# Patient Record
Sex: FEMALE | Race: WHITE | NOT HISPANIC OR LATINO | Employment: FULL TIME | URBAN - METROPOLITAN AREA
[De-identification: names, ages, dates, MRNs, and addresses within clinical notes are randomized per-mention and may not be internally consistent; named-entity substitution may affect disease eponyms.]

---

## 2013-03-20 LAB
LEFT EYE DM RETINOPATHY: NEGATIVE
RIGHT EYE DM RETINOPATHY: NEGATIVE

## 2017-01-03 DIAGNOSIS — R52 PAIN: Primary | ICD-10-CM

## 2017-01-03 RX ORDER — OXYCODONE AND ACETAMINOPHEN 5; 325 MG/1; MG/1
1 TABLET ORAL EVERY 6 HOURS PRN
Qty: 24 TABLET | Refills: 0 | Status: SHIPPED | OUTPATIENT
Start: 2017-01-03 | End: 2017-01-10 | Stop reason: SDUPTHER

## 2017-01-04 ENCOUNTER — TELEPHONE (OUTPATIENT)
Dept: ORTHOPEDICS | Facility: CLINIC | Age: 37
End: 2017-01-04

## 2017-01-06 ENCOUNTER — OFFICE VISIT (OUTPATIENT)
Dept: PSYCHIATRY | Facility: CLINIC | Age: 37
End: 2017-01-06
Payer: MEDICARE

## 2017-01-06 VITALS
DIASTOLIC BLOOD PRESSURE: 106 MMHG | BODY MASS INDEX: 43.4 KG/M2 | HEIGHT: 69 IN | HEART RATE: 71 BPM | WEIGHT: 293 LBS | SYSTOLIC BLOOD PRESSURE: 172 MMHG

## 2017-01-06 DIAGNOSIS — F39 MOOD DISORDER: Primary | ICD-10-CM

## 2017-01-06 PROCEDURE — 90833 PSYTX W PT W E/M 30 MIN: CPT | Mod: HB,AF,S$PBB, | Performed by: NURSE PRACTITIONER

## 2017-01-06 PROCEDURE — 99999 PR PBB SHADOW E&M-EST. PATIENT-LVL III: CPT | Mod: PBBFAC,,, | Performed by: NURSE PRACTITIONER

## 2017-01-06 PROCEDURE — 99214 OFFICE O/P EST MOD 30 MIN: CPT | Mod: HB,AF,S$PBB, | Performed by: NURSE PRACTITIONER

## 2017-01-06 PROCEDURE — 90833 PSYTX W PT W E/M 30 MIN: CPT | Mod: PBBFAC | Performed by: NURSE PRACTITIONER

## 2017-01-06 PROCEDURE — 99213 OFFICE O/P EST LOW 20 MIN: CPT | Mod: PBBFAC | Performed by: NURSE PRACTITIONER

## 2017-01-06 RX ORDER — DULOXETIN HYDROCHLORIDE 30 MG/1
30 CAPSULE, DELAYED RELEASE ORAL DAILY
Qty: 30 CAPSULE | Refills: 5 | Status: SHIPPED | OUTPATIENT
Start: 2017-01-06 | End: 2017-01-17 | Stop reason: SDUPTHER

## 2017-01-06 NOTE — PATIENT INSTRUCTIONS
[facility] - DEPARTMENT OF PSYCHIATRY   PATIENT INFORMATION    We appreciate the opportunity to participate in your medical care and hope the following protocols will make it easier for you to receive quality treatment in our department.    1. PUNCTUALITY: Your appointment is scheduled for a fixed amount of time.  To get the benefit of your appointment, please arrive early enough to allow time for traffic, parking and registration.  If you are late for your appointment, you may have to reschedule.  Please make every effort to be on time.      2. PAYMENT FOR SERVICES:   Payments are expected at the time of service.  Please contact [phone] if you need to resolve issues involving your account at [facility] or to set up a payment plan.    3. CANCELLATION / MISSED APPOINTMENTS:   In order to receive quality care, all appointments must be kept.  Appointment may be cancelled at least 24 hours before your appointment time. If you do not give at least 24-hour notice of cancellation a fee may be billed directly to the patient.  Please note that insurance does not cover no-show charges, so you will be billed directly.  If you are consistently late, cancel, or do not show for your appointments, our department reserves the right to terminate treatment     MESSAGES- In general you can reach the department by calling [phone], between 8:00 a.m. and 5:00 p.m., Monday through Friday.  You can also use the [facility] Patient Portal.     AFTER HOURS, WEEKEND OR HOLIDAYS- For urgent questions after hours, weekends and holidays, calling the department number [phone] will connect you with a representative.  EMERGENCY-  In case of a crisis when there is a concern of harm to self or others, call 911 or the office [phone] between 8:00 a.m. and 5:00 p.m., Monday through Friday or go to the [facility] Emergency Room.    4. PRESCRIPTION REFILLS:  Prescription refills must be done at your physician office visit, You  will be given a sufficient number of refills to last until your next appointment, you must come to appointments for prescriptions.   No additional refills will be approved beyond the original treatment plan. Again, please note that no additional prescriptions will be approved per patient request.     5. FOLLOW UP APPOINTMENTS:  Follow-up appointments can be made in person at the Psychiatry Appointment Desk, online through the [facility] Patient Portal, or by calling [phone], from 8am to 5pm, between Monday and Friday.  Patients are responsible for scheduling their own follow-up appointment,  It  Is recommended you schedule your appointment before leaving the clinic.    6. Providers are NOT ABLE to schedule appointments; all appointments must be made through the appointment department or through [facility] Portal.           PATIENTS MAY EXPERIENCE SYMPTOMS OF WITHDRAWAL IF THEY RUN OUT OF MEDICATIONS.  THE PATIENT WILL ASSUME ALL RESPONSIBILITIES OF ANY OUTCOMES WHEN THERE IS AN ISSUE WITH NONCOMPLIANCE WITH FOLLOW-UP APPOINTMENTS AND MEDICATIONS.        THERE IS TO BE NO USE OF ALCOHOL AND/OR ILLEGAL SUBSTANCES    PATIENT ARE TO GO TO THE CLOSEST EMERGENCY ROOM IF FEELING A THREAT TO THEMSELVES OR OTHER OR IF GRAVELY DISABLED    TELL US HOW WE ARE DOING.  PLEASE COMPLETE THE PATIENT SATISFACTION SURVERY  Revised December 2016

## 2017-01-06 NOTE — MR AVS SNAPSHOT
[facility] - Psychiatry  [address]  [facility] [address]  Phone: [phone]  Fax: [phone]                  Anitra Chaney   2017 8:00 AM   Office Visit    Description:  Female : 1980   Provider:  Antohny Cheema NP   Department:  [facility] - Psychiatry           Reason for Visit     Mood           Diagnoses this Visit        Comments    Mood disorder    -  Primary            To Do List           Future Appointments        Provider Department Dept Phone    2017 9:30 AM Lele Hernández MD [facility] - Orthopedics [phone]    2017 2:15 PM Radha Palomares MD [facility] - OB/GYN [address] [phone]      Goals (5 Years of Data)     None      Follow-Up and Disposition     Return in about 6 weeks (around 2017).       These Medications        Disp Refills Start End    duloxetine (CYMBALTA) 30 MG capsule 30 capsule 5 2017     Take 1 capsule (30 mg total) by mouth once daily. - Oral    Pharmacy: [facility]/pharmacy #[id number] - [place], [place] - [address] #: [phone]         [facility]     [facility] On Call Nurse Care Line -  Assistance  Registered nurses in the [facility] provide clinical advisement, health education, appointment booking, and other advisory services.  Call for this free service at [phone].             Medications           Message regarding Medications     Verify the changes and/or additions to your medication regime listed below are the same as discussed with your clinician today.  If any of these changes or additions are incorrect, please notify your healthcare provider.        START taking these NEW medications        Refills    duloxetine (CYMBALTA) 30 MG capsule 5    Sig: Take 1 capsule (30 mg total) by mouth once daily.    Class: Normal    Route: Oral      STOP taking these medications     fluoxetine (PROZAC) 40 MG capsule Take 1 capsule (40 mg total) by mouth once daily.    lamotrigine  "(LAMICTAL) 200 MG tablet Take 1 tablet (200 mg total) by mouth 2 (two) times daily.           Verify that the below list of medications is an accurate representation of the medications you are currently taking.  If none reported, the list may be blank. If incorrect, please contact your healthcare provider. Carry this list with you in case of emergency.           Current Medications     duloxetine (CYMBALTA) 30 MG capsule Take 1 capsule (30 mg total) by mouth once daily.    hydrochlorothiazide (HYDRODIURIL) 25 MG tablet Take 1 tablet (25 mg total) by mouth once daily.    multivitamin capsule Take 1 capsule by mouth Daily.    nifedipine 30 MG ORAL TR24 (PROCARDIA-XL) 30 MG (OSM) 24 hr tablet Take 1 tablet (30 mg total) by mouth once daily.    ondansetron (ZOFRAN-ODT) 8 MG TbDL Take 1 tablet (8 mg total) by mouth every 8 (eight) hours as needed.    oxycodone-acetaminophen (PERCOCET) 5-325 mg per tablet Take 1 tablet by mouth every 6 (six) hours as needed (severe pain).    oxycodone-acetaminophen (PERCOCET) 5-325 mg per tablet Take 1 tablet by mouth every 6 (six) hours as needed (severe pain).           Clinical Reference Information           Vital Signs - Last Recorded  Most recent update: 1/6/2017  7:47 AM by Liang Aquino    BP Pulse Ht Wt BMI    (!) 172/106 71 5' 9" (1.753 m) (!) 152 kg (335 lb) 49.47 kg/m2      Blood Pressure          Most Recent Value    BP  (!)  172/106      Allergies as of 1/6/2017     Mirtazapine    Ziprasidone Hcl    Morphine      Immunizations Administered on Date of Encounter - 1/6/2017     None      Instructions    [facility] - DEPARTMENT OF PSYCHIATRY   PATIENT INFORMATION    We appreciate the opportunity to participate in your medical care and hope the following protocols will make it easier for you to receive quality treatment in our department.    1. PUNCTUALITY: Your appointment is scheduled for a fixed amount of time.  To get the benefit of your appointment, please arrive " early enough to allow time for traffic, parking and registration.  If you are late for your appointment, you may have to reschedule.  Please make every effort to be on time.      2. PAYMENT FOR SERVICES:   Payments are expected at the time of service.  Please contact [phone] if you need to resolve issues involving your account at [facility] or to set up a payment plan.    3. CANCELLATION / MISSED APPOINTMENTS:   In order to receive quality care, all appointments must be kept.  Appointment may be cancelled at least 24 hours before your appointment time. If you do not give at least 24-hour notice of cancellation a fee may be billed directly to the patient.  Please note that insurance does not cover no-show charges, so you will be billed directly.  If you are consistently late, cancel, or do not show for your appointments, our department reserves the right to terminate treatment     MESSAGES- In general you can reach the department by calling [phone], between 8:00 a.m. and 5:00 p.m., Monday through Friday.  You can also use the [facility] Patient Portal.     AFTER HOURS, WEEKEND OR HOLIDAYS- For urgent questions after hours, weekends and holidays, calling the department number [phone] will connect you with a representative.  EMERGENCY-  In case of a crisis when there is a concern of harm to self or others, call 911 or the office [phone] between 8:00 a.m. and 5:00 p.m., Monday through Friday or go to the [facility] Emergency Room.    4. PRESCRIPTION REFILLS:  Prescription refills must be done at your physician office visit, You will be given a sufficient number of refills to last until your next appointment, you must come to appointments for prescriptions.   No additional refills will be approved beyond the original treatment plan. Again, please note that no additional prescriptions will be approved per patient request.     5. FOLLOW UP APPOINTMENTS:  Follow-up appointments can be made in person at the  Psychiatry Appointment Desk, online through the [facility] Patient Portal, or by calling [phone], from 8am to 5pm, between Monday and Friday.  Patients are responsible for scheduling their own follow-up appointment,  It  Is recommended you schedule your appointment before leaving the clinic.    6. Providers are NOT ABLE to schedule appointments; all appointments must be made through the appointment department or through [facility].           PATIENTS MAY EXPERIENCE SYMPTOMS OF WITHDRAWAL IF THEY RUN OUT OF MEDICATIONS.  THE PATIENT WILL ASSUME ALL RESPONSIBILITIES OF ANY OUTCOMES WHEN THERE IS AN ISSUE WITH NONCOMPLIANCE WITH FOLLOW-UP APPOINTMENTS AND MEDICATIONS.        THERE IS TO BE NO USE OF ALCOHOL AND/OR ILLEGAL SUBSTANCES    PATIENT ARE TO GO TO THE CLOSEST EMERGENCY ROOM IF FEELING A THREAT TO THEMSELVES OR OTHER OR IF GRAVELY DISABLED    TELL US HOW WE ARE DOING.  PLEASE COMPLETE THE PATIENT SATISFACTION SURVERY  Revised December 2016         Smoking Cessation     If you would like to quit smoking:   You may be eligible for free services if you are a [place] resident and started smoking cigarettes before September 1, 1988.  Call the [employer]oking [employer]essation [employer] ([employer]) toll free at ([phone] or [phone] 821-8216.   Call 4-554-QUIT-NOW if you do not meet the above criteria.
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
## 2017-01-10 DIAGNOSIS — R52 PAIN: ICD-10-CM

## 2017-01-10 RX ORDER — OXYCODONE AND ACETAMINOPHEN 5; 325 MG/1; MG/1
1 TABLET ORAL EVERY 6 HOURS PRN
Qty: 24 TABLET | Refills: 0 | Status: CANCELLED | OUTPATIENT
Start: 2017-01-10

## 2017-01-11 RX ORDER — OXYCODONE AND ACETAMINOPHEN 5; 325 MG/1; MG/1
1 TABLET ORAL EVERY 6 HOURS PRN
Qty: 60 TABLET | Refills: 0 | Status: SHIPPED | OUTPATIENT
Start: 2017-01-11 | End: 2017-01-30 | Stop reason: SDUPTHER

## 2017-01-13 DIAGNOSIS — E11.9 TYPE 2 DIABETES MELLITUS WITHOUT COMPLICATION: ICD-10-CM

## 2017-01-17 DIAGNOSIS — F39 MOOD DISORDER: ICD-10-CM

## 2017-01-17 RX ORDER — DULOXETIN HYDROCHLORIDE 30 MG/1
30 CAPSULE, DELAYED RELEASE ORAL DAILY
Qty: 30 CAPSULE | Refills: 5 | Status: SHIPPED | OUTPATIENT
Start: 2017-01-17 | End: 2017-07-12

## 2017-01-30 DIAGNOSIS — R52 PAIN: ICD-10-CM

## 2017-01-30 RX ORDER — OXYCODONE AND ACETAMINOPHEN 5; 325 MG/1; MG/1
1 TABLET ORAL EVERY 6 HOURS PRN
Qty: 24 TABLET | Refills: 0 | Status: CANCELLED | OUTPATIENT
Start: 2017-01-30

## 2017-01-31 RX ORDER — OXYCODONE AND ACETAMINOPHEN 5; 325 MG/1; MG/1
1 TABLET ORAL EVERY 6 HOURS PRN
Qty: 60 TABLET | Refills: 0 | Status: SHIPPED | OUTPATIENT
Start: 2017-01-31 | End: 2017-02-09 | Stop reason: SDUPTHER

## 2017-02-09 DIAGNOSIS — R52 PAIN: ICD-10-CM

## 2017-02-10 RX ORDER — OXYCODONE AND ACETAMINOPHEN 5; 325 MG/1; MG/1
1 TABLET ORAL EVERY 6 HOURS PRN
Qty: 60 TABLET | Refills: 0 | Status: SHIPPED | OUTPATIENT
Start: 2017-02-10 | End: 2017-02-24 | Stop reason: SDUPTHER

## 2017-02-10 RX ORDER — HYDROCHLOROTHIAZIDE 25 MG/1
25 TABLET ORAL DAILY
Qty: 90 TABLET | Refills: 0 | Status: SHIPPED | OUTPATIENT
Start: 2017-02-10 | End: 2018-02-05 | Stop reason: SDUPTHER

## 2017-02-24 DIAGNOSIS — R52 PAIN: ICD-10-CM

## 2017-02-24 RX ORDER — OXYCODONE AND ACETAMINOPHEN 5; 325 MG/1; MG/1
1 TABLET ORAL EVERY 6 HOURS PRN
Qty: 60 TABLET | Refills: 0 | Status: SHIPPED | OUTPATIENT
Start: 2017-02-24 | End: 2017-03-10 | Stop reason: SDUPTHER

## 2017-03-10 DIAGNOSIS — R52 PAIN: ICD-10-CM

## 2017-03-10 RX ORDER — OXYCODONE AND ACETAMINOPHEN 5; 325 MG/1; MG/1
1 TABLET ORAL EVERY 6 HOURS PRN
Qty: 60 TABLET | Refills: 0 | Status: SHIPPED | OUTPATIENT
Start: 2017-03-10 | End: 2017-03-23 | Stop reason: SDUPTHER

## 2017-03-23 DIAGNOSIS — R52 PAIN: ICD-10-CM

## 2017-03-24 RX ORDER — OXYCODONE AND ACETAMINOPHEN 5; 325 MG/1; MG/1
1 TABLET ORAL EVERY 6 HOURS PRN
Qty: 60 TABLET | Refills: 0 | Status: SHIPPED | OUTPATIENT
Start: 2017-03-24 | End: 2017-04-04 | Stop reason: SDUPTHER

## 2017-03-27 RX ORDER — HYDROCHLOROTHIAZIDE 25 MG/1
25 TABLET ORAL DAILY
Qty: 90 TABLET | Refills: 0 | Status: CANCELLED | OUTPATIENT
Start: 2017-03-27

## 2017-03-27 NOTE — TELEPHONE ENCOUNTER
Sultana at pt pharmacy states pt has refills on file for HCTZ. Dylan states he will fill the rx for pt. Pt informed via pt portal.

## 2017-04-04 DIAGNOSIS — R52 PAIN: ICD-10-CM

## 2017-04-04 RX ORDER — OXYCODONE AND ACETAMINOPHEN 5; 325 MG/1; MG/1
1 TABLET ORAL EVERY 6 HOURS PRN
Qty: 60 TABLET | Refills: 0 | Status: SHIPPED | OUTPATIENT
Start: 2017-04-04 | End: 2017-04-17 | Stop reason: SDUPTHER

## 2017-04-17 DIAGNOSIS — R52 PAIN: ICD-10-CM

## 2017-04-17 RX ORDER — OXYCODONE AND ACETAMINOPHEN 5; 325 MG/1; MG/1
1 TABLET ORAL EVERY 6 HOURS PRN
Qty: 60 TABLET | Refills: 0 | Status: SHIPPED | OUTPATIENT
Start: 2017-04-17 | End: 2017-05-01 | Stop reason: SDUPTHER

## 2017-04-26 ENCOUNTER — PATIENT MESSAGE (OUTPATIENT)
Dept: OBSTETRICS AND GYNECOLOGY | Facility: CLINIC | Age: 37
End: 2017-04-26

## 2017-04-26 ENCOUNTER — TELEPHONE (OUTPATIENT)
Dept: OBSTETRICS AND GYNECOLOGY | Facility: CLINIC | Age: 37
End: 2017-04-26

## 2017-04-26 DIAGNOSIS — N92.6 IRREGULAR MENSES: Primary | ICD-10-CM

## 2017-04-26 NOTE — TELEPHONE ENCOUNTER
Pt stated that she has not had a cycle in 2 months and stated that she has been experiencing hot flashes and her pregnancy tests were negative.  Do you want her to come in or just get labs drawn?

## 2017-04-26 NOTE — TELEPHONE ENCOUNTER
She has had some medical issues recently, I believe.  We can start with labs.  I have placed orders.

## 2017-04-27 ENCOUNTER — TELEPHONE (OUTPATIENT)
Dept: OBSTETRICS AND GYNECOLOGY | Facility: CLINIC | Age: 37
End: 2017-04-27

## 2017-05-01 ENCOUNTER — TELEPHONE (OUTPATIENT)
Dept: ORTHOPEDICS | Facility: CLINIC | Age: 37
End: 2017-05-01

## 2017-05-01 DIAGNOSIS — R52 PAIN: ICD-10-CM

## 2017-05-01 RX ORDER — OXYCODONE AND ACETAMINOPHEN 5; 325 MG/1; MG/1
1 TABLET ORAL EVERY 6 HOURS PRN
Qty: 60 TABLET | Refills: 0 | Status: SHIPPED | OUTPATIENT
Start: 2017-05-01 | End: 2017-05-15 | Stop reason: SDUPTHER

## 2017-05-04 PROBLEM — I63.9 CVA (CEREBRAL VASCULAR ACCIDENT): Status: ACTIVE | Noted: 2017-05-04

## 2017-05-15 ENCOUNTER — TELEPHONE (OUTPATIENT)
Dept: ORTHOPEDICS | Facility: CLINIC | Age: 37
End: 2017-05-15

## 2017-05-15 DIAGNOSIS — R52 PAIN: ICD-10-CM

## 2017-05-15 RX ORDER — OXYCODONE AND ACETAMINOPHEN 5; 325 MG/1; MG/1
1 TABLET ORAL EVERY 6 HOURS PRN
Qty: 60 TABLET | Refills: 0 | Status: SHIPPED | OUTPATIENT
Start: 2017-05-15 | End: 2017-05-26 | Stop reason: SDUPTHER

## 2017-05-22 ENCOUNTER — TELEPHONE (OUTPATIENT)
Dept: INTERNAL MEDICINE | Facility: CLINIC | Age: 37
End: 2017-05-22

## 2017-05-22 NOTE — LETTER
May 22, 2017    Anitratomasa Chaney  [address] [address]             [facility]  [address] [facility] [address]  Phone: [phone]  Fax: [phone] Dear Ms. Chaney:    We were unable to contact you in regards to scheduling an appointment with  Dr. Watts.  Please contact us at [phone] so we can schedule you to see your primary care provider at your earliest convenience.        If you have any questions or concerns, please don't hesitate to call.    Sincerely,        Kathryn Frazier

## 2017-05-26 ENCOUNTER — TELEPHONE (OUTPATIENT)
Dept: ORTHOPEDICS | Facility: CLINIC | Age: 37
End: 2017-05-26

## 2017-05-26 DIAGNOSIS — R52 PAIN: ICD-10-CM

## 2017-05-26 RX ORDER — OXYCODONE AND ACETAMINOPHEN 5; 325 MG/1; MG/1
1 TABLET ORAL EVERY 6 HOURS PRN
Qty: 60 TABLET | Refills: 0 | Status: SHIPPED | OUTPATIENT
Start: 2017-05-26 | End: 2017-06-09 | Stop reason: SDUPTHER

## 2017-06-09 DIAGNOSIS — R52 PAIN: ICD-10-CM

## 2017-06-09 RX ORDER — OXYCODONE AND ACETAMINOPHEN 5; 325 MG/1; MG/1
1 TABLET ORAL EVERY 6 HOURS PRN
Qty: 60 TABLET | Refills: 0 | Status: SHIPPED | OUTPATIENT
Start: 2017-06-09 | End: 2017-06-23 | Stop reason: SDUPTHER

## 2017-06-23 ENCOUNTER — TELEPHONE (OUTPATIENT)
Dept: ORTHOPEDICS | Facility: CLINIC | Age: 37
End: 2017-06-23

## 2017-06-23 DIAGNOSIS — R52 PAIN: ICD-10-CM

## 2017-06-23 RX ORDER — OXYCODONE AND ACETAMINOPHEN 5; 325 MG/1; MG/1
1 TABLET ORAL EVERY 6 HOURS PRN
Qty: 60 TABLET | Refills: 0 | Status: SHIPPED | OUTPATIENT
Start: 2017-06-23 | End: 2017-07-06 | Stop reason: SDUPTHER

## 2017-06-23 NOTE — TELEPHONE ENCOUNTER
Left voice mail for pt to return my call.  Script ready for  at our office.  Pending return call to discuss next step in treatment.   Surgery vs referral to PM&R.

## 2017-07-06 ENCOUNTER — HOSPITAL ENCOUNTER (EMERGENCY)
Facility: OTHER | Age: 37
Discharge: HOME | End: 2017-07-06
Attending: EMERGENCY MEDICINE
Payer: MEDICARE

## 2017-07-06 ENCOUNTER — TELEPHONE (OUTPATIENT)
Dept: INTERNAL MEDICINE | Facility: CLINIC | Age: 37
End: 2017-07-06

## 2017-07-06 VITALS
RESPIRATION RATE: 17 BRPM | SYSTOLIC BLOOD PRESSURE: 150 MMHG | TEMPERATURE: 98 F | DIASTOLIC BLOOD PRESSURE: 93 MMHG | WEIGHT: 293 LBS | HEART RATE: 79 BPM | OXYGEN SATURATION: 100 % | BODY MASS INDEX: 43.4 KG/M2 | HEIGHT: 69 IN

## 2017-07-06 DIAGNOSIS — R52 PAIN: ICD-10-CM

## 2017-07-06 DIAGNOSIS — R11.2 NON-INTRACTABLE VOMITING WITH NAUSEA, UNSPECIFIED VOMITING TYPE: ICD-10-CM

## 2017-07-06 DIAGNOSIS — I10 ESSENTIAL HYPERTENSION: Primary | ICD-10-CM
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
## 2017-07-06 LAB
ALBUMIN SERPL BCP-MCNC: 3.7 G/DL
ALP SERPL-CCNC: 71 U/L
ALT SERPL W/O P-5'-P-CCNC: 27 U/L
ANION GAP SERPL CALC-SCNC: 10 MMOL/L
AST SERPL-CCNC: 19 U/L
B-HCG UR QL: NEGATIVE
BASOPHILS # BLD AUTO: 0.03 K/UL
BASOPHILS NFR BLD: 0.3 %
BILIRUB SERPL-MCNC: 0.4 MG/DL
BILIRUB UR QL STRIP: NEGATIVE
BUN SERPL-MCNC: 12 MG/DL
CALCIUM SERPL-MCNC: 9.8 MG/DL
CHLORIDE SERPL-SCNC: 108 MMOL/L
CLARITY UR: CLEAR
CO2 SERPL-SCNC: 21 MMOL/L
COLOR UR: YELLOW
CREAT SERPL-MCNC: 0.7 MG/DL
CTP QC/QA: YES
DIFFERENTIAL METHOD: ABNORMAL
EOSINOPHIL # BLD AUTO: 0.2 K/UL
EOSINOPHIL NFR BLD: 1.6 %
ERYTHROCYTE [DISTWIDTH] IN BLOOD BY AUTOMATED COUNT: 13.8 %
EST. GFR  (AFRICAN AMERICAN): >60 ML/MIN/1.73 M^2
EST. GFR  (NON AFRICAN AMERICAN): >60 ML/MIN/1.73 M^2
GLUCOSE SERPL-MCNC: 98 MG/DL
GLUCOSE UR QL STRIP: NEGATIVE
HCT VFR BLD AUTO: 40.9 %
HGB BLD-MCNC: 13.7 G/DL
HGB UR QL STRIP: ABNORMAL
INR PPP: 0.9
KETONES UR QL STRIP: NEGATIVE
LEUKOCYTE ESTERASE UR QL STRIP: NEGATIVE
LYMPHOCYTES # BLD AUTO: 2.5 K/UL
LYMPHOCYTES NFR BLD: 20.7 %
MCH RBC QN AUTO: 28.7 PG
MCHC RBC AUTO-ENTMCNC: 33.5 %
MCV RBC AUTO: 86 FL
MONOCYTES # BLD AUTO: 0.5 K/UL
MONOCYTES NFR BLD: 4.2 %
NEUTROPHILS # BLD AUTO: 8.6 K/UL
NEUTROPHILS NFR BLD: 72.8 %
NITRITE UR QL STRIP: NEGATIVE
PH UR STRIP: 6 [PH] (ref 5–8)
PLATELET # BLD AUTO: 268 K/UL
PMV BLD AUTO: 9.5 FL
POCT GLUCOSE: 116 MG/DL (ref 70–110)
POTASSIUM SERPL-SCNC: 4.1 MMOL/L
PROT SERPL-MCNC: 7.4 G/DL
PROT UR QL STRIP: NEGATIVE
PROTHROMBIN TIME: 10.2 SEC
RBC # BLD AUTO: 4.78 M/UL
SODIUM SERPL-SCNC: 139 MMOL/L
SP GR UR STRIP: 1.01 (ref 1–1.03)
URN SPEC COLLECT METH UR: ABNORMAL
UROBILINOGEN UR STRIP-ACNC: NEGATIVE EU/DL
WBC # BLD AUTO: 11.82 K/UL

## 2017-07-06 PROCEDURE — 63600175 PHARM REV CODE 636 W HCPCS: Performed by: EMERGENCY MEDICINE

## 2017-07-06 PROCEDURE — 81003 URINALYSIS AUTO W/O SCOPE: CPT

## 2017-07-06 PROCEDURE — 96361 HYDRATE IV INFUSION ADD-ON: CPT

## 2017-07-06 PROCEDURE — 96374 THER/PROPH/DIAG INJ IV PUSH: CPT

## 2017-07-06 PROCEDURE — 93010 ELECTROCARDIOGRAM REPORT: CPT | Mod: ,,, | Performed by: INTERNAL MEDICINE

## 2017-07-06 PROCEDURE — 25000003 PHARM REV CODE 250: Performed by: EMERGENCY MEDICINE

## 2017-07-06 PROCEDURE — 85025 COMPLETE CBC W/AUTO DIFF WBC: CPT

## 2017-07-06 PROCEDURE — 81025 URINE PREGNANCY TEST: CPT | Performed by: EMERGENCY MEDICINE

## 2017-07-06 PROCEDURE — 80053 COMPREHEN METABOLIC PANEL: CPT

## 2017-07-06 PROCEDURE — 99284 EMERGENCY DEPT VISIT MOD MDM: CPT | Mod: 25

## 2017-07-06 PROCEDURE — 85610 PROTHROMBIN TIME: CPT

## 2017-07-06 RX ORDER — ONDANSETRON 8 MG/1
8 TABLET, ORALLY DISINTEGRATING ORAL
Status: COMPLETED | OUTPATIENT
Start: 2017-07-06 | End: 2017-07-06

## 2017-07-06 RX ORDER — MECLIZINE HYDROCHLORIDE 25 MG/1
25 TABLET ORAL 3 TIMES DAILY PRN
Qty: 20 TABLET | Refills: 0 | Status: SHIPPED | OUTPATIENT
Start: 2017-07-06 | End: 2017-11-03

## 2017-07-06 RX ORDER — METOCLOPRAMIDE 10 MG/1
10 TABLET ORAL EVERY 6 HOURS
Qty: 30 TABLET | Refills: 0 | Status: SHIPPED | OUTPATIENT
Start: 2017-07-06 | End: 2017-11-03

## 2017-07-06 RX ORDER — KETOROLAC TROMETHAMINE 30 MG/ML
15 INJECTION, SOLUTION INTRAMUSCULAR; INTRAVENOUS
Status: COMPLETED | OUTPATIENT
Start: 2017-07-06 | End: 2017-07-06

## 2017-07-06 RX ORDER — MECLIZINE HYDROCHLORIDE 25 MG/1
50 TABLET ORAL
Status: COMPLETED | OUTPATIENT
Start: 2017-07-06 | End: 2017-07-06

## 2017-07-06 RX ADMIN — SODIUM CHLORIDE 1000 ML: 0.9 INJECTION, SOLUTION INTRAVENOUS at 12:07

## 2017-07-06 RX ADMIN — ONDANSETRON 8 MG: 8 TABLET, ORALLY DISINTEGRATING ORAL at 12:07

## 2017-07-06 RX ADMIN — MECLIZINE HYDROCHLORIDE 50 MG: 25 TABLET ORAL at 12:07

## 2017-07-06 RX ADMIN — SODIUM CHLORIDE 1000 ML: 0.9 INJECTION, SOLUTION INTRAVENOUS at 01:07

## 2017-07-06 RX ADMIN — KETOROLAC TROMETHAMINE 15 MG: 30 INJECTION, SOLUTION INTRAMUSCULAR at 01:07

## 2017-07-06 NOTE — TELEPHONE ENCOUNTER
----- Message from Donna Ortiz sent at 7/6/2017  9:47 AM CDT -----  Contact: HILLARY TYLER [[id number]]  x_  1st Request  _  2nd Request  _  3rd Request        Who: HILLARY TYLER [[id number]]    Why: patient is returning a call    What Number to Call Back: [phone]    When to Expect a call back: (Before the end of the day)   -- if call after 3:00 call back will be tomorrow.

## 2017-07-06 NOTE — TELEPHONE ENCOUNTER
Fell and hit her head on a dresser. Pt states she was   Conscious the entire time.Got up dizzy/syncocpe. Pt c/o Knot on head/ shoulder arm pain/nauseous. Pt advised to seek tx in ER. Pt agreeable. Patient has no further questions or concerns.

## 2017-07-06 NOTE — ED NOTES
Rounding on the patient has been done. Pt is AAOx 4,  respirations even, unlabored, vital signs stable with monitoring in progress.  Pain was assessed and is currently a pain level 8/10, requesting meds for relief. Comfort positioning and restroom needs were addressed. She complains of headaches and dizziness. The patient has been updated on the plan of care and current status. The call bell is within reach with instructions of usage for any additional patient needs. The patient is resting comfortably in stretcher with wheels locked and bed in lowest position. Will inform MD of pts request and continue to monitor.

## 2017-07-06 NOTE — ED NOTES
Pt rounding complete. Pain 8/10, states she doesn't feel any better since receiving pain meds.  Complaining of nausea. Restroom and comfort needs addressed. Pt updated on plan of care. Will continue to monitor.

## 2017-07-06 NOTE — TELEPHONE ENCOUNTER
----- Message from Jyotsna Norris sent at 7/6/2017  7:37 AM CDT -----  _  1st Request  _  2nd Request  _  3rd Request        Who: patient    Why please call pt she fell and hit her head, she is feeling weak and has a headache:     What Number to Call Back: [phone]    When to Expect a call back: (With in 24 hours)

## 2017-07-06 NOTE — ED PROVIDER NOTES
"Encounter Date: 7/6/2017    SCRIBE #1 NOTE: I, José Gilman, am scribing for, and in the presence of,  Dr. Moss. I have scribed the entire note.       History     Chief Complaint   Patient presents with    Loss of Consciousness     "I stood up yesterday and blacked out. I don't feel right. Every time I try to stand up I start throwing up."     Time seen by provider: 11:15 AM    This is a 37 y.o. female with HTN, DM, GERD and a Hx of TIA who presents with complaint of right sided "heaviness" starting yesterday. She reports that yesterday when she got up she felt light headed and then fell, hitting her head on the dresser. She slept through most of the day and woke up 8 hours ago with nausea, vomiting, dizziness, and the right sided "heaviness and coldness." She also complains of a HA rated a 7/10 in severity. When she attempts to stand she states "I'm getting hot, like I'm walking into a heater." She takes ASA daily and also took 800mg ibuprofen without relief. She denies SOB, CP, numbness and LOC. She reports that she had a stroke 6 months ago and has some right sided weakness.      The history is provided by the patient.     Review of patient's allergies indicates:   Allergen Reactions    Mirtazapine      Other reaction(s): tachycardia    Ziprasidone hcl      Other reaction(s): Unknown  Other reaction(s): Unknown    Morphine Hives and Rash     Past Medical History:   Diagnosis Date    Anxiety     Chest pain, unspecified 9/13/2013 9/13/2013: For three days.    Depression     Diabetes mellitus     Diabetes mellitus, type 2     DM2 (diabetes mellitus, type 2)     diet control    Eyelid lesion     LLL    GERD (gastroesophageal reflux disease)     History of conjunctivitis     History of PCOS     HTN (hypertension), benign 3/10/2014    Migraines     Obesity     TIA (transient ischemic attack) 2013     Past Surgical History:   Procedure Laterality Date    ANKLE FRACTURE SURGERY      "  SECTION, CLASSIC  2005/2009    x2    CHOLECYSTECTOMY  3/2002    lap maribel    FOOT SURGERY       Family History   Problem Relation Age of Onset    Other Father      house fire     Social History   Substance Use Topics    Smoking status: Current Every Day Smoker     Packs/day: 1.00     Years: 18.00     Types: Cigarettes    Smokeless tobacco: Never Used      Comment: cutting to 3-4 cigarette per day    Alcohol use No     Review of Systems   Constitutional: Negative for chills, diaphoresis and fever.   HENT: Negative for sore throat.    Eyes: Negative for pain and visual disturbance.   Respiratory: Negative for cough and shortness of breath.    Cardiovascular: Negative for chest pain.   Gastrointestinal: Positive for nausea and vomiting. Negative for abdominal pain.   Genitourinary: Negative for dysuria.   Musculoskeletal: Negative for myalgias.   Skin: Negative for color change.   Neurological: Positive for dizziness, weakness and headaches. Negative for syncope, facial asymmetry and numbness.   Psychiatric/Behavioral: Negative for behavioral problems and confusion.       Physical Exam     Initial Vitals [17 1053]   BP Pulse Resp Temp SpO2   (!) 158/108 85 16 98.3 °F (36.8 °C) 98 %      MAP       124.67         Physical Exam    Nursing note and vitals reviewed.  Constitutional: She appears well-nourished. She is not diaphoretic. No distress.   Morbidly obese.    HENT:   Head: Normocephalic and atraumatic.   Mouth/Throat: Oropharynx is clear and moist.   Tongue is midline.    Eyes: Conjunctivae are normal. Pupils are equal, round, and reactive to light. Right eye exhibits no discharge. Left eye exhibits no discharge.   Neck: Normal range of motion. Neck supple.   Cardiovascular: Normal rate, regular rhythm, normal heart sounds and intact distal pulses. Exam reveals no gallop and no friction rub.    No murmur heard.  No tachycardia.    Pulmonary/Chest: Breath sounds normal. No respiratory distress.  She has no wheezes. She has no rhonchi. She has no rales.   Abdominal: Soft. Bowel sounds are normal. She exhibits no distension. There is no tenderness. There is no rebound and no guarding.   Musculoskeletal: Normal range of motion. She exhibits no edema or tenderness.   Neurological: She is alert and oriented to person, place, and time. No sensory deficit.   No facial droop. 4/5 right hand  strength. Slight pronator drift on the right. Pt is symptomatic upon standing. 5/5 lower leg strength.    Skin: Skin is warm and dry. No rash and no abscess noted. No erythema. No pallor.   Psychiatric: She has a normal mood and affect. Her behavior is normal. Judgment and thought content normal.         ED Course   Procedures  Labs Reviewed   CBC W/ AUTO DIFFERENTIAL - Abnormal; Notable for the following:        Result Value    Gran # 8.6 (*)     All other components within normal limits   COMPREHENSIVE METABOLIC PANEL - Abnormal; Notable for the following:     CO2 21 (*)     All other components within normal limits   URINALYSIS - Abnormal; Notable for the following:     Occult Blood UA Trace (*)     All other components within normal limits   POCT GLUCOSE - Abnormal; Notable for the following:     POCT Glucose 116 (*)     All other components within normal limits   PROTIME-INR   POCT URINE PREGNANCY   POCT GLUCOSE MONITORING CONTINUOUS     EKG Readings: (Independently Interpreted)   Initial Reading: No STEMI.   NSR rate of 75. Normal axis and intervals. No STEMI.            Imaging Results          CT Head Without Contrast (Final result)  Result time 07/06/17 11:58:06    Final result by Beau Rueda MD (07/06/17 11:58:06)                 Impression:     Normal exam.      Electronically signed by: BEAU RUEDA MD  Date:     07/06/17  Time:    11:58              Narrative:    Comparison: MRI brain dated 3/8/13.    Technique: Contiguous 5 mm axial images were obtained from the vertex to the skull base without the  administration of contrast.  Sagittal and coronal reformats were also submitted.      Findings: There is normal brain parenchymal attenuation with no intra-axial or extra-axial mass or hemorrhage.  There is no evidence for acute ischemia or infarct.  The gray-white matter differentiation is preserved.  The CSF containing spaces maintained normal size, symmetry and volume.  The pneumatized aspect of the skull and skull base show no abnormalities.  The osseous and soft tissue structures are normal.                             Medical Decision Making:   Initial Assessment:   Evaluation of 37-year-old female here with mult medical problems here w nausea and vomiting in the setting of recent fall. Patient has a history of CVA sp tpa- with mild residual right-sided deficit, hypertension, diabetes and obesity.  Patient reports feeling abnormal last night, but contaminated from bed, patient had a near syncopal episode, striking the back of her head, without LOC.  Patient attempted to get evaluated by her primary care provider, Dr. Watts, but came to ED on his encouragement.  On exam patient is obese, nontoxic-appearing, able to ambulate without difficulty.  Differential includes anemia, orthostasis, lowered suspicion for head injury.  Clinical Tests:   Lab Tests: Ordered and Reviewed  Radiological Study: Ordered and Reviewed  Medical Tests: Ordered and Reviewed  ED Management:  Patient symptomatic upon standing, not hypotensive, improved after IV fluids.  Labs without gross abnormalities, head CT without obvious ischemic pathology/nor head bleed.  Will DC home with follow-up Dr. Watts.     Additional MDM:   EKG: I have independently interpreted EKG(s) - see notes.          Scribe Attestation:   Scribe #1: I performed the above scribed service and the documentation accurately describes the services I performed. I attest to the accuracy of the note.    Attending Attestation:           Physician Attestation for  Scribe:  Physician Attestation Statement for Scribe #1: I, Dr. Moss, reviewed documentation, as scribed by José Gilman in my presence, and it is both accurate and complete.                 ED Course     Clinical Impression:     1. Essential hypertension    2. Fall    3. Non-intractable vomiting with nausea, unspecified vomiting type         Disposition:   Disposition: Discharged  Condition: Stable                        Kamryn Moss MD  07/06/17 7801

## 2017-07-07 ENCOUNTER — TELEPHONE (OUTPATIENT)
Dept: ORTHOPEDICS | Facility: CLINIC | Age: 37
End: 2017-07-07

## 2017-07-07 RX ORDER — OXYCODONE AND ACETAMINOPHEN 5; 325 MG/1; MG/1
1 TABLET ORAL EVERY 6 HOURS PRN
Qty: 60 TABLET | Refills: 0 | Status: SHIPPED | OUTPATIENT
Start: 2017-07-07 | End: 2017-07-24 | Stop reason: SDUPTHER

## 2017-07-12 ENCOUNTER — OFFICE VISIT (OUTPATIENT)
Dept: INTERNAL MEDICINE | Facility: CLINIC | Age: 37
End: 2017-07-12
Attending: FAMILY MEDICINE
Payer: MEDICAID

## 2017-07-12 VITALS
HEART RATE: 92 BPM | DIASTOLIC BLOOD PRESSURE: 100 MMHG | HEIGHT: 69 IN | BODY MASS INDEX: 43.4 KG/M2 | SYSTOLIC BLOOD PRESSURE: 150 MMHG | WEIGHT: 293 LBS

## 2017-07-12 DIAGNOSIS — G47.33 OSA (OBSTRUCTIVE SLEEP APNEA): ICD-10-CM

## 2017-07-12 DIAGNOSIS — E11.9 TYPE 2 DIABETES MELLITUS WITHOUT COMPLICATION, WITHOUT LONG-TERM CURRENT USE OF INSULIN: ICD-10-CM

## 2017-07-12 DIAGNOSIS — F17.210 CIGARETTE SMOKER: ICD-10-CM

## 2017-07-12 DIAGNOSIS — E66.01 MORBID OBESITY DUE TO EXCESS CALORIES: ICD-10-CM

## 2017-07-12 DIAGNOSIS — I10 HTN (HYPERTENSION), BENIGN: Primary | ICD-10-CM

## 2017-07-12 PROCEDURE — 3044F HG A1C LEVEL LT 7.0%: CPT | Mod: S$GLB,,, | Performed by: FAMILY MEDICINE

## 2017-07-12 PROCEDURE — 99213 OFFICE O/P EST LOW 20 MIN: CPT | Mod: PBBFAC | Performed by: FAMILY MEDICINE

## 2017-07-12 PROCEDURE — 99214 OFFICE O/P EST MOD 30 MIN: CPT | Mod: S$GLB,,, | Performed by: FAMILY MEDICINE

## 2017-07-12 PROCEDURE — 99999 PR PBB SHADOW E&M-EST. PATIENT-LVL III: CPT | Mod: PBBFAC,,, | Performed by: FAMILY MEDICINE

## 2017-07-12 PROCEDURE — 4010F ACE/ARB THERAPY RXD/TAKEN: CPT | Mod: S$GLB,,, | Performed by: FAMILY MEDICINE

## 2017-07-12 RX ORDER — AMLODIPINE BESYLATE 10 MG/1
10 TABLET ORAL DAILY
Qty: 30 TABLET | Refills: 11 | Status: SHIPPED | OUTPATIENT
Start: 2017-07-12 | End: 2017-11-12

## 2017-07-12 NOTE — LETTER
July 12, 2017      [facility]  [address]  [facility] [address]  Phone: [phone]  Fax: [phone]       Patient: Anitra Chaney   YOB: 1980  Date of Visit: 07/12/2017    To Whom It May Concern:    Anitra Peterson was at [facility] on 07/12/2017. She may return to work on today with no restrictions. If you have any questions or concerns, or if I can be of further assistance, please do not hesitate to contact me.    Sincerely,    Tien Watts MD

## 2017-07-12 NOTE — LETTER
July 12, 2017    Anitra Chaney  [address] [address]             [facility]  [address] [place] [address]  Phone: [phone]  Fax: [phone] Dear Dr Wasserman and Ms. Chaney:    I am referring the above patient to you for bariatric surgery for morbid obesity.  I believe the patient is a good candidate for surgery and would benefit greatly from significant weight loss.     The patient's history includes comorbidities of diabetes, hypertension and a CVA. There is no significant liver, kidney or GI disease present; there is no treatable metabolic cause for obesity such as adrenal or thyroid disorder and TSH levels are normal upon recent testing. There is no history of alcohol or drug abuse.     The patient has been morbidly obese for 20 years and has tried and failed multiple diets. Her current weight is 333 pounds and her height is 5 foot 10 which gives her a BMI of 49. Because of her morbid obesity and the health conditions listed above it is medically necessary that the above patient undergo bariatric surgery.        If you have any questions or concerns, please don't hesitate to call.    Sincerely,        Tien Watts MD

## 2017-07-20 NOTE — PROGRESS NOTES
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  The patient has a long psychiatric history.      For several weeks she is having jose leg pain.     She reports falling asleep at work.  She snores.  She has obstructive sleep apnea as a complication of her morbid obesity.  I had her see sleep medicine.      She has a history of mood disorder.  She sees psychiatry.  She takes lamotrigine.    REVIEW OF SYSTEMS:  GENERAL: No fever, chills, fatigability or weight loss.  SKIN: No rashes, itching or changes in color or texture of skin.  HEAD: No headaches or recent head trauma.  EYES: Visual acuity fine. No photophobia, ocular pain or diplopia.  EARS: Denies ear pain, discharge or vertigo.  NOSE: No loss of smell, no epistaxis or postnasal drip.  MOUTH & THROAT: No hoarseness or change in voice. No excessive gum bleeding.  NODES: Denies swollen glands.  CHEST: Denies OLIVARES, cyanosis, wheezing, cough and sputum production.  CARDIOVASCULAR: Denies PND, orthopnea or reduced exercise tolerance.  ABDOMEN: Appetite fine. No weight loss. Denies diarrhea, abdominal pain, hematemesis or blood in stool.  URINARY: No flank pain, dysuria or hematuria.  PERIPHERAL VASCULAR: No claudication or cyanosis.  MUSCULOSKELETAL: No joint stiffness or swelling.  Except as mentioned above  NEUROLOGIC: No history of seizures.  She was admitted for a TIA.    SOCIAL HISTORY: The patient does not smoke.  The patient consumes alcohol socially.  The patient is .  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      PHYSICAL EXAMINATION:   Blood pressure (!) 150/100, pulse 92, height 5' 9" (1.753 m), weight (!) 150.9 kg (332 lb 10.8 oz).      APPEARANCE: Well nourished, well developed, in no acute distress.    HEAD: Normocephalic, atraumatic.  EYES: PERRL. EOMI.  Conjunctivae without injection and  anicteric  EARS: TM's intact. Light " reflex normal. No retraction or perforation.    NOSE: Mucosa pink. Airway clear.  MOUTH & THROAT: No tonsillar enlargement. No pharyngeal erythema or exudate. No stridor.  NECK: Supple.   NODES: No cervical, axillary or inguinal lymph node enlargement.  CHEST: Lungs clear to auscultation.  No retractions are noted.  No rales or rhonchi are present.  CARDIOVASCULAR: Normal S1, S2. No rubs, murmurs or gallops.  ABDOMEN: Bowel sounds normal. Not distended. Soft. No tenderness or masses.  No ascites is noted.  MUSCULOSKELETAL:  There is no clubbing, cyanosis, or edema of the extremities x4.  There is full range of motion of the lumbar spine.  There is full range of motion of the extremities x4.  There is no deformity noted.    NEUROLOGIC:       Normal speech development.      Hearing normal.      Normal gait.      Motor and sensory exams grossly normal.      DTR's normal.  PSYCHIATRIC: Patient is alert and oriented x3.  Thought processes are all normal.  There is no homicidality.  There is no suicidality.  There is no evidence of psychosis.    LABORATORY/RADIOLOGY:   Chart reviewed.     ASSESSMENT:   Bilateral leg pain   Morbid obesity with a BMI of 45  SARAH / snoring  TIA, resolved  Mood disorder on Lamictal  HTN     PLAN:  Amlodipine and HCTZ  Lamictal  Patient has been by sleep medicine.  Continue current medications     She understands she needs to lose weight.  She is currently seeing bariatric surgery.  Return to clinic in 1 month

## 2017-07-24 ENCOUNTER — TELEPHONE (OUTPATIENT)
Dept: ORTHOPEDICS | Facility: CLINIC | Age: 37
End: 2017-07-24

## 2017-07-24 DIAGNOSIS — R52 PAIN: ICD-10-CM

## 2017-07-24 RX ORDER — OXYCODONE AND ACETAMINOPHEN 5; 325 MG/1; MG/1
1 TABLET ORAL EVERY 6 HOURS PRN
Qty: 60 TABLET | Refills: 0 | Status: SHIPPED | OUTPATIENT
Start: 2017-07-24 | End: 2017-09-20 | Stop reason: SDUPTHER

## 2017-07-24 NOTE — TELEPHONE ENCOUNTER
Left voice mail for pt to return my call.   Will notify pt that no additional narcotic scripts will be written by Dr Hernández.  Pt must be seen by Dr Hernández to discuss next step direction (sx) or referred to PM&R.   Pending return call from pt.

## 2017-09-20 DIAGNOSIS — R52 PAIN: ICD-10-CM

## 2017-09-20 RX ORDER — OXYCODONE AND ACETAMINOPHEN 5; 325 MG/1; MG/1
1 TABLET ORAL EVERY 6 HOURS PRN
Qty: 60 TABLET | Refills: 0 | Status: SHIPPED | OUTPATIENT
Start: 2017-09-20 | End: 2017-10-10 | Stop reason: SDUPTHER

## 2017-09-21 ENCOUNTER — TELEPHONE (OUTPATIENT)
Dept: ORTHOPEDICS | Facility: CLINIC | Age: 37
End: 2017-09-21

## 2017-09-21 ENCOUNTER — PATIENT MESSAGE (OUTPATIENT)
Dept: ORTHOPEDICS | Facility: CLINIC | Age: 37
End: 2017-09-21

## 2017-09-21 NOTE — TELEPHONE ENCOUNTER
Left voice mail for pt to return my call.   Will advise pt that script is ready for  at our office.

## 2017-10-10 DIAGNOSIS — R52 PAIN: ICD-10-CM

## 2017-10-10 RX ORDER — OXYCODONE AND ACETAMINOPHEN 5; 325 MG/1; MG/1
1 TABLET ORAL EVERY 6 HOURS PRN
Qty: 60 TABLET | Refills: 0 | Status: SHIPPED | OUTPATIENT
Start: 2017-10-10 | End: 2017-11-12

## 2017-10-11 ENCOUNTER — TELEPHONE (OUTPATIENT)
Dept: PODIATRY | Facility: CLINIC | Age: 37
End: 2017-10-11

## 2017-10-27 ENCOUNTER — TELEPHONE (OUTPATIENT)
Dept: ORTHOPEDICS | Facility: CLINIC | Age: 37
End: 2017-10-27

## 2017-10-27 ENCOUNTER — PATIENT MESSAGE (OUTPATIENT)
Dept: ORTHOPEDICS | Facility: CLINIC | Age: 37
End: 2017-10-27

## 2017-10-27 DIAGNOSIS — E11.9 TYPE 2 DIABETES MELLITUS WITHOUT COMPLICATION: ICD-10-CM

## 2017-10-27 NOTE — TELEPHONE ENCOUNTER
Pt called the office earlier today and advised that she would come in at 1 pm    Pt has not showed as of yet.   Left voice mail for pt to return my call.

## 2017-11-01 ENCOUNTER — PATIENT MESSAGE (OUTPATIENT)
Dept: INTERNAL MEDICINE | Facility: CLINIC | Age: 37
End: 2017-11-01

## 2017-11-02 ENCOUNTER — PATIENT MESSAGE (OUTPATIENT)
Dept: INTERNAL MEDICINE | Facility: CLINIC | Age: 37
End: 2017-11-02

## 2017-11-02 NOTE — TELEPHONE ENCOUNTER
[facility]  has recently made significant changes to opioid prescribing guidelines.  A visit is required for any opioid prescription.   I am not sure if a nondentist would feel comfortable prescribing opioids for TMJ.

## 2017-11-02 NOTE — TELEPHONE ENCOUNTER
Please advise if chart can be updated as requested:  meclizine 25 mg tablet 7/6/2017 Anitra Chaney i no longer take   metoclopramide HCl 10 MG tablet 7/6/2017 Anitra Chaney i no longer take   losartan 100 MG tablet  Anitra Chaney I know longer take

## 2017-11-06 ENCOUNTER — PATIENT MESSAGE (OUTPATIENT)
Dept: ORTHOPEDICS | Facility: CLINIC | Age: 37
End: 2017-11-06

## 2017-11-06 ENCOUNTER — TELEPHONE (OUTPATIENT)
Dept: PODIATRY | Facility: CLINIC | Age: 37
End: 2017-11-06

## 2017-11-06 NOTE — TELEPHONE ENCOUNTER
Called pt left message regarding dr. orr is out of clinic 11/10 and 11/17  Is only at [facility] on  Fridays  To please call back at [phone] to schedule appt to  See a different provider

## 2017-11-08 ENCOUNTER — PATIENT MESSAGE (OUTPATIENT)
Dept: ORTHOPEDICS | Facility: CLINIC | Age: 37
End: 2017-11-08

## 2017-11-12 ENCOUNTER — HOSPITAL ENCOUNTER (EMERGENCY)
Facility: OTHER | Age: 37
Discharge: HOME | End: 2017-11-12
Attending: EMERGENCY MEDICINE
Payer: COMMERCIAL

## 2017-11-12 VITALS
HEIGHT: 69 IN | TEMPERATURE: 99 F | OXYGEN SATURATION: 100 % | DIASTOLIC BLOOD PRESSURE: 88 MMHG | SYSTOLIC BLOOD PRESSURE: 122 MMHG | WEIGHT: 280 LBS | BODY MASS INDEX: 41.47 KG/M2 | RESPIRATION RATE: 16 BRPM | HEART RATE: 76 BPM

## 2017-11-12 DIAGNOSIS — M54.50 MIDLINE LOW BACK PAIN WITHOUT SCIATICA, UNSPECIFIED CHRONICITY: Primary | ICD-10-CM

## 2017-11-12 LAB
B-HCG UR QL: NEGATIVE
CTP QC/QA: YES

## 2017-11-12 PROCEDURE — 99283 EMERGENCY DEPT VISIT LOW MDM: CPT | Mod: 25

## 2017-11-12 PROCEDURE — 96372 THER/PROPH/DIAG INJ SC/IM: CPT

## 2017-11-12 PROCEDURE — 81025 URINE PREGNANCY TEST: CPT | Performed by: EMERGENCY MEDICINE

## 2017-11-12 PROCEDURE — 63600175 PHARM REV CODE 636 W HCPCS: Performed by: EMERGENCY MEDICINE

## 2017-11-12 RX ORDER — ORPHENADRINE CITRATE 30 MG/ML
60 INJECTION INTRAMUSCULAR; INTRAVENOUS
Status: COMPLETED | OUTPATIENT
Start: 2017-11-12 | End: 2017-11-12

## 2017-11-12 RX ORDER — KETOROLAC TROMETHAMINE 30 MG/ML
30 INJECTION, SOLUTION INTRAMUSCULAR; INTRAVENOUS
Status: COMPLETED | OUTPATIENT
Start: 2017-11-12 | End: 2017-11-12

## 2017-11-12 RX ADMIN — ORPHENADRINE CITRATE 60 MG: 30 INJECTION INTRAMUSCULAR; INTRAVENOUS at 06:11

## 2017-11-12 RX ADMIN — KETOROLAC TROMETHAMINE 30 MG: 30 INJECTION, SOLUTION INTRAMUSCULAR at 06:11

## 2017-11-12 NOTE — ED NOTES
Discharge information, new medication prescriptions, follow-up care, community resources and home care discussed with patient and family. Pt reports understanding and denies further questions or concerns at this time. Pt ambulated to registration desk w/ green folder containing all discharge paperwork and prescriptions.

## 2017-11-12 NOTE — ED PROVIDER NOTES
"Encounter Date: 2017    SCRIBE #1 NOTE: I, Adelina Flowers, am scribing for, and in the presence of, Dr. Yost.       History     Chief Complaint   Patient presents with    Back Pain     Pt CO LBP Xs since this morning.     Time seen by provider: 5:50 AM    This is a 37 y.o. female who presents with complaint of low back pain that began one day ago.  Patient reports it felt "like something popped when I stood up." She describes pain sharp with associated muscle spasms. She reports trying ibprofen, tylenol, ultram, and robaxin with little relief. She denies any weakness, numbness, tingling , or urinary incontinence.        The history is provided by the patient.     Review of patient's allergies indicates:   Allergen Reactions    Mirtazapine      Other reaction(s): tachycardia    Ziprasidone hcl      Other reaction(s): Unknown  Other reaction(s): Unknown     Past Medical History:   Diagnosis Date    Anxiety     Chest pain, unspecified 2013: For three days.    Depression     Diabetes mellitus     Diabetes mellitus, type 2     DM2 (diabetes mellitus, type 2)     diet control    Eyelid lesion     LLL    GERD (gastroesophageal reflux disease)     History of conjunctivitis     History of PCOS     HTN (hypertension), benign 3/10/2014    Migraines     Obesity     TIA (transient ischemic attack)      Past Surgical History:   Procedure Laterality Date    ANKLE FRACTURE SURGERY       SECTION, CLASSIC  2005/2009    x2    CHOLECYSTECTOMY  3/2002    lap maribel    FOOT SURGERY       Family History   Problem Relation Age of Onset    Other Father      house fire     Social History   Substance Use Topics    Smoking status: Current Every Day Smoker     Packs/day: 1.00     Years: 18.00     Types: Cigarettes    Smokeless tobacco: Never Used      Comment: cutting to 3-4 cigarette per day    Alcohol use No     Review of Systems   Constitutional: Negative for chills and fever. "   HENT: Negative for congestion and sore throat.    Eyes: Negative for visual disturbance.   Respiratory: Negative for cough and shortness of breath.    Cardiovascular: Negative for chest pain and palpitations.   Gastrointestinal: Negative for abdominal pain, diarrhea and vomiting.   Genitourinary: Negative for decreased urine volume, difficulty urinating, dysuria and vaginal discharge.   Musculoskeletal: Positive for back pain (Low back pain.  ) and myalgias. Negative for joint swelling, neck pain and neck stiffness.   Skin: Negative for rash and wound.   Neurological: Negative for weakness, numbness and headaches.   Psychiatric/Behavioral: Negative for confusion.       Physical Exam     Initial Vitals [11/12/17 0518]   BP Pulse Resp Temp SpO2   (!) 159/108 78 18 99.2 °F (37.3 °C) 99 %      MAP       125         Physical Exam    Nursing note and vitals reviewed.  Constitutional: She appears well-developed and well-nourished. She is cooperative.  Non-toxic appearance. No distress.   Morbidly obese. Uncomfortable appearance. No distress.    HENT:   Head: Normocephalic and atraumatic.   Mouth/Throat: Oropharynx is clear and moist.   Eyes: Conjunctivae and EOM are normal. Pupils are equal, round, and reactive to light.   Neck: Normal range of motion and full passive range of motion without pain. Neck supple. No thyromegaly present. No JVD present.   Cardiovascular: Normal rate, regular rhythm, normal heart sounds and normal pulses.   Pulmonary/Chest: Effort normal and breath sounds normal. No respiratory distress.   Abdominal: Soft. Normal appearance and bowel sounds are normal. She exhibits no distension and no mass. There is no tenderness.   Musculoskeletal: Normal range of motion. She exhibits tenderness.   Lower lumbar spinal tenderness. Normal ROM.     Neurological: She is alert and oriented to person, place, and time. She has normal strength. No cranial nerve deficit or sensory deficit.   Skin: Skin is warm, dry  and intact. No rash noted.   Psychiatric: She has a normal mood and affect. Her speech is normal and behavior is normal. Judgment and thought content normal.         ED Course   Procedures  Labs Reviewed   POCT URINE PREGNANCY             Medical Decision Making:   Clinical Tests:   Lab Tests: Ordered and Reviewed                   ED Course    I personally performed the history, physical exam and medical decision making; the documentation recorded by the scribe accurately reflects the service I performed and the decisions made by me.      Patient presents with exacerbation of chronic low back pain.  Occurred with normal activity.  She reports no relief with multiple over-the-counter and previously prescribed medications.  No change in bowel or bladder.  Neurovascular intact.  No indication for further imaging or workup.  The patient did appear uncomfortable upon exam .  Plan was initially to give the patient intramuscular injections of anti-inflammatory, muscle relaxant however the patient initially declined, requesting pills instead.  The medications which she reported were ineffective at home essentially left  narcotic medications as the remaining treatment option.  Chart review shows that her orthopedist recently decided to discontinue use of narcotics for the chronic pain and that since then the patient has been contacting various prescribers including podiatry, dental, primary care physician for narcotic medications.  In addition the [place] pharmacy database shows a recent prescription for narcotics which the patient did not disclose to me.  I'm concerned about further prescriptions for narcotics, especially from the emergency department and for chronic disorder.  Informed the patient that she needs to see pain management or other single provider for any further narcotic refills.  Patient did except the intramuscular injections at this point.  Encouraged follow-up with her primary care physician, especially  symptoms persist and also given referral information for pain management    Clinical Impression:     1. Midline low back pain without sciatica, unspecified chronicity                               Juan Yost II, MD  11/16/17 2034

## 2017-11-17 DIAGNOSIS — Z13.5 DIABETIC RETINOPATHY SCREENING: ICD-10-CM

## 2017-11-20 ENCOUNTER — PATIENT MESSAGE (OUTPATIENT)
Dept: ORTHOPEDICS | Facility: CLINIC | Age: 37
End: 2017-11-20

## 2017-11-20 ENCOUNTER — TELEPHONE (OUTPATIENT)
Dept: ORTHOPEDICS | Facility: CLINIC | Age: 37
End: 2017-11-20

## 2017-11-20 ENCOUNTER — PATIENT MESSAGE (OUTPATIENT)
Dept: INTERNAL MEDICINE | Facility: CLINIC | Age: 37
End: 2017-11-20

## 2017-11-20 NOTE — TELEPHONE ENCOUNTER
Call patient to inform her that Dr. Hernández has no openings and she should keep her scheduled appointment. Received no answer.

## 2017-11-20 NOTE — TELEPHONE ENCOUNTER
Returned patient's call. She states her left ankle that she has had a previous surgery. She states it twists every time she walks.

## 2017-11-20 NOTE — TELEPHONE ENCOUNTER
Per Dr. Hernández, scheduled is booked. If an earlier appointment becomes available, she will notified.

## 2017-11-21 ENCOUNTER — PATIENT MESSAGE (OUTPATIENT)
Dept: ORTHOPEDICS | Facility: CLINIC | Age: 37
End: 2017-11-21

## 2017-11-21 ENCOUNTER — OFFICE VISIT (OUTPATIENT)
Dept: INTERNAL MEDICINE | Facility: CLINIC | Age: 37
End: 2017-11-21
Attending: FAMILY MEDICINE
Payer: COMMERCIAL

## 2017-11-21 ENCOUNTER — PATIENT OUTREACH (OUTPATIENT)
Dept: ADMINISTRATIVE | Facility: HOSPITAL | Age: 37
End: 2017-11-21

## 2017-11-21 VITALS
SYSTOLIC BLOOD PRESSURE: 122 MMHG | HEIGHT: 69 IN | OXYGEN SATURATION: 97 % | BODY MASS INDEX: 42.35 KG/M2 | HEART RATE: 81 BPM | DIASTOLIC BLOOD PRESSURE: 90 MMHG | WEIGHT: 285.94 LBS

## 2017-11-21 DIAGNOSIS — E11.9 DIABETES MELLITUS WITHOUT COMPLICATION: Primary | ICD-10-CM

## 2017-11-21 DIAGNOSIS — M67.88 LEFT PERONEAL TENDONOSIS: ICD-10-CM

## 2017-11-21 DIAGNOSIS — M19.079 ANKLE ARTHRITIS: ICD-10-CM

## 2017-11-21 DIAGNOSIS — M54.50 MIDLINE LOW BACK PAIN WITHOUT SCIATICA, UNSPECIFIED CHRONICITY: Primary | ICD-10-CM

## 2017-11-21 PROCEDURE — 99999 PR PBB SHADOW E&M-EST. PATIENT-LVL III: CPT | Mod: PBBFAC,,, | Performed by: FAMILY MEDICINE

## 2017-11-21 PROCEDURE — 99213 OFFICE O/P EST LOW 20 MIN: CPT | Mod: S$GLB,,, | Performed by: FAMILY MEDICINE

## 2017-11-21 RX ORDER — GABAPENTIN 300 MG/1
300 CAPSULE ORAL 3 TIMES DAILY
Qty: 90 CAPSULE | Refills: 0 | Status: SHIPPED | OUTPATIENT
Start: 2017-11-21 | End: 2018-02-02 | Stop reason: SDUPTHER

## 2017-11-21 RX ORDER — NAPROXEN 500 MG/1
500 TABLET ORAL 2 TIMES DAILY PRN
Qty: 60 TABLET | Refills: 1 | Status: SHIPPED | OUTPATIENT
Start: 2017-11-21 | End: 2017-12-21

## 2017-11-21 RX ORDER — LIDOCAINE AND PRILOCAINE 25; 25 MG/G; MG/G
CREAM TOPICAL
Qty: 30 G | Refills: 1 | Status: SHIPPED | OUTPATIENT
Start: 2017-11-21 | End: 2018-03-13 | Stop reason: SDUPTHER

## 2017-11-21 NOTE — PATIENT INSTRUCTIONS
Treating Ankle Sprains  Treatment will depend on how bad your sprain is. For a severe sprain, healing may take 3 months or more.  Right after your injury: Use R.I.C.E.  · Rest: At first, keep weight off the ankle as much as you can. You may be given crutches to help you walk without putting weight on the ankle.  · Ice: Put an ice pack on the ankle for 15 minutes. Remove the pack and wait at least 30 minutes. Repeat for up to 3 days. This helps reduce swelling.  · Compression: To reduce swelling and keep the joint stable, you may need to wrap the ankle with an elastic bandage. For more severe sprains, you may need an ankle brace or a cast.  · Elevation: To reduce swelling, keep your ankle raised above your heart when you sit or lie down.  Medicine  Your healthcare provider may suggest oral non-steroidal anti-inflammatory medicine (NSAIDs), such as ibuprofen. This relieves the pain and helps reduce any swelling. Be sure to take your medicine as directed.  Contrast baths  After 3 days, soak your ankle in warm water for 30 seconds, then in cool water for 30 seconds. Go back and forth for 5 minutes. Doing this every 2 hours will help keep the swelling down.  Exercises    After about 2 to 3 weeks, you may be given exercises to strengthen the ligaments and muscles in the ankle. Doing these exercises will help prevent another ankle sprain. Exercises may include standing on your toes and then on your heels and doing ankle curls.  Ankle curls  · Sit on the edge of a sturdy table or lie on your back.  · Pull your toes toward you. Then point them away from you. Repeat for 2 to 3 minutes.   Date Last Reviewed: 9/28/2015  © 4805-0175 [employer]. [address] [place] [address], [place] [address]. All rights reserved. This information is not intended as a substitute for professional medical care. Always follow your healthcare professional's instructions.

## 2017-11-21 NOTE — PROGRESS NOTES
"Subjective:      Patient ID: Anitra Chaney is a 37 y.o. female.    Chief Complaint: Ankle Injury (left ankle)    She has history of chronic peroneal left tendonosis. She is here after 1.5 weeks ago rolling left ankle and was able to weight bear after this. She has taken ibuprofen, percocet, ice, naprosyn with improvement. She walks with more pressure to the side of the foot. It is a chronic ache type pain.       Review of Systems   HENT: Negative.    Respiratory: Negative.    Cardiovascular: Negative.    Gastrointestinal: Negative.    Genitourinary: Negative.      I personally reviewed Past Medical History, Past Surgical history,  Past Social History and Family History    Objective:   BP (!) 122/90   Pulse 81   Ht 5' 9" (1.753 m)   Wt 129.7 kg (285 lb 15 oz)   SpO2 97%   BMI 42.23 kg/m²     Physical Exam   Constitutional: She is oriented to person, place, and time. She appears well-developed and well-nourished. No distress.   HENT:   Head: Normocephalic and atraumatic.   Right Ear: External ear normal.   Left Ear: External ear normal.   Mouth/Throat: Oropharynx is clear and moist.   Eyes: Conjunctivae and EOM are normal. Right eye exhibits no discharge. Left eye exhibits no discharge. No scleral icterus.   Cardiovascular: Normal rate, regular rhythm, normal heart sounds and intact distal pulses.  Exam reveals no gallop.    No murmur heard.  Pulmonary/Chest: Effort normal and breath sounds normal. No respiratory distress. She has no wheezes. She has no rales. She exhibits no tenderness.   Musculoskeletal:        Left ankle: She exhibits swelling. No lateral malleolus, no medial malleolus, no head of 5th metatarsal and no proximal fibula tenderness found.   Neurological: She is alert and oriented to person, place, and time.   Vitals reviewed.      Anitra was seen today for ankle injury.    Diagnoses and all orders for this visit:    Midline low back pain without sciatica, unspecified " chronicity  -improved    Left peroneal tendonosis  Ankle arthritis  Ankle sprain  -she is able to weight bear and can ambulate, she can call if no imporvement and consider imaging  -patient has follow up with orthopedics next month, advise supportive care, will trial naprosyn and emla prn, if no improvement will start gabapentin        Other orders  -     gabapentin (NEURONTIN) 300 MG capsule; Take 1 capsule (300 mg total) by mouth 3 (three) times daily.  -     lidocaine-prilocaine (EMLA) cream; Apply topically as needed.  -     naproxen (NAPROSYN) 500 MG tablet; Take 1 tablet (500 mg total) by mouth 2 (two) times daily as needed.

## 2017-12-21 ENCOUNTER — OFFICE VISIT (OUTPATIENT)
Dept: ORTHOPEDICS | Facility: CLINIC | Age: 37
End: 2017-12-21
Payer: COMMERCIAL

## 2017-12-21 VITALS — WEIGHT: 273.81 LBS | BODY MASS INDEX: 40.56 KG/M2 | HEIGHT: 69 IN

## 2017-12-21 DIAGNOSIS — R52 PAIN: ICD-10-CM

## 2017-12-21 DIAGNOSIS — M19.079 ANKLE ARTHRITIS: Primary | ICD-10-CM

## 2017-12-21 PROCEDURE — 99213 OFFICE O/P EST LOW 20 MIN: CPT | Mod: S$GLB,,, | Performed by: ORTHOPAEDIC SURGERY

## 2017-12-21 PROCEDURE — 99999 PR PBB SHADOW E&M-EST. PATIENT-LVL II: CPT | Mod: PBBFAC,,, | Performed by: ORTHOPAEDIC SURGERY

## 2017-12-21 RX ORDER — FLUOXETINE HYDROCHLORIDE 20 MG/1
20 CAPSULE ORAL DAILY
Refills: 0 | Status: ON HOLD | COMMUNITY
Start: 2017-12-13 | End: 2018-03-27

## 2017-12-21 RX ORDER — OXYCODONE AND ACETAMINOPHEN 5; 325 MG/1; MG/1
1 TABLET ORAL EVERY 12 HOURS PRN
Qty: 60 TABLET | Refills: 0 | Status: SHIPPED | OUTPATIENT
Start: 2017-12-21 | End: 2018-01-03 | Stop reason: SDUPTHER

## 2017-12-21 RX ORDER — LORAZEPAM 0.5 MG/1
0.5 TABLET ORAL DAILY
Refills: 0 | COMMUNITY
Start: 2017-12-13 | End: 2018-03-22

## 2017-12-21 NOTE — PROGRESS NOTES
Mrs. Chaney returns today for followup.  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  She has had previous left calcaneal osteotomy and peroneal tendon   repair.  Subsequent to that surgery, she has had further injuries and has pain   requiring intermittent narcotic pain medication.  We have discussed surgery in   the past.  She thinks she is ready to proceed with surgery.  She will need a   left ankle arthrodesis.  She understands that she will lose range of motion of   her ankle, which is decreased, but functional at this time.  Her exam is   unchanged from her previous examinations.  She has a severe varus alignment of   her foot and ankle.  Her ankle deformity is passively correctable and this puts   her foot in a plantigrade position.  The problem is she is very unstable and I   do not think a soft tissue repair is going to hold up over time.  This has been   discussed in the past.  I also discussed with her the importance of trying to   quit smoking prior to doing the fusion.  She states she has decreased her   smoking and ____ try to get it up going forward.  She needs to wait till   February to accumulate some more short-term disability time.  We have her return   for H and P consent for left ankle arthrodesis.      CRISTY/IN  dd: 12/21/2017 08:56:01 (CST)  td: 12/22/2017 00:06:47 (CST)  Doc ID   #[id number]  Job ID #[id number]    CC:     This office note has been dictated.

## 2018-01-03 ENCOUNTER — TELEPHONE (OUTPATIENT)
Dept: ORTHOPEDICS | Facility: CLINIC | Age: 38
End: 2018-01-03

## 2018-01-03 DIAGNOSIS — M19.079 ANKLE ARTHRITIS: ICD-10-CM

## 2018-01-03 DIAGNOSIS — R52 PAIN: ICD-10-CM

## 2018-01-03 RX ORDER — OXYCODONE AND ACETAMINOPHEN 5; 325 MG/1; MG/1
1 TABLET ORAL EVERY 12 HOURS PRN
Qty: 60 TABLET | Refills: 0 | Status: SHIPPED | OUTPATIENT
Start: 2018-01-03 | End: 2018-02-02

## 2018-01-30 ENCOUNTER — PATIENT MESSAGE (OUTPATIENT)
Dept: ORTHOPEDICS | Facility: CLINIC | Age: 38
End: 2018-01-30

## 2018-02-02 RX ORDER — GABAPENTIN 300 MG/1
300 CAPSULE ORAL 3 TIMES DAILY
Qty: 90 CAPSULE | Refills: 0 | Status: SHIPPED | OUTPATIENT
Start: 2018-02-02 | End: 2018-06-12 | Stop reason: ALTCHOICE

## 2018-02-05 DIAGNOSIS — I10 ESSENTIAL HYPERTENSION, BENIGN: Primary | ICD-10-CM

## 2018-02-05 RX ORDER — HYDROCHLOROTHIAZIDE 25 MG/1
25 TABLET ORAL DAILY
Qty: 90 TABLET | Refills: 0 | Status: SHIPPED | OUTPATIENT
Start: 2018-02-05 | End: 2018-03-01

## 2018-02-20 ENCOUNTER — PATIENT MESSAGE (OUTPATIENT)
Dept: ORTHOPEDICS | Facility: CLINIC | Age: 38
End: 2018-02-20

## 2018-02-21 ENCOUNTER — TELEPHONE (OUTPATIENT)
Dept: ORTHOPEDICS | Facility: CLINIC | Age: 38
End: 2018-02-21

## 2018-02-21 ENCOUNTER — PATIENT MESSAGE (OUTPATIENT)
Dept: OBSTETRICS AND GYNECOLOGY | Facility: CLINIC | Age: 38
End: 2018-02-21

## 2018-02-21 ENCOUNTER — PATIENT MESSAGE (OUTPATIENT)
Dept: ORTHOPEDICS | Facility: CLINIC | Age: 38
End: 2018-02-21

## 2018-02-21 NOTE — TELEPHONE ENCOUNTER
----- Message from Adelina Cameron sent at 2/21/2018 10:05 AM CST -----  Contact: self  Pt called stating that she has left maybe three messages for someone to return her call and never got a call back. She went into her mychart to request a refill for her PERCOCET and stated that someone removed the prescription so she cant refill it anymore. Pt is requesting an immediate call back from Digna and could be reached at [phone].

## 2018-02-21 NOTE — TELEPHONE ENCOUNTER
Spoke with pt.   Advised that her request for a refill was sent to Dr Telles for review.   Will call pt once prescription is approved and written.  Pt verbalized understanding.

## 2018-02-27 ENCOUNTER — PATIENT OUTREACH (OUTPATIENT)
Dept: INTERNAL MEDICINE | Facility: CLINIC | Age: 38
End: 2018-02-27

## 2018-02-27 NOTE — PROGRESS NOTES
[facility] is committed to your overall health.  To help you get the most out of each of your visits, we will review your information to make sure you are up to date on all of your recommended tests and/or procedures.       Your PCP  Tien Watts MD   found that you may be due for:       Health Maintenance Due   Topic Date Due    TETANUS VACCINE  02/12/1998    Pneumococcal PPSV23 (Medium Risk) (1) 02/12/1998    Eye Exam  03/20/2014    Urine Microalbumin  08/20/2014    Lipid Panel  01/11/2017    Hemoglobin A1c  06/02/2017    Influenza Vaccine  08/01/2017    Foot Exam  12/03/2017     You will be scheduled for a eye cam retina scan on the same day of your scheduled visit with your Tien Watts MD.  NO eye drop dilation will take place. You can drive after the scan.  This will take no longer than ten minutes. This will take place in the same office area as your visit. This eye scan is NOT a visual exam. This exam is being used to scan for diseases of the eye such as Diabetic Retinopathy which is the leading cause for blindness in those that have Diabetes Mellitus.     If you feel you need a vision exam please contact the office to schedule an appointment with Opthalmology.     If you see an outside eye physician please be prepared to sign a release of information so that we may request your records to update your medical records. Thank you.           If you have had any of the above done at another facility, please bring the records or information with you so that your record at [facility] will be complete.  If you would like to schedule any of these, please contact me.     If you are currently taking medication, please bring it with you to your appointment for review.     Also, if you have any type of Advanced Directives, please bring them with you to your office visit so we may scan them into your chart.       Thank you for Choosing [facility] for your healthcare needs.        Additional  Information  If you have questions, you can email [email or web address] or call [phone]  to talk to our [facility] staff. Remember, [facility] is NOT to be used for urgent needs. For medical emergencies, dial 911.

## 2018-03-01 ENCOUNTER — OFFICE VISIT (OUTPATIENT)
Dept: ORTHOPEDICS | Facility: CLINIC | Age: 38
End: 2018-03-01
Payer: COMMERCIAL

## 2018-03-01 VITALS — WEIGHT: 268.5 LBS | HEIGHT: 69 IN | BODY MASS INDEX: 39.77 KG/M2

## 2018-03-01 DIAGNOSIS — M19.079 ANKLE ARTHRITIS: Primary | ICD-10-CM

## 2018-03-01 PROCEDURE — 99212 OFFICE O/P EST SF 10 MIN: CPT | Mod: S$GLB,,, | Performed by: ORTHOPAEDIC SURGERY

## 2018-03-01 PROCEDURE — 99999 PR PBB SHADOW E&M-EST. PATIENT-LVL II: CPT | Mod: PBBFAC,,, | Performed by: ORTHOPAEDIC SURGERY

## 2018-03-01 RX ORDER — OXYCODONE AND ACETAMINOPHEN 5; 325 MG/1; MG/1
1 TABLET ORAL EVERY 8 HOURS PRN
Qty: 60 TABLET | Refills: 0 | Status: SHIPPED | OUTPATIENT
Start: 2018-03-01 | End: 2018-03-13 | Stop reason: SDUPTHER

## 2018-03-02 NOTE — PROGRESS NOTES
Ms. Chaney returns today.  She mainly comes in to reschedule her surgery.    She was scheduled for a left ankle arthrodesis, but her mom had some medical   problems and she had to postpone her surgery.  Her examination remains unchanged   with continued swelling and tenderness about the ankle.  I went ahead and   reschedule her surgery at the end of this month.  She will return for   preoperative H and P and consent.      KELVIN  dd: 03/01/2018 11:01:59 (CST)  td: 03/02/2018 09:23:02 (CST)  Doc ID   #[id number]  Job ID #[id number]    CC:

## 2018-03-13 ENCOUNTER — TELEPHONE (OUTPATIENT)
Dept: ORTHOPEDICS | Facility: CLINIC | Age: 38
End: 2018-03-13

## 2018-03-13 DIAGNOSIS — M19.079 ANKLE ARTHRITIS: ICD-10-CM

## 2018-03-13 RX ORDER — OXYCODONE AND ACETAMINOPHEN 5; 325 MG/1; MG/1
1 TABLET ORAL EVERY 8 HOURS PRN
Qty: 60 TABLET | Refills: 0 | Status: ON HOLD | OUTPATIENT
Start: 2018-03-13 | End: 2018-03-27 | Stop reason: HOSPADM

## 2018-03-13 RX ORDER — LIDOCAINE AND PRILOCAINE 25; 25 MG/G; MG/G
CREAM TOPICAL
Qty: 30 G | Refills: 1 | Status: ON HOLD | OUTPATIENT
Start: 2018-03-13 | End: 2018-03-27 | Stop reason: HOSPADM

## 2018-03-15 ENCOUNTER — PATIENT MESSAGE (OUTPATIENT)
Dept: ORTHOPEDICS | Facility: CLINIC | Age: 38
End: 2018-03-15

## 2018-03-22 ENCOUNTER — DOCUMENTATION ONLY (OUTPATIENT)
Dept: ORTHOPEDICS | Facility: CLINIC | Age: 38
End: 2018-03-22

## 2018-03-22 ENCOUNTER — HOSPITAL ENCOUNTER (OUTPATIENT)
Dept: RADIOLOGY | Facility: HOSPITAL | Age: 38
Discharge: HOME | End: 2018-03-22
Attending: PHYSICIAN ASSISTANT
Payer: COMMERCIAL

## 2018-03-22 ENCOUNTER — OFFICE VISIT (OUTPATIENT)
Dept: ORTHOPEDICS | Facility: CLINIC | Age: 38
End: 2018-03-22
Payer: COMMERCIAL

## 2018-03-22 DIAGNOSIS — M25.572 ACUTE LEFT ANKLE PAIN: ICD-10-CM

## 2018-03-22 DIAGNOSIS — M19.079 ANKLE ARTHRITIS: Primary | ICD-10-CM

## 2018-03-22 DIAGNOSIS — R52 PAIN: ICD-10-CM

## 2018-03-22 PROCEDURE — 73610 X-RAY EXAM OF ANKLE: CPT | Mod: 26,LT,, | Performed by: RADIOLOGY

## 2018-03-22 PROCEDURE — 73610 X-RAY EXAM OF ANKLE: CPT | Mod: TC,LT

## 2018-03-22 PROCEDURE — 99499 UNLISTED E&M SERVICE: CPT | Mod: SA,S$GLB,, | Performed by: PHYSICIAN ASSISTANT

## 2018-03-22 PROCEDURE — 99999 PR PBB SHADOW E&M-EST. PATIENT-LVL I: CPT | Mod: PBBFAC,,, | Performed by: PHYSICIAN ASSISTANT

## 2018-03-22 NOTE — PROGRESS NOTES
Anitra is a 38 y.o. female here today for a pre-operative visit in preparation for a  ARTHRODESIS-ANKLE Left    to be performed by  on 04/27/2018.    she was last seen and treated in the clinic on 03/01/2018.  she has since seen their primary care for optimization of the chronic health concerns.  There has been no significant change in their past medical or orthopedic status since the previous visit.  No fever, chills, malaise or unexplained weight change.     Allergies, medications, past medical history and past surgical history were reviewed with the patient.     Physical examination was performed.     Consents were signed.   Patient has walker and will bring with them the day of surgery. They have been counseled on proper use of the assistive device.     Pre, adelina, and post operative procedure and expectations were discussed.  Questions were answered. The patient has been educated and is ready to proceed with surgery.  Approximately 30 minutes was spent discussing surgical outcomes, plans, procedures, pre, adelina, and post operative expectations and care. The risks, benefits and alternatives to surgery were discussed with the patient at great length.  These include bleeding, infection, vessel/nerve damage, pain, numbness, tingling, complex regional pain syndrome, hardware/surgical failure, need for further surgery, malunion, nonunion, DVT, PE, arthritis and death. He also understands that the risks of surgery may be greater for some patients due to health or lifestyle issues, such as a current condition or a history of heart disease, obesity, clotting disorders, recurrent infections, smoking, sedentary lifestyle, or noncompliance with medications, therapy, or followup. The degree of the increased risk is hard to estimate with any degree of precision.  Patient states an understanding and wishes to proceed with surgery.   All questions were answered.  No guarantees were implied or stated.  Informed  consent was obtained  The patient will contact us if the have any questions, concerns, and changes in their medical condition prior to surgery.

## 2018-03-22 NOTE — PROGRESS NOTES
Knee scooter fax to [facility] [phone]. Done.

## 2018-03-22 NOTE — H&P
Subjective:      Patient ID: Anitra Chaney is a 38 y.o. female.    Chief Complaint: No chief complaint on file.    Anitra is a 38 y.o. female here today for a pre-operative visit in preparation for a  ARTHRODESIS-ANKLE Left    to be performed by  on 2018.    she was last seen and treated in the clinic on 2018.  she has since seen their primary care for optimization of the chronic health concerns.  There has been no significant change in their past medical or orthopedic status since the previous visit.  No fever, chills, malaise or unexplained weight change.     Past Medical History:   Diagnosis Date    Anxiety     Chest pain, unspecified 2013: For three days.    Depression     Diabetes mellitus     Diabetes mellitus, type 2     DM2 (diabetes mellitus, type 2)     diet control    Eyelid lesion     LLL    GERD (gastroesophageal reflux disease)     History of conjunctivitis     History of PCOS     HTN (hypertension), benign 3/10/2014    Migraines     Obesity     TIA (transient ischemic attack)      Past Surgical History:   Procedure Laterality Date    ANKLE FRACTURE SURGERY       SECTION, CLASSIC  2005/2009    x2    CHOLECYSTECTOMY  3/2002    lap maribel    FOOT SURGERY      gastric sleeve       Social History     Occupational History     [employer]     Social History Main Topics    Smoking status: Current Every Day Smoker     Packs/day: 1.00     Years: 18.00     Types: Cigarettes    Smokeless tobacco: Never Used      Comment: cutting to 3-4 cigarette per day    Alcohol use No    Drug use: No    Sexual activity: Yes     Partners: Male      ROS  Current Outpatient Prescriptions on File Prior to Visit   Medication Sig Dispense Refill    FLUoxetine (PROZAC) 20 MG capsule Take 20 mg by mouth once daily.  0    gabapentin (NEURONTIN) 300 MG capsule Take 1 capsule (300 mg total) by mouth 3 (three) times daily. 90  capsule 0    lidocaine-prilocaine (EMLA) cream Apply topically as needed. 30 g 1    multivitamin capsule Take 3 capsules by mouth once daily.       oxyCODONE-acetaminophen (PERCOCET) 5-325 mg per tablet Take 1 tablet by mouth every 8 (eight) hours as needed for Pain. 60 tablet 0    [DISCONTINUED] LORazepam (ATIVAN) 0.5 MG tablet Take 0.5 mg by mouth once daily.  0     No current facility-administered medications on file prior to visit.      Review of patient's allergies indicates:   Allergen Reactions    Mirtazapine      Other reaction(s): tachycardia    Ziprasidone hcl      Other reaction(s): Unknown  Other reaction(s): Unknown         Objective:        Ortho Exam     Gen: WD, WN in NAD   HEENT: NC/AT   Heart: RR   Resp: unlabored breathing   Skin: intact, no lesions pertinent to the surgery site     Assessment:       1. Ankle arthritis, left    2. Acute left ankle pain          Plan:       Surgical intervention per ,

## 2018-03-26 ENCOUNTER — ANESTHESIA EVENT (OUTPATIENT)
Dept: SURGERY | Facility: HOSPITAL | Age: 38
End: 2018-03-26
Payer: COMMERCIAL

## 2018-03-26 ENCOUNTER — PATIENT MESSAGE (OUTPATIENT)
Dept: ORTHOPEDICS | Facility: CLINIC | Age: 38
End: 2018-03-26

## 2018-03-26 ENCOUNTER — TELEPHONE (OUTPATIENT)
Dept: ORTHOPEDICS | Facility: CLINIC | Age: 38
End: 2018-03-26

## 2018-03-26 NOTE — TELEPHONE ENCOUNTER
Left 2 message - so far  to call me back regarding tomorrows surgery I will continue to reach & finally leave a message I will also call her Mom's #

## 2018-03-26 NOTE — TELEPHONE ENCOUNTER
I left a detailed message  : Reminded to eat light the night before surgery, NPO after midnight, take hibclens shower the night before surgery  & again in the am , sleep on clean sheets  in clean clothes, no laxatives or stool softeners , report to the 2nd floor surgery unit for  5;30 AM  Tomorrow  I also stated that if she is NOT coming to please call the surgery center @ [phone] and the orthopedic MD on call -ASAP @ [phone] so that we can switch surgeries around ,..I  asked that she call me back if she heard this message

## 2018-03-27 ENCOUNTER — HOSPITAL ENCOUNTER (OUTPATIENT)
Facility: HOSPITAL | Age: 38
Discharge: HOME | End: 2018-03-29
Attending: ORTHOPAEDIC SURGERY | Admitting: ORTHOPAEDIC SURGERY
Payer: COMMERCIAL

## 2018-03-27 ENCOUNTER — ANESTHESIA (OUTPATIENT)
Dept: SURGERY | Facility: HOSPITAL | Age: 38
End: 2018-03-27
Payer: COMMERCIAL

## 2018-03-27 DIAGNOSIS — R52 PAIN: ICD-10-CM

## 2018-03-27 DIAGNOSIS — M19.079 ANKLE ARTHRITIS: Primary | ICD-10-CM

## 2018-03-27 DIAGNOSIS — M19.079 ANKLE ARTHRITIS: ICD-10-CM

## 2018-03-27 DIAGNOSIS — Z98.1 S/P ANKLE ARTHRODESIS: ICD-10-CM

## 2018-03-27 PROCEDURE — 25000003 PHARM REV CODE 250: Performed by: STUDENT IN AN ORGANIZED HEALTH CARE EDUCATION/TRAINING PROGRAM

## 2018-03-27 PROCEDURE — 63600175 PHARM REV CODE 636 W HCPCS: Performed by: ANESTHESIOLOGY

## 2018-03-27 PROCEDURE — 25000003 PHARM REV CODE 250

## 2018-03-27 PROCEDURE — 71000033 HC RECOVERY, INTIAL HOUR: Performed by: ORTHOPAEDIC SURGERY

## 2018-03-27 PROCEDURE — 27000221 HC OXYGEN, UP TO 24 HOURS

## 2018-03-27 PROCEDURE — 82962 GLUCOSE BLOOD TEST: CPT | Performed by: ORTHOPAEDIC SURGERY

## 2018-03-27 PROCEDURE — 71000039 HC RECOVERY, EACH ADD'L HOUR: Performed by: ORTHOPAEDIC SURGERY

## 2018-03-27 PROCEDURE — C1713 ANCHOR/SCREW BN/BN,TIS/BN: HCPCS | Performed by: ORTHOPAEDIC SURGERY

## 2018-03-27 PROCEDURE — 01991 ANES DX/THER NRV BLK&INJ OTH: CPT | Performed by: ORTHOPAEDIC SURGERY

## 2018-03-27 PROCEDURE — 25000003 PHARM REV CODE 250: Performed by: NURSE ANESTHETIST, CERTIFIED REGISTERED

## 2018-03-27 PROCEDURE — 25000003 PHARM REV CODE 250: Performed by: ORTHOPAEDIC SURGERY

## 2018-03-27 PROCEDURE — 94761 N-INVAS EAR/PLS OXIMETRY MLT: CPT

## 2018-03-27 PROCEDURE — C1769 GUIDE WIRE: HCPCS | Performed by: ORTHOPAEDIC SURGERY

## 2018-03-27 PROCEDURE — 27201423 OPTIME MED/SURG SUP & DEVICES STERILE SUPPLY: Performed by: ORTHOPAEDIC SURGERY

## 2018-03-27 PROCEDURE — D9220A PRA ANESTHESIA: Mod: CRNA,,, | Performed by: NURSE ANESTHETIST, CERTIFIED REGISTERED

## 2018-03-27 PROCEDURE — 36000708 HC OR TIME LEV III 1ST 15 MIN: Performed by: ORTHOPAEDIC SURGERY

## 2018-03-27 PROCEDURE — 64447 NJX AA&/STRD FEMORAL NRV IMG: CPT | Mod: 59,LT,ICN, | Performed by: ANESTHESIOLOGY

## 2018-03-27 PROCEDURE — 25000003 PHARM REV CODE 250: Performed by: ANESTHESIOLOGY

## 2018-03-27 PROCEDURE — 37000009 HC ANESTHESIA EA ADD 15 MINS: Performed by: ORTHOPAEDIC SURGERY

## 2018-03-27 PROCEDURE — 63600175 PHARM REV CODE 636 W HCPCS: Performed by: NURSE ANESTHETIST, CERTIFIED REGISTERED

## 2018-03-27 PROCEDURE — 27800903 OPTIME MED/SURG SUP & DEVICES OTHER IMPLANTS: Performed by: ORTHOPAEDIC SURGERY

## 2018-03-27 PROCEDURE — 36000709 HC OR TIME LEV III EA ADD 15 MIN: Performed by: ORTHOPAEDIC SURGERY

## 2018-03-27 PROCEDURE — 27870 FUSION OF ANKLE JOINT OPEN: CPT | Mod: LT,,, | Performed by: ORTHOPAEDIC SURGERY

## 2018-03-27 PROCEDURE — 37000008 HC ANESTHESIA 1ST 15 MINUTES: Performed by: ORTHOPAEDIC SURGERY

## 2018-03-27 PROCEDURE — 64446 NJX AA&/STRD SC NRV NFS IMG: CPT | Performed by: ANESTHESIOLOGY

## 2018-03-27 PROCEDURE — D9220A PRA ANESTHESIA: Mod: ANES,,, | Performed by: ANESTHESIOLOGY

## 2018-03-27 PROCEDURE — 76942 ECHO GUIDE FOR BIOPSY: CPT | Mod: 26,ICN,, | Performed by: ANESTHESIOLOGY

## 2018-03-27 DEVICE — IMPLANTABLE DEVICE: Type: IMPLANTABLE DEVICE | Site: ANKLE | Status: FUNCTIONAL

## 2018-03-27 DEVICE — FIBER CORTICAL ENHANCE 2.5CC: Type: IMPLANTABLE DEVICE | Site: ANKLE | Status: FUNCTIONAL

## 2018-03-27 RX ORDER — SUCCINYLCHOLINE CHLORIDE 20 MG/ML
INJECTION INTRAMUSCULAR; INTRAVENOUS
Status: DISCONTINUED | OUTPATIENT
Start: 2018-03-27 | End: 2018-03-27

## 2018-03-27 RX ORDER — OXYCODONE HYDROCHLORIDE 5 MG/1
15 TABLET ORAL EVERY 4 HOURS PRN
Status: DISCONTINUED | OUTPATIENT
Start: 2018-03-27 | End: 2018-03-27

## 2018-03-27 RX ORDER — MORPHINE SULFATE 2 MG/ML
2 INJECTION, SOLUTION INTRAMUSCULAR; INTRAVENOUS
Status: DISCONTINUED | OUTPATIENT
Start: 2018-03-27 | End: 2018-03-28

## 2018-03-27 RX ORDER — OXYCODONE HYDROCHLORIDE 5 MG/1
5 TABLET ORAL
Status: DISCONTINUED | OUTPATIENT
Start: 2018-03-27 | End: 2018-03-29 | Stop reason: HOSPADM

## 2018-03-27 RX ORDER — CELECOXIB 200 MG/1
400 CAPSULE ORAL ONCE
Status: COMPLETED | OUTPATIENT
Start: 2018-03-27 | End: 2018-03-27

## 2018-03-27 RX ORDER — FENTANYL CITRATE 50 UG/ML
INJECTION, SOLUTION INTRAMUSCULAR; INTRAVENOUS
Status: DISCONTINUED | OUTPATIENT
Start: 2018-03-27 | End: 2018-03-27

## 2018-03-27 RX ORDER — LIDOCAINE HCL/PF 100 MG/5ML
SYRINGE (ML) INTRAVENOUS
Status: DISCONTINUED | OUTPATIENT
Start: 2018-03-27 | End: 2018-03-27

## 2018-03-27 RX ORDER — LIDOCAINE HYDROCHLORIDE 10 MG/ML
1 INJECTION, SOLUTION EPIDURAL; INFILTRATION; INTRACAUDAL; PERINEURAL ONCE
Status: COMPLETED | OUTPATIENT
Start: 2018-03-27 | End: 2018-03-27

## 2018-03-27 RX ORDER — LIDOCAINE HYDROCHLORIDE 10 MG/ML
INJECTION, SOLUTION EPIDURAL; INFILTRATION; INTRACAUDAL; PERINEURAL
Status: COMPLETED
Start: 2018-03-27 | End: 2018-03-27

## 2018-03-27 RX ORDER — FENTANYL CITRATE 50 UG/ML
25 INJECTION, SOLUTION INTRAMUSCULAR; INTRAVENOUS EVERY 5 MIN PRN
Status: DISCONTINUED | OUTPATIENT
Start: 2018-03-27 | End: 2018-03-27 | Stop reason: HOSPADM

## 2018-03-27 RX ORDER — PREGABALIN 75 MG/1
150 CAPSULE ORAL NIGHTLY
Status: DISCONTINUED | OUTPATIENT
Start: 2018-03-27 | End: 2018-03-27

## 2018-03-27 RX ORDER — PROPOFOL 10 MG/ML
VIAL (ML) INTRAVENOUS
Status: DISCONTINUED | OUTPATIENT
Start: 2018-03-27 | End: 2018-03-27

## 2018-03-27 RX ORDER — OXYCODONE HYDROCHLORIDE 5 MG/1
5 TABLET ORAL EVERY 4 HOURS PRN
Status: DISCONTINUED | OUTPATIENT
Start: 2018-03-27 | End: 2018-03-27

## 2018-03-27 RX ORDER — ACETAMINOPHEN 10 MG/ML
INJECTION, SOLUTION INTRAVENOUS
Status: DISCONTINUED | OUTPATIENT
Start: 2018-03-27 | End: 2018-03-27

## 2018-03-27 RX ORDER — SODIUM CHLORIDE 9 MG/ML
INJECTION, SOLUTION INTRAVENOUS CONTINUOUS
Status: DISCONTINUED | OUTPATIENT
Start: 2018-03-27 | End: 2018-03-27

## 2018-03-27 RX ORDER — SODIUM CHLORIDE 0.9 % (FLUSH) 0.9 %
3 SYRINGE (ML) INJECTION
Status: DISCONTINUED | OUTPATIENT
Start: 2018-03-27 | End: 2018-03-29 | Stop reason: HOSPADM

## 2018-03-27 RX ORDER — OXYCODONE HYDROCHLORIDE 5 MG/1
10 TABLET ORAL EVERY 4 HOURS PRN
Status: DISCONTINUED | OUTPATIENT
Start: 2018-03-27 | End: 2018-03-27

## 2018-03-27 RX ORDER — PROMETHAZINE HYDROCHLORIDE 12.5 MG/1
12.5 TABLET ORAL EVERY 4 HOURS PRN
Qty: 60 TABLET | Refills: 0 | Status: SHIPPED | OUTPATIENT
Start: 2018-03-27 | End: 2018-06-12 | Stop reason: ALTCHOICE

## 2018-03-27 RX ORDER — SODIUM CHLORIDE 0.9 % (FLUSH) 0.9 %
3 SYRINGE (ML) INJECTION
Status: DISCONTINUED | OUTPATIENT
Start: 2018-03-27 | End: 2018-03-27 | Stop reason: HOSPADM

## 2018-03-27 RX ORDER — ONDANSETRON 2 MG/ML
4 INJECTION INTRAMUSCULAR; INTRAVENOUS EVERY 12 HOURS PRN
Status: DISCONTINUED | OUTPATIENT
Start: 2018-03-27 | End: 2018-03-27

## 2018-03-27 RX ORDER — HYDROMORPHONE HYDROCHLORIDE 1 MG/ML
1 INJECTION, SOLUTION INTRAMUSCULAR; INTRAVENOUS; SUBCUTANEOUS EVERY 4 HOURS PRN
Status: DISCONTINUED | OUTPATIENT
Start: 2018-03-27 | End: 2018-03-27

## 2018-03-27 RX ORDER — MORPHINE SULFATE 2 MG/ML
2 INJECTION, SOLUTION INTRAMUSCULAR; INTRAVENOUS
Status: DISCONTINUED | OUTPATIENT
Start: 2018-03-27 | End: 2018-03-29

## 2018-03-27 RX ORDER — ACETAMINOPHEN 10 MG/ML
1000 INJECTION, SOLUTION INTRAVENOUS ONCE
Status: COMPLETED | OUTPATIENT
Start: 2018-03-27 | End: 2018-03-27

## 2018-03-27 RX ORDER — NEOSTIGMINE METHYLSULFATE 1 MG/ML
INJECTION, SOLUTION INTRAVENOUS
Status: DISCONTINUED | OUTPATIENT
Start: 2018-03-27 | End: 2018-03-27

## 2018-03-27 RX ORDER — MIDAZOLAM HYDROCHLORIDE 1 MG/ML
0.5 INJECTION INTRAMUSCULAR; INTRAVENOUS EVERY 5 MIN PRN
Status: DISCONTINUED | OUTPATIENT
Start: 2018-03-27 | End: 2018-03-27

## 2018-03-27 RX ORDER — OXYCODONE AND ACETAMINOPHEN 10; 325 MG/1; MG/1
1 TABLET ORAL EVERY 4 HOURS PRN
Qty: 90 TABLET | Refills: 0 | Status: SHIPPED | OUTPATIENT
Start: 2018-03-27 | End: 2018-04-13 | Stop reason: SDUPTHER

## 2018-03-27 RX ORDER — OXYCODONE HYDROCHLORIDE 5 MG/1
10 TABLET ORAL
Status: DISCONTINUED | OUTPATIENT
Start: 2018-03-27 | End: 2018-03-29 | Stop reason: HOSPADM

## 2018-03-27 RX ORDER — OXYCODONE HYDROCHLORIDE 5 MG/1
15 TABLET ORAL
Status: DISCONTINUED | OUTPATIENT
Start: 2018-03-27 | End: 2018-03-29 | Stop reason: HOSPADM

## 2018-03-27 RX ORDER — DEXAMETHASONE SODIUM PHOSPHATE 4 MG/ML
INJECTION, SOLUTION INTRA-ARTICULAR; INTRALESIONAL; INTRAMUSCULAR; INTRAVENOUS; SOFT TISSUE
Status: DISCONTINUED | OUTPATIENT
Start: 2018-03-27 | End: 2018-03-27

## 2018-03-27 RX ORDER — GABAPENTIN 300 MG/1
300 CAPSULE ORAL 3 TIMES DAILY
Status: DISCONTINUED | OUTPATIENT
Start: 2018-03-27 | End: 2018-03-29 | Stop reason: HOSPADM

## 2018-03-27 RX ORDER — CEFAZOLIN SODIUM 1 G/3ML
INJECTION, POWDER, FOR SOLUTION INTRAMUSCULAR; INTRAVENOUS
Status: DISCONTINUED | OUTPATIENT
Start: 2018-03-27 | End: 2018-03-27

## 2018-03-27 RX ORDER — ACETAMINOPHEN 325 MG/1
650 TABLET ORAL EVERY 8 HOURS PRN
Status: DISCONTINUED | OUTPATIENT
Start: 2018-03-27 | End: 2018-03-27

## 2018-03-27 RX ORDER — ONDANSETRON 8 MG/1
8 TABLET, ORALLY DISINTEGRATING ORAL EVERY 8 HOURS PRN
Status: DISCONTINUED | OUTPATIENT
Start: 2018-03-27 | End: 2018-03-27

## 2018-03-27 RX ORDER — DIPHENHYDRAMINE HYDROCHLORIDE 50 MG/ML
25 INJECTION INTRAMUSCULAR; INTRAVENOUS EVERY 4 HOURS PRN
Status: DISCONTINUED | OUTPATIENT
Start: 2018-03-27 | End: 2018-03-29 | Stop reason: HOSPADM

## 2018-03-27 RX ORDER — ROPIVACAINE HYDROCHLORIDE 2 MG/ML
8 INJECTION, SOLUTION EPIDURAL; INFILTRATION; PERINEURAL CONTINUOUS
Status: DISCONTINUED | OUTPATIENT
Start: 2018-03-27 | End: 2018-03-29 | Stop reason: HOSPADM

## 2018-03-27 RX ORDER — ROCURONIUM BROMIDE 10 MG/ML
INJECTION, SOLUTION INTRAVENOUS
Status: DISCONTINUED | OUTPATIENT
Start: 2018-03-27 | End: 2018-03-27

## 2018-03-27 RX ORDER — FENTANYL CITRATE 50 UG/ML
25 INJECTION, SOLUTION INTRAMUSCULAR; INTRAVENOUS EVERY 5 MIN PRN
Status: DISCONTINUED | OUTPATIENT
Start: 2018-03-27 | End: 2018-03-27

## 2018-03-27 RX ORDER — CELECOXIB 200 MG/1
200 CAPSULE ORAL DAILY
Status: DISCONTINUED | OUTPATIENT
Start: 2018-03-28 | End: 2018-03-29 | Stop reason: HOSPADM

## 2018-03-27 RX ORDER — GLYCOPYRROLATE 0.2 MG/ML
INJECTION INTRAMUSCULAR; INTRAVENOUS
Status: DISCONTINUED | OUTPATIENT
Start: 2018-03-27 | End: 2018-03-27

## 2018-03-27 RX ORDER — DOCUSATE SODIUM 100 MG/1
100 CAPSULE, LIQUID FILLED ORAL 2 TIMES DAILY
Qty: 60 CAPSULE | Refills: 0 | Status: SHIPPED | OUTPATIENT
Start: 2018-03-27 | End: 2018-04-24

## 2018-03-27 RX ORDER — RAMELTEON 8 MG/1
8 TABLET ORAL NIGHTLY PRN
Status: DISCONTINUED | OUTPATIENT
Start: 2018-03-27 | End: 2018-03-29 | Stop reason: HOSPADM

## 2018-03-27 RX ORDER — ONDANSETRON 2 MG/ML
INJECTION INTRAMUSCULAR; INTRAVENOUS
Status: DISCONTINUED | OUTPATIENT
Start: 2018-03-27 | End: 2018-03-27

## 2018-03-27 RX ORDER — ONDANSETRON 8 MG/1
8 TABLET, ORALLY DISINTEGRATING ORAL EVERY 12 HOURS PRN
Status: DISCONTINUED | OUTPATIENT
Start: 2018-03-27 | End: 2018-03-29 | Stop reason: HOSPADM

## 2018-03-27 RX ORDER — ACETAMINOPHEN 500 MG
1000 TABLET ORAL EVERY 6 HOURS
Status: DISCONTINUED | OUTPATIENT
Start: 2018-03-27 | End: 2018-03-29 | Stop reason: HOSPADM

## 2018-03-27 RX ADMIN — FENTANYL CITRATE 50 MCG: 50 INJECTION, SOLUTION INTRAMUSCULAR; INTRAVENOUS at 06:03

## 2018-03-27 RX ADMIN — OXYCODONE HYDROCHLORIDE 15 MG: 5 TABLET ORAL at 03:03

## 2018-03-27 RX ADMIN — FENTANYL CITRATE 50 MCG: 50 INJECTION, SOLUTION INTRAMUSCULAR; INTRAVENOUS at 09:03

## 2018-03-27 RX ADMIN — GABAPENTIN 300 MG: 300 CAPSULE ORAL at 08:03

## 2018-03-27 RX ADMIN — Medication 2 MG: at 02:03

## 2018-03-27 RX ADMIN — SODIUM CHLORIDE: 0.9 INJECTION, SOLUTION INTRAVENOUS at 06:03

## 2018-03-27 RX ADMIN — FENTANYL CITRATE 50 MCG: 50 INJECTION, SOLUTION INTRAMUSCULAR; INTRAVENOUS at 07:03

## 2018-03-27 RX ADMIN — Medication 2 MG: at 05:03

## 2018-03-27 RX ADMIN — SODIUM CHLORIDE, SODIUM GLUCONATE, SODIUM ACETATE, POTASSIUM CHLORIDE, MAGNESIUM CHLORIDE, SODIUM PHOSPHATE, DIBASIC, AND POTASSIUM PHOSPHATE: .53; .5; .37; .037; .03; .012; .00082 INJECTION, SOLUTION INTRAVENOUS at 08:03

## 2018-03-27 RX ADMIN — ROPIVACAINE HYDROCHLORIDE 8 ML/HR: 10 INJECTION, SOLUTION EPIDURAL at 11:03

## 2018-03-27 RX ADMIN — MIDAZOLAM HYDROCHLORIDE 2 MG: 1 INJECTION, SOLUTION INTRAMUSCULAR; INTRAVENOUS at 06:03

## 2018-03-27 RX ADMIN — PROPOFOL 20 MG: 10 INJECTION, EMULSION INTRAVENOUS at 09:03

## 2018-03-27 RX ADMIN — ROCURONIUM BROMIDE 5 MG: 10 INJECTION, SOLUTION INTRAVENOUS at 07:03

## 2018-03-27 RX ADMIN — OXYCODONE HYDROCHLORIDE 15 MG: 5 TABLET ORAL at 08:03

## 2018-03-27 RX ADMIN — ACETAMINOPHEN 1000 MG: 10 INJECTION, SOLUTION INTRAVENOUS at 08:03

## 2018-03-27 RX ADMIN — SUCCINYLCHOLINE CHLORIDE 140 MG: 20 INJECTION, SOLUTION INTRAMUSCULAR; INTRAVENOUS at 07:03

## 2018-03-27 RX ADMIN — NEOSTIGMINE METHYLSULFATE 3 MG: 1 INJECTION INTRAVENOUS at 10:03

## 2018-03-27 RX ADMIN — CELECOXIB 400 MG: 200 CAPSULE ORAL at 11:03

## 2018-03-27 RX ADMIN — FENTANYL CITRATE 25 MCG: 50 INJECTION, SOLUTION INTRAMUSCULAR; INTRAVENOUS at 01:03

## 2018-03-27 RX ADMIN — GLYCOPYRROLATE 0.4 MG: 0.2 INJECTION INTRAMUSCULAR; INTRAVENOUS at 10:03

## 2018-03-27 RX ADMIN — LIDOCAINE HYDROCHLORIDE 1 MG: 10 INJECTION, SOLUTION EPIDURAL; INFILTRATION; INTRACAUDAL; PERINEURAL at 06:03

## 2018-03-27 RX ADMIN — Medication 2 MG: at 11:03

## 2018-03-27 RX ADMIN — CEFAZOLIN 2 G: 330 INJECTION, POWDER, FOR SOLUTION INTRAMUSCULAR; INTRAVENOUS at 07:03

## 2018-03-27 RX ADMIN — DEXAMETHASONE SODIUM PHOSPHATE 8 MG: 4 INJECTION, SOLUTION INTRAMUSCULAR; INTRAVENOUS at 07:03

## 2018-03-27 RX ADMIN — FENTANYL CITRATE 50 MCG: 50 INJECTION, SOLUTION INTRAMUSCULAR; INTRAVENOUS at 10:03

## 2018-03-27 RX ADMIN — PROPOFOL 180 MG: 10 INJECTION, EMULSION INTRAVENOUS at 07:03

## 2018-03-27 RX ADMIN — ONDANSETRON 4 MG: 2 INJECTION INTRAMUSCULAR; INTRAVENOUS at 10:03

## 2018-03-27 RX ADMIN — LIDOCAINE HYDROCHLORIDE 75 MG: 20 INJECTION, SOLUTION INTRAVENOUS at 07:03

## 2018-03-27 RX ADMIN — OXYCODONE HYDROCHLORIDE 15 MG: 5 TABLET ORAL at 11:03

## 2018-03-27 RX ADMIN — GABAPENTIN 300 MG: 300 CAPSULE ORAL at 02:03

## 2018-03-27 RX ADMIN — ACETAMINOPHEN 1000 MG: 500 TABLET ORAL at 08:03

## 2018-03-27 RX ADMIN — ACETAMINOPHEN 1000 MG: 10 INJECTION, SOLUTION INTRAVENOUS at 01:03

## 2018-03-27 RX ADMIN — ROCURONIUM BROMIDE 25 MG: 10 INJECTION, SOLUTION INTRAVENOUS at 07:03

## 2018-03-27 NOTE — ANESTHESIA PREPROCEDURE EVALUATION
03/27/2018  Anitra Chaney is a 38 y.o., female.    Active Ambulatory Problems     Diagnosis Date Noted    Mood altered 09/24/2012    Migraine syndrome 03/08/2013    HTN (hypertension), benign 03/10/2014    SARAH (obstructive sleep apnea) 03/10/2014    Daytime somnolence 03/10/2014    Leg pain 03/10/2014    DM2 (diabetes mellitus, type 2) 07/17/2014    Morbid obesity 02/13/2016    Mass of foot or toe 03/02/2016    Diabetic foot infection 12/02/2016    CVA (cerebral vascular accident) 05/04/2017     Resolved Ambulatory Problems     Diagnosis Date Noted    TIA (transient ischemic attack) 03/14/2013    Chest pain, unspecified 09/13/2013     Past Medical History:   Diagnosis Date    Anxiety     Chest pain, unspecified 9/13/2013    Depression     Diabetes mellitus     Diabetes mellitus, type 2     DM2 (diabetes mellitus, type 2)     Eyelid lesion     GERD (gastroesophageal reflux disease)     History of conjunctivitis     History of PCOS     HTN (hypertension), benign 3/10/2014    Migraines     Obesity     TIA (transient ischemic attack) 2013         Anesthesia Evaluation    I have reviewed the Patient Summary Reports.    I have reviewed the Nursing Notes.      Review of Systems  Anesthesia Hx:  Denies Hx of Anesthetic complications    Social:  Smoker    Cardiovascular:   Exercise tolerance: good Hypertension Denies CAD.     Denies Angina.        Pulmonary:   Denies Shortness of breath. Sleep Apnea    Renal/:   Denies Chronic Renal Disease.     Hepatic/GI:   GERD, well controlled Denies Liver Disease.    Neurological:   TIA, Headaches Denies Seizures.    Endocrine:   Diabetes, type 2 Denies Hypothyroidism.        Physical Exam  General:  Obesity    Airway/Jaw/Neck:  Airway Findings: Mallampati: III TM Distance: Normal, at least 6 cm  Jaw/Neck Findings:  Neck ROM: Normal ROM        Chest/Lungs:  Chest/Lungs Findings: Normal Respiratory Rate     Heart/Vascular:  Heart Findings: Rate: Normal        Mental Status:  Mental Status Findings:  Cooperative, Alert and Oriented         Anesthesia Plan  Type of Anesthesia, risks & benefits discussed:  Anesthesia Type:  general  Patient's Preference: GA  Intra-op Monitoring Plan: standard ASA monitors  Intra-op Monitoring Plan Comments:   Post Op Pain Control Plan: multimodal analgesia, IV/PO Opiods PRN, per primary service following discharge from PACU and peripheral nerve block  Post Op Pain Control Plan Comments:   Induction:   IV  Beta Blocker:  Patient is not currently on a Beta-Blocker (No further documentation required).       Informed Consent: Patient understands risks and agrees with Anesthesia plan.  Questions answered. Anesthesia consent signed with patient.  ASA Score: 3     Day of Surgery Review of History & Physical:    H&P update referred to the surgeon.     Anesthesia Plan Notes: The patient's PMH was reviewed and PE was performed  Plan for GA        Ready For Surgery From Anesthesia Perspective.

## 2018-03-27 NOTE — INTERVAL H&P NOTE
The patient has been examined and the H&P has been reviewed:    I concur with the findings and no changes have occurred since H&P was written.    Anesthesia/Surgery risks, benefits and alternative options discussed and understood by patient/family.          Active Hospital Problems    Diagnosis  POA    Ankle arthritis [M19.079]  Yes      Resolved Hospital Problems    Diagnosis Date Resolved POA   No resolved problems to display.

## 2018-03-27 NOTE — TRANSFER OF CARE
"Anesthesia Transfer of Care Note    Patient: Anitra Chaney    Procedure(s) Performed: Procedure(s) (LRB):  ARTHRODESIS-ANKLE (Left)    Patient location: PACU    Anesthesia Type: general and regional    Transport from OR: Transported from OR on 6-10 L/min O2 by face mask with adequate spontaneous ventilation    Post pain: adequate analgesia    Post assessment: no apparent anesthetic complications    Post vital signs: stable    Level of consciousness: awake    Nausea/Vomiting: no nausea/vomiting    Complications: none    Transfer of care protocol was followed      Last vitals:   Visit Vitals  /64 (BP Location: Left arm, Patient Position: Lying)   Pulse 68   Temp 36.6 °C (97.8 °F) (Axillary)   Resp 20   Ht 5' 9" (1.753 m)   Wt 117.9 kg (260 lb)   LMP 03/26/2018   SpO2 99%   Breastfeeding? No   BMI 38.40 kg/m²     "

## 2018-03-27 NOTE — ANESTHESIA PROCEDURE NOTES
Popliteal Sciatic Nerve Catheter    Patient location during procedure: pre-op   Block not for primary anesthetic.  Reason for block: at surgeon's request and post-op pain management   Post-op Pain Location: left ankle pain  Start time: 3/27/2018 6:42 AM  Timeout: 3/27/2018 6:41 AM   End time: 3/27/2018 6:59 AM  Staffing  Anesthesiologist: LUCIAN CHERRY  Other anesthesia staff: JULIANA YU  Performed: other anesthesia staff and anesthesiologist   Preanesthetic Checklist  Completed: patient identified, site marked, surgical consent, pre-op evaluation, timeout performed, IV checked, risks and benefits discussed and monitors and equipment checked  Peripheral Block  Patient position: supine  Prep: ChloraPrep and site prepped and draped  Patient monitoring: heart rate, cardiac monitor, continuous pulse ox, continuous capnometry and frequent blood pressure checks  Block type: popliteal  Laterality: left  Injection technique: continuous  Needle  Needle type: Tuohy   Needle gauge: 18 G  Needle length: 3.5 in  Needle localization: anatomical landmarks and ultrasound guidance  Catheter type: non-stimulating  Catheter size: 20 G  Test dose: lidocaine 1.5% with Epi 1-to-200,000 and negative   -ultrasound image captured on disc.  Assessment  Injection assessment: negative aspiration, negative parasthesia and local visualized surrounding nerve  Paresthesia pain: none  Heart rate change: no  Slow fractionated injection: yes  Medications:  Bolus administered: 40 mL of 0.5 ropivacaine  Epinephrine added: 3.75 mcg/mL (1/300,000)  Additional Notes  VSS.  [facility] RN monitoring vitals throughout procedure.  Patient tolerated procedure well.

## 2018-03-27 NOTE — ANESTHESIA PROCEDURE NOTES
Adductor Canal Single Injection Block    Patient location during procedure: pre-op   Block not for primary anesthetic.  Reason for block: at surgeon's request and post-op pain management   Post-op Pain Location: left ankle pain  Start time: 3/27/2018 6:59 AM  Timeout: 3/27/2018 6:41 AM   End time: 3/27/2018 7:04 AM  Staffing  Anesthesiologist: LUCIAN CHERRY  Other anesthesia staff: JULIANA YU  Performed: other anesthesia staff   Preanesthetic Checklist  Completed: patient identified, site marked, surgical consent, pre-op evaluation, timeout performed, IV checked, risks and benefits discussed and monitors and equipment checked  Peripheral Block  Patient position: supine  Prep: ChloraPrep  Patient monitoring: heart rate, cardiac monitor, continuous pulse ox, continuous capnometry and frequent blood pressure checks  Block type: adductor canal  Laterality: left  Injection technique: single shot  Needle  Needle type: Stimuplex   Needle gauge: 21 G  Needle length: 4 in  Needle localization: anatomical landmarks and ultrasound guidance   -ultrasound image captured on disc.  Assessment  Injection assessment: negative aspiration, negative parasthesia and local visualized surrounding nerve  Paresthesia pain: none  Heart rate change: no  Slow fractionated injection: yes  Medications:  Bolus administered: 20 mL of 0.25 ropivacaine  Epinephrine added: 3.75 mcg/mL (1/300,000)  Additional Notes  VSS.  [facility] RN monitoring vitals throughout procedure.  Patient tolerated procedure well.

## 2018-03-27 NOTE — PLAN OF CARE
[facility]    HOME HEALTH ORDERS  FACE TO FACE ENCOUNTER    Patient Name: Anitra Chaney  YOB: 1980    PCP: Tien Watts MD   PCP Address: [address] [place] [address] / [place] [address]  PCP Phone Number: [phone]  PCP Fax: [phone]    Encounter Date: 03/27/2018    Admit to Home Health    Diagnoses:  Active Hospital Problems    Diagnosis  POA    *Ankle arthritis [M19.079]  Yes      Resolved Hospital Problems    Diagnosis Date Resolved POA   No resolved problems to display.       Future Appointments  Date Time Provider Department Center   4/12/2018 10:15 AM Kimberly Ferguson PA-C [facility] ORTHO [facility]   5/10/2018 2:15 PM Radha Palomares MD [id number]     Follow-up Information     Kimberly Ferguson PA-C In 2 weeks.    Specialty:  Orthopedic Surgery  Contact information:  [address] [facility] [address]  [phone]                     I have seen and examined this patient face to face today. My clinical findings that support the need for the home health skilled services and home bound status are the following:  Weakness/numbness causing balance and gait disturbance due to Surgery making it taxing to leave home.    Allergies:  Review of patient's allergies indicates:   Allergen Reactions    Mirtazapine      Other reaction(s): tachycardia    Ziprasidone hcl      Other reaction(s): Unknown  Other reaction(s): Unknown       Diet: regular diet    Activities: nwb lle    Nursing:   SN to complete comprehensive assessment including routine vital signs. Instruct on disease process and s/s of complications to report to MD. Review/verify medication list sent home with the patient at time of discharge  and instruct patient/caregiver as needed. Frequency may be adjusted depending on start of care date.    Notify MD if SBP > 160 or < 90; DBP > 90 or < 50; HR > 120 or < 50; Temp > 101; Other:         CONSULTS:    Physical Therapy to evaluate  and treat. Evaluate for home safety and equipment needs; Establish/upgrade home exercise program. Perform / instruct on therapeutic exercises, gait training, transfer training, and Range of Motion.  Occupational Therapy to evaluate and treat. Evaluate home environment for safety and equipment needs. Perform/Instruct on transfers, ADL training, ROM, and therapeutic exercises.   to evaluate for community resources/long-range planning.    MISCELLANEOUS CARE:      WOUND CARE ORDERS  N/A: cast      Medications: Review discharge medications with patient and family and provide education.      Current Discharge Medication List      START taking these medications    Details   docusate sodium (COLACE) 100 MG capsule Take 1 capsule (100 mg total) by mouth 2 (two) times daily.  Qty: 60 capsule, Refills: 0      oxyCODONE-acetaminophen (PERCOCET)  mg per tablet Take 1 tablet by mouth every 4 (four) hours as needed for Pain.  Qty: 90 tablet, Refills: 0      promethazine (PHENERGAN) 12.5 MG Tab Take 1 tablet (12.5 mg total) by mouth every 4 (four) hours as needed.  Qty: 60 tablet, Refills: 0         CONTINUE these medications which have NOT CHANGED    Details   gabapentin (NEURONTIN) 300 MG capsule Take 1 capsule (300 mg total) by mouth 3 (three) times daily.  Qty: 90 capsule, Refills: 0         STOP taking these medications       multivitamin capsule Comments:   Reason for Stopping:         oxyCODONE-acetaminophen (PERCOCET) 5-325 mg per tablet Comments:   Reason for Stopping:         lidocaine-prilocaine (EMLA) cream Comments:   Reason for Stopping:               I certify that this patient is confined to her home and needs intermittent skilled nursing care, physical therapy and occupational therapy.

## 2018-03-27 NOTE — NURSING TRANSFER
Nursing Transfer Note      3/27/2018     Transfer 542A    Transfer via stretcher    Transfer with belongings    Transported by RN    Medicines sent: Ropivacaine    Chart send with patient: yes    Notified: friend    Patient reassessed at:03/27/18    Upon arrival to floor:

## 2018-03-27 NOTE — OP NOTE
DATE OF PROCEDURE:  03/27/2018.    PREOPERATIVE DIAGNOSES:  Left ankle arthritis with varus deformity.    POSTOPERATIVE DIAGNOSES:  Left ankle arthritis with varus deformity.    PROCEDURE PERFORMED:  Left ankle arthrodesis.    ANESTHESIA:  General.    SURGEON:  Lele Hernández M.D.    ASSISTANT:  Tank Miranda M.D. (RES).    COMPLICATIONS:  There were no operative or anesthetic complications.    ESTIMATED BLOOD LOSS:  5 mL.    SPECIMENS:  None.    IMPLANTS:  Three 7.5 Acutrak compression screws, two 4.0 Acutrak compression   screws.    PROCEDURE IN DETAIL:  After anesthesia was administered, the patient's left leg   was prepped and draped in a sterile fashion with a tourniquet around the left   thigh.  The left leg was exsanguinated with an Esmarch bandage and tourniquet   was elevated to 300 mmHg.  I elected to do a lateral approach through a previous   surgical scar.  The incision was made centered over the fibula and extending   distally and curving anteriorly towards the sinus tarsi area of the hindfoot.    The incision was carried through the skin and subcutaneous tissue and down to   the periosteum of the fibula.  This was opened distally down past the tip of the   fibula and overlying two large loose bony fragments.  These were excised.  The   flap was lifted anteriorly full thickness off the fibula over the anterior   aspect of the tibia and through the joint capsule.  The distal fibula was then   exposed posteriorly and a microsagittal saw was used to perform a fibulectomy at   the distal 5 cm of the fibula.  This gave good exposure of the lateral ankle.    A large laminar  was used to expose the joint and the joint was   decorticated using a combination of osteotomes, curettes, rongeur, and a sheila.    I also used a K-wire to drill holes into the subchondral bone.  Once the   decortication was completed, the fusion site was well irrigated.  The fusion   site was reduced and was fixed temporarily  with three guidewires for the Acutrak   7.5 mm screws; one was placed anteriorly, one laterally, and one medially.  The   position of the guidewires was checked using FluoroScan imaging.  The fusion   site was packed with some cortical fiber allograft.  Three 7.5 screws were then   placed across the fusion site.  Solid purchase was obtained with all three   screws.  Once that fixation was completed, I fashioned a bony plate from the   distal fibula and used that as a lateral biological plate that was fixed across   the fusion site with two 4.0 mm Acutrak compression screws.  Once this was   completed, the wound was again gently irrigated.  The deep tissues were closed   with 0-Vicryl sutures, subcutaneous tissue was closed with 2-0 Vicryl suture,   and the skin incisions were closed with 3-0 nylon.  A sterile compressive Mon   dressing and a posterior splint was placed in the Operating Room.  The patient   was returned to Recovery in stable condition.      CRISTY/MENDY  dd: 03/27/2018 10:58:42 (WILBER)  td: 03/27/2018 12:48:56 (WILBER)  Doc ID   #[id number]  Job ID #[id number]    CC:

## 2018-03-27 NOTE — ANESTHESIA POSTPROCEDURE EVALUATION
"Anesthesia Post Evaluation    Patient: Anitra Chaney    Procedure(s) Performed: Procedure(s) (LRB):  ARTHRODESIS-ANKLE (Left)    Final Anesthesia Type: general  Patient location during evaluation: PACU  Patient participation: Yes- Able to Participate  Level of consciousness: awake and alert  Post-procedure vital signs: reviewed and stable  Pain management: adequate  Airway patency: patent  PONV status at discharge: No PONV  Anesthetic complications: no      Cardiovascular status: blood pressure returned to baseline  Respiratory status: unassisted and spontaneous ventilation  Hydration status: euvolemic  Follow-up not needed.        Visit Vitals  /61 (BP Location: Left arm, Patient Position: Lying)   Pulse (!) 57   Temp 36.3 °C (97.4 °F) (Axillary)   Resp 17   Ht 5' 9" (1.753 m)   Wt 117.9 kg (260 lb)   LMP 03/26/2018   SpO2 (!) 92%   Breastfeeding? No   BMI 38.40 kg/m²       Pain/Stanley Score: Pain Assessment Performed: Yes (3/27/2018 11:45 AM)  Presence of Pain: complains of pain/discomfort (3/27/2018 11:45 AM)  Pain Rating Prior to Med Admin: 7 (3/27/2018 11:18 AM)  Stanley Score: 10 (3/27/2018 11:45 AM)      "

## 2018-03-28 PROCEDURE — 63600175 PHARM REV CODE 636 W HCPCS: Performed by: STUDENT IN AN ORGANIZED HEALTH CARE EDUCATION/TRAINING PROGRAM

## 2018-03-28 PROCEDURE — 25000003 PHARM REV CODE 250: Performed by: ANESTHESIOLOGY

## 2018-03-28 PROCEDURE — 97161 PT EVAL LOW COMPLEX 20 MIN: CPT

## 2018-03-28 PROCEDURE — 25000003 PHARM REV CODE 250: Performed by: STUDENT IN AN ORGANIZED HEALTH CARE EDUCATION/TRAINING PROGRAM

## 2018-03-28 PROCEDURE — G8979 MOBILITY GOAL STATUS: HCPCS | Mod: CI

## 2018-03-28 PROCEDURE — G8978 MOBILITY CURRENT STATUS: HCPCS | Mod: CJ

## 2018-03-28 PROCEDURE — 99202 OFFICE O/P NEW SF 15 MIN: CPT | Mod: ICN,,, | Performed by: ANESTHESIOLOGY

## 2018-03-28 PROCEDURE — 63600175 PHARM REV CODE 636 W HCPCS: Performed by: ANESTHESIOLOGY

## 2018-03-28 RX ORDER — HYDROMORPHONE HYDROCHLORIDE 1 MG/ML
1 INJECTION, SOLUTION INTRAMUSCULAR; INTRAVENOUS; SUBCUTANEOUS
Status: DISCONTINUED | OUTPATIENT
Start: 2018-03-28 | End: 2018-03-28

## 2018-03-28 RX ADMIN — Medication 2 MG: at 12:03

## 2018-03-28 RX ADMIN — GABAPENTIN 300 MG: 300 CAPSULE ORAL at 09:03

## 2018-03-28 RX ADMIN — Medication 2 MG: at 02:03

## 2018-03-28 RX ADMIN — ROPIVACAINE HYDROCHLORIDE 8 ML/HR: 10 INJECTION, SOLUTION EPIDURAL at 11:03

## 2018-03-28 RX ADMIN — OXYCODONE HYDROCHLORIDE 15 MG: 5 TABLET ORAL at 09:03

## 2018-03-28 RX ADMIN — ONDANSETRON 8 MG: 8 TABLET, ORALLY DISINTEGRATING ORAL at 03:03

## 2018-03-28 RX ADMIN — CELECOXIB 200 MG: 200 CAPSULE ORAL at 09:03

## 2018-03-28 RX ADMIN — ACETAMINOPHEN 1000 MG: 500 TABLET ORAL at 06:03

## 2018-03-28 RX ADMIN — ACETAMINOPHEN 1000 MG: 500 TABLET ORAL at 07:03

## 2018-03-28 RX ADMIN — OXYCODONE HYDROCHLORIDE 15 MG: 5 TABLET ORAL at 11:03

## 2018-03-28 RX ADMIN — Medication 2 MG: at 03:03

## 2018-03-28 RX ADMIN — HYDROMORPHONE HYDROCHLORIDE 1 MG: 1 INJECTION, SOLUTION INTRAMUSCULAR; INTRAVENOUS; SUBCUTANEOUS at 09:03

## 2018-03-28 RX ADMIN — GABAPENTIN 300 MG: 300 CAPSULE ORAL at 03:03

## 2018-03-28 RX ADMIN — ACETAMINOPHEN 1000 MG: 500 TABLET ORAL at 11:03

## 2018-03-28 RX ADMIN — Medication 2 MG: at 07:03

## 2018-03-28 RX ADMIN — OXYCODONE HYDROCHLORIDE 15 MG: 5 TABLET ORAL at 06:03

## 2018-03-28 RX ADMIN — OXYCODONE HYDROCHLORIDE 15 MG: 5 TABLET ORAL at 03:03

## 2018-03-28 RX ADMIN — HYDROMORPHONE HYDROCHLORIDE 1 MG: 1 INJECTION, SOLUTION INTRAMUSCULAR; INTRAVENOUS; SUBCUTANEOUS at 06:03

## 2018-03-28 RX ADMIN — OXYCODONE HYDROCHLORIDE 15 MG: 5 TABLET ORAL at 01:03

## 2018-03-28 NOTE — PLAN OF CARE
CM met with patient to discuss DME needs. Patient stated she owns a knee scooter and axillary crutches. Patient requested a bedside commode and shower chair. Patient verbalized understanding of out of pocket costs associated with shower chair.    CM ordered BSC and SC from [facility] with [facility]. DME for bedside delivery.    Patient inquired about plastic covering for cast when she showers. CM gave patient the number for [employer] to obtain cast covering supplies.    Shelly Saleh RN, CM [facility]  [phone] -[phone]

## 2018-03-28 NOTE — PT/OT/SLP EVAL
"Physical Therapy Evaluation    Patient Name:  Anitra Chaney   MRN:  [id number]    Recommendations:     Discharge Recommendations:  home   Discharge Equipment Recommendations: bedside commode, shower chair   Barriers to discharge: Inaccessible home 4 DENNIS    Assessment:     Anitra Chaney is a 38 y.o. female admitted with a medical diagnosis of Ankle arthritis.  She presents with the following impairments/functional limitations:  gait instability, impaired functional mobilty, pain, orthopedic precautions, decreased lower extremity function . Pt states she has been practicing at home on the knee walker and going up/down steps with AMARJIT ENNIS. Pt's  present and educated in guarding pt when going up/down steps.    Rehab Prognosis:  good; patient would benefit from acute skilled PT services to address these deficits and reach maximum level of function.      Recent Surgery: Procedure(s) (LRB):  ARTHRODESIS-ANKLE (Left) 1 Day Post-Op    Plan:     During this hospitalization, patient to be seen 4 x/week to address the above listed problems via gait training, therapeutic activities  · Plan of Care Expires:  04/27/18   Plan of Care Reviewed with: patient, spouse    Subjective     Communicated with nurse prior to session.  Patient found supine upon PT entry to room, agreeable to evaluation.    "I need to go outside, I am getting claustrophobic in this room"  Pain/Comfort:  · Pain Rating 1: 4/10  · Location - Side 1: Left  · Location - Orientation 1: medial  · Location 1: ankle  · Pain Addressed 1: Pre-medicate for activity, Reposition, Cessation of Activity  · Pain Rating Post-Intervention 1: 4/10    Patients cultural, spiritual, Religion conflicts given the current situation: none noted    Living Environment:  Pt lives with  and children in 1 story home with 4 DENNIS with B UE rails but can only use 1 rail  Prior to admission, patients level of function was independent.  Patient has the following " equipment: none.  DME owned (not currently used): [employer].  Upon discharge, patient will have assistance from .    Objective:     Patient found with: perineural catheter     General Precautions: Standard, fall   Orthopedic Precautions:LLE non weight bearing   Braces:  (short leg cast L ankle)     Exams:  · Cognitive Exam:  Patient is oriented to Person, Place, Time and Situation and follows 100% of multistep commands   · Sensation:    · -       Intact  light/touch B LE  · RLE ROM: WFL  · RLE Strength: WFL  · LLE ROM: WFL except ankle in short leg cast  · LLE Strength: Deficits: hip flex 4-/5; knee flex/ext 3/5; ankle NT due to short leg cast    Functional Mobility:  · Bed Mobility:     · Supine to Sit: modified independence  · Sit to Supine: modified independence  · Transfers:     · Sit to Stand:  stand by assistance with NWB L LE with axillary crutches; 1 trial from bed and 2 trials from w/c  · Bed to Chair: contact guard assistance with  no AD  With NWB L LE  Stand Pivot  · Gait: 8ft with crutches with NWB L LE with CGA and verbal cues for proper technique   · Stairs:  Pt ascended/descended 4 stair(s) with Axillary crutch on L side with right handrail with NWB L LE with Minimal Assistance.     AM-PAC 6 CLICK MOBILITY  Total Score:20   Therapeutic Activities and Exercises:   Pt and pt's  educated in proper and safe technique up/down steps.     Patient left up in chair with all lines intact, nurse notified and family present.    GOALS:    Physical Therapy Goals        Problem: Physical Therapy Goal    Goal Priority Disciplines Outcome Goal Variances Interventions   Physical Therapy Goal     PT/OT, PT Ongoing (interventions implemented as appropriate)     Description:  PT goals until 3/31/18    1. Pt sit to stand with crutches with NWB L LE with mod independent-not met  2. Pt to perform gait 60ft with crutches with NWB L LE with mod independent.-not met  3. Pt to up/down 4 steps with R rail with  crutches with NWB L LE with CGA.-not met                      History:     Past Medical History:   Diagnosis Date    Anxiety     Chest pain, unspecified 2013: For three days.    Depression     Diabetes mellitus     Diabetes mellitus, type 2     DM2 (diabetes mellitus, type 2)     diet control    Eyelid lesion     LLL    GERD (gastroesophageal reflux disease)     History of conjunctivitis     History of PCOS     HTN (hypertension), benign 3/10/2014    Migraines     Obesity     TIA (transient ischemic attack)        Past Surgical History:   Procedure Laterality Date    ANKLE FRACTURE SURGERY       SECTION, CLASSIC  2005/2009    x2    CHOLECYSTECTOMY  3/2002    lap maribel    FOOT SURGERY      gastric sleeve         Clinical Decision Making:     History  Co-morbidities and personal factors that may impact the plan of care Examination  Body Structures and Functions, activity limitations and participation restrictions that may impact the plan of care Clinical Presentation   Decision Making/ Complexity Score   Co-morbidities:   [] Time since onset of injury / illness / exacerbation  [] Status of current condition  []Patient's cognitive status and safety concerns    [] Multiple Medical Problems (see med hx)  Personal Factors:   [] Patient's age  [] Prior Level of function   [] Patient's home situation (environment and family support)  [] Patient's level of motivation  [] Expected progression of patient      HISTORY:(criteria)    [] 24116 - no personal factors/history    [] 57212 - has 1-2 personal factor/comorbidity     [] 57790 - has >3 personal factor/comorbidity     Body Regions:  [] Objective examination findings  [] Head     []  Neck  [] Trunk   [] Upper Extremity  [] Lower Extremity    Body Systems:  [] For communication ability, affect, cognition, language, and learning style: the assessment of the ability to make needs known, consciousness, orientation (person, place,  and time), expected emotional /behavioral responses, and learning preferences (eg, learning barriers, education  needs)  [] For the neuromuscular system: a general assessment of gross coordinated movement (eg, balance, gait, locomotion, transfers, and transitions) and motor function  (motor control and motor learning)  [] For the musculoskeletal system: the assessment of gross symmetry, gross range of motion, gross strength, height, and weight  [] For the integumentary system: the assessment of pliability(texture), presence of scar formation, skin color, and skin integrity  [] For cardiovascular/pulmonary system: the assessment of heart rate, respiratory rate, blood pressure, and edema     Activity limitations:    [] Patient's cognitive status and saf ety concerns          [] Status of current condition      [] Weight bearing restriction  [] Cardiopulmunary Restriction    Participation Restrictions:   [] Goals and goal agreement with the patient     [] Rehab potential (prognosis) and probable outcome      Examination of Body System: (criteria)    [] 05883 - addressing 1-2 elements    [] 46646 - addressing a total of 3 or more elements     [] 86561 -  Addressing a total of 4 or more elements         Clinical Presentation: (criteria)  Choose one     On examination of body system using standardized tests and measures patient presents with (CHOOSE ONE) elements from any of the following: body structures and functions, activity limitations, and/or participation restrictions.  Leading to a clinical presentation that is considered (CHOOSE ONE)                              Clinical Decision Making  (Eval Complexity):  Low- 13252     Time Tracking:     PT Received On: 03/28/18  PT Start Time: 0832     PT Stop Time: 0848  PT Total Time (min): 16 min     Billable Minutes: Evaluation 16      Anitra Bejarano, PT  03/28/2018

## 2018-03-28 NOTE — PROGRESS NOTES
"[facility]  Orthopedics  Progress Note    Patient Name: Anitra Chaney  MRN: [id number]  Admission Date: 3/27/2018  Hospital Length of Stay: 0 days  Attending Provider: Lele Hernández MD  Primary Care Provider: Tien Watts MD  Follow-up For: Procedure(s) (LRB):  ARTHRODESIS-ANKLE (Left)    Post-Operative Day: 1 Day Post-Op  Subjective:     Principal Problem:Ankle arthritis    Principal Orthopedic Problem: same    Interval History: The patient was seen and examined this morning at the bedside. Patient reports no acute issues overnight.  PNC seems to have stopped working, but gets relief with PRN meds.       Review of patient's allergies indicates:   Allergen Reactions    Mirtazapine      Other reaction(s): tachycardia    Ziprasidone hcl      Other reaction(s): Unknown  Other reaction(s): Unknown       Current Facility-Administered Medications   Medication    acetaminophen tablet 1,000 mg    celecoxib capsule 200 mg    diphenhydrAMINE injection 25 mg    gabapentin capsule 300 mg    HYDROmorphone injection 1 mg    morphine injection 2 mg    ondansetron disintegrating tablet 8 mg    oxyCODONE immediate release tablet 10 mg    oxyCODONE immediate release tablet 15 mg    oxyCODONE immediate release tablet 5 mg    promethazine (PHENERGAN) 6.25 mg in dextrose 5 % 50 mL IVPB    ramelteon tablet 8 mg    ropivacaine (PF) 2 mg/ml (0.2%) infusion    sodium chloride 0.9% flush 3 mL     Objective:     Vital Signs (Most Recent):  Temp: 98.1 °F (36.7 °C) (03/28/18 0345)  Pulse: (!) 52 (03/28/18 0345)  Resp: 18 (03/28/18 0345)  BP: 125/82 (03/28/18 0345)  SpO2: 98 % (03/28/18 0345) Vital Signs (24h Range):  Temp:  [97.4 °F (36.3 °C)-98.9 °F (37.2 °C)] 98.1 °F (36.7 °C)  Pulse:  [50-85] 52  Resp:  [12-20] 18  SpO2:  [92 %-99 %] 98 %  BP: (103-135)/(58-98) 125/82     Weight: 117.9 kg (260 lb)  Height: 5' 9" (175.3 cm)  Body mass index is 38.4 kg/m².      Intake/Output Summary " (Last 24 hours) at 03/28/18 0557  Last data filed at 03/27/18 2000   Gross per 24 hour   Intake             2200 ml   Output             1650 ml   Net              550 ml       Ortho/SPM Exam    Awake/alert/oriented x3, No acute distress, Afebrile, Vital signs stable  Good inspiratory effort with unlaboured breathing  Dressings bulky c/d/i  NVI in operative limb     Assessment/Plan:     * Ankle arthritis    Anitra Chaney is a 38 y.o. female s/p left ankle arthrodesis 3/27/2018    - PT/OT: AMARJIT TOMAS  - pain control  - dc rosa elena Miranda MD  Orthopedics  [facility]

## 2018-03-28 NOTE — PLAN OF CARE
Problem: Physical Therapy Goal  Goal: Physical Therapy Goal  PT goals until 3/31/18    1. Pt sit to stand with crutches with NWB L LE with mod independent-not met  2. Pt to perform gait 60ft with crutches with NWB L LE with mod independent.-not met  3. Pt to up/down 4 steps with R rail with crutches with NWB L LE with CGA.-not met    Outcome: Ongoing (interventions implemented as appropriate)  Pt's goals set and pt will benefit from skilled PT services to work towards improved functional mobility including: transfers, up/down steps, and gait.   Anitra Bejarano, PT  3/28/2018

## 2018-03-28 NOTE — ANESTHESIA POST-OP PAIN MANAGEMENT
Acute Pain Service Progress Note      Surgery:  Procedure(s) (LRB):  ARTHRODESIS-ANKLE (Left)    Anitra Chaney is a 38 y.o. female w/ a significant PMHx of HTN, SARAH, T2DM, and arthritis of left ankle not amenable to conservative medical management.    Interval Hx: Overnight patient stated she could feel the catheter stop working and pain returned to 9/10 pain. This AM on assessment was able to reposition catheter and patient reported resolution of pain after bolus.    Post Op Day #: 1    Catheter type: perineural  popliteal    Infusion type: Ropivacaine 0.2%  8 cc/hr basal    Problem List:    Active Hospital Problems    Diagnosis  POA    *Ankle arthritis [M19.079]  Yes      Resolved Hospital Problems    Diagnosis Date Resolved POA   No resolved problems to display.       Subjective:  General appearance of alert, oriented, no complaints  Pain with rest: 6    Numbers  Pain with movement: 6    Numbers  Side Effects:   1. Pruritis: No   2. Nausea: No   3. Motor Blockade: No, 0=Ability to raise lower extremities off bed   4. Sedation: No, 1=awake and alert    Schedule Medications:    acetaminophen  1,000 mg Oral Q6H    celecoxib  200 mg Oral Daily    gabapentin  300 mg Oral TID        Continuous Infusions:   ropivacaine (PF) 2 mg/ml (0.2%) 8 mL/hr (03/27/18 2351)        PRN Medications:  diphenhydrAMINE, HYDROmorphone, morphine, ondansetron, oxyCODONE, oxyCODONE, oxyCODONE, promethazine (PHENERGAN) IVPB, ramelteon, sodium chloride 0.9%       Antibiotics:  Antibiotics     None           Objective:  Catheter site: clean, dry, intact    Vital Signs (Most Recent):  Temp: 36.4 °C (97.5 °F) (03/28/18 0830)  Pulse: (!) 50 (03/28/18 0830)  Resp: 13 (03/28/18 0830)  BP: 120/81 (03/28/18 0830)  SpO2: 97 % (03/28/18 0830) Vital Signs Range (Last 24H):  Temp:  [36.3 °C (97.4 °F)-37.2 °C (98.9 °F)]   Pulse:  [50-85]   Resp:  [12-20]   BP: (103-135)/(59-82)   SpO2:  [92 %-99 %]      I & O (Last 24H):  Intake/Output  Summary (Last 24 hours) at 03/28/18 0913  Last data filed at 03/28/18 0600   Gross per 24 hour   Intake             3379 ml   Output             2900 ml   Net              479 ml       Physical Exam:  GA: Alert, comfortable, no acute distress.   Pulmonary: Clear to auscultation A/P/L. No wheezing, crackles, or rhonchi.  Cardiac: RRR S1 & S2 w/o rubs/murmurs/gallops.   Abdominal:Bowel sounds present. No tenderness to palpation or distension. No appreciable hepatosplenomegaly.   Skin: No jaundice, rashes, or visible lesions.    Laboratory:  CBC: No results for input(s): WBC, RBC, HGB, HCT, PLT, MCV, MCH, MCHC in the last 72 hours.    BMP: No results for input(s): NA, K, CO2, CL, BUN, CREATININE, GLU, MG, PHOS, CALCIUM in the last 72 hours.    No results for input(s): PT, INR, PROTIME, APTT in the last 72 hours.      Anticoagulant: None.    Assessment:    Pain control: adequate    Plan:  Patient doing well, continue present treatment.        Blue Osborne MD  CA-1  [phone]    I have seen the patient, reviewed the Resident's assessment and plan. I have personally interviewed and examined the patient at bedside and: agree with the findings.   D/C dilaudid, patient with good pain control after catheter bolus

## 2018-03-28 NOTE — ADDENDUM NOTE
Addendum  created 03/28/18 1109 by Marjorie Ramírez MD    Charge Capture section accepted, Sign clinical note

## 2018-03-28 NOTE — CLINICAL REVIEW
Anesthesia, on call, paged and notified of pts complaining of pain  (crying) unrelieved by PNC(. which in intact) and Oxocodone 15 and 2mg of morphine between 0115 and 0215. Ok to given 4mg of morphine at this time, as per order.. Will continue to monitor.

## 2018-03-28 NOTE — SUBJECTIVE & OBJECTIVE
"Principal Problem:Ankle arthritis    Principal Orthopedic Problem: same    Interval History: The patient was seen and examined this morning at the bedside. Patient reports no acute issues overnight.  PNC seems to have stopped working, but gets relief with PRN meds.       Review of patient's allergies indicates:   Allergen Reactions    Mirtazapine      Other reaction(s): tachycardia    Ziprasidone hcl      Other reaction(s): Unknown  Other reaction(s): Unknown       Current Facility-Administered Medications   Medication    acetaminophen tablet 1,000 mg    celecoxib capsule 200 mg    diphenhydrAMINE injection 25 mg    gabapentin capsule 300 mg    HYDROmorphone injection 1 mg    morphine injection 2 mg    ondansetron disintegrating tablet 8 mg    oxyCODONE immediate release tablet 10 mg    oxyCODONE immediate release tablet 15 mg    oxyCODONE immediate release tablet 5 mg    promethazine (PHENERGAN) 6.25 mg in dextrose 5 % 50 mL IVPB    ramelteon tablet 8 mg    ropivacaine (PF) 2 mg/ml (0.2%) infusion    sodium chloride 0.9% flush 3 mL     Objective:     Vital Signs (Most Recent):  Temp: 98.1 °F (36.7 °C) (03/28/18 0345)  Pulse: (!) 52 (03/28/18 0345)  Resp: 18 (03/28/18 0345)  BP: 125/82 (03/28/18 0345)  SpO2: 98 % (03/28/18 0345) Vital Signs (24h Range):  Temp:  [97.4 °F (36.3 °C)-98.9 °F (37.2 °C)] 98.1 °F (36.7 °C)  Pulse:  [50-85] 52  Resp:  [12-20] 18  SpO2:  [92 %-99 %] 98 %  BP: (103-135)/(58-98) 125/82     Weight: 117.9 kg (260 lb)  Height: 5' 9" (175.3 cm)  Body mass index is 38.4 kg/m².      Intake/Output Summary (Last 24 hours) at 03/28/18 0557  Last data filed at 03/27/18 2000   Gross per 24 hour   Intake             2200 ml   Output             1650 ml   Net              550 ml       Ortho/SPM Exam    Awake/alert/oriented x3, No acute distress, Afebrile, Vital signs stable  Good inspiratory effort with unlaboured breathing  Dressings bulky c/d/i  NVI in operative limb   "

## 2018-03-28 NOTE — ASSESSMENT & PLAN NOTE
Anitravita Chaney is a 38 y.o. female s/p left ankle arthrodesis 3/27/2018    - PT/OT: AMARJIT TOMAS  - pain control  - anibal bundy

## 2018-03-28 NOTE — PROGRESS NOTES
Pt was dressed upon arrival and has been performing all functional T/F's I.  Will D/C from inpatient OT.

## 2018-03-29 ENCOUNTER — HOSPITAL ENCOUNTER (EMERGENCY)
Facility: HOSPITAL | Age: 38
Discharge: HOME | End: 2018-03-29
Attending: EMERGENCY MEDICINE
Payer: COMMERCIAL

## 2018-03-29 VITALS
HEIGHT: 70 IN | BODY MASS INDEX: 37.22 KG/M2 | TEMPERATURE: 99 F | WEIGHT: 260 LBS | OXYGEN SATURATION: 96 % | SYSTOLIC BLOOD PRESSURE: 121 MMHG | HEART RATE: 85 BPM | RESPIRATION RATE: 17 BRPM | DIASTOLIC BLOOD PRESSURE: 75 MMHG

## 2018-03-29 VITALS
TEMPERATURE: 98 F | RESPIRATION RATE: 18 BRPM | DIASTOLIC BLOOD PRESSURE: 56 MMHG | HEIGHT: 69 IN | BODY MASS INDEX: 38.51 KG/M2 | WEIGHT: 260 LBS | HEART RATE: 64 BPM | SYSTOLIC BLOOD PRESSURE: 115 MMHG | OXYGEN SATURATION: 99 %

## 2018-03-29 DIAGNOSIS — M79.672 LEFT FOOT PAIN: ICD-10-CM

## 2018-03-29 DIAGNOSIS — G89.18 POST-OP PAIN: Primary | ICD-10-CM

## 2018-03-29 PROCEDURE — 63600175 PHARM REV CODE 636 W HCPCS: Performed by: ANESTHESIOLOGY

## 2018-03-29 PROCEDURE — 25000003 PHARM REV CODE 250: Performed by: ANESTHESIOLOGY

## 2018-03-29 PROCEDURE — 25000003 PHARM REV CODE 250: Performed by: STUDENT IN AN ORGANIZED HEALTH CARE EDUCATION/TRAINING PROGRAM

## 2018-03-29 PROCEDURE — 63600175 PHARM REV CODE 636 W HCPCS: Performed by: STUDENT IN AN ORGANIZED HEALTH CARE EDUCATION/TRAINING PROGRAM

## 2018-03-29 PROCEDURE — 99283 EMERGENCY DEPT VISIT LOW MDM: CPT | Mod: SA,,, | Performed by: PHYSICIAN ASSISTANT

## 2018-03-29 PROCEDURE — 99283 EMERGENCY DEPT VISIT LOW MDM: CPT

## 2018-03-29 PROCEDURE — 25000003 PHARM REV CODE 250: Performed by: PHYSICIAN ASSISTANT

## 2018-03-29 PROCEDURE — 99212 OFFICE O/P EST SF 10 MIN: CPT | Mod: ICN,,, | Performed by: ANESTHESIOLOGY

## 2018-03-29 RX ORDER — ACETAMINOPHEN 325 MG/1
650 TABLET ORAL
Status: COMPLETED | OUTPATIENT
Start: 2018-03-29 | End: 2018-03-29

## 2018-03-29 RX ADMIN — Medication 2 MG: at 04:03

## 2018-03-29 RX ADMIN — CELECOXIB 200 MG: 200 CAPSULE ORAL at 07:03

## 2018-03-29 RX ADMIN — ACETAMINOPHEN 1000 MG: 500 TABLET ORAL at 06:03

## 2018-03-29 RX ADMIN — ROPIVACAINE HYDROCHLORIDE: 2 INJECTION, SOLUTION EPIDURAL; INFILTRATION at 09:03

## 2018-03-29 RX ADMIN — ACETAMINOPHEN 650 MG: 325 TABLET ORAL at 11:03

## 2018-03-29 RX ADMIN — OXYCODONE HYDROCHLORIDE 15 MG: 5 TABLET ORAL at 07:03

## 2018-03-29 RX ADMIN — GABAPENTIN 300 MG: 300 CAPSULE ORAL at 07:03

## 2018-03-29 NOTE — PLAN OF CARE
Problem: Patient Care Overview  Goal: Plan of Care Review  Outcome: Ongoing (interventions implemented as appropriate)  Pt awake, alert and oriented. Vitals stable. PNC and prn pain med as needed for pain control. White board updated. Pt up in w/c and with crutches- no fall or trauma this shift. Daughter here at bedside with pt. q2h rounding protocol followed.

## 2018-03-29 NOTE — ADDENDUM NOTE
Addendum  created 03/29/18 0900 by Elliot Osborne MD    Order list changed, Order sets accessed

## 2018-03-29 NOTE — ASSESSMENT & PLAN NOTE
Anitravita Chavis Edithsuresh is a 38 y.o. female s/p left ankle arthrodesis 3/27/2018    - PT/OT: AMARJIT OCONNELLE  - pain control  - will change bulky to cast today    Dc home after casting

## 2018-03-29 NOTE — PROGRESS NOTES
Physical Therapy Discharge Summary    Name: Anitra Chaney  MRN: [id number]   Principal Problem: Ankle arthritis     Patient Discharged from acute Physical Therapy on 3/29/18.  Please refer to prior PT noted date on 3/28/18 for functional status.     Assessment:     Patient appropriate for care in another setting.    Objective:     GOALS:    Physical Therapy Goals     Not on file          Multidisciplinary Problems (Resolved)        Problem: Physical Therapy Goal    Goal Priority Disciplines Outcome Goal Variances Interventions   Physical Therapy Goal   (Resolved)     PT/OT, PT Outcome(s) achieved     Description:  PT goals until 3/31/18    1. Pt sit to stand with crutches with NWB L LE with mod independent-not met  2. Pt to perform gait 60ft with crutches with NWB L LE with mod independent.-not met  3. Pt to up/down 4 steps with R rail with crutches with NWB L LE with CGA.-not met                  No goals met due to pt seen for eval only prior to D/C    Reasons for Discontinuation of Therapy Services  Transfer to alternate level of care.      Plan:     Patient Discharged to: Home no PT services needed with family assist.    Anitra Bejarano, PT  3/29/2018

## 2018-03-29 NOTE — ANESTHESIA POST-OP PAIN MANAGEMENT
Acute Pain Service Progress Note      Surgery:  Procedure(s) (LRB):  ARTHRODESIS-ANKLE (Left)    Anitra Chaney is a 38 y.o. female w/ a significant PMHx of of HTN, SARAH, T2DM, and arthritis of left ankle not amenable to conservative medical management.    Interval Hx: NAEON. Patient sleeping comfortably in chair this AM on assessment. Still asking for considerable amount of IV pain medication however, patient currently denies any pain.     Post Op Day #: 2    Catheter type: perineural  popliteal    Infusion type: Ropivacaine 0.2%  8 cc/hr basal    Problem List:    Active Hospital Problems    Diagnosis  POA    *Ankle arthritis [M19.079]  Yes      Resolved Hospital Problems    Diagnosis Date Resolved POA   No resolved problems to display.       Subjective:  General appearance of alert, oriented, no complaints  Pain with rest: 1    Numbers  Pain with movement: 1    Numbers  Side Effects:   1. Pruritis: No   2. Nausea: No   3. Motor Blockade: No, 0=Ability to raise lower extremities off bed   4. Sedation: No, S=sleep, easy to arouse    Schedule Medications:    acetaminophen  1,000 mg Oral Q6H    celecoxib  200 mg Oral Daily    gabapentin  300 mg Oral TID        Continuous Infusions:   ropivacaine (PF) 2 mg/ml (0.2%) 8 mL/hr (03/28/18 2346)        PRN Medications:  diphenhydrAMINE, morphine, ondansetron, oxyCODONE, oxyCODONE, oxyCODONE, promethazine (PHENERGAN) IVPB, ramelteon, sodium chloride 0.9%       Antibiotics:  Antibiotics     None           Objective:  Catheter site: clean, dry, intact     Vital Signs (Most Recent):  Temp: 36.7 °C (98.1 °F) (03/29/18 0750)  Pulse: 64 (03/29/18 0750)  Resp: 18 (03/28/18 2013)  BP: (!) 115/56 (03/29/18 0750)  SpO2: 99 % (03/29/18 0750) Vital Signs Range (Last 24H):  Temp:  [36.4 °C (97.5 °F)-37 °C (98.6 °F)]   Pulse:  [48-71]   Resp:  [16-20]   BP: (113-143)/(56-85)   SpO2:  [96 %-99 %]      I & O (Last 24H):No intake or output data in the 24 hours ending 03/29/18  0836    Physical Exam:  GA: Alert, comfortable, no acute distress.   Pulmonary: Clear to auscultation A/P/L. No wheezing, crackles, or rhonchi.  Cardiac: RRR S1 & S2 w/o rubs/murmurs/gallops.   Abdominal:Bowel sounds present. No tenderness to palpation or distension. No appreciable hepatosplenomegaly.   Skin: No jaundice, rashes, or visible lesions.    Laboratory:  CBC: No results for input(s): WBC, RBC, HGB, HCT, PLT, MCV, MCH, MCHC in the last 72 hours.    BMP: No results for input(s): NA, K, CO2, CL, BUN, CREATININE, GLU, MG, PHOS, CALCIUM in the last 72 hours.    No results for input(s): PT, INR, PROTIME, APTT in the last 72 hours.      Anticoagulant: None.    Assessment:    Pain control: adequate    Plan:  Patient doing well, continue present treatment. Will discontinue IV pain meds in anticipation for dispo this afternoon.        Blue Osborne MD  CA-1  [phone]    Pt seen and examined with Dr. Osborne.  Agree with his assessment and plan.  D/C to home today with OnQ.    Sang Dang M.D.  Staff Anesthesiologist

## 2018-03-29 NOTE — SUBJECTIVE & OBJECTIVE
"Principal Problem:Ankle arthritis    Principal Orthopedic Problem: same    Interval History: The patient was seen and examined this morning at the bedside. Patient reports no acute issues overnight.  Pain controlled, pt sleeping this AM.     Review of patient's allergies indicates:   Allergen Reactions    Mirtazapine      Other reaction(s): tachycardia    Ziprasidone hcl      Other reaction(s): Unknown  Other reaction(s): Unknown       Current Facility-Administered Medications   Medication    acetaminophen tablet 1,000 mg    celecoxib capsule 200 mg    diphenhydrAMINE injection 25 mg    gabapentin capsule 300 mg    morphine injection 2 mg    ondansetron disintegrating tablet 8 mg    oxyCODONE immediate release tablet 10 mg    oxyCODONE immediate release tablet 15 mg    oxyCODONE immediate release tablet 5 mg    promethazine (PHENERGAN) 6.25 mg in dextrose 5 % 50 mL IVPB    ramelteon tablet 8 mg    ropivacaine (PF) 2 mg/ml (0.2%) infusion    sodium chloride 0.9% flush 3 mL     Objective:     Vital Signs (Most Recent):  Temp: 98.3 °F (36.8 °C) (03/29/18 0411)  Pulse: (!) 55 (03/29/18 0411)  Resp: 18 (03/28/18 2013)  BP: 114/67 (03/29/18 0411)  SpO2: 98 % (03/29/18 0411) Vital Signs (24h Range):  Temp:  [97.5 °F (36.4 °C)-98.6 °F (37 °C)] 98.3 °F (36.8 °C)  Pulse:  [48-71] 55  Resp:  [13-20] 18  SpO2:  [96 %-98 %] 98 %  BP: (113-143)/(58-85) 114/67     Weight: 117.9 kg (260 lb)  Height: 5' 9" (175.3 cm)  Body mass index is 38.4 kg/m².    No intake or output data in the 24 hours ending 03/29/18 0622    Ortho/SPM Exam    Awake/alert/oriented x3, No acute distress, Afebrile, Vital signs stable  Good inspiratory effort with unlaboured breathing  Dressings bulky c/d/i  NVI in operative limb       "

## 2018-03-29 NOTE — NURSING
Pt discharged to home via wheelchair. Pt given prescriptions and Q-ball along with copy of discharge home instructions. Pt denies pain at the this time. Pt educated on signs and symptoms of when to call the doctor. All belongings with the patient. Pt verbalized understanding of all discharge instructions.

## 2018-03-29 NOTE — ANESTHESIA POST-OP PAIN MANAGEMENT
Brought OnQ infusion ball to patient at bedside. Patient consented to being switched to OnQ infusion ball prior to discharge. Instructed patient on OnQ ball and possible adverse side effects to be aware of and how to stop infusion if needed. Provided patient w/ instructional pamphlet which includes Anesthesia on-call number. Instructed patient on how to remove on OnQ when appropriate and told patient we would be calling on a daily basis while OnQ ball in place. Patient verbalized understanding; all questions answered, all concerns addressed.      Blue Osborne MD  CA-1  [phone]

## 2018-03-29 NOTE — PT/OT/SLP PROGRESS
Physical Therapy      Patient Name:  Anitra Chaney   MRN:  [id number]    Patient not seen today secondary to Other (Comment). Pt refused due to awaiting transport to go home. Pt noted by PT to be walking in the room without crutches, putting weight on her L LE. PT educated pt in the importance of using the crutches for mobility to keep the weight off her L LE, pt began using the crutches.     Anitra Bejarano, PT 3/29/18

## 2018-03-29 NOTE — PROGRESS NOTES
"[facility]  Orthopedics  Progress Note    Patient Name: Anitra Chaney  MRN: [id number]  Admission Date: 3/27/2018  Hospital Length of Stay: 0 days  Attending Provider: Lele Hernández MD  Primary Care Provider: Tien Watts MD  Follow-up For: Procedure(s) (LRB):  ARTHRODESIS-ANKLE (Left)    Post-Operative Day: 2 Days Post-Op  Subjective:     Principal Problem:Ankle arthritis    Principal Orthopedic Problem: same    Interval History: The patient was seen and examined this morning at the bedside. Patient reports no acute issues overnight.  Pain controlled, pt sleeping this AM.     Review of patient's allergies indicates:   Allergen Reactions    Mirtazapine      Other reaction(s): tachycardia    Ziprasidone hcl      Other reaction(s): Unknown  Other reaction(s): Unknown       Current Facility-Administered Medications   Medication    acetaminophen tablet 1,000 mg    celecoxib capsule 200 mg    diphenhydrAMINE injection 25 mg    gabapentin capsule 300 mg    morphine injection 2 mg    ondansetron disintegrating tablet 8 mg    oxyCODONE immediate release tablet 10 mg    oxyCODONE immediate release tablet 15 mg    oxyCODONE immediate release tablet 5 mg    promethazine (PHENERGAN) 6.25 mg in dextrose 5 % 50 mL IVPB    ramelteon tablet 8 mg    ropivacaine (PF) 2 mg/ml (0.2%) infusion    sodium chloride 0.9% flush 3 mL     Objective:     Vital Signs (Most Recent):  Temp: 98.3 °F (36.8 °C) (03/29/18 0411)  Pulse: (!) 55 (03/29/18 0411)  Resp: 18 (03/28/18 2013)  BP: 114/67 (03/29/18 0411)  SpO2: 98 % (03/29/18 0411) Vital Signs (24h Range):  Temp:  [97.5 °F (36.4 °C)-98.6 °F (37 °C)] 98.3 °F (36.8 °C)  Pulse:  [48-71] 55  Resp:  [13-20] 18  SpO2:  [96 %-98 %] 98 %  BP: (113-143)/(58-85) 114/67     Weight: 117.9 kg (260 lb)  Height: 5' 9" (175.3 cm)  Body mass index is 38.4 kg/m².    No intake or output data in the 24 hours ending 03/29/18 0622    Ortho/SPM " Exam    Awake/alert/oriented x3, No acute distress, Afebrile, Vital signs stable  Good inspiratory effort with unlaboured breathing  Dressings bulky c/d/i  NVI in operative limb         Assessment/Plan:     * Ankle arthritis    Anitra Chaney is a 38 y.o. female s/p left ankle arthrodesis 3/27/2018    - PT/OT: AMARJIT OCONNELLE  - pain control  - will change bulky to cast today    Dc home after casting              Tank Miranda MD  Orthopedics  [facility]

## 2018-03-29 NOTE — PLAN OF CARE
Patient to discharge home after casting. No issues over night per MD note. No discharge needs identified.    Future Appointments  Date Time Provider Department Center   4/12/2018 10:15 AM Kimberly Ferguson PA-C [facility] ORTHO [facility]   5/10/2018 2:15 PM Radha Palomares MD [id number]     Shelly Saleh RN, CM [facility]  [phone]

## 2018-03-29 NOTE — ADDENDUM NOTE
Addendum  created 03/29/18 2712 by Elliot Osborne MD    Order list changed, Sign clinical note

## 2018-03-29 NOTE — ADDENDUM NOTE
Addendum  created 03/29/18 1244 by Sang Dang MD    Charge Capture section accepted, Sign clinical note

## 2018-03-30 NOTE — ED PROVIDER NOTES
Encounter Date: 3/29/2018       History     Chief Complaint   Patient presents with    Post-op Problem     Pt reports having left ankle surgery this morning and lidocaine pump has come out of her leg.     39 y/o female with history of DMII, HTN, PCOS, presents to the ER with chief complaint of lidocaine pump leaking from behind her left leg.  Patient reports leakage beginning 45 minutes prior to arrival.  This began after using the bathroom, but she does not recall pulling on the catheter.  The patient is 2 days s/p left ankle arthrodesis performed here by orthopedics.  She was discharged home today with a cast to the LLE and anesthesia placed a lidocaine pump today prior to discharge. She was prescribe percocet for pain, but has not filled this prescription.  She does not have any current pain, but does feel that the lidocaine is starting to wear off. She denies fever, chills, nausea, vomiting or other complaints at this time.            Review of patient's allergies indicates:   Allergen Reactions    Mirtazapine      Other reaction(s): tachycardia    Ziprasidone hcl      Other reaction(s): Unknown  Other reaction(s): Unknown     Past Medical History:   Diagnosis Date    Anxiety     Chest pain, unspecified 2013: For three days.    Depression     Diabetes mellitus     Diabetes mellitus, type 2     DM2 (diabetes mellitus, type 2)     diet control    Eyelid lesion     LLL    GERD (gastroesophageal reflux disease)     History of conjunctivitis     History of PCOS     HTN (hypertension), benign 3/10/2014    Migraines     Obesity     TIA (transient ischemic attack)      Past Surgical History:   Procedure Laterality Date    ANKLE FRACTURE SURGERY       SECTION, CLASSIC  2005/2009    x2    CHOLECYSTECTOMY  3/2002    lap maribel    FOOT SURGERY      gastric sleeve       Family History   Problem Relation Age of Onset    Other Father      house fire     Social History    Substance Use Topics    Smoking status: Current Every Day Smoker     Packs/day: 1.00     Years: 18.00     Types: Cigarettes    Smokeless tobacco: Never Used      Comment: cutting to 3-4 cigarette per day    Alcohol use No     Review of Systems   Constitutional: Negative for chills and fever.   Respiratory: Negative for shortness of breath.    Cardiovascular: Negative for chest pain.   Gastrointestinal: Negative for abdominal pain, nausea and vomiting.   Musculoskeletal: Positive for arthralgias. Negative for back pain.   Skin: Negative for rash.   Neurological: Negative for dizziness, syncope, weakness and light-headedness.   Hematological: Does not bruise/bleed easily.       Physical Exam     Initial Vitals [03/29/18 2153]   BP Pulse Resp Temp SpO2   121/75 85 17 99.4 °F (37.4 °C) 96 %      MAP       90.33         Physical Exam    Nursing note and vitals reviewed.  Constitutional: She appears well-developed and well-nourished.   HENT:   Head: Atraumatic.   Mouth/Throat: Oropharynx is clear and moist.   Eyes: Conjunctivae and EOM are normal. Pupils are equal, round, and reactive to light.   Neck: Normal range of motion. Neck supple.   Cardiovascular: Normal rate and regular rhythm.   Pulmonary/Chest: Breath sounds normal. No respiratory distress. She has no wheezes. She has no rhonchi. She has no rales.   Musculoskeletal:   Cast to LLE.  Patient is neurovascularly intact.   Neurological: She is alert and oriented to person, place, and time.   Skin: No rash noted.   Lidocaine pump Catheter tubing to left posterior upper leg.  Tegaderm is saturated and lifted below the catheter.  No surrounding erythema, swelling or tenderness.     Psychiatric: She has a normal mood and affect.         ED Course   Procedures  Labs Reviewed - No data to display                APC / Resident Notes:   Patient presents to the ER due to leakage from lidocaine pump popliteal catheter.  Patient had a left ankle arthrodesis 2/27 and was  "discharged 2/29 with cast to the LLE.   I discussed patients presentation with anesthesia on call, Dr. Ambrosio.  He has evaluated the patient in the ER with bedside ultrasound.  Per anesthesia note "The fluid was visualized spreading in the subQ layer above the sciatic nerve, not reaching the nerve."  He has removed the catheter due to improper location.    I will give tylenol in the ED for pain and she will take Percocet as prescribed.  She has no other complaints and is stable for discharge.  She will f/u with Ortho as scheduled.    She is given ER return precautions and advised to follow up with PCP within 1 week for ER follow exam.     I discussed the care of this pt with my supervising MD.                   Clinical Impression:   The primary encounter diagnosis was Post-op pain. A diagnosis of Left foot pain was also pertinent to this visit.                           MILEY Arias  03/30/18 0158    "

## 2018-03-30 NOTE — ED TRIAGE NOTES
Pt states she had her surgery Tuesday and discharged today with lidocaine pump. 30 mins ago she noticed leaking from her leg.      LOC: The patient is awake, alert, aware of environment with an appropriate affect. Oriented x4, speaking appropriately  APPEARANCE: Pt resting comfortably, in no acute distress, pt is clean and well groomed, clothing properly fastened  SKIN:The skin is warm and dry, color consistent with ethnicity, patient has normal skin turgor and moist mucus membranes  RESPIRATORY:Airway is open and patent, respirations are spontaneous, patient has a normal effort and rate, no accessory muscle use noted.  CARDIAC: Normal rate and rhythm, no peripheral edema noted, capillary refill < 3 seconds, bilateral radial pulses 2+.  NEUROLOGIC: PERRLA, facial expression is symmetrical, patient moving all extremities spontaneously, normal sensation in all extremities when touched with a finger.  Follows all commands appropriately  MUSCULOSKELETAL: pt has cast on her leg lower leg

## 2018-03-30 NOTE — DISCHARGE SUMMARY
"[facility]  Orthopedics  Discharge Summary      Patient Name: Anitra Chaney  MRN: [id number]  Admission Date: 3/27/2018  Hospital Length of Stay: 0 days  Discharge Date and Time: 03/29/2018   Attending Physician: No att. providers found   Discharging Provider: Tank Miranda MD  Primary Care Provider: Tien Watts MD    HPI:   No notes on file    Procedure(s) (LRB):  ARTHRODESIS-ANKLE (Left)      Hospital Course:  Hospital Course:  On 3/27/2018, the patient arrived to [facility] for proper pre-operative management.  Upon completion of pre-operative preparation, the patient was taken back to the operative theatre.  A ankle arthrodesis was performed without complication and the patient was transported to the post anesthesia care unit in stable condition. The patient suffered minimal blood loss and electrolyte imbalances during the procedure, which were correct accordingly if neccessary. After appropriate recovery from the anaesthetic agents used during the surgery the patient was transported to hospital floor.  Pain has been controlled with PNC and po meds.  Has worked with PT/OT.  Bulky splint changed to cast.  Ready to dc home.        Significant Diagnostic Studies:     Pending Diagnostic Studies:     None        Final Active Diagnoses:    Diagnosis Date Noted POA    PRINCIPAL PROBLEM:  Ankle arthritis [M19.079] 03/27/2018 Yes      Problems Resolved During this Admission:    Diagnosis Date Noted Date Resolved POA      Discharged Condition: stable    Disposition: Home or Self Care    Follow Up:  Follow-up Information     Kimberly Ferguson PA-C In 2 weeks.    Specialty:  Orthopedic Surgery  Contact information:  [address] [place] [address] [id number] [phone]                 Patient Instructions:     COMMODE FOR HOME USE   Order Specific Question Answer Comments   Type: Standard    Height: 5' 9" (1.753 m)    Weight: 117.9 kg (260 lb)    Length of need (1-99 months): " "99    Vendor: [facility]    Expected Date of Delivery: 3/28/2018      CRUTCHES FOR HOME USE   Order Specific Question Answer Comments   Type: Axillary    Height: 5' 9" (1.753 m)    Weight: 117.9 kg (260 lb)    Length of need (1-99 months): 99    Vendor: Other (use comments) Disregard   Expected Date of Delivery: 3/28/2018      TRANSFER TUB BENCH FOR HOME USE   Order Specific Question Answer Comments   Type of Transfer Tub Bench: Padded    Height: 5' 9" (1.753 m)    Weight: 117.9 kg (260 lb)    Length of need (1-99 months): 99    Vendor: Other (use comments) Disregard   Expected Date of Delivery: 3/28/2018      WALKER FOR HOME USE   Order Specific Question Answer Comments   Type of Walker: Adult (5'4"-6'6")    With wheels? Yes    Height: 5' 9" (1.753 m)    Weight: 117.9 kg (260 lb)    Length of need (1-99 months): 99    Please check all that apply: Patient's condition impairs ambulation.    Vendor: Other (use comments) Disregard   Expected Date of Delivery: 3/28/2018      COMMODE FOR HOME USE   Order Specific Question Answer Comments   Type: Standard    Height: 5' 9" (1.753 m)    Weight: 117.9 kg (260 lb)    Length of need (1-99 months): 99    Vendor: Other (use comments) Disregard   Expected Date of Delivery: 3/28/2018      BATH/SHOWER CHAIR FOR HOME USE   Order Specific Question Answer Comments   Height: 5' 9" (1.753 m)    Weight: 117.9 kg (260 lb)    Length of need (1-99 months): 99    Type: Without back    Vendor: [facility]    Expected Date of Delivery: 3/28/2018      Keep surgical extremity elevated     Sponge bath only until clinic visit     Weight bearing restrictions (specify)   Order Comments: NWB LLE     Notify your health care provider if you experience any of the following:  increased confusion or weakness     Notify your health care provider if you experience any of the following:  persistent dizziness, light-headedness, or visual disturbances     Notify your health care provider if you experience any " of the following:  worsening rash     Notify your health care provider if you experience any of the following:  severe persistent headache     Notify your health care provider if you experience any of the following:  difficulty breathing or increased cough     Notify your health care provider if you experience any of the following:  severe uncontrolled pain     Notify your health care provider if you experience any of the following:  persistent nausea and vomiting or diarrhea     Notify your health care provider if you experience any of the following:  temperature >100.4     Notify your health care provider if you experience any of the following:  redness, tenderness, or signs of infection (pain, swelling, redness, odor or green/yellow discharge around incision site)     Leave dressing on - Keep it clean, dry, and intact until clinic visit       Medications:  Reconciled Home Medications:      Medication List      START taking these medications    docusate sodium 100 MG capsule  Commonly known as:  COLACE  Take 1 capsule (100 mg total) by mouth 2 (two) times daily.     oxyCODONE-acetaminophen  mg per tablet  Commonly known as:  PERCOCET  Take 1 tablet by mouth every 4 (four) hours as needed for Pain.  Replaces:  oxyCODONE-acetaminophen 5-325 mg per tablet     promethazine 12.5 MG Tab  Commonly known as:  PHENERGAN  Take 1 tablet (12.5 mg total) by mouth every 4 (four) hours as needed.        CONTINUE taking these medications    gabapentin 300 MG capsule  Commonly known as:  NEURONTIN  Take 1 capsule (300 mg total) by mouth 3 (three) times daily.        STOP taking these medications    lidocaine-prilocaine cream  Commonly known as:  EMLA     multivitamin capsule     oxyCODONE-acetaminophen 5-325 mg per tablet  Commonly known as:  PERCOCET  Replaced by:  oxyCODONE-acetaminophen  mg per tablet           Where to Get Your Medications      You can get these medications from any pharmacy    Bring a paper  prescription for each of these medications  · docusate sodium 100 MG capsule  · oxyCODONE-acetaminophen  mg per tablet  · promethazine 12.5 MG Tab         Tank Miranda MD  Orthopedics  [facility]

## 2018-03-30 NOTE — HOSPITAL COURSE
Hospital Course:  On 3/27/2018, the patient arrived to [facility] for proper pre-operative management.  Upon completion of pre-operative preparation, the patient was taken back to the operative theatre.  A ankle arthrodesis was performed without complication and the patient was transported to the post anesthesia care unit in stable condition. The patient suffered minimal blood loss and electrolyte imbalances during the procedure, which were correct accordingly if neccessary. After appropriate recovery from the anaesthetic agents used during the surgery the patient was transported to hospital floor.  Pain has been controlled with PNC and po meds.  Has worked with PT/OT.  Bulky splint changed to cast.  Ready to dc home.

## 2018-03-30 NOTE — ANESTHESIA POST-OP PAIN MANAGEMENT
Notified by the ED that the patient had returned to the hospital complaining of significant fluid leaking at home from the site of the catheter insertion, so much so that it saturated her clothes and caused the Tegaderms to lift off her skin.  Patient stated that everything was going well until shortly after she got up to go to the bathroom, and then soon after she noted the significant leakage.  She clamped the catheter eventually, and came to the ED unannounced.  Upon evaluation, catheter was only in the skin about 4-5 cm, tegaderm covering the insertion site had come off the skin completely.  Ultrasound was used to evaluate catheter location, and NS was injected under ultrasound guidance.  The fluid was visualized spreading in the subQ layer above the sciatic nerve, not reaching the nerve.  It was also noted that a significant amount of the normal saline was dripping from the insertion site onto the floor.  At this point, decision was made to discontinue the catheter given the fact that it was in an improper location. Catheter removed without complication, blue tip intact.

## 2018-04-02 ENCOUNTER — TELEPHONE (OUTPATIENT)
Dept: ORTHOPEDICS | Facility: CLINIC | Age: 38
End: 2018-04-02

## 2018-04-02 NOTE — TELEPHONE ENCOUNTER
----- Message from Donna Linares sent at 4/2/2018 12:56 PM CDT -----  Contact: self   Pt is requesting a call back regarding scheduling her post op appt for tomorrow. She stated it is important for her to be seen as soon as possible.  pt can be reached at [phone]

## 2018-04-02 NOTE — TELEPHONE ENCOUNTER
Called and Informed patient of appointment on 04/03/18 at 8:00 am. Bottom of cast crack. Pt states that she is unable to come in today 04/02/18. Patient states verbal understanding and has no further questions.

## 2018-04-03 ENCOUNTER — OFFICE VISIT (OUTPATIENT)
Dept: ORTHOPEDICS | Facility: CLINIC | Age: 38
End: 2018-04-03
Payer: COMMERCIAL

## 2018-04-03 DIAGNOSIS — Z47.89 AFTERCARE FOR CAST OR SPLINT CHECK OR CHANGE: Primary | ICD-10-CM

## 2018-04-03 DIAGNOSIS — M19.079 ANKLE ARTHRITIS: ICD-10-CM

## 2018-04-03 DIAGNOSIS — Z09 S/P ORTHOPEDIC SURGERY, FOLLOW-UP EXAM: ICD-10-CM

## 2018-04-03 PROCEDURE — 99499 UNLISTED E&M SERVICE: CPT | Mod: SA,S$GLB,, | Performed by: PHYSICIAN ASSISTANT

## 2018-04-03 PROCEDURE — 29405 APPL SHORT LEG CAST: CPT | Mod: 58,LT,S$GLB, | Performed by: PHYSICIAN ASSISTANT

## 2018-04-04 ENCOUNTER — DOCUMENTATION ONLY (OUTPATIENT)
Dept: ORTHOPEDICS | Facility: CLINIC | Age: 38
End: 2018-04-04

## 2018-04-04 NOTE — PROGRESS NOTES
Patient presents to clinic for a cast change due to cracking on the bottom of her cast.   She is s/p Left ankle arthrodesis by  on 03/27/2018.     - cast removed, incision c/d/i    - cast change    - new SLC placed,   - NWB, has knee scooter  - return to clinic at 2 weeks post op for possible suture removal.

## 2018-04-05 ENCOUNTER — PATIENT MESSAGE (OUTPATIENT)
Dept: ORTHOPEDICS | Facility: CLINIC | Age: 38
End: 2018-04-05

## 2018-04-06 ENCOUNTER — PATIENT MESSAGE (OUTPATIENT)
Dept: ORTHOPEDICS | Facility: CLINIC | Age: 38
End: 2018-04-06

## 2018-04-06 ENCOUNTER — DOCUMENTATION ONLY (OUTPATIENT)
Dept: ORTHOPEDICS | Facility: CLINIC | Age: 38
End: 2018-04-06

## 2018-04-06 ENCOUNTER — TELEPHONE (OUTPATIENT)
Dept: ORTHOPEDICS | Facility: CLINIC | Age: 38
End: 2018-04-06

## 2018-04-06 DIAGNOSIS — E11.9 TYPE 2 DIABETES MELLITUS WITHOUT COMPLICATION: ICD-10-CM

## 2018-04-06 NOTE — TELEPHONE ENCOUNTER
----- Message from Digna Lopez MA sent at 4/6/2018 10:02 AM CDT -----  Contact: [employer]  phone  fax      ----- Message -----  From: Wilman Massey  Sent: 4/6/2018   9:53 AM  To: Edgar Perez request the patient's visit that was on 3/22/2018 to be sent via e-mail to: [email or web address] so she can process the patient's claim

## 2018-04-10 ENCOUNTER — DOCUMENTATION ONLY (OUTPATIENT)
Dept: ORTHOPEDICS | Facility: CLINIC | Age: 38
End: 2018-04-10

## 2018-04-10 ENCOUNTER — TELEPHONE (OUTPATIENT)
Dept: ORTHOPEDICS | Facility: CLINIC | Age: 38
End: 2018-04-10

## 2018-04-10 ENCOUNTER — PATIENT MESSAGE (OUTPATIENT)
Dept: ORTHOPEDICS | Facility: CLINIC | Age: 38
End: 2018-04-10

## 2018-04-10 NOTE — TELEPHONE ENCOUNTER
Spoke with pt.   Advised that I have not received any paperwork from [employer].    Pt verbalized understanding.

## 2018-04-10 NOTE — PROGRESS NOTES
Corrected dates on FMLA form that were incorrect.   Forwarded FMLA form along with STD form to disability department for processing.

## 2018-04-11 ENCOUNTER — PATIENT MESSAGE (OUTPATIENT)
Dept: ORTHOPEDICS | Facility: CLINIC | Age: 38
End: 2018-04-11

## 2018-04-13 ENCOUNTER — TELEPHONE (OUTPATIENT)
Dept: ORTHOPEDICS | Facility: CLINIC | Age: 38
End: 2018-04-13

## 2018-04-13 ENCOUNTER — OFFICE VISIT (OUTPATIENT)
Dept: ORTHOPEDICS | Facility: CLINIC | Age: 38
End: 2018-04-13
Payer: COMMERCIAL

## 2018-04-13 ENCOUNTER — PATIENT MESSAGE (OUTPATIENT)
Dept: ORTHOPEDICS | Facility: CLINIC | Age: 38
End: 2018-04-13

## 2018-04-13 DIAGNOSIS — Z98.1 STATUS POST ANKLE ARTHRODESIS: ICD-10-CM

## 2018-04-13 DIAGNOSIS — Z09 FOLLOW-UP EXAMINATION AFTER ORTHOPEDIC SURGERY: Primary | ICD-10-CM

## 2018-04-13 PROCEDURE — 99024 POSTOP FOLLOW-UP VISIT: CPT | Mod: S$GLB,,, | Performed by: ORTHOPAEDIC SURGERY

## 2018-04-13 PROCEDURE — 99999 PR PBB SHADOW E&M-EST. PATIENT-LVL I: CPT | Mod: PBBFAC,,, | Performed by: ORTHOPAEDIC SURGERY

## 2018-04-13 RX ORDER — OXYCODONE AND ACETAMINOPHEN 10; 325 MG/1; MG/1
1 TABLET ORAL EVERY 8 HOURS PRN
Qty: 60 TABLET | Refills: 0 | Status: SHIPPED | OUTPATIENT
Start: 2018-04-13 | End: 2018-04-25 | Stop reason: SDUPTHER

## 2018-04-13 NOTE — TELEPHONE ENCOUNTER
Pt notified that paperwork for [employer] and [employer] insurance has been faxed to insurance companies per Digna.

## 2018-04-14 NOTE — PROGRESS NOTES
Ms. Chaney returns today.  She is just over two weeks out from her left   ankle arthrodesis.  She reports her pain is being managed with the medications.    Her wounds are well healed.  Her sutures are removed today.  I put her back in   a short-leg cast.  She is going to continue nonweightbearing.  She will return   in four weeks.  She should have her cast removed and an x-ray of her left ankle   at that time.      CRISTY/MENDY  dd: 04/13/2018 08:42:26 (BERRYT)  td: 04/13/2018 23:43:04 (WILBER)  Doc ID   #[id number]  Job ID #[id number]    CC:

## 2018-04-16 ENCOUNTER — TELEPHONE (OUTPATIENT)
Dept: ORTHOPEDICS | Facility: CLINIC | Age: 38
End: 2018-04-16

## 2018-04-16 NOTE — TELEPHONE ENCOUNTER
----- Message from Ang Fernandes sent at 4/16/2018 12:01 PM CDT -----  Contact: [employer] Ms. Marrero  ATT :Dinga Lopez    Pt would like to be called back regarding Disability claims form.    Pt can be reached at direct line [phone].    Thank You.

## 2018-04-17 ENCOUNTER — PATIENT MESSAGE (OUTPATIENT)
Dept: ORTHOPEDICS | Facility: CLINIC | Age: 38
End: 2018-04-17

## 2018-04-20 DIAGNOSIS — E11.9 TYPE 2 DIABETES MELLITUS WITHOUT COMPLICATION: ICD-10-CM

## 2018-04-23 ENCOUNTER — PATIENT MESSAGE (OUTPATIENT)
Dept: ORTHOPEDICS | Facility: CLINIC | Age: 38
End: 2018-04-23

## 2018-04-24 ENCOUNTER — HOSPITAL ENCOUNTER (OUTPATIENT)
Dept: RADIOLOGY | Facility: HOSPITAL | Age: 38
Discharge: HOME | End: 2018-04-24
Attending: PHYSICIAN ASSISTANT
Payer: COMMERCIAL

## 2018-04-24 ENCOUNTER — OFFICE VISIT (OUTPATIENT)
Dept: ORTHOPEDICS | Facility: CLINIC | Age: 38
End: 2018-04-24
Payer: COMMERCIAL

## 2018-04-24 ENCOUNTER — DOCUMENTATION ONLY (OUTPATIENT)
Dept: ORTHOPEDICS | Facility: CLINIC | Age: 38
End: 2018-04-24

## 2018-04-24 DIAGNOSIS — Z47.89 AFTERCARE FOR CAST OR SPLINT CHECK OR CHANGE: ICD-10-CM
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
## 2018-04-24 DIAGNOSIS — M25.572 ACUTE LEFT ANKLE PAIN: ICD-10-CM

## 2018-04-24 PROCEDURE — 73610 X-RAY EXAM OF ANKLE: CPT | Mod: 26,LT,, | Performed by: RADIOLOGY

## 2018-04-24 PROCEDURE — 73610 X-RAY EXAM OF ANKLE: CPT | Mod: TC,LT

## 2018-04-24 PROCEDURE — 99024 POSTOP FOLLOW-UP VISIT: CPT | Mod: S$GLB,,, | Performed by: PHYSICIAN ASSISTANT

## 2018-04-24 PROCEDURE — 29405 APPL SHORT LEG CAST: CPT | Mod: 58,LT,S$GLB, | Performed by: PHYSICIAN ASSISTANT

## 2018-04-24 NOTE — PROGRESS NOTES
Forwarded new [facility] paperwork to disability department for processing with a corrected anticipated return to work date of 6/29/2018.

## 2018-04-25 DIAGNOSIS — Z98.1 STATUS POST ANKLE ARTHRODESIS: ICD-10-CM

## 2018-04-25 DIAGNOSIS — Z09 FOLLOW-UP EXAMINATION AFTER ORTHOPEDIC SURGERY: ICD-10-CM

## 2018-04-25 RX ORDER — OXYCODONE AND ACETAMINOPHEN 10; 325 MG/1; MG/1
1 TABLET ORAL EVERY 8 HOURS PRN
Qty: 60 TABLET | Refills: 0 | Status: SHIPPED | OUTPATIENT
Start: 2018-04-25 | End: 2018-05-14 | Stop reason: SDUPTHER

## 2018-04-25 NOTE — PROGRESS NOTES
Patient presents to clinic for a cast change due to cracking on the bottom of her cast, falling getting out of the shower. She also reports that last week her cast got wet, but it seemed to dry so she did not come in for a change. Denied fever chills   She is s/p Left ankle arthrodesis by  on 03/27/2018.     - cast removed, incision c/d/i, no maceration of her skin,     - cast change    - new SLC placed,   - NWB, has knee scooter  - return to clinic at next appointment

## 2018-04-29 ENCOUNTER — PATIENT MESSAGE (OUTPATIENT)
Dept: INTERNAL MEDICINE | Facility: CLINIC | Age: 38
End: 2018-04-29

## 2018-04-30 ENCOUNTER — OFFICE VISIT (OUTPATIENT)
Dept: URGENT CARE | Facility: CLINIC | Age: 38
End: 2018-04-30
Payer: COMMERCIAL

## 2018-04-30 VITALS
WEIGHT: 255 LBS | SYSTOLIC BLOOD PRESSURE: 122 MMHG | RESPIRATION RATE: 19 BRPM | HEART RATE: 80 BPM | TEMPERATURE: 100 F | DIASTOLIC BLOOD PRESSURE: 88 MMHG | HEIGHT: 69 IN | OXYGEN SATURATION: 97 % | BODY MASS INDEX: 37.77 KG/M2

## 2018-04-30 DIAGNOSIS — H62.41 OTOMYCOSIS OF RIGHT EAR: ICD-10-CM

## 2018-04-30 DIAGNOSIS — E11.9 TYPE 2 DIABETES MELLITUS WITHOUT COMPLICATION, WITHOUT LONG-TERM CURRENT USE OF INSULIN: ICD-10-CM

## 2018-04-30 DIAGNOSIS — B36.9 OTOMYCOSIS OF RIGHT EAR: ICD-10-CM

## 2018-04-30 DIAGNOSIS — R05.9 COUGH: ICD-10-CM

## 2018-04-30 DIAGNOSIS — I10 HTN (HYPERTENSION), BENIGN: ICD-10-CM

## 2018-04-30 DIAGNOSIS — E66.01 MORBID OBESITY: Primary | ICD-10-CM

## 2018-04-30 DIAGNOSIS — R06.02 SHORTNESS OF BREATH: ICD-10-CM

## 2018-04-30 PROCEDURE — 99214 OFFICE O/P EST MOD 30 MIN: CPT | Mod: SA,S$GLB,, | Performed by: NURSE PRACTITIONER

## 2018-04-30 PROCEDURE — 3074F SYST BP LT 130 MM HG: CPT | Mod: CPTII,S$GLB,, | Performed by: NURSE PRACTITIONER

## 2018-04-30 PROCEDURE — 3079F DIAST BP 80-89 MM HG: CPT | Mod: CPTII,S$GLB,, | Performed by: NURSE PRACTITIONER

## 2018-04-30 RX ORDER — NEOMYCIN SULFATE, POLYMYXIN B SULFATE, HYDROCORTISONE 3.5; 10000; 1 MG/ML; [USP'U]/ML; MG/ML
3 SOLUTION/ DROPS AURICULAR (OTIC) 3 TIMES DAILY
Qty: 1 BOTTLE | Refills: 0 | Status: SHIPPED | OUTPATIENT
Start: 2018-04-30 | End: 2018-05-10

## 2018-04-30 NOTE — PATIENT INSTRUCTIONS
"Your chest xray did not show signs of pneumonia.                                              URI   If your condition worsens or fails to improve we recommend that you receive another evaluation at the ER immediately or contact your PCP to discuss your concerns or return here. You must understand that you've received an urgent care treatment only and that you may be released before all your medical problems are known or treated. You the patient will arrange for follouwp care as instructed.   If we discussed that I think your illness is viral, it will not respond to antibiotics and will last 10-14 days.     Flonase (fluticasone) is a nasal spray which is available over the counter and may help with your symptoms.   Zyrtec D, Claritin D or Allegra D can also help with symptoms of congestion and drainage.   If you have hypertension avoid using the "D" which is the decongestant   If you just have drainage you can take plain zyrtec, claritin or allegra     Take mucinex for your congestion to loosen the secretions.    If you just have a congested feeling you can take pseudoephedrine (unless you have high blood pressure) which you have to sign for behind the counter. Do not buy the phenylephrine which is on the shelf as it is not effective   Rest and fluids are also important.   Tylenol or ibuprofen can also be used as directed for pain unless you have an allergy to them or medical condition such as stomach ulcers, kidney or liver disease or blood thinners etc for which you should not be taking these type of medications.   If you are flying in the next few days Afrin nose drops for the airplane flight upon take off and landing may help. Other than at those times refrain from using afrin.   If you were prescribed a narcotic do not drive or operate heavy machinery while taking these medications.          Viral Upper Respiratory Illness (Adult)  You have a viral upper respiratory illness (URI), which is another term for the " common cold. This illness is contagious during the first few days. It is spread through the air by coughing and sneezing. It may also be spread by direct contact (touching the sick person and then touching your own eyes, nose, or mouth). Frequent handwashing will decrease risk of spread. Most viral illnesses go away within 7 to 10 days with rest and simple home remedies. Sometimes the illness may last for several weeks. Antibiotics will not kill a virus, and they are generally not prescribed for this condition.    Home care  · If symptoms are severe, rest at home for the first 2 to 3 days. When you resume activity, don't let yourself get too tired.  · Avoid being exposed to cigarette smoke (yours or others).  · You may use acetaminophen or ibuprofen to control pain and fever, unless another medicine was prescribed. (Note: If you have chronic liver or kidney disease, have ever had a stomach ulcer or gastrointestinal bleeding, or are taking blood-thinning medicines, talk with your healthcare provider before using these medicines.) Aspirin should never be given to anyone under 18 years of age who is ill with a viral infection or fever. It may cause severe liver or brain damage.  · Your appetite may be poor, so a light diet is fine. Avoid dehydration by drinking 6 to 8 glasses of fluids per day (water, soft drinks, juices, tea, or soup). Extra fluids will help loosen secretions in the nose and lungs.  · Over-the-counter cold medicines will not shorten the length of time youre sick, but they may be helpful for the following symptoms: cough, sore throat, and nasal and sinus congestion. (Note: Do not use decongestants if you have high blood pressure.)  Follow-up care  Follow up with your healthcare provider, or as advised.  When to seek medical advice  Call your healthcare provider right away if any of these occur:  · Cough with lots of colored sputum (mucus)  · Severe headache; face, neck, or ear  pain  · Difficulty swallowing due to throat pain  · Fever of 100.4°F (38°C)  Call 911, or get immediate medical care  Call emergency services right away if any of these occur:  · Chest pain, shortness of breath, wheezing, or difficulty breathing  · Coughing up blood  · Inability to swallow due to throat pain        External Ear Infection (Adult)    External otitis (also called swimmers ear) is an infection in the ear canal. It is often caused by bacteria or fungus. It can occur a few days after water gets trapped in the ear canal (from swimming or bathing). It can also occur after cleaning too deeply in the ear canal with a cotton swab or other object. Sometimes, hair care products get into the ear canal and cause this problem.  Symptoms can include pain, fever, itching, redness, drainage, or swelling of the ear canal. Temporary hearing loss may also occur.  Home care  · Do not try to clean the ear canal. This can push pus and bacteria deeper into the canal.  · Use prescribed ear drops as directed. These help reduce swelling and fight the infection. If an ear wick was placed in the ear canal, apply drops right onto the end of the wick. The wick will draw the medication into the ear canal even if it is swollen closed.  · A cotton ball may be loosely placed in the outer ear to absorb any drainage.  · You may use acetaminophen or ibuprofen to control pain, unless another medication was prescribed. Note: If you have chronic liver or kidney disease or ever had a stomach ulcer or GI bleeding, talk to your health care provider before taking any of these medications.  · Do not allow water to get into your ear when bathing. Also, avoid swimming until the infection has cleared.  Prevention  · Keep your ears dry. This helps lower the risk of infection. Dry your ears with a towel or hair dryer after getting wet. Also, use ear plugs when swimming.  · Do not stick any objects in the ear to remove wax.  · If you feel water  trapped in your ear, use ear drops right away. You can get these drops over the counter at most drugstores. They work by removing water from the ear canal.  Follow-up care  Follow up with your health care provider in one week, or as advised.  When to seek medical advice  Call your health care provider right away if any of these occur:  · Ear pain becomes worse or doesnt improve after 3 days of treatment  · Redness or swelling of the outer ear occurs or gets worse  · Headache  · Painful or stiff neck  · Drowsiness or confusion  · Fever of 100.4ºF (38ºC) or higher, or as directed by your health care provider  · Seizure  Date Last Reviewed: 3/22/2015  © 3123-2511 [employer]. [address] [place] [address], [place] [address]. All rights reserved. This information is not intended as a substitute for professional medical care. Always follow your healthcare professional's instructions.

## 2018-04-30 NOTE — TELEPHONE ENCOUNTER
Pt has c/o possible pneumonia, states she has a heaviness in her chest, fever s/s ongoing for the past 4 days. Request appt today. No appt at [facility], pt declines appt at  stating she is currently in [place] with her mom and will go to [facility] ER for eval once she returns to the [facility].  Patient has no further questions or concerns.

## 2018-04-30 NOTE — PROGRESS NOTES
"Subjective:       Patient ID: Anitra Chaney is a 38 y.o. female.    Vitals:  height is 5' 9" (1.753 m) and weight is 115.7 kg (255 lb).     Chief Complaint: Sinus Problem and Fever    Patient with sinus headache four days ago. Patient started with fever and chills the following day. Patient has tried otc sinus medication; no improvement. Temperature registered at 104.0 F at home. Fever has improved to 100F with tylenol. Patient stated she gets short of breath when taking deep breaths and some pleuritic pain along her thoracic back. She states she feels she has congestion in the chest but not able to cough it up. She stated she started some Cipro last night when she spiked a fever.  She also reports some diarrhea x 2 days. She denies vomiting.  Bilateral ear pressure. Right ear pain and itching.      Review of Systems   Constitution: Positive for chills, fever and malaise/fatigue.   HENT: Positive for congestion. Negative for ear pain, hoarse voice and sore throat.    Eyes: Negative for discharge and redness.   Cardiovascular: Negative for chest pain, dyspnea on exertion and leg swelling.   Respiratory: Positive for cough. Negative for shortness of breath, sputum production and wheezing.    Musculoskeletal: Negative for myalgias.   Gastrointestinal: Positive for diarrhea and nausea. Negative for abdominal pain.   Neurological: Positive for headaches.       Objective:      Physical Exam   Constitutional: She is oriented to person, place, and time. She appears well-developed and well-nourished. She is cooperative.  Non-toxic appearance. She does not appear ill. No distress.   HENT:   Head: Normocephalic and atraumatic.   Right Ear: Hearing, tympanic membrane and external ear normal.   Left Ear: Hearing, tympanic membrane, external ear and ear canal normal.   Nose: Mucosal edema and rhinorrhea present. No nasal deformity. No epistaxis. Right sinus exhibits no maxillary sinus tenderness and no frontal sinus " tenderness. Left sinus exhibits no maxillary sinus tenderness and no frontal sinus tenderness.   Mouth/Throat: Uvula is midline and mucous membranes are normal. No trismus in the jaw. Normal dentition. No uvula swelling. Posterior oropharyngeal erythema present.   Right ear canal with erythema and yellow flakes. Positive for tenderness and itching.   Eyes: Conjunctivae and lids are normal. No scleral icterus.   Sclera clear bilat   Neck: Trachea normal, full passive range of motion without pain and phonation normal. Neck supple.   Cardiovascular: Normal rate, regular rhythm, normal heart sounds, intact distal pulses and normal pulses.    Pulmonary/Chest: Effort normal and breath sounds normal. No respiratory distress. She has no wheezes. She has no rales. She exhibits no tenderness.   Abdominal: Soft. Normal appearance. She exhibits no distension.   Musculoskeletal: Normal range of motion. She exhibits no edema or deformity.   Neurological: She is alert and oriented to person, place, and time. She exhibits normal muscle tone. Coordination normal.   Skin: Skin is warm, dry and intact. She is not diaphoretic. No pallor.   Psychiatric: She has a normal mood and affect. Her speech is normal and behavior is normal. Judgment and thought content normal. Cognition and memory are normal.   Nursing note and vitals reviewed.      EXAMINATION:  XR CHEST PA AND LATERAL    CLINICAL HISTORY:  ex. health; Shortness of breath    TECHNIQUE:  PA and lateral views of the chest were performed.    COMPARISON:  Chest radiograph 02/22/2016    FINDINGS:  The lungs are clear, with normal appearance of pulmonary vasculature and no pleural effusion or pneumothorax.    The cardiomediastinal silhouette is normal.    Bones are intact.    Surgical clips overlie the right upper quadrant.   Impression       No acute abnormality.       Assessment:       1. Morbid obesity    2. HTN (hypertension), benign    3. Type 2 diabetes mellitus without  "complication, without long-term current use of insulin    4. Shortness of breath    5. Otomycosis of right ear    6. Cough        Plan:         Morbid obesity    HTN (hypertension), benign    Type 2 diabetes mellitus without complication, without long-term current use of insulin    Shortness of breath  -     X-Ray Chest PA And Lateral; Future; Expected date: 04/30/2018    Otomycosis of right ear    Cough  -     X-Ray Chest PA And Lateral; Future; Expected date: 04/30/2018    Other orders  -     neomycin-polymyxin-hydrocortisone (CORTISPORIN) otic solution; Place 3 drops into the right ear 3 (three) times daily.  Dispense: 1 Bottle; Refill: 0      Patient Instructions   Your chest xray did not show signs of pneumonia.                                              URI   If your condition worsens or fails to improve we recommend that you receive another evaluation at the ER immediately or contact your PCP to discuss your concerns or return here. You must understand that you've received an urgent care treatment only and that you may be released before all your medical problems are known or treated. You the patient will arrange for follouwp care as instructed.   If we discussed that I think your illness is viral, it will not respond to antibiotics and will last 10-14 days.     Flonase (fluticasone) is a nasal spray which is available over the counter and may help with your symptoms.   Zyrtec D, Claritin D or Allegra D can also help with symptoms of congestion and drainage.   If you have hypertension avoid using the "D" which is the decongestant   If you just have drainage you can take plain zyrtec, claritin or allegra     Take mucinex for your congestion to loosen the secretions.    If you just have a congested feeling you can take pseudoephedrine (unless you have high blood pressure) which you have to sign for behind the counter. Do not buy the phenylephrine which is on the shelf as it is not effective   Rest and fluids " are also important.   Tylenol or ibuprofen can also be used as directed for pain unless you have an allergy to them or medical condition such as stomach ulcers, kidney or liver disease or blood thinners etc for which you should not be taking these type of medications.   If you are flying in the next few days Afrin nose drops for the airplane flight upon take off and landing may help. Other than at those times refrain from using afrin.   If you were prescribed a narcotic do not drive or operate heavy machinery while taking these medications.          Viral Upper Respiratory Illness (Adult)  You have a viral upper respiratory illness (URI), which is another term for the common cold. This illness is contagious during the first few days. It is spread through the air by coughing and sneezing. It may also be spread by direct contact (touching the sick person and then touching your own eyes, nose, or mouth). Frequent handwashing will decrease risk of spread. Most viral illnesses go away within 7 to 10 days with rest and simple home remedies. Sometimes the illness may last for several weeks. Antibiotics will not kill a virus, and they are generally not prescribed for this condition.    Home care  · If symptoms are severe, rest at home for the first 2 to 3 days. When you resume activity, don't let yourself get too tired.  · Avoid being exposed to cigarette smoke (yours or others).  · You may use acetaminophen or ibuprofen to control pain and fever, unless another medicine was prescribed. (Note: If you have chronic liver or kidney disease, have ever had a stomach ulcer or gastrointestinal bleeding, or are taking blood-thinning medicines, talk with your healthcare provider before using these medicines.) Aspirin should never be given to anyone under 18 years of age who is ill with a viral infection or fever. It may cause severe liver or brain damage.  · Your appetite may be poor, so a light diet is fine. Avoid dehydration by  drinking 6 to 8 glasses of fluids per day (water, soft drinks, juices, tea, or soup). Extra fluids will help loosen secretions in the nose and lungs.  · Over-the-counter cold medicines will not shorten the length of time youre sick, but they may be helpful for the following symptoms: cough, sore throat, and nasal and sinus congestion. (Note: Do not use decongestants if you have high blood pressure.)  Follow-up care  Follow up with your healthcare provider, or as advised.  When to seek medical advice  Call your healthcare provider right away if any of these occur:  · Cough with lots of colored sputum (mucus)  · Severe headache; face, neck, or ear pain  · Difficulty swallowing due to throat pain  · Fever of 100.4°F (38°C)  Call 911, or get immediate medical care  Call emergency services right away if any of these occur:  · Chest pain, shortness of breath, wheezing, or difficulty breathing  · Coughing up blood  · Inability to swallow due to throat pain        External Ear Infection (Adult)    External otitis (also called swimmers ear) is an infection in the ear canal. It is often caused by bacteria or fungus. It can occur a few days after water gets trapped in the ear canal (from swimming or bathing). It can also occur after cleaning too deeply in the ear canal with a cotton swab or other object. Sometimes, hair care products get into the ear canal and cause this problem.  Symptoms can include pain, fever, itching, redness, drainage, or swelling of the ear canal. Temporary hearing loss may also occur.  Home care  · Do not try to clean the ear canal. This can push pus and bacteria deeper into the canal.  · Use prescribed ear drops as directed. These help reduce swelling and fight the infection. If an ear wick was placed in the ear canal, apply drops right onto the end of the wick. The wick will draw the medication into the ear canal even if it is swollen closed.  · A cotton ball may be loosely placed in the outer  ear to absorb any drainage.  · You may use acetaminophen or ibuprofen to control pain, unless another medication was prescribed. Note: If you have chronic liver or kidney disease or ever had a stomach ulcer or GI bleeding, talk to your health care provider before taking any of these medications.  · Do not allow water to get into your ear when bathing. Also, avoid swimming until the infection has cleared.  Prevention  · Keep your ears dry. This helps lower the risk of infection. Dry your ears with a towel or hair dryer after getting wet. Also, use ear plugs when swimming.  · Do not stick any objects in the ear to remove wax.  · If you feel water trapped in your ear, use ear drops right away. You can get these drops over the counter at most drugstores. They work by removing water from the ear canal.  Follow-up care  Follow up with your health care provider in one week, or as advised.  When to seek medical advice  Call your health care provider right away if any of these occur:  · Ear pain becomes worse or doesnt improve after 3 days of treatment  · Redness or swelling of the outer ear occurs or gets worse  · Headache  · Painful or stiff neck  · Drowsiness or confusion  · Fever of 100.4ºF (38ºC) or higher, or as directed by your health care provider  · Seizure  Date Last Reviewed: 3/22/2015  © 2629-8496 [employer]. [address] [place] [address], [place] [address]. All rights reserved. This information is not intended as a substitute for professional medical care. Always follow your healthcare professional's instructions.

## 2018-05-01 ENCOUNTER — PATIENT MESSAGE (OUTPATIENT)
Dept: ORTHOPEDICS | Facility: CLINIC | Age: 38
End: 2018-05-01

## 2018-05-10 ENCOUNTER — OFFICE VISIT (OUTPATIENT)
Dept: OBSTETRICS AND GYNECOLOGY | Facility: CLINIC | Age: 38
End: 2018-05-10
Attending: OBSTETRICS & GYNECOLOGY
Payer: COMMERCIAL

## 2018-05-10 VITALS
WEIGHT: 260.81 LBS | SYSTOLIC BLOOD PRESSURE: 122 MMHG | DIASTOLIC BLOOD PRESSURE: 68 MMHG | HEIGHT: 69 IN | BODY MASS INDEX: 38.63 KG/M2

## 2018-05-10 DIAGNOSIS — Z01.419 VISIT FOR GYNECOLOGIC EXAMINATION: Primary | ICD-10-CM

## 2018-05-10 PROCEDURE — 3074F SYST BP LT 130 MM HG: CPT | Mod: CPTII,S$GLB,, | Performed by: OBSTETRICS & GYNECOLOGY

## 2018-05-10 PROCEDURE — 99395 PREV VISIT EST AGE 18-39: CPT | Mod: S$GLB,,, | Performed by: OBSTETRICS & GYNECOLOGY

## 2018-05-10 PROCEDURE — 99999 PR PBB SHADOW E&M-EST. PATIENT-LVL II: CPT | Mod: PBBFAC,,, | Performed by: OBSTETRICS & GYNECOLOGY

## 2018-05-10 PROCEDURE — 3078F DIAST BP <80 MM HG: CPT | Mod: CPTII,S$GLB,, | Performed by: OBSTETRICS & GYNECOLOGY

## 2018-05-10 NOTE — PROGRESS NOTES
"CC: Well woman exam    Anitra Chaney is a 38 y.o. female  presents for a well woman exam.  She has no issues, problems, or complaints.    She is s/p gastric sleeve and 100 pound weight loss.  She reports regular cycles.  She is interested in conceiving.  She is currently taking PNV.  She reports actively trying to conceive over the last 2 months.    Past Medical History:   Diagnosis Date    Anxiety     Chest pain, unspecified 2013: For three days.    Depression     Diabetes mellitus     Diabetes mellitus, type 2     DM2 (diabetes mellitus, type 2)     diet control    Eyelid lesion     LLL    GERD (gastroesophageal reflux disease)     History of conjunctivitis     History of PCOS     HTN (hypertension), benign 3/10/2014    Migraines     Obesity     Stroke     TIA (transient ischemic attack)        Past Surgical History:   Procedure Laterality Date    ANKLE FRACTURE SURGERY       SECTION, CLASSIC  2005/2009    x2    CHOLECYSTECTOMY  3/2002    lap maribel    FOOT SURGERY      gastric sleeve         OB History    Para Term  AB Living   2 2           SAB TAB Ectopic Multiple Live Births                  # Outcome Date GA Lbr Yuval/2nd Weight Sex Delivery Anes PTL Lv   2 Para            1 Para                   Family History   Problem Relation Age of Onset    Other Father         house fire    Breast cancer Neg Hx     Colon cancer Neg Hx     Ovarian cancer Neg Hx        Social History   Substance Use Topics    Smoking status: Current Every Day Smoker     Packs/day: 1.00     Years: 18.00     Types: Cigarettes    Smokeless tobacco: Never Used      Comment: cutting to 3-4 cigarette per day    Alcohol use No       /68   Ht 5' 9" (1.753 m)   Wt 118.3 kg (260 lb 12.9 oz)   LMP 2018 (Exact Date)   BMI 38.51 kg/m²     ROS:  GENERAL: Denies weight gain or weight loss. Feeling well overall.   SKIN: Denies rash or lesions.   HEAD: " Denies head injury or headache.   NODES: Denies enlarged lymph nodes.   CHEST: Denies chest pain or shortness of breath.   CARDIOVASCULAR: Denies palpitations or left sided chest pain.   ABDOMEN: No abdominal pain, constipation, diarrhea, nausea, vomiting or rectal bleeding.   URINARY: No frequency, dysuria, hematuria, or burning on urination.  REPRODUCTIVE: See HPI.   BREASTS: The patient performs breast self-examination and denies pain, lumps, or nipple discharge.   HEMATOLOGIC: No easy bruisability or excessive bleeding.  MUSCULOSKELETAL: Denies joint pain or swelling.   NEUROLOGIC: Denies syncope or weakness.   PSYCHIATRIC: Denies depression, anxiety or mood swings.    Physical Exam:    APPEARANCE: Well nourished, well developed, in no acute distress.  AFFECT: WNL, alert and oriented x 3  SKIN: No acne or hirsutism  NECK: Neck symmetric without masses or thyromegaly  NODES: No inguinal, cervical, axillary, or femoral lymph node enlargement  CHEST: Good respiratory effect  ABDOMEN: Soft.  No tenderness or masses.  No hepatosplenomegaly.  No hernias.  BREASTS: Symmetrical, no skin changes or visible lesions.  No palpable masses, nipple discharge bilaterally.  PELVIC: Normal external genitalia without lesions.  Normal hair distribution.  Adequate perineal body, normal urethral meatus.  Vagina moist and well rugated without lesions or discharge.  Cervix pink, without lesions, discharge or tenderness.  No significant cystocele or rectocele.  Bimanual exam shows uterus to be normal size, regular, mobile and nontender.  Adnexa without masses or tenderness.    EXTREMITIES: No edema.  Left foot in cast.    ASSESSMENT AND PLAN  1. Visit for gynecologic examination         Patient was counseled today on A.C.S. Pap guidelines and recommendations for yearly pelvic exams, pap smears every 3 years, and monthly self breast exams; to see her PCP for other health maintenance.     Discussed that if she is not able to conceive  after 6 months of trying, would recommend f/u with [facility]    Follow-up in about 1 year (around 5/10/2019).

## 2018-05-11 ENCOUNTER — HOSPITAL ENCOUNTER (OUTPATIENT)
Dept: RADIOLOGY | Facility: HOSPITAL | Age: 38
Discharge: HOME | End: 2018-05-11
Attending: ORTHOPAEDIC SURGERY
Payer: COMMERCIAL

## 2018-05-11 ENCOUNTER — OFFICE VISIT (OUTPATIENT)
Dept: ORTHOPEDICS | Facility: CLINIC | Age: 38
End: 2018-05-11
Payer: COMMERCIAL

## 2018-05-11 DIAGNOSIS — Z09 FOLLOW-UP EXAMINATION AFTER ORTHOPEDIC SURGERY: ICD-10-CM

## 2018-05-11 DIAGNOSIS — Z09 FOLLOW-UP EXAMINATION AFTER ORTHOPEDIC SURGERY: Primary | ICD-10-CM

## 2018-05-11 DIAGNOSIS — Z98.1 STATUS POST ANKLE ARTHRODESIS: ICD-10-CM

## 2018-05-11 PROCEDURE — 99024 POSTOP FOLLOW-UP VISIT: CPT | Mod: S$GLB,,, | Performed by: ORTHOPAEDIC SURGERY

## 2018-05-11 PROCEDURE — 99999 PR PBB SHADOW E&M-EST. PATIENT-LVL I: CPT | Mod: PBBFAC,,, | Performed by: ORTHOPAEDIC SURGERY

## 2018-05-11 PROCEDURE — 73610 X-RAY EXAM OF ANKLE: CPT | Mod: TC,LT

## 2018-05-11 PROCEDURE — 73610 X-RAY EXAM OF ANKLE: CPT | Mod: 26,LT,, | Performed by: RADIOLOGY

## 2018-05-12 NOTE — PROGRESS NOTES
Ms. Chaney returns today for followup.  She is now six weeks postop from her   left ankle arthrodesis.  She reports her pain is improving, but still requiring   pain medicine.  Her cast is removed today.  Her wounds are well healed.  I   ordered and reviewed new x-ray today and the screws are in place without any   sign of loosening.  At this point, I am going to put her in a fracture boot.    She can begin weightbearing within limits of pain.  I am going to have her   return to see me in four weeks.  She should have repeat x-ray of her left ankle   at that time.      CRISTY/MENDY  dd: 05/11/2018 10:56:37 (CDT)  td: 05/11/2018 21:50:14 (CDT)  Doc ID   #[id number]  Job ID #[id number]    CC:

## 2018-05-14 ENCOUNTER — TELEPHONE (OUTPATIENT)
Dept: ORTHOPEDICS | Facility: CLINIC | Age: 38
End: 2018-05-14

## 2018-05-14 DIAGNOSIS — Z98.1 STATUS POST ANKLE ARTHRODESIS: ICD-10-CM

## 2018-05-14 DIAGNOSIS — Z09 FOLLOW-UP EXAMINATION AFTER ORTHOPEDIC SURGERY: ICD-10-CM

## 2018-05-14 RX ORDER — OXYCODONE AND ACETAMINOPHEN 10; 325 MG/1; MG/1
1 TABLET ORAL EVERY 8 HOURS PRN
Qty: 60 TABLET | Refills: 0 | Status: SHIPPED | OUTPATIENT
Start: 2018-05-14 | End: 2018-05-31 | Stop reason: SDUPTHER

## 2018-05-28 DIAGNOSIS — Z98.1 STATUS POST ANKLE ARTHRODESIS: ICD-10-CM

## 2018-05-28 DIAGNOSIS — Z09 FOLLOW-UP EXAMINATION AFTER ORTHOPEDIC SURGERY: ICD-10-CM

## 2018-05-28 RX ORDER — OXYCODONE AND ACETAMINOPHEN 10; 325 MG/1; MG/1
1 TABLET ORAL EVERY 8 HOURS PRN
Qty: 60 TABLET | Refills: 0 | Status: CANCELLED | OUTPATIENT
Start: 2018-05-28

## 2018-05-29 ENCOUNTER — PATIENT MESSAGE (OUTPATIENT)
Dept: OBSTETRICS AND GYNECOLOGY | Facility: CLINIC | Age: 38
End: 2018-05-29

## 2018-05-29 DIAGNOSIS — Z09 FOLLOW-UP EXAMINATION AFTER ORTHOPEDIC SURGERY: ICD-10-CM

## 2018-05-29 DIAGNOSIS — N91.5 OLIGOMENORRHEA, UNSPECIFIED TYPE: Primary | ICD-10-CM

## 2018-05-29 DIAGNOSIS — Z98.1 STATUS POST ANKLE ARTHRODESIS: ICD-10-CM

## 2018-05-30 ENCOUNTER — PATIENT MESSAGE (OUTPATIENT)
Dept: OBSTETRICS AND GYNECOLOGY | Facility: CLINIC | Age: 38
End: 2018-05-30

## 2018-05-30 ENCOUNTER — TELEPHONE (OUTPATIENT)
Dept: OBSTETRICS AND GYNECOLOGY | Facility: CLINIC | Age: 38
End: 2018-05-30

## 2018-05-30 NOTE — TELEPHONE ENCOUNTER
Hello, this is Sam calling from the OBGYN clinic at [facility]. Calling to schedule the labs that Dr. Palomares placed to follow up with your concerns. ( Orders are placed. Please schedule the pt for her labs.)

## 2018-05-31 ENCOUNTER — TELEPHONE (OUTPATIENT)
Dept: ORTHOPEDICS | Facility: CLINIC | Age: 38
End: 2018-05-31

## 2018-05-31 ENCOUNTER — TELEPHONE (OUTPATIENT)
Dept: OBSTETRICS AND GYNECOLOGY | Facility: CLINIC | Age: 38
End: 2018-05-31

## 2018-05-31 ENCOUNTER — LAB VISIT (OUTPATIENT)
Dept: LAB | Facility: OTHER | Age: 38
End: 2018-05-31
Attending: OBSTETRICS & GYNECOLOGY
Payer: COMMERCIAL

## 2018-05-31 DIAGNOSIS — Z98.1 STATUS POST ANKLE ARTHRODESIS: ICD-10-CM

## 2018-05-31 DIAGNOSIS — N91.5 OLIGOMENORRHEA, UNSPECIFIED TYPE: ICD-10-CM

## 2018-05-31 DIAGNOSIS — Z09 FOLLOW-UP EXAMINATION AFTER ORTHOPEDIC SURGERY: ICD-10-CM

## 2018-05-31 LAB
HCG INTACT+B SERPL-ACNC: 6.4 MIU/ML
PROLACTIN SERPL IA-MCNC: 10.6 NG/ML
TSH SERPL DL<=0.005 MIU/L-ACNC: 1.13 UIU/ML

## 2018-05-31 PROCEDURE — 84146 ASSAY OF PROLACTIN: CPT

## 2018-05-31 PROCEDURE — 84443 ASSAY THYROID STIM HORMONE: CPT

## 2018-05-31 PROCEDURE — 84702 CHORIONIC GONADOTROPIN TEST: CPT

## 2018-05-31 PROCEDURE — 36415 COLL VENOUS BLD VENIPUNCTURE: CPT

## 2018-05-31 RX ORDER — OXYCODONE AND ACETAMINOPHEN 10; 325 MG/1; MG/1
1 TABLET ORAL EVERY 8 HOURS PRN
Qty: 60 TABLET | Refills: 0 | Status: CANCELLED | OUTPATIENT
Start: 2018-05-31

## 2018-05-31 RX ORDER — OXYCODONE AND ACETAMINOPHEN 10; 325 MG/1; MG/1
1 TABLET ORAL EVERY 8 HOURS PRN
Qty: 60 TABLET | Refills: 0 | Status: SHIPPED | OUTPATIENT
Start: 2018-05-31 | End: 2018-06-12 | Stop reason: ALTCHOICE

## 2018-05-31 NOTE — TELEPHONE ENCOUNTER
Returned the pt's call to the clinic. Pt inquiring about her lab results. Informed the pt that all of her lads aren't back yet and that Dr. Palomares will send her a message through the portal once she reviews all of her results. Pt inquired if the 6/15 appointment was still available. Informed the pt that it is not and that Dr. Palomares will let her know how soon she needs to see her in the office once her results are back in. Pt verbalized understanding.

## 2018-05-31 NOTE — TELEPHONE ENCOUNTER
----- Message from Evette Gtz sent at 5/31/2018  4:13 PM CDT -----  Contact: self  Pt is calling to discuss her lab results. The pt can be reached at [phone].

## 2018-06-01 ENCOUNTER — PATIENT MESSAGE (OUTPATIENT)
Dept: OBSTETRICS AND GYNECOLOGY | Facility: CLINIC | Age: 38
End: 2018-06-01

## 2018-06-01 ENCOUNTER — TELEPHONE (OUTPATIENT)
Dept: OBSTETRICS AND GYNECOLOGY | Facility: CLINIC | Age: 38
End: 2018-06-01

## 2018-06-01 DIAGNOSIS — N92.6 IRREGULAR MENSES: Primary | ICD-10-CM

## 2018-06-01 NOTE — TELEPHONE ENCOUNTER
Hello, this is Sam calling from the OBGYN clinic at [facility]. Just calling to schedule your repeat hcg lab for two weeks from yesterday. ( Please schedule the pt for the hcg lab. The order is already placed.)

## 2018-06-01 NOTE — TELEPHONE ENCOUNTER
----- Message from Radha Palomares MD sent at 6/1/2018 10:18 AM CDT -----  Please schedule repeat hcg in 2 weeks

## 2018-06-05 ENCOUNTER — LAB VISIT (OUTPATIENT)
Dept: LAB | Facility: OTHER | Age: 38
End: 2018-06-05
Attending: OBSTETRICS & GYNECOLOGY
Payer: COMMERCIAL

## 2018-06-05 DIAGNOSIS — N92.6 IRREGULAR MENSES: ICD-10-CM

## 2018-06-05 LAB — HCG INTACT+B SERPL-ACNC: 110 MIU/ML

## 2018-06-05 PROCEDURE — 36415 COLL VENOUS BLD VENIPUNCTURE: CPT

## 2018-06-05 PROCEDURE — 84702 CHORIONIC GONADOTROPIN TEST: CPT

## 2018-06-06 ENCOUNTER — PATIENT MESSAGE (OUTPATIENT)
Dept: OBSTETRICS AND GYNECOLOGY | Facility: CLINIC | Age: 38
End: 2018-06-06

## 2018-06-06 ENCOUNTER — TELEPHONE (OUTPATIENT)
Dept: OBSTETRICS AND GYNECOLOGY | Facility: CLINIC | Age: 38
End: 2018-06-06

## 2018-06-06 NOTE — TELEPHONE ENCOUNTER
Returned call to the pt. Pt agreed to an appointment on 6/8 at 1PM. Pt verbalized understanding. Letter sent out.

## 2018-06-06 NOTE — TELEPHONE ENCOUNTER
----- Message from Donna Wilson sent at 6/6/2018  8:36 AM CDT -----  Contact: HILLARY TYLER [[id number]]            Name of Who is Calling:HILLARY TYLER [[id number]]       What is the request in detail: pt states she is returning a call to schedule injection and initial ob appt. Please call     Can the clinic reply by [facility]: no    What [facility] to [facility]: [phone]

## 2018-06-07 ENCOUNTER — HOSPITAL ENCOUNTER (OUTPATIENT)
Dept: RADIOLOGY | Facility: HOSPITAL | Age: 38
Discharge: HOME | End: 2018-06-07
Attending: ORTHOPAEDIC SURGERY
Payer: COMMERCIAL

## 2018-06-07 ENCOUNTER — OFFICE VISIT (OUTPATIENT)
Dept: ORTHOPEDICS | Facility: CLINIC | Age: 38
End: 2018-06-07
Payer: COMMERCIAL

## 2018-06-07 DIAGNOSIS — Z98.1 STATUS POST ANKLE ARTHRODESIS: ICD-10-CM

## 2018-06-07 DIAGNOSIS — Z09 FOLLOW-UP EXAMINATION AFTER ORTHOPEDIC SURGERY: Primary | ICD-10-CM

## 2018-06-07 DIAGNOSIS — Z09 FOLLOW-UP EXAMINATION AFTER ORTHOPEDIC SURGERY: ICD-10-CM

## 2018-06-07 PROCEDURE — 99024 POSTOP FOLLOW-UP VISIT: CPT | Mod: S$GLB,,, | Performed by: ORTHOPAEDIC SURGERY

## 2018-06-07 PROCEDURE — 99999 PR PBB SHADOW E&M-EST. PATIENT-LVL I: CPT | Mod: PBBFAC,,, | Performed by: ORTHOPAEDIC SURGERY

## 2018-06-07 NOTE — PROGRESS NOTES
Ms Chaney returns today for postop follow-up.  She is 2-1/2 months postop from her left ankle arthrodesis.  She informs us today that she just found out that she is almost 7 weeks pregnant.  She reports that her pain is improved.  She is only taking Tylenol for pain at this time.  Examination reveals that her wounds are well-healed.  She has moderate swelling today with minimal tenderness.  Because of her pregnancy I Did not order any new x-rays today.  She can continue to progress her weightbearing in the boot as the pain allows.  I would allow her to return to work at this time.  She will return in 8 weeks for follow-up.

## 2018-06-08 ENCOUNTER — LAB VISIT (OUTPATIENT)
Dept: LAB | Facility: OTHER | Age: 38
End: 2018-06-08
Attending: OBSTETRICS & GYNECOLOGY
Payer: COMMERCIAL

## 2018-06-08 ENCOUNTER — OFFICE VISIT (OUTPATIENT)
Dept: OBSTETRICS AND GYNECOLOGY | Facility: CLINIC | Age: 38
End: 2018-06-08
Attending: OBSTETRICS & GYNECOLOGY
Payer: COMMERCIAL

## 2018-06-08 VITALS
WEIGHT: 254.44 LBS | HEIGHT: 69 IN | DIASTOLIC BLOOD PRESSURE: 73 MMHG | SYSTOLIC BLOOD PRESSURE: 118 MMHG | BODY MASS INDEX: 37.68 KG/M2

## 2018-06-08 DIAGNOSIS — I63.9 CEREBROVASCULAR ACCIDENT (CVA), UNSPECIFIED MECHANISM: ICD-10-CM

## 2018-06-08 DIAGNOSIS — E11.9 TYPE 2 DIABETES MELLITUS WITHOUT COMPLICATION, WITHOUT LONG-TERM CURRENT USE OF INSULIN: ICD-10-CM

## 2018-06-08 DIAGNOSIS — O09.521 ELDERLY MULTIGRAVIDA IN FIRST TRIMESTER: ICD-10-CM

## 2018-06-08 DIAGNOSIS — Z32.01 PREGNANCY CONFIRMED BY POSITIVE BLOOD TEST: ICD-10-CM

## 2018-06-08 DIAGNOSIS — I10 HTN (HYPERTENSION), BENIGN: ICD-10-CM

## 2018-06-08 DIAGNOSIS — E66.01 MORBID OBESITY: ICD-10-CM

## 2018-06-08 DIAGNOSIS — Z32.01 PREGNANCY CONFIRMED BY POSITIVE BLOOD TEST: Primary | ICD-10-CM

## 2018-06-08 LAB
ABO + RH BLD: NORMAL
ALBUMIN SERPL BCP-MCNC: 3.8 G/DL
ALP SERPL-CCNC: 72 U/L
ALT SERPL W/O P-5'-P-CCNC: 13 U/L
ANION GAP SERPL CALC-SCNC: 9 MMOL/L
AST SERPL-CCNC: 13 U/L
B-HCG UR QL: POSITIVE
BASOPHILS # BLD AUTO: 0.05 K/UL
BASOPHILS NFR BLD: 0.5 %
BILIRUB SERPL-MCNC: 0.3 MG/DL
BLD GP AB SCN CELLS X3 SERPL QL: NORMAL
BUN SERPL-MCNC: 7 MG/DL
CALCIUM SERPL-MCNC: 9.3 MG/DL
CHLORIDE SERPL-SCNC: 107 MMOL/L
CO2 SERPL-SCNC: 23 MMOL/L
CREAT SERPL-MCNC: 0.7 MG/DL
CTP QC/QA: YES
DIFFERENTIAL METHOD: ABNORMAL
EOSINOPHIL # BLD AUTO: 0.2 K/UL
EOSINOPHIL NFR BLD: 2.2 %
ERYTHROCYTE [DISTWIDTH] IN BLOOD BY AUTOMATED COUNT: 13 %
EST. GFR  (AFRICAN AMERICAN): >60 ML/MIN/1.73 M^2
EST. GFR  (NON AFRICAN AMERICAN): >60 ML/MIN/1.73 M^2
ESTIMATED AVG GLUCOSE: 97 MG/DL
GLUCOSE SERPL-MCNC: 91 MG/DL
HBA1C MFR BLD HPLC: 5 %
HCG INTACT+B SERPL-ACNC: 275 MIU/ML
HCT VFR BLD AUTO: 36.8 %
HGB BLD-MCNC: 12.3 G/DL
LDH SERPL L TO P-CCNC: 145 U/L
LYMPHOCYTES # BLD AUTO: 2.7 K/UL
LYMPHOCYTES NFR BLD: 29.5 %
MCH RBC QN AUTO: 28.5 PG
MCHC RBC AUTO-ENTMCNC: 33.4 G/DL
MCV RBC AUTO: 85 FL
MONOCYTES # BLD AUTO: 0.5 K/UL
MONOCYTES NFR BLD: 5.4 %
NEUTROPHILS # BLD AUTO: 5.7 K/UL
NEUTROPHILS NFR BLD: 62.2 %
PLATELET # BLD AUTO: 309 K/UL
PMV BLD AUTO: 9.9 FL
POTASSIUM SERPL-SCNC: 3.3 MMOL/L
PROT SERPL-MCNC: 7.1 G/DL
RBC # BLD AUTO: 4.32 M/UL
SODIUM SERPL-SCNC: 139 MMOL/L
URATE SERPL-MCNC: 5.1 MG/DL
WBC # BLD AUTO: 9.14 K/UL

## 2018-06-08 PROCEDURE — 99999 PR PBB SHADOW E&M-EST. PATIENT-LVL III: CPT | Mod: PBBFAC,,, | Performed by: OBSTETRICS & GYNECOLOGY

## 2018-06-08 PROCEDURE — 99214 OFFICE O/P EST MOD 30 MIN: CPT | Mod: S$GLB,,, | Performed by: OBSTETRICS & GYNECOLOGY

## 2018-06-08 PROCEDURE — 86850 RBC ANTIBODY SCREEN: CPT

## 2018-06-08 PROCEDURE — 36415 COLL VENOUS BLD VENIPUNCTURE: CPT

## 2018-06-08 PROCEDURE — 83615 LACTATE (LD) (LDH) ENZYME: CPT

## 2018-06-08 PROCEDURE — 87086 URINE CULTURE/COLONY COUNT: CPT

## 2018-06-08 PROCEDURE — 81025 URINE PREGNANCY TEST: CPT | Mod: S$GLB,,, | Performed by: OBSTETRICS & GYNECOLOGY

## 2018-06-08 PROCEDURE — 3008F BODY MASS INDEX DOCD: CPT | Mod: CPTII,S$GLB,, | Performed by: OBSTETRICS & GYNECOLOGY

## 2018-06-08 PROCEDURE — 87491 CHLMYD TRACH DNA AMP PROBE: CPT

## 2018-06-08 PROCEDURE — 84550 ASSAY OF BLOOD/URIC ACID: CPT

## 2018-06-08 PROCEDURE — 86762 RUBELLA ANTIBODY: CPT

## 2018-06-08 PROCEDURE — 86592 SYPHILIS TEST NON-TREP QUAL: CPT

## 2018-06-08 PROCEDURE — 85025 COMPLETE CBC W/AUTO DIFF WBC: CPT

## 2018-06-08 PROCEDURE — 86703 HIV-1/HIV-2 1 RESULT ANTBDY: CPT

## 2018-06-08 PROCEDURE — 87340 HEPATITIS B SURFACE AG IA: CPT

## 2018-06-08 PROCEDURE — 80053 COMPREHEN METABOLIC PANEL: CPT

## 2018-06-08 PROCEDURE — 84702 CHORIONIC GONADOTROPIN TEST: CPT

## 2018-06-08 PROCEDURE — 83036 HEMOGLOBIN GLYCOSYLATED A1C: CPT

## 2018-06-08 NOTE — PROGRESS NOTES
"CC: Absence of menses    Anitra Chaney is a 38 y.o. female  presents with complaint of absence of menstruation.  She denies nausea/vomIting/abdominal pain/bleeding.  UPT is positive. LMP 2018.  ANIL is 2019, 6w6d.    She is very early pregnant.  Last hcg on  was 110, up for 5.4 on .      She has multiple medical problems.  Not currently taking Percocet or Neurontin.  Not taking any blood pressure medication since gastric sleeve.  Had a TIA in  that she reports was caused by "a stress migraine".  CT, CTA, MRI at that time were negative at that time.      Past Medical History:   Diagnosis Date    Anxiety     Chest pain, unspecified 2013: For three days.    Depression     DM2 (diabetes mellitus, type 2)     diet control    Eyelid lesion     LLL    GERD (gastroesophageal reflux disease)     History of conjunctivitis     History of PCOS     HTN (hypertension), benign 3/10/2014    Migraines     Obesity     Stroke     TIA (transient ischemic attack)      Past Surgical History:   Procedure Laterality Date    ANKLE FRACTURE SURGERY       SECTION, CLASSIC  2005/2009    x2    CHOLECYSTECTOMY  3/2002    lap maribel    FOOT SURGERY      gastric sleeve  2017     Social History     Social History    Marital status: Single     Spouse name: N/A    Number of children: N/A    Years of education: N/A     Occupational History     [employer]     Social History Main Topics    Smoking status: Current Every Day Smoker     Packs/day: 1.00     Years: 18.00     Types: Cigarettes    Smokeless tobacco: Never Used      Comment: cutting to 3-4 cigarette per day    Alcohol use No    Drug use: No    Sexual activity: Yes     Partners: Male     Other Topics Concern    None     Social History Narrative    None     Family History   Problem Relation Age of Onset    Other Father         house fire    Breast cancer Neg Hx     Colon " "cancer Neg Hx     Ovarian cancer Neg Hx      OB History    Para Term  AB Living   3 2           SAB TAB Ectopic Multiple Live Births                  # Outcome Date GA Lbr Yuval/2nd Weight Sex Delivery Anes PTL Lv   3 Current            2 Para            1 Para                   /73   Ht 5' 9" (1.753 m)   Wt 115.4 kg (254 lb 6.6 oz)   LMP 2018   BMI 37.57 kg/m²     ROS:  GENERAL: Denies weight gain or weight loss. Feeling well overall.   SKIN: Denies rash or lesions.   HEAD: Denies head injury or headache.   NODES: Denies enlarged lymph nodes.   CHEST: Denies chest pain or shortness of breath.   CARDIOVASCULAR: Denies palpitations or left sided chest pain.   ABDOMEN: No abdominal pain, constipation, diarrhea, nausea, vomiting or rectal bleeding.   URINARY: No frequency, dysuria, hematuria, or burning on urination.  REPRODUCTIVE: See HPI.   BREASTS: The patient performs breast self-examination and denies pain, lumps, or nipple discharge.   HEMATOLOGIC: No easy bruisability or excessive bleeding.   MUSCULOSKELETAL: Denies joint pain or swelling.   NEUROLOGIC: Denies syncope or weakness.   PSYCHIATRIC: Denies depression, anxiety or mood swings.    PE:   APPEARANCE: Well nourished, well developed, in no acute distress.  AFFECT: WNL, alert and oriented x 3.  SKIN: No acne or hirsutism.  NECK: Neck symmetric without masses or thyromegaly.  NODES: No inguinal, cervical, axillary or femoral lymph node enlargement.  CHEST: Good respiratory effort.   ABDOMEN: Soft. No tenderness or masses. No hepatosplenomegaly. No hernias.  BREASTS: Symmetrical, no skin changes or visible lesions. No palpable masses, nipple discharge bilaterally.  PELVIC: Normal external female genitalia without lesions. Normal hair distribution. Adequate perineal body, normal urethral meatus. Vagina moist and well rugated without lesions or discharge. Cervix pink, without lesions, discharge or tenderness. No significant " cystocele or rectocele. Bimanual exam shows uterus is 6 weeks, regular, mobile and nontender. Adnexa without masses or tenderness.  EXTREMITIES: No edema.          ASSESSMENT and PLAN:    ICD-10-CM ICD-9-CM    1. Pregnancy confirmed by positive blood test Z32.01 V72.42 HIV-1 and HIV-2 antibodies      RPR      Hepatitis B surface antigen      Type & Screen      Rubella antibody, IgG      Urine culture      CBC auto differential      C. trachomatis/N. gonorrhoeae by AMP DNA Urine      POCT urine pregnancy      hCG, quantitative      US OB/GYN Procedure ([facility])   2. Type 2 diabetes mellitus without complication, without long-term current use of insulin E11.9 250.00 Hemoglobin A1c   3. Morbid obesity E66.01 278.01    4. HTN (hypertension), benign I10 401.1 Comprehensive metabolic panel      Lactate dehydrogenase      Uric acid      Protein / creatinine ratio, urine   5. Cerebrovascular accident (CVA), unspecified mechanism I63.9 434.91    6. Elderly multigravida in first trimester O09.521 659.63        Discussed medical problems as it relates to pregnancy:    1. Pregnancy - PNL orders placed.  Discussed diet and PNV.   Discussed foods to avoid in pregnancy (i.e. sushi, fish that are high in mercury, deli meat, and unpasteurized cheeses). Discussed prenatal vitamin options (i.e. stool softener, DHA).  2. AMA - discussed aneuploidy screening (interested in [id number])  3. Type 2 DM (diet controlled) - Hgb A1C ordered to determine BS control  4. Morbid obesity - discussed risks in pregnancy, counseled today on proper weight gain based on the [place] [employer]'s recommendations based on her pre-pregnancy weight  5. Hypertension and h/o preeclampsia - discussed increased risk of pre-eclampsia in pregnancy, BP medications are appropriate for pregnancy.  Baseline labs ordered today.  Discussed ASA 81 mg after the 1st trimester   5. Discussed h/o TIA - no management at this time.  6. Previous  x 2 - repeat C/S  at 39 weeks    Follow-up in about 2 weeks (around 6/22/2018).

## 2018-06-10 LAB
BACTERIA UR CULT: NORMAL
BACTERIA UR CULT: NORMAL
C TRACH DNA SPEC QL NAA+PROBE: NOT DETECTED
N GONORRHOEA DNA SPEC QL NAA+PROBE: NOT DETECTED

## 2018-06-11 ENCOUNTER — PATIENT MESSAGE (OUTPATIENT)
Dept: OBSTETRICS AND GYNECOLOGY | Facility: CLINIC | Age: 38
End: 2018-06-11

## 2018-06-11 LAB
HBV SURFACE AG SERPL QL IA: NEGATIVE
HIV 1+2 AB+HIV1 P24 AG SERPL QL IA: NEGATIVE
RPR SER QL: NORMAL
RUBV IGG SER-ACNC: 21.5 IU/ML
RUBV IGG SER-IMP: REACTIVE

## 2018-06-12 ENCOUNTER — HOSPITAL ENCOUNTER (EMERGENCY)
Facility: OTHER | Age: 38
Discharge: HOME | End: 2018-06-12
Attending: EMERGENCY MEDICINE
Payer: COMMERCIAL

## 2018-06-12 VITALS
BODY MASS INDEX: 35.55 KG/M2 | HEART RATE: 62 BPM | DIASTOLIC BLOOD PRESSURE: 70 MMHG | OXYGEN SATURATION: 100 % | HEIGHT: 69 IN | RESPIRATION RATE: 17 BRPM | SYSTOLIC BLOOD PRESSURE: 112 MMHG | TEMPERATURE: 100 F | WEIGHT: 240 LBS

## 2018-06-12 DIAGNOSIS — R10.9 ABDOMINAL PAIN IN PREGNANCY, FIRST TRIMESTER: Primary | ICD-10-CM

## 2018-06-12 DIAGNOSIS — O26.891 ABDOMINAL PAIN IN PREGNANCY, FIRST TRIMESTER: Primary | ICD-10-CM

## 2018-06-12 LAB
ABO + RH BLD: NORMAL
ALBUMIN SERPL BCP-MCNC: 3.4 G/DL
ALP SERPL-CCNC: 62 U/L
ALT SERPL W/O P-5'-P-CCNC: 9 U/L
ANION GAP SERPL CALC-SCNC: 8 MMOL/L
AST SERPL-CCNC: 11 U/L
B-HCG UR QL: POSITIVE
BACTERIA #/AREA URNS HPF: ABNORMAL /HPF
BASOPHILS # BLD AUTO: 0.04 K/UL
BASOPHILS NFR BLD: 0.4 %
BILIRUB SERPL-MCNC: 0.2 MG/DL
BILIRUB UR QL STRIP: NEGATIVE
BLD GP AB SCN CELLS X3 SERPL QL: NORMAL
BUN SERPL-MCNC: 6 MG/DL
CALCIUM SERPL-MCNC: 9.2 MG/DL
CHLORIDE SERPL-SCNC: 108 MMOL/L
CLARITY UR: ABNORMAL
CO2 SERPL-SCNC: 24 MMOL/L
COLOR UR: YELLOW
CREAT SERPL-MCNC: 0.6 MG/DL
CTP QC/QA: YES
DIFFERENTIAL METHOD: ABNORMAL
EOSINOPHIL # BLD AUTO: 0.2 K/UL
EOSINOPHIL NFR BLD: 2.2 %
ERYTHROCYTE [DISTWIDTH] IN BLOOD BY AUTOMATED COUNT: 13.2 %
EST. GFR  (AFRICAN AMERICAN): >60 ML/MIN/1.73 M^2
EST. GFR  (NON AFRICAN AMERICAN): >60 ML/MIN/1.73 M^2
GLUCOSE SERPL-MCNC: 89 MG/DL
GLUCOSE UR QL STRIP: NEGATIVE
HCG INTACT+B SERPL-ACNC: 579 MIU/ML
HCT VFR BLD AUTO: 34.9 %
HGB BLD-MCNC: 11.7 G/DL
HGB UR QL STRIP: NEGATIVE
INJECT RH IG VOL PATIENT: NORMAL ML
KETONES UR QL STRIP: NEGATIVE
LEUKOCYTE ESTERASE UR QL STRIP: ABNORMAL
LYMPHOCYTES # BLD AUTO: 2.2 K/UL
LYMPHOCYTES NFR BLD: 23.6 %
MCH RBC QN AUTO: 28.7 PG
MCHC RBC AUTO-ENTMCNC: 33.5 G/DL
MCV RBC AUTO: 86 FL
MICROSCOPIC COMMENT: ABNORMAL
MONOCYTES # BLD AUTO: 0.5 K/UL
MONOCYTES NFR BLD: 5.4 %
NEUTROPHILS # BLD AUTO: 6.4 K/UL
NEUTROPHILS NFR BLD: 68.3 %
NITRITE UR QL STRIP: NEGATIVE
PH UR STRIP: 6 [PH] (ref 5–8)
PLATELET # BLD AUTO: 269 K/UL
PMV BLD AUTO: 9.6 FL
POTASSIUM SERPL-SCNC: 3.3 MMOL/L
PROT SERPL-MCNC: 6.4 G/DL
PROT UR QL STRIP: NEGATIVE
RBC # BLD AUTO: 4.07 M/UL
SODIUM SERPL-SCNC: 140 MMOL/L
SP GR UR STRIP: 1.01 (ref 1–1.03)
SQUAMOUS #/AREA URNS HPF: 14 /HPF
URN SPEC COLLECT METH UR: ABNORMAL
UROBILINOGEN UR STRIP-ACNC: NEGATIVE EU/DL
WBC # BLD AUTO: 9.41 K/UL
WBC #/AREA URNS HPF: 3 /HPF (ref 0–5)
WBC CLUMPS URNS QL MICRO: ABNORMAL

## 2018-06-12 PROCEDURE — 85025 COMPLETE CBC W/AUTO DIFF WBC: CPT

## 2018-06-12 PROCEDURE — 99284 EMERGENCY DEPT VISIT MOD MDM: CPT | Mod: 25

## 2018-06-12 PROCEDURE — 81000 URINALYSIS NONAUTO W/SCOPE: CPT

## 2018-06-12 PROCEDURE — 81025 URINE PREGNANCY TEST: CPT | Performed by: EMERGENCY MEDICINE

## 2018-06-12 PROCEDURE — 86850 RBC ANTIBODY SCREEN: CPT

## 2018-06-12 PROCEDURE — 80053 COMPREHEN METABOLIC PANEL: CPT

## 2018-06-12 PROCEDURE — 96372 THER/PROPH/DIAG INJ SC/IM: CPT

## 2018-06-12 PROCEDURE — 84702 CHORIONIC GONADOTROPIN TEST: CPT

## 2018-06-12 PROCEDURE — 63600519 RHOGAM PHARM REV CODE 636 ALT 250 W HCPCS: Performed by: PHYSICIAN ASSISTANT

## 2018-06-12 RX ADMIN — HUMAN RHO(D) IMMUNE GLOBULIN 300 MCG: 300 INJECTION, SOLUTION INTRAMUSCULAR at 05:06

## 2018-06-12 NOTE — ED TRIAGE NOTES
Bilat lower abdomen and low back pain starting today. Recently found out she was pregnant. 7.5 weeks based on LMP. Denies vaginal bleeding, discharge, or urinary s/s.

## 2018-06-12 NOTE — ED NOTES
Pt AAOx4 and appropriate at this time. Respirations even and unlabored. No acute distress noted. Awaiting further orders. Pt updated on POC. Bed is locked and in lowest position. Call bell within reach and pt oriented to use of call bell. Pt on continuous pulse ox, and continuous BP cuff. Will continue to monitor.

## 2018-06-12 NOTE — ED PROVIDER NOTES
Encounter Date: 2018       History     Chief Complaint   Patient presents with    Abdominal Cramping     Pt 7.5 weeks pregnant with complaints of abdominal cramping that radiates to the back, denies vaginal bleeding/discharge, LMP 2018     Patient is a 38-year-old  female, approximately 7.5 weeks gestational age, with depression, diabetes, hypertension and TIA () who presents to the ED with abdominal pain and pregnancy.  Patient had initial OB appointment with Dr. Cohen on - beta-hCG measurements on  was 110 and 275 on .  Patient has not yet had confirmed IUP.  Patient reports lower abdominal pain that radiates to her lower back.  She states this began when she woke up today.  She also reports urinary frequency but denies dysuria.  She denies any known fever.  She denies vaginal bleeding.  She denies taking any analgesics for this pain.          Review of patient's allergies indicates:   Allergen Reactions    Mirtazapine      Other reaction(s): tachycardia    Ziprasidone hcl Palpitations     Past Medical History:   Diagnosis Date    Anxiety     Chest pain, unspecified 2013: For three days.    Depression     DM2 (diabetes mellitus, type 2)     diet control    Eyelid lesion     LLL    GERD (gastroesophageal reflux disease)     History of conjunctivitis     History of PCOS     HTN (hypertension), benign 3/10/2014    Migraines     Obesity     Stroke     TIA (transient ischemic attack)      Past Surgical History:   Procedure Laterality Date    ANKLE FRACTURE SURGERY       SECTION, CLASSIC  2005/2009    x2    CHOLECYSTECTOMY  3/2002    lap maribel    FOOT SURGERY      gastric sleeve  2017     Family History   Problem Relation Age of Onset    Other Father         house fire    Breast cancer Neg Hx     Colon cancer Neg Hx     Ovarian cancer Neg Hx      Social History   Substance Use Topics    Smoking status: Current Every Day Smoker      Packs/day: 1.00     Years: 18.00     Types: Cigarettes    Smokeless tobacco: Never Used      Comment: cutting to 3-4 cigarette per day    Alcohol use No     Review of Systems   Constitutional: Negative for chills and fever.   HENT: Negative for congestion and sore throat.    Eyes: Negative for pain.   Respiratory: Negative for shortness of breath.    Cardiovascular: Negative for chest pain.   Gastrointestinal: Positive for abdominal pain. Negative for diarrhea, nausea and vomiting.   Genitourinary: Positive for frequency. Negative for dysuria.   Musculoskeletal: Positive for back pain.   Skin: Negative for rash.   Neurological: Negative for headaches.       Physical Exam     Initial Vitals [06/12/18 1256]   BP Pulse Resp Temp SpO2   124/77 77 17 99.5 °F (37.5 °C) 100 %      MAP       --         Physical Exam    Constitutional: Vital signs are normal. She is cooperative.   White female in no acute distress. She is nontoxic-appearing.   HENT:   Head: Normocephalic and atraumatic.   Eyes: EOM are normal. Pupils are equal, round, and reactive to light.   Neck: Normal range of motion. Neck supple.   Cardiovascular: Normal rate, regular rhythm and intact distal pulses.   Pulmonary/Chest: Breath sounds normal. She has no wheezes. She has no rhonchi. She has no rales.   Abdominal: Soft. Bowel sounds are normal.   Mild TTP in the left, lower quadrant and suprapubic region.  No CVA tenderness bilaterally.   Musculoskeletal: Normal range of motion. She exhibits no edema.   No CT L midline tenderness, crepitus, masses, step-offs, deformities. Mild TTP to the paraspinal muscles in the bilateral lumbar region.   Neurological: She is alert and oriented to person, place, and time. GCS eye subscore is 4. GCS verbal subscore is 5. GCS motor subscore is 6.   Skin: Skin is warm and dry. Capillary refill takes less than 2 seconds. No rash noted.   Psychiatric: She has a normal mood and affect. Her behavior is normal.         ED  Course   Procedures  Labs Reviewed   URINALYSIS - Abnormal; Notable for the following:        Result Value    Appearance, UA Hazy (*)     Leukocytes, UA 1+ (*)     All other components within normal limits   CBC W/ AUTO DIFFERENTIAL - Abnormal; Notable for the following:     Hemoglobin 11.7 (*)     Hematocrit 34.9 (*)     All other components within normal limits   COMPREHENSIVE METABOLIC PANEL - Abnormal; Notable for the following:     Potassium 3.3 (*)     Albumin 3.4 (*)     ALT 9 (*)     All other components within normal limits   URINALYSIS MICROSCOPIC - Abnormal; Notable for the following:     Bacteria, UA Few (*)     All other components within normal limits   POCT URINE PREGNANCY - Abnormal; Notable for the following:     POC Preg Test, Ur Positive (*)     All other components within normal limits   HCG, QUANTITATIVE, PREGNANCY   TYPE & SCREEN   PREPARE RH IMMUNE GLOBULIN          No orders to display        Medical Decision Making:   Initial Assessment:   Urgent evaluation of a 38 y.o. female presenting to the emergency department complaining of abdominal and back pain. Patient is afebrile, nontoxic appearing and hemodynamically stable. No signs of acute abdomen on exam.  Differential includes UTI, pyelonephritis, ectopic, round ligament pain.  We will obtain labs and imaging for further workup.  Patient does not want analgesics for her pain at this time.  ED Management:  Lab work reveals anemia with stable H&H.  Mild hypokalemia 3.3.  Rh negative, will administer RhoGAM.  Beta HCG is trending upward -579, however at this point should be in the range of 5529-4596.  Ultrasound reveals no viable IUP.  There is a questionable gestational sac in the endometrial canal measuring 2.4 mm but no fetal pole.  Concerning for failed early pregnancy, however, this could also be early pregnancy.  Patient states in her previous pregnancies her beta HCG levels were low and she was placed on progesterone at this time.  The  left ovary also has a 2.2 cm complex cyst, reflecting where patient's pain was on exam.  No UTI on urinalysis, specimen is contaminated.  Will send for urine culture.  No evidence of pyelonephritis on exam.   Spoke with Ob  Resident, Dr. Carpio who will place patinet in the beta book. She has no other complaints at this time and is stable for discharge.   I have discussed the patient with the attending thoroughly and he/she agrees to the treatment and plan.                       Clinical Impression:     1. Abdominal pain in pregnancy, first trimester                               Ludwig Jeffrey PA-C  06/12/18 1936

## 2018-06-12 NOTE — ED NOTES
Rounding has been complete. Pt resting comfortably on stretcher in high walker position. PT AAOx3.Respirations even and unlabored. Call bell within reach. Will continue to monitor.

## 2018-06-13 ENCOUNTER — PATIENT MESSAGE (OUTPATIENT)
Dept: OBSTETRICS AND GYNECOLOGY | Facility: CLINIC | Age: 38
End: 2018-06-13

## 2018-06-14 ENCOUNTER — PATIENT MESSAGE (OUTPATIENT)
Dept: OBSTETRICS AND GYNECOLOGY | Facility: CLINIC | Age: 38
End: 2018-06-14

## 2018-06-14 ENCOUNTER — TELEPHONE (OUTPATIENT)
Dept: OBSTETRICS AND GYNECOLOGY | Facility: CLINIC | Age: 38
End: 2018-06-14

## 2018-06-14 ENCOUNTER — LAB VISIT (OUTPATIENT)
Dept: LAB | Facility: OTHER | Age: 38
End: 2018-06-14
Attending: OBSTETRICS & GYNECOLOGY
Payer: COMMERCIAL

## 2018-06-14 DIAGNOSIS — O21.9 NAUSEA AND VOMITING IN PREGNANCY: Primary | ICD-10-CM

## 2018-06-14 DIAGNOSIS — N92.6 IRREGULAR MENSES: ICD-10-CM

## 2018-06-14 LAB — HCG INTACT+B SERPL-ACNC: 964 MIU/ML

## 2018-06-14 PROCEDURE — 36415 COLL VENOUS BLD VENIPUNCTURE: CPT

## 2018-06-14 PROCEDURE — 84702 CHORIONIC GONADOTROPIN TEST: CPT

## 2018-06-17 RX ORDER — ONDANSETRON 4 MG/1
4 TABLET, FILM COATED ORAL EVERY 8 HOURS PRN
Qty: 30 TABLET | Refills: 1 | Status: SHIPPED | OUTPATIENT
Start: 2018-06-17 | End: 2018-09-17

## 2018-06-19 ENCOUNTER — TELEPHONE (OUTPATIENT)
Dept: OBSTETRICS AND GYNECOLOGY | Facility: CLINIC | Age: 38
End: 2018-06-19

## 2018-06-19 NOTE — TELEPHONE ENCOUNTER
Pt contacted the clinic c/o vaginal spotting. Pt informed that spotting in the first trimester can be common especially if strenuous activity was done. Pt informed that if she starts bleeding heavy like her period with bright red blood or begin experiencing any severe abdominal or pelvic pain to report to the ED. Pt verbalized understanding.

## 2018-06-21 ENCOUNTER — PATIENT MESSAGE (OUTPATIENT)
Dept: ORTHOPEDICS | Facility: CLINIC | Age: 38
End: 2018-06-21

## 2018-06-22 ENCOUNTER — PATIENT MESSAGE (OUTPATIENT)
Dept: ORTHOPEDICS | Facility: CLINIC | Age: 38
End: 2018-06-22

## 2018-06-25 ENCOUNTER — INITIAL PRENATAL (OUTPATIENT)
Dept: OBSTETRICS AND GYNECOLOGY | Facility: CLINIC | Age: 38
End: 2018-06-25
Attending: OBSTETRICS & GYNECOLOGY
Payer: COMMERCIAL

## 2018-06-25 VITALS
SYSTOLIC BLOOD PRESSURE: 100 MMHG | DIASTOLIC BLOOD PRESSURE: 64 MMHG | BODY MASS INDEX: 37.24 KG/M2 | WEIGHT: 252.19 LBS

## 2018-06-25 DIAGNOSIS — Z98.891 PREVIOUS CESAREAN SECTION: ICD-10-CM

## 2018-06-25 DIAGNOSIS — Z34.90 NORMAL REPEAT PREGNANCY, ANTEPARTUM: Primary | ICD-10-CM

## 2018-06-25 DIAGNOSIS — E11.9 TYPE 2 DIABETES MELLITUS WITHOUT COMPLICATION, WITHOUT LONG-TERM CURRENT USE OF INSULIN: ICD-10-CM

## 2018-06-25 DIAGNOSIS — E66.01 MORBID OBESITY: ICD-10-CM

## 2018-06-25 DIAGNOSIS — I10 HTN (HYPERTENSION), BENIGN: ICD-10-CM

## 2018-06-25 DIAGNOSIS — O26.891 RH NEGATIVE STATE IN ANTEPARTUM PERIOD, FIRST TRIMESTER: ICD-10-CM

## 2018-06-25 DIAGNOSIS — O09.521 ELDERLY MULTIGRAVIDA IN FIRST TRIMESTER: ICD-10-CM

## 2018-06-25 DIAGNOSIS — Z67.91 RH NEGATIVE STATE IN ANTEPARTUM PERIOD, FIRST TRIMESTER: ICD-10-CM

## 2018-06-25 PROCEDURE — 0502F SUBSEQUENT PRENATAL CARE: CPT | Mod: S$GLB,,, | Performed by: OBSTETRICS & GYNECOLOGY

## 2018-06-25 PROCEDURE — 99999 PR PBB SHADOW E&M-EST. PATIENT-LVL II: CPT | Mod: PBBFAC,,, | Performed by: OBSTETRICS & GYNECOLOGY

## 2018-06-25 NOTE — PROGRESS NOTES
No problems.  No bleeding.  Based on LMP, she should be 9w2d.  Dating ultrasound done today - results not available but per patient, GS was measuring 5 weeks with no fetus.  Will call with results to determine next steps.  For now, will continue prenatal care as usual.  Bleeding/pain precautions.

## 2018-06-26 ENCOUNTER — PATIENT MESSAGE (OUTPATIENT)
Dept: OBSTETRICS AND GYNECOLOGY | Facility: CLINIC | Age: 38
End: 2018-06-26

## 2018-06-28 ENCOUNTER — PATIENT MESSAGE (OUTPATIENT)
Dept: ORTHOPEDICS | Facility: CLINIC | Age: 38
End: 2018-06-28

## 2018-06-28 ENCOUNTER — PATIENT MESSAGE (OUTPATIENT)
Dept: OBSTETRICS AND GYNECOLOGY | Facility: CLINIC | Age: 38
End: 2018-06-28

## 2018-06-29 ENCOUNTER — PATIENT MESSAGE (OUTPATIENT)
Dept: ORTHOPEDICS | Facility: CLINIC | Age: 38
End: 2018-06-29

## 2018-06-30 ENCOUNTER — HOSPITAL ENCOUNTER (EMERGENCY)
Facility: OTHER | Age: 38
Discharge: HOME | End: 2018-07-01
Attending: EMERGENCY MEDICINE
Payer: COMMERCIAL

## 2018-06-30 DIAGNOSIS — O02.1 MISSED ABORTION: Primary | ICD-10-CM

## 2018-06-30 DIAGNOSIS — O03.4 INCOMPLETE ABORTION: ICD-10-CM

## 2018-06-30 DIAGNOSIS — N93.9 VAGINAL BLEEDING: ICD-10-CM

## 2018-06-30 LAB
B-HCG UR QL: POSITIVE
BACTERIA #/AREA URNS HPF: ABNORMAL /HPF
BILIRUB UR QL STRIP: NEGATIVE
CLARITY UR: CLEAR
COLOR UR: YELLOW
CTP QC/QA: YES
GLUCOSE UR QL STRIP: NEGATIVE
HCG INTACT+B SERPL-ACNC: 757 MIU/ML
HGB UR QL STRIP: ABNORMAL
KETONES UR QL STRIP: NEGATIVE
LEUKOCYTE ESTERASE UR QL STRIP: ABNORMAL
MICROSCOPIC COMMENT: ABNORMAL
NITRITE UR QL STRIP: NEGATIVE
PH UR STRIP: 6 [PH] (ref 5–8)
PROT UR QL STRIP: NEGATIVE
RBC #/AREA URNS HPF: >100 /HPF (ref 0–4)
SP GR UR STRIP: >=1.03 (ref 1–1.03)
URN SPEC COLLECT METH UR: ABNORMAL
UROBILINOGEN UR STRIP-ACNC: NEGATIVE EU/DL
WBC #/AREA URNS HPF: 2 /HPF (ref 0–5)

## 2018-06-30 PROCEDURE — 99285 EMERGENCY DEPT VISIT HI MDM: CPT | Mod: 25

## 2018-06-30 PROCEDURE — 81000 URINALYSIS NONAUTO W/SCOPE: CPT

## 2018-06-30 PROCEDURE — 63600175 PHARM REV CODE 636 W HCPCS: Performed by: EMERGENCY MEDICINE

## 2018-06-30 PROCEDURE — 96372 THER/PROPH/DIAG INJ SC/IM: CPT | Mod: 59

## 2018-06-30 PROCEDURE — 81025 URINE PREGNANCY TEST: CPT | Performed by: EMERGENCY MEDICINE

## 2018-06-30 PROCEDURE — 25000003 PHARM REV CODE 250: Performed by: EMERGENCY MEDICINE

## 2018-06-30 PROCEDURE — 84702 CHORIONIC GONADOTROPIN TEST: CPT

## 2018-06-30 RX ORDER — ONDANSETRON 4 MG/1
TABLET, FILM COATED ORAL
Status: DISCONTINUED
Start: 2018-06-30 | End: 2018-07-01 | Stop reason: HOSPADM

## 2018-06-30 RX ORDER — ONDANSETRON 4 MG/1
4 TABLET, ORALLY DISINTEGRATING ORAL
Status: COMPLETED | OUTPATIENT
Start: 2018-06-30 | End: 2018-06-30

## 2018-06-30 RX ORDER — MORPHINE SULFATE 10 MG/ML
INJECTION INTRAMUSCULAR; INTRAVENOUS; SUBCUTANEOUS
Status: DISCONTINUED
Start: 2018-06-30 | End: 2018-07-01 | Stop reason: HOSPADM

## 2018-06-30 RX ORDER — MORPHINE SULFATE 10 MG/ML
4 INJECTION INTRAMUSCULAR; INTRAVENOUS; SUBCUTANEOUS
Status: COMPLETED | OUTPATIENT
Start: 2018-06-30 | End: 2018-06-30

## 2018-06-30 RX ORDER — OXYCODONE AND ACETAMINOPHEN 5; 325 MG/1; MG/1
1 TABLET ORAL
Status: COMPLETED | OUTPATIENT
Start: 2018-06-30 | End: 2018-06-30

## 2018-06-30 RX ADMIN — ONDANSETRON 4 MG: 4 TABLET, ORALLY DISINTEGRATING ORAL at 11:06

## 2018-06-30 RX ADMIN — MORPHINE SULFATE 4 MG: 10 INJECTION INTRAVENOUS at 11:06

## 2018-06-30 RX ADMIN — OXYCODONE HYDROCHLORIDE AND ACETAMINOPHEN 1 TABLET: 5; 325 TABLET ORAL at 10:06

## 2018-07-01 VITALS
RESPIRATION RATE: 16 BRPM | HEART RATE: 58 BPM | TEMPERATURE: 98 F | DIASTOLIC BLOOD PRESSURE: 79 MMHG | BODY MASS INDEX: 32.93 KG/M2 | HEIGHT: 70 IN | OXYGEN SATURATION: 99 % | SYSTOLIC BLOOD PRESSURE: 117 MMHG | WEIGHT: 230 LBS

## 2018-07-01 PROBLEM — O03.4 INCOMPLETE ABORTION: Status: ACTIVE | Noted: 2018-07-01

## 2018-07-01 PROCEDURE — 99283 EMERGENCY DEPT VISIT LOW MDM: CPT | Mod: ,,, | Performed by: OBSTETRICS & GYNECOLOGY

## 2018-07-01 RX ORDER — NAPROXEN SODIUM 550 MG/1
550 TABLET ORAL 3 TIMES DAILY
Qty: 30 TABLET | Refills: 0 | Status: SHIPPED | OUTPATIENT
Start: 2018-07-01 | End: 2018-09-17

## 2018-07-01 RX ORDER — HYDROCODONE BITARTRATE AND ACETAMINOPHEN 5; 325 MG/1; MG/1
1 TABLET ORAL EVERY 4 HOURS PRN
Qty: 15 TABLET | Refills: 0 | Status: SHIPPED | OUTPATIENT
Start: 2018-07-01 | End: 2018-07-05 | Stop reason: SDUPTHER

## 2018-07-01 RX ORDER — MISOPROSTOL 200 UG/1
800 TABLET ORAL ONCE
Qty: 4 TABLET | Refills: 1 | Status: SHIPPED | OUTPATIENT
Start: 2018-07-01 | End: 2018-09-17

## 2018-07-01 NOTE — HPI
Pt is a 39 yo female with PMH  who presents with abdominal cramping and vaginal bleeding in early pregnancy. Pt reports that for the past few weeks she has had these symptoms - was told she was likely miscarrying. Reports over the past 1-2 days the pain has significantly worsened and she noticed she passed a collection of mucousy tissue. Says she feels like she is having contractions. Saturated 3 pads over past 4 hours. No dizziness, weakness, shortness of breath, palpitations. Ultrasound was performed in the emergency department which showed possible retained products of conception and no IUP. Adnexa was unremarkable. Gynecology was consulted for further recommendations.

## 2018-07-01 NOTE — CONSULTS
[facility]  Obstetrics  Consult Note    Patient Name: Anitra Chaney  MRN: [id number]  Admission Date: 2018  Hospital Length of Stay: 0 days  Code Status: Prior  Primary Care Provider: Tien Watts MD  Principal Problem: <principal problem not specified>    Consults  Subjective:     Principal Problem:<principal problem not specified>    History of Present Illness:  Pt is a 39 yo female with PMH  who presents with abdominal cramping and vaginal bleeding in early pregnancy. Pt reports that for the past few weeks she has had these symptoms - was told she was likely miscarrying. Reports over the past 1-2 days the pain has significantly worsened and she noticed she passed a collection of mucousy tissue. Says she feels like she is having contractions. Saturated 3 pads over past 4 hours. No dizziness, weakness, shortness of breath, palpitations. Ultrasound was performed in the emergency department which showed possible retained products of conception and no IUP. Adnexa was unremarkable. Gynecology was consulted for further recommendations.     Obstetric HPI:    Obstetric History       T2      L0     SAB0   TAB0   Ectopic0   Multiple0   Live Births0       # Outcome Date GA Lbr Yuval/2nd Weight Sex Delivery Anes PTL Lv   3 Current            2 Term 09 37w0d  2.722 kg (6 lb) F CS-Unspec EPI     1 Term 05 37w0d  3.345 kg (7 lb 6 oz) M CS-Unspec EPI          Past Medical History:   Diagnosis Date    Anxiety     Chest pain, unspecified 2013: For three days.    Depression     DM2 (diabetes mellitus, type 2)     diet control    Eyelid lesion     LLL    GERD (gastroesophageal reflux disease)     History of conjunctivitis     History of PCOS     HTN (hypertension), benign 3/10/2014    Migraines     Obesity     Stroke     TIA (transient ischemic attack)      Past Surgical History:   Procedure Laterality Date    ANKLE FRACTURE  SURGERY       SECTION, CLASSIC  2005/2009    x2    CHOLECYSTECTOMY  3/2002    lap maribel    FOOT SURGERY      gastric sleeve  2017         (Not in a hospital admission)    Review of patient's allergies indicates:   Allergen Reactions    Mirtazapine      Other reaction(s): tachycardia    Ziprasidone hcl Palpitations        Family History     Problem Relation (Age of Onset)    Other Father        Social History Main Topics    Smoking status: Current Every Day Smoker     Packs/day: 1.00     Years: 18.00     Types: Cigarettes    Smokeless tobacco: Never Used      Comment: cutting to 3-4 cigarette per day    Alcohol use No    Drug use: No    Sexual activity: Yes     Partners: Male     Review of Systems   Constitutional: Negative for chills and fever.   Eyes: Negative for visual disturbance.   Respiratory: Negative for shortness of breath.    Cardiovascular: Negative for chest pain.   Gastrointestinal: Positive for abdominal pain. Negative for nausea and vomiting.   Genitourinary: Positive for vaginal bleeding. Negative for vaginal discharge.   Skin:  Negative for rash.   Neurological: Negative for headaches.      Objective:     Vital Signs (Most Recent):  Temp: 98.2 °F (36.8 °C) (18)  Pulse: (!) 58 (18)  Resp: 16 (18)  BP: 117/79 (18)  SpO2: 99 % (18) Vital Signs (24h Range):  Temp:  [98.2 °F (36.8 °C)] 98.2 °F (36.8 °C)  Pulse:  [48-60] 58  Resp:  [16-18] 16  SpO2:  [99 %] 99 %  BP: ()/(50-81) 117/79     Weight: 104.3 kg (230 lb)  Body mass index is 33 kg/m².    Physical Exam:   Constitutional: She is oriented to person, place, and time. She appears well-developed and well-nourished. No distress.    HENT:   Head: Normocephalic and atraumatic.       Pulmonary/Chest: Effort normal. No respiratory distress.        Abdominal: Soft. She exhibits no distension. There is no tenderness. There is no rebound.     Genitourinary:   Genitourinary  Comments: Normal appearing external genitalia, speculum exam reveals normal appearing vaginal mucosa and cervix, some mucousy discharge present in vault, small amount of blood with no active bleeding from os, cervical os is closed, bimanual exam reveals mild tenderness, no adnexal masses             Musculoskeletal: Moves all extremeties.       Neurological: She is alert and oriented to person, place, and time.    Skin: Skin is warm and dry. She is not diaphoretic. No pallor.    Psychiatric: She has a normal mood and affect. Her behavior is normal. Judgment and thought content normal.     Significant Labs:  Lab Results   Component Value Date    GROUPTRH A NEG 2018    HEPBSAG Negative 2018     I have personallly reviewed all pertinent lab results from the last 24 hours.   Beta hc (down from 964 2 weeks ago)  TVUS: No evidence of intrauterine gestation.  Nonvisualization of previous 2 mm questionable intrauterine gestation.  Diffuse thickening of the endometrium with a focal 3.5 x 0.9 x 1.2 cm region.  The findings are most suggestive of retained products of conception.  Suggest Obstetrics and Gynecology evaluation.      Assessment/Plan:     38 y.o. female  at 10w1d for:    Incomplete     --already received rhogam on  for Rh- status  --pt hemodynamically stable, pain controlled on medications, vaginal bleeding moderate  --Discussed expectant vs medical vs surgical mgmt: pt desires medical mgmt  ----will give 800 mcg oral cytotec. Pt to take second dose of 800 mcg if she does not pass tissue in next 24-48 hours  ----will send home with naproxen and norco for pain  ----needs repeat beta hcg on Thursday  ----to call Dr. Palomares's office (primary OBG) on Monday to schedule f/u appt  ----bleeding/pain precautions given            Thank you for your consult. I will sign off. Please contact us if you have any additional questions. Stable for discharge from a GYN standpoint.    Raymond ALBA  MD [place]  Obstetrics & Gynecology  [facility]

## 2018-07-01 NOTE — ED TRIAGE NOTES
"Pt presents to ED with vaginal bleeding starting 1.5 hours PTA, pt also reports tissue passing, reports saturating about 2 pads/hour. Pt reports 9/10 abdominal pain, states "it's like labor contractions", also reports nausea. Pt states she is about 5 weeks pregnant. Pt denies vomiting, dizziness, SOB.  "

## 2018-07-01 NOTE — ED PROVIDER NOTES
"Encounter Date: 2018    SCRIBE #1 NOTE: I, Adelina Flowers, am scribing for, and in the presence of, Dr. De La Torre.       History     Chief Complaint   Patient presents with    Threatened Miscarriage     5 weeks pregnant, started spotting yesterday and heavy bleeding about an hour and a half     Time seen by provider: 9:50 PM    This is a 38 y.o. female, /A0, who presents with complaint of vaginal bleeding that began one day ago. She reports being 5 weeks pregnant as determined by ultrasound. Patient reports vaginal bleeding began spotting and has gradually increased. She reports pelvic pain and lower back pain. She describes pain as a cramping. She rates pain as a 9 out of 10. She reports passing "grayish" tissue earlier today. She denies fever, chills, nausea, vomiting, dysuria, fatigue lightheadedness, dizziness, or weakness. Her last US was on  that showed no IUP, but questionable gestational sac. Her OB following this pregnancy is Dr. Palomares.       The history is provided by the patient.     Review of patient's allergies indicates:   Allergen Reactions    Mirtazapine      Other reaction(s): tachycardia    Ziprasidone hcl Palpitations     Past Medical History:   Diagnosis Date    Anxiety     Chest pain, unspecified 2013: For three days.    Depression     DM2 (diabetes mellitus, type 2)     diet control    Eyelid lesion     LLL    GERD (gastroesophageal reflux disease)     History of conjunctivitis     History of PCOS     HTN (hypertension), benign 3/10/2014    Migraines     Obesity     Stroke     TIA (transient ischemic attack)      Past Surgical History:   Procedure Laterality Date    ANKLE FRACTURE SURGERY       SECTION, CLASSIC  2005/2009    x2    CHOLECYSTECTOMY  3/2002    lap maribel    FOOT SURGERY      gastric sleeve  2017     Family History   Problem Relation Age of Onset    Other Father         house fire    Breast cancer Neg Hx  "    Colon cancer Neg Hx     Ovarian cancer Neg Hx      Social History   Substance Use Topics    Smoking status: Current Every Day Smoker     Packs/day: 1.00     Years: 18.00     Types: Cigarettes    Smokeless tobacco: Never Used      Comment: cutting to 3-4 cigarette per day    Alcohol use No     Review of Systems   Constitutional: Negative for chills, fatigue and fever.   HENT: Negative for sore throat.    Respiratory: Negative for shortness of breath.    Cardiovascular: Negative for chest pain.   Gastrointestinal: Negative for abdominal pain, nausea and vomiting.   Genitourinary: Positive for pelvic pain and vaginal bleeding. Negative for dysuria, flank pain, hematuria and vaginal discharge.   Musculoskeletal: Positive for back pain.   Skin: Negative for rash.   Neurological: Negative for dizziness, weakness and light-headedness.   Hematological: Does not bruise/bleed easily.       Physical Exam     Initial Vitals [06/30/18 2125]   BP Pulse Resp Temp SpO2   (!) 143/81 60 18 98.2 °F (36.8 °C) 99 %      MAP       --         Physical Exam    Nursing note and vitals reviewed.  Constitutional: Vital signs are normal. She appears well-developed and well-nourished. No distress.   HENT:   Head: Normocephalic and atraumatic.   Mouth/Throat: Oropharynx is clear and moist.   Eyes: Conjunctivae and EOM are normal. Pupils are equal, round, and reactive to light.   Neck: Normal range of motion. Neck supple.   Cardiovascular: Normal rate, regular rhythm and normal heart sounds. Exam reveals no gallop and no friction rub.    No murmur heard.  Abdominal: Soft. Bowel sounds are normal. There is no tenderness. There is no rebound and no guarding.   Genitourinary:   Genitourinary Comments: Os is closed. No CMT. Small amount of blood in the vault. No tissue or clots present.    Neurological: She is alert and oriented to person, place, and time. She has normal strength and normal reflexes. She displays normal reflexes. No cranial  nerve deficit or sensory deficit. She displays a negative Romberg sign.   Skin: Skin is warm and dry. No rash noted. No pallor.         ED Course   Procedures  Labs Reviewed   URINALYSIS - Abnormal; Notable for the following:        Result Value    Specific Gravity, UA >=1.030 (*)     Occult Blood UA 3+ (*)     Leukocytes, UA 1+ (*)     All other components within normal limits   URINALYSIS MICROSCOPIC - Abnormal; Notable for the following:     RBC, UA >100 (*)     All other components within normal limits   POCT URINE PREGNANCY - Abnormal; Notable for the following:     POC Preg Test, Ur Positive (*)     All other components within normal limits   HCG, QUANTITATIVE, PREGNANCY          Imaging Results          US OB Less Than 14 Wks with Transvaginal (xpd) (Final result)  Result time 06/30/18 22:44:29   Procedure changed from US OB Less Than 14 Wks Add Gestation     Final result by Kenneth Santamaria MD (06/30/18 22:44:29)                 Impression:      No evidence of intrauterine gestation.  Nonvisualization of previous 2 mm questionable intrauterine gestation.  Diffuse thickening of the endometrium with a focal 3.5 x 0.9 x 1.2 cm region.  The findings are most suggestive of retained products of conception.  Suggest Obstetrics and Gynecology evaluation.      Electronically signed by: Kenneth Santamaria MD  Date:    06/30/2018  Time:    22:44             Narrative:    EXAMINATION:  US OB LESS THAN 14 WKS WITH TRANSVAGINAL (XPD)    CLINICAL HISTORY:  Vaginal bleeding; Abnormal uterine and vaginal bleeding, unspecified    TECHNIQUE:  Transabdominal sonography of the pelvis was performed, followed by transvaginal sonography to better evaluate the uterus and ovaries.    COMPARISON:  Ultrasound of the pelvis dated 06/12/2018    FINDINGS:  The uterus measures 10.5 x 5.8 x 7.1 cm.  No intrauterine gestation is identified.  There is diffuse thickening of the endometrium with a focal area measuring 3.5 x 0.9 x 1.2 cm.  The cervix is  closed.  There is trace amount of free fluid in the cervix.    The right ovary measures 4.1 x 2.0 x 4.1 cm.  The left ovary measures 4.2 x 2.3 x 1.7 cm.  There is a stable left intra ovarian lesion measuring 2.1 x 1.6 x 1.7 cm.  There is normal flow to both ovaries.    No extrauterine fluid collection is identified.                                 Medical Decision Making:   History:   Old Medical Records: I decided to obtain old medical records.  Old Records Summarized: records from clinic visits.       <> Summary of Records: On review of medical records, patient received US on 6/12 showing only 5 weeks with no fetal heart tones. US on 6/12 showed no viable IUP, questionable gestational sac, no fetal pole. She had a Quant of 964. Pt. Has been in close communication with Dr. Palomares about bleeding and pain with throughout pregnancy.   Clinical Tests:   Lab Tests: Ordered and Reviewed  Radiological Study: Ordered and Reviewed  ED Management:  38-year-old female who has been evaluated over the last few weeks by OB for possible threatened miscarriage presents with worsening cramping and bleeding.  Stated she passed tissue today but came in due to the worsening pain.    11:10 p.m. ultrasound shows concern of retained products.  Her HCG quantitative only went down from 960 to 150 over the last 2 weeks.  I spoke with OB resident, Dr. Barr who will come down and evaluate the patient.  I did re-evaluate the patient in update her on the plan of care.  She is at bedside rolled over and crying due to pain.  Will give more analgesia.    12:03 AM Reevaluated pt pain improved and updated her that Ob is in surgery.     12:49 AM Ob evaluated the patient.  Discharge will plan for discharge to follow up with her OB later this week.    Patient discharged home in stable condition. Diagnosis and treatment plan explained to patient. I have answered all questions and the patient is satisfied with the plan of care. This is the extent to  the patients complaints today here in the emergency department.              Attending Attestation:           Physician Attestation for Scribe:  Physician Attestation Statement for Scribe #1: I, Dr. De La Torre, reviewed documentation, as scribed by Adelina Flowers in my presence, and it is both accurate and complete.                    Clinical Impression:     1. Missed     2. Vaginal bleeding    3. Incomplete                                  Manjit De La Torre, DO  18 [id number]

## 2018-07-01 NOTE — ASSESSMENT & PLAN NOTE
--already received rhogam on 6/12 for Rh- status  --pt hemodynamically stable, pain controlled on medications, vaginal bleeding moderate  --Discussed expectant vs medical vs surgical mgmt: pt desires medical mgmt  ----will give 800 mcg oral cytotec. Pt to take second dose of 800 mcg if she does not pass tissue in next 24-48 hours  ----will send home with naproxen and norco for pain  ----needs repeat beta hcg on Thursday  ----to call Dr. Palomares's office (primary OBG) on Monday to schedule f/u appt  ----bleeding/pain precautions given

## 2018-07-01 NOTE — SUBJECTIVE & OBJECTIVE
Obstetric HPI:    Obstetric History       T2      L0     SAB0   TAB0   Ectopic0   Multiple0   Live Births0       # Outcome Date GA Lbr Yuval/2nd Weight Sex Delivery Anes PTL Lv   3 Current            2 Term 09 37w0d  2.722 kg (6 lb) F CS-Unspec EPI     1 Term 05 37w0d  3.345 kg (7 lb 6 oz) M CS-Unspec EPI          Past Medical History:   Diagnosis Date    Anxiety     Chest pain, unspecified 2013: For three days.    Depression     DM2 (diabetes mellitus, type 2)     diet control    Eyelid lesion     LLL    GERD (gastroesophageal reflux disease)     History of conjunctivitis     History of PCOS     HTN (hypertension), benign 3/10/2014    Migraines     Obesity     Stroke     TIA (transient ischemic attack)      Past Surgical History:   Procedure Laterality Date    ANKLE FRACTURE SURGERY       SECTION, CLASSIC  2005/2009    x2    CHOLECYSTECTOMY  3/2002    lap maribel    FOOT SURGERY      gastric sleeve  2017         (Not in a hospital admission)    Review of patient's allergies indicates:   Allergen Reactions    Mirtazapine      Other reaction(s): tachycardia    Ziprasidone hcl Palpitations        Family History     Problem Relation (Age of Onset)    Other Father        Social History Main Topics    Smoking status: Current Every Day Smoker     Packs/day: 1.00     Years: 18.00     Types: Cigarettes    Smokeless tobacco: Never Used      Comment: cutting to 3-4 cigarette per day    Alcohol use No    Drug use: No    Sexual activity: Yes     Partners: Male     Review of Systems   Constitutional: Negative for chills and fever.   Eyes: Negative for visual disturbance.   Respiratory: Negative for shortness of breath.    Cardiovascular: Negative for chest pain.   Gastrointestinal: Positive for abdominal pain. Negative for nausea and vomiting.   Genitourinary: Positive for vaginal bleeding. Negative for vaginal discharge.   Skin:  Negative for  rash.   Neurological: Negative for headaches.      Objective:     Vital Signs (Most Recent):  Temp: 98.2 °F (36.8 °C) (18)  Pulse: (!) 58 (18)  Resp: 16 (18)  BP: 117/79 (18)  SpO2: 99 % (18) Vital Signs (24h Range):  Temp:  [98.2 °F (36.8 °C)] 98.2 °F (36.8 °C)  Pulse:  [48-60] 58  Resp:  [16-18] 16  SpO2:  [99 %] 99 %  BP: ()/(50-81) 117/79     Weight: 104.3 kg (230 lb)  Body mass index is 33 kg/m².    Physical Exam:   Constitutional: She is oriented to person, place, and time. She appears well-developed and well-nourished. No distress.    HENT:   Head: Normocephalic and atraumatic.       Pulmonary/Chest: Effort normal. No respiratory distress.        Abdominal: Soft. She exhibits no distension. There is no tenderness. There is no rebound.     Genitourinary:   Genitourinary Comments: Normal appearing external genitalia, speculum exam reveals normal appearing vaginal mucosa and cervix, some mucousy discharge present in vault, small amount of blood with no active bleeding from os, cervical os is closed, bimanual exam reveals mild tenderness, no adnexal masses             Musculoskeletal: Moves all extremeties.       Neurological: She is alert and oriented to person, place, and time.    Skin: Skin is warm and dry. She is not diaphoretic. No pallor.    Psychiatric: She has a normal mood and affect. Her behavior is normal. Judgment and thought content normal.     Significant Labs:  Lab Results   Component Value Date    GROUPTRH A NEG 2018    HEPBSAG Negative 2018     I have personallly reviewed all pertinent lab results from the last 24 hours.   Beta hc (down from 964 2 weeks ago)  TVUS: No evidence of intrauterine gestation.  Nonvisualization of previous 2 mm questionable intrauterine gestation.  Diffuse thickening of the endometrium with a focal 3.5 x 0.9 x 1.2 cm region.  The findings are most suggestive of retained products of  conception.  Suggest Obstetrics and Gynecology evaluation.

## 2018-07-02 ENCOUNTER — PATIENT MESSAGE (OUTPATIENT)
Dept: OBSTETRICS AND GYNECOLOGY | Facility: CLINIC | Age: 38
End: 2018-07-02

## 2018-07-02 ENCOUNTER — TELEPHONE (OUTPATIENT)
Dept: PODIATRY | Facility: CLINIC | Age: 38
End: 2018-07-02

## 2018-07-05 ENCOUNTER — HOSPITAL ENCOUNTER (OUTPATIENT)
Dept: RADIOLOGY | Facility: HOSPITAL | Age: 38
Discharge: HOME | End: 2018-07-05
Attending: ORTHOPAEDIC SURGERY
Payer: COMMERCIAL

## 2018-07-05 ENCOUNTER — OFFICE VISIT (OUTPATIENT)
Dept: ORTHOPEDICS | Facility: CLINIC | Age: 38
End: 2018-07-05
Payer: COMMERCIAL

## 2018-07-05 ENCOUNTER — TELEPHONE (OUTPATIENT)
Dept: ORTHOPEDICS | Facility: CLINIC | Age: 38
End: 2018-07-05

## 2018-07-05 ENCOUNTER — PATIENT MESSAGE (OUTPATIENT)
Dept: ORTHOPEDICS | Facility: CLINIC | Age: 38
End: 2018-07-05

## 2018-07-05 DIAGNOSIS — Z98.1 STATUS POST ANKLE ARTHRODESIS: ICD-10-CM

## 2018-07-05 DIAGNOSIS — Z98.1 STATUS POST ANKLE ARTHRODESIS: Primary | ICD-10-CM

## 2018-07-05 DIAGNOSIS — G89.29 OTHER CHRONIC PAIN: ICD-10-CM

## 2018-07-05 PROCEDURE — 99999 PR PBB SHADOW E&M-EST. PATIENT-LVL I: CPT | Mod: PBBFAC,,, | Performed by: ORTHOPAEDIC SURGERY

## 2018-07-05 PROCEDURE — 99212 OFFICE O/P EST SF 10 MIN: CPT | Mod: S$GLB,,, | Performed by: ORTHOPAEDIC SURGERY

## 2018-07-05 PROCEDURE — 73610 X-RAY EXAM OF ANKLE: CPT | Mod: TC,LT

## 2018-07-05 PROCEDURE — 73610 X-RAY EXAM OF ANKLE: CPT | Mod: 26,LT,, | Performed by: RADIOLOGY

## 2018-07-05 RX ORDER — HYDROCODONE BITARTRATE AND ACETAMINOPHEN 5; 325 MG/1; MG/1
1 TABLET ORAL EVERY 12 HOURS PRN
Qty: 60 TABLET | Refills: 0 | Status: SHIPPED | OUTPATIENT
Start: 2018-07-05 | End: 2018-07-20 | Stop reason: SDUPTHER

## 2018-07-05 NOTE — PROGRESS NOTES
Ms. Chaney returns today for follow-up a little earlier than scheduled.  A 38-year-old female who is now 3-1/2 months postop from her left ankle arthrodesis.  I saw her 4 weeks ago at which point her pain was fairly well controlled.  Since I saw her she return to work in a boot and also had a miscarriage of an early pregnancy.  She reports some increased pain since he return to work.    Examination: Reveals mild swelling about the left ankle.  She has some mild diffuse tenderness.  Fusion site seems to be stable though.    X-ray I ordered and reviewed new x-rays were left ankle today and the fusion sites are intact.  There is no sign of any acute injury.  There is no sign of loosening of the screws.    Impression: Status post left ankle arthrodesis progressing well radiographically    Recommendation: I suspect that her increased pain is related to her return to work.  I reassured her that her fusion is doing well.  She's also had some significant stress from other recent issues.                                 I refilled her pain medication today.                                 Return to clinic as previously scheduled with a repeat left ankle x-ray.

## 2018-07-05 NOTE — TELEPHONE ENCOUNTER
Received a call from pt.   States she is having increased pain and swelling.  Pt advised to come in now.  Pt will be worked into clinic this am.

## 2018-07-11 ENCOUNTER — PATIENT MESSAGE (OUTPATIENT)
Dept: INTERNAL MEDICINE | Facility: CLINIC | Age: 38
End: 2018-07-11

## 2018-07-12 ENCOUNTER — PATIENT MESSAGE (OUTPATIENT)
Dept: INTERNAL MEDICINE | Facility: CLINIC | Age: 38
End: 2018-07-12

## 2018-07-12 NOTE — TELEPHONE ENCOUNTER
Message forward to  and also schedule a appt with [facility] for pt just in case  feels she needs to be seen today,

## 2018-07-13 ENCOUNTER — OFFICE VISIT (OUTPATIENT)
Dept: URGENT CARE | Facility: CLINIC | Age: 38
End: 2018-07-13
Payer: COMMERCIAL

## 2018-07-13 VITALS
BODY MASS INDEX: 32.93 KG/M2 | HEART RATE: 80 BPM | SYSTOLIC BLOOD PRESSURE: 122 MMHG | OXYGEN SATURATION: 99 % | HEIGHT: 70 IN | WEIGHT: 230 LBS | TEMPERATURE: 99 F | RESPIRATION RATE: 11 BRPM | DIASTOLIC BLOOD PRESSURE: 70 MMHG
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
## 2018-07-13 PROCEDURE — 99214 OFFICE O/P EST MOD 30 MIN: CPT | Mod: S$GLB,,, | Performed by: NURSE PRACTITIONER

## 2018-07-13 RX ORDER — TRIAMCINOLONE ACETONIDE 1 MG/G
CREAM TOPICAL 3 TIMES DAILY
Qty: 30 G | Refills: 2 | Status: SHIPPED | OUTPATIENT
Start: 2018-07-13 | End: 2018-09-17

## 2018-07-13 NOTE — PROGRESS NOTES
"Subjective:       Patient ID: Anitra Chaney is a 38 y.o. female.    Vitals:  height is 5' 10" (1.778 m) and weight is 104.3 kg (230 lb). Her oral temperature is 98.5 °F (36.9 °C). Her blood pressure is 122/70 and her pulse is 80. Her respiration is 11 and oxygen saturation is 99%.     Chief Complaint: Rash    Patient presents with 5 days of bites from bed bugs.  States that they are very itchy, worse when she sweats or gets hot.  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  She has searched her house and has not found any of the bugs.  Her fiance and children who live with her do not have bites.  She went to another urgent care last night and received a steroid shot and Vistaril, she has not picked up the Vistaril yet.  She states that it is somewhat better but is requesting a cream.  She is getting  on Thursday.      Rash   This is a new problem. The current episode started in the past 7 days. The rash is diffuse. The rash is characterized by itchiness, redness, swelling and burning. She was exposed to an insect bite/sting. Pertinent negatives include no eye pain, facial edema, fatigue, fever, joint pain, shortness of breath or sore throat. The treatment provided mild relief.     Review of Systems   Constitution: Negative for chills, fatigue and fever.   HENT: Negative for sore throat.    Eyes: Negative for pain.   Respiratory: Negative for shortness of breath.    Skin: Positive for color change, itching and suspicious lesions. Negative for rash.   Musculoskeletal: Negative for joint pain.       Objective:      Physical Exam   Constitutional: She is oriented to person, place, and time. She appears well-developed and well-nourished.   HENT:   Head: Normocephalic and atraumatic. Head is without abrasion, without contusion and without laceration.   Right Ear: External ear normal.   Left Ear: External ear normal.   Nose: Nose normal. "   Mouth/Throat: Oropharynx is clear and moist.   Eyes: Conjunctivae, EOM and lids are normal. Pupils are equal, round, and reactive to light.   Neck: Trachea normal, full passive range of motion without pain and phonation normal. Neck supple.   Cardiovascular: Normal rate, regular rhythm and normal heart sounds.    Pulmonary/Chest: Effort normal and breath sounds normal. No stridor. No respiratory distress.   Musculoskeletal: Normal range of motion.   Neurological: She is alert and oriented to person, place, and time.   Skin: Skin is warm, dry and intact. Capillary refill takes less than 2 seconds. Lesion noted. No abrasion, no bruising, no burn, no ecchymosis, no laceration and no rash noted. No erythema.   Wheals to bilateral arms, trunk, scant to face.  Grouped.  No vesicles.  None noted to groin.  No burrows.  None noted to webs.   Psychiatric: She has a normal mood and affect. Her speech is normal and behavior is normal. Judgment and thought content normal. Cognition and memory are normal.   Nursing note and vitals reviewed.      Assessment:       1. Bedbug bite, initial encounter        Plan:         Bedbug bite, initial encounter  -     triamcinolone acetonide 0.1% (KENALOG) 0.1 % cream; Apply topically 3 (three) times daily. for 7 days  Dispense: 30 g; Refill: 2      Patient Instructions   Cold packs.    Zyrtec daily.  Try to avoid sweating and heat.  Steroid cream to limbs and trunk as needed.  Do not place cream on your face.  You can use otc topical hydrocortisone (Cortaid) mixed with benadryl cream on the face as needed for a short period, no longer than a week.  Follow up with derm for worsening symptoms.  Bedbug Bites  Bedbugs are tiny insects, about the size of an apple seed. They are reddish-brown and slightly flattened and oval. They feed on human blood, usually at night while people are sleeping. Bedbugs are attracted to the warmth of your body, and also to your breath. Unlike some other parasites,  they can live up to a year without eating. They dont have wings and dont jump, but they are fast crawlers.  Bedbugs are not dangerous and dont usually spread disease.  Bite symptoms  The symptoms of bedbug bites can be different for different people. Bites can be found anywhere on your body, but they are more common on skin that is exposed. Look for these symptoms:  · Itching  · Red rash, which can start small and get larger  · Hives, red swollen marks (welts), or raised red itchy areas (wheals). These may be in spots or cover a large area.  · Small, firm, flat bumps  · Blisters  · Cluster of bites in a line, or in a curved or zigzag row  · Allergic reaction  · Skin infection from scratching the bites  Where bedbugs hide out  Bedbugs can be found in almost any place you spend time, both at home and away from home. This includes hotels, buses, trains, ships, nursing homes, and apartments.  Bedbugs can be in clean or dirty places. Because of their size and shape, bedbugs can get into small places, where you wouldnt expect to look. They tend to be found mostly in furniture, furnishings around the home, clothing, and cracks and crevices. Here are specific areas where they can be found:  · Beds and mattresses, especially in the seams  · Joints of bed frames, or the headboard  · Sheets and blankets  · Couches, fabric-covered chairs, and other furniture with fabric  · Rugs, especially along the edges  · Luggage or boxes  · Clothing  · Behind wall decorations, pictures, mirrors, and smoke alarms, and in electric outlets  How to find them  Bedbugs are big enough to be seen. But they also may leave some traces:  · Black spots (feces) on a bed mattress, especially around the seams  · Blood spots on the sheets  · Shells they may have shed  Home care  · Bite symptoms usually go away on their own in 1 to 2 weeks.  · To help prevent infection, avoid scratching the bites as much as possible.  · To relieve itching and swelling,  use an over-the-counter (OTC) hydrocortisone ointment or cream  · If you need more relief, put an ice pack on the bites. Use the ice pack for up to 20 minutes at a time. To make an ice pack, put ice cubes in a plastic bag that seals at the top. Wrap the bag in a clean, thin towel or cloth. Never put ice or an ice pack directly on the skin.  · If you have a large number of bites or severe itching, take an OTC oral antihistamine. Follow the directions on the package.  · If a bite becomes infected, your health care provider can prescribe an antibiotic. This may be a pill you take by mouth. Or it may be a cream you put on your skin.  · If you were bitten in your home, talk with a licensed pest-control company. Bedbugs do not live on you. They live in cracks in your house. The company can inspect your home and help you get rid of the bugs safely.  Follow-up care  Follow up with your healthcare provider, or as advised.  When to seek medical advice  Call your healthcare provider right away if any of these occur:  · Fever of 100.4°F (38.0°C), or higher  · Signs of infection in the bites, such as swelling and pain that gets worse, warmth in the area, or drainage from the bites  · Signs of allergic reaction, such as hives or a spreading rash  Call 911  Call 911 if any of the following occur:  · Throat itching or swelling  · Wheezing  Date Last Reviewed: 8/1/2016  © 7966-6064 [employer]. [address] [place] [address], [place] [address]. All rights reserved. This information is not intended as a substitute for professional medical care. Always follow your healthcare professional's instructions.

## 2018-07-16 ENCOUNTER — TELEPHONE (OUTPATIENT)
Dept: OBSTETRICS AND GYNECOLOGY | Facility: HOSPITAL | Age: 38
End: 2018-07-16

## 2018-07-16 NOTE — TELEPHONE ENCOUNTER
Left message expressing importance for patient to come to [facility] lab and have beta-hCG levels taken to ensure they are <5. Last beta-hCG on 6/30 was 757. Left instructions on where to find lab and instructed to go to ED if experiencing and severe abdominal cramping, heavy vaginal bleeding, light-headed/dizziness, or fever >100.4.    Ashlee Sena MD  OBGYN, PGY-1

## 2018-07-20 ENCOUNTER — PATIENT MESSAGE (OUTPATIENT)
Dept: ORTHOPEDICS | Facility: CLINIC | Age: 38
End: 2018-07-20

## 2018-07-20 DIAGNOSIS — G89.29 OTHER CHRONIC PAIN: Primary | ICD-10-CM

## 2018-07-20 DIAGNOSIS — Z98.1 STATUS POST ANKLE ARTHRODESIS: ICD-10-CM

## 2018-07-20 DIAGNOSIS — G89.29 OTHER CHRONIC PAIN: ICD-10-CM

## 2018-07-20 RX ORDER — HYDROCODONE BITARTRATE AND ACETAMINOPHEN 5; 325 MG/1; MG/1
1 TABLET ORAL EVERY 12 HOURS PRN
Qty: 60 TABLET | Refills: 0 | Status: CANCELLED | OUTPATIENT
Start: 2018-07-20 | End: 2018-08-19

## 2018-07-20 RX ORDER — HYDROCODONE BITARTRATE AND ACETAMINOPHEN 5; 325 MG/1; MG/1
1 TABLET ORAL EVERY 12 HOURS PRN
Qty: 60 TABLET | Refills: 0 | Status: SHIPPED | OUTPATIENT
Start: 2018-07-20 | End: 2018-08-16 | Stop reason: SDUPTHER

## 2018-08-16 ENCOUNTER — PATIENT MESSAGE (OUTPATIENT)
Dept: ORTHOPEDICS | Facility: CLINIC | Age: 38
End: 2018-08-16

## 2018-08-16 ENCOUNTER — TELEPHONE (OUTPATIENT)
Dept: ORTHOPEDICS | Facility: CLINIC | Age: 38
End: 2018-08-16

## 2018-08-16 DIAGNOSIS — Z98.1 STATUS POST ANKLE ARTHRODESIS: ICD-10-CM

## 2018-08-16 DIAGNOSIS — G89.29 OTHER CHRONIC PAIN: ICD-10-CM

## 2018-08-16 RX ORDER — HYDROCODONE BITARTRATE AND ACETAMINOPHEN 5; 325 MG/1; MG/1
1 TABLET ORAL EVERY 12 HOURS PRN
Qty: 60 TABLET | Refills: 0 | Status: SHIPPED | OUTPATIENT
Start: 2018-08-16 | End: 2018-08-31

## 2018-08-27 ENCOUNTER — PATIENT MESSAGE (OUTPATIENT)
Dept: ADMINISTRATIVE | Facility: HOSPITAL | Age: 38
End: 2018-08-27

## 2018-08-31 RX ORDER — OXYCODONE AND ACETAMINOPHEN 5; 325 MG/1; MG/1
1 TABLET ORAL EVERY 12 HOURS PRN
Qty: 60 TABLET | Refills: 0 | Status: SHIPPED | OUTPATIENT
Start: 2018-08-31 | End: 2018-09-17

## 2018-08-31 RX ORDER — OXYCODONE AND ACETAMINOPHEN 5; 325 MG/1; MG/1
1 TABLET ORAL EVERY 12 HOURS PRN
Qty: 60 TABLET | Refills: 0 | Status: SHIPPED | OUTPATIENT
Start: 2018-08-31 | End: 2018-08-31

## 2018-09-06 ENCOUNTER — TELEPHONE (OUTPATIENT)
Dept: OBSTETRICS AND GYNECOLOGY | Facility: HOSPITAL | Age: 38
End: 2018-09-06

## 2018-09-06 NOTE — LETTER
September 6, 2018    Anitra Chaney  [address]  [place] [address]                [address] [facility] [address]  Phone: [phone]  Fax: [phone] Dear Ms. Chaney:    This letter is a reminder that your beta hCG pregnancy hormone level test is due. We have been unable to reach you by phone to discuss your results.. Your most recent recorded level was 757 on 6/30/18. It is crucial that we follow this value to a documented level of <5. [facility].     If you experience abdominal pain, fever, or vaginal bleeding, please report to the emergency room.     Please contact us with any questions or concerns.     Sincerely,        Susu Nicole MD

## 2018-09-06 NOTE — TELEPHONE ENCOUNTER
Patient did not answer attempted telephone call. Patient has not responded to past messages left at this number. Will draft letter expressing importance of follow beta hCG level to < 5.     Susu Nicole MD  OBGYN, PGY-1

## 2018-09-11 ENCOUNTER — PATIENT MESSAGE (OUTPATIENT)
Dept: ORTHOPEDICS | Facility: CLINIC | Age: 38
End: 2018-09-11

## 2018-09-16 ENCOUNTER — PATIENT MESSAGE (OUTPATIENT)
Dept: OBSTETRICS AND GYNECOLOGY | Facility: CLINIC | Age: 38
End: 2018-09-16

## 2018-09-16 DIAGNOSIS — Z32.00 POSSIBLE PREGNANCY, NOT YET CONFIRMED: Primary | ICD-10-CM

## 2018-09-17 ENCOUNTER — LAB VISIT (OUTPATIENT)
Dept: LAB | Facility: OTHER | Age: 38
End: 2018-09-17
Attending: OBSTETRICS & GYNECOLOGY
Payer: COMMERCIAL

## 2018-09-17 ENCOUNTER — TELEPHONE (OUTPATIENT)
Dept: ORTHOPEDICS | Facility: CLINIC | Age: 38
End: 2018-09-17

## 2018-09-17 ENCOUNTER — INITIAL PRENATAL (OUTPATIENT)
Dept: OBSTETRICS AND GYNECOLOGY | Facility: CLINIC | Age: 38
End: 2018-09-17
Attending: OBSTETRICS & GYNECOLOGY
Payer: COMMERCIAL

## 2018-09-17 ENCOUNTER — TELEPHONE (OUTPATIENT)
Dept: OBSTETRICS AND GYNECOLOGY | Facility: CLINIC | Age: 38
End: 2018-09-17

## 2018-09-17 ENCOUNTER — PATIENT MESSAGE (OUTPATIENT)
Dept: ORTHOPEDICS | Facility: CLINIC | Age: 38
End: 2018-09-17

## 2018-09-17 VITALS — WEIGHT: 248 LBS | BODY MASS INDEX: 35.59 KG/M2 | DIASTOLIC BLOOD PRESSURE: 70 MMHG | SYSTOLIC BLOOD PRESSURE: 122 MMHG

## 2018-09-17 DIAGNOSIS — O09.91 SUPERVISION OF HIGH RISK PREGNANCY IN FIRST TRIMESTER: ICD-10-CM

## 2018-09-17 DIAGNOSIS — F41.9 ANXIETY: ICD-10-CM

## 2018-09-17 DIAGNOSIS — F32.A DEPRESSION, UNSPECIFIED DEPRESSION TYPE: ICD-10-CM

## 2018-09-17 DIAGNOSIS — O02.1 MISSED ABORTION: ICD-10-CM

## 2018-09-17 DIAGNOSIS — O09.91 SUPERVISION OF HIGH RISK PREGNANCY IN FIRST TRIMESTER: Primary | ICD-10-CM

## 2018-09-17 PROBLEM — Z67.91 RH NEGATIVE STATE IN ANTEPARTUM PERIOD, FIRST TRIMESTER: Status: RESOLVED | Noted: 2018-06-25 | Resolved: 2018-09-17

## 2018-09-17 PROBLEM — O03.4 INCOMPLETE ABORTION: Status: RESOLVED | Noted: 2018-07-01 | Resolved: 2018-09-17

## 2018-09-17 PROBLEM — O09.521 ELDERLY MULTIGRAVIDA IN FIRST TRIMESTER: Status: RESOLVED | Noted: 2018-06-25 | Resolved: 2018-09-17

## 2018-09-17 PROBLEM — Z98.891 PREVIOUS CESAREAN SECTION: Status: RESOLVED | Noted: 2018-06-25 | Resolved: 2018-09-17

## 2018-09-17 PROBLEM — O26.891 RH NEGATIVE STATE IN ANTEPARTUM PERIOD, FIRST TRIMESTER: Status: RESOLVED | Noted: 2018-06-25 | Resolved: 2018-09-17

## 2018-09-17 LAB
ABO + RH BLD: NORMAL
ANION GAP SERPL CALC-SCNC: 10 MMOL/L
BASOPHILS # BLD AUTO: 0.04 K/UL
BASOPHILS NFR BLD: 0.3 %
BLD GP AB SCN CELLS X3 SERPL QL: NORMAL
BUN SERPL-MCNC: 7 MG/DL
CALCIUM SERPL-MCNC: 9.6 MG/DL
CHLORIDE SERPL-SCNC: 110 MMOL/L
CO2 SERPL-SCNC: 21 MMOL/L
CREAT SERPL-MCNC: 0.7 MG/DL
DIFFERENTIAL METHOD: ABNORMAL
EOSINOPHIL # BLD AUTO: 0.2 K/UL
EOSINOPHIL NFR BLD: 1.9 %
ERYTHROCYTE [DISTWIDTH] IN BLOOD BY AUTOMATED COUNT: 13.7 %
EST. GFR  (AFRICAN AMERICAN): >60 ML/MIN/1.73 M^2
EST. GFR  (NON AFRICAN AMERICAN): >60 ML/MIN/1.73 M^2
ESTIMATED AVG GLUCOSE: 97 MG/DL
GLUCOSE SERPL-MCNC: 85 MG/DL
HBA1C MFR BLD HPLC: 5 %
HCG INTACT+B SERPL-ACNC: NORMAL MIU/ML
HCT VFR BLD AUTO: 38.7 %
HGB BLD-MCNC: 12.7 G/DL
LYMPHOCYTES # BLD AUTO: 1.8 K/UL
LYMPHOCYTES NFR BLD: 14.7 %
MCH RBC QN AUTO: 28.3 PG
MCHC RBC AUTO-ENTMCNC: 32.8 G/DL
MCV RBC AUTO: 86 FL
MONOCYTES # BLD AUTO: 0.4 K/UL
MONOCYTES NFR BLD: 3.5 %
NEUTROPHILS # BLD AUTO: 9.9 K/UL
NEUTROPHILS NFR BLD: 79.4 %
PLATELET # BLD AUTO: 310 K/UL
PMV BLD AUTO: 9.8 FL
POTASSIUM SERPL-SCNC: 3.7 MMOL/L
RBC # BLD AUTO: 4.49 M/UL
SODIUM SERPL-SCNC: 141 MMOL/L
WBC # BLD AUTO: 12.52 K/UL

## 2018-09-17 PROCEDURE — 85025 COMPLETE CBC W/AUTO DIFF WBC: CPT

## 2018-09-17 PROCEDURE — 86592 SYPHILIS TEST NON-TREP QUAL: CPT

## 2018-09-17 PROCEDURE — 0502F SUBSEQUENT PRENATAL CARE: CPT | Mod: S$GLB,,, | Performed by: OBSTETRICS & GYNECOLOGY

## 2018-09-17 PROCEDURE — 86703 HIV-1/HIV-2 1 RESULT ANTBDY: CPT

## 2018-09-17 PROCEDURE — 80048 BASIC METABOLIC PNL TOTAL CA: CPT

## 2018-09-17 PROCEDURE — 99999 PR PBB SHADOW E&M-EST. PATIENT-LVL III: CPT | Mod: PBBFAC,,, | Performed by: OBSTETRICS & GYNECOLOGY

## 2018-09-17 PROCEDURE — 86762 RUBELLA ANTIBODY: CPT

## 2018-09-17 PROCEDURE — 86850 RBC ANTIBODY SCREEN: CPT

## 2018-09-17 PROCEDURE — 87491 CHLMYD TRACH DNA AMP PROBE: CPT

## 2018-09-17 PROCEDURE — 84702 CHORIONIC GONADOTROPIN TEST: CPT

## 2018-09-17 PROCEDURE — 83036 HEMOGLOBIN GLYCOSYLATED A1C: CPT

## 2018-09-17 PROCEDURE — 87086 URINE CULTURE/COLONY COUNT: CPT

## 2018-09-17 PROCEDURE — 87340 HEPATITIS B SURFACE AG IA: CPT

## 2018-09-17 RX ORDER — FLUOXETINE HYDROCHLORIDE 20 MG/1
20 CAPSULE ORAL DAILY
Qty: 30 CAPSULE | Refills: 2 | Status: SHIPPED | OUTPATIENT
Start: 2018-09-17 | End: 2018-09-17 | Stop reason: SDUPTHER

## 2018-09-17 RX ORDER — FLUOXETINE HYDROCHLORIDE 20 MG/1
20 CAPSULE ORAL DAILY
Qty: 30 CAPSULE | Refills: 2 | Status: SHIPPED | OUTPATIENT
Start: 2018-09-17 | End: 2018-12-31

## 2018-09-17 RX ORDER — OXYCODONE AND ACETAMINOPHEN 5; 325 MG/1; MG/1
1 TABLET ORAL EVERY 12 HOURS PRN
Qty: 60 TABLET | Refills: 0 | Status: SHIPPED | OUTPATIENT
Start: 2018-09-17 | End: 2018-10-08

## 2018-09-17 RX ORDER — CLONAZEPAM 0.5 MG/1
0.5 TABLET ORAL 2 TIMES DAILY
Qty: 60 TABLET | Refills: 0 | Status: SHIPPED | OUTPATIENT
Start: 2018-09-17 | End: 2018-09-17 | Stop reason: SDUPTHER

## 2018-09-17 RX ORDER — OXYCODONE AND ACETAMINOPHEN 5; 325 MG/1; MG/1
1 TABLET ORAL EVERY 12 HOURS PRN
Qty: 60 TABLET | Refills: 0 | OUTPATIENT
Start: 2018-09-17

## 2018-09-17 RX ORDER — CLONAZEPAM 0.5 MG/1
0.5 TABLET ORAL 2 TIMES DAILY
Qty: 60 TABLET | Refills: 0 | Status: SHIPPED | OUTPATIENT
Start: 2018-09-17 | End: 2018-10-30

## 2018-09-17 NOTE — PROGRESS NOTES
SUBJECTIVE:   38 y.o. female  complains of amenorrhea.   +UPT at [facility].  She had a missed  in July of this year.   She did not follow [facility] to zero.  She states that she has had normal cycles since.  Since her weight loss she has not had an issue with DM or HTN.  She has had an increase in panic attacks.  She was previously on prozac and ativan.    ROS:  GENERAL: No fever, chills, fatigability or weight loss.  VULVAR: No pain, no lesions and no itching.  VAGINAL: No relaxation, no itching, no discharge, no abnormal bleeding and no lesions.  ABDOMEN: No abdominal pain. Denies nausea. Denies vomiting. No diarrhea. No constipation  BREAST: Denies pain. No lumps. No discharge.  URINARY: No incontinence, no nocturia, no frequency and no dysuria.  CARDIOVASCULAR: No chest pain. No shortness of breath. No leg cramps.  NEUROLOGICAL: No headaches. No vision changes.        Vitals:    18 1101   BP: 122/70         OBJECTIVE:   She appears well, afebrile.  Abdomen: benign, soft, nontender, no masses.  VULVA: Normal external female genitalia, normal urethra, normal urethral meatus  VAGINA:no lesions  CERVIXNormal  UTERUSnormal size, contour, position, consistency, mobility, non-tender  ADNEXAno mass, fullness, tenderness      ASSESSMENT:   Anitra was seen today for routine prenatal visit, morning sickness, anxiety and back pain.    Diagnoses and all orders for this visit:    Supervision of high risk pregnancy in first trimester  -     Basic metabolic panel; Future  -     HIV-1 and HIV-2 antibodies; Future  -     RPR; Future  -     Hepatitis B surface antigen; Future  -     Type & Screen - Ob Profile; Future  -     Rubella antibody, IgG; Future  -     Hemoglobin A1c; Future  -     Urine culture  -     C. trachomatis/N. gonorrhoeae by AMP DNA  -     CBC auto differential; Future  -     US OB/GYN Procedure ([facility]); Future    Anxiety  -     Discontinue: FLUoxetine (PROZAC) 20 MG capsule; Take 1 capsule (20  mg total) by mouth once daily.  -     Discontinue: clonazePAM (KLONOPIN) 0.5 MG tablet; Take 1 tablet (0.5 mg total) by mouth 2 (two) times daily.  -     FLUoxetine (PROZAC) 20 MG capsule; Take 1 capsule (20 mg total) by mouth once daily.  -     clonazePAM (KLONOPIN) 0.5 MG tablet; Take 1 tablet (0.5 mg total) by mouth 2 (two) times daily.    Depression, unspecified depression type  -     Discontinue: FLUoxetine (PROZAC) 20 MG capsule; Take 1 capsule (20 mg total) by mouth once daily.  -     FLUoxetine (PROZAC) 20 MG capsule; Take 1 capsule (20 mg total) by mouth once daily.    Other orders  -     Influenza - Quadrivalent (3 years & older) (PF)    Counseled to avoid cat litter, not garden without gloves, avoid raw meat, heat up deli meat, to eat large fish like tuna no more than once a week, and to avoid soft unpasteurized cheeses.  I recommend a PNV daily.  She should avoid ibuprofen.

## 2018-09-17 NOTE — TELEPHONE ENCOUNTER
Hello, this is Sam calling from the OBGYN clinic at [facility]. Calling to inform you that Dr. Palomares would like for you to get some labs done and you can be seen by the NP to confirm pregnancy. Dr. Palomares would like to know what the first day of your LMP. (No answer, left VM message for the pt regarding this information and to call the clinic back to get scheduled for labs. Orders are in patient just needs to be scheduled. Patient can f/u up with WILLIE Rico or WILLIE Love 8 weeks from the first day of her last period.)

## 2018-09-18 ENCOUNTER — PATIENT MESSAGE (OUTPATIENT)
Dept: OBSTETRICS AND GYNECOLOGY | Facility: CLINIC | Age: 38
End: 2018-09-18

## 2018-09-18 PROBLEM — O26.899 RH NEGATIVE STATE IN ANTEPARTUM PERIOD: Status: ACTIVE | Noted: 2018-09-18

## 2018-09-18 PROBLEM — Z67.91 RH NEGATIVE STATE IN ANTEPARTUM PERIOD: Status: ACTIVE | Noted: 2018-09-18

## 2018-09-18 LAB
C TRACH DNA SPEC QL NAA+PROBE: NOT DETECTED
HBV SURFACE AG SERPL QL IA: NEGATIVE
HIV 1+2 AB+HIV1 P24 AG SERPL QL IA: NEGATIVE
N GONORRHOEA DNA SPEC QL NAA+PROBE: NOT DETECTED
RPR SER QL: NORMAL
RUBV IGG SER-ACNC: 26.2 IU/ML
RUBV IGG SER-IMP: REACTIVE

## 2018-09-19 LAB
BACTERIA UR CULT: NORMAL
BACTERIA UR CULT: NORMAL

## 2018-09-20 ENCOUNTER — PATIENT MESSAGE (OUTPATIENT)
Dept: OBSTETRICS AND GYNECOLOGY | Facility: CLINIC | Age: 38
End: 2018-09-20

## 2018-09-21 ENCOUNTER — PROCEDURE VISIT (OUTPATIENT)
Dept: OBSTETRICS AND GYNECOLOGY | Facility: CLINIC | Age: 38
End: 2018-09-21
Attending: OBSTETRICS & GYNECOLOGY
Payer: COMMERCIAL

## 2018-09-21 ENCOUNTER — PATIENT MESSAGE (OUTPATIENT)
Dept: OBSTETRICS AND GYNECOLOGY | Facility: CLINIC | Age: 38
End: 2018-09-21

## 2018-09-21 DIAGNOSIS — O09.91 SUPERVISION OF HIGH RISK PREGNANCY IN FIRST TRIMESTER: ICD-10-CM

## 2018-09-21 DIAGNOSIS — O21.9 NAUSEA/VOMITING IN PREGNANCY: Primary | ICD-10-CM

## 2018-09-21 PROCEDURE — 76801 OB US < 14 WKS SINGLE FETUS: CPT | Mod: S$GLB,,, | Performed by: OBSTETRICS & GYNECOLOGY

## 2018-09-21 RX ORDER — PROMETHAZINE HYDROCHLORIDE 25 MG/1
25 TABLET ORAL EVERY 6 HOURS PRN
Qty: 30 TABLET | Refills: 1 | Status: SHIPPED | OUTPATIENT
Start: 2018-09-21 | End: 2018-11-30

## 2018-09-21 RX ORDER — PYRIDOXINE HCL (VITAMIN B6) 25 MG
25 TABLET ORAL 3 TIMES DAILY
Qty: 90 TABLET | Refills: 2 | Status: SHIPPED | OUTPATIENT
Start: 2018-09-21 | End: 2018-10-08

## 2018-09-24 ENCOUNTER — PATIENT MESSAGE (OUTPATIENT)
Dept: OBSTETRICS AND GYNECOLOGY | Facility: CLINIC | Age: 38
End: 2018-09-24

## 2018-09-24 DIAGNOSIS — O20.0 THREATENED ABORTION: Primary | ICD-10-CM

## 2018-09-26 ENCOUNTER — PATIENT MESSAGE (OUTPATIENT)
Dept: OBSTETRICS AND GYNECOLOGY | Facility: CLINIC | Age: 38
End: 2018-09-26

## 2018-09-26 ENCOUNTER — HOSPITAL ENCOUNTER (OUTPATIENT)
Dept: RADIOLOGY | Facility: OTHER | Age: 38
Discharge: HOME | End: 2018-09-26
Attending: OBSTETRICS & GYNECOLOGY
Payer: COMMERCIAL

## 2018-09-26 DIAGNOSIS — O20.0 THREATENED ABORTION: ICD-10-CM

## 2018-09-26 PROCEDURE — 76801 OB US < 14 WKS SINGLE FETUS: CPT | Mod: 26,,, | Performed by: INTERNAL MEDICINE

## 2018-09-26 PROCEDURE — 76801 OB US < 14 WKS SINGLE FETUS: CPT | Mod: TC

## 2018-09-26 PROCEDURE — 76817 TRANSVAGINAL US OBSTETRIC: CPT | Mod: 26,,, | Performed by: INTERNAL MEDICINE

## 2018-10-08 ENCOUNTER — ROUTINE PRENATAL (OUTPATIENT)
Dept: OBSTETRICS AND GYNECOLOGY | Facility: CLINIC | Age: 38
End: 2018-10-08
Payer: COMMERCIAL

## 2018-10-08 ENCOUNTER — PATIENT MESSAGE (OUTPATIENT)
Dept: OBSTETRICS AND GYNECOLOGY | Facility: CLINIC | Age: 38
End: 2018-10-08

## 2018-10-08 ENCOUNTER — TELEPHONE (OUTPATIENT)
Dept: OBSTETRICS AND GYNECOLOGY | Facility: CLINIC | Age: 38
End: 2018-10-08

## 2018-10-08 ENCOUNTER — HOSPITAL ENCOUNTER (OUTPATIENT)
Dept: RADIOLOGY | Facility: OTHER | Age: 38
Discharge: HOME | End: 2018-10-08
Attending: NURSE PRACTITIONER
Payer: COMMERCIAL

## 2018-10-08 VITALS — BODY MASS INDEX: 35.43 KG/M2 | WEIGHT: 246.94 LBS | DIASTOLIC BLOOD PRESSURE: 70 MMHG | SYSTOLIC BLOOD PRESSURE: 90 MMHG

## 2018-10-08 DIAGNOSIS — O26.891 VAGINAL DISCHARGE DURING PREGNANCY IN FIRST TRIMESTER: ICD-10-CM

## 2018-10-08 DIAGNOSIS — N89.8 VAGINAL DISCHARGE DURING PREGNANCY IN FIRST TRIMESTER: Primary | ICD-10-CM

## 2018-10-08 DIAGNOSIS — O26.891 DYSURIA DURING PREGNANCY IN FIRST TRIMESTER: ICD-10-CM

## 2018-10-08 DIAGNOSIS — N89.8 VAGINAL DISCHARGE DURING PREGNANCY IN FIRST TRIMESTER: ICD-10-CM

## 2018-10-08 DIAGNOSIS — O26.891 VAGINAL DISCHARGE DURING PREGNANCY IN FIRST TRIMESTER: Primary | ICD-10-CM

## 2018-10-08 DIAGNOSIS — Z34.80 SUPERVISION OF OTHER NORMAL PREGNANCY, ANTEPARTUM: ICD-10-CM

## 2018-10-08 DIAGNOSIS — R30.0 DYSURIA DURING PREGNANCY IN FIRST TRIMESTER: ICD-10-CM

## 2018-10-08 PROCEDURE — 76817 TRANSVAGINAL US OBSTETRIC: CPT | Mod: 26,,, | Performed by: RADIOLOGY

## 2018-10-08 PROCEDURE — 99213 OFFICE O/P EST LOW 20 MIN: CPT | Mod: S$GLB,,, | Performed by: NURSE PRACTITIONER

## 2018-10-08 PROCEDURE — 87086 URINE CULTURE/COLONY COUNT: CPT

## 2018-10-08 PROCEDURE — 99999 PR PBB SHADOW E&M-EST. PATIENT-LVL III: CPT | Mod: PBBFAC,,, | Performed by: NURSE PRACTITIONER

## 2018-10-08 PROCEDURE — 76801 OB US < 14 WKS SINGLE FETUS: CPT | Mod: TC

## 2018-10-08 PROCEDURE — 76801 OB US < 14 WKS SINGLE FETUS: CPT | Mod: 26,,, | Performed by: RADIOLOGY

## 2018-10-08 PROCEDURE — 87660 TRICHOMONAS VAGIN DIR PROBE: CPT

## 2018-10-08 PROCEDURE — 3008F BODY MASS INDEX DOCD: CPT | Mod: CPTII,S$GLB,, | Performed by: NURSE PRACTITIONER

## 2018-10-08 NOTE — TELEPHONE ENCOUNTER
Spoke with pt, explained u/s results. Pt expressed understanding.       Please notify pt her US revealed fetal heart rate of 164 bpm.  Baby looks good

## 2018-10-09 LAB
CANDIDA RRNA VAG QL PROBE: NEGATIVE
G VAGINALIS RRNA GENITAL QL PROBE: NEGATIVE
T VAGINALIS RRNA GENITAL QL PROBE: NEGATIVE

## 2018-10-10 LAB — BACTERIA UR CULT: NO GROWTH

## 2018-10-17 ENCOUNTER — ROUTINE PRENATAL (OUTPATIENT)
Dept: OBSTETRICS AND GYNECOLOGY | Facility: CLINIC | Age: 38
End: 2018-10-17
Payer: COMMERCIAL

## 2018-10-17 VITALS
WEIGHT: 246.94 LBS | SYSTOLIC BLOOD PRESSURE: 118 MMHG | BODY MASS INDEX: 35.43 KG/M2 | DIASTOLIC BLOOD PRESSURE: 70 MMHG

## 2018-10-17 DIAGNOSIS — O09.521 ELDERLY MULTIGRAVIDA, CURRENTLY PREGNANT IN FIRST TRIMESTER: ICD-10-CM

## 2018-10-17 DIAGNOSIS — O09.91 HIGH-RISK PREGNANCY IN FIRST TRIMESTER: Primary | ICD-10-CM

## 2018-10-17 DIAGNOSIS — I63.9 CEREBROVASCULAR ACCIDENT (CVA), UNSPECIFIED MECHANISM: ICD-10-CM

## 2018-10-17 DIAGNOSIS — M19.079 ANKLE ARTHRITIS: ICD-10-CM

## 2018-10-17 DIAGNOSIS — I10 ESSENTIAL HYPERTENSION: ICD-10-CM

## 2018-10-17 LAB
CREAT UR-MCNC: 201 MG/DL
EXT MATERNIT21: NEGATIVE
PROT UR-MCNC: 13 MG/DL
PROT/CREAT UR: 0.06 MG/G{CREAT}

## 2018-10-17 PROCEDURE — 82570 ASSAY OF URINE CREATININE: CPT

## 2018-10-17 PROCEDURE — 99999 PR PBB SHADOW E&M-EST. PATIENT-LVL IV: CPT | Mod: PBBFAC,,, | Performed by: OBSTETRICS & GYNECOLOGY

## 2018-10-17 PROCEDURE — 0502F SUBSEQUENT PRENATAL CARE: CPT | Mod: S$GLB,,, | Performed by: OBSTETRICS & GYNECOLOGY

## 2018-10-17 RX ORDER — ASPIRIN 81 MG/1
81 TABLET ORAL DAILY
Qty: 90 TABLET | Refills: 3 | Status: SHIPPED | OUTPATIENT
Start: 2018-10-17 | End: 2018-12-12

## 2018-10-17 NOTE — PROGRESS NOTES
Seen and examined.  Agree with note.  All questions answered  Request records from [facility] - no anticoagulants after.  history of htn, dm, and michael resolved with weight loss after bariatric surgery.  ama - requests matT21

## 2018-10-17 NOTE — PROGRESS NOTES
Complaints today: Feeling a little tired, otherwise doing well. Complains of pain from her foot surgery. 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 She was on lovenox during her admission, but was not discharged with anticoagulation. She has a PMH of HTN (on 3 meds) and DM2 (on metformin), but had a gastric sleeve in 2017 and has since discontinued all medications. Has history of two term .     /70   Wt 112 kg (246 lb 14.6 oz)   LMP 2018 (LMP Unknown)   BMI 35.43 kg/m²     38 y.o., at 10w1d by Estimated Date of Delivery: 19  Patient Active Problem List   Diagnosis    Mood altered    Migraine syndrome    SARAH (obstructive sleep apnea)    Daytime somnolence    Leg pain    Class 2 obesity due to excess calories without serious comorbidity with body mass index (BMI) of 35.0 to 35.9 in adult    Mass of foot or toe    CVA (cerebral vascular accident)    Ankle arthritis    Rh negative state in antepartum period     OB History    Para Term  AB Living   5 2 2   1 2   SAB TAB Ectopic Multiple Live Births   1              # Outcome Date GA Lbr Yuval/2nd Weight Sex Delivery Anes PTL Lv   5 Current            4 Term 09 37w0d  2.722 kg (6 lb) F CS-Unspec EPI     3 Term 05 37w0d  3.345 kg (7 lb 6 oz) M CS-Unspec EPI     2             1 SAB                   Dating reviewed    Allergies and problem list reviewed and updated    Medical and surgical history reviewed    Prenatal labs reviewed and updated    Physical Exam:  ABD: soft, gravid, nontender    Assessment:  High risk pregnancy in first trimester    Plan:   - Mat21 ordered  - P/c ratio and CMP for baseline HTN labs  - Consult M for h/o stroke, HTN, and morbid obesity  - Records request to [facility] for stroke care  - Start taking ASA 81 daily.

## 2018-10-22 DIAGNOSIS — O09.522 ELDERLY MULTIGRAVIDA IN SECOND TRIMESTER: Primary | ICD-10-CM

## 2018-10-23 ENCOUNTER — TELEPHONE (OUTPATIENT)
Dept: OBSTETRICS AND GYNECOLOGY | Facility: CLINIC | Age: 38
End: 2018-10-23

## 2018-10-23 ENCOUNTER — PATIENT MESSAGE (OUTPATIENT)
Dept: OBSTETRICS AND GYNECOLOGY | Facility: CLINIC | Age: 38
End: 2018-10-23

## 2018-10-30 DIAGNOSIS — F41.9 ANXIETY: ICD-10-CM

## 2018-10-30 RX ORDER — CLONAZEPAM 0.5 MG/1
0.5 TABLET ORAL 2 TIMES DAILY
Qty: 60 TABLET | Refills: 0 | Status: SHIPPED | OUTPATIENT
Start: 2018-10-30 | End: 2018-11-14 | Stop reason: SDUPTHER

## 2018-11-06 ENCOUNTER — PROCEDURE VISIT (OUTPATIENT)
Dept: MATERNAL FETAL MEDICINE | Facility: CLINIC | Age: 38
End: 2018-11-06
Payer: COMMERCIAL

## 2018-11-06 VITALS
BODY MASS INDEX: 35.6 KG/M2 | WEIGHT: 248.13 LBS | TEMPERATURE: 99 F | DIASTOLIC BLOOD PRESSURE: 72 MMHG | SYSTOLIC BLOOD PRESSURE: 124 MMHG

## 2018-11-06 DIAGNOSIS — Z36.89 ENCOUNTER FOR FETAL ANATOMIC SURVEY: Primary | ICD-10-CM

## 2018-11-06 DIAGNOSIS — O09.521 ELDERLY MULTIGRAVIDA IN FIRST TRIMESTER: ICD-10-CM

## 2018-11-06 DIAGNOSIS — I63.9 CEREBROVASCULAR ACCIDENT (CVA), UNSPECIFIED MECHANISM: ICD-10-CM

## 2018-11-06 DIAGNOSIS — O09.521 ELDERLY MULTIGRAVIDA, CURRENTLY PREGNANT IN FIRST TRIMESTER: ICD-10-CM

## 2018-11-06 DIAGNOSIS — O99.210 OBESITY IN PREGNANCY: ICD-10-CM

## 2018-11-06 DIAGNOSIS — O09.91 HIGH-RISK PREGNANCY IN FIRST TRIMESTER: ICD-10-CM

## 2018-11-06 DIAGNOSIS — Z98.84 HISTORY OF BARIATRIC SURGERY: ICD-10-CM

## 2018-11-06 DIAGNOSIS — O99.891 CURRENT MATERNAL CONDITION AFFECTING PREGNANCY: ICD-10-CM

## 2018-11-06 PROCEDURE — 76801 OB US < 14 WKS SINGLE FETUS: CPT | Mod: S$GLB,,, | Performed by: OBSTETRICS & GYNECOLOGY

## 2018-11-06 PROCEDURE — 99203 OFFICE O/P NEW LOW 30 MIN: CPT | Mod: 25,S$GLB,, | Performed by: OBSTETRICS & GYNECOLOGY

## 2018-11-14 ENCOUNTER — ROUTINE PRENATAL (OUTPATIENT)
Dept: OBSTETRICS AND GYNECOLOGY | Facility: CLINIC | Age: 38
End: 2018-11-14
Payer: COMMERCIAL

## 2018-11-14 VITALS
SYSTOLIC BLOOD PRESSURE: 128 MMHG | DIASTOLIC BLOOD PRESSURE: 72 MMHG | BODY MASS INDEX: 36.25 KG/M2 | WEIGHT: 252.63 LBS

## 2018-11-14 DIAGNOSIS — F41.9 ANXIETY: ICD-10-CM

## 2018-11-14 DIAGNOSIS — I63.9 CEREBROVASCULAR ACCIDENT (CVA), UNSPECIFIED MECHANISM: Primary | ICD-10-CM

## 2018-11-14 DIAGNOSIS — O09.92 SUPERVISION OF HIGH RISK PREGNANCY IN SECOND TRIMESTER: ICD-10-CM

## 2018-11-14 DIAGNOSIS — O99.212 OBESITY AFFECTING PREGNANCY IN SECOND TRIMESTER: ICD-10-CM

## 2018-11-14 DIAGNOSIS — O09.522 ELDERLY MULTIGRAVIDA IN SECOND TRIMESTER: ICD-10-CM

## 2018-11-14 PROBLEM — O09.529 ADVANCED MATERNAL AGE IN MULTIGRAVIDA: Status: ACTIVE | Noted: 2018-11-14

## 2018-11-14 PROBLEM — O09.90 PREGNANCY, SUPERVISION, HIGH-RISK: Status: ACTIVE | Noted: 2018-11-14

## 2018-11-14 PROBLEM — O99.210 OBESITY COMPLICATING PREGNANCY: Status: ACTIVE | Noted: 2018-11-14

## 2018-11-14 LAB
ALBUMIN SERPL BCP-MCNC: 4.4 G/DL
ALP SERPL-CCNC: 52 U/L
ALT SERPL W/O P-5'-P-CCNC: 18 U/L
ANION GAP SERPL CALC-SCNC: 11 MMOL/L
APTT BLDCRRT: 23.3 SEC
AST SERPL-CCNC: 25 U/L
BILIRUB SERPL-MCNC: 0.6 MG/DL
BUN SERPL-MCNC: 8 MG/DL
CALCIUM SERPL-MCNC: 10.2 MG/DL
CHLORIDE SERPL-SCNC: 102 MMOL/L
CO2 SERPL-SCNC: 25 MMOL/L
CREAT SERPL-MCNC: 1 MG/DL
EST. GFR  (AFRICAN AMERICAN): >60 ML/MIN/1.73 M^2
EST. GFR  (NON AFRICAN AMERICAN): >60 ML/MIN/1.73 M^2
GLUCOSE SERPL-MCNC: 108 MG/DL
INR PPP: 0.9
POTASSIUM SERPL-SCNC: 3.3 MMOL/L
PROT SERPL-MCNC: 7.7 G/DL
PROTHROMBIN TIME: 9.5 SEC
SODIUM SERPL-SCNC: 138 MMOL/L

## 2018-11-14 PROCEDURE — 86147 CARDIOLIPIN ANTIBODY EA IG: CPT | Mod: 59

## 2018-11-14 PROCEDURE — 99999 PR PBB SHADOW E&M-EST. PATIENT-LVL III: CPT | Mod: PBBFAC,,, | Performed by: OBSTETRICS & GYNECOLOGY

## 2018-11-14 PROCEDURE — 85300 ANTITHROMBIN III ACTIVITY: CPT

## 2018-11-14 PROCEDURE — 85305 CLOT INHIBIT PROT S TOTAL: CPT

## 2018-11-14 PROCEDURE — 85730 THROMBOPLASTIN TIME PARTIAL: CPT

## 2018-11-14 PROCEDURE — 85613 RUSSELL VIPER VENOM DILUTED: CPT

## 2018-11-14 PROCEDURE — 36415 COLL VENOUS BLD VENIPUNCTURE: CPT | Mod: PO

## 2018-11-14 PROCEDURE — 80053 COMPREHEN METABOLIC PANEL: CPT

## 2018-11-14 PROCEDURE — 0502F SUBSEQUENT PRENATAL CARE: CPT | Mod: S$GLB,,, | Performed by: OBSTETRICS & GYNECOLOGY

## 2018-11-14 PROCEDURE — 82232 ASSAY OF BETA-2 PROTEIN: CPT

## 2018-11-14 PROCEDURE — 85303 CLOT INHIBIT PROT C ACTIVITY: CPT

## 2018-11-14 PROCEDURE — 85610 PROTHROMBIN TIME: CPT

## 2018-11-14 RX ORDER — CLONAZEPAM 0.5 MG/1
0.5 TABLET ORAL 2 TIMES DAILY
Qty: 60 TABLET | Refills: 0 | Status: SHIPPED | OUTPATIENT
Start: 2018-11-14 | End: 2018-11-30

## 2018-11-14 NOTE — PROGRESS NOTES
Complaints today: Feeling tired, otherwise no complaints.    /72   Wt 114.6 kg (252 lb 10.4 oz)   LMP 2018 (LMP Unknown)   BMI 36.25 kg/m²     38 y.o., at 14w1d by Estimated Date of Delivery: 19  Patient Active Problem List   Diagnosis    Mood altered    Migraine syndrome    Daytime somnolence    Class 2 obesity due to excess calories without serious comorbidity with body mass index (BMI) of 35.0 to 35.9 in adult    CVA (cerebral vascular accident) in 2017 at [facility].  no anticoagulants.  records requested from stroke center f/u    Ankle arthritis    Rh negative state in antepartum period    CHTN on no medications    Pregnancy, supervision, high-risk    Advanced maternal age in multigravida    Obesity complicating pregnancy     OB History    Para Term  AB Living   4 2 2   1 2   SAB TAB Ectopic Multiple Live Births   1       2      # Outcome Date GA Lbr Yuval/2nd Weight Sex Delivery Anes PTL Lv   4 Current            3 SAB 18 10w0d          2 Term 09 37w0d  2.722 kg (6 lb) F CS-Unspec EPI  JIGAR   1 Term 05 37w0d  3.345 kg (7 lb 6 oz) M CS-Unspec EPI  JIGAR          Dating reviewed    Allergies and problem list reviewed and updated    Medical and surgical history reviewed    Prenatal labs reviewed and updated    Physical Exam:  ABD: soft, gravid, nontender    Assessment:  Routine prenatal    Plan:   - Could not obtain records from stroke at [facility]. Coag studies ordered today  - Baseline labs obtained per [facility] recs  - Plan for AFP at nect visit

## 2018-11-14 NOTE — PROGRESS NOTES
Seen and examined.  Agree with note.  All questions answered  Unable to get records from [facility] stroke [place].  Will do workup today.

## 2018-11-15 ENCOUNTER — PATIENT MESSAGE (OUTPATIENT)
Dept: OBSTETRICS AND GYNECOLOGY | Facility: CLINIC | Age: 38
End: 2018-11-15

## 2018-11-15 ENCOUNTER — TELEPHONE (OUTPATIENT)
Dept: OBSTETRICS AND GYNECOLOGY | Facility: CLINIC | Age: 38
End: 2018-11-15

## 2018-11-15 ENCOUNTER — TELEPHONE (OUTPATIENT)
Dept: MATERNAL FETAL MEDICINE | Facility: CLINIC | Age: 38
End: 2018-11-15

## 2018-11-15 LAB
APTT PROTEIN C ACTIVATOR+FV DP/APTT PPP: 110 %
AT III ACT/NOR PPP CHRO: 98 %
B2 MICROGLOB SERPL-MCNC: 1.4 UG/ML
PROT S ACT/NOR PPP: 71 %

## 2018-11-15 NOTE — TELEPHONE ENCOUNTER
----- Message from Pau Viramontes MD sent at 11/15/2018 10:34 AM CST -----  Thank you.     Did you guys also send prothrombin gene mutation, Protein C, free Protein S, beta 2 glycoprotein?  We do have laboratory references per trimester for the Protein C and free Protein S.       ----- Message -----  From: Stella Yen MD  Sent: 11/15/2018   9:57 AM  To: Pau Viramontes MD    Labs done yesterday'  ----- Message -----  From: Pau Viramontes MD  Sent: 11/15/2018   9:42 AM  To: Cely Hale RN, Stella Yen MD, #    Stella:    Since records from [facility] cannot be obtained, I agree with inherited thrombophilia/APS work up.   I think she needs a f/u MFM appt for further recommendations for pregnancy. She does not have an appt yet for fetal anatomical survey with MFM. May need an earlier f/u consult.     Please let MFM know as far as scheduling.     Sincerely  Pau     ----- Message -----  From: Cely Hale RN  Sent: 11/15/2018   9:31 AM  To: Pau Viramontes MD    FYI only - you saw pt for consult and wanted OB to obtain records they could not?  ----- Message -----  From: Stella Yen MD  Sent: 11/14/2018  11:16 AM  To: Wilmer Ayoub Staff

## 2018-11-15 NOTE — TELEPHONE ENCOUNTER
Can you see if they can add prothrombin, protein c and s, and beta 2 glycoprotein to her labs from yesterday?

## 2018-11-16 LAB
CARDIOLIPIN IGG SER IA-ACNC: <9.4 GPL
CARDIOLIPIN IGM SER IA-ACNC: <9.4 MPL
LA PPP-IMP: NEGATIVE

## 2018-11-19 ENCOUNTER — PATIENT MESSAGE (OUTPATIENT)
Dept: OBSTETRICS AND GYNECOLOGY | Facility: CLINIC | Age: 38
End: 2018-11-19

## 2018-11-29 ENCOUNTER — PATIENT MESSAGE (OUTPATIENT)
Dept: OBSTETRICS AND GYNECOLOGY | Facility: CLINIC | Age: 38
End: 2018-11-29

## 2018-11-30 ENCOUNTER — PATIENT MESSAGE (OUTPATIENT)
Dept: OBSTETRICS AND GYNECOLOGY | Facility: CLINIC | Age: 38
End: 2018-11-30

## 2018-11-30 ENCOUNTER — TELEPHONE (OUTPATIENT)
Dept: OBSTETRICS AND GYNECOLOGY | Facility: CLINIC | Age: 38
End: 2018-11-30

## 2018-11-30 ENCOUNTER — OFFICE VISIT (OUTPATIENT)
Dept: URGENT CARE | Facility: CLINIC | Age: 38
End: 2018-11-30
Payer: COMMERCIAL

## 2018-11-30 VITALS
OXYGEN SATURATION: 98 % | WEIGHT: 252 LBS | SYSTOLIC BLOOD PRESSURE: 120 MMHG | RESPIRATION RATE: 17 BRPM | HEIGHT: 70 IN | BODY MASS INDEX: 36.08 KG/M2 | TEMPERATURE: 98 F | DIASTOLIC BLOOD PRESSURE: 76 MMHG | HEART RATE: 88 BPM

## 2018-11-30 DIAGNOSIS — K04.7 DENTAL ABSCESS: Primary | ICD-10-CM

## 2018-11-30 PROCEDURE — 3008F BODY MASS INDEX DOCD: CPT | Mod: CPTII,S$GLB,, | Performed by: NURSE PRACTITIONER

## 2018-11-30 PROCEDURE — 99214 OFFICE O/P EST MOD 30 MIN: CPT | Mod: S$GLB,,, | Performed by: NURSE PRACTITIONER

## 2018-11-30 RX ORDER — HYDROCODONE BITARTRATE AND ACETAMINOPHEN 5; 325 MG/1; MG/1
1 TABLET ORAL EVERY 6 HOURS PRN
Qty: 10 TABLET | Refills: 0 | Status: SHIPPED | OUTPATIENT
Start: 2018-11-30 | End: 2018-11-30

## 2018-11-30 RX ORDER — HYDROCODONE BITARTRATE AND ACETAMINOPHEN 5; 325 MG/1; MG/1
1 TABLET ORAL EVERY 6 HOURS PRN
Qty: 10 TABLET | Refills: 0 | Status: ON HOLD | OUTPATIENT
Start: 2018-11-30 | End: 2018-12-10 | Stop reason: SDUPTHER

## 2018-11-30 RX ORDER — CLINDAMYCIN HYDROCHLORIDE 300 MG/1
300 CAPSULE ORAL 4 TIMES DAILY
Qty: 40 CAPSULE | Refills: 0 | Status: ON HOLD | OUTPATIENT
Start: 2018-11-30 | End: 2018-12-10 | Stop reason: HOSPADM

## 2018-11-30 NOTE — PATIENT INSTRUCTIONS
"Antibiotic until completion. Consider adding over-the-counter PROBIOTICS to decrease some side-effects associated with antibiotics. When a person takes antibiotics, both the harmful bacteria and the beneficial bacteria are killed. A reduction of beneficial bacteria can lead to digestive problems, such as diarrhea, yeast infections and urinary tract infections.  Do not take ibuprofen.  You can take your pain medication as directed by her OBGYN.  Follow up closely with OBGYN annular dentist.  Go to the ER for any worsening symptoms.  Dental Abscess    An abscess is a pocket of pus at the tip of a tooth root in your jaw bone. It is caused by an infection at the root of the tooth. It can cause pain and swelling of the gum, cheek, or jaw. Pain may spread from the tooth to your ear or the area of your jaw on the same side. If the abscess isnt treated, it appears as a bubble or swelling on the gum near the tooth. The pressure that builds in this swelling is the source of the pain. More serious infections cause your face to swell.  An abscess can be caused by a crack in the tooth, a cavity, a gum infection, or a combination of these. Once the pulp of the tooth is exposed, bacteria can spread down the roots to the tip. If the bacteria are not stopped, they can damage the bone and soft tissue, and an abscess can form.  Home care  Follow these guidelines when caring for yourself at home:  · Avoid hot and cold foods and drinks. Your tooth may be sensitive to changes in temperature. Dont chew on the side of the infected tooth.  · If your tooth is chipped or cracked, or if there is a large open cavity, put oil of cloves directly on the tooth to relieve pain. You can buy oil of cloves at drugstores. Some pharmacies carry an over-the-counter "toothache kit." This contains a paste that you can put on the exposed tooth to make it less sensitive.  · Put a cold pack on your jaw over the sore area to help reduce pain.  · You may use " over-the-counter medicine to ease pain, unless another medicine was prescribed. If you have chronic liver or kidney disease, talk with your healthcare provider before using acetaminophen or ibuprofen. Also talk with your provider if youve had a stomach ulcer or GI bleeding.  · An antibiotic will be prescribed. Take it until finished, even if you are feeling better after a few days.  Follow-up care  Follow up with your dentist or an oral surgeon, or as advised. Once an infection occurs in a tooth, it will continue to be a problem until the infection is drained. This is done through surgery or a root canal. Or you may need to have your tooth pulled.  Call 911  Call 911 if any of these occur:  · Unusual drowsiness  · Headache or stiff neck  · Weakness or fainting  · Difficulty swallowing, breathing, or opening your mouth  · Swollen eyelids  When to seek medical advice  Call your healthcare provider right away if any of these occur:  · Your face becomes more swollen or red  · Pain gets worse or spreads to your neck  · Fever of 100.4º F (38.0º C) or higher, or as directed by your healthcare provider  · Pus drains from the tooth  Date Last Reviewed: 10/1/2016  © 2973-9331 [employer]. [address] [place] [address], [place] [address]. All rights reserved. This information is not intended as a substitute for professional medical care. Always follow your healthcare professional's instructions.

## 2018-11-30 NOTE — PROGRESS NOTES
"Subjective:       Patient ID: Anitra Chaney is a 38 y.o. female.    Vitals:  height is 5' 10" (1.778 m) and weight is 114.3 kg (252 lb). Her oral temperature is 98.1 °F (36.7 °C). Her blood pressure is 120/76 and her pulse is 88. Her respiration is 17 and oxygen saturation is 98%.     Chief Complaint: Abscess (mouth abscess)    Patient presents with complaint of pain and swelling with suspected dental abscess after a root canal several days ago.  Patient states the pain is starting to spread to her face.  She is 17 weeks pregnant.  Her OBGYN at wrote her for Norco for this.  But states that she thinks that she needs an antibiotic.  No fever no difficulty swallowing no muffled voice.      Abscess   Chronicity:  New  Onset:  3-5 days ago  Progression Since Onset: worsening  Location:  Mouth  Associated Symptoms: no fever, no chills, no sweats  Characteristics: painful and swelling    Characteristics: not draining, no itching, no redness, no dryness, no scaling, no peeling, no bruising and no blistering    Pain Scale:  8/10  Treatments Tried:  Oral antibiotics      Constitution: Negative for chills, fatigue and fever.   HENT: Negative for congestion and sore throat.    Neck: Negative for painful lymph nodes.   Cardiovascular: Negative for chest pain and leg swelling.   Eyes: Negative for double vision and blurred vision.   Respiratory: Negative for cough and shortness of breath.    Gastrointestinal: Negative for nausea, vomiting and diarrhea.   Genitourinary: Negative for dysuria, frequency, urgency and history of kidney stones.   Musculoskeletal: Negative for joint pain, joint swelling, muscle cramps and muscle ache.   Skin: Positive for abscess. Negative for color change, pale, rash and bruising.   Allergic/Immunologic: Negative for seasonal allergies.   Neurological: Negative for dizziness, history of vertigo, light-headedness, passing out and headaches.   Hematologic/Lymphatic: Negative for swollen lymph " nodes.   Psychiatric/Behavioral: Negative for nervous/anxious, sleep disturbance and depression. The patient is not nervous/anxious.        Objective:      Physical Exam   Constitutional: She is oriented to person, place, and time. She appears well-developed and well-nourished. She is cooperative.  Non-toxic appearance. She does not appear ill. No distress.   HENT:   Head: Normocephalic and atraumatic.   Right Ear: Hearing, tympanic membrane, external ear and ear canal normal.   Left Ear: Hearing, tympanic membrane, external ear and ear canal normal.   Nose: Nose normal. No mucosal edema, rhinorrhea or nasal deformity. No epistaxis. Right sinus exhibits no maxillary sinus tenderness and no frontal sinus tenderness. Left sinus exhibits no maxillary sinus tenderness and no frontal sinus tenderness.   Mouth/Throat: Uvula is midline, oropharynx is clear and moist and mucous membranes are normal. No trismus in the jaw. Normal dentition. Dental abscesses present. No uvula swelling. No posterior oropharyngeal erythema.       Pain reported to left-sided cheek and neck with no erythema noted there is some slight swelling to the left side of her face.   Eyes: Conjunctivae and lids are normal. No scleral icterus.   Sclera clear bilat   Neck: Trachea normal, full passive range of motion without pain and phonation normal. Neck supple.   Cardiovascular: Normal rate, regular rhythm, normal heart sounds, intact distal pulses and normal pulses.   Pulmonary/Chest: Effort normal and breath sounds normal. No respiratory distress.   Abdominal: Soft. Normal appearance and bowel sounds are normal. She exhibits no distension. There is no tenderness.      Musculoskeletal: Normal range of motion. She exhibits no edema or deformity.   Lymphadenopathy:     She has no cervical adenopathy.   Neurological: She is alert and oriented to person, place, and time. She exhibits normal muscle tone. Coordination normal.   Skin: Skin is warm, dry  and intact. She is not diaphoretic. No pallor.   Psychiatric: She has a normal mood and affect. Her speech is normal and behavior is normal. Judgment and thought content normal. Cognition and memory are normal.   Nursing note and vitals reviewed.      Assessment:       1. Dental abscess        Plan:         Dental abscess  -     clindamycin (CLEOCIN) 300 MG capsule; Take 1 capsule (300 mg total) by mouth 4 (four) times daily. for 10 days  Dispense: 40 capsule; Refill: 0      Patient Instructions   Antibiotic until completion. Consider adding over-the-counter PROBIOTICS to decrease some side-effects associated with antibiotics. When a person takes antibiotics, both the harmful bacteria and the beneficial bacteria are killed. A reduction of beneficial bacteria can lead to digestive problems, such as diarrhea, yeast infections and urinary tract infections.  Do not take ibuprofen.  You can take your pain medication as directed by her OBGYN.  Follow up closely with OBGYN annular dentist.  Go to the ER for any worsening symptoms.  Dental Abscess    An abscess is a pocket of pus at the tip of a tooth root in your jaw bone. It is caused by an infection at the root of the tooth. It can cause pain and swelling of the gum, cheek, or jaw. Pain may spread from the tooth to your ear or the area of your jaw on the same side. If the abscess isnt treated, it appears as a bubble or swelling on the gum near the tooth. The pressure that builds in this swelling is the source of the pain. More serious infections cause your face to swell.  An abscess can be caused by a crack in the tooth, a cavity, a gum infection, or a combination of these. Once the pulp of the tooth is exposed, bacteria can spread down the roots to the tip. If the bacteria are not stopped, they can damage the bone and soft tissue, and an abscess can form.  Home care  Follow these guidelines when caring for yourself at home:  · Avoid hot and cold foods and drinks. Your  "tooth may be sensitive to changes in temperature. Dont chew on the side of the infected tooth.  · If your tooth is chipped or cracked, or if there is a large open cavity, put oil of cloves directly on the tooth to relieve pain. You can buy oil of cloves at drugstores. Some pharmacies carry an over-the-counter "toothache kit." This contains a paste that you can put on the exposed tooth to make it less sensitive.  · Put a cold pack on your jaw over the sore area to help reduce pain.  · You may use over-the-counter medicine to ease pain, unless another medicine was prescribed. If you have chronic liver or kidney disease, talk with your healthcare provider before using acetaminophen or ibuprofen. Also talk with your provider if youve had a stomach ulcer or GI bleeding.  · An antibiotic will be prescribed. Take it until finished, even if you are feeling better after a few days.  Follow-up care  Follow up with your dentist or an oral surgeon, or as advised. Once an infection occurs in a tooth, it will continue to be a problem until the infection is drained. This is done through surgery or a root canal. Or you may need to have your tooth pulled.  Call 911  Call 911 if any of these occur:  · Unusual drowsiness  · Headache or stiff neck  · Weakness or fainting  · Difficulty swallowing, breathing, or opening your mouth  · Swollen eyelids  When to seek medical advice  Call your healthcare provider right away if any of these occur:  · Your face becomes more swollen or red  · Pain gets worse or spreads to your neck  · Fever of 100.4º F (38.0º C) or higher, or as directed by your healthcare provider  · Pus drains from the tooth  Date Last Reviewed: 10/1/2016  © 1180-1585 [employer]. [address] [place] [address], [place] [address]. All rights reserved. This information is not intended as a substitute for professional medical care. Always follow your healthcare professional's instructions.             "

## 2018-11-30 NOTE — TELEPHONE ENCOUNTER
----- Message from Yara Mcneil sent at 11/30/2018  2:47 PM CST -----  Contact: Self            Name of Who is Calling: HILLARY TYLER [[id number]]      What is the request in detail: Pt states the [facility] pharmacy advised her they do not have the Norco prescription. Please contact to further discuss and advise.         Can the clinic reply by [facility]: Y      What Number to Call Back [facility] not [facility]: [phone]

## 2018-12-05 ENCOUNTER — TELEPHONE (OUTPATIENT)
Dept: ORTHOPEDICS | Facility: CLINIC | Age: 38
End: 2018-12-05

## 2018-12-05 ENCOUNTER — PATIENT MESSAGE (OUTPATIENT)
Dept: ORTHOPEDICS | Facility: CLINIC | Age: 38
End: 2018-12-05

## 2018-12-05 NOTE — TELEPHONE ENCOUNTER
Left voice mail for pt to return my call.   Pt sent a [facility] message requesting Percocet.  Pt is pregnant.  Need to discuss possible appointment with pt.  Per Dr Hernández, no pain medications will be given without written approval from pt's OBGYN, Dr Stella Yen.

## 2018-12-08 ENCOUNTER — HOSPITAL ENCOUNTER (OUTPATIENT)
Facility: OTHER | Age: 38
Discharge: HOME | End: 2018-12-10
Attending: EMERGENCY MEDICINE | Admitting: EMERGENCY MEDICINE
Payer: COMMERCIAL

## 2018-12-08 DIAGNOSIS — D64.9 ANEMIA, UNSPECIFIED TYPE: ICD-10-CM

## 2018-12-08 DIAGNOSIS — E87.6 HYPOKALEMIA: ICD-10-CM

## 2018-12-08 DIAGNOSIS — K92.2 LOWER GI BLEED: Primary | ICD-10-CM

## 2018-12-08 DIAGNOSIS — Z3A.17 17 WEEKS GESTATION OF PREGNANCY: ICD-10-CM

## 2018-12-08 PROBLEM — Z3A.12 12 WEEKS GESTATION OF PREGNANCY: Status: ACTIVE | Noted: 2018-12-08

## 2018-12-08 LAB
ABO + RH BLD: NORMAL
ALBUMIN SERPL BCP-MCNC: 3 G/DL
ALP SERPL-CCNC: 69 U/L
ALT SERPL W/O P-5'-P-CCNC: 8 U/L
ANION GAP SERPL CALC-SCNC: 9 MMOL/L
AST SERPL-CCNC: 11 U/L
B-HCG UR QL: POSITIVE
BACTERIA #/AREA URNS HPF: ABNORMAL /HPF
BACTERIA GENITAL QL WET PREP: ABNORMAL
BASOPHILS # BLD AUTO: 0.03 K/UL
BASOPHILS NFR BLD: 0.3 %
BILIRUB SERPL-MCNC: 0.2 MG/DL
BILIRUB UR QL STRIP: NEGATIVE
BUN SERPL-MCNC: 7 MG/DL
CALCIUM SERPL-MCNC: 9 MG/DL
CHLORIDE SERPL-SCNC: 108 MMOL/L
CLARITY UR: CLEAR
CLUE CELLS VAG QL WET PREP: ABNORMAL
CO2 SERPL-SCNC: 22 MMOL/L
COLOR UR: YELLOW
CREAT SERPL-MCNC: 0.6 MG/DL
CTP QC/QA: YES
DIFFERENTIAL METHOD: ABNORMAL
EOSINOPHIL # BLD AUTO: 0.2 K/UL
EOSINOPHIL NFR BLD: 1.6 %
ERYTHROCYTE [DISTWIDTH] IN BLOOD BY AUTOMATED COUNT: 13 %
EST. GFR  (AFRICAN AMERICAN): >60 ML/MIN/1.73 M^2
EST. GFR  (NON AFRICAN AMERICAN): >60 ML/MIN/1.73 M^2
FILAMENT FUNGI VAG WET PREP-#/AREA: ABNORMAL
GLUCOSE SERPL-MCNC: 76 MG/DL
GLUCOSE UR QL STRIP: NEGATIVE
HCG INTACT+B SERPL-ACNC: NORMAL MIU/ML
HCT VFR BLD AUTO: 34.3 %
HGB BLD-MCNC: 11.8 G/DL
HGB UR QL STRIP: NEGATIVE
KETONES UR QL STRIP: NEGATIVE
LEUKOCYTE ESTERASE UR QL STRIP: ABNORMAL
LYMPHOCYTES # BLD AUTO: 2.3 K/UL
LYMPHOCYTES NFR BLD: 24.4 %
MCH RBC QN AUTO: 29.5 PG
MCHC RBC AUTO-ENTMCNC: 34.4 G/DL
MCV RBC AUTO: 86 FL
MICROSCOPIC COMMENT: ABNORMAL
MONOCYTES # BLD AUTO: 0.4 K/UL
MONOCYTES NFR BLD: 4.7 %
NEUTROPHILS # BLD AUTO: 6.4 K/UL
NEUTROPHILS NFR BLD: 68.7 %
NITRITE UR QL STRIP: NEGATIVE
PH UR STRIP: 7 [PH] (ref 5–8)
PLATELET # BLD AUTO: 247 K/UL
PMV BLD AUTO: 10 FL
POTASSIUM SERPL-SCNC: 3.2 MMOL/L
PROT SERPL-MCNC: 6.7 G/DL
PROT UR QL STRIP: NEGATIVE
RBC # BLD AUTO: 4 M/UL
RBC #/AREA URNS HPF: 2 /HPF (ref 0–4)
SODIUM SERPL-SCNC: 139 MMOL/L
SP GR UR STRIP: 1.01 (ref 1–1.03)
SPECIMEN SOURCE: ABNORMAL
SQUAMOUS #/AREA URNS HPF: 21 /HPF
T VAGINALIS GENITAL QL WET PREP: ABNORMAL
URN SPEC COLLECT METH UR: ABNORMAL
UROBILINOGEN UR STRIP-ACNC: NEGATIVE EU/DL
WBC # BLD AUTO: 9.32 K/UL
WBC #/AREA URNS HPF: 17 /HPF (ref 0–5)
WBC #/AREA VAG WET PREP: ABNORMAL
YEAST GENITAL QL WET PREP: ABNORMAL

## 2018-12-08 PROCEDURE — 81000 URINALYSIS NONAUTO W/SCOPE: CPT

## 2018-12-08 PROCEDURE — 63600175 PHARM REV CODE 636 W HCPCS: Performed by: PHYSICIAN ASSISTANT

## 2018-12-08 PROCEDURE — 25000003 PHARM REV CODE 250: Performed by: NURSE PRACTITIONER

## 2018-12-08 PROCEDURE — 99285 EMERGENCY DEPT VISIT HI MDM: CPT | Mod: 25

## 2018-12-08 PROCEDURE — 96374 THER/PROPH/DIAG INJ IV PUSH: CPT

## 2018-12-08 PROCEDURE — 96361 HYDRATE IV INFUSION ADD-ON: CPT

## 2018-12-08 PROCEDURE — 99220 PR INITIAL OBSERVATION CARE,LEVL III: CPT | Mod: ,,, | Performed by: NURSE PRACTITIONER

## 2018-12-08 PROCEDURE — 86901 BLOOD TYPING SEROLOGIC RH(D): CPT

## 2018-12-08 PROCEDURE — 96376 TX/PRO/DX INJ SAME DRUG ADON: CPT

## 2018-12-08 PROCEDURE — G0378 HOSPITAL OBSERVATION PER HR: HCPCS

## 2018-12-08 PROCEDURE — 80053 COMPREHEN METABOLIC PANEL: CPT

## 2018-12-08 PROCEDURE — 87210 SMEAR WET MOUNT SALINE/INK: CPT

## 2018-12-08 PROCEDURE — 84702 CHORIONIC GONADOTROPIN TEST: CPT

## 2018-12-08 PROCEDURE — 25000003 PHARM REV CODE 250: Performed by: PHYSICIAN ASSISTANT

## 2018-12-08 PROCEDURE — 87086 URINE CULTURE/COLONY COUNT: CPT

## 2018-12-08 PROCEDURE — 81025 URINE PREGNANCY TEST: CPT | Performed by: PHYSICIAN ASSISTANT

## 2018-12-08 PROCEDURE — 85025 COMPLETE CBC W/AUTO DIFF WBC: CPT

## 2018-12-08 PROCEDURE — 87491 CHLMYD TRACH DNA AMP PROBE: CPT

## 2018-12-08 PROCEDURE — 96375 TX/PRO/DX INJ NEW DRUG ADDON: CPT

## 2018-12-08 RX ORDER — PRENATAL WITH FERROUS FUM AND FOLIC ACID 3080; 920; 120; 400; 22; 1.84; 3; 20; 10; 1; 12; 200; 27; 25; 2 [IU]/1; [IU]/1; MG/1; [IU]/1; MG/1; MG/1; MG/1; MG/1; MG/1; MG/1; UG/1; MG/1; MG/1; MG/1; MG/1
1 TABLET ORAL DAILY
Status: DISCONTINUED | OUTPATIENT
Start: 2018-12-09 | End: 2018-12-10 | Stop reason: HOSPADM

## 2018-12-08 RX ORDER — MORPHINE SULFATE 4 MG/ML
4 INJECTION, SOLUTION INTRAMUSCULAR; INTRAVENOUS
Status: COMPLETED | OUTPATIENT
Start: 2018-12-08 | End: 2018-12-08

## 2018-12-08 RX ORDER — ACETAMINOPHEN 325 MG/1
650 TABLET ORAL EVERY 4 HOURS PRN
Status: DISCONTINUED | OUTPATIENT
Start: 2018-12-08 | End: 2018-12-10 | Stop reason: HOSPADM

## 2018-12-08 RX ORDER — ACETAMINOPHEN 500 MG
1000 TABLET ORAL
Status: COMPLETED | OUTPATIENT
Start: 2018-12-08 | End: 2018-12-08

## 2018-12-08 RX ORDER — SODIUM CHLORIDE 0.9 % (FLUSH) 0.9 %
5 SYRINGE (ML) INJECTION
Status: DISCONTINUED | OUTPATIENT
Start: 2018-12-08 | End: 2018-12-10 | Stop reason: HOSPADM

## 2018-12-08 RX ORDER — ONDANSETRON 2 MG/ML
4 INJECTION INTRAMUSCULAR; INTRAVENOUS
Status: COMPLETED | OUTPATIENT
Start: 2018-12-08 | End: 2018-12-08

## 2018-12-08 RX ORDER — FLUOXETINE HYDROCHLORIDE 20 MG/1
20 CAPSULE ORAL DAILY
Status: DISCONTINUED | OUTPATIENT
Start: 2018-12-09 | End: 2018-12-10 | Stop reason: HOSPADM

## 2018-12-08 RX ORDER — SODIUM CHLORIDE 9 MG/ML
1000 INJECTION, SOLUTION INTRAVENOUS
Status: COMPLETED | OUTPATIENT
Start: 2018-12-08 | End: 2018-12-08

## 2018-12-08 RX ADMIN — ONDANSETRON 4 MG: 2 INJECTION INTRAMUSCULAR; INTRAVENOUS at 06:12

## 2018-12-08 RX ADMIN — ACETAMINOPHEN 650 MG: 325 TABLET, FILM COATED ORAL at 09:12

## 2018-12-08 RX ADMIN — ONDANSETRON 4 MG: 2 INJECTION INTRAMUSCULAR; INTRAVENOUS at 05:12

## 2018-12-08 RX ADMIN — MORPHINE SULFATE 4 MG: 4 INJECTION INTRAVENOUS at 06:12

## 2018-12-08 RX ADMIN — SODIUM CHLORIDE 1000 ML: 0.9 INJECTION, SOLUTION INTRAVENOUS at 05:12

## 2018-12-08 RX ADMIN — SODIUM CHLORIDE 1000 ML: 0.9 INJECTION, SOLUTION INTRAVENOUS at 06:12

## 2018-12-08 RX ADMIN — ACETAMINOPHEN 1000 MG: 500 TABLET, FILM COATED ORAL at 05:12

## 2018-12-08 NOTE — ED PROVIDER NOTES
"Encounter Date: 2018       History     Chief Complaint   Patient presents with    Rectal Bleeding     Pt reports dark red stools since this am. She is c/o right sided back pain with pressure to the right lower abd. Pt is 17 wks pregnant.     Vomiting     Pt reports vomiting two times today with brown emesis.      Patient is 38 year old female  at 17 wks gestation with history of CVA on ASA, gastric sleeve, who presents with complaints of lower abdominal cramping and pain with maroon colored stool and subsequent vomiting that started today. She reports that she had an otherwise normal bowel movement after feeling right sided lower abdominal cramping and right sided back and flank pain earlier today. She reports that when she wiped she noticed some dark maroon blood on the toilet paper and then reports that she looked down and there was blood staining the toilet bowl water. She reports that she then developed dark brown colored vomiting, x 2 episodes, however she admits she had just drank coffee and thinks this is why her emesis was dark in color. She reports there was no evidence of coffee grounds in emesis. She reports feeling "woozy" but denies dizziness or AMS, LOC, CP or SOB. She reports no dysuria or vaginal bleeding. She reports that she is only slightly feeling fetal movement. She reports no fever or URI symptoms. She reports that she has never had GI bleeding in the past. She reports no associated sick contacts or recent travel. Her OB/GYN is Dr. Yen and she reports that thus far, her pregnancy has been normal thought she is considered "high risk because of her history". She takes an ASA daily because of history of CVA last year and has right sided "heaviness" which is her baseline. She is also s/p gastric sleeve and had a history of HTN and DM but reports that this has now resolved after 100lbs weight loss over the last 18 months.           Review of patient's allergies indicates:  No Known " Allergies  Past Medical History:   Diagnosis Date    Anxiety     Depression     DM2 (diabetes mellitus, type 2)     diet control    GERD (gastroesophageal reflux disease)     History of PCOS     HTN (hypertension), benign 3/10/2014    Leg pain 3/10/2014    Migraines     Obesity     Stroke     TIA (transient ischemic attack)      Past Surgical History:   Procedure Laterality Date    ANKLE FRACTURE SURGERY      ARTHRODESIS-ANKLE Left 3/27/2018    Performed [facility] at [facility] 1ST [facility]LR     SECTION, CLASSIC  2005/2009    x2    CHOLECYSTECTOMY  3/2002    lap maribel    EXCISION-MASS FOOT Left 3/2/2016    Performed by Lele Hern[facility]dez [facility] at [facility] OR 1ST [facility]LR    FOOT SURGERY      gastric sleeve  2017     Family History   Problem Relation Age of Onset    Other Father         house fire    Breast cancer Neg Hx     Colon cancer Neg Hx     Ovarian cancer Neg Hx      Social History     Tobacco Use    Smoking status: Current Every Day Smoker     Packs/day: 1.00     Years: 18.00     Pack years: 18.00     Types: Cigarettes    Smokeless tobacco: Never Used    Tobacco comment: cutting to 2 cigarettes per day   Substance Use Topics    Alcohol use: No     Alcohol/week: 0.0 oz    Drug use: No     Review of Systems   Constitutional: Negative for fever.   HENT: Negative for sore throat.    Respiratory: Negative for shortness of breath.    Cardiovascular: Negative for chest pain.   Gastrointestinal: Positive for abdominal pain, blood in stool, nausea and vomiting.   Genitourinary: Negative for dysuria.   Musculoskeletal: Negative for back pain.   Skin: Negative for rash.   Neurological: Negative for weakness.   Hematological: Does not bruise/bleed easily.       Physical Exam     Initial Vitals [18 1612]   BP Pulse Resp Temp SpO2   133/80 78 18 99 °F (37.2 °C) 99 %      MAP       --         Physical Exam    Nursing note and vitals reviewed.  Constitutional: She appears  well-developed and well-nourished. She is not diaphoretic. No distress.   Healthy appearing female in NAD or apparent pain. She makes good eye contact, speaks in clear full sentences and ambulates with ease.    HENT:   Head: Normocephalic and atraumatic.   Eyes: Conjunctivae and EOM are normal. Pupils are equal, round, and reactive to light. Right eye exhibits no discharge. Left eye exhibits no discharge. No scleral icterus.   No conjunctival pallor      Neck: Normal range of motion.   Cardiovascular: Normal rate, regular rhythm, normal heart sounds and intact distal pulses. Exam reveals no gallop and no friction rub.    No murmur heard.  Pulmonary/Chest: Breath sounds normal. She has no wheezes. She has no rhonchi. She has no rales.   Abdominal: Soft. Bowel sounds are normal. There is tenderness. There is no rebound and no guarding.   LLQ TTP without overlying skin changes.    Genitourinary:   Genitourinary Comments: There is thin whitish discharge in vault without bleeding. Cervix is erythematous with out CMT. There is no friability. There is no uterine or adnexal TTP on bimanual exam. There is no genital lesions.   Rectal exam reveals normal external rectum. There is no masses. There is scant stool in the vault that is brown in color. It is guaiac positive on fecal occult card.    Musculoskeletal: Normal range of motion. She exhibits no edema or tenderness.   Lymphadenopathy:     She has no cervical adenopathy.   Neurological: She is alert and oriented to person, place, and time. She has normal strength.   Skin: Skin is warm. Capillary refill takes less than 2 seconds. No rash and no abscess noted. No erythema.   Psychiatric: She has a normal mood and affect. Her behavior is normal. Thought content normal.         ED Course   Procedures  Labs Reviewed - No data to display        Labs Reviewed   CBC W/ AUTO DIFFERENTIAL - Abnormal; Notable for the following components:       Result Value    Hemoglobin 11.8 (*)      Hematocrit 34.3 (*)     All other components within normal limits   COMPREHENSIVE METABOLIC PANEL - Abnormal; Notable for the following components:    Potassium 3.2 (*)     CO2 22 (*)     Albumin 3.0 (*)     ALT 8 (*)     All other components within normal limits   URINALYSIS, REFLEX TO URINE CULTURE - Abnormal; Notable for the following components:    Leukocytes, UA 2+ (*)     All other components within normal limits    Narrative:     Preferred Collection Type->Urine, Clean Catch   URINALYSIS MICROSCOPIC - Abnormal; Notable for the following components:    WBC, UA 17 (*)     Bacteria, UA Few (*)     All other components within normal limits    Narrative:     Preferred Collection Type->Urine, Clean Catch   POCT URINE PREGNANCY - Abnormal; Notable for the following components:    POC Preg Test, Ur Positive (*)     All other components within normal limits   C. TRACHOMATIS/N. GONORRHOEAE BY AMP DNA   CULTURE, URINE   HCG, QUANTITATIVE, PREGNANCY   VAGINAL SCREEN   GROUP & RH          Medical Decision Making:   ED Management:  Urgent evaluation of 38 year old female who presents with complaints of rectal bleeding, vomiting and abdominal pain. She is afebrile, non-toxic appearing and hemodynamically stable. Physical exam outlined above and reveals LLQ tenderness to deep palpation without acute peritoneal signs. There is normal FHT of 150 on bedside US with spontaneous fetal movements. Labs pending.     Discussed case with on-call OBGYN who feels this is not related to pregnancy.  Diagnostic labs reveal stable H&H of 11 and 34 with baseline noted to be 12 2 months ago.  There is mild hypokalemia at 3.2 and no other significant electrolyte abnormalities.  Blood type is A-negative but, because there is no vaginal bleeding no RhoGAM is warranted at this time.  She has generalized lower abdominal and right-sided posterior flank pain without evidence of acute abdomen no imaging is felt warranted at this time.  Discussed case  with hospital medicine who is amenable to admission for observation of H&H, pain management and consult with GI in the morning.  No urgent intervention felt warranted at this time.  I have considered nephrolithiasis though this would not explain rectal bleeding.  Urinalysis does have red blood cells but specimen is contaminated.  Low suspicion for renal etiology of her current symptoms.  No leukocytosis, fever or evidence of sepsis.  Initially Tylenol does not help with pain so will give morphine, Zofran and continue fluid hydration.  Patient is amenable for admission and she is stable for transport to the floor.  Discussed case with attending physician who agrees with plan  Other:   I have discussed this case with another health care provider.       <> Summary of the Discussion: Jane                       Clinical Impression:   The primary encounter diagnosis was Lower GI bleed. Diagnoses of Anemia, unspecified type, 17 weeks gestation of pregnancy, and Hypokalemia were also pertinent to this visit.                             Sandy Flowers PA-C  12/08/18 3069       Sandy Flowers PA-C  12/08/18 1836

## 2018-12-08 NOTE — ED NOTES
Completed hourly rounding on pt. Pt lying sitting up in bed conversing with radiology tech, AAOx4, GCS 15, calm, cooperative, responding appropriately to questions, speaking in full sentences, respirations even and unlabored with equal bilateral chest expansion, NAD noted. Addressed pt comfort, positioning and restroom needs. Pt denies need to use the bathroom at this time. Pt remains connected to cardiac, continuous pulse ox and BP monitoring. Bed locked and in low position, side rails x 2, personal items and call light in reach. TM.

## 2018-12-08 NOTE — ED TRIAGE NOTES
"Pt presents to ED with c/o lower middle back pain that radiates to lower abdomen, blood in stool, vomiting brown emesis x 2 all starting today. Pt reports sudden onset of "ripping dull ache" in back radiating to lower abdomen rated 8/10. Bilateral lower abdominal pain with LLQ tenderness. Pt denies CP, SOB, dizziness. PMHx CVA, DM, HTN. Pt reports right sided "heaviness" from previous stroke. Pt is 17 weeks and 4 days gestation, reports seeing OBGYN recently with no problems.  "

## 2018-12-09 LAB
ALBUMIN SERPL BCP-MCNC: 2.4 G/DL
ALP SERPL-CCNC: 52 U/L
ALT SERPL W/O P-5'-P-CCNC: 7 U/L
ANION GAP SERPL CALC-SCNC: 7 MMOL/L
AST SERPL-CCNC: 8 U/L
BASOPHILS # BLD AUTO: 0.03 K/UL
BASOPHILS NFR BLD: 0.4 %
BILIRUB SERPL-MCNC: 0.2 MG/DL
BUN SERPL-MCNC: 7 MG/DL
CALCIUM SERPL-MCNC: 8.1 MG/DL
CHLORIDE SERPL-SCNC: 112 MMOL/L
CO2 SERPL-SCNC: 21 MMOL/L
CREAT SERPL-MCNC: 0.5 MG/DL
DIFFERENTIAL METHOD: ABNORMAL
EOSINOPHIL # BLD AUTO: 0.2 K/UL
EOSINOPHIL NFR BLD: 2.7 %
ERYTHROCYTE [DISTWIDTH] IN BLOOD BY AUTOMATED COUNT: 13.2 %
EST. GFR  (AFRICAN AMERICAN): >60 ML/MIN/1.73 M^2
EST. GFR  (NON AFRICAN AMERICAN): >60 ML/MIN/1.73 M^2
GLUCOSE SERPL-MCNC: 78 MG/DL
HCT VFR BLD AUTO: 30.2 %
HGB BLD-MCNC: 10.1 G/DL
LYMPHOCYTES # BLD AUTO: 2.9 K/UL
LYMPHOCYTES NFR BLD: 37.1 %
MAGNESIUM SERPL-MCNC: 2 MG/DL
MCH RBC QN AUTO: 29.3 PG
MCHC RBC AUTO-ENTMCNC: 33.4 G/DL
MCV RBC AUTO: 88 FL
MONOCYTES # BLD AUTO: 0.4 K/UL
MONOCYTES NFR BLD: 5.5 %
NEUTROPHILS # BLD AUTO: 4.3 K/UL
NEUTROPHILS NFR BLD: 53.9 %
PHOSPHATE SERPL-MCNC: 3.6 MG/DL
PLATELET # BLD AUTO: 205 K/UL
PMV BLD AUTO: 9.5 FL
POCT GLUCOSE: 71 MG/DL (ref 70–110)
POCT GLUCOSE: 84 MG/DL (ref 70–110)
POCT GLUCOSE: 93 MG/DL (ref 70–110)
POTASSIUM SERPL-SCNC: 3 MMOL/L
PROT SERPL-MCNC: 5.2 G/DL
RBC # BLD AUTO: 3.45 M/UL
SODIUM SERPL-SCNC: 140 MMOL/L
WBC # BLD AUTO: 7.87 K/UL

## 2018-12-09 PROCEDURE — 25000003 PHARM REV CODE 250: Performed by: NURSE PRACTITIONER

## 2018-12-09 PROCEDURE — 80053 COMPREHEN METABOLIC PANEL: CPT

## 2018-12-09 PROCEDURE — 99900035 HC TECH TIME PER 15 MIN (STAT)

## 2018-12-09 PROCEDURE — 83735 ASSAY OF MAGNESIUM: CPT

## 2018-12-09 PROCEDURE — 99226 PR SUBSEQUENT OBSERVATION CARE,LEVEL III: CPT | Mod: ,,, | Performed by: HOSPITALIST

## 2018-12-09 PROCEDURE — 82105 ALPHA-FETOPROTEIN SERUM: CPT

## 2018-12-09 PROCEDURE — 25000003 PHARM REV CODE 250: Performed by: HOSPITALIST

## 2018-12-09 PROCEDURE — 94761 N-INVAS EAR/PLS OXIMETRY MLT: CPT

## 2018-12-09 PROCEDURE — 85025 COMPLETE CBC W/AUTO DIFF WBC: CPT

## 2018-12-09 PROCEDURE — 36415 COLL VENOUS BLD VENIPUNCTURE: CPT

## 2018-12-09 PROCEDURE — 84100 ASSAY OF PHOSPHORUS: CPT

## 2018-12-09 PROCEDURE — G0378 HOSPITAL OBSERVATION PER HR: HCPCS

## 2018-12-09 RX ORDER — HYDROCODONE BITARTRATE AND ACETAMINOPHEN 7.5; 325 MG/1; MG/1
1 TABLET ORAL EVERY 6 HOURS PRN
Status: DISCONTINUED | OUTPATIENT
Start: 2018-12-09 | End: 2018-12-10 | Stop reason: HOSPADM

## 2018-12-09 RX ORDER — POTASSIUM CHLORIDE 20 MEQ/1
40 TABLET, EXTENDED RELEASE ORAL
Status: COMPLETED | OUTPATIENT
Start: 2018-12-09 | End: 2018-12-09

## 2018-12-09 RX ADMIN — POTASSIUM CHLORIDE 40 MEQ: 1500 TABLET, EXTENDED RELEASE ORAL at 09:12

## 2018-12-09 RX ADMIN — ACETAMINOPHEN 650 MG: 325 TABLET, FILM COATED ORAL at 07:12

## 2018-12-09 RX ADMIN — HYDROCODONE BITARTRATE AND ACETAMINOPHEN 1 TABLET: 7.5; 325 TABLET ORAL at 10:12

## 2018-12-09 RX ADMIN — PRENATAL VIT W/ FE FUMARATE-FA TAB 27-0.8 MG 1 TABLET: 27-0.8 TAB at 09:12

## 2018-12-09 RX ADMIN — FLUOXETINE 20 MG: 20 CAPSULE ORAL at 09:12

## 2018-12-09 RX ADMIN — HYDROCODONE BITARTRATE AND ACETAMINOPHEN 1 TABLET: 7.5; 325 TABLET ORAL at 06:12

## 2018-12-09 RX ADMIN — POTASSIUM CHLORIDE 40 MEQ: 1500 TABLET, EXTENDED RELEASE ORAL at 07:12

## 2018-12-09 NOTE — HOSPITAL COURSE
Patient was placed in observation and was seen by GI.  They recommended serial H/H and possible flex sig if bleeding occurred again.  Overnight she had several normal BMs without bleeding recurrence and she was feeling well on 12/10.

## 2018-12-09 NOTE — SUBJECTIVE & OBJECTIVE
Obstetric History       T2      L2     SAB1   TAB0   Ectopic0   Multiple0   Live Births2       # Outcome Date GA Lbr Yuval/2nd Weight Sex Delivery Anes PTL Lv   4 Current            3 SAB 18 10w0d          2 Term 09 37w0d  2.722 kg (6 lb) F CS-Unspec EPI  JIGAR   1 Term 05 37w0d  3.345 kg (7 lb 6 oz) M CS-Unspec EPI  JIGAR        Past Medical History:   Diagnosis Date    Anxiety and depression     DM2 (diabetes mellitus, type 2)     diet control    GERD (gastroesophageal reflux disease)     HTN (hypertension), benign 3/10/2014    Migraines     Obesity     Polycystic disease, ovaries     Stroke     TIA (transient ischemic attack)      Past Surgical History:   Procedure Laterality Date    ANKLE FRACTURE SURGERY      ARTHRODESIS-ANKLE Left 3/27/2018    Performed by [facility] at [facility] 1ST FLR     SECTION, CLASSIC  2005/2009    x2    CHOLECYSTECTOMY  3/2002    lap maribel    EXCISION-MASS FOOT Left 3/2/2016    Performed by Lele [facility] at [facility] OR 1ST FLR    FOOT SURGERY      gastric sleeve  2017       PTA Medications   Medication Sig    aspirin (ECOTRIN) 81 MG EC tablet Take 1 tablet (81 mg total) by mouth once daily.    clindamycin (CLEOCIN) 300 MG capsule Take 1 capsule (300 mg total) by mouth 4 (four) times daily. for 10 days    FLUoxetine (PROZAC) 20 MG capsule Take 1 capsule (20 mg total) by mouth once daily.    prenatal vit calc,iron,folic (PRENATAL VITAMIN ORAL) Take by mouth.    HYDROcodone-acetaminophen (NORCO) 5-325 mg per tablet Take 1 tablet by mouth every 6 (six) hours as needed for Pain.       Review of patient's allergies indicates:  No Known Allergies     Family History     Problem Relation (Age of Onset)    Other Father        Tobacco Use    Smoking status: Current Every Day Smoker     Packs/day: 1.00     Years: 18.00     Pack years: 18.00     Types: Cigarettes    Smokeless tobacco: Never Used    Tobacco  comment: cutting to 2 cigarettes per day   Substance and Sexual Activity    Alcohol use: No     Alcohol/week: 0.0 oz    Drug use: No    Sexual activity: Yes     Partners: Male     Review of Systems   Constitutional: Negative for chills and fever.   Eyes: Negative for visual disturbance.   Respiratory: Negative for shortness of breath.    Cardiovascular: Negative for chest pain, palpitations and leg swelling.   Gastrointestinal: Positive for blood in stool and vomiting. Negative for abdominal pain, constipation, diarrhea and nausea.   Endocrine: Negative for diabetes.   Genitourinary: Negative for vaginal bleeding and vaginal discharge.   Musculoskeletal: Negative for back pain.   Neurological: Negative for seizures, syncope and headaches.   Hematological: Does not bruise/bleed easily.   Psychiatric/Behavioral: Negative for depression. The patient is not nervous/anxious.       Objective:     Vital Signs (Most Recent):  Temp: 98.1 °F (36.7 °C) (12/09/18 0419)  Pulse: (!) 56 (12/09/18 0419)  Resp: 18 (12/09/18 0419)  BP: (!) 89/53 (12/09/18 0419)  SpO2: 99 % (12/09/18 0419) Vital Signs (24h Range):  Temp:  [98 °F (36.7 °C)-99 °F (37.2 °C)] 98.1 °F (36.7 °C)  Pulse:  [56-80] 56  Resp:  [13-21] 18  SpO2:  [99 %-100 %] 99 %  BP: ()/(52-80) 89/53     Weight: 112.5 kg (248 lb 0.3 oz)  Body mass index is 35.59 kg/m².    FHT: 150 bpm in ER      Physical Exam:   Constitutional: She is oriented to person, place, and time. She appears well-developed and well-nourished. No distress.    HENT:   Head: Normocephalic and atraumatic.   Nose: No epistaxis.    Eyes: Conjunctivae and EOM are normal.    Neck: Normal range of motion. Neck supple. No thyromegaly present.    Cardiovascular: Normal rate and regular rhythm.     Pulmonary/Chest: Effort normal. No respiratory distress.        Abdominal: Soft. She exhibits no distension. There is no tenderness.             Musculoskeletal: Normal range of motion and moves all  extremeties. She exhibits no edema or tenderness.       Neurological: She is alert and oriented to person, place, and time.    Skin: Skin is warm and dry. No rash noted.    Psychiatric: She has a normal mood and affect. Her behavior is normal.         Significant Labs:  Lab Results   Component Value Date    GROUPTRH A NEG 12/08/2018    HEPBSAG Negative 09/17/2018     Recent Labs   Lab 12/08/18  1702 12/09/18  0412   WBC 9.32 7.87   HGB 11.8* 10.1*   HCT 34.3* 30.2*   MCV 86 88    205      Recent Labs   Lab 12/08/18  1702 12/09/18  0412    140   K 3.2* 3.0*    112*   CO2 22* 21*   BUN 7 7   CREATININE 0.6 0.5   GLU 76 78   PROT 6.7 5.2*   BILITOT 0.2 0.2   ALKPHOS 69 52*   ALT 8* 7*   AST 11 8*   MG  --  2.0   PHOS  --  3.6          I have personallly reviewed all pertinent lab results from the last 24 hours.

## 2018-12-09 NOTE — PLAN OF CARE
Problem: Patient Care Overview  Goal: Plan of Care Review  Pt was updated on POC and remained free from vomiting during the night time shift. Pt ate sandwich after arriving to the floor and tolerated well. Pt was safe from falls through out the night and rested comfortably

## 2018-12-09 NOTE — SUBJECTIVE & OBJECTIVE
Past Medical History:   Diagnosis Date    Anxiety     Depression     DM2 (diabetes mellitus, type 2)     diet control    GERD (gastroesophageal reflux disease)     History of PCOS     HTN (hypertension), benign 3/10/2014    Leg pain 3/10/2014    Migraines     Obesity     Stroke     TIA (transient ischemic attack)        Past Surgical History:   Procedure Laterality Date    ANKLE FRACTURE SURGERY      ARTHRODESIS-ANKLE Left 3/27/2018    [facility]formed [facility] at [facility] OR 1ST FLR     SECTION, CLASSIC  2005/2009    x2    CHOLECYSTECTOMY  3/2002    lap maribel    EXCISION-MASS FOOT Left 3/2/2016    Performed [facility] Le[facility] at [facility] OR 1ST FLR    FOOT SURGERY      gastric sleeve  2017       Review of patient's allergies indicates:  No Known Allergies    No current facility-administered medications on file prior to encounter.      Current Outpatient Medications on File Prior to Encounter   Medication Sig    aspirin (ECOTRIN) 81 MG EC tablet Take 1 tablet (81 mg total) by mouth once daily.    clindamycin (CLEOCIN) 300 MG capsule Take 1 capsule (300 mg total) by mouth 4 (four) times daily. for 10 days    FLUoxetine (PROZAC) 20 MG capsule Take 1 capsule (20 mg total) by mouth once daily.    prenatal vit calc,iron,folic (PRENATAL VITAMIN ORAL) Take by mouth.    HYDROcodone-acetaminophen (NORCO) 5-325 mg per tablet Take 1 tablet by mouth every 6 (six) hours as needed for Pain.     Family History     Problem Relation (Age of Onset)    Other Father        Tobacco Use    Smoking status: Current Every Day Smoker     Packs/day: 1.00     Years: 18.00     Pack years: 18.00     Types: Cigarettes    Smokeless tobacco: Never Used    Tobacco comment: cutting to 2 cigarettes per day   Substance and Sexual Activity    Alcohol use: No     Alcohol/week: 0.0 oz    Drug use: No    Sexual activity: Yes     Partners: Male     Review of Systems   Constitutional: Negative for  activity change, appetite change and fever.   HENT: Negative for congestion, ear pain, rhinorrhea and sinus pressure.    Eyes: Negative for pain and discharge.   Respiratory: Negative for cough, chest tightness, shortness of breath and wheezing.    Cardiovascular: Negative for chest pain and leg swelling.   Gastrointestinal: Positive for blood in stool and vomiting. Negative for abdominal distention, abdominal pain, diarrhea and nausea.   Endocrine: Negative for cold intolerance and heat intolerance.   Genitourinary: Negative for difficulty urinating, flank pain, frequency, hematuria and urgency.   Musculoskeletal: Positive for arthralgias and myalgias. Negative for joint swelling.   Allergic/Immunologic: Negative for environmental allergies and food allergies.   Neurological: Negative for dizziness, weakness, light-headedness and headaches.   Hematological: Does not bruise/bleed easily.   Psychiatric/Behavioral: Negative for agitation, behavioral problems and decreased concentration.     Objective:     Vital Signs (Most Recent):  Temp: 98 °F (36.7 °C) (12/08/18 1845)  Pulse: 62 (12/08/18 1955)  Resp: 20 (12/08/18 1955)  BP: 101/61 (12/08/18 1946)  SpO2: 100 % (12/08/18 1946) Vital Signs (24h Range):  Temp:  [98 °F (36.7 °C)-99 °F (37.2 °C)] 98 °F (36.7 °C)  Pulse:  [56-80] 62  Resp:  [13-21] 20  SpO2:  [99 %-100 %] 100 %  BP: ()/(52-80) 101/61     Weight: 112.5 kg (248 lb)  Body mass index is 35.58 kg/m².    Physical Exam   Constitutional: She is oriented to person, place, and time. She appears well-developed and well-nourished.   HENT:   Head: Normocephalic.   Eyes: Conjunctivae are normal. Right eye exhibits no discharge. Left eye exhibits no discharge.   Neck: Normal range of motion. Neck supple.   Cardiovascular: Normal rate, regular rhythm, normal heart sounds and intact distal pulses.   Pulmonary/Chest: Effort normal and breath sounds normal. No respiratory distress.   Abdominal: Soft. Bowel sounds are  normal. She exhibits no distension. There is no tenderness.   Musculoskeletal: Normal range of motion.   Neurological: She is alert and oriented to person, place, and time. She has normal strength. GCS eye subscore is 4. GCS verbal subscore is 5. GCS motor subscore is 6.   Skin: Skin is warm and dry.   Psychiatric: She has a normal mood and affect. Her speech is normal and behavior is normal.           Significant Labs:   CBC:   Recent Labs   Lab 12/08/18  1702   WBC 9.32   HGB 11.8*   HCT 34.3*        CMP:   Recent Labs   Lab 12/08/18  1702      K 3.2*      CO2 22*   GLU 76   BUN 7   CREATININE 0.6   CALCIUM 9.0   PROT 6.7   ALBUMIN 3.0*   BILITOT 0.2   ALKPHOS 69   AST 11   ALT 8*   ANIONGAP 9   EGFRNONAA >60       Significant Imaging: I have reviewed all pertinent imaging results/findings within the past 24 hours.

## 2018-12-09 NOTE — PROGRESS NOTES
[facility] Hospital Medicine  Progress Note    Patient Name: Anitra Chaney  MRN: [id number]  Patient Class: OP- Observation   Admission Date: 12/8/2018  Length of Stay: 0 days  Attending Physician: Imelda Sanchez MD  Primary Care Provider: Tien Watts MD        Subjective:     Principal Problem:Lower GI bleed    HPI:  Ms. Chaney is a 38 year old woman 17 weeks pregnant who presented with abdominal cramping and bloody stools.  She had 3 painless bowel movements today with bright red blood.  Following that she developed the cramping and then had a normal brown stool.  No vaginal bleeding.  She also vomited twice but did not see any blood in the vomitus.  She never had GI bleeding prior to this and does not have a history of hemorrhoids.  On presentation she had H/H 11.8/34.3, slightly below her baseline.    Medical history includes migraine headaches and a stroke in 1/2016 for which she was hospitalized at [facility] and was given tPA.  She reports the stroke was cerebellar and she was found to have a cardiac shunt while hospitalized.  At that time she also weighed 400 pounds.  She underwent a gastric sleeve in 9/2017 and has lost considerable weight, and because of the weight loss she no longer requires treatment for HTN or diabetes and the headaches have resolved.  She is on a daily ASA.  She had 2 normal pregnancies when she was younger, and then became pregnant again in June this year but miscarried.  She is considered high risk due to her age and history of stroke.  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        Hospital Course:  Patient was placed in observation and was seen by GI.  They recommended serial H/H and possible flex sig if bleeding occurred again.    Interval History:  No further bleeding noted.  H/H have decreased slightly since admission.  She has back pain (chronic) and reqests her Norco.  No abdominal pain or  cramping and she has not had further stools.    Review of Systems   Constitutional: Negative for chills and fever.   Respiratory: Negative for cough and shortness of breath.    Cardiovascular: Negative for chest pain and palpitations.     Objective:     Vital Signs (Most Recent):  Temp: 98.2 °F (36.8 °C) (12/09/18 1245)  Pulse: 66 (12/09/18 1245)  Resp: 16 (12/09/18 0743)  BP: (!) 101/56 (12/09/18 1245)  SpO2: 98 % (12/09/18 1245) Vital Signs (24h Range):  Temp:  [96.8 °F (36 °C)-99 °F (37.2 °C)] 98.2 °F (36.8 °C)  Pulse:  [54-80] 66  Resp:  [13-21] 16  SpO2:  [98 %-100 %] 98 %  BP: ()/(52-80) 101/56     Weight: 112.5 kg (248 lb 0.3 oz)  Body mass index is 35.59 kg/m².    Intake/Output Summary (Last 24 hours) at 12/9/2018 1311  Last data filed at 12/8/2018 2009  Gross per 24 hour   Intake 2000 ml   Output --   Net 2000 ml      Physical Exam   Constitutional: She is oriented to person, place, and time. She appears well-developed and well-nourished.   Sitting up, alert and conversant.   HENT:   Head: Normocephalic.   Eyes: Conjunctivae are normal. Pupils are equal, round, and reactive to light.   Neck: Neck supple. No thyromegaly present.   Cardiovascular: Normal rate, regular rhythm, normal heart sounds and intact distal pulses. Exam reveals no gallop and no friction rub.   No murmur heard.  Pulmonary/Chest: Effort normal and breath sounds normal.   Abdominal: Soft. Bowel sounds are normal. She exhibits no distension. There is no tenderness.   Musculoskeletal: Normal range of motion. She exhibits no edema.   Lymphadenopathy:     She has no cervical adenopathy.   Neurological: She is alert and oriented to person, place, and time.   Strength equal and symmetric   Skin: Skin is warm and dry. No rash noted.   Psychiatric: She has a normal mood and affect. Her behavior is normal. Thought content normal.       Significant Labs:   CBC:   Recent Labs   Lab 12/08/18  1702 12/09/18  0412   WBC 9.32 7.87   HGB 11.8* 10.1*    HCT 34.3* 30.2*    205       Significant Imaging: I have reviewed all pertinent imaging results/findings within the past 24 hours.    Assessment/Plan:      * Lower GI bleed    - Possibly hemorrhoidal, but also could be diverticular.  - Colonoscopy result from 2011 not on chart.  GI looking into it.  - ASA held  - Serial H/H  - Regular diet (patient 17 weeks pregnant)  - OB/GYN have evaluated and do not feel bleeding is related to her pregnancy.         17 weeks gestation of pregnancy    Noted         VTE Risk Mitigation (From admission, onward)        Ordered     Place HERMILO hose  Until discontinued      12/08/18 2039     Place sequential compression device  Until discontinued      12/08/18 2039     IP VTE HIGH RISK PATIENT  Once      12/08/18 2039     Reason for No Pharmacological VTE Prophylaxis  Once      12/08/18 2039              Imelda Sanderson MD  Department of Hospital Medicine   [facility]

## 2018-12-09 NOTE — CONSULTS
Consult Note  Gastroenterology    Consult Requested By: Dr Sanchez  Reason for Consult: GI Bleed    SUBJECTIVE:     History of Present Illness:  Patient is a 38 y.o. female who presents with blood ins tool. Onset of symptoms was abrupt starting 1 day ago with stable course since that time. Patient denies abdominal pain, constipation, nausea, odynophagia, reflux symptoms and vomiting. Symptoms are aggravated by none. Symptoms improve with none. Past history includes Gastric sleeve surgery. Previous studies include colonoscopy. Pt denies fh of IBD or colon cancer    Review of patient's allergies indicates:  No Known Allergies    Scheduled Meds:   FLUoxetine  20 mg Oral Daily    prenatal vitamin  1 tablet Oral Daily       Continuous Infusions:      PRN Meds:.  acetaminophen, HYDROcodone-acetaminophen, sodium chloride 0.9%    Past Medical History:   Diagnosis Date    Anxiety and depression     DM2 (diabetes mellitus, type 2)     diet control    GERD (gastroesophageal reflux disease)     HTN (hypertension), benign 3/10/2014    Migraines     Obesity     Polycystic disease, ovaries     Stroke 2017    Hospitalized at [facility]; given tPA       Past Surgical History:   Procedure Laterality Date    ANKLE FRACTURE SURGERY      ARTHRODESIS-ANKLE Left 3/27/2018    Performed by Lele Hern[facility]dez MD at [facility] OR 1ST FLR     SECTION, CLASSIC  2005/2009    x2    CHOLECYSTECTOMY  3/2002    lap maribel    EXCISION-MASS FOOT Left 3/2/2016    Performed by Lele Hern[facility]dez MD at [facility] OR 1ST FLR    FOOT SURGERY      gastric sleeve  2017       Family History   Problem Relation Age of Onset    Other Father         house fire    Breast cancer Neg Hx     Colon cancer Neg Hx     Ovarian cancer Neg Hx        Social History     Socioeconomic History    Marital status: Single     Spouse name: None    Number of children: None    Years of education: None    Highest education level: None   Social Needs     Financial resource strain: None    Food insecurity - worry: None    Food insecurity - inability: None    Transportation needs - medical: None    Transportation needs - non-medical: None   Occupational History     Employer: [employer]   Tobacco Use    Smoking status: Current Every Day Smoker     Packs/day: 1.00     Years: 18.00     Pack years: 18.00     Types: Cigarettes    Smokeless tobacco: Never Used    Tobacco comment: cutting to 2 cigarettes per day   Substance and Sexual Activity    Alcohol use: No     Alcohol/week: 0.0 oz    Drug use: No    Sexual activity: Yes     Partners: Male   Other Topics Concern    Caffeine Use Not Asked    Financial Status: Disabled Not Asked    Legal: Involved in criminal litigation Not Asked    Caffeine Use: Frequent Not Asked    Financial Status: Employed Not Asked    Legal: Other Not Asked    Caffeine Use: Moderate Not Asked    Financial Status: Unemployed Not Asked    Leisure: Exercise Not Asked    Caffeine Use: Substantial Not Asked    Financial Status: Other Not Asked    Leisure: Fishing Not Asked    Childhood History: Adopted Not Asked    Firearms: Does patient have access to a firearm? Not Asked    Leisure: Hunting Not Asked    Childhood History: Early trauma Not Asked    Home situation: homeless Not Asked    Leisure: Movie Watching Not Asked    Childhood History: Raised by parents Not Asked    Home situation: lives alone Not Asked    Leisure: Shopping Not Asked    Childhood History: Uneventful Not Asked    Home situation: lives in group home Not Asked    Leisure: Sports Not Asked    Childhood History: Other Not Asked    Home situation: lives in nursing home Not Asked    Leisure: Time with family Not Asked    Education: Unfinished High School Not Asked    Home situation: lives in shelter Not Asked     Service Not Asked    Education: High School Graduate Not Asked    Home situation: lives with family Not  Asked    Spirituality: Active Participation Not Asked    Education: Unfinished college Not Asked    Home situation: lives with friends Not Asked    Spirituality: Organized Christianity Not Asked    Education: Trade School Not Asked    Home situation: lives with significant other Not Asked    Spirituality: Private Participation Not Asked    Education: Associate's Degree Not Asked    Home situation: lives with spouse Not Asked    Patient feels they ought to cut down on drinking/drug use Not Asked    Education: Bachelor's Degree Not Asked    Legal consequences of chemical use Not Asked    Patient annoyed by others criticizing their drinking/drug use Not Asked    Education: More than one Bachelor's or Professional Not Asked    Legal: Arrest history Not Asked    Patient has felt bad or guilty about drinking/drug use Not Asked    Education: Master's, PhD Not Asked    Legal: Involved in civil litigation Not Asked    Patient has had a drink/used drugs as an eye opener in the AM Not Asked   Social History Narrative    None       Review of Systems:  Constitutional: no fever or chills  Eyes: no visual changes  ENT: no nasal congestion or sore throat  Respiratory: no cough or shorness of breath  Cardiovascular: no chest pain or palpitations  Gastrointestinal: no nausea or vomiting, no abdominal pain or change in bowel habits  Genitourinary: no hematuria or dysuria    OBJECTIVE:   Physical Exam:  General: Well developed, well nourished, no apparent distress  Vital Signs Range (Last 24H):  Temp:  [96.8 °F (36 °C)-99 °F (37.2 °C)]   Pulse:  [54-80]   Resp:  [13-21]   BP: ()/(52-80)   SpO2:  [99 %-100 %]   HEENT: Anicteric, PERRLA  CVS: S1, S2, no murmers/rubs  Lungs: CTA-B, normal excursion  Abdomen: Soft, NT, ND, normal BS's  Extremities: No c/c/e, 1+ DP pulses to BLE's  Skin: No rashes/lesions.      Laboratory:    CBC:   Recent Labs   Lab 12/09/18  0412   WBC 7.87   RBC 3.45*   HGB 10.1*   HCT 30.2*   PLT  205   MCV 88   MCH 29.3   MCHC 33.4     CMP:   Recent Labs   Lab 12/09/18  0412   GLU 78   CALCIUM 8.1*   ALBUMIN 2.4*   PROT 5.2*      K 3.0*   CO2 21*   *   BUN 7   CREATININE 0.5   ALKPHOS 52*   ALT 7*   AST 8*   BILITOT 0.2     Coagulation: No results for input(s): LABPROT, INR, APTT in the last 168 hours.    Recent Results (from the past 336 hour(s))   CBC with Automated Differential    Collection Time: 12/09/18  4:12 AM   Result Value Ref Range    WBC 7.87 3.90 - 12.70 K/uL    Hemoglobin 10.1 (L) 12.0 - 16.0 g/dL    Hematocrit 30.2 (L) 37.0 - 48.5 %    Platelets 205 150 - 350 K/uL   CBC W/ AUTO DIFFERENTIAL    Collection Time: 12/08/18  5:02 PM   Result Value Ref Range    WBC 9.32 3.90 - 12.70 K/uL    Hemoglobin 11.8 (L) 12.0 - 16.0 g/dL    Hematocrit 34.3 (L) 37.0 - 48.5 %    Platelets 247 150 - 350 K/uL      No results for input(s): APTT, INR, PTT in the last 168 hours.  Recent Labs   Lab 12/08/18  1702 12/09/18  0412    140   K 3.2* 3.0*    112*   CO2 22* 21*   BUN 7 7   CREATININE 0.6 0.5   CALCIUM 9.0 8.1*   PROT 6.7 5.2*   BILITOT 0.2 0.2   ALKPHOS 69 52*   ALT 8* 7*   AST 11 8*      Lab Results   Component Value Date    HEPAIGM Negative 05/01/2014    HEPBIGM Negative 05/01/2014    HEPCAB Negative 05/01/2014    No results found for: AFP   No results found for: CEA       Diagnostic Results:  ER No external hemorrhoids on Rectal exam    ASSESSMENT/PLAN:     1. Lower GI bleed    2. Anemia, unspecified type    3. 17 weeks gestation of pregnancy    4. Hypokalemia        Plan: 1) obtain prior colonosocpy report  2) serial h/h  3) ? Flex sig if rebleeds

## 2018-12-09 NOTE — SUBJECTIVE & OBJECTIVE
Interval History:  No further bleeding noted.  H/H have decreased slightly since admission.  She has back pain (chronic) and reqests her Norco.  No abdominal pain or cramping and she has not had further stools.    Review of Systems   Constitutional: Negative for chills and fever.   Respiratory: Negative for cough and shortness of breath.    Cardiovascular: Negative for chest pain and palpitations.     Objective:     Vital Signs (Most Recent):  Temp: 98.2 °F (36.8 °C) (12/09/18 1245)  Pulse: 66 (12/09/18 1245)  Resp: 16 (12/09/18 0743)  BP: (!) 101/56 (12/09/18 1245)  SpO2: 98 % (12/09/18 1245) Vital Signs (24h Range):  Temp:  [96.8 °F (36 °C)-99 °F (37.2 °C)] 98.2 °F (36.8 °C)  Pulse:  [54-80] 66  Resp:  [13-21] 16  SpO2:  [98 %-100 %] 98 %  BP: ()/(52-80) 101/56     Weight: 112.5 kg (248 lb 0.3 oz)  Body mass index is 35.59 kg/m².    Intake/Output Summary (Last 24 hours) at 12/9/2018 1311  Last data filed at 12/8/2018 2009  Gross per 24 hour   Intake 2000 ml   Output --   Net 2000 ml      Physical Exam   Constitutional: She is oriented to person, place, and time. She appears well-developed and well-nourished.   Sitting up, alert and conversant.   HENT:   Head: Normocephalic.   Eyes: Conjunctivae are normal. Pupils are equal, round, and reactive to light.   Neck: Neck supple. No thyromegaly present.   Cardiovascular: Normal rate, regular rhythm, normal heart sounds and intact distal pulses. Exam reveals no gallop and no friction rub.   No murmur heard.  Pulmonary/Chest: Effort normal and breath sounds normal.   Abdominal: Soft. Bowel sounds are normal. She exhibits no distension. There is no tenderness.   Musculoskeletal: Normal range of motion. She exhibits no edema.   Lymphadenopathy:     She has no cervical adenopathy.   Neurological: She is alert and oriented to person, place, and time.   Strength equal and symmetric   Skin: Skin is warm and dry. No rash noted.   Psychiatric: She has a normal mood and  affect. Her behavior is normal. Thought content normal.       Significant Labs:   CBC:   Recent Labs   Lab 12/08/18  1702 12/09/18  0412   WBC 9.32 7.87   HGB 11.8* 10.1*   HCT 34.3* 30.2*    205       Significant Imaging: I have reviewed all pertinent imaging results/findings within the past 24 hours.

## 2018-12-09 NOTE — ASSESSMENT & PLAN NOTE
- Rec continuing PNV while inpatient  - On ASA 81 mg daily for history of cHTN and possible CVA prior to pregnancy, recommend holding in the setting of GI bleed  - Patient currently undergoing genetic screening, will obtain second component, AFP, while in hospital  - FHTs verified 150 bpm  - No additional acute needs

## 2018-12-09 NOTE — HPI
Ms. Chaney is a 38 year old woman 17 weeks pregnant who presented with abdominal cramping and bloody stools.  She had 3 painless bowel movements today with bright red blood.  Following that she developed the cramping and then had a normal brown stool.  No vaginal bleeding.  She also vomited twice but did not see any blood in the vomitus.  She never had GI bleeding prior to this and does not have a history of hemorrhoids.  On presentation she had H/H 11.8/34.3, slightly below her baseline.    Medical history includes migraine headaches and a stroke in 1/2016 for which she was hospitalized at [facility] and was given tPA.  She reports the stroke was cerebellar and she was found to have a cardiac shunt while hospitalized.  At that time she also weighed 400 pounds.  She underwent a gastric sleeve in 9/2017 and has lost considerable weight, and because of the weight loss she no longer requires treatment for HTN or diabetes and the headaches have resolved.  She is on a daily ASA.  She had 2 normal pregnancies when she was younger, and then became pregnant again in June this year but miscarried.  She is considered high risk due to her age and history of stroke.  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

## 2018-12-09 NOTE — CONSULTS
"[facility]  Obstetrics  Consult Note    Patient Name: Anitra Chaney  MRN: [id number]  Admission Date: 2018  Hospital Length of Stay: 0 days  Code Status: Full Code  Primary Care Provider: Tien Watts MD  Principal Problem: Lower GI bleed    Inpatient consult to Obstetrics  Consult performed by: Erendira Dyson MD  Consult ordered by: Imelda Sanchez MD        Subjective:     Principal Problem:Lower GI bleed    History of Present Illness:  Anitra Chaney is a 38 y.o. [id number] at 17w5d who presented to the ER last night with complaints of 3 episodes of dark blood per rectum and maroon colored stool that occurred yesterday.  She states when she wiped she noticed some dark maroon blood on the toilet paper and adds when she looked down and there was blood in the toilet.  She reports loose BM, denies straining.  She denies history of hemorrhoids and states this has never happened before.  She adds she also had emesis x2 yesterday, denies hematemesis.  She denies feeling lightheaded, weak, or dizzy.  She denies fever or chills.     She does have a history of GERD, states she has had both an EGD and colonoscopy several years ago showing just "irritation" and gastritis.   She denies vaginal bleeding or abdominal pain/cramping.  She reports mostly lower back pain.     This pregnancy is complicated by history of CVA in 2016 for which she has been taking ASA 81 mg daily. She also has a history of gastric sleeve in 2017 for which she has lost 130 pounds.  Prior to this procedure she had HTN (previously on 3 meds) and DM2 (previously on metformin).  She no longer requires medication for these conditions since she has lost weight.  She is on Prozac and klonopin PRN for anxiety.   Patient's primary ob/gyn is Dr. Yen and she has seen MFM early in pregnancy due to her high risk pregnancy.     Ob History  - C/S 37 wks failed induction, GDM (metformin), " PreEclampsia  2009- C/S 37 wks repeat, GDM metformin)  2018- SAB (cytotec)        Obstetric History       T2      L2     SAB1   TAB0   Ectopic0   Multiple0   Live Births2       # Outcome Date GA Lbr Yuval/2nd Weight Sex Delivery Anes PTL Lv   4 Current            3 SAB 18 10w0d          2 Term 09 37w0d  2.722 kg (6 lb) F CS-Unspec EPI  JIGAR   1 Term 05 37w0d  3.345 kg (7 lb 6 oz) M CS-Unspec EPI  JIGAR        Past Medical History:   Diagnosis Date    Anxiety and depression     DM2 (diabetes mellitus, type 2)     diet control    GERD (gastroesophageal reflux disease)     HTN (hypertension), benign 3/10/2014    Migraines     Obesity     Polycystic disease, ovaries     Stroke     TIA (transient ischemic attack)      Past Surgical History:   Procedure Laterality Date    ANKLE FRACTURE SURGERY      ARTHRODESIS-ANKLE Left 3/27/2018    Performed by [facility]dez [facility] at [facility] OR 1ST FLR     SECTION, CLASSIC  2005/2009    x2    CHOLECYSTECTOMY  3/2002    lap maribel    EXCISION-MASS FOOT Left 3/2/2016    Performed by Lele Hern[facility]dez [facility] at [facility] OR 1ST FLR    FOOT SURGERY      gastric sleeve  2017       PTA Medications   Medication Sig    aspirin (ECOTRIN) 81 MG EC tablet Take 1 tablet (81 mg total) by mouth once daily.    clindamycin (CLEOCIN) 300 MG capsule Take 1 capsule (300 mg total) by mouth 4 (four) times daily. for 10 days    FLUoxetine (PROZAC) 20 MG capsule Take 1 capsule (20 mg total) by mouth once daily.    prenatal vit calc,iron,folic (PRENATAL VITAMIN ORAL) Take by mouth.    HYDROcodone-acetaminophen (NORCO) 5-325 mg per tablet Take 1 tablet by mouth every 6 (six) hours as needed for Pain.       Review of patient's allergies indicates:  No Known Allergies     Family History     Problem Relation (Age of Onset)    Other Father        Tobacco Use    Smoking status: Current Every Day Smoker     Packs/day: 1.00     Years: 18.00     Pack years:  18.00     Types: Cigarettes    Smokeless tobacco: Never Used    Tobacco comment: cutting to 2 cigarettes per day   Substance and Sexual Activity    Alcohol use: No     Alcohol/week: 0.0 oz    Drug use: No    Sexual activity: Yes     Partners: Male     Review of Systems   Constitutional: Negative for chills and fever.   Eyes: Negative for visual disturbance.   Respiratory: Negative for shortness of breath.    Cardiovascular: Negative for chest pain, palpitations and leg swelling.   Gastrointestinal: Positive for blood in stool and vomiting. Negative for abdominal pain, constipation, diarrhea and nausea.   Endocrine: Negative for diabetes.   Genitourinary: Negative for vaginal bleeding and vaginal discharge.   Musculoskeletal: Negative for back pain.   Neurological: Negative for seizures, syncope and headaches.   Hematological: Does not bruise/bleed easily.   Psychiatric/Behavioral: Negative for depression. The patient is not nervous/anxious.       Objective:     Vital Signs (Most Recent):  Temp: 98.1 °F (36.7 °C) (12/09/18 0419)  Pulse: (!) 56 (12/09/18 0419)  Resp: 18 (12/09/18 0419)  BP: (!) 89/53 (12/09/18 0419)  SpO2: 99 % (12/09/18 0419) Vital Signs (24h Range):  Temp:  [98 °F (36.7 °C)-99 °F (37.2 °C)] 98.1 °F (36.7 °C)  Pulse:  [56-80] 56  Resp:  [13-21] 18  SpO2:  [99 %-100 %] 99 %  BP: ()/(52-80) 89/53     Weight: 112.5 kg (248 lb 0.3 oz)  Body mass index is 35.59 kg/m².    FHT: 150 bpm in ER      Physical Exam:   Constitutional: She is oriented to person, place, and time. She appears well-developed and well-nourished. No distress.    HENT:   Head: Normocephalic and atraumatic.   Nose: No epistaxis.    Eyes: Conjunctivae and EOM are normal.    Neck: Normal range of motion. Neck supple. No thyromegaly present.    Cardiovascular: Normal rate and regular rhythm.     Pulmonary/Chest: Effort normal. No respiratory distress.        Abdominal: Soft. She exhibits no distension. There is no tenderness.              Musculoskeletal: Normal range of motion and moves all extremeties. She exhibits no edema or tenderness.       Neurological: She is alert and oriented to person, place, and time.    Skin: Skin is warm and dry. No rash noted.    Psychiatric: She has a normal mood and affect. Her behavior is normal.         Significant Labs:  Lab Results   Component Value Date    GROUPTRH A NEG 2018    HEPBSAG Negative 2018     Recent Labs   Lab 18  1702 18  0412   WBC 9.32 7.87   HGB 11.8* 10.1*   HCT 34.3* 30.2*   MCV 86 88    205      Recent Labs   Lab 18  1702 18  0412    140   K 3.2* 3.0*    112*   CO2 22* 21*   BUN 7 7   CREATININE 0.6 0.5   GLU 76 78   PROT 6.7 5.2*   BILITOT 0.2 0.2   ALKPHOS 69 52*   ALT 8* 7*   AST 11 8*   MG  --  2.0   PHOS  --  3.6          I have personallly reviewed all pertinent lab results from the last 24 hours.    Assessment/Plan:     38 y.o. female  at 17w5d for:    * Lower GI bleed    - Management per primary  - GI consult     17 weeks gestation of pregnancy    - Rec continuing PNV while inpatient  - On ASA 81 mg daily for history of cHTN and possible CVA prior to pregnancy, recommend holding in the setting of GI bleed  - Patient currently undergoing genetic screening, will obtain second component, AFP, while in hospital  - FHTs verified 150 bpm  - No additional acute needs       CVA (cerebral vascular accident) in 2017 at [facility].  no anticoagulants.  records requested from stroke center f/u.  not available    - Primary team attempting to obtain records  - hold ASA in setting of GI bleed         Thank you for your consult.   Please contact ob/gyn team with any specific questions.  Lower level resident phone #[phone]. M resident phone #[phone]. Upper level resident phone #[phone].      Erendira Dyson MD  Obstetrics & Gynecology  [facility]

## 2018-12-09 NOTE — H&P
"[facility] Hospital Medicine  History & Physical    Patient Name: Anitra Chaney  MRN: [id number]  Admission Date: 2018  Attending Physician: Imelda Sanchez MD   Primary Care Provider: Tien Watts MD         Patient information was obtained from patient, past medical records and ER records.     Subjective:     Principal Problem:Lower GI bleed    Chief Complaint:   Chief Complaint   Patient presents with    Rectal Bleeding     Pt reports dark red stools since this am. She is c/o right sided back pain with pressure to the right lower abd. Pt is 17 wks pregnant.     Vomiting     Pt reports vomiting two times today with brown emesis.         HPI: The patient is 38 year old female  at 17 wks gestation with history of CVA on ASA, gastric sleeve, who presents with complaints of lower abdominal cramping and pain with maroon colored stool and subsequent vomiting that started today. She reports that she had an otherwise normal bowel movement after feeling right sided lower abdominal cramping earlier today. She reports that when she wiped she noticed some dark maroon blood on the toilet paper and then reports that she looked down and there was blood in the toilet bowl. She reports that she then developed dark brown colored vomiting, x 2 episodes. She reports feeling "woozy" but denies dizziness or AMS, LOC, CP or SOB. She reports no dysuria or vaginal bleeding. She reports that she is only slightly feeling fetal movement. She reports no fever or URI symptoms. She reports that she has never had GI bleeding in the past. She reports no associated sick contacts or recent travel. Her OB/GYN is Dr. Yen and she reports that thus far, her pregnancy has been normal thought she is considered "high risk because of her history". She takes an ASA daily because of history of CVA last year and has right sided "heaviness" which is her baseline. She is also s/p gastric sleeve and had a " history of HTN and DM but reports that this has now resolved after 100lbs weight loss over the last 18 months.         Past Medical History:   Diagnosis Date    Anxiety     Depression     DM2 (diabetes mellitus, type 2)     diet control    GERD (gastroesophageal reflux disease)     History of PCOS     HTN (hypertension), benign 3/10/2014    Leg pain 3/10/2014    Migraines     Obesity     Stroke     TIA (transient ischemic attack)        Past Surgical History:   Procedure Laterality Date    ANKLE FRACTURE SURGERY      ARTHRODESIS-ANKLE Left 3/27/2018    Performed by [facility] at [facility] OR 1ST FLR     SECTION, CLASSIC  2005/2009    x2    CHOLECYSTECTOMY  3/2002    lap maribel    EXCISION-MASS FOOT Left 3/2/2016    Per[facility] by [facility] at [facility] OR 1ST FLR    FOOT SURGERY      gastric sleeve  2017       Review of patient's allergies indicates:  No Known Allergies    No current facility-administered medications on file prior to encounter.      Current Outpatient Medications on File Prior to Encounter   Medication Sig    aspirin (ECOTRIN) 81 MG EC tablet Take 1 tablet (81 mg total) by mouth once daily.    clindamycin (CLEOCIN) 300 MG capsule Take 1 capsule (300 mg total) by mouth 4 (four) times daily. for 10 days    FLUoxetine (PROZAC) 20 MG capsule Take 1 capsule (20 mg total) by mouth once daily.    prenatal vit calc,iron,folic (PRENATAL VITAMIN ORAL) Take by mouth.    HYDROcodone-acetaminophen (NORCO) 5-325 mg per tablet Take 1 tablet by mouth every 6 (six) hours as needed for Pain.     Family History     Problem Relation (Age of Onset)    Other Father        Tobacco Use    Smoking status: Current Every Day Smoker     Packs/day: 1.00     Years: 18.00     Pack years: 18.00     Types: Cigarettes    Smokeless tobacco: Never Used    Tobacco comment: cutting to 2 cigarettes per day   Substance and Sexual Activity    Alcohol use: No     Alcohol/week: 0.0 oz     Drug use: No    Sexual activity: Yes     Partners: Male     Review of Systems   Constitutional: Negative for activity change, appetite change and fever.   HENT: Negative for congestion, ear pain, rhinorrhea and sinus pressure.    Eyes: Negative for pain and discharge.   Respiratory: Negative for cough, chest tightness, shortness of breath and wheezing.    Cardiovascular: Negative for chest pain and leg swelling.   Gastrointestinal: Positive for blood in stool and vomiting. Negative for abdominal distention, abdominal pain, diarrhea and nausea.   Endocrine: Negative for cold intolerance and heat intolerance.   Genitourinary: Negative for difficulty urinating, flank pain, frequency, hematuria and urgency.   Musculoskeletal: Positive for arthralgias and myalgias. Negative for joint swelling.   Allergic/Immunologic: Negative for environmental allergies and food allergies.   Neurological: Negative for dizziness, weakness, light-headedness and headaches.   Hematological: Does not bruise/bleed easily.   Psychiatric/Behavioral: Negative for agitation, behavioral problems and decreased concentration.     Objective:     Vital Signs (Most Recent):  Temp: 98 °F (36.7 °C) (12/08/18 1845)  Pulse: 62 (12/08/18 1955)  Resp: 20 (12/08/18 1955)  BP: 101/61 (12/08/18 1946)  SpO2: 100 % (12/08/18 1946) Vital Signs (24h Range):  Temp:  [98 °F (36.7 °C)-99 °F (37.2 °C)] 98 °F (36.7 °C)  Pulse:  [56-80] 62  Resp:  [13-21] 20  SpO2:  [99 %-100 %] 100 %  BP: ()/(52-80) 101/61     Weight: 112.5 kg (248 lb)  Body mass index is 35.58 kg/m².    Physical Exam   Constitutional: She is oriented to person, place, and time. She appears well-developed and well-nourished.   HENT:   Head: Normocephalic.   Eyes: Conjunctivae are normal. Right eye exhibits no discharge. Left eye exhibits no discharge.   Neck: Normal range of motion. Neck supple.   Cardiovascular: Normal rate, regular rhythm, normal heart sounds and intact distal pulses.    Pulmonary/Chest: Effort normal and breath sounds normal. No respiratory distress.   Abdominal: Soft. Bowel sounds are normal. She exhibits no distension. There is no tenderness.   Musculoskeletal: Normal range of motion.   Neurological: She is alert and oriented to person, place, and time. She has normal strength. GCS eye subscore is 4. GCS verbal subscore is 5. GCS motor subscore is 6.   Skin: Skin is warm and dry.   Psychiatric: She has a normal mood and affect. Her speech is normal and behavior is normal.           Significant Labs:   CBC:   Recent Labs   Lab 12/08/18  1702   WBC 9.32   HGB 11.8*   HCT 34.3*        CMP:   Recent Labs   Lab 12/08/18  1702      K 3.2*      CO2 22*   GLU 76   BUN 7   CREATININE 0.6   CALCIUM 9.0   PROT 6.7   ALBUMIN 3.0*   BILITOT 0.2   ALKPHOS 69   AST 11   ALT 8*   ANIONGAP 9   EGFRNONAA >60       Significant Imaging: I have reviewed all pertinent imaging results/findings within the past 24 hours.    Assessment/Plan:     * Lower GI bleed    Believe this is more likely a hemorrhoid bleed as only a single episode    Consult GI  Hold ASA for now  CBC in AM, monitor for more bleeding       17 weeks gestation of pregnancy    Noted         VTE Risk Mitigation (From admission, onward)        Ordered     Place HERMILO hose  Until discontinued      12/08/18 2039     Place sequential compression device  Until discontinued      12/08/18 2039     IP VTE HIGH RISK PATIENT  Once      12/08/18 2039     Reason for No Pharmacological VTE Prophylaxis  Once      12/08/18 2039             Lawrence Wilde NP  Department of Hospital Medicine   [facility]

## 2018-12-09 NOTE — ASSESSMENT & PLAN NOTE
Believe this is more likely a hemorrhoid bleed as only a single episode    Consult GI  Hold ASA for now  CBC in AM, monitor for more bleeding

## 2018-12-10 VITALS
RESPIRATION RATE: 18 BRPM | OXYGEN SATURATION: 99 % | SYSTOLIC BLOOD PRESSURE: 114 MMHG | HEIGHT: 70 IN | TEMPERATURE: 98 F | HEART RATE: 68 BPM | WEIGHT: 248 LBS | BODY MASS INDEX: 35.5 KG/M2 | DIASTOLIC BLOOD PRESSURE: 70 MMHG

## 2018-12-10 LAB
ALBUMIN SERPL BCP-MCNC: 2.5 G/DL
ALP SERPL-CCNC: 52 U/L
ALT SERPL W/O P-5'-P-CCNC: 8 U/L
ANION GAP SERPL CALC-SCNC: 9 MMOL/L
AST SERPL-CCNC: 8 U/L
BACTERIA UR CULT: NO GROWTH
BASOPHILS # BLD AUTO: 0.02 K/UL
BASOPHILS NFR BLD: 0.3 %
BILIRUB SERPL-MCNC: 0.2 MG/DL
BUN SERPL-MCNC: 8 MG/DL
C TRACH DNA SPEC QL NAA+PROBE: NOT DETECTED
CALCIUM SERPL-MCNC: 8.5 MG/DL
CHLORIDE SERPL-SCNC: 110 MMOL/L
CO2 SERPL-SCNC: 20 MMOL/L
CREAT SERPL-MCNC: 0.5 MG/DL
DIFFERENTIAL METHOD: ABNORMAL
EOSINOPHIL # BLD AUTO: 0.2 K/UL
EOSINOPHIL NFR BLD: 2.3 %
ERYTHROCYTE [DISTWIDTH] IN BLOOD BY AUTOMATED COUNT: 13.1 %
EST. GFR  (AFRICAN AMERICAN): >60 ML/MIN/1.73 M^2
EST. GFR  (NON AFRICAN AMERICAN): >60 ML/MIN/1.73 M^2
GLUCOSE SERPL-MCNC: 78 MG/DL
HCT VFR BLD AUTO: 30.5 %
HGB BLD-MCNC: 10.2 G/DL
LYMPHOCYTES # BLD AUTO: 2.2 K/UL
LYMPHOCYTES NFR BLD: 27.4 %
MAGNESIUM SERPL-MCNC: 1.8 MG/DL
MCH RBC QN AUTO: 29.2 PG
MCHC RBC AUTO-ENTMCNC: 33.4 G/DL
MCV RBC AUTO: 87 FL
MONOCYTES # BLD AUTO: 0.4 K/UL
MONOCYTES NFR BLD: 4.8 %
N GONORRHOEA DNA SPEC QL NAA+PROBE: NOT DETECTED
NEUTROPHILS # BLD AUTO: 5.2 K/UL
NEUTROPHILS NFR BLD: 64.9 %
PHOSPHATE SERPL-MCNC: 3.7 MG/DL
PLATELET # BLD AUTO: 216 K/UL
PMV BLD AUTO: 9.7 FL
POCT GLUCOSE: 76 MG/DL (ref 70–110)
POTASSIUM SERPL-SCNC: 3.4 MMOL/L
PROT SERPL-MCNC: 5.5 G/DL
RBC # BLD AUTO: 3.49 M/UL
SODIUM SERPL-SCNC: 139 MMOL/L
WBC # BLD AUTO: 7.97 K/UL

## 2018-12-10 PROCEDURE — G0378 HOSPITAL OBSERVATION PER HR: HCPCS

## 2018-12-10 PROCEDURE — 94761 N-INVAS EAR/PLS OXIMETRY MLT: CPT

## 2018-12-10 PROCEDURE — 83735 ASSAY OF MAGNESIUM: CPT

## 2018-12-10 PROCEDURE — 25000003 PHARM REV CODE 250: Performed by: NURSE PRACTITIONER

## 2018-12-10 PROCEDURE — 80053 COMPREHEN METABOLIC PANEL: CPT

## 2018-12-10 PROCEDURE — 85025 COMPLETE CBC W/AUTO DIFF WBC: CPT

## 2018-12-10 PROCEDURE — 99217 PR OBSERVATION CARE DISCHARGE: CPT | Mod: ,,, | Performed by: HOSPITALIST

## 2018-12-10 PROCEDURE — 84100 ASSAY OF PHOSPHORUS: CPT

## 2018-12-10 PROCEDURE — 25000003 PHARM REV CODE 250: Performed by: HOSPITALIST

## 2018-12-10 PROCEDURE — 36415 COLL VENOUS BLD VENIPUNCTURE: CPT

## 2018-12-10 RX ORDER — HYDROCODONE BITARTRATE AND ACETAMINOPHEN 5; 325 MG/1; MG/1
1 TABLET ORAL EVERY 6 HOURS PRN
Qty: 10 TABLET | Refills: 0 | Status: ON HOLD | OUTPATIENT
Start: 2018-12-10 | End: 2019-01-09 | Stop reason: HOSPADM

## 2018-12-10 RX ADMIN — PRENATAL VIT W/ FE FUMARATE-FA TAB 27-0.8 MG 1 TABLET: 27-0.8 TAB at 08:12

## 2018-12-10 RX ADMIN — FLUOXETINE 20 MG: 20 CAPSULE ORAL at 08:12

## 2018-12-10 RX ADMIN — HYDROCODONE BITARTRATE AND ACETAMINOPHEN 1 TABLET: 7.5; 325 TABLET ORAL at 08:12

## 2018-12-10 RX ADMIN — HYDROCODONE BITARTRATE AND ACETAMINOPHEN 1 TABLET: 7.5; 325 TABLET ORAL at 01:12

## 2018-12-10 NOTE — PLAN OF CARE
"MARIELENA signed and in pt chart.  Records from [facility] included in release of information form.  [facility] uses Epic and chart should be available under "Care Everywhere" tab once released by Nydia.     No anticipated DC needs from CM perspective.    Family to drive home.    CM to continue to follow.        12/10/18 1400   Discharge Assessment   Assessment Type Discharge Planning Reassessment   Confirmed/corrected address and phone number on facesheet? Yes   Assessment information obtained from? Patient   Communicated expected length of stay with patient/caregiver yes   Prior to hospitilization cognitive status: Alert/Oriented   Prior to hospitalization functional status: Independent   Current cognitive status: Alert/Oriented   Current Functional Status: Independent   Lives With spouse   Is patient able to care for self after discharge? Yes     "

## 2018-12-10 NOTE — PLAN OF CARE
See previous discharge planning assessment completed by LIBERTAD Atkins [facility]      12/09/18 8138   Discharge Assessment   Assessment Type Discharge Planning Assessment

## 2018-12-10 NOTE — NURSING
Pt given discharge eduction and packet. Pt is alert and oriented x4. Vitals are stable. No acute pain. Pt verifies understanding of all discharge education. Pt left floor by herself with intent to drive home.

## 2018-12-10 NOTE — DISCHARGE SUMMARY
[facility] Hospital Medicine  Discharge Summary      Patient Name: Anitra Chaney  MRN: [id number]  Admission Date: 12/8/2018  Hospital Length of Stay: 0 days  Discharge Date and Time:  12/10/2018 11:34 AM  Attending Physician: Imelda Sanchez MD   Discharging Provider: Imelda Sanchez MD  Primary Care Provider: Tien Watts MD      HPI:   Ms. Chaney is a 38 year old woman 17 weeks pregnant who presented with abdominal cramping and bloody stools.  She had 3 painless bowel movements today with bright red blood.  Following that she developed the cramping and then had a normal brown stool.  No vaginal bleeding.  She also vomited twice but did not see any blood in the vomitus.  She never had GI bleeding prior to this and does not have a history of hemorrhoids.  On presentation she had H/H 11.8/34.3, slightly below her baseline.    Medical history includes migraine headaches and a stroke in 1/2016 for which she was hospitalized at [facility] and was given tPA.  She reports the stroke was cerebellar and she was found to have a cardiac shunt while hospitalized.  At that time she also weighed 400 pounds.  She underwent a gastric sleeve in 9/2017 and has lost considerable weight, and because of the weight loss she no longer requires treatment for HTN or diabetes and the headaches have resolved.  She is on a daily ASA.  She had 2 normal pregnancies when she was younger, and then became pregnant again in June this year but miscarried.  She is considered high risk due to her age and history of stroke.  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              Hospital Course:   Patient was placed in observation and was seen by GI.  They recommended serial H/H and possible flex sig if bleeding occurred again.  Overnight she had several normal BMs without bleeding recurrence and she was feeling well on 12/10.      Consults:   Consults (From admission,  onward)        Status Ordering Provider     Inpatient consult to Gastroenterology  Once     Provider:  Gastroenterology    Acknowledged BRANNON WREN     Inpatient consult to Obstetrics  Once     Provider:  Sonja Fleming MD    Completed BRANNON WREN            Final Active Diagnoses:    Diagnosis Date Noted POA    PRINCIPAL PROBLEM:  Lower GI bleed [K92.2] 12/08/2018 Yes    17 weeks gestation of pregnancy [Z3A.17] 12/08/2018 Not Applicable      Problems Resolved During this Admission:       Discharged Condition: stable    Disposition: Home or Self Care    Follow Up:  Follow-up Information     Stella Yen MD.    Specialties:  Obstetrics, Obstetrics and Gynecology  Why:  Follow up for the next available appointment  Contact information:  [address] [id number] [phone]                 Patient Instructions:      Diet Adult Regular     Activity as tolerated         Medications:  Reconciled Home Medications:      Medication List      CONTINUE taking these medications    aspirin 81 MG EC tablet  Commonly known as:  ECOTRIN  Take 1 tablet (81 mg total) by mouth once daily.     FLUoxetine 20 MG capsule  Take 1 capsule (20 mg total) by mouth once daily.     HYDROcodone-acetaminophen 5-325 mg per tablet  Commonly known as:  NORCO  Take 1 tablet by mouth every 6 (six) hours as needed for Pain.     PRENATAL VITAMIN ORAL  Take by mouth.        STOP taking these medications    clindamycin 300 MG capsule  Commonly known as:  CLEOCIN            Time spent on the discharge of patient: <30 minutes  Patient was seen and examined on the date of discharge and determined to be suitable for discharge.         Imelda Sanderson MD  Department of Hospital Medicine  [facility]

## 2018-12-10 NOTE — PLAN OF CARE
12/10/18 1401   Final Note   Assessment Type Final Discharge Note   Anticipated Discharge Disposition Home   Hospital Follow Up  Appt(s) scheduled? Yes   Discharge plans and expectations educations in teach back method with documentation complete? Yes

## 2018-12-10 NOTE — PLAN OF CARE
Problem: Patient Care Overview  Goal: Plan of Care Review  Outcome: Ongoing (interventions implemented as appropriate)   12/10/18 7277   Plan of Care Review   Plan of Care Reviewed With patient   POC reviewed with pt. Pt AAO x 4, like to amublate in hallway frequently. Pt reports having multiple BM's this shift, no blood noted in any of the stools. Pt reports back pain, managed with PRN pain mediation. Pt remains free of falls and injury this shift. Call light in reach, bed in lowest position. Will continue to monitor.

## 2018-12-10 NOTE — PLAN OF CARE
12/10/2018    Patient: Anitra Chaeny    YOB: 1980    To whom it may concern,    Anitra Chaney was admitted to the hospital under my care on 12/8/2018 and was discharged on 12/10/2018.  Cleared to return to work on Tuesday December 11, with no restrictions.    If you have any questions or concerns, please don't hesitate to contact the department of hospital medicine at [phone].    Sincerely,        Imelda Sanchez MD MPH  Department of Hospital Medicine

## 2018-12-11 LAB
# FETUSES US: NORMAL
AFP INTERPRETATION: NORMAL
AFP MOM SERPL: 0.56
AFP SERPL-MCNC: 16.6 NG/ML
AFP SERPL-MCNC: NEGATIVE NG/ML
AGE AT DELIVERY: 39
GA (DAYS): 5 D
GA (WEEKS): 17 WK
GA METHOD: NORMAL
IDDM PATIENT QL: NORMAL
SMOKING STATUS FTND: NO

## 2018-12-12 ENCOUNTER — ROUTINE PRENATAL (OUTPATIENT)
Dept: OBSTETRICS AND GYNECOLOGY | Facility: CLINIC | Age: 38
End: 2018-12-12
Payer: COMMERCIAL

## 2018-12-12 VITALS
SYSTOLIC BLOOD PRESSURE: 118 MMHG | WEIGHT: 254.88 LBS | DIASTOLIC BLOOD PRESSURE: 70 MMHG | BODY MASS INDEX: 36.57 KG/M2

## 2018-12-12 DIAGNOSIS — O09.92 SUPERVISION OF HIGH RISK PREGNANCY IN SECOND TRIMESTER: Primary | ICD-10-CM

## 2018-12-12 DIAGNOSIS — O09.522 ELDERLY MULTIGRAVIDA IN SECOND TRIMESTER: ICD-10-CM

## 2018-12-12 DIAGNOSIS — O99.212 OBESITY AFFECTING PREGNANCY IN SECOND TRIMESTER: ICD-10-CM

## 2018-12-12 PROBLEM — Z3A.17 17 WEEKS GESTATION OF PREGNANCY: Status: RESOLVED | Noted: 2018-12-08 | Resolved: 2018-12-12

## 2018-12-12 PROCEDURE — 0502F SUBSEQUENT PRENATAL CARE: CPT | Mod: S$GLB,,, | Performed by: OBSTETRICS & GYNECOLOGY

## 2018-12-12 PROCEDURE — 99999 PR PBB SHADOW E&M-EST. PATIENT-LVL III: CPT | Mod: PBBFAC,,, | Performed by: OBSTETRICS & GYNECOLOGY

## 2018-12-12 NOTE — PROGRESS NOTES
Patient doing well today. Admitted over the weekend for GI bleed, workup found patient to be stable with no additional bleeding. Per patient, she either has hemorrhoid or diverticular disease, scope not performed.   Patient denies further GI bleeding.   Denies vaginal bleeding, LOF, or cramping. She reports FM.     /70   Wt 115.6 kg (254 lb 13.6 oz)   LMP 2018 (LMP Unknown)   BMI 36.57 kg/m²     38 y.o., at 18w1d by Estimated Date of Delivery: 19  Patient Active Problem List   Diagnosis    Mood altered    Migraine syndrome    Daytime somnolence    Class 2 obesity due to excess calories without serious comorbidity with body mass index (BMI) of 35.0 to 35.9 in adult    CVA (cerebral vascular accident) in 2017 at [facility].  no anticoagulants.  records requested from stroke center f/u.  not available    Ankle arthritis    Rh negative state in antepartum period    CHTN on no medications    Pregnancy, supervision, high-risk    Advanced maternal age in multigravida    Obesity complicating pregnancy    Lower GI bleed     OB History    Para Term  AB Living   4 2 2   1 2   SAB TAB Ectopic Multiple Live Births   1       2      # Outcome Date GA Lbr Yuval/2nd Weight Sex Delivery Anes PTL Lv   4 Current            3 SAB 18 10w0d          2 Term 09 37w0d  2.722 kg (6 lb) F CS-Unspec EPI  JIGAR   1 Term 05 37w0d  3.345 kg (7 lb 6 oz) M CS-Unspec EPI  JIGAR          Dating reviewed    Allergies and problem list reviewed and updated    Medical and surgical history reviewed    Prenatal labs reviewed and updated    Physical Exam:  ABD: soft, gravid, nontender  FHTs 140    Assessment:  Prenatal     Plan:   Prenatal   - labs utd  - genetic screening complete  - start connected mom     GI bleed  - Cont hold ASA  - no additional bleed    H/O CVA  - Previously on ASA, held in setting of GI bleed  - Records requested again as inconsistent history   - MFM following         follow up 4  Weeks, bleeding/pain precautions

## 2018-12-17 ENCOUNTER — PATIENT MESSAGE (OUTPATIENT)
Dept: OBSTETRICS AND GYNECOLOGY | Facility: CLINIC | Age: 38
End: 2018-12-17

## 2018-12-17 DIAGNOSIS — K92.2 LOWER GI BLEED: Primary | ICD-10-CM

## 2018-12-17 DIAGNOSIS — O09.522 ELDERLY MULTIGRAVIDA IN SECOND TRIMESTER: ICD-10-CM

## 2018-12-18 ENCOUNTER — HOSPITAL ENCOUNTER (EMERGENCY)
Facility: OTHER | Age: 38
Discharge: HOME | End: 2018-12-18
Attending: OBSTETRICS & GYNECOLOGY
Payer: COMMERCIAL

## 2018-12-18 ENCOUNTER — PATIENT MESSAGE (OUTPATIENT)
Dept: OBSTETRICS AND GYNECOLOGY | Facility: CLINIC | Age: 38
End: 2018-12-18

## 2018-12-18 VITALS
BODY MASS INDEX: 35.79 KG/M2 | TEMPERATURE: 97 F | WEIGHT: 250 LBS | HEIGHT: 70 IN | HEART RATE: 71 BPM | OXYGEN SATURATION: 99 % | RESPIRATION RATE: 16 BRPM | DIASTOLIC BLOOD PRESSURE: 66 MMHG | SYSTOLIC BLOOD PRESSURE: 116 MMHG

## 2018-12-18 DIAGNOSIS — F41.9 ANXIETY: ICD-10-CM

## 2018-12-18 DIAGNOSIS — K92.1 BLOODY STOOL: Primary | ICD-10-CM

## 2018-12-18 DIAGNOSIS — Z3A.19 19 WEEKS GESTATION OF PREGNANCY: ICD-10-CM

## 2018-12-18 DIAGNOSIS — Z86.73 HISTORY OF STROKE: ICD-10-CM

## 2018-12-18 DIAGNOSIS — K21.9 GASTROESOPHAGEAL REFLUX DISEASE, ESOPHAGITIS PRESENCE NOT SPECIFIED: ICD-10-CM

## 2018-12-18 LAB
ALBUMIN SERPL BCP-MCNC: 2.9 G/DL
ALP SERPL-CCNC: 65 U/L
ALT SERPL W/O P-5'-P-CCNC: 6 U/L
ANION GAP SERPL CALC-SCNC: 8 MMOL/L
AST SERPL-CCNC: 10 U/L
BASOPHILS # BLD AUTO: 0.03 K/UL
BASOPHILS NFR BLD: 0.3 %
BILIRUB SERPL-MCNC: 0.2 MG/DL
BILIRUB UR QL STRIP: NEGATIVE
BUN SERPL-MCNC: 6 MG/DL
CALCIUM SERPL-MCNC: 9.1 MG/DL
CHLORIDE SERPL-SCNC: 109 MMOL/L
CLARITY UR: CLEAR
CO2 SERPL-SCNC: 21 MMOL/L
COLOR UR: YELLOW
CREAT SERPL-MCNC: 0.6 MG/DL
DIFFERENTIAL METHOD: ABNORMAL
EOSINOPHIL # BLD AUTO: 0.2 K/UL
EOSINOPHIL NFR BLD: 1.6 %
ERYTHROCYTE [DISTWIDTH] IN BLOOD BY AUTOMATED COUNT: 13.2 %
EST. GFR  (AFRICAN AMERICAN): >60 ML/MIN/1.73 M^2
EST. GFR  (NON AFRICAN AMERICAN): >60 ML/MIN/1.73 M^2
GLUCOSE SERPL-MCNC: 81 MG/DL
GLUCOSE UR QL STRIP: NEGATIVE
HCT VFR BLD AUTO: 33 %
HGB BLD-MCNC: 11.1 G/DL
HGB UR QL STRIP: NEGATIVE
KETONES UR QL STRIP: NEGATIVE
LEUKOCYTE ESTERASE UR QL STRIP: NEGATIVE
LYMPHOCYTES # BLD AUTO: 2.2 K/UL
LYMPHOCYTES NFR BLD: 19.3 %
MCH RBC QN AUTO: 28.8 PG
MCHC RBC AUTO-ENTMCNC: 33.6 G/DL
MCV RBC AUTO: 86 FL
MONOCYTES # BLD AUTO: 0.5 K/UL
MONOCYTES NFR BLD: 4.2 %
NEUTROPHILS # BLD AUTO: 8.3 K/UL
NEUTROPHILS NFR BLD: 74.2 %
NITRITE UR QL STRIP: NEGATIVE
PH UR STRIP: 7 [PH] (ref 5–8)
PLATELET # BLD AUTO: 264 K/UL
PMV BLD AUTO: 9.4 FL
POTASSIUM SERPL-SCNC: 3.2 MMOL/L
PROT SERPL-MCNC: 6.4 G/DL
PROT UR QL STRIP: NEGATIVE
RBC # BLD AUTO: 3.85 M/UL
SODIUM SERPL-SCNC: 138 MMOL/L
SP GR UR STRIP: 1.01 (ref 1–1.03)
URN SPEC COLLECT METH UR: NORMAL
UROBILINOGEN UR STRIP-ACNC: NEGATIVE EU/DL
WBC # BLD AUTO: 11.19 K/UL

## 2018-12-18 PROCEDURE — 81003 URINALYSIS AUTO W/O SCOPE: CPT

## 2018-12-18 PROCEDURE — 25000003 PHARM REV CODE 250: Performed by: STUDENT IN AN ORGANIZED HEALTH CARE EDUCATION/TRAINING PROGRAM

## 2018-12-18 PROCEDURE — 99283 EMERGENCY DEPT VISIT LOW MDM: CPT

## 2018-12-18 PROCEDURE — 85025 COMPLETE CBC W/AUTO DIFF WBC: CPT

## 2018-12-18 PROCEDURE — 99284 EMERGENCY DEPT VISIT MOD MDM: CPT | Mod: ,,, | Performed by: OBSTETRICS & GYNECOLOGY

## 2018-12-18 PROCEDURE — 25000003 PHARM REV CODE 250: Performed by: OBSTETRICS & GYNECOLOGY

## 2018-12-18 PROCEDURE — 80053 COMPREHEN METABOLIC PANEL: CPT

## 2018-12-18 RX ORDER — HYDROCODONE BITARTRATE AND ACETAMINOPHEN 5; 325 MG/1; MG/1
1 TABLET ORAL EVERY 4 HOURS PRN
Qty: 10 TABLET | Refills: 0 | Status: ON HOLD | OUTPATIENT
Start: 2018-12-18 | End: 2019-01-09 | Stop reason: HOSPADM

## 2018-12-18 RX ORDER — POTASSIUM CHLORIDE 20 MEQ/1
40 TABLET, EXTENDED RELEASE ORAL ONCE
Status: COMPLETED | OUTPATIENT
Start: 2018-12-18 | End: 2018-12-18

## 2018-12-18 RX ORDER — HYDROCODONE BITARTRATE AND ACETAMINOPHEN 5; 325 MG/1; MG/1
1 TABLET ORAL ONCE
Status: COMPLETED | OUTPATIENT
Start: 2018-12-18 | End: 2018-12-18

## 2018-12-18 RX ADMIN — POTASSIUM CHLORIDE 40 MEQ: 1500 TABLET, EXTENDED RELEASE ORAL at 06:12

## 2018-12-18 RX ADMIN — HYDROCODONE BITARTRATE AND ACETAMINOPHEN 1 TABLET: 5; 325 TABLET ORAL at 05:12

## 2018-12-18 NOTE — ED PROVIDER NOTES
Encounter Date: 2018       History     Chief Complaint   Patient presents with    GI Bleeding     pt approx 19 weeks pregnant, started having lower back pain yesterday, 1 episode of dark marroon stool today. Pt reports admission for GI bleed Dec 8. Denies abd pain or cramping. No vaginal bleeding.     HPI   Anitra Chaney is a 38 y.o. [id number] at 19w0d presents complaining of maroon colored stool and back pain. Patient reports that at approx 1400 today, she was at work when she felt that she needed to have a bowel movement. At this time, she had 1x episode of maroon colored stool that filled the toilet bowl. She denies any abdominal pain or cramping. No bright red blood in the toilet bowl. Reports some nausea when she saw the stool however, no vomiting. Denies light headedness, dizziness, CP or SOB however has somewhat felt more tired over the past several days.     In addition over the past several days, she has had constant lower throbbing back pain which has increased in severity from 6/10 to 8/10 today. She has tried ibuprofen for her symptoms with little relief. Has a distant history of constipation however, has been having regular BMs which are usually loose given her history of gastric sleeve in 2017 when she lost 130 lbs. She had a EGD and colonoscopy done in  given history of GERD which showed gastritis and irritation.     Recent admission for similar symptoms on  where patient was observed and seen by GI. During this admission, they recommended possible flexible sigmoscopy if bleeding occurred again however, during this particular admission she had several normal BMs without recurrence and was discharged. Has follow up GI consult which was placed by primary OBGYN today. Patient has not scheduled appointment yet secondary to work constraints.     This IUP is complicated by above mentioned history and h/o CVA (2016), previously on ASA, anxiety (on zoloft 20 mg).  Patient  denies contractions, denies vaginal bleeding, denies LOF.   Fetal Movement: normal.     Review of patient's allergies indicates:  No Known Allergies  Past Medical History:   Diagnosis Date    Anxiety and depression     DM2 (diabetes mellitus, type 2)     diet control    GERD (gastroesophageal reflux disease)     HTN (hypertension), benign 3/10/2014    Migraines     Obesity     Polycystic disease, ovaries     Stroke 2017    Hospitalized at [facility]; given tPA     Past Surgical History:   Procedure Laterality Date    ANKLE FRACTURE SURGERY      ARTHRODESIS-ANKLE Left 3/27/2018    Performed by Lele Hern[facility]dez MD at [facility] OR 1ST FLR     SECTION, CLASSIC  2005/2009    x2    CHOLECYSTECTOMY  3/2002    lap maribel    EXCISION-MASS FOOT Left 3/2/2016    Performed by Lele Hern[facility]dez MD at [facility] OR 1ST FLR    FOOT SURGERY      gastric sleeve  2017     Family History   Problem Relation Age of Onset    Other Father         house fire    Breast cancer Neg Hx     Colon cancer Neg Hx     Ovarian cancer Neg Hx      Social History     Tobacco Use    Smoking status: Current Every Day Smoker     Packs/day: 1.00     Years: 18.00     Pack years: 18.00     Types: Cigarettes    Smokeless tobacco: Never Used    Tobacco comment: cutting to 2 cigarettes per day   Substance Use Topics    Alcohol use: No     Alcohol/week: 0.0 oz    Drug use: No     Review of Systems   Constitutional: Negative for chills, diaphoresis and fever.   HENT: Negative for congestion, postnasal drip, rhinorrhea, sneezing and sore throat.    Eyes: Negative for photophobia.   Respiratory: Negative for cough, chest tightness and shortness of breath.    Cardiovascular: Negative for chest pain, palpitations and leg swelling.   Gastrointestinal: Positive for blood in stool and nausea. Negative for abdominal pain, constipation, diarrhea and vomiting.   Genitourinary: Negative for dysuria, frequency, hematuria, vaginal bleeding and  vaginal discharge.   Musculoskeletal: Positive for back pain. Negative for arthralgias and joint swelling.   Neurological: Negative for syncope, light-headedness and headaches.   Psychiatric/Behavioral: Negative for self-injury, sleep disturbance and suicidal ideas. The patient is not nervous/anxious.        Physical Exam     Initial Vitals [12/18/18 1428]   BP Pulse Resp Temp SpO2   129/73 91 16 98.6 °F (37 °C) 98 %      MAP       --         Physical Exam    Constitutional: Vital signs are normal. She appears well-developed and well-nourished. She is not diaphoretic. No distress.   HENT:   Head: Normocephalic and atraumatic.   Eyes: Conjunctivae are normal. Right eye exhibits no discharge. Left eye exhibits no discharge.   Neck: Neck supple.   Cardiovascular: Normal rate, regular rhythm and normal heart sounds.   Pulmonary/Chest: Breath sounds normal. No respiratory distress. She exhibits no tenderness.   Abdominal: Soft. She exhibits no distension and no mass. There is no tenderness. There is no rebound and no guarding.   Genitourinary: Rectum normal. Rectal exam shows no external hemorrhoid, no internal hemorrhoid, no fissure and anal tone normal.   Musculoskeletal: She exhibits tenderness (Paraspinal tendernes in lower back with palpation).   Neurological: She is alert and oriented to person, place, and time. She has normal reflexes. No cranial nerve deficit.   Skin: Skin is warm and dry. Capillary refill takes less than 2 seconds. No rash noted. No erythema.   Psychiatric: She has a normal mood and affect. Her behavior is normal. Judgment and thought content normal.         ED Course   FHT: verified on bedside US  Labs Reviewed   CBC W/ AUTO DIFFERENTIAL - Abnormal; Notable for the following components:       Result Value    RBC 3.85 (*)     Hemoglobin 11.1 (*)     Hematocrit 33.0 (*)     Gran # (ANC) 8.3 (*)     Gran% 74.2 (*)     All other components within normal limits   URINALYSIS, REFLEX TO URINE CULTURE     Narrative:     Preferred Collection Type->Urine, Clean Catch   COMPREHENSIVE METABOLIC PANEL          Imaging Results    None          Medical Decision Making:   History:   Old Medical Records: I decided to obtain old medical records.  Old Records Summarized: records from clinic visits.  ED Management:  Patient presenting with 1x maroon colored stool today and back pain for past several days.  FHT: verified with bedside US  Physical exam: abdomen soft/nontender/ non distended, + active BS. Rectal exam: no internal or hemorrhoids noted. No signs of bleeding.   Patient has GI consult set up, unable to return phone call today due to work constraints. Will call tomorrow to set up appointment.  CBC: WNL, CMP: 3.2  Abdominal pain improved with norco   Will discharge home with precautions and klorocon/norco prior to discharge. All questions answered, patient verbalized understanding.               Attending Attestation:   Physician Attestation Statement for Resident:  As the supervising MD   Physician Attestation Statement: I have personally seen and examined this patient.   I agree with the above history. -:   As the supervising MD I agree with the above PE.    As the supervising MD I agree with the above treatment, course, plan, and disposition.  I was personally present during the critical portions of the procedure(s) performed by the resident and was immediately available in the ED to provide services and assistance as needed during the entire procedure.  I have reviewed and agree with the residents interpretation of the following: lab data and rhythm strips.  I have reviewed the following: old records at this facility.                    ED Course as of Dec 18 1749   Tue Dec 18, 2018   1716 I evaluated ms. Chaney and discussed plan with Dr. Hartley.  Pt had bloody BM, which she has had before.  She has GI consult set up, they called her today to set up appt, but she was unable to catch phone.  Rectal exam  basically normal.  Will get cBC, cMP.  If normal, will d/c home with some norco and follow up with GI ASAP. Come back for further bloody diarrhea  [HU]      ED Course User Index  [HU] Shrei Simpson DO     Clinical Impression:   The primary encounter diagnosis was Bloody stool. Diagnoses of Anxiety, 19 weeks gestation of pregnancy, Gastroesophageal reflux disease, esophagitis presence not specified, and History of stroke were also pertinent to this visit.      Disposition:   Disposition: Discharged  Condition: Stable                        Sandy Hartley MD  Resident  12/18/18 7519       Sheri Simpson DO  12/26/18 9692

## 2018-12-19 RX ORDER — HYDROCODONE BITARTRATE AND ACETAMINOPHEN 5; 325 MG/1; MG/1
1 TABLET ORAL EVERY 6 HOURS PRN
Qty: 10 TABLET | Refills: 0 | OUTPATIENT
Start: 2018-12-19

## 2018-12-20 ENCOUNTER — INITIAL CONSULT (OUTPATIENT)
Dept: MATERNAL FETAL MEDICINE | Facility: CLINIC | Age: 38
End: 2018-12-20
Attending: OBSTETRICS & GYNECOLOGY
Payer: COMMERCIAL

## 2018-12-20 ENCOUNTER — PROCEDURE VISIT (OUTPATIENT)
Dept: MATERNAL FETAL MEDICINE | Facility: CLINIC | Age: 38
End: 2018-12-20
Payer: COMMERCIAL

## 2018-12-20 VITALS — BODY MASS INDEX: 35.37 KG/M2 | DIASTOLIC BLOOD PRESSURE: 90 MMHG | WEIGHT: 246.5 LBS | SYSTOLIC BLOOD PRESSURE: 130 MMHG

## 2018-12-20 DIAGNOSIS — Z36.89 ENCOUNTER FOR FETAL ANATOMIC SURVEY: ICD-10-CM

## 2018-12-20 DIAGNOSIS — O09.91 HIGH-RISK PREGNANCY IN FIRST TRIMESTER: ICD-10-CM

## 2018-12-20 DIAGNOSIS — I63.9 CEREBROVASCULAR ACCIDENT (CVA), UNSPECIFIED MECHANISM: ICD-10-CM

## 2018-12-20 DIAGNOSIS — O09.522 ELDERLY MULTIGRAVIDA IN SECOND TRIMESTER: ICD-10-CM

## 2018-12-20 DIAGNOSIS — O99.212 OBESITY AFFECTING PREGNANCY IN SECOND TRIMESTER: Primary | ICD-10-CM

## 2018-12-20 DIAGNOSIS — Z36.89 ENCOUNTER FOR ULTRASOUND TO CHECK FETAL GROWTH: Primary | ICD-10-CM

## 2018-12-20 DIAGNOSIS — O09.521 ELDERLY MULTIGRAVIDA, CURRENTLY PREGNANT IN FIRST TRIMESTER: ICD-10-CM

## 2018-12-20 PROCEDURE — 3008F BODY MASS INDEX DOCD: CPT | Mod: CPTII,S$GLB,, | Performed by: OBSTETRICS & GYNECOLOGY

## 2018-12-20 PROCEDURE — 99999 PR PBB SHADOW E&M-EST. PATIENT-LVL III: CPT | Mod: PBBFAC,,, | Performed by: OBSTETRICS & GYNECOLOGY

## 2018-12-20 PROCEDURE — 99214 OFFICE O/P EST MOD 30 MIN: CPT | Mod: 25,S$GLB,, | Performed by: OBSTETRICS & GYNECOLOGY

## 2018-12-20 PROCEDURE — 76811 OB US DETAILED SNGL FETUS: CPT | Mod: S$GLB,,, | Performed by: OBSTETRICS & GYNECOLOGY

## 2018-12-20 NOTE — LETTER
December 20, 2018      Stella Yen MD  [address]  [address]  [place] [address]           [facility]  [address]  [place] [facility] [address]  Phone: [phone]          Patient: Anitra Chaney   MR Number: [id number]   YOB: 1980   Date of Visit: 12/20/2018       Dear Dr. Stella Yen:    Thank you for referring Anitra Chaney to me for evaluation. Attached you will find relevant portions of my assessment and plan of care.    If you have questions, please do not hesitate to call me. I look forward to following Anitra Chaney along with you.    Sincerely,    Joseph Ross MD    Enclosure  CC:  No Recipients    If you would like to receive this communication electronically, please contact [email or web address] or [phone] to request more information on [employer] Link access.    For providers and/or their staff who would like to refer a patient to [facility], please contact us through our one-stop-shop provider referral line, [facility], at [phone].    If you feel you have received this communication in error or would no longer like to receive these types of communications, please e-mail [email or web address]

## 2018-12-20 NOTE — NURSING
"Pt reported "7/10" pain to back with continued rectal bleeding. Dr. Ross was notified and patient was also scheduled to see GI later today at 230p.    "

## 2018-12-20 NOTE — PROGRESS NOTES
See viewpoint US report    Patient recently seen x 2 in ER for GI bleed.  She is S/P bariatric surgery recently stopped ASA 2/2 bleeds.  ER notes have set up GI consult and done 2nd time by Dr. Yen per pt.  Colonoscopy was supposed to be done if bleeding recurrent.  Per patient has had bleed today and another time since ER visit.    We are contacting GI to expedite colonoscopy.    Last CBC demonstrate stable H/H.    The patient was given an opportunity to ask questions about management and the diease process.  She expressed an understanding of and agreement to the above impression and plan. All questions were answered to her satisfaction.  She was given contact information to the [facility] clinic to address further concerns.      The approximate physician face-to-face time was 30 minutes. The majority of the time (>50%) was spent on counseling of the patient or coordination of care.

## 2018-12-21 DIAGNOSIS — F41.9 ANXIETY: ICD-10-CM

## 2018-12-21 DIAGNOSIS — F32.A DEPRESSION, UNSPECIFIED DEPRESSION TYPE: ICD-10-CM

## 2018-12-21 RX ORDER — FLUOXETINE HYDROCHLORIDE 20 MG/1
20 CAPSULE ORAL DAILY
Qty: 30 CAPSULE | Refills: 2 | Status: CANCELLED | OUTPATIENT
Start: 2018-12-21 | End: 2019-12-21

## 2018-12-27 RX ORDER — HYDROCODONE BITARTRATE AND ACETAMINOPHEN 5; 325 MG/1; MG/1
1 TABLET ORAL EVERY 4 HOURS PRN
Qty: 10 TABLET | Refills: 0 | Status: CANCELLED | OUTPATIENT
Start: 2018-12-27

## 2018-12-31 ENCOUNTER — TELEPHONE (OUTPATIENT)
Dept: OBSTETRICS AND GYNECOLOGY | Facility: CLINIC | Age: 38
End: 2018-12-31

## 2018-12-31 DIAGNOSIS — F41.9 ANXIETY: ICD-10-CM

## 2018-12-31 DIAGNOSIS — F32.A DEPRESSION, UNSPECIFIED DEPRESSION TYPE: ICD-10-CM

## 2018-12-31 RX ORDER — FLUOXETINE HYDROCHLORIDE 20 MG/1
20 CAPSULE ORAL DAILY
Qty: 30 CAPSULE | Refills: 12 | Status: SHIPPED | OUTPATIENT
Start: 2018-12-31 | End: 2019-02-13

## 2018-12-31 NOTE — TELEPHONE ENCOUNTER
----- Message from Marcie Harden LPN sent at 12/21/2018  2:08 PM CST -----   FLUoxetine 20 MG capsule       Sig: Take 1 capsule (20 mg total) by mouth once daily.   Disp:  30 capsule    Refills:  2   Start: 12/21/2018 - 12/21/2019   Class: Normal   For: Anxiety; Depression, unspecified depression type   Last ordered: 3 months ago by Stella Yen MD Last dispensed: 12/10/2018   Rx #: [id number]

## 2019-01-03 ENCOUNTER — TELEPHONE (OUTPATIENT)
Dept: OBSTETRICS AND GYNECOLOGY | Facility: CLINIC | Age: 39
End: 2019-01-03

## 2019-01-03 ENCOUNTER — PATIENT MESSAGE (OUTPATIENT)
Dept: OBSTETRICS AND GYNECOLOGY | Facility: CLINIC | Age: 39
End: 2019-01-03

## 2019-01-03 NOTE — TELEPHONE ENCOUNTER
----- Message from Candy Abebe MA sent at 1/2/2019 12:19 PM CST -----  Anitra Chaney would like a refill of the following medications:         HYDROcodone-acetaminophen (NORCO) 5-325 mg per tablet [Sandy Hartley MD]     Preferred pharmacy: [facility]   Delivery method: Pickup

## 2019-01-04 ENCOUNTER — PATIENT MESSAGE (OUTPATIENT)
Dept: OBSTETRICS AND GYNECOLOGY | Facility: CLINIC | Age: 39
End: 2019-01-04

## 2019-01-08 ENCOUNTER — OUTSIDE PLACE OF SERVICE (OUTPATIENT)
Dept: CARDIOLOGY | Facility: CLINIC | Age: 39
End: 2019-01-08
Payer: COMMERCIAL

## 2019-01-08 ENCOUNTER — ROUTINE PRENATAL (OUTPATIENT)
Dept: OBSTETRICS AND GYNECOLOGY | Facility: CLINIC | Age: 39
End: 2019-01-08
Attending: OBSTETRICS & GYNECOLOGY
Payer: COMMERCIAL

## 2019-01-08 VITALS
BODY MASS INDEX: 35.74 KG/M2 | SYSTOLIC BLOOD PRESSURE: 119 MMHG | WEIGHT: 249.13 LBS | DIASTOLIC BLOOD PRESSURE: 79 MMHG

## 2019-01-08 DIAGNOSIS — O99.212 OBESITY AFFECTING PREGNANCY IN SECOND TRIMESTER: ICD-10-CM

## 2019-01-08 DIAGNOSIS — O09.522 ELDERLY MULTIGRAVIDA IN SECOND TRIMESTER: ICD-10-CM

## 2019-01-08 DIAGNOSIS — M54.9 BACK PAIN AFFECTING PREGNANCY IN SECOND TRIMESTER: ICD-10-CM

## 2019-01-08 DIAGNOSIS — O99.891 BACK PAIN AFFECTING PREGNANCY IN SECOND TRIMESTER: ICD-10-CM

## 2019-01-08 DIAGNOSIS — O09.92 SUPERVISION OF HIGH RISK PREGNANCY IN SECOND TRIMESTER: Primary | ICD-10-CM

## 2019-01-08 PROCEDURE — 0502F SUBSEQUENT PRENATAL CARE: CPT | Mod: S$GLB,,, | Performed by: STUDENT IN AN ORGANIZED HEALTH CARE EDUCATION/TRAINING PROGRAM

## 2019-01-08 PROCEDURE — 99999 PR PBB SHADOW E&M-EST. PATIENT-LVL III: CPT | Mod: PBBFAC,,, | Performed by: STUDENT IN AN ORGANIZED HEALTH CARE EDUCATION/TRAINING PROGRAM

## 2019-01-08 PROCEDURE — 93010 PR ELECTROCARDIOGRAM REPORT: ICD-10-PCS | Mod: S$GLB,,, | Performed by: INTERNAL MEDICINE

## 2019-01-08 PROCEDURE — 99999 PR PBB SHADOW E&M-EST. PATIENT-LVL III: ICD-10-PCS | Mod: PBBFAC,,, | Performed by: STUDENT IN AN ORGANIZED HEALTH CARE EDUCATION/TRAINING PROGRAM

## 2019-01-08 PROCEDURE — 0502F PR SUBSEQUENT PRENATAL CARE: ICD-10-PCS | Mod: S$GLB,,, | Performed by: STUDENT IN AN ORGANIZED HEALTH CARE EDUCATION/TRAINING PROGRAM

## 2019-01-08 PROCEDURE — 93010 ELECTROCARDIOGRAM REPORT: CPT | Mod: S$GLB,,, | Performed by: INTERNAL MEDICINE

## 2019-01-08 NOTE — PROGRESS NOTES
Complaints today: back pain, left ankle pain    Pt reports continuing lower back pain that is not relieved with Tylenol. Was seen in [facility] on 18 and reports improvement in pain with Norco given at that time. Denies VB, LOF, ctx. Normal FM.    /79   Wt 113 kg (249 lb 1.9 oz)   LMP 2018 (LMP Unknown)   BMI 35.74 kg/m²     38 y.o., at 22w0d by Estimated Date of Delivery: 19  Patient Active Problem List   Diagnosis    Mood altered    Migraine syndrome    Daytime somnolence    Class 2 obesity due to excess calories without serious comorbidity with body mass index (BMI) of 35.0 to 35.9 in adult    CVA [facility]cere[facility] vascular accident) in 2017 at [facility].  no anticoagulants.  records requested from stroke center f/u.  not available    Ankle arthritis    Rh negative state in antepartum period    CHTN on no medications    Pregnancy, supervision, high-risk    Advanced maternal age in multigravida    Obesity complicating pregnancy    Lower GI bleed     OB History    Para Term  AB Living   4 2 2   1 2   SAB TAB Ectopic Multiple Live Births   1       2      # Outcome Date GA Lbr Yuval/2nd Weight Sex Delivery Anes PTL Lv   4 Current            3 SAB 18 10w0d          2 Term 09 37w0d  2.722 kg (6 lb) F CS-Unspec EPI  JIGAR   1 Term 05 37w0d  3.345 kg (7 lb 6 oz) M CS-Unspec EPI  JIGAR          Dating reviewed    Allergies and problem list reviewed and updated    Medical and surgical history reviewed    Prenatal labs reviewed and updated    Physical Exam:  ABD: soft, gravid, nontender    Assessment:  Pt is a 38 y.o.  at 22w0d who presents for routine OB follow up    Plan:   1. Supervision of high risk pregnancy in second trimester  - glucose screen at next visit    2. Obesity affecting pregnancy in second trimester      3. Elderly multigravida in second trimester      4. Back pain affecting pregnancy in second trimester  - Ambulatory Referral to Physical/Occupational  Therapy      Follow up  4 Weeks, labor precautions     Sonja Ardon MD   OBGYN, PGY-1

## 2019-01-09 ENCOUNTER — HOSPITAL ENCOUNTER (OUTPATIENT)
Facility: HOSPITAL | Age: 39
Discharge: HOME | End: 2019-01-09
Attending: EMERGENCY MEDICINE | Admitting: INTERNAL MEDICINE
Payer: COMMERCIAL

## 2019-01-09 VITALS
HEIGHT: 66 IN | RESPIRATION RATE: 17 BRPM | DIASTOLIC BLOOD PRESSURE: 68 MMHG | BODY MASS INDEX: 42.41 KG/M2 | SYSTOLIC BLOOD PRESSURE: 121 MMHG | OXYGEN SATURATION: 97 % | HEART RATE: 66 BPM | TEMPERATURE: 99 F | WEIGHT: 263.88 LBS

## 2019-01-09 DIAGNOSIS — R20.2 PARESTHESIA OF RIGHT ARM AND LEG: Primary | ICD-10-CM

## 2019-01-09 DIAGNOSIS — G43.019 INTRACTABLE MIGRAINE WITHOUT AURA AND WITHOUT STATUS MIGRAINOSUS: ICD-10-CM

## 2019-01-09 DIAGNOSIS — Z3A.22 22 WEEKS GESTATION OF PREGNANCY: ICD-10-CM

## 2019-01-09 PROBLEM — G43.909 MIGRAINE: Status: ACTIVE | Noted: 2019-01-09

## 2019-01-09 PROBLEM — R29.818 ACUTE FOCAL NEUROLOGICAL DEFICIT: Status: ACTIVE | Noted: 2019-01-09

## 2019-01-09 LAB
ALBUMIN SERPL BCP-MCNC: 2.4 G/DL
ALP SERPL-CCNC: 57 U/L
ALT SERPL W/O P-5'-P-CCNC: 6 U/L
AMPHET+METHAMPHET UR QL: NEGATIVE
ANION GAP SERPL CALC-SCNC: 11 MMOL/L
APTT BLDCRRT: 26.5 SEC
AST SERPL-CCNC: 15 U/L
BACTERIA #/AREA URNS HPF: NORMAL /HPF
BARBITURATES UR QL SCN>200 NG/ML: NEGATIVE
BENZODIAZ UR QL SCN>200 NG/ML: NORMAL
BILIRUB DIRECT SERPL-MCNC: 0.1 MG/DL
BILIRUB SERPL-MCNC: 0.2 MG/DL
BILIRUB UR QL STRIP: NEGATIVE
BUN SERPL-MCNC: 7 MG/DL
BZE UR QL SCN: NEGATIVE
CALCIUM SERPL-MCNC: 8.6 MG/DL
CANNABINOIDS UR QL SCN: NEGATIVE
CHLORIDE SERPL-SCNC: 110 MMOL/L
CHOLEST SERPL-MCNC: 143 MG/DL
CHOLEST/HDLC SERPL: 2.6 {RATIO}
CLARITY UR: CLEAR
CO2 SERPL-SCNC: 18 MMOL/L
COLOR UR: YELLOW
CREAT SERPL-MCNC: 0.6 MG/DL
CREAT UR-MCNC: 243.8 MG/DL
ERYTHROCYTE [DISTWIDTH] IN BLOOD BY AUTOMATED COUNT: 13.4 %
EST. GFR  (AFRICAN AMERICAN): >60 ML/MIN/1.73 M^2
EST. GFR  (NON AFRICAN AMERICAN): >60 ML/MIN/1.73 M^2
ESTIMATED AVG GLUCOSE: 94 MG/DL
GLUCOSE SERPL-MCNC: 84 MG/DL
GLUCOSE UR QL STRIP: NEGATIVE
HBA1C MFR BLD HPLC: 4.9 %
HCT VFR BLD AUTO: 30.4 %
HDLC SERPL-MCNC: 54 MG/DL
HDLC SERPL: 37.8 %
HGB BLD-MCNC: 10 G/DL
HGB UR QL STRIP: NEGATIVE
INR PPP: 0.9
KETONES UR QL STRIP: NEGATIVE
LDLC SERPL CALC-MCNC: 63.8 MG/DL
LEUKOCYTE ESTERASE UR QL STRIP: ABNORMAL
MAGNESIUM SERPL-MCNC: 2.2 MG/DL
MAGNESIUM SERPL-MCNC: 2.2 MG/DL
MCH RBC QN AUTO: 28.8 PG
MCHC RBC AUTO-ENTMCNC: 32.9 G/DL
MCV RBC AUTO: 88 FL
METHADONE UR QL SCN>300 NG/ML: NEGATIVE
MICROSCOPIC COMMENT: NORMAL
NITRITE UR QL STRIP: NEGATIVE
NONHDLC SERPL-MCNC: 89 MG/DL
OPIATES UR QL SCN: NEGATIVE
PCP UR QL SCN>25 NG/ML: NEGATIVE
PH UR STRIP: 7 [PH] (ref 5–8)
PHOSPHATE SERPL-MCNC: 3.6 MG/DL
PHOSPHATE SERPL-MCNC: 3.9 MG/DL
PLATELET # BLD AUTO: 237 K/UL
PMV BLD AUTO: 9.9 FL
POTASSIUM SERPL-SCNC: 3.4 MMOL/L
PROT SERPL-MCNC: 5.5 G/DL
PROT UR QL STRIP: NEGATIVE
PROTHROMBIN TIME: 9.8 SEC
RBC # BLD AUTO: 3.47 M/UL
RBC #/AREA URNS HPF: 1 /HPF (ref 0–4)
SODIUM SERPL-SCNC: 139 MMOL/L
SP GR UR STRIP: 1.02 (ref 1–1.03)
SQUAMOUS #/AREA URNS HPF: 2 /HPF
TOXICOLOGY INFORMATION: NORMAL
TRIGL SERPL-MCNC: 126 MG/DL
TSH SERPL DL<=0.005 MIU/L-ACNC: 0.92 UIU/ML
URN SPEC COLLECT METH UR: ABNORMAL
UROBILINOGEN UR STRIP-ACNC: NEGATIVE EU/DL
WBC # BLD AUTO: 9.13 K/UL
WBC #/AREA URNS HPF: 4 /HPF (ref 0–5)

## 2019-01-09 PROCEDURE — 80061 LIPID PANEL: CPT

## 2019-01-09 PROCEDURE — 96361 HYDRATE IV INFUSION ADD-ON: CPT

## 2019-01-09 PROCEDURE — 84100 ASSAY OF PHOSPHORUS: CPT | Mod: 91

## 2019-01-09 PROCEDURE — 80048 BASIC METABOLIC PNL TOTAL CA: CPT

## 2019-01-09 PROCEDURE — G0378 HOSPITAL OBSERVATION PER HR: HCPCS

## 2019-01-09 PROCEDURE — 25000003 PHARM REV CODE 250: Performed by: NURSE PRACTITIONER

## 2019-01-09 PROCEDURE — 92523 SPEECH SOUND LANG COMPREHEN: CPT

## 2019-01-09 PROCEDURE — 99225 PR SUBSEQUENT OBSERVATION CARE,LEVEL II: ICD-10-PCS | Mod: ,,, | Performed by: PSYCHIATRY & NEUROLOGY

## 2019-01-09 PROCEDURE — 85027 COMPLETE CBC AUTOMATED: CPT

## 2019-01-09 PROCEDURE — 99285 EMERGENCY DEPT VISIT HI MDM: CPT | Mod: 25

## 2019-01-09 PROCEDURE — 80307 DRUG TEST PRSMV CHEM ANLYZR: CPT

## 2019-01-09 PROCEDURE — 84443 ASSAY THYROID STIM HORMONE: CPT

## 2019-01-09 PROCEDURE — 96360 HYDRATION IV INFUSION INIT: CPT

## 2019-01-09 PROCEDURE — 25000003 PHARM REV CODE 250: Performed by: PHYSICIAN ASSISTANT

## 2019-01-09 PROCEDURE — 83735 ASSAY OF MAGNESIUM: CPT

## 2019-01-09 PROCEDURE — 92610 EVALUATE SWALLOWING FUNCTION: CPT

## 2019-01-09 PROCEDURE — 25000003 PHARM REV CODE 250: Performed by: EMERGENCY MEDICINE

## 2019-01-09 PROCEDURE — 85610 PROTHROMBIN TIME: CPT

## 2019-01-09 PROCEDURE — 85730 THROMBOPLASTIN TIME PARTIAL: CPT

## 2019-01-09 PROCEDURE — 99225 PR SUBSEQUENT OBSERVATION CARE,LEVEL II: CPT | Mod: ,,, | Performed by: PSYCHIATRY & NEUROLOGY

## 2019-01-09 PROCEDURE — A4216 STERILE WATER/SALINE, 10 ML: HCPCS | Performed by: NURSE PRACTITIONER

## 2019-01-09 PROCEDURE — 83036 HEMOGLOBIN GLYCOSYLATED A1C: CPT

## 2019-01-09 PROCEDURE — 80076 HEPATIC FUNCTION PANEL: CPT

## 2019-01-09 PROCEDURE — 36415 COLL VENOUS BLD VENIPUNCTURE: CPT

## 2019-01-09 PROCEDURE — 99900038 HC OT GENERIC THERAPY SCREENING (STAT)

## 2019-01-09 PROCEDURE — 81000 URINALYSIS NONAUTO W/SCOPE: CPT | Mod: 59

## 2019-01-09 PROCEDURE — 97161 PT EVAL LOW COMPLEX 20 MIN: CPT

## 2019-01-09 RX ORDER — SODIUM CHLORIDE 0.9 % (FLUSH) 0.9 %
3 SYRINGE (ML) INJECTION EVERY 8 HOURS
Status: DISCONTINUED | OUTPATIENT
Start: 2019-01-09 | End: 2019-01-09 | Stop reason: HOSPADM

## 2019-01-09 RX ORDER — ACETAMINOPHEN 500 MG
500 TABLET ORAL EVERY 8 HOURS PRN
Status: DISCONTINUED | OUTPATIENT
Start: 2019-01-09 | End: 2019-01-09

## 2019-01-09 RX ORDER — SODIUM CHLORIDE 9 MG/ML
1000 INJECTION, SOLUTION INTRAVENOUS CONTINUOUS
Status: ACTIVE | OUTPATIENT
Start: 2019-01-09 | End: 2019-01-09

## 2019-01-09 RX ORDER — BUTALBITAL, ACETAMINOPHEN AND CAFFEINE 50; 325; 40 MG/1; MG/1; MG/1
TABLET ORAL
Qty: 4 TABLET | Refills: 0 | Status: SHIPPED | OUTPATIENT
Start: 2019-01-09 | End: 2019-02-04 | Stop reason: SDUPTHER

## 2019-01-09 RX ORDER — BUTALBITAL, ACETAMINOPHEN AND CAFFEINE 50; 325; 40 MG/1; MG/1; MG/1
1 TABLET ORAL EVERY 4 HOURS PRN
Status: DISCONTINUED | OUTPATIENT
Start: 2019-01-09 | End: 2019-01-09 | Stop reason: HOSPADM

## 2019-01-09 RX ORDER — LANOLIN ALCOHOL/MO/W.PET/CERES
400 CREAM (GRAM) TOPICAL ONCE
Status: COMPLETED | OUTPATIENT
Start: 2019-01-09 | End: 2019-01-09

## 2019-01-09 RX ADMIN — ACETAMINOPHEN 500 MG: 500 TABLET ORAL at 04:01

## 2019-01-09 RX ADMIN — Medication 3 ML: at 03:01

## 2019-01-09 RX ADMIN — BUTALBITAL, ACETAMINOPHEN, AND CAFFEINE 1 TABLET: 50; 325; 40 TABLET ORAL at 10:01

## 2019-01-09 RX ADMIN — MAGNESIUM OXIDE TAB 400 MG (241.3 MG ELEMENTAL MG) 400 MG: 400 (241.3 MG) TAB at 04:01

## 2019-01-09 RX ADMIN — BUTALBITAL, ACETAMINOPHEN, AND CAFFEINE 1 TABLET: 50; 325; 40 TABLET ORAL at 03:01

## 2019-01-09 RX ADMIN — SODIUM CHLORIDE 1000 ML: 0.9 INJECTION, SOLUTION INTRAVENOUS at 02:01

## 2019-01-09 RX ADMIN — Medication 3 ML: at 06:01

## 2019-01-09 RX ADMIN — SODIUM CHLORIDE 1000 ML: 0.9 INJECTION, SOLUTION INTRAVENOUS at 06:01

## 2019-01-09 NOTE — PT/OT/SLP EVAL
Physical Therapy Evaluation and Discharge Note    Patient Name:  Anitra Chaney   MRN:  [id number]    Recommendations:     Discharge Recommendations:  other (see comments)(HOME)   Discharge Equipment Recommendations: none   Barriers to discharge: None    Assessment:     Anitra Chaney is a 38 y.o. female admitted with a medical diagnosis of Acute focal neurological deficit. .  At this time, patient is functioning at their prior level of function and does not require further acute PT services.     Recent Surgery: * No surgery found *      Plan:     During this hospitalization, patient does not require further acute PT services.  Please re-consult if situation changes.      Subjective     Chief Complaint: HEADACHE  Patient/Family Comments/goals:   Pain/Comfort:  · Pain Rating 1: 6/10  · Location 1: head  · Pain Rating Post-Intervention 1: 6/10  · Location 2: neck    Patients cultural, spiritual, Tenriism conflicts given the current situation:      Living Environment:  PT LIVES WITH  AND SON 1 STORY HOUSE NO STEPS, PT AMB INDEP COMMUNITY DISTANCES, STILL DRIVES AND WORKS, INDEP WITH ADL'S  Prior to admission, patients level of function was INDEP.  Equipment used at home: none.  DME owned (not currently used): none.  Upon discharge, patient will have assistance from FAMILY.    Objective:     Communicated with NURSE CUEVAS prior to session.  Patient found SUPINE upon PT entry to room found with: telemetry, peripheral IV     General Precautions: Standard   Orthopedic Precautions:N/A   Braces: N/A     Exams:  · Cognitive Exam:  Patient is oriented to Person, Place, Time and Situation  · Postural Exam:  Patient presented with the following abnormalities:    · -       No postural abnormalities identified  · Sensation:    · -       Impaired  light/touch PT C/O HEAVINESS TO RUE  · RLE ROM: WFL  · RLE Strength: WFL  · LLE ROM: WFL except FUSION TO L ANKLE  · LLE Strength: WFL    Functional  Mobility:  · Bed Mobility:     · Rolling Left:  modified independence  · Rolling Right: modified independence  · Scooting: modified independence  · Supine to Sit: modified independence  · Sit to Supine: modified independence  · Transfers:     · Sit to Stand:  modified independence with no AD  · Gait: PT ' NO AD, NO LOB OR SOB ON ROOM AIR  · Balance: GOOD    AM-PAC 6 CLICK MOBILITY  Total Score:21       Therapeutic Activities and Exercises:   PT EDUCATED IN ROLE OF P.T., PT RETURN TO SUPINE PER HER REQUEST DUE TO C/O HEADACHE    AM-PAC 6 CLICK MOBILITY  Total Score:21     Patient left supine with all lines intact, call button in reach and NURSE notified.    GOALS:   Multidisciplinary Problems     Physical Therapy Goals     Not on file                History:     Past Medical History:   Diagnosis Date    Anxiety and depression     DM2 (diabetes mellitus, type 2)     diet control    GERD (gastroesophageal reflux disease)     HTN (hypertension), benign 3/10/2014    Migraines     Obesity     Polycystic disease, ovaries     Stroke 2017    Hospitalized at [facility]; given tPA       Past Surgical History:   Procedure Laterality Date    ANKLE FRACTURE SURGERY      ARTHRODESIS-ANKLE Left 3/27/2018    [facility] at [facility] OR 1ST FLR     SECTION, CLASSIC  2005/2009    x2    CHOLECYSTECTOMY  3/2002    lap maribel    EXCISION-MASS FOOT Left 3/2/2016    Performed by Lele [facility] at [facility] OR 1ST FLR    FOOT SURGERY      gastric sleeve  2017       Clinical Decision Making:     History  Co-morbidities and personal factors that may impact the plan of care Examination  Body Structures and Functions, activity limitations and participation restrictions that may impact the plan of care Clinical Presentation   Decision Making/ Complexity Score   Co-morbidities:   [] Time since onset of injury / illness / exacerbation  [] Status of current condition  []Patient's cognitive  status and safety concerns    [] Multiple Medical Problems (see med hx)  Personal Factors:   [] Patient's age  [] Prior Level of function   [] Patient's home situation (environment and family support)  [] Patient's level of motivation  [] Expected progression of patient      HISTORY:(criteria)    [] 16862 - no personal factors/history    [] 61586 - has 1-2 personal factor/comorbidity     [] 12962 - has >3 personal factor/comorbidity     Body Regions:  [] Objective examination findings  [] Head     []  Neck  [] Trunk   [] Upper Extremity  [] Lower Extremity    Body Systems:  [] For communication ability, affect, cognition, language, and learning style: the assessment of the ability to make needs known, consciousness, orientation (person, place, and time), expected emotional /behavioral responses, and learning preferences (eg, learning barriers, education  needs)  [] For the neuromuscular system: a general assessment of gross coordinated movement (eg, balance, gait, locomotion, transfers, and transitions) and motor function  (motor control and motor learning)  [] For the musculoskeletal system: the assessment of gross symmetry, gross range of motion, gross strength, height, and weight  [] For the integumentary system: the assessment of pliability(texture), presence of scar formation, skin color, and skin integrity  [] For cardiovascular/pulmonary system: the assessment of heart rate, respiratory rate, blood pressure, and edema     Activity limitations:    [] Patient's cognitive status and saf ety concerns          [] Status of current condition      [] Weight bearing restriction  [] Cardiopulmunary Restriction    Participation Restrictions:   [] Goals and goal agreement with the patient     [] Rehab potential (prognosis) and probable outcome      Examination of Body System: (criteria)    [] 01078 - addressing 1-2 elements    [] 42123 - addressing a total of 3 or more elements     [] 52132 -  Addressing a total of 4 or  more elements         Clinical Presentation: (criteria)  Choose one     On examination of body system using standardized tests and measures patient presents with (CHOOSE ONE) elements from any of the following: body structures and functions, activity limitations, and/or participation restrictions.  Leading to a clinical presentation that is considered (CHOOSE ONE)                              Clinical Decision Making  (Eval Complexity):  Choose One     Time Tracking:     PT Received On: 01/09/19  PT Start Time: 0820     PT Stop Time: 0845  PT Total Time (min): 25 min     Billable Minutes: Evaluation 15 and Gait Training 10    [place], PT  01/09/2019

## 2019-01-09 NOTE — PLAN OF CARE
CM met with patient and spouse at bedside to assess discharge needs. Patient lives at home with  and is independent with needs. Patient ambulates safely independently and denies any recent falls. No dc needs identified at this time. Updated patient white board with current d/c plan and CM contact information. Encouraged patient to contact CM if any questions or concerns arise.    DC Plan: home/selfcare    Transportation home at d/c: spouse       01/09/19 1505   Discharge Assessment   Assessment Type Discharge Planning Assessment   Confirmed/corrected address and phone number on facesheet? Yes   Assessment information obtained from? Patient   Prior to hospitilization cognitive status: Alert/Oriented   Prior to hospitalization functional status: Independent   Current cognitive status: Alert/Oriented   Current Functional Status: Independent   Lives With spouse;child(rosalia), dependent   Able to Return to Prior Arrangements yes   Is patient able to care for self after discharge? Yes   Patient's perception of discharge disposition home or selfcare   Readmission Within the Last 30 Days no previous admission in last 30 days   Patient currently being followed by outpatient case management? No   Patient currently receives any other outside agency services? No   Equipment Currently Used at Home none   Do you have any problems affording any of your prescribed medications? No   Is the patient taking medications as prescribed? yes   Does the patient have transportation home? Yes   Does the patient receive services at the Coumadin Clinic? No   Discharge Plan A Home   DME Needed Upon Discharge  none   Patient/Family in Agreement with Plan yes

## 2019-01-09 NOTE — HOSPITAL COURSE
The pt is a 38 y.o. female patient with DM, HTN, and CVA with TPA in 2016 who is 22 weeks pregnant  placed in observation as a transfer from outside hospital for MRI/Neurology services for further evaluation for HA, right facial numbness, and R sided heavness. MRI brain showed nothing acute. Neurology evaluated pt and felt pt had a complex migraine. Pt had relief with Fioricet. Neurology recommended Fioricet -no more than twice per week. Prescription provided.

## 2019-01-09 NOTE — PLAN OF CARE
Problem: Adult Inpatient Plan of Care  Goal: Plan of Care Review  Outcome: Ongoing (interventions implemented as appropriate)  Pt discharged home, PIV removed, tele-monitor removed, discharge instructions reviewed with pt

## 2019-01-09 NOTE — HPI
Anitra Chaney is a 38 y.o. female patient with PMHx of DM, HTN, and CVA with TPA in 2016 who is 22 weeks pregnant presents as a transfer from outside hospital for MRI/Neurology services for further evaluation for R sided heavness. Onset couple of days ago.  Associated sxs include right side headache, right upper ext numbness.  No mitigating or exacerbating factors reported. Patient reports seeing Dr. Yen for OB/GYN. telestroke evalaution at [facility] done and note reviewed under chart review.  No further complaints or concerns at this time. Patient evaluated in Er. OB/GYN consulted by Er who plan to see today. HM consulted.  Mild hypotensive to normotensive vitals. Gentle IV hydration x 1 L ordered. Labs pending. MRI planned for AM and if negative plan to transfer care to OB services.

## 2019-01-09 NOTE — ED NOTES
Pt reports a history of cva affecting her right upper extrem but states her right arm feels heavy.  Pt reports headache and neck pain.  Pt is lying on her right side in the bed resting with her eyes closed. Pt transferred to [facility] for MRI.  Pt waiting to be seen by md

## 2019-01-09 NOTE — SUBJECTIVE & OBJECTIVE
Past Medical History:   Diagnosis Date    Anxiety and depression     DM2 (diabetes mellitus, type 2)     diet control    GERD (gastroesophageal reflux disease)     HTN (hypertension), benign 3/10/2014    Migraines     Obesity     Polycystic disease, ovaries     Stroke 2017    Hospitalized at [facility]; given tPA       Past Surgical History:   Procedure Laterality Date    ANKLE FRACTURE SURGERY      ARTHRODESIS-ANKLE Left 3/27/2018    Performed by Lele Hern[facility]dez MD at [facility] OR 1ST FLR     SECTION, CLASSIC  2005/2009    x2    CHOLECYSTECTOMY  3/2002    lap maribel    EXCISION-MASS FOOT Left 3/2/2016    Performed by Lele Hern[facility]dez [facility] at [facility] OR 1ST FLR    FOOT SURGERY      gastric sleeve  2017       Review of patient's allergies indicates:  No Known Allergies    No current facility-administered medications on file prior to encounter.      Current Outpatient Medications on File Prior to Encounter   Medication Sig    FLUoxetine 20 MG capsule Take 1 capsule (20 mg total) by mouth once daily.    HYDROcodone-acetaminophen (NORCO) 5-325 mg per tablet Take 1 tablet by mouth every 6 (six) hours as needed for Pain.    HYDROcodone-acetaminophen (NORCO) 5-325 mg per tablet Take 1 tablet by mouth every 4 (four) hours as needed for Pain.    prenatal vit calc,iron,folic (PRENATAL VITAMIN ORAL) Take by mouth.     Family History     Problem Relation (Age of Onset)    Other Father        Tobacco Use    Smoking status: Current Every Day Smoker     Packs/day: 1.00     Years: 18.00     Pack years: 18.00     Types: Cigarettes    Smokeless tobacco: Never Used    Tobacco comment: cutting to 2 cigarettes per day   Substance and Sexual Activity    Alcohol use: No     Alcohol/week: 0.0 oz    Drug use: No    Sexual activity: Yes     Partners: Male     Review of Systems   Constitutional: Negative for chills, diaphoresis, fatigue and fever.   HENT: Negative for congestion, sore throat and voice change.     Eyes: Negative for photophobia and visual disturbance.   Respiratory: Negative for cough, shortness of breath, wheezing and stridor.    Cardiovascular: Negative for chest pain and leg swelling.   Gastrointestinal: Negative for abdominal distention, abdominal pain, constipation, diarrhea, nausea and vomiting.   Endocrine: Negative for polydipsia, polyphagia and polyuria.   Genitourinary: Negative for difficulty urinating, dysuria, flank pain, pelvic pain, urgency and vaginal discharge.   Musculoskeletal: Negative for back pain, joint swelling, neck pain and neck stiffness.   Skin: Negative for color change and rash.   Allergic/Immunologic: Negative for immunocompromised state.   Neurological: Positive for weakness, numbness and headaches. Negative for dizziness and syncope.   Hematological: Does not bruise/bleed easily.   Psychiatric/Behavioral: Negative for agitation, behavioral problems and confusion.     Objective:     Vital Signs (Most Recent):  Temp: 98 °F (36.7 °C) (01/09/19 0330)  Pulse: 62 (01/09/19 0530)  Resp: 16 (01/09/19 0530)  BP: (!) 92/59 (01/09/19 0530)  SpO2: 97 % (01/09/19 0530) Vital Signs (24h Range):  Temp:  [98 °F (36.7 °C)-98.8 °F (37.1 °C)] 98 °F (36.7 °C)  Pulse:  [54-75] 62  Resp:  [16-18] 16  SpO2:  [97 %-100 %] 97 %  BP: ()/(52-79) 92/59        Body mass index is 40.21 kg/m².    Physical Exam   Constitutional: She is oriented to person, place, and time. She appears well-developed and well-nourished. No distress.   HENT:   Head: Normocephalic and atraumatic.   Nose: Nose normal.   Eyes: Conjunctivae and EOM are normal. Pupils are equal, round, and reactive to light. No scleral icterus.   Neck: Normal range of motion. Neck supple. No spinous process tenderness present. No neck rigidity. No tracheal deviation and normal range of motion present.   Cardiovascular: Normal rate, regular rhythm, normal heart sounds and intact distal pulses.   No murmur heard.  Pulmonary/Chest: Effort  normal and breath sounds normal. No stridor. No respiratory distress. She has no wheezes. She has no rales.   Abdominal: Soft. Bowel sounds are normal. She exhibits no distension. There is no tenderness. There is no guarding.   Obese  Pregnant     Musculoskeletal: Normal range of motion. She exhibits no edema or deformity.   Neurological: She is alert and oriented to person, place, and time. No cranial nerve deficit.   NIH 0   Skin: Skin is warm and dry. Capillary refill takes less than 2 seconds. No rash noted. She is not diaphoretic.   Psychiatric: She has a normal mood and affect. Her behavior is normal. Judgment and thought content normal.   Nursing note and vitals reviewed.        CRANIAL NERVES     CN III, IV, VI   Pupils are equal, round, and reactive to light.  Extraocular motions are normal.        Significant Labs: All pertinent labs within the past 24 hours have been reviewed.  Results for orders placed or performed during the hospital encounter of 01/09/19   Magnesium   Result Value Ref Range    Magnesium 2.2 1.6 - 2.6 mg/dL   Phosphorus   Result Value Ref Range    Phosphorus 3.9 2.7 - 4.5 mg/dL   TSH   Result Value Ref Range    TSH 0.923 0.400 - 4.000 uIU/mL   Phosphorus   Result Value Ref Range    Phosphorus 3.6 2.7 - 4.5 mg/dL   APTT   Result Value Ref Range    aPTT 26.5 21.0 - 32.0 sec   Protime-INR   Result Value Ref Range    Prothrombin Time 9.8 9.0 - 12.5 sec    INR 0.9 0.8 - 1.2   Hepatic function panel   Result Value Ref Range    Total Protein 5.5 (L) 6.0 - 8.4 g/dL    Albumin 2.4 (L) 3.5 - 5.2 g/dL    Total Bilirubin 0.2 0.1 - 1.0 mg/dL    Bilirubin, Direct 0.1 0.1 - 0.3 mg/dL    AST 15 10 - 40 U/L    ALT 6 (L) 10 - 44 U/L    Alkaline Phosphatase 57 55 - 135 U/L       Significant Imaging: I have reviewed all pertinent imaging results/findings within the past 24 hours.   Imaging Results    CT READ Per TeleStroke:   No hemmorhage. No mass effect. No early infarct signs.     MRI pending

## 2019-01-09 NOTE — PT/OT/SLP EVAL
Speech Language Pathology Evaluation  Cognitive/Bedside Swallow    Patient Name:  Anitra Chaney   MRN:  [id number]  Admitting Diagnosis: Acute focal neurological deficit    Recommendations:                  General Recommendations:  Follow-up not indicated  Diet recommendations:  Regular, Thin   Aspiration Precautions: HOB to 90 degrees and Remain upright 30 minutes post meal   General Precautions: Standard,      History:     Past Medical History:   Diagnosis Date    Anxiety and depression     DM2 (diabetes mellitus, type 2)     diet control    GERD (gastroesophageal reflux disease)     HTN (hypertension), benign 3/10/2014    Migraines     Obesity     Polycystic disease, ovaries     Stroke 2017    Hospitalized at [facility]; given tPA       Past Surgical History:   Procedure Laterality Date    ANKLE FRACTURE SURGERY      ARTHRODESIS-ANKLE Left 3/27/2018    Performed by Lele Hernández MD at [facility] OR 1ST FLR     SECTION, CLASSIC  2005/2009    x2    CHOLECYSTECTOMY  3/2002    lap maribel    EXCISION-MASS FOOT Left 3/2/2016    Performed by Lele Hernández MD at [facility] OR 1ST FLR    FOOT SURGERY      gastric sleeve  2017       Social History: Patient lives with her  and son.  She works full time and Independent PTA.    Prior diet: No dysphagia reported and pt tolerating a Regular consistency diet.     Subjective   Pt seen sitting on the couch in her room with her  and son present.  She is awake, alert, and oriented.  Patient goals: pt states she wants to go home     Pain/Comfort:  · Pain Rating 1: 7/10  · Location 1: head  · Pain Addressed 1: Nurse notified, Distraction    Objective:     Cognitive Status:    No acute deficits, WFL      Receptive Language:   Comprehension:      WFL    Pragmatics:    No acute deficits, WFL    Expressive Language:  Verbal:    No acute deficits, WFL      Motor Speech:  WFL    Voice:   WFL    Visual-Spatial:  WFL    Oral Musculature  Evaluation  · Oral Musculature: WFL  · Dentition: present and adequate    Bedside Swallow Eval:   Consistencies Assessed:  · Thin liquids and solids     Oral Phase:   · WFL    Pharyngeal Phase:   · no overt clinical signs/symptoms of aspiration  · no overt clinical signs/symptoms of pharyngeal dysphagia      Assessment:     Anitra Chaney is a 38 y.o. female with an SLP diagnosis of normal speech and language abilities.  She presents with no dysphagia, aphasia, dysarthria, or cog/ling deficits.  No further skilled ST warranted at this time.       Plan:      · SLP Follow-Up:  No       Discharge recommendations:    Home  Barriers to Discharge:  None    Time Tracking:     SLP Treatment Date:   01/09/19  Speech Start Time:  1000  Speech Stop Time:  1030     Speech Total Time (min):  30 min    Billable Minutes: Eval 15  and Eval Swallow and Oral Function 15    Anna Hernandez CCC-SLP  01/09/2019

## 2019-01-09 NOTE — ED PROVIDER NOTES
SCRIBE #1 NOTE: I, Roxana Kline, am scribing for, and in the presence of, Deneen Florez MD. I have scribed the entire note.         History     Chief Complaint   Patient presents with    Numbness     22 weeks gestation right arm numbness and falcial heaviness transfer from [facility]       Review of patient's allergies indicates:  No Known Allergies      History of Present Illness   HPI    2019, 12:49 AM  History obtained from the patient      History of Present Illness: Anitra Chaney is a 38 y.o. female patient with PMHx of DM, HTN, and CVA who is 22 weeks pregnant presents to the Emergency Department for R sided numbness which onset gradually 2 days ago. Patient was evaluated at [facility] for sxs and was tele stroked; no TPA administered. Patient was transferred for admission and MRI. Symptoms are worsening and moderate in severity. The patient describes the sxs as heaviness. No mitigating or exacerbating factors reported. Associated sxs include HA. Patient denies any fever, chills, slurred speech, drooling, extremity weakness, confusion, N/V, and all other sxs at this time. Patient reports hx of CVA in 2016 that required TPA. Patient reports seeing Dr. Yen for OB/GYN. No further complaints or concerns at this time.     Arrival mode: Personal vehicle      PCP: Tien Watts MD        Past Medical History:  Past Medical History:   Diagnosis Date    Anxiety and depression     DM2 (diabetes mellitus, type 2)     diet control    GERD (gastroesophageal reflux disease)     HTN (hypertension), benign 3/10/2014    Migraines     Obesity     Polycystic disease, ovaries     Stroke 2017    Hospitalized at [facility]; given tPA       Past Surgical History:  Past Surgical History:   Procedure Laterality Date    ANKLE FRACTURE SURGERY      ARTHRODESIS-ANKLE Left 3/27/2018    Performed [facility] 1ST FLR     SECTION, CLASSIC  2005/2009    x2     CHOLECYSTECTOMY  3/2002    lap maribel    EXCISION-MASS FOOT Left 3/2/2016    [facility]    FOOT SURGERY      gastric sleeve  09/01/2017         Family History:  Family History   Problem Relation Age of Onset    Other Father         house fire    Breast cancer Neg Hx     Colon cancer Neg Hx     Ovarian cancer Neg Hx        Social History:  Social History     Tobacco Use    Smoking status: Current Every Day Smoker     Packs/day: 1.00     Years: 18.00     Pack years: 18.00     Types: Cigarettes    Smokeless tobacco: Never Used    Tobacco comment: cutting to 2 cigarettes per day   Substance and Sexual Activity    Alcohol use: No     Alcohol/week: 0.0 oz    Drug use: No    Sexual activity: Yes     Partners: Male        Review of Systems   Review of Systems   Constitutional: Negative for chills and fever.   HENT: Negative for drooling and sore throat.    Respiratory: Negative for shortness of breath.    Cardiovascular: Negative for chest pain.   Gastrointestinal: Negative for nausea and vomiting.   Genitourinary: Negative for dysuria.   Musculoskeletal: Negative for back pain.   Skin: Negative for rash.   Neurological: Positive for numbness (R sided) and headaches. Negative for facial asymmetry, speech difficulty and weakness.   Hematological: Does not bruise/bleed easily.   Psychiatric/Behavioral: Negative for confusion.   All other systems reviewed and are negative.       Physical Exam     Initial Vitals [01/09/19 0027]   BP Pulse Resp Temp SpO2   107/64 (!) 54 18 98.8 °F (37.1 °C) 97 %      MAP       --          Physical Exam  Nursing Notes and Vital Signs Reviewed.  Constitutional: Patient is in no acute distress. Well-developed and well-nourished.  Head: Atraumatic. Normocephalic.  Eyes: PERRL. EOM intact. Conjunctivae are not pale. No scleral icterus.  ENT: Mucous membranes are moist. Oropharynx is clear and symmetric.    Neck: Supple. Full ROM. No  "lymphadenopathy.  Cardiovascular: Regular rate. Regular rhythm. No murmurs, rubs, or gallops. Distal pulses are 2+ and symmetric.  Pulmonary/Chest: No respiratory distress. Clear to auscultation bilaterally. No wheezing or rales.  Abdominal: Soft and non-distended.  There is no tenderness.  No rebound, guarding, or rigidity.   Musculoskeletal: Moves all extremities. No obvious deformities.   Skin: Warm and dry.  Neurological:  Alert, awake, and appropriate.  Normal speech.  No acute focal neurological deficits are appreciated.  Psychiatric: Normal affect. Good eye contact. Appropriate in content.     ED Course   Procedures  ED Vital Signs:  Vitals:    01/09/19 0027 01/09/19 0030 01/09/19 0100 01/09/19 0115   BP: 107/64 (!) 94/57 (!) 112/59    Pulse: (!) 54 70 75 64   Resp: 18      Temp: 98.8 °F (37.1 °C)      TempSrc: Oral      SpO2: 97% 98% 98% 97%   Height: 5' 6" (1.676 m)       01/09/19 0130 01/09/19 0145 01/09/19 0200 01/09/19 0215   BP: (!) 92/52  (!) 93/55    Pulse: 68 70 69 67   Resp:       Temp:       TempSrc:       SpO2: 97% 97% 97% 98%   Height:        01/09/19 0230 01/09/19 0300 01/09/19 0330 01/09/19 0400   BP: 97/65 (!) 91/58 (!) 95/55 107/69   Pulse: 68 63 66 63   Resp:       Temp:   98 °F (36.7 °C)    TempSrc:   Oral    SpO2: 97% 98% 98% 98%   Height:        01/09/19 0430 01/09/19 0500   BP: 106/66 116/62   Pulse: 63 71   Resp:  17   Temp:     TempSrc:     SpO2: 99% 99%   Height:         Abnormal Lab Results:  Labs Reviewed   HEPATIC FUNCTION PANEL - Abnormal; Notable for the following components:       Result Value    Total Protein 5.5 (*)     Albumin 2.4 (*)     ALT 6 (*)     All other components within normal limits    Narrative:     Fasting   ALCOHOL,MEDICAL (ETHANOL)   MAGNESIUM   PHOSPHORUS   PHOSPHORUS    Narrative:     Fasting   APTT    Narrative:     Fasting   PROTIME-INR    Narrative:     Fasting   BASIC METABOLIC PANEL   CBC W/ AUTO DIFFERENTIAL   DRUG SCREEN PANEL, URINE EMERGENCY   TSH "   MAGNESIUM   LIPID PANEL   HEMOGLOBIN A1C   URINALYSIS        All Lab Results:  Results for orders placed or performed during the hospital encounter of 01/09/19   Magnesium   Result Value Ref Range    Magnesium 2.2 1.6 - 2.6 mg/dL   Phosphorus   Result Value Ref Range    Phosphorus 3.9 2.7 - 4.5 mg/dL   Phosphorus   Result Value Ref Range    Phosphorus 3.6 2.7 - 4.5 mg/dL   APTT   Result Value Ref Range    aPTT 26.5 21.0 - 32.0 sec   Protime-INR   Result Value Ref Range    Prothrombin Time 9.8 9.0 - 12.5 sec    INR 0.9 0.8 - 1.2   Hepatic function panel   Result Value Ref Range    Total Protein 5.5 (L) 6.0 - 8.4 g/dL    Albumin 2.4 (L) 3.5 - 5.2 g/dL    Total Bilirubin 0.2 0.1 - 1.0 mg/dL    Bilirubin, Direct 0.1 0.1 - 0.3 mg/dL    AST 15 10 - 40 U/L    ALT 6 (L) 10 - 44 U/L    Alkaline Phosphatase 57 55 - 135 U/L       Imaging Results:  Imaging Results    None                    The Emergency Provider reviewed the vital signs and test results, which are outlined above.     ED Discussion     12:52 AM: I have discussed test results, shared treatment plan, and the need for admission with patient and family at bedside. Pt and family express understanding at this time and agree with all information. All questions answered. Pt and family have no further questions or concerns at this time. Pt is ready for admit.    1:19 AM: Dr. Florez discussed the pt's case with Osmar Mendez NP ([facility]) who recommends consult OB/GYN.    1:45 AM: Dr. Florez discussed the pt's case with Dr. Stinson (OB/GYN) states she will see patient on the floor and states as long as fetus has fetal heart tones, can proceed with stroke workup.    1:58 AM: Discussed case with Dr. Aaron ([facility]) who agrees with current care and management of pt and accepts admission.   Admitting Service: Hospital medicine   Admitting Physician: Dr. Aaron  Admit to: Obs-tele            ED Medication(s):  Medications   sodium chloride 0.9% flush 3 mL  (not administered)   0.9%  NaCl infusion (1,000 mLs Intravenous New Bag 1/9/19 2425)   acetaminophen tablet 500 mg (500 mg Oral Given 1/9/19 6230)   magnesium oxide tablet 400 mg (400 mg Oral Given 1/9/19 0430)                  Medical Decision Making     Medical Decision Making:   Clinical Tests:   Lab Tests: Reviewed  Radiological Study: Reviewed             Scribe Attestation:   Scribe #1: I performed the above scribed service and the documentation accurately describes the services I performed. I attest to the accuracy of the note.     Attending:   Physician Attestation Statement for Scribe #1: I, Deneen Florez MD, personally performed the services described in this documentation, as scribed by Roxana Kline, in my presence, and it is both accurate and complete.           Clinical Impression       ICD-10-CM ICD-9-CM   1. Paresthesia of right arm and leg R20.2 782.0   2. 22 weeks gestation of pregnancy Z3A.22 V22.2       Disposition:   Disposition: Placed in Observation  Condition: Fair         Deneen Florez MD  01/09/19 6016

## 2019-01-09 NOTE — CONSULTS
Subjective:       Patient ID: nAitra Chaney is a 38 y.o. female.    Chief Complaint: Numbness (22 weeks gestation right arm numbness and falcial heaviness transfer from [place] [facility])    HPI     The patient was admitted for evaluation of headache associated with RT facial/RUE heaviness.  The symptoms developed gradually after throbbing headache with light and noise sensitivity. Brain MRI is NL. Of note, the patient is 22 weeks pregnant.with PMHx of DM, HTN, and cerebellar stroke S/P t-PA in 2016 off ASA due to GIB. She is S/P bariatric surgery as well.       Review of Systems   Constitutional: Negative for appetite change and fatigue.   HENT: Negative for hearing loss and tinnitus.    Eyes: Negative for photophobia and visual disturbance.   Respiratory: Negative for apnea and shortness of breath.    Cardiovascular: Negative for chest pain and palpitations.   Gastrointestinal: Negative for nausea and vomiting.   Endocrine: Negative for cold intolerance and heat intolerance.   Genitourinary: Negative for difficulty urinating and urgency.   Musculoskeletal: Negative for arthralgias, back pain, gait problem, joint swelling, myalgias, neck pain and neck stiffness.   Skin: Negative for color change and rash.   Allergic/Immunologic: Negative for environmental allergies and immunocompromised state.   Neurological: Positive for weakness, numbness and headaches. Negative for dizziness, tremors, seizures, syncope, facial asymmetry, speech difficulty and light-headedness.   Hematological: Negative for adenopathy. Does not bruise/bleed easily.   Psychiatric/Behavioral: Negative for agitation, behavioral problems, confusion, decreased concentration, dysphoric mood, hallucinations, self-injury, sleep disturbance and suicidal ideas. The patient is not hyperactive.          Current Facility-Administered Medications:     butalbital-acetaminophen-caffeine -40 mg per tablet 1 tablet, 1 tablet, Oral, Q4H PRN, Jade THOMPSON  FIORELLA Simmons, 1 tablet at 19 1513    sodium chloride 0.9% flush 3 mL, 3 mL, Intravenous, Q8H, Osmar Mendez NP, 3 mL at 19 1513  Past Medical History:   Diagnosis Date    Anxiety and depression     DM2 (diabetes mellitus, type 2)     diet control    GERD (gastroesophageal reflux disease)     HTN (hypertension), benign 3/10/2014    Migraines     Obesity     Polycystic disease, ovaries     Stroke 2017    Hospitalized at [facility]; given tPA     Past Surgical History:   Procedure Laterality Date    ANKLE FRACTURE SURGERY      ARTHRODESIS-ANKLE Left 3/27/2018    Performed by Lele [facility]dez [facility] at [facility] OR 1ST FLR     SECTION, CLASSIC  2005/2009    x2    CHOLECYSTECTOMY  3/2002    lap maribel    EXCISION-MASS FOOT Left 3/2/2016    Performed by Lele [facility] at [facility] OR 1ST FLR    FOOT SURGERY      gastric sleeve  2017     Social History     Socioeconomic History    Marital status: Single     Spouse name: Not on file    Number of children: Not on file    Years of education: Not on file    Highest education level: Not on file   Social Needs    Financial resource strain: Not on file    Food insecurity - worry: Not on file    Food insecurity - inability: Not on file    Transportation needs - medical: Not on file    Transportation needs - non-medical: Not on file   Occupational History     Employer: [employer]   Tobacco Use    Smoking status: Current Every Day Smoker     Packs/day: 1.00     Years: 18.00     Pack years: 18.00     Types: Cigarettes    Smokeless tobacco: Never Used    Tobacco comment: cutting to 2 cigarettes per day   Substance and Sexual Activity    Alcohol use: No     Alcohol/week: 0.0 oz    Drug use: No    Sexual activity: Yes     Partners: Male   Other Topics Concern    Caffeine Use Not Asked    Financial Status: Disabled Not Asked    Legal: Involved in criminal litigation Not Asked    Caffeine Use: Frequent Not Asked     Financial Status: Employed Not Asked    Legal: Other Not Asked    Caffeine Use: Moderate Not Asked    Financial Status: Unemployed Not Asked    Leisure: Exercise Not Asked    Caffeine Use: Substantial Not Asked    Financial Status: Other Not Asked    Leisure: Fishing Not Asked    Childhood History: Adopted Not Asked    Firearms: Does patient have access to a firearm? Not Asked    Leisure: Hunting Not Asked    Childhood History: Early trauma Not Asked    Home situation: homeless Not Asked    Leisure: Movie Watching Not Asked    Childhood History: Raised by parents Not Asked    Home situation: lives alone Not Asked    Leisure: Shopping Not Asked    Childhood History: Uneventful Not Asked    Home situation: lives in group home Not Asked    Leisure: Sports Not Asked    Childhood History: Other Not Asked    Home situation: lives in nursing home Not Asked    Leisure: Time with family Not Asked    Education: Unfinished High School Not Asked    Home situation: lives in shelter Not Asked     Service Not Asked    Education: High School Graduate Not Asked    Home situation: lives with family Not Asked    Spirituality: Active Participation Not Asked    Education: Unfinished college Not Asked    Home situation: lives with friends Not Asked    Spirituality: Organized Yazidi Not Asked    Education: Trade School Not Asked    Home situation: lives with significant other Not Asked    Spirituality: Private Participation Not Asked    Education: Associate's Degree Not Asked    Home situation: lives with spouse Not Asked    Patient feels they ought to cut down on drinking/drug use Not Asked    Education: Bachelor's Degree Not Asked    Legal consequences of chemical use Not Asked    Patient annoyed by others criticizing their drinking/drug use Not Asked    Education: More than one Bachelor's or Professional Not Asked    Legal: Arrest history Not Asked    Patient has felt bad or  guilty about drinking/drug use Not Asked    Education: Master's, PhD Not Asked    Legal: Involved in civil litigation Not Asked    Patient has had a drink/used drugs as an eye opener in the AM Not Asked   Social History Narrative    Not on file       Objective:     GENERAL APPEARANCE:     The patient looks comfortable.    No signs of medical or psychiatric distress.    Normal breathing pattern.    No dysmorphic features    Normal eye contact.     GENERAL MEDICAL EXAM:    HEENT:  Head is atraumatic normocephalic. No tender temporal arteries.     Neck and Axillae: No JVD. No carotid bruits. No thyromegaly. No lymphadenopathy.    Cardiopulmonary: No cyanosis. No tachypnea. Normal respiratory effort.  Clear breath sounds. Normal heart sounds with regular rhythm and no murmurs.    Gastrointestinal:  No stomas or lesions. No hernias.  Abdomen is soft non-tender. No masses or organomegaly.    Skin, Hair and Nails: No pathognonomic skin rash. No neurofibromatosis.   No stigmata of autoimmune disease.     Limbs: No varicose veins. No edema. Symmetric pulses.     Muskoskeletal: No deformities.No spine tenderness.   No signs of longstanding neuropathy. No dislocations or fractures.        Neurologic Exam     Mental Status   Oriented to person, place, and time.   Registration: recalls 3 of 3 objects. Recall at 5 minutes: recalls 3 of 3 objects. Follows 3 step commands.   Attention: normal. Concentration: normal.   Speech: speech is normal   Level of consciousness: alert  Knowledge: good and consistent with education. Able to perform simple calculations.   Able to name object. Able to read. Able to repeat. Able to write. Normal comprehension.     Cranial Nerves     CN II   Visual fields full to confrontation.   Visual acuity: normal  Right visual field deficit: none  Left visual field deficit: none     CN III, IV, VI   Pupils are equal, round, and reactive to light.  Extraocular motions are normal.   Right pupil: Size: 2 mm.  Shape: regular. Reactivity: brisk. Consensual response: intact. Accommodation: intact.   Left pupil: Size: 2 mm. Shape: regular. Reactivity: brisk. Consensual response: intact. Accommodation: intact.   CN III: no CN III palsy  CN VI: no CN VI palsy  Nystagmus: none   Diplopia: none  Ophthalmoparesis: none  Upgaze: normal  Downgaze: normal  Conjugate gaze: present  Vestibulo-ocular reflex: present    CN V   Facial sensation intact.   Right facial sensation deficit: cheeks  Left facial sensation deficit: none  Right corneal reflex: normal  Left corneal reflex: normal    CN VII   Right facial weakness: none  Left facial weakness: none  Right taste: normal  Left taste: normal    CN VIII   CN VIII normal.   Hearing: intact  Right Rinne: AC > BC  Left Rinne: AC > BC  Haywood: does not lateralize     CN IX, X   CN IX normal.   CN X normal.   Palate: symmetric  Right gag reflex: normal  Left gag reflex: normal    CN XI   CN XI normal.   Right sternocleidomastoid strength: normal  Left sternocleidomastoid strength: normal  Right trapezius strength: normal  Left trapezius strength: normal    CN XII   CN XII normal.   Tongue: not atrophic  Fasciculations: absent  Tongue deviation: none    Motor Exam   Muscle bulk: normal  Overall muscle tone: normal  Right arm tone: normal  Left arm tone: normal  Right arm pronator drift: absent  Left arm pronator drift: absent  Right leg tone: normal  Left leg tone: normal    Strength   Strength 5/5 throughout.   Right neck flexion: 5/5  Left neck flexion: 5/5  Right neck extension: 5/5  Left neck extension: 5/5  Right deltoid: 5/5  Left deltoid: 5/5  Right biceps: 5/5  Left biceps: 5/5  Right triceps: 5/5  Left triceps: 5/5  Right wrist flexion: 5/5  Left wrist flexion: 5/5  Right wrist extension: 5/5  Left wrist extension: 5/5  Right interossei: 5/5  Left interossei: 5/5  Right abdominals: 5/5  Left abdominals: 5/5  Right iliopsoas: 5/5  Left iliopsoas: 5/5  Right quadriceps: 5/5  Left  quadriceps: 5/5  Right hamstrin/5  Left hamstrin/5  Right glutei: 5/5  Left glutei: 5/5  Right anterior tibial: 5/5  Left anterior tibial: 5/5  Right posterior tibial: 5/5  Left posterior tibial: 5/5  Right peroneal: 5/5  Left peroneal: 5/5  Right gastroc: 5/5  Left gastroc: 5/5    Sensory Exam   Light touch normal.   Right arm light touch: RUE Dysesthesia   Left arm light touch: normal  Right leg light touch: normal  Left leg light touch: normal  Vibration normal.   Right arm vibration: normal  Left arm vibration: normal  Right leg vibration: normal  Left leg vibration: normal  Proprioception normal.   Right arm proprioception: normal  Left arm proprioception: normal  Right leg proprioception: normal  Left leg proprioception: normal  Pinprick normal.   Right arm pinprick: normal  Left arm pinprick: normal  Right leg pinprick: normal  Left leg pinprick: normal  Graphesthesia: normal  Stereognosis: normal    Gait, Coordination, and Reflexes     Gait  Gait: normal    Coordination   Romberg: negative  Finger to nose coordination: normal  Heel to shin coordination: normal  Tandem walking coordination: normal    Tremor   Resting tremor: absent  Intention tremor: absent  Action tremor: absent    Reflexes   Right brachioradialis: 2+  Left brachioradialis: 2+  Right biceps: 2+  Left biceps: 2+  Right triceps: 2+  Left triceps: 2+  Right patellar: 2+  Left patellar: 2+  Right achilles: 2+  Left achilles: 2+  Right : 2+  Left : 2+  Right plantar: normal  Left plantar: normal  Right Nunez: absent  Left Nunez: absent  Right ankle clonus: absent  Left ankle clonus: absent  Right pendular knee jerk: absent  Left pendular knee jerk: absent      Lab Results   Component Value Date    WBC 9.13 2019    HGB 10.0 (L) 2019    HCT 30.4 (L) 2019    MCV 88 2019     2019     Sodium   Date Value Ref Range Status   2019 139 136 - 145 mmol/L Final     Potassium   Date Value Ref  Range Status   01/09/2019 3.4 (L) 3.5 - 5.1 mmol/L Final     Chloride   Date Value Ref Range Status   01/09/2019 110 95 - 110 mmol/L Final     CO2   Date Value Ref Range Status   01/09/2019 18 (L) 23 - 29 mmol/L Final     Glucose   Date Value Ref Range Status   01/09/2019 84 70 - 110 mg/dL Final     BUN, Bld   Date Value Ref Range Status   01/09/2019 7 6 - 20 mg/dL Final     Creatinine   Date Value Ref Range Status   01/09/2019 0.6 0.5 - 1.4 mg/dL Final     Calcium   Date Value Ref Range Status   01/09/2019 8.6 (L) 8.7 - 10.5 mg/dL Final     Total Protein   Date Value Ref Range Status   01/09/2019 5.5 (L) 6.0 - 8.4 g/dL Final     Albumin   Date Value Ref Range Status   01/09/2019 2.4 (L) 3.5 - 5.2 g/dL Final     Total Bilirubin   Date Value Ref Range Status   01/09/2019 0.2 0.1 - 1.0 mg/dL Final     Comment:     For infants and newborns, interpretation of results should be based  on gestational age, weight and in agreement with clinical  observations.  Premature Infant recommended reference ranges:  Up to 24 hours.............<8.0 mg/dL  Up to 48 hours............<12.0 mg/dL  3-5 days..................<15.0 mg/dL  6-29 days.................<15.0 mg/dL       Alkaline Phosphatase   Date Value Ref Range Status   01/09/2019 57 55 - 135 U/L Final     AST   Date Value Ref Range Status   01/09/2019 15 10 - 40 U/L Final     ALT   Date Value Ref Range Status   01/09/2019 6 (L) 10 - 44 U/L Final     Anion Gap   Date Value Ref Range Status   01/09/2019 11 8 - 16 mmol/L Final     eGFR if    Date Value Ref Range Status   01/09/2019 >60 >60 mL/min/1.73 m^2 Final     eGFR if non    Date Value Ref Range Status   01/09/2019 >60 >60 mL/min/1.73 m^2 Final     Comment:     Calculation used to obtain the estimated glomerular filtration  rate (eGFR) is the CKD-EPI equation.        Lab Results   Component Value Date    [id number] 382 12/13/2010     Lab Results   Component Value Date    TSH 0.923 01/09/2019        Reviewed the neuroimaging independently       Brain MRI NL    EKG NSR       Assessment:       1. Paresthesia of right arm and leg    2. 22 weeks gestation of pregnancy    3. Intractable migraine without aura and without status migrainosus          COMPLICATED MIGRAINE       Plan:       The clinical picture is indicative of COMPLICATED MIGRAINE    There are essentially no safe options in terms of pain control and the patient does understand that. She said that only butabital-acetaminophen-caffeine helped which is a poor choice for migraine. I instructed her to not use more than 2 tabs a week.          Please do not hesitate to contact me with any updates, questions or concerns.        Jazmin Diaz MD, FAAN    Attending Neurologist/Epileptologist         Diplomate, American Board-Psychiatry and Neurology (Neurology)    Diplomate, American Board-Clinical Neurophysiology (Epilpesy-Neuromuscular)     Fellow, American Academy of Neurology

## 2019-01-09 NOTE — DISCHARGE SUMMARY
[facility] Hospital Medicine  Discharge Summary      Patient Name: Anitra Chaney  MRN: [id number]  Admission Date: 1/9/2019  Hospital Length of Stay: 0 days  Discharge Date and Time:  01/09/2019 3:52 PM  Attending Physician: Yany Price MD   Discharging Provider: Sandy Fernandes NP  Primary Care Provider: Tien Watts MD      HPI:   Anitra Chaney is a 38 y.o. female patient with PMHx of DM, HTN, and CVA with TPA in 2016 who is 22 weeks pregnant presents  as a transfer from outside hospital for MRI/Neurology services for further evaluation for R sided heavness. Onset couple of days ago.  Associated sxs include  right side headache, right upper ext numbness.  No mitigating or exacerbating factors reported. Patient reports seeing Dr. Yen for OB/GYN. tel[facility]roke eval[facility] at [facility] done and note reviewed under chart review.  No further complaints or concerns at this time.  Patient evaluated in Er. OB/GYN consulted by Er who plan to see today. HM consulted.  Mild hypotensive to normotensive vitals. Gentle IV hydration x 1 L ordered. Labs pending. MRI planned for AM and if negative plan to transfer care to OB services.         * No surgery found *      Hospital Course:   The pt is a 38 y.o. female patient with DM, HTN, and CVA with TPA in 2016 who is 22 weeks pregnant  placed in observation as a transfer from outside hospital for MRI/Neurology services for further evaluation for HA, right facial numbness, and R sided heavness. MRI brain showed nothing acute. Neurology evaluated pt and felt pt had a complex migraine. Pt had relief with Fioricet. Neurology recommended Fioricet -no more than twice per week. Prescription provided.          Consults:   Consults (From admission, onward)        Status Ordering Provider     Inpatient consult to Hospitalist  Once     Provider:  Arley Aaron MD    Acknowledged MIHIR PRICE.     Inpatient consult to Neurology  Once      Provider:  Jazmin Diaz MD    Completed CHARISSE REECE     Inpatient consult to Obstetrics / Gynecology  Once     Provider:  Jessika Stinson, RN    Acknowledged MIHIR PRICE     Inpatient consult to Registered Dietitian/Nutritionist  Once     Provider:  (Not yet assigned)    Acknowledged PEDRO ZHANG     IP consult to case management/social work  Once     Provider:  (Not yet assigned)    PEDRO Pina              Final Active Diagnoses:    Diagnosis Date Noted POA    PRINCIPAL PROBLEM:  Migraine [G43.909] 01/09/2019 Yes    Pregnancy, supervision, high-risk [O09.90] 11/14/2018 Not Applicable      Problems Resolved During this Admission:       Discharged Condition: stable    Disposition: Home or Self Care    Follow Up:  Follow-up Information     Tien Watts MD In 3 days.    Specialty:  Family Medicine  Contact information:  [address]  [facility] [[email or web address] [address]  [facility] [address]  [phone]                 Patient Instructions:      Diet Adult Regular     Activity as tolerated       Significant Diagnostic Studies:   Imaging Results          MRI Brain Without Contrast (Final result)  Result time 01/09/19 12:42:47    Final result by Pedro Win MD (01/09/19 12:42:47)                 Impression:      MRI of the brain is within normal limits.  No evidence of acute ischemic changes.      Electronically signed by: Pedro Win MD  Date:    01/09/2019  Time:    12:42             Narrative:    EXAMINATION:  MRI BRAIN WITHOUT CONTRAST    CLINICAL HISTORY:  Stroke;.    TECHNIQUE:  Multiplanar multisequence MR imaging of the brain was performed without contrast.    COMPARISON:  None    FINDINGS:  No evidence of signal abnormality in the brain.  No diffusion weighted sequence abnormality. No mass lesion.    Ventricles and sulci are normal in size for age without evidence of hydrocephalus. No evidence of intra or extra-axial hemorrhage.    Normal vascular flow voids are preserved.    Mucoperiosteal  thickening in ethmoid air cells and the maxillary sinuses.    Bone marrow signal intensity is normal.                              Pending Diagnostic Studies:     Procedure Component Value Units Date/Time    MRI Brain Without Contrast [[id number]]     Order Status:  Sent Lab Status:  No result          Medications:  Reconciled Home Medications:      Medication List      START taking these medications    butalbital-acetaminophen-caffeine -40 mg -40 mg per tablet  Commonly known as:  FIORICET, ESGIC  May take one tab twice weekly prn Migraine        CONTINUE taking these medications    FLUoxetine 20 MG capsule  Take 1 capsule (20 mg total) by mouth once daily.     PRENATAL VITAMIN ORAL  Take by mouth.        STOP taking these medications    HYDROcodone-acetaminophen 5-325 mg per tablet  Commonly known as:  NORCO            Indwelling Lines/Drains at time of discharge:   Lines/Drains/Airways          None          Time spent on the discharge of patient: 42 minutes  Patient was seen and examined on the date of discharge and determined to be suitable for discharge.         Sandy Fernandes NP  Department of Hospital Medicine  [facility]

## 2019-01-09 NOTE — H&P
[facility] Hospital Medicine  History & Physical    Patient Name: Anitra Chaney  MRN: [id number]  Admission Date: 1/9/2019  Attending Physician: Arley Aaron MD  Primary Care Provider: Tien Watts MD         Patient information was obtained from patient, past medical records and ER records.     Subjective:     Principal Problem:Acute focal neurological deficit    Chief Complaint:   Chief Complaint   Patient presents with    Numbness     22 weeks gestation right arm numbness and falcial heaviness transfer from [facility]        HPI: Anitra Chaney is a 38 y.o. female patient with PMHx of DM, HTN, and CVA with TPA in 2016 who is 22 weeks pregnant presents  as a transfer from outside hospital for MRI/Neurology services for further evaluation for R sided heavness. Onset couple of days ago.  Associated sxs include  right side headache, right upper ext numbness.  No mitigating or exacerbating factors reported. Patient reports seeing Dr. Yen for OB/GYN. telestroke evalaution at [facility] done and note reviewed under chart review.  No further complaints or concerns at this time.  Patient evaluated in Er. OB/GYN consulted by Er who plan to see today. HM consulted.  Mild hypotensive to normotensive vitals. Gentle IV hydration x 1 L ordered. Labs pending. MRI planned for AM and if negative plan to transfer care to OB services.         No new subjective & objective note has been filed under this hospital service since the last note was generated.  I have personally reviewed the patients labs, imaging, and discussed the patient case in detail with the Er provider    Assessment/Plan:     * Acute focal neurological deficit    Consider Low dose ASA if ok with ob and pending course. Hx of ?GI bleed with ASA/NSAID in past  Tele Stroke Consult reviewed  CT Head result per telestroke reviewed  MRI pending  Consider Additional diagnostics:  US bilateral Carotids, ECHO pending course  Check  TSH, electrolytes   Check A1C  Consider Neuro Consult pending obs course and diagnostics   OP follow up with PCP, Cardiology, Neuro for additional evaluation, routine follow ups to assist in risk reduction.   Monitor closely   Will defer starting of any medication therapy pending results and discussion with neurology if need and clearing through OB/GYN  Transferring of care to OB/GYN pending course.   OB/GYN consulted for assistance at this time.        Pregnancy, supervision, high-risk    Fetal Heart Tones noted. 130s  OB consulted.          VTE Risk Mitigation (From admission, onward)        Ordered     IP VTE HIGH RISK PATIENT  Once      01/09/19 0213     Place sequential compression device  Until discontinued      01/09/19 0213     Place HERMILO hose  Until discontinued      01/09/19 0213             Osmar Mendez NP  Department of Hospital Medicine   [facility]

## 2019-01-09 NOTE — ASSESSMENT & PLAN NOTE
Consider Low dose ASA if ok with ob and pending course. Hx of ?GI bleed with ASA/NSAID in past  Tele Stroke Consult reviewed  CT Head result per telestroke reviewed  MRI pending  Consider Additional diagnostics:  US bilateral Carotids, ECHO pending course  Check TSH, electrolytes   Check A1C  Consider Neuro Consult pending obs course and diagnostics   OP follow up with PCP, Cardiology, Neuro for additional evaluation, routine follow ups to assist in risk reduction.   Monitor closely   Will defer starting of any medication therapy pending results and discussion with neurology if need and clearing through OB/GYN  Transferring of care to OB/GYN pending course.   OB/GYN consulted for assistance at this time.

## 2019-01-09 NOTE — ED NOTES
Pt resting on stretcher at this time, POC reviewed with pt, pt verbalized understanding. VSS. IV infusing per MAR. Pt denies chest pain/SOB/pain/nausea. Pt denies worsening of symptoms. Pt call light within reach, bed in lowest position, WCTM.

## 2019-01-09 NOTE — ED NOTES
The pt was given a specimen cup and wipe with clean catch instructions. Waiting for urine specimen     Pt stated she cant urinate at this time

## 2019-01-10 NOTE — PT/OT/SLP PROGRESS
Occupational Therapy      Patient Name:  Anitra Chaney   MRN:  [id number]    eval initiated via chart review evalat not complete secondary to los.    meeta Rodríguez, OT  1/9/2019

## 2019-01-14 ENCOUNTER — PATIENT MESSAGE (OUTPATIENT)
Dept: OBSTETRICS AND GYNECOLOGY | Facility: CLINIC | Age: 39
End: 2019-01-14

## 2019-01-15 RX ORDER — BUTALBITAL, ACETAMINOPHEN AND CAFFEINE 50; 325; 40 MG/1; MG/1; MG/1
1 TABLET ORAL EVERY 4 HOURS PRN
Qty: 30 TABLET | Refills: 6 | Status: SHIPPED | OUTPATIENT
Start: 2019-01-15 | End: 2019-02-14

## 2019-02-04 ENCOUNTER — INITIAL CONSULT (OUTPATIENT)
Dept: MATERNAL FETAL MEDICINE | Facility: CLINIC | Age: 39
End: 2019-02-04
Attending: OBSTETRICS & GYNECOLOGY
Payer: COMMERCIAL

## 2019-02-04 VITALS
BODY MASS INDEX: 40.39 KG/M2 | SYSTOLIC BLOOD PRESSURE: 110 MMHG | WEIGHT: 250.25 LBS | DIASTOLIC BLOOD PRESSURE: 70 MMHG

## 2019-02-04 DIAGNOSIS — Z36.9 ANTENATAL SCREENING ENCOUNTER: Primary | ICD-10-CM

## 2019-02-04 DIAGNOSIS — Z36.89 ENCOUNTER FOR ULTRASOUND TO CHECK FETAL GROWTH: ICD-10-CM

## 2019-02-04 PROCEDURE — 99214 PR OFFICE/OUTPT VISIT, EST, LEVL IV, 30-39 MIN: ICD-10-PCS | Mod: 25,S$GLB,, | Performed by: OBSTETRICS & GYNECOLOGY

## 2019-02-04 PROCEDURE — 99999 PR PBB SHADOW E&M-EST. PATIENT-LVL III: CPT | Mod: PBBFAC,,, | Performed by: OBSTETRICS & GYNECOLOGY

## 2019-02-04 PROCEDURE — 99999 PR PBB SHADOW E&M-EST. PATIENT-LVL III: ICD-10-PCS | Mod: PBBFAC,,, | Performed by: OBSTETRICS & GYNECOLOGY

## 2019-02-04 PROCEDURE — 76816 PR  US,PREGNANT UTERUS,F/U,TRANSABD APP: ICD-10-PCS | Mod: 26,S$GLB,, | Performed by: OBSTETRICS & GYNECOLOGY

## 2019-02-04 PROCEDURE — 76816 OB US FOLLOW-UP PER FETUS: CPT | Mod: 26,S$GLB,, | Performed by: OBSTETRICS & GYNECOLOGY

## 2019-02-04 PROCEDURE — 3008F BODY MASS INDEX DOCD: CPT | Mod: CPTII,S$GLB,, | Performed by: OBSTETRICS & GYNECOLOGY

## 2019-02-04 PROCEDURE — 3008F PR BODY MASS INDEX (BMI) DOCUMENTED: ICD-10-PCS | Mod: CPTII,S$GLB,, | Performed by: OBSTETRICS & GYNECOLOGY

## 2019-02-04 PROCEDURE — 99214 OFFICE O/P EST MOD 30 MIN: CPT | Mod: 25,S$GLB,, | Performed by: OBSTETRICS & GYNECOLOGY

## 2019-02-04 NOTE — PROGRESS NOTES
Recent hospitalization for UE weakness and sensation changes reviewed in detail diagnosed as complex migraine.  She is using PRN Fioricet.  She will restart ASA 81 mg but not use on days she takes Fioricet.  GI does not want to proceed with colonoscopy in pregnancy.    She denies any further GI bleed.   Heart poorly visualized on repeat anatomy today.  Will schedule for fetal echocardiogram.      The patient was given an opportunity to ask questions about management and the diease process.  She expressed an understanding of and agreement to the above impression and plan. All questions were answered to her satisfaction.  She was given contact information to the [facility] clinic to address further concerns.      The approximate physician face-to-face time was 30 minutes. The majority of the time (>50%) was spent on counseling of the patient or coordination of care.

## 2019-02-13 ENCOUNTER — ROUTINE PRENATAL (OUTPATIENT)
Dept: OBSTETRICS AND GYNECOLOGY | Facility: CLINIC | Age: 39
End: 2019-02-13
Payer: COMMERCIAL

## 2019-02-13 VITALS
SYSTOLIC BLOOD PRESSURE: 130 MMHG | BODY MASS INDEX: 39.85 KG/M2 | WEIGHT: 246.94 LBS | DIASTOLIC BLOOD PRESSURE: 80 MMHG

## 2019-02-13 DIAGNOSIS — O99.212 OBESITY AFFECTING PREGNANCY IN SECOND TRIMESTER: Primary | ICD-10-CM

## 2019-02-13 DIAGNOSIS — Z67.91 RH NEGATIVE STATE IN ANTEPARTUM PERIOD: ICD-10-CM

## 2019-02-13 DIAGNOSIS — O09.522 ELDERLY MULTIGRAVIDA IN SECOND TRIMESTER: ICD-10-CM

## 2019-02-13 DIAGNOSIS — O26.899 RH NEGATIVE STATE IN ANTEPARTUM PERIOD: ICD-10-CM

## 2019-02-13 DIAGNOSIS — Z98.891 HISTORY OF 2 CESAREAN SECTIONS: ICD-10-CM

## 2019-02-13 DIAGNOSIS — O09.92 SUPERVISION OF HIGH RISK PREGNANCY IN SECOND TRIMESTER: ICD-10-CM

## 2019-02-13 PROBLEM — I63.9 ACUTE ISCHEMIC STROKE: Status: RESOLVED | Noted: 2017-05-04 | Resolved: 2019-02-13

## 2019-02-13 PROCEDURE — 99999 PR PBB SHADOW E&M-EST. PATIENT-LVL III: CPT | Mod: PBBFAC,,, | Performed by: OBSTETRICS & GYNECOLOGY

## 2019-02-13 PROCEDURE — 0502F SUBSEQUENT PRENATAL CARE: CPT | Mod: CPTII,S$GLB,, | Performed by: OBSTETRICS & GYNECOLOGY

## 2019-02-13 PROCEDURE — 99999 PR PBB SHADOW E&M-EST. PATIENT-LVL III: ICD-10-PCS | Mod: PBBFAC,,, | Performed by: OBSTETRICS & GYNECOLOGY

## 2019-02-13 PROCEDURE — 0502F PR SUBSEQUENT PRENATAL CARE: ICD-10-PCS | Mod: CPTII,S$GLB,, | Performed by: OBSTETRICS & GYNECOLOGY

## 2019-02-13 RX ORDER — FLUOXETINE HYDROCHLORIDE 20 MG/1
40 CAPSULE ORAL DAILY
Qty: 30 CAPSULE | Refills: 2 | Status: SHIPPED | OUTPATIENT
Start: 2019-02-13 | End: 2019-02-28

## 2019-02-13 RX ORDER — ASPIRIN 81 MG/1
81 TABLET ORAL DAILY
Qty: 30 TABLET | Refills: 6 | Status: SHIPPED | OUTPATIENT
Start: 2019-02-13 | End: 2019-02-28

## 2019-02-13 NOTE — PROGRESS NOTES
Complaints today:Denies vaginal bleeding, contractions, loss of fluid. Confirms good fetal movement. Had fall yesterday, landed on bottom, at 0530. Denies VB, LOF or contractions. Feeling fetal movement. Has had flu shot. Taking fioricet daily for headaches.Denies any vision changes, RUQ pain, CP or SOB. States she has had more anxiety and depression lately, occasionally trouble sleeping. Plans to breast feed and wants mirena for contraception.       /80   Wt 112 kg (246 lb 14.6 oz)   LMP 2018 (LMP Unknown)   BMI 39.85 kg/m²     39 y.o., at 27w1d by Estimated Date of Delivery: 19  Patient Active Problem List   Diagnosis    Mood altered    Migraine syndrome    Daytime somnolence    Class 2 obesity due to excess calories without serious comorbidity with body mass index (BMI) of 35.0 to 35.9 in adult    Acute ischemic stroke    Ankle arthritis    Rh negative state in antepartum period    CHTN on no medications    Pregnancy, supervision, high-risk    Advanced maternal age in multigravida    Obesity complicating pregnancy    Lower GI bleed    Migraine     OB History    Para Term  AB Living   4 2 2   1 2   SAB TAB Ectopic Multiple Live Births   1       2      # Outcome Date GA Lbr Yuval/2nd Weight Sex Delivery Anes PTL Lv   4 Current            3 SAB 18 10w0d          2 Term 09 37w0d  2.722 kg (6 lb) F CS-Unspec EPI  JIGAR   1 Term 05 37w0d  3.345 kg (7 lb 6 oz) M CS-Unspec EPI  JIGAR          Dating reviewed    Allergies and problem list reviewed and updated    Medical and surgical history reviewed    Prenatal labs reviewed and updated    Physical Exam:  ABD: soft, gravid, nontender,     Assessment:  Anitra was seen today for routine prenatal visit.    Diagnoses and all orders for this visit:    Obesity affecting pregnancy in second trimester    Elderly multigravida in second trimester    Supervision of high risk pregnancy in second trimester    Rh negative  state in antepartum period    History of 2  sections          Plan:   - counseled to come to [facility] after fall in the future. Discussed risks.   - labs reveiwed from [facility] where she was diagnosed with migraines  - CBC and hemoglobin A1C WNL although shows some anemia. She is taking Fe supplements.   - can't tolerate glucola 2/2 to h/o of gastric sleeve. A1C 4.9  - increase prozac from 20mg to 40mg, add hydroxyzine at night to help sleep and for anxiety  - Counseling information given   - OB assessment performed today  - TDAP and rhogam next visit    follow up 2 Weeks, bleeding/pain  kick counts, labor precautions given

## 2019-02-18 ENCOUNTER — TELEPHONE (OUTPATIENT)
Dept: OBSTETRICS AND GYNECOLOGY | Facility: CLINIC | Age: 39
End: 2019-02-18

## 2019-02-18 ENCOUNTER — TELEPHONE (OUTPATIENT)
Dept: MATERNAL FETAL MEDICINE | Facility: CLINIC | Age: 39
End: 2019-02-18

## 2019-02-18 NOTE — TELEPHONE ENCOUNTER
2nd message left for pt to call [facility] clinic at [phone] to schedule follow-up ultrasound in March.

## 2019-02-18 NOTE — TELEPHONE ENCOUNTER
Called pt concerning 2nd message left for pt to call [facility] clinic at [phone] to schedule follow-up ultrasound in March. No answer. Left VM message. Sent MY Chart message.

## 2019-02-19 ENCOUNTER — TELEPHONE (OUTPATIENT)
Dept: PHARMACY | Facility: CLINIC | Age: 39
End: 2019-02-19

## 2019-02-20 ENCOUNTER — OFFICE VISIT (OUTPATIENT)
Dept: PEDIATRIC CARDIOLOGY | Facility: CLINIC | Age: 39
End: 2019-02-20
Attending: OBSTETRICS & GYNECOLOGY
Payer: COMMERCIAL

## 2019-02-20 VITALS
WEIGHT: 251.13 LBS | HEIGHT: 70 IN | BODY MASS INDEX: 35.95 KG/M2 | SYSTOLIC BLOOD PRESSURE: 112 MMHG | DIASTOLIC BLOOD PRESSURE: 80 MMHG

## 2019-02-20 DIAGNOSIS — Z36.9 ANTENATAL SCREENING ENCOUNTER: Primary | ICD-10-CM

## 2019-02-20 DIAGNOSIS — O35.9XX0 SUSPECTED FETAL ABNORMALITY AFFECTING MANAGEMENT OF MOTHER, SINGLE OR UNSPECIFIED FETUS: ICD-10-CM

## 2019-02-20 DIAGNOSIS — O09.523 ADVANCED MATERNAL AGE IN MULTIGRAVIDA, THIRD TRIMESTER: ICD-10-CM

## 2019-02-20 DIAGNOSIS — O99.213 OBESITY AFFECTING PREGNANCY IN THIRD TRIMESTER: ICD-10-CM

## 2019-02-20 DIAGNOSIS — Z3A.28 28 WEEKS GESTATION OF PREGNANCY: ICD-10-CM

## 2019-02-20 PROBLEM — Z34.90 PREGNANT: Status: ACTIVE | Noted: 2019-02-20

## 2019-02-20 PROCEDURE — 3008F PR BODY MASS INDEX (BMI) DOCUMENTED: ICD-10-PCS | Mod: CPTII,S$GLB,, | Performed by: PEDIATRICS

## 2019-02-20 PROCEDURE — 3008F BODY MASS INDEX DOCD: CPT | Mod: CPTII,S$GLB,, | Performed by: PEDIATRICS

## 2019-02-20 PROCEDURE — 99999 PR PBB SHADOW E&M-EST. PATIENT-LVL III: CPT | Mod: PBBFAC,,, | Performed by: PEDIATRICS

## 2019-02-20 PROCEDURE — 99999 PR PBB SHADOW E&M-EST. PATIENT-LVL III: ICD-10-PCS | Mod: PBBFAC,,, | Performed by: PEDIATRICS

## 2019-02-20 PROCEDURE — 99203 OFFICE O/P NEW LOW 30 MIN: CPT | Mod: 25,S$GLB,, | Performed by: PEDIATRICS

## 2019-02-20 PROCEDURE — 99203 PR OFFICE/OUTPT VISIT, NEW, LEVL III, 30-44 MIN: ICD-10-PCS | Mod: 25,S$GLB,, | Performed by: PEDIATRICS

## 2019-02-20 NOTE — LETTER
February 20, 2019      Joseph Ross MD  [address]  [facility] [address]           [facility] [address]  [address]  [facility] [address]  Phone: [phone]  Fax: [phone]          Patient: Anitra Chaney   MR Number: [id number]   YOB: 1980   Date of Visit: 2/20/2019       Dear Dr. Joseph Ross:    Thank you for referring Anitra Chaney to me for evaluation. Attached you will find relevant portions of my assessment and plan of care.    If you have questions, please do not hesitate to call me. I look forward to following Anitra Chaney along with you.    Sincerely,    Anthony Yen MD    Enclosure  CC:  No Recipients    If you would like to receive this communication electronically, please contact [email or web address] or [phone] to request more information on [employer] access.    For providers and/or their staff who would like to refer a patient to [facility], please contact us through our one-stop-shop provider referral line, [facility], at [phone].    If you feel you have received this communication in error or would no longer like to receive these types of communications, please e-mail [email or web address]

## 2019-02-20 NOTE — PROGRESS NOTES
"I had the pleasure of evaluating Anitra Chaney who is now 39 y.o.  and carrying her 4th pregnancy at 28 1/7 weeks gestation. She was referred for evaluation of the fetal heart due to increased risks for congenital heart disease associated with advanced maternal age and difficulty demosntrating fetal cardiac anatomy. Maternal T21 is reported as unremarkable..      Current Outpatient Medications:     butalb/acetaminophen/caffeine (FIORICET ORAL), Take 1 capsule by mouth continuous prn., Disp: , Rfl:     FLUoxetine 20 MG capsule, Take 2 capsules (40 mg total) by mouth once daily., Disp: 30 capsule, Rfl: 2    prenatal vits62/FA/om3/dha/epa (PRENATAL GUMMY ORAL), Take 6 tablets by mouth once daily., Disp: , Rfl:     aspirin (ECOTRIN) 81 MG EC tablet, Take 1 tablet (81 mg total) by mouth once daily., Disp: 30 tablet, Rfl: 6    levonorgestrel (MIRENA) 20 mcg/24 hr (5 years) IUD, 1 Intra Uterine Device by Intrauterine route once. RETURN FOR INTRAUTERINE INSERTION IN THE OFFICE for 1 dose, Disp: 1 each, Rfl: 0  Review of patient's allergies indicates:  No Known Allergies    Maternal factors increasing risk for congenital heart disease: One previous fetal loss and maternal history of stroke with ASD/PFO "too small to close" per Dr. Mosley.  Paternal factors increasing risk for congenital heart disease: Unremarkable.    FETAL ECHOCARDIOGRAM (preliminary):  Study is technically limited by available acoustic windows:  Normal fetal cardiac connections and function for estimated gestational age in views available.  (Full report in electronic medical record)    I reviewed the strengths and weaknesses of fetal echocardiograph including the insufficient sensitivity to rule out many septal defects, anomalies of pulmonary and systemic veins, arch anomalies, and some valvar abnormalities. I also discussed that  resolution of the ductus arteriosus and foramen ovale (normal fetal structures) cannot be assured by " fetal evaluation. Finally, I reviewed today's echocardiogram and the difficulty due to limited acoustic windows. The images obtained demonstrate normal anatomical connections and function for the estimated gestational age with reservations because of the technical issues. Within the limitations imposed by fetal physiology and the poor acoustic windiws, I would expect a reasonable probability that this infant will be born with a normal heart.    I reviewed the concerns related to advanced maternal age and in particular the risk for Down syndrome which is frequently associated with congenital heart disease. Ms. Chaney elected to perform maternal cell free DNA testing which demonstrated no evidence for trisomy 21,18 or 13. This test has a high predictive value and is very reassuring. In addition, the fetal cardiac evaluation also does not demonstrate any evidence for congenital heart disease which is highly associated with each of these genetic abnormalities in some form.    I answered all the mother's questions. I also provided an information sheet that reviews the fetal physiology and compares this with normal  physiology. This sheet also includes a reiteration of the limitations of fetal echocardiography that I have discussed with her today. I have not scheduled another evaluation; however, if questions arise later during gestation or after delivery, I would be happy to assist in any way. Ms. Chaney is comfortable with the plan.    RECOMMENDATIONS:  Evaluation of the infant after delivery if the clinical exam is abnormal        Note:  Over 50% of visit in consultation with the family >30 minutes

## 2019-02-20 NOTE — LETTER
February 20, 2019        Stella Yen MD  [address]  [address]  [facility] [address]             [facility] [address]  [address]  [facility] [address]  Phone: [phone]  Fax: [phone]   Patient: Anitra Chaney   MR Number: [id number]   YOB: 1980   Date of Visit: 2/20/2019       Dear Dr. Yen:    Thank you for referring Anitra Chaney to me for evaluation. Below are the relevant portions of my assessment and plan of care.            If you have questions, please do not hesitate to call me. I look forward to following Anitra along with you.    Sincerely,      Anthony Yen MD           CC  No Recipients

## 2019-02-27 ENCOUNTER — PATIENT MESSAGE (OUTPATIENT)
Dept: OBSTETRICS AND GYNECOLOGY | Facility: CLINIC | Age: 39
End: 2019-02-27

## 2019-02-28 ENCOUNTER — ROUTINE PRENATAL (OUTPATIENT)
Dept: OBSTETRICS AND GYNECOLOGY | Facility: CLINIC | Age: 39
End: 2019-02-28
Payer: COMMERCIAL

## 2019-02-28 VITALS — BODY MASS INDEX: 36.38 KG/M2 | DIASTOLIC BLOOD PRESSURE: 80 MMHG | WEIGHT: 253.5 LBS | SYSTOLIC BLOOD PRESSURE: 130 MMHG

## 2019-02-28 DIAGNOSIS — O09.93 SUPERVISION OF HIGH RISK PREGNANCY IN THIRD TRIMESTER: Primary | ICD-10-CM

## 2019-02-28 DIAGNOSIS — Z98.891 HISTORY OF 2 CESAREAN SECTIONS: ICD-10-CM

## 2019-02-28 DIAGNOSIS — F41.9 ANXIETY: ICD-10-CM

## 2019-02-28 DIAGNOSIS — Z67.91 RH NEGATIVE STATE IN ANTEPARTUM PERIOD: ICD-10-CM

## 2019-02-28 DIAGNOSIS — O09.523 ELDERLY MULTIGRAVIDA IN THIRD TRIMESTER: ICD-10-CM

## 2019-02-28 DIAGNOSIS — O99.213 OBESITY AFFECTING PREGNANCY IN THIRD TRIMESTER: ICD-10-CM

## 2019-02-28 DIAGNOSIS — O26.899 RH NEGATIVE STATE IN ANTEPARTUM PERIOD: ICD-10-CM

## 2019-02-28 PROBLEM — Z34.90 PREGNANT: Status: RESOLVED | Noted: 2019-02-20 | Resolved: 2019-02-28

## 2019-02-28 PROCEDURE — 99999 PR PBB SHADOW E&M-EST. PATIENT-LVL II: CPT | Mod: PBBFAC,,,

## 2019-02-28 PROCEDURE — 96372 THER/PROPH/DIAG INJ SC/IM: CPT | Mod: 59,S$GLB,, | Performed by: OBSTETRICS & GYNECOLOGY

## 2019-02-28 PROCEDURE — 90715 TDAP VACCINE 7 YRS/> IM: CPT | Mod: S$GLB,,, | Performed by: OBSTETRICS & GYNECOLOGY

## 2019-02-28 PROCEDURE — 90471 IMMUNIZATION ADMIN: CPT | Mod: S$GLB,,, | Performed by: OBSTETRICS & GYNECOLOGY

## 2019-02-28 PROCEDURE — 90715 TDAP VACCINE GREATER THAN OR EQUAL TO 7YO IM: ICD-10-PCS | Mod: S$GLB,,, | Performed by: OBSTETRICS & GYNECOLOGY

## 2019-02-28 PROCEDURE — 0502F SUBSEQUENT PRENATAL CARE: CPT | Mod: CPTII,S$GLB,, | Performed by: OBSTETRICS & GYNECOLOGY

## 2019-02-28 PROCEDURE — 96372 RHO (D) IMMUNE GLOBULIN: ICD-10-PCS | Mod: 59,S$GLB,, | Performed by: OBSTETRICS & GYNECOLOGY

## 2019-02-28 PROCEDURE — 99999 PR PBB SHADOW E&M-EST. PATIENT-LVL II: ICD-10-PCS | Mod: PBBFAC,,,

## 2019-02-28 PROCEDURE — 90471 TDAP VACCINE GREATER THAN OR EQUAL TO 7YO IM: ICD-10-PCS | Mod: S$GLB,,, | Performed by: OBSTETRICS & GYNECOLOGY

## 2019-02-28 PROCEDURE — 0502F PR SUBSEQUENT PRENATAL CARE: ICD-10-PCS | Mod: CPTII,S$GLB,, | Performed by: OBSTETRICS & GYNECOLOGY

## 2019-02-28 RX ORDER — HYDROXYZINE HYDROCHLORIDE 25 MG/1
25 TABLET, FILM COATED ORAL NIGHTLY
Qty: 30 TABLET | Refills: 1 | Status: SHIPPED | OUTPATIENT
Start: 2019-02-28 | End: 2019-08-26 | Stop reason: SDUPTHER

## 2019-02-28 NOTE — NURSING
Pt to receive Rhogam  Order reviewed  Pt confirmed name and   nkda  Hyper RHO 300mcg IM  Pt is A neg  [employer]  Lot# [id number]  Exp date 21  Given left deltoid  Pt tolerated well  Instructed to remain in clinic 15 min  Pt verbalized understanding  Pt given Rhogam admin card to keep in wallet

## 2019-02-28 NOTE — PROGRESS NOTES
Complaints today: Constan, bilateral lower back pain, increased pelvic pressure, and BH contractions that started yesterday evening. She continues to take PNVs and prozac. She denies vaginal bleeding and LOF. She reports good fetal movement.    /80   Wt 115 kg (253 lb 8.5 oz)   LMP 2018 (LMP Unknown)   BMI 36.38 kg/m²     39 y.o., at 29w2d by Estimated Date of Delivery: 19  Patient Active Problem List   Diagnosis    Mood altered    Migraine syndrome    Daytime somnolence    Class 2 obesity due to excess calories without serious comorbidity with body mass index (BMI) of 35.0 to 35.9 in adult    Ankle arthritis    Rh negative state in antepartum period/rhogam 19    CHTN on no medications    Pregnancy, supervision, high-risk/breast/mirena/ob needs assessment/flu/tdap    Advanced maternal age in multigravida    Obesity complicating pregnancy    Lower GI bleed    Migraine    History of 2  sections     screening encounter    Suspected fetal abnormality affecting management of mother     OB History    Para Term  AB Living   4 2 2   1 2   SAB TAB Ectopic Multiple Live Births   1       2      # Outcome Date GA Lbr Yuval/2nd Weight Sex Delivery Anes PTL Lv   4 Current            3 SAB 18 10w0d          2 Term 09 37w0d  2.722 kg (6 lb) F CS-Unspec EPI  JIGAR   1 Term 05 37w0d  3.345 kg (7 lb 6 oz) M CS-Unspec EPI  JIGAR          Dating reviewed    Allergies and problem list reviewed and updated    Medical and surgical history reviewed    Prenatal labs reviewed and updated    Physical Exam:  ABD: soft, gravid, nontender    Assessment:  38 yo  at 29w2d who presents for SHANNON visit, currently with complaints of BH contractions and lower back pain.    Plan:   - 2T labs, Rhogam, and tdap today  - GELY WEATHERS reviewed, will bring booklet to next visit  - Labor precautions reviewed, patient voiced understanding  - Follow-up 4 weeks    Ashlee Sena  MD ALANIZ, PGY-1

## 2019-03-02 ENCOUNTER — HOSPITAL ENCOUNTER (EMERGENCY)
Facility: OTHER | Age: 39
Discharge: HOME | End: 2019-03-02
Attending: OBSTETRICS & GYNECOLOGY
Payer: COMMERCIAL

## 2019-03-02 VITALS
SYSTOLIC BLOOD PRESSURE: 117 MMHG | DIASTOLIC BLOOD PRESSURE: 74 MMHG | HEART RATE: 63 BPM | TEMPERATURE: 98 F | OXYGEN SATURATION: 99 % | RESPIRATION RATE: 18 BRPM

## 2019-03-02 DIAGNOSIS — Z3A.29 29 WEEKS GESTATION OF PREGNANCY: ICD-10-CM

## 2019-03-02 DIAGNOSIS — R10.9 ABDOMINAL PAIN, UNSPECIFIED ABDOMINAL LOCATION: Primary | ICD-10-CM

## 2019-03-02 DIAGNOSIS — O99.891 BACK PAIN AFFECTING PREGNANCY IN THIRD TRIMESTER: ICD-10-CM

## 2019-03-02 DIAGNOSIS — M54.9 BACK PAIN AFFECTING PREGNANCY IN THIRD TRIMESTER: ICD-10-CM

## 2019-03-02 LAB
BACTERIA #/AREA URNS HPF: ABNORMAL /HPF
BILIRUB SERPL-MCNC: ABNORMAL MG/DL
BILIRUB UR QL STRIP: NEGATIVE
BLOOD URINE, POC: ABNORMAL
CLARITY UR: ABNORMAL
COLOR UR: YELLOW
COLOR, POC UA: ABNORMAL
GLUCOSE UR QL STRIP: ABNORMAL
GLUCOSE UR QL STRIP: NEGATIVE
HGB UR QL STRIP: NEGATIVE
KETONES UR QL STRIP: ABNORMAL
KETONES UR QL STRIP: ABNORMAL
LEUKOCYTE ESTERASE UR QL STRIP: ABNORMAL
LEUKOCYTE ESTERASE URINE, POC: ABNORMAL
MICROSCOPIC COMMENT: ABNORMAL
NITRITE UR QL STRIP: NEGATIVE
NITRITE, POC UA: ABNORMAL
PH UR STRIP: 6 [PH] (ref 5–8)
PH, POC UA: ABNORMAL
PROT UR QL STRIP: ABNORMAL
PROTEIN, POC: ABNORMAL
SP GR UR STRIP: 1.02 (ref 1–1.03)
SPECIFIC GRAVITY, POC UA: ABNORMAL
SQUAMOUS #/AREA URNS HPF: 10 /HPF
URN SPEC COLLECT METH UR: ABNORMAL
UROBILINOGEN UR STRIP-ACNC: NEGATIVE EU/DL
UROBILINOGEN, POC UA: ABNORMAL
WBC #/AREA URNS HPF: 4 /HPF (ref 0–5)

## 2019-03-02 PROCEDURE — 99284 EMERGENCY DEPT VISIT MOD MDM: CPT | Mod: 25

## 2019-03-02 PROCEDURE — 99284 PR EMERGENCY DEPT VISIT,LEVEL IV: ICD-10-PCS | Mod: 25,,, | Performed by: OBSTETRICS & GYNECOLOGY

## 2019-03-02 PROCEDURE — 59025 PR FETAL 2N-STRESS TEST: ICD-10-PCS | Mod: 26,,, | Performed by: OBSTETRICS & GYNECOLOGY

## 2019-03-02 PROCEDURE — 59025 FETAL NON-STRESS TEST: CPT

## 2019-03-02 PROCEDURE — 99284 EMERGENCY DEPT VISIT MOD MDM: CPT | Mod: 25,,, | Performed by: OBSTETRICS & GYNECOLOGY

## 2019-03-02 PROCEDURE — 87086 URINE CULTURE/COLONY COUNT: CPT

## 2019-03-02 PROCEDURE — 81000 URINALYSIS NONAUTO W/SCOPE: CPT

## 2019-03-02 PROCEDURE — 59025 FETAL NON-STRESS TEST: CPT | Mod: 26,,, | Performed by: OBSTETRICS & GYNECOLOGY

## 2019-03-02 RX ORDER — ACETAMINOPHEN 500 MG
1000 TABLET ORAL ONCE
Status: DISCONTINUED | OUTPATIENT
Start: 2019-03-02 | End: 2019-03-02 | Stop reason: HOSPADM

## 2019-03-02 RX ORDER — ONDANSETRON 4 MG/1
4 TABLET, ORALLY DISINTEGRATING ORAL ONCE
Status: DISCONTINUED | OUTPATIENT
Start: 2019-03-02 | End: 2019-03-02 | Stop reason: HOSPADM

## 2019-03-02 NOTE — DISCHARGE INSTRUCTIONS
Call clinic [phone] or L & D after hours at [phone] for vaginal bleeding, leakage of fluids, contractions 4-5 in 2 hours, decreased fetal movements ( 10 kicks in 2 hours), headache not relieved by Tylenol, blurry vision, or temp of 100.4 or greater.  Begin doing fetal kick counts, at least 10 movements in 2 hours starting at 28 weeks gestation.  Keep next clinic appointment

## 2019-03-02 NOTE — ED TRIAGE NOTES
Pt arrived to OB ED via EMS with complaints of vaginal pressure/back pain every 5 minutes.  Pt denies LOF, vaginal bleeding.  Reports +FM.  MD notified. TOCO and EFM applied.

## 2019-03-02 NOTE — ED PROVIDER NOTES
"Encounter Date: 3/2/2019       History     Chief Complaint   Patient presents with    Contractions     Anitra Chaney is a 39 y.o. [id number] at 29w4d presents complaining of contractions. Patient reports contractions occurring q5 minutes for the past 45 minutes. Prior to that time she had been noting inconsistent contractions. She states it feels like " a John horse in my vagina." Patient reports some leakage of urine.   This IUP is complicated by chronic hypertension (no meds), AMA, Rh negative status, obesity and history of  migraines.  Patient reports contractions, denies vaginal bleeding, denies LOF.   Fetal Movement: normal.            Review of patient's allergies indicates:  No Known Allergies  Past Medical History:   Diagnosis Date    Anxiety and depression     DM2 (diabetes mellitus, type 2)     diet control    GERD (gastroesophageal reflux disease)     HTN (hypertension), benign 3/10/2014    Migraines     Obesity     PFO (patent foramen ovale)     found after stroke "too small/risky to close"    Polycystic disease, ovaries     Stroke 2017    Hospitalized at [facility]; given tPA     Past Surgical History:   Procedure Laterality Date    ANKLE FRACTURE SURGERY      ARTHRODESIS-ANKLE Left 3/27/2018    Performed by Lele [facility]dez MD at [facility] OR 1ST FLR     SECTION, CLASSIC  2005/2009    x2    CHOLECYSTECTOMY  3/2002    lap maribel    EXCISION-MASS FOOT Left 3/2/2016    Performed by Lele Hern[facility]dez MD at [facility] OR 1ST FLR    FOOT SURGERY      gastric sleeve  2017     Family History   Problem Relation Age of Onset    Other Father         house fire    Breast cancer Neg Hx     Colon cancer Neg Hx     Ovarian cancer Neg Hx     Congenital heart disease Neg Hx     Pacemaker/defibrilator Neg Hx     Early death Neg Hx      Social History     Tobacco Use    Smoking status: Current Every Day Smoker     Packs/day: 1.00     Years: 18.00     Pack years: 18.00     Types: " Cigarettes    Smokeless tobacco: Never Used    Tobacco comment: cutting to 2 cigarettes per day   Substance Use Topics    Alcohol use: No     Alcohol/week: 0.0 oz    Drug use: No     Review of Systems   Constitutional: Negative for activity change, chills, diaphoresis, fatigue and fever.   HENT: Negative for trouble swallowing.    Eyes: Negative for photophobia and visual disturbance.   Respiratory: Negative for cough, chest tightness, shortness of breath and wheezing.    Cardiovascular: Negative for chest pain and palpitations.   Gastrointestinal: Positive for abdominal pain. Negative for diarrhea, nausea and vomiting.   Genitourinary: Negative for difficulty urinating, dysuria, hematuria, pelvic pain, vaginal bleeding, vaginal discharge and vaginal pain.   Musculoskeletal: Negative for arthralgias and myalgias.   Neurological: Negative for dizziness, syncope, weakness and light-headedness.       Physical Exam     Initial Vitals [03/02/19 1500]   BP Pulse Resp Temp SpO2   117/74 63 18 97.7 °F (36.5 °C) 99 %      MAP       --         Physical Exam    Vitals reviewed.  Constitutional: She appears well-developed and well-nourished. She is not diaphoretic. No distress.   HENT:   Head: Normocephalic and atraumatic.   Nose: Nose normal.   Eyes: Conjunctivae are normal. Right eye exhibits no discharge. Left eye exhibits no discharge. No scleral icterus.   Neck: Normal range of motion.   Cardiovascular: Normal rate and intact distal pulses.   Pulmonary/Chest: No respiratory distress.   Abdominal:   Gravid    Musculoskeletal: Normal range of motion. She exhibits no edema.   Neurological: She is alert and oriented to person, place, and time.   Psychiatric: She has a normal mood and affect. Her behavior is normal. Judgment and thought content normal.     OB LABOR EXAM:   Pre-Term Labor: No.   Membranes ruptured: No.         Dilatation: 0.   Station: -5.   Effacement: 40%.             ED Course   Fetal non-stress  test  Date/Time: 3/2/2019 3:29 PM  Performed by: Susu Nicole MD  Authorized by: Sheri Simpson DO     Nonstress Test:     Variability:  6-25 BPM    Decelerations:  None    Accelerations:  10 bpm    Acoustic Stimulator: No      Baseline:  120    Uterine Irritability: No      Contractions:  Not present  Biophysical Profile:     Nonstress Test Interpretation: reactive      Overall Impression:  Reassuring      Labs Reviewed   URINALYSIS, REFLEX TO URINE CULTURE - Abnormal; Notable for the following components:       Result Value    Appearance, UA Hazy (*)     Protein, UA Trace (*)     Ketones, UA Trace (*)     Leukocytes, UA Trace (*)     All other components within normal limits    Narrative:     Preferred Collection Type->Urine, Clean Catch   URINALYSIS MICROSCOPIC - Abnormal; Notable for the following components:    Bacteria, UA Few (*)     All other components within normal limits    Narrative:     Preferred Collection Type->Urine, Clean Catch   POCT URINALYSIS, DIPSTICK OR TABLET REAGENT, AUTOMATED, WITH MICROSCOP - Abnormal   CULTURE, URINE          Imaging Results    None          Medical Decision Making:   ED Management:  VSS  NST reactive and reassuring  Udip 2+ protein; 2+ leuks   UA hazy with 2 WBCs; urine culture pending; no tx for UTI at this time; follow up urine culture   Cervix closed on exam   Tylenol for pain   zofran for nausea     Patient stable for discharge home. Counseled on labor precautions and instructed to follow up with primary ob.                 Attending Attestation:   Physician Attestation Statement for Resident:  As the supervising MD   Physician Attestation Statement: I have personally seen and examined this patient.   I agree with the above history. -:   As the supervising MD I agree with the above PE.    As the supervising MD I agree with the above treatment, course, plan, and disposition.  I was personally present during the critical portions of the procedure(s)  performed by the resident and was immediately available in the ED to provide services and assistance as needed during the entire procedure.  I have reviewed and agree with the residents interpretation of the following: rhythm strips.  I have reviewed the following: old records at this facility.                    ED Course as of Mar 02 1634   Sat Mar 02, 2019   1550 I evaluated Ms. Chaney and discussed plan with Dr. Nicole.  Pt presents at 29w4d with lower back pain and vaginal pain.  Here, no contraction seen, fetal status reactive and reassuring.  Cervix closed.  UA with trace leukocytes, negative nitrites, only 4 WBC on micro and few bacteria.  Will send for cx, but I do not believe c/w UTI.  She is stable for d/c home  [HU]      ED Course User Index  [HU] Sheri Simpson DO     Clinical Impression:       ICD-10-CM ICD-9-CM   1. 29 weeks gestation of pregnancy Z3A.29 V22.2   2. Abdominal pain, unspecified abdominal location R10.9 789.00         Disposition:   Disposition: Discharged  Condition: Stable                        Susu Nicole MD  Resident  03/02/19 1634       Sheri Simpson DO  03/02/19 1749

## 2019-03-04 LAB
BACTERIA UR CULT: NORMAL
BACTERIA UR CULT: NORMAL

## 2019-03-06 ENCOUNTER — TELEPHONE (OUTPATIENT)
Dept: OBSTETRICS AND GYNECOLOGY | Facility: CLINIC | Age: 39
End: 2019-03-06

## 2019-03-06 DIAGNOSIS — Z30.9 ENCOUNTER FOR CONTRACEPTIVE MANAGEMENT, UNSPECIFIED TYPE: Primary | ICD-10-CM

## 2019-03-12 ENCOUNTER — PATIENT MESSAGE (OUTPATIENT)
Dept: OBSTETRICS AND GYNECOLOGY | Facility: CLINIC | Age: 39
End: 2019-03-12

## 2019-03-19 ENCOUNTER — TELEPHONE (OUTPATIENT)
Dept: MATERNAL FETAL MEDICINE | Facility: CLINIC | Age: 39
End: 2019-03-19

## 2019-03-19 ENCOUNTER — HOSPITAL ENCOUNTER (EMERGENCY)
Facility: OTHER | Age: 39
Discharge: HOME | End: 2019-03-19
Attending: OBSTETRICS & GYNECOLOGY
Payer: COMMERCIAL

## 2019-03-19 VITALS
DIASTOLIC BLOOD PRESSURE: 76 MMHG | SYSTOLIC BLOOD PRESSURE: 129 MMHG | HEART RATE: 56 BPM | OXYGEN SATURATION: 99 % | TEMPERATURE: 98 F | RESPIRATION RATE: 18 BRPM

## 2019-03-19 DIAGNOSIS — Z3A.32 32 WEEKS GESTATION OF PREGNANCY: ICD-10-CM

## 2019-03-19 DIAGNOSIS — O26.899 CRAMPING AFFECTING PREGNANCY, ANTEPARTUM: Primary | ICD-10-CM

## 2019-03-19 DIAGNOSIS — R10.9 CRAMPING AFFECTING PREGNANCY, ANTEPARTUM: Primary | ICD-10-CM

## 2019-03-19 DIAGNOSIS — I10 ESSENTIAL HYPERTENSION: ICD-10-CM

## 2019-03-19 DIAGNOSIS — G43.001 MIGRAINE WITHOUT AURA AND WITH STATUS MIGRAINOSUS, NOT INTRACTABLE: ICD-10-CM

## 2019-03-19 DIAGNOSIS — N89.8 VAGINAL DISCHARGE: ICD-10-CM

## 2019-03-19 LAB
ABDOMINAL CIRCUMFERENCE: NORMAL CM
ALBUMIN SERPL BCP-MCNC: 2.4 G/DL
ALP SERPL-CCNC: 108 U/L
ALT SERPL W/O P-5'-P-CCNC: 8 U/L
ANION GAP SERPL CALC-SCNC: 9 MMOL/L
AST SERPL-CCNC: 11 U/L
BASOPHILS # BLD AUTO: 0.04 K/UL
BASOPHILS NFR BLD: 0.4 %
BILIRUB SERPL-MCNC: 0.2 MG/DL
BILIRUB UR QL STRIP: NEGATIVE
BIPARIETAL DIAMETER: NORMAL CM
BUN SERPL-MCNC: 5 MG/DL
CALCIUM SERPL-MCNC: 8.8 MG/DL
CHLORIDE SERPL-SCNC: 108 MMOL/L
CLARITY UR: CLEAR
CO2 SERPL-SCNC: 22 MMOL/L
COLOR UR: YELLOW
CREAT SERPL-MCNC: 0.6 MG/DL
CREAT UR-MCNC: 78.1 MG/DL
DIFFERENTIAL METHOD: ABNORMAL
EOSINOPHIL # BLD AUTO: 0.1 K/UL
EOSINOPHIL NFR BLD: 1.3 %
ERYTHROCYTE [DISTWIDTH] IN BLOOD BY AUTOMATED COUNT: 13.3 %
EST. GFR  (AFRICAN AMERICAN): >60 ML/MIN/1.73 M^2
EST. GFR  (NON AFRICAN AMERICAN): >60 ML/MIN/1.73 M^2
ESTIMATED FETAL WEIGHT: NORMAL GRAMS
FEMUR LENGTH: NORMAL
GLUCOSE SERPL-MCNC: 90 MG/DL
GLUCOSE UR QL STRIP: NEGATIVE
HC/AC: NORMAL
HCT VFR BLD AUTO: 29.5 %
HEAD CIRCUMFERENCE: NORMAL CM
HGB BLD-MCNC: 9.9 G/DL
HGB UR QL STRIP: NEGATIVE
KETONES UR QL STRIP: NEGATIVE
LEUKOCYTE ESTERASE UR QL STRIP: ABNORMAL
LYMPHOCYTES # BLD AUTO: 2.5 K/UL
LYMPHOCYTES NFR BLD: 23.2 %
MCH RBC QN AUTO: 28.2 PG
MCHC RBC AUTO-ENTMCNC: 33.6 G/DL
MCV RBC AUTO: 84 FL
MICROSCOPIC COMMENT: NORMAL
MONOCYTES # BLD AUTO: 0.5 K/UL
MONOCYTES NFR BLD: 4.8 %
NEUTROPHILS # BLD AUTO: 7.5 K/UL
NEUTROPHILS NFR BLD: 70 %
NITRITE UR QL STRIP: NEGATIVE
PH UR STRIP: 7 [PH] (ref 5–8)
PLATELET # BLD AUTO: 211 K/UL
PMV BLD AUTO: 9.7 FL
POTASSIUM SERPL-SCNC: 2.9 MMOL/L
PROT SERPL-MCNC: 5.9 G/DL
PROT UR QL STRIP: NEGATIVE
PROT UR-MCNC: 12 MG/DL
PROT/CREAT UR: 0.15 MG/G{CREAT}
RBC # BLD AUTO: 3.51 M/UL
SODIUM SERPL-SCNC: 139 MMOL/L
SP GR UR STRIP: 1.01 (ref 1–1.03)
URN SPEC COLLECT METH UR: ABNORMAL
UROBILINOGEN UR STRIP-ACNC: NEGATIVE EU/DL
WBC # BLD AUTO: 10.69 K/UL
WBC #/AREA URNS HPF: 3 /HPF (ref 0–5)

## 2019-03-19 PROCEDURE — 25000003 PHARM REV CODE 250: Performed by: OBSTETRICS & GYNECOLOGY

## 2019-03-19 PROCEDURE — 99284 EMERGENCY DEPT VISIT MOD MDM: CPT | Mod: 25,,, | Performed by: OBSTETRICS & GYNECOLOGY

## 2019-03-19 PROCEDURE — 25000003 PHARM REV CODE 250: Performed by: STUDENT IN AN ORGANIZED HEALTH CARE EDUCATION/TRAINING PROGRAM

## 2019-03-19 PROCEDURE — 81000 URINALYSIS NONAUTO W/SCOPE: CPT

## 2019-03-19 PROCEDURE — 99284 EMERGENCY DEPT VISIT MOD MDM: CPT | Mod: 25

## 2019-03-19 PROCEDURE — 99284 PR EMERGENCY DEPT VISIT,LEVEL IV: ICD-10-PCS | Mod: 25,,, | Performed by: OBSTETRICS & GYNECOLOGY

## 2019-03-19 PROCEDURE — 36415 COLL VENOUS BLD VENIPUNCTURE: CPT

## 2019-03-19 PROCEDURE — 82570 ASSAY OF URINE CREATININE: CPT

## 2019-03-19 PROCEDURE — 59025 FETAL NON-STRESS TEST: CPT

## 2019-03-19 PROCEDURE — 76815 OB US LIMITED FETUS(S): CPT | Performed by: OBSTETRICS & GYNECOLOGY

## 2019-03-19 PROCEDURE — 80053 COMPREHEN METABOLIC PANEL: CPT

## 2019-03-19 PROCEDURE — 59025 PR FETAL 2N-STRESS TEST: ICD-10-PCS | Mod: 26,,, | Performed by: OBSTETRICS & GYNECOLOGY

## 2019-03-19 PROCEDURE — 85025 COMPLETE CBC W/AUTO DIFF WBC: CPT

## 2019-03-19 PROCEDURE — 59025 FETAL NON-STRESS TEST: CPT | Mod: 26,,, | Performed by: OBSTETRICS & GYNECOLOGY

## 2019-03-19 RX ORDER — ONDANSETRON 8 MG/1
8 TABLET, ORALLY DISINTEGRATING ORAL ONCE
Status: COMPLETED | OUTPATIENT
Start: 2019-03-19 | End: 2019-03-19

## 2019-03-19 RX ORDER — ACETAMINOPHEN 325 MG/1
650 TABLET ORAL ONCE
Status: COMPLETED | OUTPATIENT
Start: 2019-03-19 | End: 2019-03-19

## 2019-03-19 RX ADMIN — ACETAMINOPHEN 650 MG: 325 TABLET ORAL at 09:03

## 2019-03-19 RX ADMIN — ONDANSETRON 8 MG: 8 TABLET, ORALLY DISINTEGRATING ORAL at 08:03

## 2019-03-19 NOTE — TELEPHONE ENCOUNTER
Patient had previously missed her follow up ultrasound and consultation with [facility].    Message left, on patient's contact number, to call [facility] back at her earliest convenience at [phone].

## 2019-03-20 NOTE — ED NOTES
Patient given discharge instructions.  Patient verbalized understanding.  Discharged home with no further questions.

## 2019-03-20 NOTE — ED PROVIDER NOTES
"Encounter Date: 3/19/2019       History     Chief Complaint   Patient presents with    Rupture of Membranes    Contractions     Anitra Chaney is a 39 y.o. [id number] at 32w0d presents complaining of contractions, pelvic pressure, and leakage of fluid stating "I'm in labor". Patient says that at 1830, she experienced sudden onset of painful contractions, about 5 mins apart. She then had two episodes of emesis. After the second episode, she felt a gush of fluid and clear fluid began to run down her leg. Patient states that she has felt an increase in pelvic pressure over the past few weeks. Patient denies vaginal bleeding and reports good fetal movement. This IUP is complicated by migraines, morbid obesity, Rh neg status, cHTN, and AMA.            Review of patient's allergies indicates:  No Known Allergies  Past Medical History:   Diagnosis Date    Anxiety and depression     DM2 (diabetes mellitus, type 2)     diet control    GERD (gastroesophageal reflux disease)     HTN (hypertension), benign 3/10/2014    Migraines     Obesity     PFO (patent foramen ovale)     found after stroke "too small/risky to close"    Polycystic disease, ovaries     Stroke 2017    Hospitalized at [facility]; given tPA     Past Surgical History:   Procedure Laterality Date    ANKLE FRACTURE SURGERY      ARTHRODESIS-ANKLE Left 3/27/2018    Performed by Lele Hern[facility]dez MD at [facility] OR 1ST FLR     SECTION, CLASSIC  2005/2009    x2    CHOLECYSTECTOMY  3/2002    lap maribel    EXCISION-MASS FOOT Left 3/2/2016    Performed by Lele Hernández MD at [facility] OR 1ST FLR    FOOT SURGERY      gastric sleeve  2017     Family History   Problem Relation Age of Onset    Other Father         house fire    Breast cancer Neg Hx     Colon cancer Neg Hx     Ovarian cancer Neg Hx     Congenital heart disease Neg Hx     Pacemaker/defibrilator Neg Hx     Early death Neg Hx      Social History     Tobacco Use    " Smoking status: Current Every Day Smoker     Packs/day: 1.00     Years: 18.00     Pack years: 18.00     Types: Cigarettes    Smokeless tobacco: Never Used    Tobacco comment: cutting to 2 cigarettes per day   Substance Use Topics    Alcohol use: No     Alcohol/week: 0.0 oz    Drug use: No     Review of Systems   Constitutional: Negative for activity change, appetite change, chills, fatigue and fever.   HENT: Negative for congestion.    Eyes: Negative for visual disturbance.   Respiratory: Negative for shortness of breath.    Cardiovascular: Negative for chest pain and palpitations.   Gastrointestinal: Positive for abdominal pain. Negative for diarrhea, nausea and vomiting.   Genitourinary: Positive for pelvic pain and vaginal discharge. Negative for dysuria, flank pain and vaginal bleeding.   Musculoskeletal: Positive for back pain.   Skin: Negative for rash.   Neurological: Positive for headaches. Negative for light-headedness.   Psychiatric/Behavioral: Negative for agitation.       Physical Exam     Initial Vitals [03/19/19 2025]   BP Pulse Resp Temp SpO2   (!) 158/89 65 18 97.5 °F (36.4 °C) 99 %      MAP       --       /76   Pulse (!) 56   Temp 97.5 °F (36.4 °C) (Temporal)   Resp 18   LMP 04/21/2018 (LMP Unknown)   SpO2 99%     Physical Exam    Vitals reviewed.  Constitutional: She appears well-developed and well-nourished. No distress.   HENT:   Head: Normocephalic and atraumatic.   Cardiovascular: Normal rate, regular rhythm and normal heart sounds.   Pulmonary/Chest: No respiratory distress.   Abdominal: Soft. She exhibits no distension. There is no tenderness. There is no rebound and no guarding.   Musculoskeletal: She exhibits no edema or tenderness.   Neurological: She is alert and oriented to person, place, and time.   Skin: Skin is warm and dry.   Psychiatric: She has a normal mood and affect. Her behavior is normal. Judgment and thought content normal.     OB LABOR EXAM:   Pre-Term Labor:  No.   Membranes ruptured: No.   Method: Sterile speculum exam per MD and Sterile vaginal exam per MD.   Vaginal Bleeding: none present.     Dilatation: 0.   Station: -4.   Effacement: 40%.   Amniotic Fluid Color: no fluid.     Comments: SSE: Mild amount of thick, clear discharge present in vaginal vault. Negative pooling. Negative nitrazine. Negative ferning. Cervix visually appears closed and thick.  SVE: closed/thick/high       ED Course   Obtain Fetal nonstress test (NST)  Date/Time: 3/20/2019 4:55 PM  Performed by: Ashlee Sena MD  Authorized by: Deandra Nunez MD     Nonstress Test:     Variability:  6-25 BPM    Decelerations:  None    Accelerations:  15 bpm    Baseline:  130    Contractions:  Not present    Contraction Frequency:  Top hat contractions initially on toco  Biophysical Profile:     Nonstress Test Interpretation: reactive      Overall Impression:  Reassuring        Labs Reviewed   COMPREHENSIVE METABOLIC PANEL - Abnormal; Notable for the following components:       Result Value    Potassium 2.9 (*)     CO2 22 (*)     BUN, Bld 5 (*)     Total Protein 5.9 (*)     Albumin 2.4 (*)     ALT 8 (*)     All other components within normal limits   CBC W/ AUTO DIFFERENTIAL - Abnormal; Notable for the following components:    RBC 3.51 (*)     Hemoglobin 9.9 (*)     Hematocrit 29.5 (*)     All other components within normal limits   URINALYSIS, REFLEX TO URINE CULTURE - Abnormal; Notable for the following components:    Leukocytes, UA 1+ (*)     All other components within normal limits    Narrative:     Preferred Collection Type->Urine, Clean Catch   PROTEIN / CREATININE RATIO, URINE   URINALYSIS MICROSCOPIC    Narrative:     Preferred Collection Type->Urine, Clean Catch          Imaging Results    None          Medical Decision Making:   ED Management:  - Initially mild range blood pressures  - , reactive and reassuring  - Top hat ctx initially on toco  - SSE: neg pooling, nitrazine,  and ferning  - SVE: closed/thick/high  - BPP 8/8, MVP 4.61 cm, fetus cephalic  - Tylenol given for report of recurrent migraine  - Zofran given for nausea  - UA normal  - H/H 9.5/29.5  - PreE labs ordered given mild range pressures and within normal limits, PC ratio 0.15  - Discharged in stable condition with labor precautions, patient voiced understanding              Attending Attestation:   Physician Attestation Statement for Resident:  As the supervising MD   Physician Attestation Statement: I have personally seen and examined this patient.   I agree with the above history. -:   As the supervising MD I agree with the above PE.    As the supervising MD I agree with the above treatment, course, plan, and disposition.   -: Patient evaluated and found to be stable, agree with resident's assessment and plan.  I was personally present during the critical portions of the procedure(s) performed by the resident and was immediately available in the ED to provide services and assistance as needed during the entire procedure.  I have reviewed and agree with the residents interpretation of the following: lab data.  I have reviewed the following: old records at this facility.                    ED Course as of Mar 20 0902   Tue Mar 19, 2019   2109 32 weeks, presents with c/o contractions and possible ROM. NST reactive/reassuring. ROM testing negative, cervix closed.   [AR]      ED Course User Index  [AR] Deandra Nunez MD     Clinical Impression:       ICD-10-CM ICD-9-CM   1. Cramping affecting pregnancy, antepartum O26.899 646.83    R10.9 789.00   2. 32 weeks gestation of pregnancy Z3A.32 V22.2   3. Vaginal discharge N89.8 623.5   4. Migraine without aura and with status migrainosus, not intractable G43.001 346.12   5. CHTN on no medications I10 401.9         Disposition:   Disposition: Discharged  Condition: Stable                        Ashlee Sena MD  Resident  03/20/19 8718       Deandra Nunez  MD  03/22/19 1517

## 2019-03-20 NOTE — DISCHARGE INSTRUCTIONS
Abdominal Pain and Early Pregnancy    (To rule out ectopic pregnancy or miscarriage)  Our tests show that you are pregnant, but the exact cause of your pain isnt clear.  Some pain and bleeding are common early in pregnancy. Often they stop, and you can go on to have a normal pregnancy and baby. Other times the pain or bleeding can be signs of a miscarriage or ectopic pregnancy. An ectopic pregnancy is a very serious problem. At this time it is unclear if your pregnancy will continue normally, if you will have a miscarriage, or if you could have an ectopic pregnancy. Below is some information about this.  Miscarriage  At this time we dont know whether you will have a miscarriage, or if things will clear up and your pregnancy will continue normally. We understand that this is emotionally difficult. There is little we can say to change the way you feel. But understand that miscarriages are common.  About 1 or 2 out of every 10 pregnancies end this way. Some end even before you know you are pregnant. This happens for a number of reasons, and usually we never figure out why. Its important you know that it is not your fault. It didnt happen because you did anything wrong.  Having sex or exercising does not cause a miscarriage. These activities are usually safe unless you have pain or bleeding or your doctor tells you to stop. Even minor falls wont cause a miscarriage. Miscarriages happen because things were not developing as they were supposed to. No medicine can prevent a miscarriage.  Ectopic pregnancy  In a normal pregnancy, the fertilized egg attaches to the wall of the womb (uterus). In an ectopic or tubal pregnancy, the fertilized egg attaches outside the uterus, usually in the fallopian tube. Very rarely, the egg attaches to an ovary or somewhere else in the abdomen. An ectopic pregnancy is much less common than a miscarriage, but it is very serious. The baby cannot survive, and as it grows it can rupture  the tube. This can cause internal bleeding and even death. Risk factors for an ectopic are:  · An ectopic pregnancy in the past  · Pelvic inflammatory disease, or PID  · Endometriosis  · Smoking  · An IUD  Additional tests  Because we dont know whats causing your symptoms, you will need more tests to figure out what the problem is. You may need:  Ultrasound  An ultrasound can usually find a normal pregnancy as early as 4 to 5 weeks along. If the ultrasound does not show the baby inside the uterus, it means that:  · You have a normal pregnancy less than 4 weeks along, or  · You are having or recently had a miscarriage, or  · You have an ectopic pregnancy  Quantitative HCG  This test measures the amount of a pregnancy hormone in your blood. Comparing today's test result to a repeat test in 2 days will show whether you have a normal pregnancy.  Laparoscopy  This is a type of surgery. The healthcare provider will put a tube with a light inside your belly (abdomen) to look directly at your pelvic organs. This test is used when it is not safe to wait 2 days for blood test results.  Important information  If you do have an ectopic pregnancy, there is a small chance that the growing fetus can tear the fallopian tube. This can cause severe internal bleeding. If this happens, you may have:  · Sudden severe pain in your lower abdomen  · Vaginal bleeding  · Weakness, dizziness, and sometimes fainting  If any of these symptoms occur:  · Call 911 or return immediately to the hospital.  · Do not drive yourself.  · Do not go to your healthcare provider's office or to a clinic. Go to the hospital.   Home care  Follow these guidelines to help care for yourself at home:  · Rest until your next exam. Dont do anything strenuous.  · Eat a light diet with foods that are easy to digest.  · Dont have sex until your healthcare provider says its OK.  Follow-up care  Follow up with your healthcare provider, or as advised. If you were told  to have a repeat blood test in 2 days, its important to get it done.  If you had an X-ray or ultrasound, a radiologist will review it. You will be told of any new findings that may affect your care.  Call 911  Call 911 if you have any of these:  · Severe pain and very heavy bleeding  · Severe lightheadedness, passing out, or fainting  · Rapid heart rate  · Trouble breathing  · Confused or difficulty waking up  When to seek medical advice  Call your healthcare provider right away if any of these occur:  · The pain in your abdomen gets worse, either suddenly or gradually.  · You are dizzy or weak when you stand.  · You have heavy vaginal bleeding. This means soaking 1 pad an hour for 3 hours.  · You have vaginal bleeding for more than 5 days.  · You have repeated vomiting or diarrhea.  · The pain in your abdomen moves to the lower right.  · You have blood in your vomit or bowel movements. This will be dark red or black.  · You have a fever of 100.4ºF (38ºC) or higher, or as directed by your healthcare provider  Date Last Reviewed: 10/1/2016  © 4124-0648 [employer]. [address] [place] [address], [place] [address]. All rights reserved. This information is not intended as a substitute for professional medical care. Always follow your healthcare professional's instructions.

## 2019-03-26 ENCOUNTER — TELEPHONE (OUTPATIENT)
Dept: MATERNAL FETAL MEDICINE | Facility: CLINIC | Age: 39
End: 2019-03-26

## 2019-03-26 ENCOUNTER — PATIENT MESSAGE (OUTPATIENT)
Dept: OBSTETRICS AND GYNECOLOGY | Facility: CLINIC | Age: 39
End: 2019-03-26

## 2019-03-26 NOTE — TELEPHONE ENCOUNTER
Left message for patient calling to schedule her follow-up MFM appt/ultrasound. Return phone number left. Will schedule and place appt slip in mail (LM x 3 after no show last appt).

## 2019-03-28 ENCOUNTER — INITIAL CONSULT (OUTPATIENT)
Dept: MATERNAL FETAL MEDICINE | Facility: CLINIC | Age: 39
End: 2019-03-28
Payer: COMMERCIAL

## 2019-03-28 ENCOUNTER — PROCEDURE VISIT (OUTPATIENT)
Dept: MATERNAL FETAL MEDICINE | Facility: CLINIC | Age: 39
End: 2019-03-28
Attending: OBSTETRICS & GYNECOLOGY
Payer: COMMERCIAL

## 2019-03-28 ENCOUNTER — ROUTINE PRENATAL (OUTPATIENT)
Dept: OBSTETRICS AND GYNECOLOGY | Facility: CLINIC | Age: 39
End: 2019-03-28
Payer: COMMERCIAL

## 2019-03-28 VITALS
BODY MASS INDEX: 36.25 KG/M2 | WEIGHT: 252.63 LBS | SYSTOLIC BLOOD PRESSURE: 122 MMHG | DIASTOLIC BLOOD PRESSURE: 80 MMHG

## 2019-03-28 DIAGNOSIS — I10 ESSENTIAL HYPERTENSION: ICD-10-CM

## 2019-03-28 DIAGNOSIS — Z36.89 ENCOUNTER FOR ULTRASOUND TO CHECK FETAL GROWTH: ICD-10-CM

## 2019-03-28 DIAGNOSIS — O09.93 HIGH-RISK PREGNANCY IN THIRD TRIMESTER: Primary | ICD-10-CM

## 2019-03-28 DIAGNOSIS — Z36.89 ENCOUNTER FOR ULTRASOUND TO CHECK FETAL GROWTH: Primary | ICD-10-CM

## 2019-03-28 DIAGNOSIS — O09.523 ELDERLY MULTIGRAVIDA IN THIRD TRIMESTER: ICD-10-CM

## 2019-03-28 DIAGNOSIS — D64.9 ANEMIA, UNSPECIFIED TYPE: ICD-10-CM

## 2019-03-28 PROCEDURE — 99999 PR PBB SHADOW E&M-EST. PATIENT-LVL III: CPT | Mod: PBBFAC,,, | Performed by: OBSTETRICS & GYNECOLOGY

## 2019-03-28 PROCEDURE — 99213 OFFICE O/P EST LOW 20 MIN: CPT | Mod: 25,S$GLB,, | Performed by: OBSTETRICS & GYNECOLOGY

## 2019-03-28 PROCEDURE — 3008F BODY MASS INDEX DOCD: CPT | Mod: CPTII,S$GLB,, | Performed by: OBSTETRICS & GYNECOLOGY

## 2019-03-28 PROCEDURE — 99999 PR PBB SHADOW E&M-EST. PATIENT-LVL II: CPT | Mod: PBBFAC,,, | Performed by: STUDENT IN AN ORGANIZED HEALTH CARE EDUCATION/TRAINING PROGRAM

## 2019-03-28 PROCEDURE — 0502F SUBSEQUENT PRENATAL CARE: CPT | Mod: CPTII,S$GLB,, | Performed by: STUDENT IN AN ORGANIZED HEALTH CARE EDUCATION/TRAINING PROGRAM

## 2019-03-28 PROCEDURE — 0502F PR SUBSEQUENT PRENATAL CARE: ICD-10-PCS | Mod: CPTII,S$GLB,, | Performed by: STUDENT IN AN ORGANIZED HEALTH CARE EDUCATION/TRAINING PROGRAM

## 2019-03-28 PROCEDURE — 76819 FETAL BIOPHYS PROFIL W/O NST: CPT | Mod: S$GLB,,, | Performed by: OBSTETRICS & GYNECOLOGY

## 2019-03-28 PROCEDURE — 76819 PR US, OB, FETAL BIOPHYSICAL, W/O NST: ICD-10-PCS | Mod: S$GLB,,, | Performed by: OBSTETRICS & GYNECOLOGY

## 2019-03-28 PROCEDURE — 3008F PR BODY MASS INDEX (BMI) DOCUMENTED: ICD-10-PCS | Mod: CPTII,S$GLB,, | Performed by: OBSTETRICS & GYNECOLOGY

## 2019-03-28 PROCEDURE — 99999 PR PBB SHADOW E&M-EST. PATIENT-LVL III: ICD-10-PCS | Mod: PBBFAC,,, | Performed by: OBSTETRICS & GYNECOLOGY

## 2019-03-28 PROCEDURE — 76816 PR  US,PREGNANT UTERUS,F/U,TRANSABD APP: ICD-10-PCS | Mod: S$GLB,,, | Performed by: OBSTETRICS & GYNECOLOGY

## 2019-03-28 PROCEDURE — 99213 PR OFFICE/OUTPT VISIT, EST, LEVL III, 20-29 MIN: ICD-10-PCS | Mod: 25,S$GLB,, | Performed by: OBSTETRICS & GYNECOLOGY

## 2019-03-28 PROCEDURE — 99999 PR PBB SHADOW E&M-EST. PATIENT-LVL II: ICD-10-PCS | Mod: PBBFAC,,, | Performed by: STUDENT IN AN ORGANIZED HEALTH CARE EDUCATION/TRAINING PROGRAM

## 2019-03-28 PROCEDURE — 76816 OB US FOLLOW-UP PER FETUS: CPT | Mod: S$GLB,,, | Performed by: OBSTETRICS & GYNECOLOGY

## 2019-03-28 RX ORDER — FERROUS SULFATE 325(65) MG
325 TABLET, DELAYED RELEASE (ENTERIC COATED) ORAL
COMMUNITY
End: 2019-05-14

## 2019-03-28 RX ORDER — BUTALBITAL, ACETAMINOPHEN AND CAFFEINE 50; 325; 40 MG/1; MG/1; MG/1
1 TABLET ORAL EVERY 4 HOURS PRN
COMMUNITY
End: 2019-05-14

## 2019-03-28 RX ORDER — ONDANSETRON 4 MG/1
8 TABLET, FILM COATED ORAL 2 TIMES DAILY
COMMUNITY
End: 2019-05-14

## 2019-03-28 NOTE — PROGRESS NOTES
Complaints today: Aches and pains that are tolerable. Still with occasional nausea for which she takes zofran. No contractions, vaginal bleeding, leakage of fluid. Reports good fetal movement. Desires mirena for contraception postpartum.    LMP 2018 (LMP Unknown)     39 y.o., at 33w2d by Estimated Date of Delivery: 19  Patient Active Problem List   Diagnosis    Mood altered    Migraine syndrome    Daytime somnolence    Class 2 obesity due to excess calories without serious comorbidity with body mass index (BMI) of 35.0 to 35.9 in adult    Ankle arthritis    Rh negative state in antepartum period/rhogam 19    CHTN on no medications    Pregnancy, supervision, high-risk/breast/mirena/ob needs assessment/flu/tdap/isha a    Advanced maternal age in multigravida    Obesity complicating pregnancy    Lower GI bleed    Migraine    History of 2  sections     screening encounter    Suspected fetal abnormality affecting management of mother     OB History    Para Term  AB Living   4 2 2   1 2   SAB TAB Ectopic Multiple Live Births   1       2      # Outcome Date GA Lbr Yuval/2nd Weight Sex Delivery Anes PTL Lv   4 Current            3 SAB 18 10w0d          2 Term 09 37w0d  2.722 kg (6 lb) F CS-Unspec EPI  JIGAR   1 Term 05 37w0d  3.345 kg (7 lb 6 oz) M CS-Unspec EPI  JIGAR       Dating reviewed    Allergies and problem list reviewed and updated    Medical and surgical history reviewed    Prenatal labs reviewed and updated    Physical Exam:  GEN: No distress.  CARDIO: Regular rate  PULM: Normal respiratory effort.  ABD: Soft, gravid, nontender  NEURO: Alert and oriented    Assessment:  40 yo  at 33w2d presents for routine prenatal visit.    Plan:   1. RTC in 2 weeks.  2. Fetal echo normal.  3. Start weekly PNT given number of borderline comorbidities including BMI, chronic HTN, AMA. Order placed.  4. Keep appt with MFM today.    Sarah Braun  MD ALANIZ PGY-1

## 2019-03-28 NOTE — PROGRESS NOTES
Indication  ========    Follow-up evaluation for fetal growth & BPP.    History  ======    General History  Height 178 cm  Height (ft) 5 ft  Height (in) 10 in  Other: hx of stroke in 2016  AMA: Materni T21 negative  Medical History  Past surgical history: Previous surgeries performed  Surgery: gastric bypass  Surgery: foot surgery / ankle surgery  Surgery: cholecystectomy  Surgery:  c/s x 2  Previous Outcomes  Preg. no. 1  Outcome: Live YOB: 2005  Gest. age 37 w + 0 d  Gender: male  Details: c/s, 7lbs 6oz/pre-e/strep B/GDM  Preg. no. 2  Outcome: Live YOB: 2009  Gest. age 37 w + 0 d  Gender: female  Details: c/s, 6lbs, Strep B/GDM  Preg. no. 3  Outcome: Spontaneous miscarriage  Date: 2018   4  Para 2  Nguyen children born living (T) 2  Nguyen children born (T) 2  Abortions (A) 1  Nguyen living children (L) 2  Risk Factors  Details: HTN  Details: obesity  Details: TIA  Details: type 2 DM  Details: Gerd  Details: present smoker - 2 cigarettes/day  Details: pt has right atrial shunt  Details: PCOS  Details: embolic stroke  Details: migraines  Details: anxiety/depression    Pregnancy History  ==============    Maternal Lab Tests  Test: Mat 21  Result: negative  Wants to know gender: yes    Maternal Assessment  =================    Height 178 cm  Height (ft) 5 ft  Height (in) 10 in    Method  ======    [place] E10, Transabdominal ultrasound examination. View: Suboptimal view: limited by maternal body habitus.    Pregnancy  =========    Nguyen pregnancy. Number of fetuses: 1.    Dating  ======    Cycle: regular cycle  Ultrasound examination on: 3/28/2019  GA by U/S based upon: AC, BPD, Femur, HC  GA by U/S 32 w + 5 d  ANIL by U/S: 2019  Assigned: Dating performed on 2018, based on ultrasound (CRL)  Assigned GA 33 w + 2 d  Assigned ANIL: 2019    General Evaluation  ==============    Cardiac activity: present.  bpm.  Fetal movements:  visualized.  Presentation: cephalic.  Placenta: anterior.  Amniotic fluid: normal amountMVP 4.2 cm.    Biophysical Profile  ==============    2: Fetal breathing movements  2: Gross body movements  2: Fetal tone  2: Amniotic fluid volume  8/8: Biophysical profile score    Fetal Biometry  ============    Fetal Biometry  BPD 79.7 mm 32w 0d Hadlock  .4 mm 38w 5d Angi  .9 mm 34w 3d Hadlock  .3 mm 32w 5d Hadlock  Femur 60.7 mm 31w 4d Hadlock  EFW 1,976 g 17% José  Calculated by: Hadlock (BPD-HC-AC-FL)  EFW (lb) 4 lb  EFW (oz) 6 oz  Cephalic index 0.70  HC / AC 1.08  FL / BPD 0.76  FL / AC 0.21  MVP 4.2 cm   bpm    Fetal Anatomy  ============    Cranium: normal  Profile: suboptimal  4-chamber view: normal  RVOT: normal  LVOT: normal  Aortic arch: normal  Ductal arch: normal  SVC: suboptimal  IVC: suboptimal  3-vessel view: normal  Stomach: normal  Kidneys: normal  Bladder: normal  Wants to know gender: yes  Other: A full anatomy survey previously performed.    Consultation  ==========    Type: Follow up from fetal echo. H/o gastric sleeve.  EFW is at the 17th percentile. Recheck growth in 3 weeks. Per clinic note (final pending at visit), patient to start weekly prenatal testing. We  did not see scheduled so our staff assisted in scheduling a weekly BPP for the next few weeks.  Patient reports that she has been taking oral iron (hgb 9.9) but is at times tired.  She started Atarax today for anxiety-she is a .  Patient is no longer taking baby aspirin as it affects her stomach.  She did not report any other complaints.  She was uncertain if she every had B12 deficiency.  Fetal echo was normal but technically limited.  I discussed with her that I would speak with her OB providers. I spoke to Sisi Braun and Lyndon following her visit and recommend b12,  folate levels, iron studies and vitamin D level. Addition other vitamin levels can be checked if concerns. If there is iron  deficiency, the patient  may benefit more from iron infusions than oral therapy. She may have a component of b12 deficiency. I also noted the patient had a CMP at a  recent with hypokalemia. Sisi Braun and Lyndon will be sure repeat potassium level is ordered.  Patient was given precautions. No neuro or pregnancy related complaints. No GI complaints noted.    15 minutes was spent in face to face time with greater than half of that time spent in counseling and coordination of care.      Impression  =========    Nguyen live intrauterine pregnancy.  Overall normal EFW with decreased growth velocity.  Normal amniotic fluid volume.  Limited fetal anatomy appears normal.  BPP 8/8.    Recommendation  ==============    Follow up growth ultrasound and visit in 3 weeks.  Discussed with primary ob Sisi Braun/Giovana and they will order recommended labs.  Weekly BPP-recommend primary ob confirm scheduled.

## 2019-03-29 NOTE — PROGRESS NOTES
Discussed with -they received minimal records but no stroke was documented. No known atrial shunt. Has seen neuro this pregnancy for complicated migraine.

## 2019-04-01 ENCOUNTER — HOSPITAL ENCOUNTER (OUTPATIENT)
Dept: PERINATAL CARE | Facility: OTHER | Age: 39
Discharge: HOME | End: 2019-04-01
Attending: OBSTETRICS & GYNECOLOGY
Payer: COMMERCIAL

## 2019-04-01 DIAGNOSIS — O09.93 HIGH-RISK PREGNANCY IN THIRD TRIMESTER: ICD-10-CM

## 2019-04-01 PROCEDURE — 76819 FETAL BIOPHYS PROFIL W/O NST: CPT

## 2019-04-01 PROCEDURE — 76819 PR US, OB, FETAL BIOPHYSICAL, W/O NST: ICD-10-PCS | Mod: 26,,, | Performed by: OBSTETRICS & GYNECOLOGY

## 2019-04-01 PROCEDURE — 76819 FETAL BIOPHYS PROFIL W/O NST: CPT | Mod: 26,,, | Performed by: OBSTETRICS & GYNECOLOGY

## 2019-04-03 ENCOUNTER — ANESTHESIA EVENT (OUTPATIENT)
Dept: OBSTETRICS AND GYNECOLOGY | Facility: OTHER | Age: 39
End: 2019-04-03

## 2019-04-03 ENCOUNTER — ANESTHESIA (OUTPATIENT)
Dept: OBSTETRICS AND GYNECOLOGY | Facility: OTHER | Age: 39
End: 2019-04-03

## 2019-04-03 PROBLEM — R51.9 INTRACTABLE HEADACHE: Status: ACTIVE | Noted: 2019-04-03

## 2019-04-03 NOTE — ANESTHESIA PREPROCEDURE EVALUATION
Anitra Chaney is a 39 y.o. female [id number] at 34w0d who presents with elevated blood pressures and severe features. This IUP is complicated by migraines, morbid obesity, cHTN (no meds).    OB History    Para Term  AB Living   4 2 2   1 2   SAB TAB Ectopic Multiple Live Births   1       2      # Outcome Date GA Lbr Yuval/2nd Weight Sex Delivery Anes PTL Lv   4 Current            3 SAB 18 10w0d          2 Term 09 37w0d  2.722 kg (6 lb) F CS-Unspec EPI  JIGAR   1 Term 05 37w0d  3.345 kg (7 lb 6 oz) M CS-Unspec EPI  JIGAR       Wt Readings from Last 1 Encounters:   19 1328 114.6 kg (252 lb 10.4 oz)       BP Readings from Last 3 Encounters:   19 (!) 159/88   19 122/80   19 122/80       Patient Active Problem List   Diagnosis    Mood altered    Migraine syndrome    Daytime somnolence    Class 2 obesity due to excess calories without serious comorbidity with body mass index (BMI) of 35.0 to 35.9 in adult    Ankle arthritis    Rh negative state in antepartum period/rhogam 19    CHTN on no medications    High-risk pregnancy in third trimester    Advanced maternal age in multigravida    Obesity complicating pregnancy    Lower GI bleed    Migraine    History of 2  sections    Intractable headache       Past Surgical History:   Procedure Laterality Date    ANKLE FRACTURE SURGERY      ARTHRODESIS-ANKLE Left 3/27/2018    Performed by [facility] at [facility] OR 1ST FLR     SECTION, CLASSIC  2005/2009    x2    CHOLECYSTECTOMY  3/2002    lap maribel    EXCISION-MASS FOOT Left 3/2/2016    Performed by Lele [facility] at [facility] OR 1ST [facility]LR    FOOT SURGERY      gastric sleeve  2017       Social History     Socioeconomic History    Marital status:      Spouse name: Not on file    Number of children: Not on file    Years of education: Not on file    Highest education level: Not on  file   Occupational History     Employer: [employer]   Social Needs    Financial resource strain: Not on file    Food insecurity:     Worry: Not on file     Inability: Not on file    Transportation needs:     Medical: Not on file     Non-medical: Not on file   Tobacco Use    Smoking status: Current Every Day Smoker     Packs/day: 1.00     Years: 18.00     Pack years: 18.00     Types: Cigarettes    Smokeless tobacco: Never Used    Tobacco comment: cutting to 2 cigarettes per day   Substance and Sexual Activity    Alcohol use: No     Alcohol/week: 0.0 oz    Drug use: No    Sexual activity: Yes     Partners: Male   Lifestyle    Physical activity:     Days per week: Not on file     Minutes per session: Not on file    Stress: Not on file   Relationships    Social connections:     Talks on phone: Not on file     Gets together: Not on file     Attends Hinduism service: Not on file     Active member of club or organization: Not on file     Attends meetings of clubs or organizations: Not on file     Relationship status: Not on file   Other Topics Concern    Caffeine Use Not Asked    Financial Status: Disabled Not Asked    Legal: Involved in criminal litigation Not Asked    Caffeine Use: Frequent Not Asked    Financial Status: Employed Not Asked    Legal: Other Not Asked    Caffeine Use: Moderate Not Asked    Financial Status: Unemployed Not Asked    Leisure: Exercise Not Asked    Caffeine Use: Substantial Not Asked    Financial Status: Other Not Asked    Leisure: Fishing Not Asked    Childhood History: Adopted Not Asked    Firearms: Does patient have access to a firearm? Not Asked    Leisure: Hunting Not Asked    Childhood History: Early trauma Not Asked    Home situation: homeless Not Asked    Leisure: Movie Watching Not Asked    Childhood History: Raised by parents Not Asked    Home situation: lives alone Not Asked    Leisure: Shopping Not Asked    Childhood History:  Uneventful Not Asked    Home situation: lives in group home Not Asked    Leisure: Sports Not Asked    Childhood History: Other Not Asked    Home situation: lives in nursing home Not Asked    Leisure: Time with family Not Asked    Education: Unfinished High School Not Asked    Home situation: lives in shelter Not Asked     Service Not Asked    Education: High School Graduate Not Asked    Home situation: lives with family Not Asked    Spirituality: Active Participation Not Asked    Education: Unfinished college Not Asked    Home situation: lives with friends Not Asked    Spirituality: Organized Tenriism Not Asked    Education: Trade School Not Asked    Home situation: lives with significant other Not Asked    Spirituality: Private Participation Not Asked    Education: Associate's Degree Not Asked    Home situation: lives with spouse Not Asked    Patient feels they ought to cut down on drinking/drug use Not Asked    Education: Bachelor's Degree Not Asked    Legal consequences of chemical use Not Asked    Patient annoyed by others criticizing their drinking/drug use Not Asked    Education: More than one Bachelor's or Professional Not Asked    Legal: Arrest history Not Asked    Patient has felt bad or guilty about drinking/drug use Not Asked    Education: Master's, PhD Not Asked    Legal: Involved in civil litigation Not Asked    Patient has had a drink/used drugs as an eye opener in the AM Not Asked   Social History Narrative    Not on file         Chemistry        Component Value Date/Time     04/02/2019 1956    K 3.4 (L) 04/02/2019 1956     04/02/2019 1956    CO2 22 (L) 04/02/2019 1956    BUN 8 04/02/2019 1956    CREATININE 0.6 04/02/2019 1956    GLU 85 04/02/2019 1956        Component Value Date/Time    CALCIUM 9.1 04/02/2019 1956    ALKPHOS 118 04/02/2019 1956    AST 9 (L) 04/02/2019 1956    ALT 5 (L) 04/02/2019 1956    BILITOT 0.2 04/02/2019 1956    ESTGFRAFRICA >60  04/02/2019 1956    EGFRNONAA >60 04/02/2019 1956            Lab Results   Component Value Date    WBC 9.63 04/02/2019    HGB 10.1 (L) 04/02/2019    HCT 30.6 (L) 04/02/2019    MCV 83 04/02/2019     04/02/2019       No results for input(s): PT, INR, PROTIME, APTT in the last 72 hours.                  Anesthesia Evaluation    I have reviewed the Patient Summary Reports.    I have reviewed the Nursing Notes.   I have reviewed the Medications.     Review of Systems  Anesthesia Hx:  No problems with previous Anesthesia  History of prior surgery of interest to airway management or planning: Denies Family Hx of Anesthesia complications.   Denies Personal Hx of Anesthesia complications.   Hematology/Oncology:  Hematology Normal   Oncology Normal     EENT/Dental:EENT/Dental Normal   Cardiovascular:   Exercise tolerance: good Hypertension    Pulmonary:  Pulmonary Normal    Renal/:  Renal/ Normal     Hepatic/GI:   GERD    Neurological:   Headaches    Endocrine:   Diabetes        Physical Exam  General:  Obesity    Airway/Jaw/Neck:  Airway Findings: Mouth Opening: Normal Tongue: Normal  General Airway Assessment: Adult  Mallampati: III  TM Distance: Normal, at least 6 cm  Jaw/Neck Findings:  Neck ROM: Normal ROM       Chest/Lungs:  Chest/Lungs Findings: Normal Respiratory Rate     Heart/Vascular:  Heart Findings: Rate: Normal        Mental Status:  Mental Status Findings:  Cooperative, Alert and Oriented         Anesthesia Plan  Type of Anesthesia, risks & benefits discussed:  Anesthesia Type:  epidural, CSE, general, spinal  Patient's Preference:   Intra-op Monitoring Plan: standard ASA monitors  Intra-op Monitoring Plan Comments:   Post Op Pain Control Plan:   Post Op Pain Control Plan Comments:   Induction:   IV  Beta Blocker:  Patient is not currently on a Beta-Blocker (No further documentation required).       Informed Consent: Patient understands risks and agrees with Anesthesia plan.  Questions answered.  Anesthesia consent signed with patient.  ASA Score: 3     Day of Surgery Review of History & Physical: I have interviewed and examined the patient. I have reviewed the patient's H&P dated:            Ready For Surgery From Anesthesia Perspective.

## 2019-04-05 ENCOUNTER — ANESTHESIA EVENT (OUTPATIENT)
Dept: OBSTETRICS AND GYNECOLOGY | Facility: OTHER | Age: 39
End: 2019-04-05
Payer: COMMERCIAL

## 2019-04-05 ENCOUNTER — ANESTHESIA (OUTPATIENT)
Dept: OBSTETRICS AND GYNECOLOGY | Facility: OTHER | Age: 39
End: 2019-04-05
Payer: COMMERCIAL

## 2019-04-05 ENCOUNTER — HOSPITAL ENCOUNTER (INPATIENT)
Facility: OTHER | Age: 39
LOS: 4 days | Discharge: HOME | End: 2019-04-09
Attending: OBSTETRICS & GYNECOLOGY | Admitting: OBSTETRICS & GYNECOLOGY
Payer: COMMERCIAL

## 2019-04-05 DIAGNOSIS — Z3A.34 34 WEEKS GESTATION OF PREGNANCY: ICD-10-CM

## 2019-04-05 DIAGNOSIS — Z98.891 S/P CESAREAN SECTION: Primary | ICD-10-CM

## 2019-04-05 DIAGNOSIS — O14.10 PREECLAMPSIA, SEVERE: ICD-10-CM

## 2019-04-05 LAB
ABO + RH BLD: NORMAL
ALBUMIN SERPL BCP-MCNC: 2.7 G/DL (ref 3.5–5.2)
ALP SERPL-CCNC: 113 U/L (ref 55–135)
ALT SERPL W/O P-5'-P-CCNC: 6 U/L (ref 10–44)
ANION GAP SERPL CALC-SCNC: 11 MMOL/L (ref 8–16)
AST SERPL-CCNC: 20 U/L (ref 10–40)
BASOPHILS # BLD AUTO: 0.04 K/UL (ref 0–0.2)
BASOPHILS NFR BLD: 0.4 % (ref 0–1.9)
BILIRUB SERPL-MCNC: 0.2 MG/DL (ref 0.1–1)
BLD GP AB SCN CELLS X3 SERPL QL: NORMAL
BUN SERPL-MCNC: 8 MG/DL (ref 6–20)
CALCIUM SERPL-MCNC: 9 MG/DL (ref 8.7–10.5)
CHLORIDE SERPL-SCNC: 109 MMOL/L (ref 95–110)
CO2 SERPL-SCNC: 18 MMOL/L (ref 23–29)
CREAT SERPL-MCNC: 0.6 MG/DL (ref 0.5–1.4)
CREAT UR-MCNC: 88.4 MG/DL (ref 15–325)
DIFFERENTIAL METHOD: ABNORMAL
EOSINOPHIL # BLD AUTO: 0.1 K/UL (ref 0–0.5)
EOSINOPHIL NFR BLD: 0.9 % (ref 0–8)
ERYTHROCYTE [DISTWIDTH] IN BLOOD BY AUTOMATED COUNT: 13 % (ref 11.5–14.5)
EST. GFR  (AFRICAN AMERICAN): >60 ML/MIN/1.73 M^2
EST. GFR  (NON AFRICAN AMERICAN): >60 ML/MIN/1.73 M^2
GLUCOSE SERPL-MCNC: 72 MG/DL (ref 70–110)
HCT VFR BLD AUTO: 29.9 % (ref 37–48.5)
HGB BLD-MCNC: 9.7 G/DL (ref 12–16)
LYMPHOCYTES # BLD AUTO: 2.5 K/UL (ref 1–4.8)
LYMPHOCYTES NFR BLD: 27.5 % (ref 18–48)
MCH RBC QN AUTO: 27.4 PG (ref 27–31)
MCHC RBC AUTO-ENTMCNC: 32.4 G/DL (ref 32–36)
MCV RBC AUTO: 85 FL (ref 82–98)
MONOCYTES # BLD AUTO: 0.6 K/UL (ref 0.3–1)
MONOCYTES NFR BLD: 6 % (ref 4–15)
NEUTROPHILS # BLD AUTO: 5.9 K/UL (ref 1.8–7.7)
NEUTROPHILS NFR BLD: 64.5 % (ref 38–73)
PLATELET # BLD AUTO: 229 K/UL (ref 150–350)
PMV BLD AUTO: 10.2 FL (ref 9.2–12.9)
POTASSIUM SERPL-SCNC: 4.3 MMOL/L (ref 3.5–5.1)
PROT SERPL-MCNC: 6.7 G/DL (ref 6–8.4)
PROT UR-MCNC: 13 MG/DL (ref 0–15)
PROT/CREAT UR: 0.15 MG/G{CREAT} (ref 0–0.2)
RBC # BLD AUTO: 3.54 M/UL (ref 4–5.4)
SODIUM SERPL-SCNC: 138 MMOL/L (ref 136–145)
WBC # BLD AUTO: 9.13 K/UL (ref 3.9–12.7)

## 2019-04-05 PROCEDURE — 99284 PR EMERGENCY DEPT VISIT,LEVEL IV: ICD-10-PCS | Mod: 25,,, | Performed by: OBSTETRICS & GYNECOLOGY

## 2019-04-05 PROCEDURE — 59025 PR FETAL 2N-STRESS TEST: ICD-10-PCS | Mod: 26,,, | Performed by: OBSTETRICS & GYNECOLOGY

## 2019-04-05 PROCEDURE — 86901 BLOOD TYPING SEROLOGIC RH(D): CPT

## 2019-04-05 PROCEDURE — 59514 CESAREAN DELIVERY ONLY: CPT | Mod: ,,, | Performed by: ANESTHESIOLOGY

## 2019-04-05 PROCEDURE — 59025 FETAL NON-STRESS TEST: CPT | Mod: 26,,, | Performed by: OBSTETRICS & GYNECOLOGY

## 2019-04-05 PROCEDURE — 59514 PRA REAN DELIVERY ONLY: ICD-10-PCS | Mod: ,,, | Performed by: ANESTHESIOLOGY

## 2019-04-05 PROCEDURE — 37000008 HC ANESTHESIA 1ST 15 MINUTES: Performed by: OBSTETRICS & GYNECOLOGY

## 2019-04-05 PROCEDURE — 37000009 HC ANESTHESIA EA ADD 15 MINS: Performed by: OBSTETRICS & GYNECOLOGY

## 2019-04-05 PROCEDURE — 99499 UNLISTED E&M SERVICE: CPT | Mod: ,,, | Performed by: OBSTETRICS & GYNECOLOGY

## 2019-04-05 PROCEDURE — 59025 FETAL NON-STRESS TEST: CPT

## 2019-04-05 PROCEDURE — 11000001 HC ACUTE MED/SURG PRIVATE ROOM

## 2019-04-05 PROCEDURE — 80053 COMPREHEN METABOLIC PANEL: CPT

## 2019-04-05 PROCEDURE — 71000039 HC RECOVERY, EACH ADD'L HOUR: Performed by: OBSTETRICS & GYNECOLOGY

## 2019-04-05 PROCEDURE — 63600175 PHARM REV CODE 636 W HCPCS: Performed by: STUDENT IN AN ORGANIZED HEALTH CARE EDUCATION/TRAINING PROGRAM

## 2019-04-05 PROCEDURE — 36004725 HC OB OR TIME LEV III - EA ADD 15 MIN: Performed by: OBSTETRICS & GYNECOLOGY

## 2019-04-05 PROCEDURE — 36415 COLL VENOUS BLD VENIPUNCTURE: CPT

## 2019-04-05 PROCEDURE — 99499 NO LOS: ICD-10-PCS | Mod: ,,, | Performed by: OBSTETRICS & GYNECOLOGY

## 2019-04-05 PROCEDURE — 82570 ASSAY OF URINE CREATININE: CPT

## 2019-04-05 PROCEDURE — 25000003 PHARM REV CODE 250: Performed by: STUDENT IN AN ORGANIZED HEALTH CARE EDUCATION/TRAINING PROGRAM

## 2019-04-05 PROCEDURE — 36004724 HC OB OR TIME LEV III - 1ST 15 MIN: Performed by: OBSTETRICS & GYNECOLOGY

## 2019-04-05 PROCEDURE — 99285 EMERGENCY DEPT VISIT HI MDM: CPT | Mod: 25

## 2019-04-05 PROCEDURE — 27800517 HC TRAY,EPIDURAL-CONTINUOUS: Performed by: STUDENT IN AN ORGANIZED HEALTH CARE EDUCATION/TRAINING PROGRAM

## 2019-04-05 PROCEDURE — 99284 EMERGENCY DEPT VISIT MOD MDM: CPT | Mod: 25,,, | Performed by: OBSTETRICS & GYNECOLOGY

## 2019-04-05 PROCEDURE — 85025 COMPLETE CBC W/AUTO DIFF WBC: CPT

## 2019-04-05 PROCEDURE — S0028 INJECTION, FAMOTIDINE, 20 MG: HCPCS

## 2019-04-05 PROCEDURE — 25000003 PHARM REV CODE 250

## 2019-04-05 PROCEDURE — 71000033 HC RECOVERY, INTIAL HOUR: Performed by: OBSTETRICS & GYNECOLOGY

## 2019-04-05 RX ORDER — LABETALOL HCL 20 MG/4 ML
20 SYRINGE (ML) INTRAVENOUS ONCE
Status: DISCONTINUED | OUTPATIENT
Start: 2019-04-05 | End: 2019-04-06

## 2019-04-05 RX ORDER — PROCHLORPERAZINE EDISYLATE 5 MG/ML
10 INJECTION INTRAMUSCULAR; INTRAVENOUS ONCE
Status: COMPLETED | OUTPATIENT
Start: 2019-04-05 | End: 2019-04-05

## 2019-04-05 RX ORDER — SODIUM CHLORIDE, SODIUM LACTATE, POTASSIUM CHLORIDE, CALCIUM CHLORIDE 600; 310; 30; 20 MG/100ML; MG/100ML; MG/100ML; MG/100ML
INJECTION, SOLUTION INTRAVENOUS CONTINUOUS
Status: DISCONTINUED | OUTPATIENT
Start: 2019-04-05 | End: 2019-04-06

## 2019-04-05 RX ORDER — OXYTOCIN/RINGER'S LACTATE 20/1000 ML
41.7 PLASTIC BAG, INJECTION (ML) INTRAVENOUS CONTINUOUS
Status: ACTIVE | OUTPATIENT
Start: 2019-04-05 | End: 2019-04-05

## 2019-04-05 RX ORDER — KETOROLAC TROMETHAMINE 30 MG/ML
30 INJECTION, SOLUTION INTRAMUSCULAR; INTRAVENOUS EVERY 6 HOURS
Status: COMPLETED | OUTPATIENT
Start: 2019-04-06 | End: 2019-04-06

## 2019-04-05 RX ORDER — ONDANSETRON 2 MG/ML
INJECTION INTRAMUSCULAR; INTRAVENOUS
Status: DISCONTINUED | OUTPATIENT
Start: 2019-04-05 | End: 2019-04-06

## 2019-04-05 RX ORDER — CEFAZOLIN SODIUM 1 G/3ML
INJECTION, POWDER, FOR SOLUTION INTRAMUSCULAR; INTRAVENOUS
Status: DISCONTINUED | OUTPATIENT
Start: 2019-04-05 | End: 2019-04-06

## 2019-04-05 RX ORDER — MAGNESIUM SULFATE HEPTAHYDRATE 40 MG/ML
2 INJECTION, SOLUTION INTRAVENOUS CONTINUOUS
Status: DISCONTINUED | OUTPATIENT
Start: 2019-04-05 | End: 2019-04-09 | Stop reason: HOSPADM

## 2019-04-05 RX ORDER — ACETAMINOPHEN 10 MG/ML
INJECTION, SOLUTION INTRAVENOUS
Status: DISCONTINUED | OUTPATIENT
Start: 2019-04-05 | End: 2019-04-06

## 2019-04-05 RX ORDER — FAMOTIDINE 10 MG/ML
INJECTION INTRAVENOUS
Status: COMPLETED
Start: 2019-04-05 | End: 2019-04-05

## 2019-04-05 RX ORDER — MAGNESIUM SULFATE HEPTAHYDRATE 40 MG/ML
4 INJECTION, SOLUTION INTRAVENOUS ONCE
Status: COMPLETED | OUTPATIENT
Start: 2019-04-05 | End: 2019-04-05

## 2019-04-05 RX ORDER — MORPHINE SULFATE 0.5 MG/ML
INJECTION, SOLUTION EPIDURAL; INTRATHECAL; INTRAVENOUS
Status: DISCONTINUED | OUTPATIENT
Start: 2019-04-05 | End: 2019-04-06

## 2019-04-05 RX ORDER — SODIUM CITRATE AND CITRIC ACID MONOHYDRATE 334; 500 MG/5ML; MG/5ML
30 SOLUTION ORAL
Status: DISCONTINUED | OUTPATIENT
Start: 2019-04-05 | End: 2019-04-06

## 2019-04-05 RX ORDER — MISOPROSTOL 200 UG/1
800 TABLET ORAL
Status: DISCONTINUED | OUTPATIENT
Start: 2019-04-05 | End: 2019-04-06

## 2019-04-05 RX ORDER — CARBOPROST TROMETHAMINE 250 UG/ML
INJECTION, SOLUTION INTRAMUSCULAR
Status: DISCONTINUED
Start: 2019-04-05 | End: 2019-04-06 | Stop reason: WASHOUT

## 2019-04-05 RX ORDER — SODIUM CHLORIDE, SODIUM LACTATE, POTASSIUM CHLORIDE, CALCIUM CHLORIDE 600; 310; 30; 20 MG/100ML; MG/100ML; MG/100ML; MG/100ML
1000 INJECTION, SOLUTION INTRAVENOUS CONTINUOUS
Status: DISCONTINUED | OUTPATIENT
Start: 2019-04-05 | End: 2019-04-09 | Stop reason: HOSPADM

## 2019-04-05 RX ORDER — BUPIVACAINE HYDROCHLORIDE 7.5 MG/ML
INJECTION, SOLUTION INTRASPINAL
Status: DISCONTINUED | OUTPATIENT
Start: 2019-04-05 | End: 2019-04-06

## 2019-04-05 RX ORDER — PHENYLEPHRINE HYDROCHLORIDE 10 MG/ML
INJECTION INTRAVENOUS
Status: DISCONTINUED | OUTPATIENT
Start: 2019-04-05 | End: 2019-04-06

## 2019-04-05 RX ORDER — CALCIUM GLUCONATE 98 MG/ML
1 INJECTION, SOLUTION INTRAVENOUS
Status: DISCONTINUED | OUTPATIENT
Start: 2019-04-05 | End: 2019-04-09 | Stop reason: HOSPADM

## 2019-04-05 RX ORDER — FENTANYL CITRATE 50 UG/ML
INJECTION, SOLUTION INTRAMUSCULAR; INTRAVENOUS
Status: DISCONTINUED | OUTPATIENT
Start: 2019-04-05 | End: 2019-04-06

## 2019-04-05 RX ORDER — OXYTOCIN 10 [USP'U]/ML
INJECTION, SOLUTION INTRAMUSCULAR; INTRAVENOUS
Status: DISCONTINUED | OUTPATIENT
Start: 2019-04-05 | End: 2019-04-06

## 2019-04-05 RX ORDER — OXYTOCIN/RINGER'S LACTATE 20/1000 ML
333 PLASTIC BAG, INJECTION (ML) INTRAVENOUS CONTINUOUS
Status: ACTIVE | OUTPATIENT
Start: 2019-04-05 | End: 2019-04-05

## 2019-04-05 RX ORDER — SODIUM CHLORIDE, SODIUM LACTATE, POTASSIUM CHLORIDE, CALCIUM CHLORIDE 600; 310; 30; 20 MG/100ML; MG/100ML; MG/100ML; MG/100ML
INJECTION, SOLUTION INTRAVENOUS CONTINUOUS PRN
Status: DISCONTINUED | OUTPATIENT
Start: 2019-04-05 | End: 2019-04-06

## 2019-04-05 RX ORDER — LABETALOL HCL 20 MG/4 ML
20 SYRINGE (ML) INTRAVENOUS ONCE
Status: DISCONTINUED | OUTPATIENT
Start: 2019-04-05 | End: 2019-04-05

## 2019-04-05 RX ORDER — CEFAZOLIN SODIUM 2 G/50ML
2 SOLUTION INTRAVENOUS
Status: DISCONTINUED | OUTPATIENT
Start: 2019-04-05 | End: 2019-04-06

## 2019-04-05 RX ADMIN — MAGNESIUM SULFATE HEPTAHYDRATE 2 G/HR: 40 INJECTION, SOLUTION INTRAVENOUS at 06:04

## 2019-04-05 RX ADMIN — PROCHLORPERAZINE EDISYLATE 10 MG: 5 INJECTION INTRAMUSCULAR; INTRAVENOUS at 05:04

## 2019-04-05 RX ADMIN — BUPIVACAINE HYDROCHLORIDE IN DEXTROSE 1.6 ML: 7.5 INJECTION, SOLUTION SUBARACHNOID at 11:04

## 2019-04-05 RX ADMIN — SODIUM CITRATE AND CITRIC ACID MONOHYDRATE 30 ML: 500; 334 SOLUTION ORAL at 10:04

## 2019-04-05 RX ADMIN — OXYTOCIN 2 UNITS: 10 INJECTION, SOLUTION INTRAMUSCULAR; INTRAVENOUS at 11:04

## 2019-04-05 RX ADMIN — ACETAMINOPHEN 1000 MG: 10 INJECTION, SOLUTION INTRAVENOUS at 11:04

## 2019-04-05 RX ADMIN — PHENYLEPHRINE HYDROCHLORIDE 100 MCG: 10 INJECTION INTRAVENOUS at 11:04

## 2019-04-05 RX ADMIN — MAGNESIUM SULFATE HEPTAHYDRATE 4 G: 40 INJECTION, SOLUTION INTRAVENOUS at 06:04

## 2019-04-05 RX ADMIN — ONDANSETRON 4 MG: 2 INJECTION INTRAMUSCULAR; INTRAVENOUS at 11:04

## 2019-04-05 RX ADMIN — FAMOTIDINE 20 MG: 10 INJECTION, SOLUTION INTRAVENOUS at 10:04

## 2019-04-05 RX ADMIN — CEFAZOLIN 2 G: 330 INJECTION, POWDER, FOR SOLUTION INTRAMUSCULAR; INTRAVENOUS at 11:04

## 2019-04-05 RX ADMIN — FENTANYL CITRATE 10 MCG: 50 INJECTION, SOLUTION INTRAMUSCULAR; INTRAVENOUS at 11:04

## 2019-04-05 RX ADMIN — SODIUM CHLORIDE, SODIUM LACTATE, POTASSIUM CHLORIDE, AND CALCIUM CHLORIDE: 600; 310; 30; 20 INJECTION, SOLUTION INTRAVENOUS at 10:04

## 2019-04-05 RX ADMIN — Medication 0.15 MG: at 11:04

## 2019-04-05 NOTE — ED PROVIDER NOTES
"Encounter Date: 2019       History     Chief Complaint   Patient presents with    Blurred Vision     38 yo  at 34w3d presents for severe 10/10 headache and blurry vision in her right eye that started a few hours ago. She was discharged on  after being admitted for severe headache and chronic HTN exacerbation. It was discussed with the patient that if any further headaches occur, she is to report to OB ED and plan would be for delivery. She took the mag oxide that was given to her on discharge with no relief. No contractions, vaginal bleeding, leakage of fluid. Reports good fetal movement.         Review of patient's allergies indicates:  No Known Allergies  Past Medical History:   Diagnosis Date    Anxiety and depression     DM2 (diabetes mellitus, type 2)     diet control    GERD (gastroesophageal reflux disease)     HTN (hypertension), benign 3/10/2014    Migraines     Obesity     PFO (patent foramen ovale)     found after stroke "too small/risky to close"    Polycystic disease, ovaries     Stroke 2017    Hospitalized at [facility]; given tPA     Past Surgical History:   Procedure Laterality Date    ANKLE FRACTURE SURGERY      ARTHRODESIS-ANKLE Left 3/27/2018    Performed by [facility] at [facility] 1ST FLR     SECTION, CLASSIC  2005/2009    x2    CHOLECYSTECTOMY  3/2002    lap maribel    EXCISION-MASS FOOT Left 3/2/2016    Performed by [facility] at [facility] OR 1ST FLR    FOOT SURGERY      gastric sleeve  2017     Family History   Problem Relation Age of Onset    Other Father         house fire    Breast cancer Neg Hx     Colon cancer Neg Hx     Ovarian cancer Neg Hx     Congenital heart disease Neg Hx     Pacemaker/defibrilator Neg Hx     Early death Neg Hx      Social History     Tobacco Use    Smoking status: Current Every Day Smoker     Packs/day: 1.00     Years: 18.00     Pack years: 18.00     Types: Cigarettes    Smokeless tobacco: Never " Used    Tobacco comment: cutting to 2 cigarettes per day   Substance Use Topics    Alcohol use: No     Alcohol/week: 0.0 oz    Drug use: No     Review of Systems   Constitutional: Negative for chills and fever.   Eyes: Positive for photophobia and visual disturbance.   Respiratory: Negative for shortness of breath.    Cardiovascular: Negative for chest pain.   Gastrointestinal: Negative for abdominal pain, diarrhea, nausea and vomiting.   Genitourinary: Negative for dysuria, vaginal bleeding and vaginal discharge.   Neurological: Positive for headaches. Negative for dizziness, weakness and light-headedness.       Physical Exam     Initial Vitals   BP Pulse Resp Temp SpO2   04/05/19 1540 04/05/19 1540 04/05/19 1541 04/05/19 1540 04/05/19 1541   118/60 67 18 97 °F (36.1 °C) 100 %      MAP       --                Physical Exam    Vitals reviewed.  Constitutional: She appears well-developed and well-nourished. She is not diaphoretic. No distress.   HENT:   Head: Normocephalic and atraumatic.   Nose: Nose normal.   Eyes: Conjunctivae and EOM are normal.   Neck: Normal range of motion.   Cardiovascular: Normal rate.   Pulmonary/Chest: No respiratory distress.   Abdominal: Soft. She exhibits no distension. There is no tenderness.   Gravid   Musculoskeletal: Normal range of motion. She exhibits no edema or tenderness.   Neurological: She is alert and oriented to person, place, and time. She has normal strength.   Skin: Skin is warm and dry.         ED Course   Obtain Fetal nonstress test (NST)  Date/Time: 4/5/2019 4:54 PM  Performed by: Sarah Braun MD  Authorized by: Raymond Barr MD     Nonstress Test:     Variability:  6-25 BPM    Decelerations:  None    Accelerations:  15 bpm    Baseline:  135    Contractions:  Not present  Biophysical Profile:     Nonstress Test Interpretation: reactive      Overall Impression:  Reassuring      Labs Reviewed   COMPREHENSIVE METABOLIC PANEL   CBC W/ AUTO DIFFERENTIAL    PROTEIN / CREATININE RATIO, URINE   TYPE AND SCREEN LABOR & DELIVERY          Imaging Results    None          Medical Decision Making:   ED Management:  Vital signs stable. BP normal. NST reactive and reassuring. Patient with severe headache with photophobia unrelieved by mag oxide. Discussed with [facility] staff, will move towards delivery. Patient last ate at 1430 today. Received BMZ this week, rescue steroids not indicated.               Attending Attestation:   Physician Attestation Statement for Resident:  As the supervising MD   Physician Attestation Statement: I have personally seen and examined this patient.   I agree with the above history. -:   As the supervising MD I agree with the above PE.    As the supervising MD I agree with the above treatment, course, plan, and disposition.   -:   NST  I independently reviewed the fetal non-stress test with the following interpretation:  135 BPM baseline  Variability: moderate  Accelerations: present  Decelerations: absent  Contractions: none  Category 1    Clinical Interpretation:reactive    Patient evaluated and found to be stable, agree with resident's assessment to admit for management of intractable headache.  I was personally present during the critical portions of the procedure(s) performed by the resident and was immediately available in the ED to provide services and assistance as needed during the entire procedure.  I have reviewed and agree with the residents interpretation of the following: lab data.  I have reviewed the following: old records at this facility.                    ED Course as of Apr 05 1658 Fri Apr 05, 2019   1555 Evaluated patient c/o severe right-sided headache, visual changes and weakness in the upper and lower right extremities. HA began 1 hour ago and has not responded to mag oxide or fioricet. Patient was recently discharged after admission for intractable headache, no evidence of pre-eclampsia.    [BM]   1557 MRI in January was  negative. Patient states she has a history of stroke vs TIA.    [BM]      ED Course User Index  [BM] Keshia Gunter MD     Clinical Impression:       ICD-10-CM ICD-9-CM   1. 34 weeks gestation of pregnancy Z3A.34 V22.2   2. Preeclampsia, severe O14.10 642.50         Disposition:   Disposition: Admitted  Condition: Stable                        Sarah Braun MD  Resident  04/05/19 [id number]       Keshia Gunter MD  04/05/19 170

## 2019-04-05 NOTE — ANESTHESIA PREPROCEDURE EVALUATION
Anitra Chaney is a 39 y.o. female  at 34w3d presents for severe 10/10 headache and blurry vision that started a few hours ago. She was discharged on  after being admitted for severe HA and chronic HTN exacerbation. She took the mag oxide that was given to her on discharge with no relief. No contractions, vaginal bleeding, leakage of fluid. Reports good fetal movement.      This IUP is complicated by cHTN w/ superimposed preeclampsia with severe features, history of migraines, obesity, AMA, Rh neg status, and hx of C/S x2.          OB History    Para Term  AB Living   4 2 2   1 2   SAB TAB Ectopic Multiple Live Births   1       2      # Outcome Date GA Lbr Yuval/2nd Weight Sex Delivery Anes PTL Lv   4 Current            3 SAB 18 10w0d          2 Term 09 37w0d  2.722 kg (6 lb) F CS-Unspec EPI  JIGAR   1 Term 05 37w0d  3.345 kg (7 lb 6 oz) M CS-Unspec EPI  JIGAR       Wt Readings from Last 1 Encounters:   19 1328 114.6 kg (252 lb 10.4 oz)       BP Readings from Last 3 Encounters:   19 118/60   19 129/82   19 122/80       Patient Active Problem List   Diagnosis    Mood altered    Migraine syndrome    Daytime somnolence    Class 2 obesity due to excess calories without serious comorbidity with body mass index (BMI) of 35.0 to 35.9 in adult    Ankle arthritis    Rh negative state in antepartum period/rhogam 19    CHTN on no medications    High-risk pregnancy in third trimester    Advanced maternal age in multigravida    Obesity complicating pregnancy    Lower GI bleed    Migraine    History of 2  sections    Intractable headache    Preeclampsia, severe       Past Surgical History:   Procedure Laterality Date    ANKLE FRACTURE SURGERY      ARTHRODESIS-ANKLE Left 3/27/2018    [facility]formed [facility] at [facility] 1ST FLR     SECTION, CLASSIC  2005/2009    x2    CHOLECYSTECTOMY  3/2002    migue lópez     EXCISION-MASS FOOT Left 3/2/2016    [facility]formed by [facility] FLR    FOOT SURGERY      gastric sleeve  09/01/2017       Social History     Socioeconomic History    Marital status:      Spouse name: Not on file    Number of children: Not on file    Years of education: Not on file    Highest education level: Not on file   Occupational History     Employer: [employer]   Social Needs    Financial resource strain: Not on file    Food insecurity:     Worry: Not on file     Inability: Not on file    Transportation needs:     Medical: Not on file     Non-medical: Not on file   Tobacco Use    Smoking status: Current Every Day Smoker     Packs/day: 1.00     Years: 18.00     Pack years: 18.00     Types: Cigarettes    Smokeless tobacco: Never Used    Tobacco comment: cutting to 2 cigarettes per day   Substance and Sexual Activity    Alcohol use: No     Alcohol/week: 0.0 oz    Drug use: No    Sexual activity: Yes     Partners: Male   Lifestyle    Physical activity:     Days per week: Not on file     Minutes per session: Not on file    Stress: Not on file   Relationships    Social connections:     Talks on phone: Not on file     Gets together: Not on file     Attends Taoism service: Not on file     Active member of club or organization: Not on file     Attends meetings of clubs or organizations: Not on file     Relationship status: Not on file   Other Topics Concern    Caffeine Use Not Asked    Financial Status: Disabled Not Asked    Legal: Involved in criminal litigation Not Asked    Caffeine Use: Frequent Not Asked    Financial Status: Employed Not Asked    Legal: Other Not Asked    Caffeine Use: Moderate Not Asked    Financial Status: Unemployed Not Asked    Leisure: Exercise Not Asked    Caffeine Use: Substantial Not Asked    Financial Status: Other Not Asked    Leisure: Fishing Not Asked    Childhood History: Adopted Not Asked    Firearms:  Does patient have access to a firearm? Not Asked    Leisure: Hunting Not Asked    Childhood History: Early trauma Not Asked    Home situation: homeless Not Asked    Leisure: Movie Watching Not Asked    Childhood History: Raised by parents Not Asked    Home situation: lives alone Not Asked    Leisure: Shopping Not Asked    Childhood History: Uneventful Not Asked    Home situation: lives in group home Not Asked    Leisure: Sports Not Asked    Childhood History: Other Not Asked    Home situation: lives in nursing home Not Asked    Leisure: Time with family Not Asked    Education: Unfinished High School Not Asked    Home situation: lives in shelter Not Asked     Service Not Asked    Education: High School Graduate Not Asked    Home situation: lives with family Not Asked    Spirituality: Active Participation Not Asked    Education: Unfinished college Not Asked    Home situation: lives with friends Not Asked    Spirituality: Organized Yarsani Not Asked    Education: Trade School Not Asked    Home situation: lives with significant other Not Asked    Spirituality: Private Participation Not Asked    Education: Associate's Degree Not Asked    Home situation: lives with spouse Not Asked    Patient feels they ought to cut down on drinking/drug use Not Asked    Education: Bachelor's Degree Not Asked    Legal consequences of chemical use Not Asked    Patient annoyed by others criticizing their drinking/drug use Not Asked    Education: More than one Bachelor's or Professional Not Asked    Legal: Arrest history Not Asked    Patient has felt bad or guilty about drinking/drug use Not Asked    Education: Master's, PhD Not Asked    Legal: Involved in civil litigation Not Asked    Patient has had a drink/used drugs as an eye opener in the AM Not Asked   Social History Narrative    Not on file         Chemistry        Component Value Date/Time     04/02/2019 1956    K 3.4 (L)  04/02/2019 1956     04/02/2019 1956    CO2 22 (L) 04/02/2019 1956    BUN 8 04/02/2019 1956    CREATININE 0.6 04/02/2019 1956    GLU 85 04/02/2019 1956        Component Value Date/Time    CALCIUM 9.1 04/02/2019 1956    ALKPHOS 118 04/02/2019 1956    AST 9 (L) 04/02/2019 1956    ALT 5 (L) 04/02/2019 1956    BILITOT 0.2 04/02/2019 1956    ESTGFRAFRICA >60 04/02/2019 1956    EGFRNONAA >60 04/02/2019 1956            Lab Results   Component Value Date    WBC 9.63 04/02/2019    HGB 10.1 (L) 04/02/2019    HCT 30.6 (L) 04/02/2019    MCV 83 04/02/2019     04/02/2019       No results for input(s): PT, INR, PROTIME, APTT in the last 72 hours.                  Anesthesia Evaluation    I have reviewed the Patient Summary Reports.    I have reviewed the Nursing Notes.   I have reviewed the Medications.     Review of Systems  Anesthesia Hx:  No problems with previous Anesthesia  History of prior surgery of interest to airway management or planning: Denies Family Hx of Anesthesia complications.    Social:  Smoker    Hematology/Oncology:  Hematology Normal   Oncology Normal     EENT/Dental:EENT/Dental Normal   Cardiovascular:   Hypertension    Renal/:  Renal/ Normal     Hepatic/GI:   GERD    Neurological:   Headaches        Physical Exam  General:  Well nourished, Obesity    Airway/Jaw/Neck:  Airway Findings: Mouth Opening: Normal Tongue: Normal  General Airway Assessment: Adult, Average  Mallampati: III  TM Distance: Normal, at least 6 cm  Jaw/Neck Findings:  Neck ROM: Normal ROM      Dental:  Dental Findings: In tact   Chest/Lungs:  Chest/Lungs Clear    Heart/Vascular:  Heart Findings: Normal Heart murmur: negative       Mental Status:  Mental Status Findings:  Cooperative, Alert and Oriented         Anesthesia Plan  Type of Anesthesia, risks & benefits discussed:  Anesthesia Type:  CSE, epidural, general, spinal, MAC  Patient's Preference:   Intra-op Monitoring Plan: standard ASA monitors  Intra-op Monitoring Plan  Comments:   Post Op Pain Control Plan: per primary service following discharge from PACU, intrathecal opioid, epidural analgesia, IV/PO Opioids PRN and multimodal analgesia  Post Op Pain Control Plan Comments:   Induction:   IV  Beta Blocker:         Informed Consent: Patient understands risks and agrees with Anesthesia plan.  Questions answered. Anesthesia consent signed with patient.  ASA Score: 3     Day of Surgery Review of History & Physical:            Ready For Surgery From Anesthesia Perspective.

## 2019-04-05 NOTE — H&P
"   HISTORY AND PHYSICAL                                                OBSTETRICS          Subjective:       Anitra Chaney is a 40 yo  at 34w3d presents for severe 10/10 headache and blurry vision in her right eye that started a few hours ago. She was discharged on  after being admitted for severe headache and chronic HTN exacerbation. It was discussed with the patient that if any further headaches occur, she is to report to OB ED and plan would be for delivery. She took the mag oxide that was given to her on discharge with no relief. No contractions, vaginal bleeding, leakage of fluid. Reports good fetal movement.     This IUP is complicated by chronic HTN with superimposed preeclampsia without severe features, history of migraines, obesity, advanced maternal age, Rh negative status, and history of C/S x2.      PMHx:   Past Medical History:   Diagnosis Date    Anxiety and depression     DM2 (diabetes mellitus, type 2)     diet control    GERD (gastroesophageal reflux disease)     HTN (hypertension), benign 3/10/2014    Migraines     Obesity     PFO (patent foramen ovale)     found after stroke "too small/risky to close"    Polycystic disease, ovaries     Stroke 2017    Hospitalized at [facility]; given tPA       PSHx:   Past Surgical History:   Procedure Laterality Date    ANKLE FRACTURE SURGERY      ARTHRODESIS-ANKLE Left 3/27/2018    Performed by [facility]dez [facility] at [facility] OR 1ST FLR     SECTION, CLASSIC  2005/2009    x2    CHOLECYSTECTOMY  3/2002    lap maribel    EXCISION-MASS FOOT Left 3/2/2016    Performed by Lele Hern[facility]dez MD at [facility] OR 1ST FLR    FOOT SURGERY      gastric sleeve  2017       All: Review of patient's allergies indicates:  No Known Allergies    Meds:   (Not in a hospital admission)    SH:   Social History     Socioeconomic History    Marital status:      Spouse name: Not on file    Number of children: Not on file    Years of " education: Not on file    Highest education level: Not on file   Occupational History     Employer: [employer]   Social Needs    Financial resource strain: Not on file    Food insecurity:     Worry: Not on file     Inability: Not on file    Transportation needs:     Medical: Not on file     Non-medical: Not on file   Tobacco Use    Smoking status: Current Every Day Smoker     Packs/day: 1.00     Years: 18.00     Pack years: 18.00     Types: Cigarettes    Smokeless tobacco: Never Used    Tobacco comment: cutting to 2 cigarettes per day   Substance and Sexual Activity    Alcohol use: No     Alcohol/week: 0.0 oz    Drug use: No    Sexual activity: Yes     Partners: Male   Lifestyle    Physical activity:     Days per week: Not on file     Minutes per session: Not on file    Stress: Not on file   Relationships    Social connections:     Talks on phone: Not on file     Gets together: Not on file     Attends Hindu service: Not on file     Active member of club or organization: Not on file     Attends meetings of clubs or organizations: Not on file     Relationship status: Not on file   Other Topics Concern    Caffeine Use Not Asked    Financial Status: Disabled Not Asked    Legal: Involved in criminal litigation Not Asked    Caffeine Use: Frequent Not Asked    Financial Status: Employed Not Asked    Legal: Other Not Asked    Caffeine Use: Moderate Not Asked    Financial Status: Unemployed Not Asked    Leisure: Exercise Not Asked    Caffeine Use: Substantial Not Asked    Financial Status: Other Not Asked    Leisure: Fishing Not Asked    Childhood History: Adopted Not Asked    Firearms: Does patient have access to a firearm? Not Asked    Leisure: Hunting Not Asked    Childhood History: Early trauma Not Asked    Home situation: homeless Not Asked    Leisure: Movie Watching Not Asked    Childhood History: Raised by parents Not Asked    Home situation: lives alone Not  Asked    Leisure: Shopping Not Asked    Childhood History: Uneventful Not Asked    Home situation: lives in group home Not Asked    Leisure: Sports Not Asked    Childhood History: Other Not Asked    Home situation: lives in nursing home Not Asked    Leisure: Time with family Not Asked    Education: Unfinished High School Not Asked    Home situation: lives in shelter Not Asked     Service Not Asked    Education: High School Graduate Not Asked    Home situation: lives with family Not Asked    Spirituality: Active Participation Not Asked    Education: Unfinished college Not Asked    Home situation: lives with friends Not Asked    Spirituality: Organized Orthodox Not Asked    Education: Trade School Not Asked    Home situation: lives with significant other Not Asked    Spirituality: Private Participation Not Asked    Education: Associate's Degree Not Asked    Home situation: lives with spouse Not Asked    Patient feels they ought to cut down on drinking/drug use Not Asked    Education: Bachelor's Degree Not Asked    Legal consequences of chemical use Not Asked    Patient annoyed by others criticizing their drinking/drug use Not Asked    Education: More than one Bachelor's or Professional Not Asked    Legal: Arrest history Not Asked    Patient has felt bad or guilty about drinking/drug use Not Asked    Education: Master's, PhD Not Asked    Legal: Involved in civil litigation Not Asked    Patient has had a drink/used drugs as an eye opener in the AM Not Asked   Social History Narrative    Not on file       FH:   Family History   Problem Relation Age of Onset    Other Father         house fire    Breast cancer Neg Hx     Colon cancer Neg Hx     Ovarian cancer Neg Hx     Congenital heart disease Neg Hx     Pacemaker/defibrilator Neg Hx     Early death Neg Hx        OBHx:   OB History    Para Term  AB Living   4 2 2 0 1 2   SAB TAB Ectopic Multiple Live Births   1  0 0 0 2      # Outcome Date GA Lbr Yuval/2nd Weight Sex Delivery Anes PTL Lv   4 Current            3 SAB 18 10w0d          2 Term 09 37w0d  2.722 kg (6 lb) F CS-Unspec EPI  JIGAR   1 Term 05 37w0d  3.345 kg (7 lb 6 oz) M CS-Unspec EPI  JIGAR       Review of Systems   Constitutional: Negative for chills and fever.   Eyes: Positive for photophobia and visual disturbance.   Respiratory: Negative for shortness of breath.    Cardiovascular: Negative for chest pain.   Gastrointestinal: Negative for abdominal pain, diarrhea, nausea and vomiting.   Genitourinary: Negative for dysuria, vaginal bleeding and vaginal discharge.   Neurological: Positive for headaches. Negative for dizziness, weakness and light-headedness.       Objective:       /60 (BP Location: Left arm, Patient Position: Lying)   Pulse 73   Temp 97 °F (36.1 °C) (Temporal)   Resp 18   LMP 2018 (LMP Unknown)   SpO2 100%     Vitals:    19 1540 19 1541   BP: 118/60    BP Location: Left arm    Patient Position: Lying    Pulse: 67 73   Resp:  18   Temp: 97 °F (36.1 °C)    TempSrc: Temporal    SpO2:  100%       General:   alert, appears stated age and cooperative   Lungs:   normal respiratory effort   Heart:   regular rate and rhythm   Abdomen:  soft, non-tender; bowel sounds normal; no masses,  no organomegaly   Extremities negative edema, negative erythema   FHT: 135 bpm, moderate variability, +accels, no decels Cat 1 (reassuring)                 TOCO: No ctx   Presentations: cephalic by ultrasound   Cervix: Deferred       Lab Review  Blood Type A NEG  GBBS: Unknown  Rubella: Immune  RPR: Nonreactive  HIV: negative  HepB: negative       Assessment:       34w3d weeks gestation,    Active Hospital Problems    Diagnosis  POA    *Preeclampsia, severe [O14.10]  Yes    Intractable headache [R51]  Yes    History of 2  sections [Z98.891]  Not Applicable    Migraine [G43.909]  Yes    Obesity complicating pregnancy  [O99.210]  Yes    CHTN on no medications [I10]  Yes    Rh negative state in antepartum period/rhogam 2/28/19 [O09.899, Z67.91]  Not Applicable    Class 2 obesity due to excess calories without serious comorbidity with body mass index (BMI) of 35.0 to 35.9 in adult [E66.09, Z68.35]  Not Applicable      Resolved Hospital Problems   No resolved problems to display.          Plan:      Risks, benefits, alternatives and possible complications have been discussed in detail with the patient.   - Plan for RLTCS.  - Mag for seizure prophylaxis  - s/p BMZ this week. Rescue steroids not indicated.  - Desires mirena for contraception  - Consents signed and to chart  - Admit to Labor and Delivery unit  - NPO   - Epidural per Anesthesia  - Draw CBC, T&S  - Notify Staff    Post-Partum Hemorrhage risk - medium          Sarah Braun MD  OBGYN PGY-1

## 2019-04-06 PROBLEM — Z98.891 S/P CESAREAN SECTION: Status: ACTIVE | Noted: 2019-04-06

## 2019-04-06 PROBLEM — Z3A.34 34 WEEKS GESTATION OF PREGNANCY: Status: ACTIVE | Noted: 2019-04-06

## 2019-04-06 LAB
ABO + RH BLD: NORMAL
BASOPHILS # BLD AUTO: 0.02 K/UL (ref 0–0.2)
BASOPHILS NFR BLD: 0.2 % (ref 0–1.9)
BLD GP AB SCN CELLS X3 SERPL QL: NORMAL
DIFFERENTIAL METHOD: ABNORMAL
EOSINOPHIL # BLD AUTO: 0.1 K/UL (ref 0–0.5)
EOSINOPHIL NFR BLD: 1.3 % (ref 0–8)
ERYTHROCYTE [DISTWIDTH] IN BLOOD BY AUTOMATED COUNT: 13 % (ref 11.5–14.5)
FETAL CELL SCN BLD QL ROSETTE: NORMAL
HCT VFR BLD AUTO: 25.1 % (ref 37–48.5)
HGB BLD-MCNC: 8.1 G/DL (ref 12–16)
LYMPHOCYTES # BLD AUTO: 1.7 K/UL (ref 1–4.8)
LYMPHOCYTES NFR BLD: 17.7 % (ref 18–48)
MCH RBC QN AUTO: 26.8 PG (ref 27–31)
MCHC RBC AUTO-ENTMCNC: 32.3 G/DL (ref 32–36)
MCV RBC AUTO: 83 FL (ref 82–98)
MONOCYTES # BLD AUTO: 0.7 K/UL (ref 0.3–1)
MONOCYTES NFR BLD: 7.4 % (ref 4–15)
NEUTROPHILS # BLD AUTO: 7 K/UL (ref 1.8–7.7)
NEUTROPHILS NFR BLD: 73.1 % (ref 38–73)
PLATELET # BLD AUTO: 227 K/UL (ref 150–350)
PMV BLD AUTO: 9.6 FL (ref 9.2–12.9)
RBC # BLD AUTO: 3.02 M/UL (ref 4–5.4)
WBC # BLD AUTO: 9.56 K/UL (ref 3.9–12.7)

## 2019-04-06 PROCEDURE — 86850 RBC ANTIBODY SCREEN: CPT

## 2019-04-06 PROCEDURE — 25000003 PHARM REV CODE 250: Performed by: STUDENT IN AN ORGANIZED HEALTH CARE EDUCATION/TRAINING PROGRAM

## 2019-04-06 PROCEDURE — 85461 HEMOGLOBIN FETAL: CPT

## 2019-04-06 PROCEDURE — 36415 COLL VENOUS BLD VENIPUNCTURE: CPT

## 2019-04-06 PROCEDURE — 85025 COMPLETE CBC W/AUTO DIFF WBC: CPT

## 2019-04-06 PROCEDURE — 11000001 HC ACUTE MED/SURG PRIVATE ROOM

## 2019-04-06 PROCEDURE — 63600175 PHARM REV CODE 636 W HCPCS: Performed by: STUDENT IN AN ORGANIZED HEALTH CARE EDUCATION/TRAINING PROGRAM

## 2019-04-06 RX ORDER — ACETAMINOPHEN 325 MG/1
650 TABLET ORAL EVERY 6 HOURS
Status: DISPENSED | OUTPATIENT
Start: 2019-04-06 | End: 2019-04-07

## 2019-04-06 RX ORDER — SIMETHICONE 80 MG
1 TABLET,CHEWABLE ORAL EVERY 6 HOURS PRN
Status: DISCONTINUED | OUTPATIENT
Start: 2019-04-06 | End: 2019-04-09 | Stop reason: HOSPADM

## 2019-04-06 RX ORDER — ADHESIVE BANDAGE
30 BANDAGE TOPICAL 2 TIMES DAILY PRN
Status: DISCONTINUED | OUTPATIENT
Start: 2019-04-07 | End: 2019-04-09 | Stop reason: HOSPADM

## 2019-04-06 RX ORDER — MISOPROSTOL 200 UG/1
200 TABLET ORAL EVERY 6 HOURS
Status: COMPLETED | OUTPATIENT
Start: 2019-04-06 | End: 2019-04-07

## 2019-04-06 RX ORDER — ACETAMINOPHEN 325 MG/1
650 TABLET ORAL EVERY 6 HOURS
Status: DISCONTINUED | OUTPATIENT
Start: 2019-04-07 | End: 2019-04-06

## 2019-04-06 RX ORDER — SODIUM CHLORIDE, SODIUM LACTATE, POTASSIUM CHLORIDE, CALCIUM CHLORIDE 600; 310; 30; 20 MG/100ML; MG/100ML; MG/100ML; MG/100ML
INJECTION, SOLUTION INTRAVENOUS CONTINUOUS
Status: ACTIVE | OUTPATIENT
Start: 2019-04-06 | End: 2019-04-06

## 2019-04-06 RX ORDER — AMOXICILLIN 250 MG
1 CAPSULE ORAL NIGHTLY PRN
Status: DISCONTINUED | OUTPATIENT
Start: 2019-04-06 | End: 2019-04-09 | Stop reason: HOSPADM

## 2019-04-06 RX ORDER — OXYCODONE HYDROCHLORIDE 5 MG/1
5 TABLET ORAL EVERY 4 HOURS PRN
Status: DISPENSED | OUTPATIENT
Start: 2019-04-06 | End: 2019-04-07

## 2019-04-06 RX ORDER — DIPHENHYDRAMINE HCL 25 MG
25 CAPSULE ORAL EVERY 4 HOURS PRN
Status: DISCONTINUED | OUTPATIENT
Start: 2019-04-06 | End: 2019-04-09 | Stop reason: HOSPADM

## 2019-04-06 RX ORDER — MUPIROCIN 20 MG/G
1 OINTMENT TOPICAL 2 TIMES DAILY
Status: DISCONTINUED | OUTPATIENT
Start: 2019-04-06 | End: 2019-04-09 | Stop reason: HOSPADM

## 2019-04-06 RX ORDER — HYDROCORTISONE 25 MG/G
CREAM TOPICAL 3 TIMES DAILY PRN
Status: DISCONTINUED | OUTPATIENT
Start: 2019-04-06 | End: 2019-04-09 | Stop reason: HOSPADM

## 2019-04-06 RX ORDER — OXYCODONE HYDROCHLORIDE 5 MG/1
10 TABLET ORAL EVERY 4 HOURS PRN
Status: ACTIVE | OUTPATIENT
Start: 2019-04-06 | End: 2019-04-07

## 2019-04-06 RX ORDER — BISACODYL 10 MG
10 SUPPOSITORY, RECTAL RECTAL ONCE AS NEEDED
Status: DISCONTINUED | OUTPATIENT
Start: 2019-04-06 | End: 2019-04-09 | Stop reason: HOSPADM

## 2019-04-06 RX ORDER — ONDANSETRON 2 MG/ML
4 INJECTION INTRAMUSCULAR; INTRAVENOUS EVERY 6 HOURS PRN
Status: ACTIVE | OUTPATIENT
Start: 2019-04-06 | End: 2019-04-07

## 2019-04-06 RX ORDER — ONDANSETRON 8 MG/1
8 TABLET, ORALLY DISINTEGRATING ORAL EVERY 8 HOURS PRN
Status: DISCONTINUED | OUTPATIENT
Start: 2019-04-06 | End: 2019-04-09 | Stop reason: HOSPADM

## 2019-04-06 RX ORDER — OXYCODONE AND ACETAMINOPHEN 10; 325 MG/1; MG/1
1 TABLET ORAL EVERY 4 HOURS PRN
Status: DISCONTINUED | OUTPATIENT
Start: 2019-04-07 | End: 2019-04-09 | Stop reason: HOSPADM

## 2019-04-06 RX ORDER — DOCUSATE SODIUM 100 MG/1
200 CAPSULE, LIQUID FILLED ORAL 2 TIMES DAILY
Status: DISCONTINUED | OUTPATIENT
Start: 2019-04-06 | End: 2019-04-09 | Stop reason: HOSPADM

## 2019-04-06 RX ORDER — MUPIROCIN 20 MG/G
OINTMENT TOPICAL
Status: DISCONTINUED | OUTPATIENT
Start: 2019-04-06 | End: 2019-04-09 | Stop reason: HOSPADM

## 2019-04-06 RX ORDER — OXYTOCIN/RINGER'S LACTATE 20/1000 ML
41.65 PLASTIC BAG, INJECTION (ML) INTRAVENOUS CONTINUOUS
Status: ACTIVE | OUTPATIENT
Start: 2019-04-06 | End: 2019-04-06

## 2019-04-06 RX ORDER — OXYCODONE AND ACETAMINOPHEN 5; 325 MG/1; MG/1
1 TABLET ORAL EVERY 4 HOURS PRN
Status: DISCONTINUED | OUTPATIENT
Start: 2019-04-07 | End: 2019-04-09 | Stop reason: HOSPADM

## 2019-04-06 RX ORDER — IBUPROFEN 600 MG/1
600 TABLET ORAL EVERY 6 HOURS
Status: DISCONTINUED | OUTPATIENT
Start: 2019-04-07 | End: 2019-04-09 | Stop reason: HOSPADM

## 2019-04-06 RX ADMIN — SIMETHICONE CHEW TAB 80 MG 80 MG: 80 TABLET ORAL at 08:04

## 2019-04-06 RX ADMIN — KETOROLAC TROMETHAMINE 30 MG: 30 INJECTION, SOLUTION INTRAMUSCULAR at 06:04

## 2019-04-06 RX ADMIN — OXYCODONE HYDROCHLORIDE 5 MG: 5 TABLET ORAL at 02:04

## 2019-04-06 RX ADMIN — ACETAMINOPHEN 650 MG: 325 TABLET, FILM COATED ORAL at 06:04

## 2019-04-06 RX ADMIN — ACETAMINOPHEN 650 MG: 325 TABLET, FILM COATED ORAL at 12:04

## 2019-04-06 RX ADMIN — OXYCODONE HYDROCHLORIDE 5 MG: 5 TABLET ORAL at 06:04

## 2019-04-06 RX ADMIN — KETOROLAC TROMETHAMINE 30 MG: 30 INJECTION, SOLUTION INTRAMUSCULAR at 12:04

## 2019-04-06 RX ADMIN — MISOPROSTOL 200 MCG: 200 TABLET ORAL at 03:04

## 2019-04-06 RX ADMIN — OXYCODONE HYDROCHLORIDE 5 MG: 5 TABLET ORAL at 03:04

## 2019-04-06 RX ADMIN — DIPHENHYDRAMINE HYDROCHLORIDE 25 MG: 25 CAPSULE ORAL at 06:04

## 2019-04-06 RX ADMIN — DOCUSATE SODIUM 200 MG: 100 CAPSULE, LIQUID FILLED ORAL at 08:04

## 2019-04-06 RX ADMIN — MISOPROSTOL 200 MCG: 200 TABLET ORAL at 06:04

## 2019-04-06 RX ADMIN — OXYCODONE HYDROCHLORIDE 5 MG: 5 TABLET ORAL at 10:04

## 2019-04-06 RX ADMIN — MISOPROSTOL 200 MCG: 200 TABLET ORAL at 12:04

## 2019-04-06 RX ADMIN — OXYCODONE HYDROCHLORIDE 5 MG: 5 TABLET ORAL at 11:04

## 2019-04-06 RX ADMIN — MAGNESIUM SULFATE HEPTAHYDRATE 2 G/HR: 40 INJECTION, SOLUTION INTRAVENOUS at 12:04

## 2019-04-06 RX ADMIN — DIPHENHYDRAMINE HYDROCHLORIDE 25 MG: 25 CAPSULE ORAL at 01:04

## 2019-04-06 NOTE — ANESTHESIA PROCEDURE NOTES
CSE    Patient location during procedure: OR  Start time: 4/5/2019 10:56 PM  Timeout: 4/5/2019 10:55 PM  End time: 4/5/2019 11:00 PM  Staffing  Anesthesiologist: Sebastian Rincon Jr., MD  Resident/CRNA: Dony Yen MD  Performed: resident/CRNA   Preanesthetic Checklist  Completed: patient identified, site marked, surgical consent, pre-op evaluation, timeout performed, IV checked, risks and benefits discussed and monitors and equipment checked  CSE  Patient position: sitting  Prep: ChloraPrep  Patient monitoring: heart rate, continuous pulse ox, frequent blood pressure checks and cardiac monitor  Approach: midline  Spinal Needle  Needle type: pencil-tip   Needle gauge: 25 G  Needle length: 5 in  Epidural Needle  Injection technique: LEVON saline  Needle type: Tuohy   Needle gauge: 17 G  Needle length: 3.5 in  Needle insertion depth: 9 cm  Location: L3-4  Needle localization: anatomical landmarks  Catheter  Catheter type: springwound  Catheter size: 19 G  Catheter at skin depth: 14 cm  Assessment  Intrathecal Medications:  administered: primary anesthetic mcg of

## 2019-04-06 NOTE — DISCHARGE INSTRUCTIONS
Pumping for the Baby in NICU    Preparation and Hygiene:  Shower daily.  Wear a clean bra each day and wash daily in warm soapy water.  1. Change wet or moist breast pads frequently.  Moist pads can promote growth of germs.  2. Actively wash your hands, paying close attention to the area around and under your fingernails, thoroughly with soap and water for 15 seconds before pumping or handling your milk.  Re-wash your hands if you touch anything (scratching your nose, answering the phone, etc) while pumping or handling your milk.   3. Before pumping your breasts, assemble the pump collection kit and have ready the sterile container and labels.  Place these items on a clean surface next to the breast pump.  4. Each time after you have finished pumping, take apart all of the parts of the breast pump collection kit and place them in a separate cleaning container (do not place them in the sink).  Be sure to remove the yellow valve from the breast shield and separate the white membrane from the yellow valve.  Rinse all of these parts with cool water.  Then use a new sponge and/or bottle brush and dishwashing detergent to clean the parts.  Rinse off the soapy water with cool water and air dry on a clean towel covered with a clean cloth.  All parts may also be washed after each use in the top rack of a .  5. Once each day, sanitize all of the parts of the breast pump collection kit.  This can be done by boiling the kit parts for 10 minutes or by using a Quick Clean Micro-Steam Bag made by [employer]  6. If condensation appears in the tubing, continue to run the pump with the tubing attached for 1-2 minutes or until the tubing is dry.   7. Notify your babys nurse or doctor if you become ill or need to take any medication, even over-the-counter medicines.  Collection and Storage of Expressed Breast milk:       1. Pump your breasts at least 8-10 times every 24 hours.  Double pump (both breasts at the same time)  for at least 15-20 minutes using the most suction that is comfortable.    2. Write the date and time of pumping and the name of any medications you are taking on the babys pre-printed hospital identification label.   3. Place your babys pre-printed hospital identification label on each container of breast milk.  Additional pre-printed labels can be obtained from your babys nurse.  If your expressed breast milk is not correctly labeled, the nurse cannot feed the milk to the baby.       4.    Do not touch the inside of the storage containers or lids.  5.        Pour the amount of expressed milk needed for 1 of your babys feedings into each storage container.  Use a new container(s) for each pumping.  Additional storage containers can be obtained from your babys nurse.  6. Tightly screw the lid onto the container and place immediately into the refrigerator for daily transportation to the hospital.   Do not freeze your milk unless asked to do so by your babys nurse.  However, if you are not able to visit your baby each day, place the expressed breast milk in the freezer.  7.     Expressed breast milk should be refrigerated or frozen within 4 hours of pumping.  8.         Do not store expressed breast milk on the door of your refrigerator or freezer where the temperature is warmer.   Transportation of Expressed Breast milk:  1. Refrigerated breast milk or frozen milk should be packed tightly together in a cooler with frozen, gel-packs to keep the milk frozen.  DO NOT USE ICE CUBES (WET ICE) TO TRANSPORT FROZEN MILK.   A clean towel can be used to fill any extra space between containers of frozen milk.  2.    Bring your expressed milk from home each time you visit the baby.

## 2019-04-06 NOTE — TRANSFER OF CARE
"Anesthesia Transfer of Care Note    Patient: Anitra Chaney    Procedure(s) Performed: Procedure(s) (LRB):   SECTION (N/A)    Patient location: Labor and Delivery    Anesthesia Type: CSE    Transport from OR: Transported from OR on 6-10 L/min O2 by face mask with adequate spontaneous ventilation    Post pain: adequate analgesia    Post assessment: no apparent anesthetic complications and tolerated procedure well    Post vital signs: stable    Level of consciousness: awake and alert    Nausea/Vomiting: no nausea/vomiting    Complications: none          Last vitals:   Visit Vitals  /84   Pulse (!) 55   Temp 36.1 °C (97 °F) (Temporal)   Resp 20   Ht 5' 10" (1.778 m)   Wt 114.3 kg (252 lb)   LMP 2018 (LMP Unknown)   SpO2 98%   Breastfeeding? No   BMI 36.16 kg/m²     "

## 2019-04-06 NOTE — LACTATION NOTE
04/06/19 1035   Maternal Assessment   Breast Shape Bilateral:;wide   Breast Density Bilateral:;soft   Areola Bilateral:;elastic   Nipples Bilateral:;everted   Equipment Type   Breast Pump Type double electric, hospital grade   Breast Pump Flange Type hard   Breast Pump Flange Size 24 mm   Breast Pumping   Breast Pumping Interventions frequent pumping encouraged   Breast Pumping double electric breast pump utilized   Lactation Referrals   Lactation Referrals WIC (women, infants and children) program   assisted pt with pumping for nicu baby. Reviewed pumping education with initiation setting. Questions answered. Pt has lc number to call if assistance is needed.

## 2019-04-06 NOTE — L&D DELIVERY NOTE
[facility]   Section   Operative Note    SUMMARY      Section Procedure Note    2019    Procedure:   1. Repeat  Section via Pfannenstiel skin incision    Indications:   1. History of prior  x2    Pre-operative Diagnosis:   1. IUP at 34 week 3 day pregnancy  2. Chronic HTN with superimposed preeclampsia with severe features  3. Type 2 DM    Post-operative Diagnosis:   same    Surgeon: Keshia Gunter MD     Assistants: Sarah Braun MD - PGY1    Anesthesia: Spinal anesthesia    Findings:    1. Single viable  male infant, with APGARS 8/9  2. Normal appearing uterus, ovaries, and fallopian tubes.  3. Normal placenta    Estimated Blood Loss:  1360 mL           Total IV Fluids: See anesthesia report     UOP: 250 mL    Specimens: None    PreOp CBC:   Lab Results   Component Value Date    WBC 9.13 2019    HGB 9.7 (L) 2019    HCT 29.9 (L) 2019    MCV 85 2019     2019                     Complications:  None; patient tolerated the procedure well.           Disposition: PACU - hemodynamically stable.           Condition: stable    Procedure Details   The patient was seen in the Holding Room. The risks, benefits, complications, treatment options, and expected outcomes were discussed with the patient.  The patient concurred with the proposed plan, giving informed consent.  The patient was taken to Operating Room, identified as Anitra Chaney and the procedure verified as  Delivery. A Time Out was held and the above information confirmed.    After induction of anesthesia, the patient was prepped and draped in the usual sterile manner while placed in a dorsal supine position with a left lateral tilt.  A cuba catheter was also placed per nursing. Preoperative antibiotics Ancef 2 g was administered. An allis test was performed confirming adequate anesthesia.  A Pfannenstiel incision was made and carried down  through the subcutaneous tissue to the fascia. Fascial incision was made and extended transversely. The fascia was grasped with [facility] clamps and  from the underlying rectus tissue superiorly and inferiorly. The peritoneum was identified, found to be free of adherent bowel, and entered bluntly. Peritoneal incision was extended longitudinally. The vesico-uterine peritoneum was identified, and bladder blade was inserted. The vesico-uterine peritoneum was incised transversely and the bladder flap was bluntly freed from the lower uterine segment. The bladder blade was reinserted to keep the bladder out of the operative field. A low transverse uterine incision was made with knife and extended with finger fracture. The amniotic sac was ruptured with hemostats and the infant was noted to be in cephalic presentation. The fetal head was brought to the incision and elevated out of the pelvis. The patient delivered a single viable male infant without difficulty.  Infant had Apgar scores of 8/9 at one and five minutes respectively. After the umbilical cord was clamped and cut, cord blood was obtained for evaluation. The placenta was removed intact, appeared normal, and was discarded. The uterus was exteriorized. The uterine incision was closed in two layers with running locked sutures of 1-0 chromic. Hemostasis was observed. The uterine outline, tubes and ovaries appeared normal. The uterus was returned to the abdominal cavity. Incision was reinspected and good hemostasis was noted. The fascia was then reapproximated with running sutures of PDS. The subcutaneous fat was reapproximated with 2-0 vicryl, and skin was reapproximated with 4-0 monocryl.    Instrument, sponge, and needle counts were correct prior the abdominal closure and at the conclusion of the case.     Pt tolerated procedure well and was in stable condition after the procedure.    Sarah Braun MD  OBGYN PGY-1           Delivery Information for  Boy  Anitra Chaney    Birth information:  YOB: 2019   Time of birth: 11:28 PM   Sex: male   Head Delivery Date/Time: 2019 11:28 PM   Delivery type: , Low Transverse   Gestational Age: 34w3d    Delivery Providers    Delivering clinician:  Keshia Gunter MD   Provider Role    Sarah Braun MD Resident    Deandra Montiel, RN Registered Nurse    Rita Williamson, [facility]            Measurements    Weight:    Length:           Apgars    Living status:  Living  Apgars:   1 min.:   5 min.:   10 min.:   15 min.:   20 min.:     Skin color:   0  1       Heart rate:   2  2       Reflex irritability:   2  2       Muscle tone:   2  2       Respiratory effort:   2  2       Total:   8  9       Apgars assigned by:  NICU         Operative Delivery    Forceps attempted?:  No  Vacuum extractor attempted?:  No         Shoulder Dystocia    Shoulder dystocia present?:  No           Presentation    Presentation:  Vertex           Interventions/Resuscitation    Method:  NICU Attended       Cord    Vessels:  3 vessels  Complications:  None  Delayed Cord Clamping?:  No  Cord Clamped Date/Time:  2019 11:28 PM  Cord Blood Disposition:  Sent with Baby  Gases Sent?:  No  Stem Cell Collection (by MD):  No       Placenta    Placenta delivery date/time:  2019 2332  Placenta removal:  Manual removal  Placenta appearance:  Intact  Placenta disposition:  discarded           Labor Events:       labor:       Labor Onset Date/Time:         Dilation Complete Date/Time:         Start Pushing Date/Time:       Rupture Date/Time:              Rupture type:           Fluid Amount:        Fluid Color:        Fluid Odor:        Membrane Status (PeriCalm):        Rupture Date/Time (PeriCalm):        Fluid Amount (PeriCalm):        Fluid Color (PeriCalm):         steroids: Full Course     Antibiotics given for GBS: No     Induction:       Indications for induction:        Augmentation:        Indications for augmentation: Preeclampsia     Labor complications: None     Additional complications:          Cervical ripening:                     Delivery:      Episiotomy: None     Indication for Episiotomy:       Perineal Lacerations: None Repaired:      Periurethral Laceration:   Repaired:     Labial Laceration:   Repaired:     Sulcus Laceration:   Repaired:     Vaginal Laceration:   Repaired:     Cervical Laceration:   Repaired:     Repair suture: None     Repair # of packets: 10     Last Value - EBL - Nursing (mL): 0     Sum - EBL - Nursing (mL): 0     Last Value - EBL - Anesthesia (mL):      Calculated QBL (mL): 1360      Vaginal Sweep Performed: No     Surgicount Correct: Yes       Other providers:       Anesthesia    Method:  Spinal          Details (if applicable):  Trial of Labor No    Categorization: Repeat    Priority: Routine   Indications for : Repeat Section   Incision Type: low transverse     Additional  information:  Forceps:    Vacuum:    Breech:    Observed anomalies    Other (Comments):

## 2019-04-06 NOTE — PROGRESS NOTES
POSTPARTUM PROGRESS NOTE     Anitra Chaney is a 39 y.o. female POD #1 status post Repeat  section at 34w3d in a pregnancy complicated by chronic HTN with superimposed preE w/ severe features. Patient is doing well this morning. She denies nausea, vomiting, fever or chills. She denies headache, vision changes, chest pain, shortness of breath, RUQ pain.  Patient reports moderate abdominal pain that is adequately relieved by oral pain medications. Lochia is mild to moderate  and stable. Greco catheter is in place and patient has not yet ambulated. She has not passed flatus, and has not had BM.  Patient does plan to breast feed. Desires interval mirena for contraception. Circumcision deferred - infant in NICU.    Objective:       Temp:  [96.8 °F (36 °C)-98.2 °F (36.8 °C)] 98.2 °F (36.8 °C)  Pulse:  [52-73] 72  Resp:  [18-20] 18  SpO2:  [97 %-100 %] 99 %  BP: (118-174)/(60-96) 128/71    General:   alert, appears stated age and cooperative   Lungs:   normal respiratory effort   Heart:   regular rate and rhythm   Abdomen:  soft, non-tender; bowel sounds normal; no masses,  no organomegaly   Uterus:  firm located at the umblicus.        Incision: Bandage in place, clean, dry and intact   Extremities: no edema, redness or tenderness in the calves or thighs     Lab Review  No results found for this or any previous visit (from the past 4 hour(s)).    I/O    Intake/Output Summary (Last 24 hours) at 2019 0236  Last data filed at 2019 0218  Gross per 24 hour   Intake 1752.5 ml   Output 2860 ml   Net -1107.5 ml        Assessment:     Patient Active Problem List   Diagnosis    Mood altered    Migraine syndrome    Daytime somnolence    Class 2 obesity due to excess calories without serious comorbidity with body mass index (BMI) of 35.0 to 35.9 in adult    Ankle arthritis    Rh negative state in antepartum period/rhogam 19    CHTN on no medications    High-risk pregnancy in third trimester     Advanced maternal age in multigravida    Obesity complicating pregnancy    Lower GI bleed    Migraine    History of 2  sections    Intractable headache    Preeclampsia, severe    S/P  section    34 weeks gestation of pregnancy        Plan:   1. Postpartum care:  - Patient doing well. Continue routine management and advances.  - Continue PO pain meds. Pain well controlled.  - Heme: H/h  > pending  - Encourage ambulation  - Circumcision deferred  - Contraception: desires interval mirena  - Lactation prn    2. Chronic HTN w/ SI PreE w/ SF (HA)  - BP: (117-174)/(60-96) 117/68  - Asymptomatic  - Continue mag sulfate for 24 hours after delivery (2330)  - CMP wnl   - PC ratio 0.15  - Compazine prn for headache    3. Obesity  - BMI 36  - Encourage ambulation    4. Rh negative status  - Needs rhogam prior to discharge    5. Postpartum Hemorrhage  - VSS. Asymptomatic.  - EBL 1350  - Patient continued to pass occasional clots in PACU, despite firm fundus.  - Cytotec series started in PACU.     Dispo: As patient meets milestones, will plan to discharge POD #3-4.    Sarah Braun MD  OBGYN PGY-1

## 2019-04-06 NOTE — NURSING
MD notified of clots present and heavy vaginal bleeding. Patient's fundus is firm without massage and midline. New orders received.

## 2019-04-06 NOTE — ANESTHESIA POSTPROCEDURE EVALUATION
Anesthesia Post Evaluation    Patient: Anitra Chaney    Procedure(s) Performed: Procedure(s) (LRB):   SECTION (N/A)    Final Anesthesia Type: CSE  Patient location during evaluation: labor & delivery  Patient participation: Yes- Able to Participate  Level of consciousness: awake and alert and oriented  Post-procedure vital signs: reviewed and stable  Pain management: adequate  Airway patency: patent  PONV status at discharge: No PONV  Anesthetic complications: no      Cardiovascular status: blood pressure returned to baseline  Respiratory status: unassisted  Hydration status: euvolemic  Follow-up not needed.          Vitals Value Taken Time   /85 2019 12:02 PM   Temp 37.2 °C (98.9 °F) 2019  8:00 AM   Pulse 72 2019 12:44 PM   Resp 18 2019  2:32 AM   SpO2 99 % 2019 12:44 PM   Vitals shown include unvalidated device data.      No case tracking events are documented in the log.      Pain/Stanley Score: Pain Rating Prior to Med Admin: 4 (2019 12:20 PM)

## 2019-04-07 LAB — INJECT RH IG VOL PATIENT: NORMAL ML

## 2019-04-07 PROCEDURE — 99024 PR POST-OP FOLLOW-UP VISIT: ICD-10-PCS | Mod: ,,, | Performed by: OBSTETRICS & GYNECOLOGY

## 2019-04-07 PROCEDURE — 11000001 HC ACUTE MED/SURG PRIVATE ROOM

## 2019-04-07 PROCEDURE — 25000003 PHARM REV CODE 250: Performed by: STUDENT IN AN ORGANIZED HEALTH CARE EDUCATION/TRAINING PROGRAM

## 2019-04-07 PROCEDURE — 99024 POSTOP FOLLOW-UP VISIT: CPT | Mod: ,,, | Performed by: OBSTETRICS & GYNECOLOGY

## 2019-04-07 PROCEDURE — 63600519 RHOGAM PHARM REV CODE 636 ALT 250 W HCPCS: Performed by: OBSTETRICS & GYNECOLOGY

## 2019-04-07 RX ADMIN — IBUPROFEN 600 MG: 600 TABLET ORAL at 07:04

## 2019-04-07 RX ADMIN — SIMETHICONE CHEW TAB 80 MG 80 MG: 80 TABLET ORAL at 09:04

## 2019-04-07 RX ADMIN — DOCUSATE SODIUM 200 MG: 100 CAPSULE, LIQUID FILLED ORAL at 09:04

## 2019-04-07 RX ADMIN — IBUPROFEN 600 MG: 600 TABLET ORAL at 12:04

## 2019-04-07 RX ADMIN — MISOPROSTOL 200 MCG: 200 TABLET ORAL at 12:04

## 2019-04-07 RX ADMIN — OXYCODONE AND ACETAMINOPHEN 1 TABLET: 10; 325 TABLET ORAL at 02:04

## 2019-04-07 RX ADMIN — HUMAN RHO(D) IMMUNE GLOBULIN 300 MCG: 300 INJECTION, SOLUTION INTRAMUSCULAR at 12:04

## 2019-04-07 RX ADMIN — OXYCODONE AND ACETAMINOPHEN 1 TABLET: 10; 325 TABLET ORAL at 09:04

## 2019-04-07 RX ADMIN — OXYCODONE AND ACETAMINOPHEN 1 TABLET: 10; 325 TABLET ORAL at 07:04

## 2019-04-07 NOTE — PLAN OF CARE
Problem: Adult Inpatient Plan of Care  Goal: Plan of Care Review  Pt did well overnight. Magnesium infusion d/c'd per MD order around 2330. Blood pressures remained wnl, denies symptoms throughout shift. Greco removed, pt has voided since. Pain controlled with scheduled and prn medication. Visited infant in NICU.

## 2019-04-07 NOTE — PROGRESS NOTES
Spoke with Dr. Hankins at this time to update on patient's status. Orders received to discontinue mag at 2330.

## 2019-04-07 NOTE — LACTATION NOTE
04/07/19 1330   Maternal Assessment   Breast Shape wide   Breast Density soft   Areola elastic  (abrasions)   Nipples everted   Maternal Infant Feeding   Maternal Emotional State relaxed;assist needed   Equipment Type   Breast Pump Type double electric, hospital grade   Breast Pump Flange Type hard   Breast Pump Flange Size 24 mm   Breast Pumping   Breast Pumping Interventions frequent pumping encouraged   Breast Pumping double electric breast pump utilized   assisted pt with use of electric breastpump. Pt has abrasions to both areolas. Lanolin applied prior to pumping. Pt had been pushing flanges into breast that may have caused the abrasions to areola tissue. Pt shown how to correctly hold flanges to breast. Pumping more comfortable to pt. Pt shown how to hand express after pumping, approximately 2 mls expressed. Reviewed cleaning and sterilizing of kit. Kit sterilized after this pumping. Encouragement provided.

## 2019-04-07 NOTE — PROGRESS NOTES
POSTPARTUM PROGRESS NOTE     Anitra Chaney is a 39 y.o. female POD #2 status post Repeat  section at 34w3d in a pregnancy complicated by chronic HTN with superimposed preE w/ severe features. Patient is doing well this morning. She denies nausea, vomiting, fever or chills. She denies headache, vision changes, chest pain, shortness of breath, RUQ pain.  Patient reports moderate abdominal pain that is adequately relieved by oral pain medications. Lochia is mild to moderate  and stable. Pt is voiding, ambulating, and passing flatus. She has not had a BM. Patient does plan to breast feed. Desires interval mirena for contraception. Circumcision deferred - infant in NICU.    Objective:       Temp:  [98.1 °F (36.7 °C)-98.9 °F (37.2 °C)] 98.7 °F (37.1 °C)  Pulse:  [56-71] 64  Resp:  [18-20] 18  SpO2:  [94 %-100 %] 99 %  BP: (117-148)/(68-89) 140/89    Physical Exam  A&Ox3, NAD  nonlabored breathing, no respiratory distress  Abd soft, nontender, nondistended  Bandage covering incision w mild shadowing  Fundus not palpable 2/2 body habitus  No LE edema  Appropriate mood & affect    Lab Review  No results found for this or any previous visit (from the past 4 hour(s)).    I/O    Intake/Output Summary (Last 24 hours) at 2019 0457  Last data filed at 2019 0045  Gross per 24 hour   Intake 700 ml   Output 4125 ml   Net -3425 ml        Assessment:     Patient Active Problem List   Diagnosis    Mood altered    Migraine syndrome    Daytime somnolence    Class 2 obesity due to excess calories without serious comorbidity with body mass index (BMI) of 35.0 to 35.9 in adult    Ankle arthritis    Rh negative state in antepartum period/rhogam 19    CHTN on no medications    High-risk pregnancy in third trimester    Advanced maternal age in multigravida    Obesity complicating pregnancy    Lower GI bleed    Migraine    History of 2  sections    Intractable headache    Preeclampsia,  severe    S/P  section    34 weeks gestation of pregnancy        Plan:   1. Postpartum care:  - Patient doing well. Continue routine management and advances.  - Continue PO pain meds. Pain well controlled.  - Heme: H/h  >   - Encourage ambulation  - Circumcision deferred  - Contraception: desires interval mirena  - Lactation prn    2. Chronic HTN w/ SI PreE w/ SF (HA)  - BP: (122-148)/(73-89) 140/89  - Asymptomatic  - s/p MgSO4  - Compazine prn for headache    3. Obesity  - BMI 36  - Encourage ambulation    4. Rh negative status  - Needs rhogam prior to discharge    5. Postpartum Hemorrhage  - VSS. Asymptomatic.  - EBL 1350  - s/p cytotec series    Dispo: As patient meets milestones, will plan to discharge POD #3-4.    Raymond Barr MD  PGY2, OBGYN [facility]

## 2019-04-08 LAB — POCT GLUCOSE: 80 MG/DL (ref 70–110)

## 2019-04-08 PROCEDURE — 25000003 PHARM REV CODE 250: Performed by: STUDENT IN AN ORGANIZED HEALTH CARE EDUCATION/TRAINING PROGRAM

## 2019-04-08 PROCEDURE — 11000001 HC ACUTE MED/SURG PRIVATE ROOM

## 2019-04-08 PROCEDURE — 51702 INSERT TEMP BLADDER CATH: CPT

## 2019-04-08 RX ORDER — IBUPROFEN 600 MG/1
600 TABLET ORAL EVERY 6 HOURS
Qty: 30 TABLET | Refills: 0 | Status: SHIPPED | OUTPATIENT
Start: 2019-04-08 | End: 2019-07-31

## 2019-04-08 RX ORDER — LABETALOL 200 MG/1
200 TABLET, FILM COATED ORAL EVERY 12 HOURS
Status: DISCONTINUED | OUTPATIENT
Start: 2019-04-08 | End: 2019-04-09 | Stop reason: HOSPADM

## 2019-04-08 RX ORDER — OXYCODONE AND ACETAMINOPHEN 5; 325 MG/1; MG/1
1 TABLET ORAL EVERY 4 HOURS PRN
Qty: 12 TABLET | Refills: 0 | Status: SHIPPED | OUTPATIENT
Start: 2019-04-08 | End: 2019-04-12 | Stop reason: SDUPTHER

## 2019-04-08 RX ADMIN — IBUPROFEN 600 MG: 600 TABLET ORAL at 12:04

## 2019-04-08 RX ADMIN — OXYCODONE AND ACETAMINOPHEN 1 TABLET: 10; 325 TABLET ORAL at 01:04

## 2019-04-08 RX ADMIN — DOCUSATE SODIUM 200 MG: 100 CAPSULE, LIQUID FILLED ORAL at 08:04

## 2019-04-08 RX ADMIN — LABETALOL HYDROCHLORIDE 200 MG: 200 TABLET, FILM COATED ORAL at 09:04

## 2019-04-08 RX ADMIN — IBUPROFEN 600 MG: 600 TABLET ORAL at 06:04

## 2019-04-08 RX ADMIN — OXYCODONE AND ACETAMINOPHEN 1 TABLET: 5; 325 TABLET ORAL at 09:04

## 2019-04-08 RX ADMIN — OXYCODONE AND ACETAMINOPHEN 1 TABLET: 10; 325 TABLET ORAL at 04:04

## 2019-04-08 RX ADMIN — LABETALOL HYDROCHLORIDE 200 MG: 200 TABLET, FILM COATED ORAL at 08:04

## 2019-04-08 RX ADMIN — OXYCODONE AND ACETAMINOPHEN 1 TABLET: 10; 325 TABLET ORAL at 12:04

## 2019-04-08 RX ADMIN — OXYCODONE AND ACETAMINOPHEN 1 TABLET: 10; 325 TABLET ORAL at 06:04

## 2019-04-08 RX ADMIN — MUPIROCIN 1 G: 20 OINTMENT TOPICAL at 08:04

## 2019-04-08 RX ADMIN — DOCUSATE SODIUM 200 MG: 100 CAPSULE, LIQUID FILLED ORAL at 09:04

## 2019-04-08 NOTE — PLAN OF CARE
Copied from baby's NICU chart:    SOCIAL WORK DISCHARGE PLANNING ASSESSMENT     Sw completed discharge planning assessment with pt's mother in mother's room 602.  Pt's mother was easily engaged. Education on the role of  was provided. Emotional support provided throughout assessment.        Legal Name: Jaquan Lopez                          :  2019         Patient Active Problem List   Diagnosis    Prematurity, 2,000-2,499 grams, 33-34 completed weeks    RDS (respiratory distress syndrome in the )    Need for observation and evaluation of  for sepsis            Birth [facility]           ANIL: 2019     Birth Weight:   No birth weight on file.                        Birth Length: 45 cm                 Gestational Age: 34w3d           Apgars    Living status:  Living  Apgars:   1 min.:   5 min.:   10 min.:   15 min.:   20 min.:     Skin color:   0  1          Heart rate:   2  2          Reflex irritability:   2  2          Muscle tone:   2  2          Respiratory effort:   2  2          Total:   8  9          Apgars assigned by:  NICU            Mother: Anitra Chaneychina 1980, 40 y/o  Address: [address] [place] [address]  Phone: [phone]  Employer: [employer]               Job Title:   Education: some college        Father: Bertin china Lopez and age unknown  Address: same as above  Phone: [phone]  Employer: [employer]  Job Title: EMT  Education:  associate's degree  Signed Birth Certificate: Yes; parents have been  since      Support person(s): Lissy Dixon ([facility] [place] [phone] and Elana Royal (family friend) [phone]     Sibling(s): maternal siblings-Schilling, 13 and Sophia, 9  Paternal sibling-Willian, 10      Spiritual Affiliation: Yes  Voodoo     Commercial Insurance Coverage: Yes  Father's [facility] (formerly [place] Primary: No  Secondary: Yes   [employer]      Pediatrician: Mom uses Dr. Arias in [employer]. Since it is recommended to find a Peds MD closer to home, mom will ask around before deciding.       Nutrition: Expressed Breast Milk               Breast Pump:              Yes               Has already obtained from [employer] insurance company               WIC:              Mom already certified; will also apply for         Essential Items: (includes car seat, crib/bassinet/pack-n-play, clothing, bottles, diapers, etc.)  Plans to acquire by discharge      Transportation: Personal vehicle      Education: Information given on CPR classes and Physician/NNP daily rounds.      Resources Given: [facility].        Potential Eligibility for SSI Benefits: No     Potential Discharge Needs:  None         Kathryn Terrazas LCSW     [facility]  [email or web address]     (phone) [phone] or  [place] [phone]  (fax) [phone]

## 2019-04-08 NOTE — PROGRESS NOTES
Dr Sena notified of pt's hx of gastric sleeve. MD stated ok to give NSAIDs (scheduled ibuprofen) as ordered.  Md notified of pt's 158/88 pressure at 2010. No new orders and will continue to monitor.

## 2019-04-08 NOTE — NURSING
Manual BP reading this am of 158/80, pt denies HA, blurry vision.  Notified Dr. Braun.  Verbal order to give labetalol.

## 2019-04-08 NOTE — PROGRESS NOTES
POSTPARTUM PROGRESS NOTE     Anitra Chaney is a 39 y.o. female POD #3 status post Repeat  section at 34w3d in a pregnancy complicated by chronic HTN with superimposed preE w/ severe features. Patient is doing well this morning. She denies nausea, vomiting, fever or chills. She denies headache, vision changes, chest pain, shortness of breath, RUQ pain.  Patient reports moderate abdominal pain that is adequately relieved by oral pain medications. Lochia is mild to moderate  and stable. Pt is voiding, ambulating, and passing flatus. She has not had a BM. Patient does plan to breast feed. Desires interval mirena for contraception. Circumcision deferred - infant in NICU.    Objective:       Temp:  [98.3 °F (36.8 °C)-99.1 °F (37.3 °C)] 98.5 °F (36.9 °C)  Pulse:  [63-74] 72  Resp:  [16-18] 18  SpO2:  [96 %-98 %] 97 %  BP: (127-170)/(65-92) 150/89    Physical Exam  A&Ox3, NAD  nonlabored breathing, no respiratory distress  Abd soft, nontender, nondistended  Incision c/d/i   Fundus not palpable 2/2 body habitus  No LE edema  Appropriate mood & affect    Lab Review  No results found for this or any previous visit (from the past 4 hour(s)).    I/O    Intake/Output Summary (Last 24 hours) at 2019 0609  Last data filed at 2019 0046  Gross per 24 hour   Intake --   Output 3200 ml   Net -3200 ml        Assessment:     Patient Active Problem List   Diagnosis    Mood altered    Migraine syndrome    Daytime somnolence    Class 2 obesity due to excess calories without serious comorbidity with body mass index (BMI) of 35.0 to 35.9 in adult    Ankle arthritis    Rh negative state in antepartum period/rhogam 19    CHTN on no medications    High-risk pregnancy in third trimester    Advanced maternal age in multigravida    Obesity complicating pregnancy    Lower GI bleed    Migraine    History of 2  sections    Intractable headache    Preeclampsia, severe    S/P  section     34 weeks gestation of pregnancy        Plan:   1. Postpartum care:  - Patient doing well. Continue routine management and advances.  - Continue PO pain meds. Pain well controlled.  - Heme: H/h 9/29 > 8/25  - Encourage ambulation  - Circumcision deferred  - Contraception: desires interval mirena  - Lactation prn    2. Chronic HTN w/ SI PreE w/ SF (HA)  - BP: (127-170)/(65-92) 150/89  - continue to monitor, consider starting BP meds   - Asymptomatic  - s/p MgSO4  - Compazine prn for headache    3. Obesity  - BMI 36  - Encourage ambulation  - SCDs for DVT ppx     4. Rh negative status  - Needs rhogam prior to discharge    5. Postpartum Hemorrhage  - VSS. Asymptomatic.  - EBL 1350  - s/p cytotec series    Dispo: As patient meets milestones, will plan to discharge POD #3-4.    Opal ALANIZ  PGY2

## 2019-04-08 NOTE — PLAN OF CARE
Problem: Adult Inpatient Plan of Care  Goal: Plan of Care Review  Outcome: Ongoing (interventions implemented as appropriate)  VS stable.  Ambulating in room without assistance.  Bleeding within normal limits.  Voiding within normal limits.

## 2019-04-08 NOTE — PROGRESS NOTES
Mother baby care guide reviewed with pt. All questions answered. Reviewed s/s of wound infection, hypertension, and pre eclampsia. Pt verbalized understanding to follow up with provider in 1 week for BP and wound check.

## 2019-04-09 VITALS
HEIGHT: 70 IN | BODY MASS INDEX: 36.08 KG/M2 | OXYGEN SATURATION: 98 % | DIASTOLIC BLOOD PRESSURE: 79 MMHG | TEMPERATURE: 99 F | HEART RATE: 73 BPM | SYSTOLIC BLOOD PRESSURE: 136 MMHG | RESPIRATION RATE: 18 BRPM | WEIGHT: 252 LBS

## 2019-04-09 PROBLEM — E66.9 OBESITY: Status: ACTIVE | Noted: 2019-04-09

## 2019-04-09 PROBLEM — O99.210 OBESITY COMPLICATING PREGNANCY: Status: RESOLVED | Noted: 2018-11-14 | Resolved: 2019-04-09

## 2019-04-09 PROBLEM — O26.899 RH NEGATIVE STATE IN ANTEPARTUM PERIOD: Status: RESOLVED | Noted: 2018-09-18 | Resolved: 2019-04-09

## 2019-04-09 PROBLEM — Z67.91 RH NEGATIVE STATE IN ANTEPARTUM PERIOD: Status: RESOLVED | Noted: 2018-09-18 | Resolved: 2019-04-09

## 2019-04-09 PROBLEM — Z3A.34 34 WEEKS GESTATION OF PREGNANCY: Status: RESOLVED | Noted: 2019-04-06 | Resolved: 2019-04-09

## 2019-04-09 PROCEDURE — 99024 POSTOP FOLLOW-UP VISIT: CPT | Mod: ,,, | Performed by: OBSTETRICS & GYNECOLOGY

## 2019-04-09 PROCEDURE — 25000003 PHARM REV CODE 250: Performed by: STUDENT IN AN ORGANIZED HEALTH CARE EDUCATION/TRAINING PROGRAM

## 2019-04-09 PROCEDURE — 99024 PR POST-OP FOLLOW-UP VISIT: ICD-10-PCS | Mod: ,,, | Performed by: OBSTETRICS & GYNECOLOGY

## 2019-04-09 RX ORDER — LABETALOL 200 MG/1
200 TABLET, FILM COATED ORAL EVERY 12 HOURS
Qty: 60 TABLET | Refills: 11 | Status: SHIPPED | OUTPATIENT
Start: 2019-04-09 | End: 2019-05-14

## 2019-04-09 RX ADMIN — DOCUSATE SODIUM 200 MG: 100 CAPSULE, LIQUID FILLED ORAL at 09:04

## 2019-04-09 RX ADMIN — LABETALOL HYDROCHLORIDE 200 MG: 200 TABLET, FILM COATED ORAL at 09:04

## 2019-04-09 RX ADMIN — IBUPROFEN 600 MG: 600 TABLET ORAL at 05:04

## 2019-04-09 RX ADMIN — OXYCODONE AND ACETAMINOPHEN 1 TABLET: 10; 325 TABLET ORAL at 05:04

## 2019-04-09 RX ADMIN — IBUPROFEN 600 MG: 600 TABLET ORAL at 12:04

## 2019-04-09 RX ADMIN — OXYCODONE AND ACETAMINOPHEN 1 TABLET: 10; 325 TABLET ORAL at 09:04

## 2019-04-09 NOTE — NURSING
Discharge instructions given to patient. Questions answered. Discharge home. Will walk to NICU with spouse.

## 2019-04-09 NOTE — PROGRESS NOTES
POSTPARTUM PROGRESS NOTE     Anitra Chaney is a 39 y.o. female POD #4 status post Repeat  section at 34w3d in a pregnancy complicated by chronic HTN with superimposed preE w/ severe features. Patient is doing well this morning. She denies nausea, vomiting, fever or chills. She denies headache, vision changes, chest pain, shortness of breath, RUQ pain.  Patient reports moderate abdominal pain that is adequately relieved by oral pain medications. Lochia is mild to moderate  and stable. Pt is voiding, ambulating, and passing flatus. She has not had a BM. Patient does plan to breast feed. Desires interval mirena for contraception. Circumcision deferred - infant in NICU.    Was started on labetalol yesterday for elevated BP. Doing well with it. Had episode of oozing from right corner of incision. Steri strip and pressure dressing placed on it. No further bleeding since 0200.     Objective:       Temp:  [98.2 °F (36.8 °C)-99.3 °F (37.4 °C)] 98.2 °F (36.8 °C)  Pulse:  [66-92] 85  Resp:  [17-18] 18  SpO2:  [96 %-98 %] 97 %  BP: (118-158)/(66-86) 153/86    Physical Exam  A&Ox3, NAD  nonlabored breathing, no respiratory distress  Abd soft, nontender, nondistended  Incision with pressure dressing in place. Shadowing seen in right lower angle, no further spread since 0200.   Fundus not palpable 2/2 body habitus  No LE edema  Appropriate mood & affect    Lab Review  No results found for this or any previous visit (from the past 4 hour(s)).    I/O    Intake/Output Summary (Last 24 hours) at 2019 0620  Last data filed at 2019 1330  Gross per 24 hour   Intake --   Output 1450 ml   Net -1450 ml        Assessment:     Patient Active Problem List   Diagnosis    Mood altered    Migraine syndrome    Daytime somnolence    Class 2 obesity due to excess calories without serious comorbidity with body mass index (BMI) of 35.0 to 35.9 in adult    Ankle arthritis    Rh negative state in antepartum period/rhogam  19    CHTN on no medications    High-risk pregnancy in third trimester    Advanced maternal age in multigravida    Obesity complicating pregnancy    Lower GI bleed    Migraine    History of 2  sections    Intractable headache    Preeclampsia, severe    S/P  section    34 weeks gestation of pregnancy        Plan:   1. Postpartum care:  - Patient doing well. Continue routine management and advances.  - Continue PO pain meds. Pain well controlled.  - Heme: H/h  >   - Encourage ambulation  - Circumcision deferred  - Contraception: desires interval mirena  - Lactation prn    2. Chronic HTN w/ SI PreE w/ SF (HA)  - BP: (118-158)/(66-86) 153/86  - started labetalol 200 BID yesterday   - Asymptomatic  - s/p MgSO4  - Compazine prn for headache    3. Obesity  - BMI 36  - Encourage ambulation  - SCDs for DVT ppx     4. Rh negative status  - Needs rhogam prior to discharge    5. Postpartum Hemorrhage  - VSS. Asymptomatic.  - EBL 1350  - s/p cytotec series        Dispo: Consider d/c home today with 1 week BP check if BP remains stable.    Opal ALANIZ  PGY2    I have reviewed and concur with the resident's history, physical assessment and plan. Upon my evaluation, patient was down in NICU. Will return to see her later. Bps well controlled on po regimen. She needs close interval fu for BP check.     Carol Oseguera MD  Obstetrics and Gynecology  [facility]

## 2019-04-09 NOTE — LACTATION NOTE
Discharge education given on pumping for nicu baby. Pt will be staying at [facility].  Encouraged pt to pump 8 or more times daily. Questions answered. Pt has lc contact number. support and encouragement given.

## 2019-04-09 NOTE — PROGRESS NOTES
Upon assessment RN noticed blood oozing from R side of  incision. Pressure applied. Charge nurse notified and came to bedside. Dr. Hankins notified and came to bedside. Steri strips applied by MD. Pressure dressing applied by RN per MD request. Pressure dressing to be removed in the morning during bedside report.

## 2019-04-09 NOTE — DISCHARGE SUMMARY
Delivery Discharge Summary  Obstetrics      Primary OB Clinician: Stella Yen MD      Admission date: 2019  Discharge date: 2019    Disposition: To home, self care    Discharge Diagnosis List:      Patient Active Problem List   Diagnosis    Mood altered    Migraine syndrome    Daytime somnolence    Class 2 obesity due to excess calories without serious comorbidity with body mass index (BMI) of 35.0 to 35.9 in adult    Ankle arthritis    CHTN on no medications    High-risk pregnancy in third trimester    Advanced maternal age in multigravida    Lower GI bleed    Migraine    History of 2  sections    Intractable headache    Preeclampsia, severe    S/P  section    Obesity       Procedure: , due to history of prior  x2    Hospital Course:  Anitra Chaney is a 39 y.o. now , POD #4 who was admitted on 2019 at 34w3d for preeclampsia with severe features (HA). Patient was subsequently admitted to labor and delivery unit with signed consents. Decision was made to proceed with RLTCS.    Please see delivery note for further details. Her postpartum course was complicated by continued elevated BP. Was started on labetalol 200mg BID. On discharge day, patient's pain is controlled with oral pain medications. Pt is tolerating ambulation without SOB or CP, and regular diet without N/V. Reports lochia is mild. Denies any HA, vision changes, F/C, LE swelling. Denies any breast pain/soreness.    Pt in stable condition and ready for discharge. She has been instructed to start and/or continue medications and follow up with her obstetrics provider as listed below.    Pertinent studies:  CBC  Recent Labs   Lab 19  1956 19  1830 19  1114   WBC 9.63 9.13 9.56   HGB 10.1* 9.7* 8.1*   HCT 30.6* 29.9* 25.1*   MCV 83 85 83    229 227          Immunization History   Administered Date(s) Administered    Hepatitis B, Pediatric/Adolescent  08/15/2011    Influenza - Quadrivalent - PF 2018    Influenza A (H1N1) 2009 Monovalent - IM 2009    Rho (D) Immune Globulin 2019    Rho (D) Immune Globulin - IM 2018, 2019    Tdap 2019        Delivery:    Episiotomy: None   Lacerations: None   Repair suture: None   Repair # of packets: 10   Blood loss (ml): 0     Birth information:  YOB: 2019   Time of birth: 11:28 PM   Sex: male   Delivery type: , Low Transverse   Gestational Age: 34w3d    Delivery Clinician:      Other providers:       Additional  information:  Forceps:    Vacuum:    Breech:    Observed anomalies      Living?:           APGARS  One minute Five minutes Ten minutes   Skin color:         Heart rate:         Grimace:         Muscle tone:         Breathing:         Totals: 8  9        Placenta: Delivered:       appearance      Patient Instructions:   Current Discharge Medication List      START taking these medications    Details   ibuprofen (ADVIL,MOTRIN) 600 MG tablet Take 1 tablet (600 mg total) by mouth every 6 (six) hours.  Qty: 30 tablet, Refills: 0      labetalol (NORMODYNE) 200 MG tablet Take 1 tablet (200 mg total) by mouth every 12 (twelve) hours.  Qty: 60 tablet, Refills: 11      oxyCODONE-acetaminophen (PERCOCET) 5-325 mg per tablet Take 1 tablet by mouth every 4 (four) hours as needed.  Qty: 12 tablet, Refills: 0         CONTINUE these medications which have NOT CHANGED    Details   butalbital-acetaminophen-caffeine -40 mg (FIORICET, ESGIC) -40 mg per tablet Take 1 tablet by mouth every 4 (four) hours as needed for Pain.      ferrous sulfate 325 (65 FE) MG EC tablet Take 325 mg by mouth 3 (three) times daily with meals.      hydrOXYzine HCl (ATARAX) 25 MG tablet Take 1 tablet (25 mg total) by mouth every evening.  Qty: 30 tablet, Refills: 1    Associated Diagnoses: Anxiety      magnesium oxide (MAG-OX) 400 mg (241.3 mg magnesium) tablet Take 1 tablet (400 mg total)  by mouth 2 (two) times daily as needed (headache).  Qty: 30 tablet, Refills: 0      ondansetron (ZOFRAN) 4 MG tablet Take 8 mg by mouth 2 (two) times daily.             Discharge Procedure Orders   Diet Adult Regular     Other restrictions (specify):   Order Comments: Pelvic rest for at least 6 weeks. Nothing in vagina - no sex, tampons, or douching for 6 weeks     Notify your health care provider if you experience any of the following:   Order Comments: Heavy vaginal bleeding saturating more than 1 pad per hour for at least 2 hours.     Notify your health care provider if you experience any of the following:  increased confusion or weakness     Notify your health care provider if you experience any of the following:  persistent dizziness, light-headedness, or visual disturbances     Notify your health care provider if you experience any of the following:  worsening rash     Notify your health care provider if you experience any of the following:  severe persistent headache     Notify your health care provider if you experience any of the following:  difficulty breathing or increased cough     Notify your health care provider if you experience any of the following:  redness, tenderness, or signs of infection (pain, swelling, redness, odor or green/yellow discharge around incision site)     Notify your health care provider if you experience any of the following:  severe uncontrolled pain     Notify your health care provider if you experience any of the following:  persistent nausea and vomiting or diarrhea     Notify your health care provider if you experience any of the following:  temperature >100.4     Notify your health care provider if you experience any of the following:  temperature >100.4     Notify your health care provider if you experience any of the following:  persistent nausea and vomiting or diarrhea     Notify your health care provider if you experience any of the following:  severe uncontrolled pain      Notify your health care provider if you experience any of the following:  redness, tenderness, or signs of infection (pain, swelling, redness, odor or green/yellow discharge around incision site)     Notify your health care provider if you experience any of the following:  difficulty breathing or increased cough     Notify your health care provider if you experience any of the following:  severe persistent headache     Notify your health care provider if you experience any of the following:  worsening rash     Notify your health care provider if you experience any of the following:  persistent dizziness, light-headedness, or visual disturbances     Notify your health care provider if you experience any of the following:  increased confusion or weakness     Notify your health care provider if you experience any of the following:   Order Comments: Bleeding through 2 pad/hr for 2 hours     No driving until:   Order Comments: Not taking narcotic pain medication     Activity as tolerated     Activity as tolerated       Follow-up Information     [facility]. Schedule an appointment as soon as possible for a visit in 6 weeks.    Specialty:  Obstetrics and Gynecology  Why:  Postpartum visit  Contact information:  [address] [address] [phone]           [facility] [address]. Schedule an appointment as soon as possible for a visit in 1 week.    Specialty:  Obstetrics and Gynecology  Why:  Blood Pressure Check  Contact information:  [address] [facility]  [facility] [address] [phone]  Additional information:  [facility], 1st Floor [address]   Please park in Elizabeth Cabrales M.D.  OBGYN  PGY2    I have reviewed and concur with the resident's history, physical assessment and plan.      Carol Oseguera MD  Obstetrics and Gynecology  [facility]

## 2019-04-10 ENCOUNTER — POSTPARTUM VISIT (OUTPATIENT)
Dept: OBSTETRICS AND GYNECOLOGY | Facility: CLINIC | Age: 39
End: 2019-04-10
Payer: COMMERCIAL

## 2019-04-10 VITALS
SYSTOLIC BLOOD PRESSURE: 142 MMHG | BODY MASS INDEX: 34.9 KG/M2 | HEIGHT: 70 IN | WEIGHT: 243.81 LBS | DIASTOLIC BLOOD PRESSURE: 88 MMHG

## 2019-04-10 DIAGNOSIS — Z51.89 VISIT FOR WOUND CHECK: Primary | ICD-10-CM

## 2019-04-10 PROCEDURE — 3008F BODY MASS INDEX DOCD: CPT | Mod: CPTII,S$GLB,, | Performed by: STUDENT IN AN ORGANIZED HEALTH CARE EDUCATION/TRAINING PROGRAM

## 2019-04-10 PROCEDURE — 99999 PR PBB SHADOW E&M-EST. PATIENT-LVL II: ICD-10-PCS | Mod: PBBFAC,,, | Performed by: STUDENT IN AN ORGANIZED HEALTH CARE EDUCATION/TRAINING PROGRAM

## 2019-04-10 PROCEDURE — 99212 OFFICE O/P EST SF 10 MIN: CPT | Mod: 24,S$GLB,, | Performed by: STUDENT IN AN ORGANIZED HEALTH CARE EDUCATION/TRAINING PROGRAM

## 2019-04-10 PROCEDURE — 99212 PR OFFICE/OUTPT VISIT, EST, LEVL II, 10-19 MIN: ICD-10-PCS | Mod: 24,S$GLB,, | Performed by: STUDENT IN AN ORGANIZED HEALTH CARE EDUCATION/TRAINING PROGRAM

## 2019-04-10 PROCEDURE — 99999 PR PBB SHADOW E&M-EST. PATIENT-LVL II: CPT | Mod: PBBFAC,,, | Performed by: STUDENT IN AN ORGANIZED HEALTH CARE EDUCATION/TRAINING PROGRAM

## 2019-04-10 PROCEDURE — 3008F PR BODY MASS INDEX (BMI) DOCUMENTED: ICD-10-PCS | Mod: CPTII,S$GLB,, | Performed by: STUDENT IN AN ORGANIZED HEALTH CARE EDUCATION/TRAINING PROGRAM

## 2019-04-10 NOTE — PROGRESS NOTES
"History & Physical  Gynecology      SUBJECTIVE:     Chief Complaint: Wound Check       History of Present Illness:    39 y.o.POD#5 s/p RTLCS presents for wound check. Presents because she was worried about "bleeding hematomas" from her wound. Denies bright red bleeding, infection, foul smelling discharge. Hospital course complicated by preE with sF. On labetalol 200mg BID. Denies any headache, vision changes, RUQ pain, CP or SOB. Was discharged home yesterday. Denies any fever, chills, nausea, vomiting.       Review of patient's allergies indicates:  No Known Allergies    Past Medical History:   Diagnosis Date    Anxiety and depression     DM2 (diabetes mellitus, type 2)     diet control    GERD (gastroesophageal reflux disease)     HTN (hypertension), benign 3/10/2014    Migraines     Obesity     PFO (patent foramen ovale)     found after stroke "too small/risky to close"    Polycystic disease, ovaries     Stroke 2017    Hospitalized at [facility]; given tPA     Past Surgical History:   Procedure Laterality Date    ANKLE FRACTURE SURGERY      ARTHRODESIS-ANKLE Left 3/27/2018    Performed by Lele [facility]dez [facility] at [facility] 1ST FLR     SECTION N/A 2019    Performed by Gianna Flowers MD at [facility] L[facility]D     SECTION, CLASSIC  2005/2009    x2    CHOLECYSTECTOMY  3/2002    lap maribel    EXCISION-MASS FOOT Left 3/2/2016    Performed by Le[facility] at [facility] OR 1ST FLR    FOOT SURGERY      gastric sleeve  2017     OB History        4    Para   3    Term   2       1    AB   1    Living   3       SAB   1    TAB        Ectopic        Multiple   0    Live Births   3               Family History   Problem Relation Age of Onset    Other Father         house fire    Breast cancer Neg Hx     Colon cancer Neg Hx     Ovarian cancer Neg Hx     Congenital heart disease Neg Hx     Pacemaker/defibrilator Neg Hx     Early death Neg Hx      Social History     Tobacco Use "    Smoking status: Current Every Day Smoker     Packs/day: 1.00     Years: 18.00     Pack years: 18.00     Types: Cigarettes    Smokeless tobacco: Never Used    Tobacco comment: cutting to 2 cigarettes per day   Substance Use Topics    Alcohol use: No     Alcohol/week: 0.0 oz    Drug use: No       Current Outpatient Medications   Medication Sig    butalbital-acetaminophen-caffeine -40 mg (FIORICET, ESGIC) -40 mg per tablet Take 1 tablet by mouth every 4 (four) hours as needed for Pain.    ferrous sulfate 325 (65 FE) MG EC tablet Take 325 mg by mouth 3 (three) times daily with meals.    hydrOXYzine HCl (ATARAX) 25 MG tablet Take 1 tablet (25 mg total) by mouth every evening.    ibuprofen (ADVIL,MOTRIN) 600 MG tablet Take 1 tablet (600 mg total) by mouth every 6 (six) hours.    labetalol (NORMODYNE) 200 MG tablet Take 1 tablet (200 mg total) by mouth every 12 (twelve) hours.    magnesium oxide (MAG-OX) 400 mg (241.3 mg magnesium) tablet Take 1 tablet (400 mg total) by mouth 2 (two) times daily as needed (headache).    ondansetron (ZOFRAN) 4 MG tablet Take 8 mg by mouth 2 (two) times daily.    oxyCODONE-acetaminophen (PERCOCET) 5-325 mg per tablet Take 1 tablet by mouth every 4 (four) hours as needed.     No current facility-administered medications for this visit.          Review of Systems:  Review of Systems   Constitutional: Negative for chills, fatigue and fever.   HENT: Negative for congestion.    Eyes: Negative for visual disturbance.   Respiratory: Negative for cough and shortness of breath.    Cardiovascular: Negative for chest pain and palpitations.   Gastrointestinal: Negative for abdominal distention, abdominal pain, constipation, diarrhea, nausea and vomiting.   Genitourinary: Negative for difficulty urinating, dysuria, hematuria, vaginal bleeding and vaginal discharge.   Skin: Negative for rash.   Neurological: Negative for dizziness, seizures, light-headedness and headaches.    Hematological: Does not bruise/bleed easily.   Psychiatric/Behavioral: Negative for dysphoric mood. The patient is not nervous/anxious.         OBJECTIVE:     Physical Exam:  Physical Exam   Constitutional: She is oriented to person, place, and time. She appears well-developed and well-nourished. No distress.   HENT:   Head: Normocephalic and atraumatic.   Cardiovascular: Normal rate and regular rhythm.   Pulmonary/Chest: Effort normal.   Abdominal: Soft. She exhibits no distension.       Incision c/d/i. Some superficial incision separation in the midline. Not bleeding, no drainage. No cellulitis noted. Some bruising noted along the incision, healing well.    Neurological: She is alert and oriented to person, place, and time.   Skin: Skin is warm.   Psychiatric: She has a normal mood and affect. Her behavior is normal. Judgment and thought content normal.   Vitals reviewed.        ASSESSMENT:       ICD-10-CM ICD-9-CM    1. Visit for wound check Z51.89 V58.89    2. Severe pre-eclampsia, antepartum O14.10 642.53               Plan:      Wound check:  - healing well  - keep incision, c/d/i  - wound precautions discussed    PreE:  - BP here mild range  - continue labetalol 200mg BID    RTC in 1 week for BP check.     Opal ALANIZ  PGY2'

## 2019-04-11 RX ORDER — OXYCODONE AND ACETAMINOPHEN 5; 325 MG/1; MG/1
1 TABLET ORAL EVERY 4 HOURS PRN
Qty: 12 TABLET | Refills: 0 | OUTPATIENT
Start: 2019-04-11

## 2019-04-12 ENCOUNTER — POSTPARTUM VISIT (OUTPATIENT)
Dept: OBSTETRICS AND GYNECOLOGY | Facility: CLINIC | Age: 39
End: 2019-04-12
Payer: COMMERCIAL

## 2019-04-12 ENCOUNTER — TELEPHONE (OUTPATIENT)
Dept: OBSTETRICS AND GYNECOLOGY | Facility: OTHER | Age: 39
End: 2019-04-12

## 2019-04-12 VITALS
DIASTOLIC BLOOD PRESSURE: 84 MMHG | BODY MASS INDEX: 33.74 KG/M2 | WEIGHT: 235.69 LBS | HEIGHT: 70 IN | SYSTOLIC BLOOD PRESSURE: 152 MMHG

## 2019-04-12 DIAGNOSIS — S30.1XXA HEMATOMA OF ABDOMINAL WALL, INITIAL ENCOUNTER: Primary | ICD-10-CM

## 2019-04-12 PROCEDURE — 3008F BODY MASS INDEX DOCD: CPT | Mod: CPTII,S$GLB,, | Performed by: OBSTETRICS & GYNECOLOGY

## 2019-04-12 PROCEDURE — 0503F PR POSTPARTUM CARE VISIT: ICD-10-PCS | Mod: S$GLB,,, | Performed by: OBSTETRICS & GYNECOLOGY

## 2019-04-12 PROCEDURE — 99999 PR PBB SHADOW E&M-EST. PATIENT-LVL III: CPT | Mod: PBBFAC,,, | Performed by: OBSTETRICS & GYNECOLOGY

## 2019-04-12 PROCEDURE — 0503F POSTPARTUM CARE VISIT: CPT | Mod: S$GLB,,, | Performed by: OBSTETRICS & GYNECOLOGY

## 2019-04-12 PROCEDURE — 3008F PR BODY MASS INDEX (BMI) DOCUMENTED: ICD-10-PCS | Mod: CPTII,S$GLB,, | Performed by: OBSTETRICS & GYNECOLOGY

## 2019-04-12 PROCEDURE — 99999 PR PBB SHADOW E&M-EST. PATIENT-LVL III: ICD-10-PCS | Mod: PBBFAC,,, | Performed by: OBSTETRICS & GYNECOLOGY

## 2019-04-12 RX ORDER — OXYCODONE AND ACETAMINOPHEN 5; 325 MG/1; MG/1
1 TABLET ORAL EVERY 6 HOURS PRN
Qty: 12 TABLET | Refills: 0 | Status: SHIPPED | OUTPATIENT
Start: 2019-04-12 | End: 2019-04-15 | Stop reason: SDUPTHER

## 2019-04-12 NOTE — TELEPHONE ENCOUNTER
"Pt reports being seen by Dr. Akhtar today for incision assessment. Pt states incision was reopened and hematoma removed. Pt states wound care has been consulted and she will possibly need a wound vac. Pt currently on the way to fill her pain medication. States she is feeling "feverish." Encouraged pt to check temperature and if pt has fever to come to the [facility]. BP being monitored closely by the clinic. Pt continues to take labetalol. Infant remains in NICU and continues to improve. Pt denies questions or concerns at this time.  "

## 2019-04-12 NOTE — PROGRESS NOTES
"Chief Complaint   Patient presents with    Wound Check       HPI:  39 y.o. female     No LMP recorded.    - s/p C/S on 2019  - Seen in clinic on 4/10/2019 for incision evaluation and found to have areas of poorly matched skin edges on incision  - Otherwise, healing well with no signs of infection    - Returns today for blood drainage from right end of incision  - No fevers, erythema, induration, or purulence  - Notes some tenderness of incision    Pap: 3/15/2016, NILM  Mammogram: N/A    Past Medical History:   Diagnosis Date    Anxiety and depression     DM2 (diabetes mellitus, type 2)     diet control    GERD (gastroesophageal reflux disease)     HTN (hypertension), benign 3/10/2014    Migraines     Obesity     PFO (patent foramen ovale)     found after stroke "too small/risky to close"    Polycystic disease, ovaries     Stroke 2017    Hospitalized at [facility]; given tPA     Past Surgical History:   Procedure Laterality Date    ANKLE FRACTURE SURGERY      ARTHRODESIS-ANKLE Left 3/27/2018    Performed by [facility] at [facility] OR 1ST FLR     SECTION N/A 2019    [facility] L[facility]D     SECTION, CLASSIC  2005/2009    x2    CHOLECYSTECTOMY  3/2002    lap maribel    EXCISION-MASS FOOT Left 3/2/2016    Performed by Le[facility] at [facility] OR 1ST FLR    FOOT SURGERY      gastric sleeve  2017       Social History     Tobacco Use    Smoking status: Current Every Day Smoker     Packs/day: 1.00     Years: 18.00     Pack years: 18.00     Types: Cigarettes    Smokeless tobacco: Never Used    Tobacco comment: cutting to 2 cigarettes per day   Substance Use Topics    Alcohol use: No     Alcohol/week: 0.0 oz     Family History   Problem Relation Age of Onset    Other Father         house fire    Breast cancer Neg Hx     Colon cancer Neg Hx     Ovarian cancer Neg Hx     Congenital heart disease Neg Hx     Pacemaker/defibrilator " "Neg Hx     Early death Neg Hx      OB History    Para Term  AB Living   4 3 2 1 1 3   SAB TAB Ectopic Multiple Live Births   1     0 3      # Outcome Date GA Lbr Yuval/2nd Weight Sex Delivery Anes PTL Lv   4  19 34w3d   M CS-LTranv Spinal  JIGAR   3 SAB 18 10w0d          2 Term 09 37w0d  2.722 kg (6 lb) F CS-Unspec EPI  JIGAR   1 Term 05 37w0d  3.345 kg (7 lb 6 oz) M CS-Unspec EPI  JIGAR       MEDICATIONS: Reviewed with patient.  ALLERGIES: Patient has no known allergies.     ROS:  Review of Systems   Constitutional: Negative for fever.   Respiratory: Negative for shortness of breath.    Cardiovascular: Negative for chest pain.   Gastrointestinal: Negative for abdominal pain, nausea and vomiting.   Genitourinary: Positive for vaginal bleeding. Negative for pelvic pain.   Neurological: Negative for headaches.       PHYSICAL EXAM:    BP (!) 152/84   Ht 5' 10" (1.778 m)   Wt 106.9 kg (235 lb 10.8 oz)   Breastfeeding? Yes   BMI 33.82 kg/m²     Physical Exam:   Constitutional: She is oriented to person, place, and time. She appears well-developed.    HENT:   Head: Normocephalic.       Pulmonary/Chest: Effort normal.        Abdominal: She exhibits no distension and no mass. There is no hepatosplenomegaly. There is no tenderness. No hernia.   Incision: bruising of mons; no erythema, induration, or purulence; some bloody drainage from right end of incision                 Neurological: She is alert and oriented to person, place, and time.     Psychiatric: She has a normal mood and affect.         ASSESSMENT & PLAN:   Hematoma of abdominal wall, initial encounter  -     Ambulatory referral to Wound Clinic    Severe pre-eclampsia, postpartum    Other orders  -     oxyCODONE-acetaminophen (PERCOCET) 5-325 mg per tablet; Take 1 tablet by mouth every 6 (six) hours as needed for Pain.  Dispense: 12 tablet; Refill: 0          - Serosanguinous drainage from right end of incision  - Drainage " increases with palpation of incision, concerning for hematoma  - Decision made to open right end of incision    - Entire length of incision injected with 10-cc of lidocaine  - Righ half of inision opened  - Hematoma noted tracking under left aspect of incision  - Entire length of incision opened  - Hematoma evacuated  - No signs of infection noted  - Wound packed with moist 4x4 gauze  - Instructed patient to present to [facility] on Sunday for dressing change  - To [facility] for worsening pain, bleeding, erythema, swelling, fever, or purulent drainage  - Consult to wound care for possible wound vac    - BP elevated today  - Increase labetalol to 400 BID

## 2019-04-14 ENCOUNTER — HOSPITAL ENCOUNTER (EMERGENCY)
Facility: OTHER | Age: 39
Discharge: HOME | End: 2019-04-14
Payer: COMMERCIAL

## 2019-04-14 DIAGNOSIS — Z98.891 S/P CESAREAN SECTION: Primary | ICD-10-CM

## 2019-04-14 PROCEDURE — 99282 EMERGENCY DEPT VISIT SF MDM: CPT

## 2019-04-15 ENCOUNTER — TELEPHONE (OUTPATIENT)
Dept: OBSTETRICS AND GYNECOLOGY | Facility: CLINIC | Age: 39
End: 2019-04-15

## 2019-04-15 ENCOUNTER — PATIENT MESSAGE (OUTPATIENT)
Dept: OBSTETRICS AND GYNECOLOGY | Facility: CLINIC | Age: 39
End: 2019-04-15

## 2019-04-15 RX ORDER — OXYCODONE AND ACETAMINOPHEN 5; 325 MG/1; MG/1
1 TABLET ORAL EVERY 6 HOURS PRN
Qty: 15 TABLET | Refills: 0 | Status: SHIPPED | OUTPATIENT
Start: 2019-04-15 | End: 2019-04-22 | Stop reason: SDUPTHER

## 2019-04-15 NOTE — TELEPHONE ENCOUNTER
Attempted to contact patient regarding prescription for  at the [facility]. Patient mail box full. [employer] message also sent to patient.

## 2019-04-16 ENCOUNTER — HOSPITAL ENCOUNTER (EMERGENCY)
Facility: OTHER | Age: 39
Discharge: HOME | End: 2019-04-16
Attending: OBSTETRICS & GYNECOLOGY
Payer: COMMERCIAL

## 2019-04-16 ENCOUNTER — TELEPHONE (OUTPATIENT)
Dept: PODIATRY | Facility: CLINIC | Age: 39
End: 2019-04-16

## 2019-04-16 ENCOUNTER — TELEPHONE (OUTPATIENT)
Dept: OBSTETRICS AND GYNECOLOGY | Facility: CLINIC | Age: 39
End: 2019-04-16

## 2019-04-16 VITALS
TEMPERATURE: 98 F | OXYGEN SATURATION: 99 % | HEART RATE: 75 BPM | SYSTOLIC BLOOD PRESSURE: 123 MMHG | DIASTOLIC BLOOD PRESSURE: 91 MMHG

## 2019-04-16 DIAGNOSIS — Z48.01 ENCOUNTER FOR SURGICAL WOUND DRESSING CHANGE: Primary | ICD-10-CM

## 2019-04-16 PROCEDURE — 99281 EMR DPT VST MAYX REQ PHY/QHP: CPT

## 2019-04-16 PROCEDURE — 99283 PR EMERGENCY DEPT VISIT,LEVEL III: ICD-10-PCS | Mod: 24,,, | Performed by: OBSTETRICS & GYNECOLOGY

## 2019-04-16 PROCEDURE — 99283 EMERGENCY DEPT VISIT LOW MDM: CPT | Mod: 24,,, | Performed by: OBSTETRICS & GYNECOLOGY

## 2019-04-16 NOTE — ED NOTES
Pt admitted to OB ED 2 for wound check, stated wound was opened last thursday, had packing changed Sunday, and needed packing changed today. Packing coming out of pt with light green/yellow drainage, foul odor noted. Pt denies pain and fevers at home.

## 2019-04-16 NOTE — CONSULTS
Telephone call received from OB ED requesting assessment of c section wound and providing recommendations.  Patient familiar to me from TC with Rylee SHERWOOD Wound Navigator who was attempting to schedule out patient wound care for patient who was seen over the weekend in the ED.  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  The wound is transverse across the lower abdomen and the lower margin of the wound and upper margin of wound do not approximate well when pulled together.  The wound is 5 cm x 12 cm x 6 cm with undermining of 6 cm at right border and 10 cm at left border.  Serous fluid drains from left area of undermining.  Wound packed with gauze with OB team at bedside.  Recommend negative pressure therapy.  Pre auth form printed for team and wound measurements documented.  Recommend large black granu-foam dressing, bridging to upper abdomen.  Wound care will assist with wound care as needed pending appointments with out patient wound care.  The wound is clean, pink and moist without purulence or odor.  The dressing which was removed was malodorous most likely due to length of time in place.  Recommend daily wound care until wound vac approved.   Transverse  incision:

## 2019-04-16 NOTE — PLAN OF CARE
Met with pt after called by OB resident about pt needing wound vac.     Confirmed pt address (staying at [facility] while baby is in NICU), telephone number and insurance.     Faxed wound vac referral to [facility].    Faxed outpt wound care clinic referral to [facility].    Will cont to f/u.    Courtney Lafont, LCSW [facility]  [email or web address]    (phone) [phone] or  Ext. [phone]  (fax) [phone]

## 2019-04-16 NOTE — ED PROVIDER NOTES
"Encounter Date: 2019       History     Chief Complaint   Patient presents with    Wound Check     packing change     HPI   Anitra Chaney is a 39 y.o. [id number] POD#11 presents for wound check. Pt reports malodorous drainage from incision site. Denies fever, chills.    Reports she was unable to get an appointment with wound care until Monday.     Review of patient's allergies indicates:  No Known Allergies  Past Medical History:   Diagnosis Date    Anxiety and depression     DM2 (diabetes mellitus, type 2)     diet control    GERD (gastroesophageal reflux disease)     HTN (hypertension), benign 3/10/2014    Migraines     Obesity     PFO (patent foramen ovale)     found after stroke "too small/risky to close"    Polycystic disease, ovaries     Stroke 2017    Hospitalized at [facility]; given tPA     Past Surgical History:   Procedure Laterality Date    ANKLE FRACTURE SURGERY      ARTHRODESIS-ANKLE Left 3/27/2018    Performed by Lele Hern[facility]dez MD at [facility] OR 1ST FLR     SECTION N/A 2019    Performed by [facility]na Flowers [facility] at [facility] L&D     SECTION, CLASSIC  2005/2009    x2    CHOLECYSTECTOMY  3/2002    lap maribel    EXCISION-MASS FOOT Left 3/2/2016    Performed by Lele Hern[facility]dez MD at [facility] OR 1ST FLR    FOOT SURGERY      gastric sleeve  2017     Family History   Problem Relation Age of Onset    Other Father         house fire    Breast cancer Neg Hx     Colon cancer Neg Hx     Ovarian cancer Neg Hx     Congenital heart disease Neg Hx     Pacemaker/defibrilator Neg Hx     Early death Neg Hx      Social History     Tobacco Use    Smoking status: Current Every Day Smoker     Packs/day: 1.00     Years: 18.00     Pack years: 18.00     Types: Cigarettes    Smokeless tobacco: Never Used    Tobacco comment: cutting to 2 cigarettes per day   Substance Use Topics    Alcohol use: No     Alcohol/week: 0.0 oz    Drug use: No     Review of Systems "   Constitutional: Negative for fever.   HENT: Negative for sore throat.    Eyes: Negative for photophobia.   Respiratory: Negative for shortness of breath.    Cardiovascular: Negative for chest pain.   Gastrointestinal: Negative for nausea and vomiting.   Genitourinary: Negative for dysuria.   Musculoskeletal: Negative for back pain.   Skin: Negative for rash.   Neurological: Negative for weakness and headaches.   Hematological: Does not bruise/bleed easily.       Physical Exam     Initial Vitals [04/16/19 1025]   BP Pulse Resp Temp SpO2   (!) 123/91 85 -- 98.2 °F (36.8 °C) 99 %      MAP       --         Physical Exam    Vitals reviewed.  Constitutional: She appears well-developed and well-nourished. She is not diaphoretic. No distress.   HENT:   Head: Normocephalic and atraumatic.   Nose: Nose normal.   Eyes: Conjunctivae are normal.   Neck: Normal range of motion.   Cardiovascular: Normal rate.   Pulmonary/Chest: No respiratory distress.   Abdominal: Soft. She exhibits no distension. There is no tenderness.       15cm wide pfannensteil incision, open  6cm deep, tracks to 6cm at 9 oclock, tracks to 10cm at 3-5 oclock  5cc of clear/yellow serous fluid drained   Musculoskeletal: She exhibits no edema or tenderness.   Neurological: She is alert and oriented to person, place, and time.   Skin: Skin is warm and dry.   Psychiatric: She has a normal mood and affect. Her behavior is normal. Judgment and thought content normal.         ED Course   Procedures  Labs Reviewed - No data to display       Imaging Results    None          Medical Decision Making:   ED Management:  - VSS  - pt unable to go to wound care clinic because there was no appointment available until Monday  - wound care consulted, wound examined, descriptions in physical exam  - wound care recommended wound vac placement  - contacted Kathryn with social work, wound vac request form completed  - Kathryn will continue to work with pt and possibly set up  patient with [facility] wound care  - will contact [facility] and set up appointment for wound packing change for Thursday if wound vac unable to be aquired before then                      Clinical Impression:       ICD-10-CM ICD-9-CM   1. Encounter for surgical wound dressing change Z48.01 V58.31                                Sarah Braun MD  Resident  04/16/19 1142       Sarah Braun MD  Resident  04/16/19 1151

## 2019-04-16 NOTE — ED PROVIDER NOTES
"Encounter Date: 2019       History     Chief Complaint   Patient presents with    Wound Check     packing change     HPI   Anitra Chaney is a 39 y.o. [id number] POD#11 presents for wound check. Pt reports malodorous drainage from incision site. Denies fever, chills.    Reports she was unable to get an appointment with wound care until Monday.     Review of patient's allergies indicates:  No Known Allergies  Past Medical History:   Diagnosis Date    Anxiety and depression     DM2 (diabetes mellitus, type 2)     diet control    GERD (gastroesophageal reflux disease)     HTN (hypertension), benign 3/10/2014    Migraines     Obesity     PFO (patent foramen ovale)     found after stroke "too small/risky to close"    Polycystic disease, ovaries     Stroke 2017    Hospitalized at [facility]; given tPA     Past Surgical History:   Procedure Laterality Date    ANKLE FRACTURE SURGERY      ARTHRODESIS-ANKLE Left 3/27/2018    Performed by Le[facility]dez MD at [facility] OR 1ST FLR     SECTION N/A 2019    Performed by Gianna Flowers MD at [facility] [place] L&D     SECTION, CLASSIC  2005/2009    x2    CHOLECYSTECTOMY  3/2002    lap maribel    EXCISION-MASS FOOT Left 3/2/2016    Performed by Le[facility]dez MD at [facility] OR 1ST FLR    FOOT SURGERY      gastric sleeve  2017     Family History   Problem Relation Age of Onset    Other Father         house fire    Breast cancer Neg Hx     Colon cancer Neg Hx     Ovarian cancer Neg Hx     Congenital heart disease Neg Hx     Pacemaker/defibrilator Neg Hx     Early death Neg Hx      Social History     Tobacco Use    Smoking status: Current Every Day Smoker     Packs/day: 1.00     Years: 18.00     Pack years: 18.00     Types: Cigarettes    Smokeless tobacco: Never Used    Tobacco comment: cutting to 2 cigarettes per day   Substance Use Topics    Alcohol use: No     Alcohol/week: 0.0 oz    Drug use: No     Review of Systems "   Constitutional: Negative for fever.   HENT: Negative for sore throat.    Eyes: Negative for photophobia.   Respiratory: Negative for shortness of breath.    Cardiovascular: Negative for chest pain.   Gastrointestinal: Negative for nausea and vomiting.   Genitourinary: Negative for dysuria.   Musculoskeletal: Negative for back pain.   Skin: Negative for rash.   Neurological: Negative for weakness and headaches.   Hematological: Does not bruise/bleed easily.       Physical Exam     Initial Vitals [04/16/19 1025]   BP Pulse Resp Temp SpO2   (!) 123/91 85 -- 98.2 °F (36.8 °C) 99 %      MAP       --         Physical Exam    Vitals reviewed.  Constitutional: She appears well-developed and well-nourished. She is not diaphoretic. No distress.   HENT:   Head: Normocephalic and atraumatic.   Nose: Nose normal.   Eyes: Conjunctivae are normal.   Neck: Normal range of motion.   Cardiovascular: Normal rate.   Pulmonary/Chest: No respiratory distress.   Abdominal: Soft. She exhibits no distension. There is no tenderness.       15cm wide pfannensteil incision, open  6cm deep, tracks to 6cm at 9 oclock, tracks to 10cm at 3-5 oclock  5cc of clear/yellow serous fluid drained   Musculoskeletal: She exhibits no edema or tenderness.   Neurological: She is alert and oriented to person, place, and time.   Skin: Skin is warm and dry.   Psychiatric: She has a normal mood and affect. Her behavior is normal. Judgment and thought content normal.         ED Course   Procedures  Labs Reviewed - No data to display       Imaging Results    None          Medical Decision Making:   ED Management:  - VSS  - pt unable to go to wound care clinic because there was no appointment available until Monday  - wound care consulted, wound examined, descriptions in physical exam  - wound care recommended wound vac placement  - contacted Kathryn with social work, wound vac request form completed  - Kathryn will continue to work with pt and possibly set up  patient with [facility] wound care  - will contact [facility] and set up appointment for wound packing change for Thursday if wound vac unable to be aquired before then              Attending Attestation:   Physician Attestation Statement for Resident:  As the supervising MD   Physician Attestation Statement: I have personally seen and examined this patient.   I agree with the above history. -:   As the supervising MD I agree with the above PE.    As the supervising MD I agree with the above treatment, course, plan, and disposition.   -: Patient evaluated and found to be stable, agree with resident's assessment of wound management and plan to discharge to home with continued wound care QOD.  I was personally present during the critical portions of the procedure(s) performed by the resident and was immediately available in the ED to provide services and assistance as needed during the entire procedure.  I have reviewed the following: old records at this facility.                       Clinical Impression:       ICD-10-CM ICD-9-CM   1. Encounter for surgical wound dressing change Z48.01 V58.31   2.  section wound seroma, postpartum O90.89 674.34                                       Sonja Ardon MD  Resident  19 1154       Keshia Gunter MD  19 132

## 2019-04-16 NOTE — TELEPHONE ENCOUNTER
Numerous calls with OB-GYN office staff re: wound care referral.  Appt made.  Patient and MD office notified

## 2019-04-16 NOTE — PLAN OF CARE
Spoke to Sang at [facility]. Wound vac was approved and released for delivery. Called pt to inform but voicemail was full. Called [facility] where pt is staying to inform them that vac would be delivered there today.     Pt has f/u appt with [facility] ([address], [address], [facility]) to have wound vac put on tomorrow (4/17/19 at 9am). Was able to reach pt to inform of the following details.     No further sw d/c planning needs.     Kathryn Terrazas LCSW    [facility]  [email or web address]    (phone) [phone] or  [place] [phone]  (fax) [phone]

## 2019-04-17 ENCOUNTER — SOCIAL WORK (OUTPATIENT)
Dept: CASE MANAGEMENT | Facility: OTHER | Age: 39
End: 2019-04-17

## 2019-04-17 NOTE — PROGRESS NOTES
-Spoke to pt this morning. Wound vac was not delivered.   -Contacted [facility] (Tod). He reported he was unsure of the [facility] address so did not deliver wound vac. Requested [facility] delivery to wound care clinic ([facility]) for pt's 9 am appt. Tod reported he did not come in the office until 10 am but to call [facility] ([phone]).   -Contacted [facility]. [facility] will try to have vac delivered by 9 am.   -Contacted pt to inform her that wound vac will be delivered to [facility] at approx 9 am.   -[facility] called to say that wound vac was delivered to [facility].   -[facility] called to request medical records. Medical records faxed.    No further d/c planning needs.      Kathryn Terrazas LCSW    [facility]  [email or web address]    (phone) [phone] or  [place]. [phone]  (fax) [phone]

## 2019-04-22 RX ORDER — IBUPROFEN 600 MG/1
600 TABLET ORAL EVERY 6 HOURS
Qty: 30 TABLET | Refills: 0 | Status: CANCELLED | OUTPATIENT
Start: 2019-04-22

## 2019-04-22 RX ORDER — OXYCODONE AND ACETAMINOPHEN 5; 325 MG/1; MG/1
1 TABLET ORAL EVERY 6 HOURS PRN
Qty: 15 TABLET | Refills: 0 | Status: SHIPPED | OUTPATIENT
Start: 2019-04-22 | End: 2019-04-27 | Stop reason: SDUPTHER

## 2019-04-22 NOTE — TELEPHONE ENCOUNTER
Spoke with patient to follow up and make sure she was being seen by wound care for  incision. She states she is being seen by [facility] for wound care and is there for her appointment today.

## 2019-04-24 VITALS — BODY MASS INDEX: 36.25 KG/M2 | WEIGHT: 252.63 LBS

## 2019-04-27 ENCOUNTER — HOSPITAL ENCOUNTER (EMERGENCY)
Facility: OTHER | Age: 39
Discharge: HOME | End: 2019-04-27
Attending: OBSTETRICS & GYNECOLOGY
Payer: COMMERCIAL

## 2019-04-27 VITALS
HEART RATE: 74 BPM | TEMPERATURE: 97 F | DIASTOLIC BLOOD PRESSURE: 89 MMHG | RESPIRATION RATE: 19 BRPM | SYSTOLIC BLOOD PRESSURE: 120 MMHG

## 2019-04-27 DIAGNOSIS — T81.49XA POSTOPERATIVE WOUND INFECTION: ICD-10-CM

## 2019-04-27 DIAGNOSIS — Z51.89 VISIT FOR WOUND CHECK: Primary | ICD-10-CM

## 2019-04-27 PROCEDURE — 87186 SC STD MICRODIL/AGAR DIL: CPT | Mod: 59

## 2019-04-27 PROCEDURE — 25000003 PHARM REV CODE 250: Performed by: STUDENT IN AN ORGANIZED HEALTH CARE EDUCATION/TRAINING PROGRAM

## 2019-04-27 PROCEDURE — 87076 CULTURE ANAEROBE IDENT EACH: CPT

## 2019-04-27 PROCEDURE — 87075 CULTR BACTERIA EXCEPT BLOOD: CPT

## 2019-04-27 PROCEDURE — 99284 EMERGENCY DEPT VISIT MOD MDM: CPT | Mod: 25

## 2019-04-27 PROCEDURE — 87077 CULTURE AEROBIC IDENTIFY: CPT | Mod: 59

## 2019-04-27 PROCEDURE — 87070 CULTURE OTHR SPECIMN AEROBIC: CPT

## 2019-04-27 PROCEDURE — 87147 CULTURE TYPE IMMUNOLOGIC: CPT

## 2019-04-27 PROCEDURE — 12021 PR CLOSURE SUPERF WND DEHIS W PACKING: ICD-10-PCS | Mod: ,,, | Performed by: OBSTETRICS & GYNECOLOGY

## 2019-04-27 PROCEDURE — 12021 TX SUPFC WND DEHSN W/PACKING: CPT | Mod: ,,, | Performed by: OBSTETRICS & GYNECOLOGY

## 2019-04-27 PROCEDURE — 12021 TX SUPFC WND DEHSN W/PACKING: CPT

## 2019-04-27 RX ORDER — CIPROFLOXACIN 500 MG/1
500 TABLET ORAL 2 TIMES DAILY
Qty: 20 TABLET | Refills: 0 | Status: SHIPPED | OUTPATIENT
Start: 2019-04-27 | End: 2019-05-07

## 2019-04-27 RX ORDER — OXYCODONE AND ACETAMINOPHEN 5; 325 MG/1; MG/1
1 TABLET ORAL EVERY 6 HOURS PRN
Qty: 20 TABLET | Refills: 0 | Status: SHIPPED | OUTPATIENT
Start: 2019-04-27 | End: 2019-05-02 | Stop reason: ALTCHOICE

## 2019-04-27 RX ORDER — CEFUROXIME AXETIL 250 MG/1
250 TABLET ORAL EVERY 12 HOURS
Qty: 20 TABLET | Refills: 0 | Status: SHIPPED | OUTPATIENT
Start: 2019-04-27 | End: 2019-05-07

## 2019-04-27 RX ORDER — OXYCODONE HYDROCHLORIDE 5 MG/1
10 TABLET ORAL ONCE
Status: COMPLETED | OUTPATIENT
Start: 2019-04-27 | End: 2019-04-27

## 2019-04-27 RX ADMIN — OXYCODONE HYDROCHLORIDE 10 MG: 5 TABLET ORAL at 01:04

## 2019-04-27 NOTE — ED PROVIDER NOTES
"Encounter Date: 2019       History     Chief Complaint   Patient presents with    Wound Check     40 yo  POD #20 s/p RLTCS presents for a wound problem. She currently has a wound vac in place. Incision was opened in clinic on  due to drainage and concern for infection. She was never diagnosed with an infection and has not been on antibiotics. She follows with wound care as an outpatient. Today she states that her wound vac stopped working and no longer had a seal. She taped it herself and came to have it fixed. She denies fever, chills, purulent drainage from incision. She does report an odor coming from her incision, which she says has been present for a while but cultures in clinic have been negative.        Review of patient's allergies indicates:  No Known Allergies  Past Medical History:   Diagnosis Date    Anxiety and depression     DM2 (diabetes mellitus, type 2)     diet control    GERD (gastroesophageal reflux disease)     HTN (hypertension), benign 3/10/2014    Migraines     Obesity     PFO (patent foramen ovale)     found after stroke "too small/risky to close"    Polycystic disease, ovaries     Stroke 2017    Hospitalized at [facility]; given tPA     Past Surgical History:   Procedure Laterality Date    ANKLE FRACTURE SURGERY      ARTHRODESIS-ANKLE Left 3/27/2018    [facility] OR 1ST FLR     SECTION N/A 2019    Performed by [facility] Flowers [facility] at [facility] L&D     SECTION, CLASSIC  2005/2009    x2    CHOLECYSTECTOMY  3/2002    lap maribel    EXCISION-MASS FOOT Left 3/2/2016    [facility] by [facility] at [facility] OR 1ST FLR    FOOT SURGERY      gastric sleeve  2017     Family History   Problem Relation Age of Onset    Other Father         house fire    Breast cancer Neg Hx     Colon cancer Neg Hx     Ovarian cancer Neg Hx     Congenital heart disease Neg Hx     Pacemaker/defibrilator Neg Hx     Early death " Neg Hx      Social History     Tobacco Use    Smoking status: Current Every Day Smoker     Packs/day: 1.00     Years: 18.00     Pack years: 18.00     Types: Cigarettes    Smokeless tobacco: Never Used    Tobacco comment: cutting to 2 cigarettes per day   Substance Use Topics    Alcohol use: No     Alcohol/week: 0.0 oz    Drug use: No     Review of Systems   Constitutional: Negative for chills and fever.   Eyes: Negative.    Respiratory: Negative for chest tightness and shortness of breath.    Cardiovascular: Negative for chest pain, palpitations and leg swelling.   Gastrointestinal: Negative for abdominal pain, constipation, diarrhea, nausea and vomiting.   Endocrine: Negative.         States diabetes is diet controlled since gastric sleeve   Genitourinary: Negative for dysuria, pelvic pain, vaginal bleeding and vaginal discharge.   Skin: Positive for wound.        Post op wound with breakdown per HPI   Neurological: Negative for dizziness, light-headedness and headaches.   Hematological: Negative.    Psychiatric/Behavioral: Negative for dysphoric mood, self-injury and suicidal ideas.       Physical Exam     Initial Vitals [04/27/19 1206]   BP Pulse Resp Temp SpO2   120/89 74 19 97.3 °F (36.3 °C) --      MAP       --         Physical Exam    Vitals reviewed.  Constitutional: She appears well-developed and well-nourished. She is not diaphoretic. No distress.   HENT:   Head: Normocephalic and atraumatic.   Nose: Nose normal.   Eyes: Conjunctivae are normal.   Neck: Normal range of motion.   Cardiovascular: Normal rate.   Pulmonary/Chest: No respiratory distress.   Abdominal: Soft. She exhibits no distension. There is no tenderness.       Musculoskeletal: She exhibits no edema or tenderness.   Neurological: She is alert and oriented to person, place, and time.   Skin: Skin is warm and dry. Capillary refill takes less than 2 seconds. Abscess noted.   Psychiatric: She has a normal mood and affect. Her behavior is  normal. Judgment and thought content normal.         ED Course   Procedures  Labs Reviewed   CULTURE, AEROBIC  (SPECIFY SOURCE)   CULTURE, ANAEROBIC          Imaging Results    None          Medical Decision Making:   ED Management:  Vital signs stable. Afebrile. Wound dressing changed. Cultures collected. Ceftin and cipro sent to patient's pharmacy. Recommend follow up with wound care on Monday. Patient stable for discharge.              Attending Attestation:   Physician Attestation Statement for Resident:  As the supervising MD   Physician Attestation Statement: I have personally seen and examined this patient.   I agree with the above history. -:   As the supervising MD I agree with the above PE.    As the supervising MD I agree with the above treatment, course, plan, and disposition.  I have reviewed the following: old records at this facility.                       Clinical Impression:       ICD-10-CM ICD-9-CM   1. Visit for wound check Z51.89 V58.89   2. Postoperative wound infection T81.49XA 998.59         Disposition:   Disposition: Discharged  Condition: Stable                        Sarah Braun MD  Resident  04/27/19 1548       Sarah Braun MD  Resident  04/27/19 1761

## 2019-04-30 LAB
BACTERIA SPEC AEROBE CULT: NORMAL
BACTERIA SPEC AEROBE CULT: NORMAL

## 2019-05-02 ENCOUNTER — POSTPARTUM VISIT (OUTPATIENT)
Dept: OBSTETRICS AND GYNECOLOGY | Facility: CLINIC | Age: 39
End: 2019-05-02
Payer: COMMERCIAL

## 2019-05-02 VITALS
DIASTOLIC BLOOD PRESSURE: 80 MMHG | SYSTOLIC BLOOD PRESSURE: 112 MMHG | BODY MASS INDEX: 33.74 KG/M2 | HEIGHT: 70 IN | WEIGHT: 235.69 LBS

## 2019-05-02 DIAGNOSIS — N89.8 VAGINAL IRRITATION: Primary | ICD-10-CM

## 2019-05-02 DIAGNOSIS — T81.49XA SUPERFICIAL POSTOPERATIVE WOUND INFECTION: ICD-10-CM

## 2019-05-02 PROCEDURE — 99999 PR PBB SHADOW E&M-EST. PATIENT-LVL III: ICD-10-PCS | Mod: PBBFAC,,, | Performed by: OBSTETRICS & GYNECOLOGY

## 2019-05-02 PROCEDURE — 99212 PR OFFICE/OUTPT VISIT, EST, LEVL II, 10-19 MIN: ICD-10-PCS | Mod: 24,S$GLB,, | Performed by: OBSTETRICS & GYNECOLOGY

## 2019-05-02 PROCEDURE — 3008F BODY MASS INDEX DOCD: CPT | Mod: CPTII,S$GLB,, | Performed by: OBSTETRICS & GYNECOLOGY

## 2019-05-02 PROCEDURE — 99999 PR PBB SHADOW E&M-EST. PATIENT-LVL III: CPT | Mod: PBBFAC,,, | Performed by: OBSTETRICS & GYNECOLOGY

## 2019-05-02 PROCEDURE — 3008F PR BODY MASS INDEX (BMI) DOCUMENTED: ICD-10-PCS | Mod: CPTII,S$GLB,, | Performed by: OBSTETRICS & GYNECOLOGY

## 2019-05-02 PROCEDURE — 99212 OFFICE O/P EST SF 10 MIN: CPT | Mod: 24,S$GLB,, | Performed by: OBSTETRICS & GYNECOLOGY

## 2019-05-02 RX ORDER — FLUCONAZOLE 150 MG/1
150 TABLET ORAL ONCE
Qty: 1 TABLET | Refills: 0 | Status: SHIPPED | OUTPATIENT
Start: 2019-05-02 | End: 2019-05-02

## 2019-05-02 NOTE — PROGRESS NOTES
"Chief Complaint   Patient presents with    Vulvar Itch       HPI:  39 y.o. female     No LMP recorded.    - s/p C/S on 2019  - Incision opened on 2019 for draining hematoma  - Wound vac in place  - Seen in [facility] on 2019 after wound vac stopped working  - Started on antibiotics at that time out of concern for infection  - Following with wound care at [facility]; seen there this morning and vac changed    - Notes vaginal irritation x2 days    Pap: 3/15/2016, NILM  Mammogram: N/A    Past Medical History:   Diagnosis Date    Anxiety and depression     DM2 (diabetes mellitus, type 2)     diet control    GERD (gastroesophageal reflux disease)     HTN (hypertension), benign 3/10/2014    Migraines     Obesity     PFO (patent foramen ovale)     found after stroke "too small/risky to close"    Polycystic disease, ovaries     Stroke 2017    Hospitalized at [facility]; given tPA     Past Surgical History:   Procedure Laterality Date    ANKLE FRACTURE SURGERY      ARTHRODESIS-ANKLE Left 3/27/2018    Performed by [facility] at [facility] OR 1ST FLR     SECTION N/A 2019    [facility] at [facility] L[facility]D     SECTION, CLASSIC  2005/2009    x2    CHOLECYSTECTOMY  3/2002    lap maribel    EXCISION-MASS FOOT Left 3/2/2016    Performed by Le[facility] at [facility] OR 1ST FLR    FOOT SURGERY      gastric sleeve  2017       Social History     Tobacco Use    Smoking status: Current Every Day Smoker     Packs/day: 1.00     Years: 18.00     Pack years: 18.00     Types: Cigarettes    Smokeless tobacco: Never Used    Tobacco comment: cutting to 2 cigarettes per day   Substance Use Topics    Alcohol use: No     Alcohol/week: 0.0 oz     Family History   Problem Relation Age of Onset    Other Father         house fire    Breast cancer Neg Hx     Colon cancer Neg Hx     Ovarian cancer Neg Hx     Congenital heart disease Neg Hx     Pacemaker/defibrilator Neg " "Hx     Early death Neg Hx      OB History    Para Term  AB Living   4 3 2 1 1 3   SAB TAB Ectopic Multiple Live Births   1     0 3      # Outcome Date GA Lbr Yuval/2nd Weight Sex Delivery Anes PTL Lv   4  19 34w3d   M CS-LTranv Spinal  JIGAR   3 SAB 18 10w0d          2 Term 09 37w0d  2.722 kg (6 lb) F CS-Unspec EPI  JIGAR   1 Term 05 37w0d  3.345 kg (7 lb 6 oz) M CS-Unspec EPI  JIGAR       MEDICATIONS: Reviewed with patient.  ALLERGIES: Patient has no known allergies.     ROS:  Review of Systems   Constitutional: Negative for fever.   Respiratory: Negative for shortness of breath.    Cardiovascular: Negative for chest pain.   Gastrointestinal: Negative for abdominal pain, nausea and vomiting.   Genitourinary: Positive for vaginal pain. Negative for pelvic pain.   Neurological: Negative for headaches.       PHYSICAL EXAM:    /80   Ht 5' 10" (1.778 m)   Wt 106.9 kg (235 lb 10.8 oz)   Breastfeeding? No   BMI 33.82 kg/m²     Physical Exam:   Constitutional: She is oriented to person, place, and time. She appears well-developed.    HENT:   Head: Normocephalic.       Pulmonary/Chest: Effort normal.        Abdominal: She exhibits no distension and no mass. There is no hepatosplenomegaly. There is no tenderness. No hernia.   Incision: Wound vac in place; bruising of mons; no erythema, induration, or purulence                 Neurological: She is alert and oriented to person, place, and time.     Psychiatric: She has a normal mood and affect.         ASSESSMENT & PLAN:   Vaginal irritation  -     fluconazole (DIFLUCAN) 150 MG Tab; Take 1 tablet (150 mg total) by mouth once. for 1 dose  Dispense: 1 tablet; Refill: 0    Superficial postoperative wound infection      - Wound improving with wound vac  - Complete antibiotic course  - Continue to follow with [facility] wound care    - Will treat empirically with fluconazole for post-antibiotics yeast infection    - Return to clinic in 3 " weeks for postpartum appt.  - Pap on return to clinic

## 2019-05-02 NOTE — ED PROVIDER NOTES
"Encounter Date: 2019       History   No chief complaint on file.    HPI   Anitra Chaney is a 39 y.o. [id number] POD#9 who presents for wound packing change. Wound initially opened in clinic on . Pt instructed to present to [facility] for packing change. Reports mild discharge from wound. Denies fever, chills, nausea, vomiting.    Review of patient's allergies indicates:  No Known Allergies  Past Medical History:   Diagnosis Date    Anxiety and depression     DM2 (diabetes mellitus, type 2)     diet control    GERD (gastroesophageal reflux disease)     HTN (hypertension), benign 3/10/2014    Migraines     Obesity     PFO (patent foramen ovale)     found after stroke "too small/risky to close"    Polycystic disease, ovaries     Stroke 2017    Hospitalized at [facility]; given tPA     Past Surgical History:   Procedure Laterality Date    ANKLE FRACTURE SURGERY      ARTHRODESIS-ANKLE Left 3/27/2018    Performed by Lele Hern[facility]dez MD at [facility] OR 1ST FLR     SECTION N/A 2019    Performed by [facility] Flowers [facility] at [facility] L&D     SECTION, CLASSIC  2005/2009    x2    CHOLECYSTECTOMY  3/2002    lap maribel    EXCISION-MASS FOOT Left 3/2/2016    Performed by Lele [facility] at [facility] OR 1ST FLR    FOOT SURGERY      gastric sleeve  2017     Family History   Problem Relation Age of Onset    Other Father         house fire    Breast cancer Neg Hx     Colon cancer Neg Hx     Ovarian cancer Neg Hx     Congenital heart disease Neg Hx     Pacemaker/defibrilator Neg Hx     Early death Neg Hx      Social History     Tobacco Use    Smoking status: Current Every Day Smoker     Packs/day: 1.00     Years: 18.00     Pack years: 18.00     Types: Cigarettes    Smokeless tobacco: Never Used    Tobacco comment: cutting to 2 cigarettes per day   Substance Use Topics    Alcohol use: No     Alcohol/week: 0.0 oz    Drug use: No     Review of Systems   Constitutional: Negative for " chills and fever.   HENT: Negative for postnasal drip, rhinorrhea, sinus pressure and sore throat.    Eyes: Negative for photophobia and visual disturbance.   Respiratory: Negative for cough and shortness of breath.    Cardiovascular: Negative for chest pain and palpitations.   Gastrointestinal: Negative for nausea and vomiting.   Genitourinary: Negative for dysuria, frequency and urgency.   Musculoskeletal: Negative for back pain.   Skin: Negative for pallor and rash.   Neurological: Negative for dizziness, light-headedness and headaches.   Psychiatric/Behavioral: The patient is not nervous/anxious.        Physical Exam     Initial Vitals   BP Pulse Resp Temp SpO2   -- -- -- -- --      MAP       --       see RN notes (paper at time of visit)  Physical Exam    Constitutional: She appears well-developed and well-nourished.   HENT:   Head: Normocephalic and atraumatic.   Cardiovascular: Normal rate, regular rhythm, normal heart sounds and intact distal pulses.   Pulmonary/Chest: Breath sounds normal.   Abdominal: Soft.       Packing removed  pfannensteil incision open, healing well, tissue beefy red in appearance, mild amount of serosanginous discharge; no pus     Neurological: She is alert and oriented to person, place, and time.   Skin: Skin is warm and dry.   Psychiatric: She has a normal mood and affect. Her behavior is normal. Judgment and thought content normal.         ED Course   Procedures   Wet to dry dressing change with kerlex packing performed without complication.  Labs Reviewed - No data to display       Imaging Results    None          Medical Decision Making:   ED Management:  - packing changed without complications  - pt instructed to follow up with wound care clinic next week  - discharged in stable condition              Attending Attestation:   Physician Attestation Statement for Resident:  As the supervising MD   Physician Attestation Statement: I have personally seen and examined this patient.    I agree with the above history. -:   As the supervising MD I agree with the above PE.    As the supervising MD I agree with the above treatment, course, plan, and disposition.  I have reviewed the following: old records at this facility.                       Clinical Impression:       ICD-10-CM ICD-9-CM   1. S/P  section Z98.891 V45.89   2.  section wound seroma, postpartum O90.89 674.34                                Sonja Ardon MD  Resident  [phone]

## 2019-05-08 LAB — BACTERIA SPEC ANAEROBE CULT: NORMAL

## 2019-05-09 ENCOUNTER — PATIENT MESSAGE (OUTPATIENT)
Dept: OBSTETRICS AND GYNECOLOGY | Facility: CLINIC | Age: 39
End: 2019-05-09

## 2019-05-09 ENCOUNTER — TELEPHONE (OUTPATIENT)
Dept: OBSTETRICS AND GYNECOLOGY | Facility: CLINIC | Age: 39
End: 2019-05-09

## 2019-05-09 DIAGNOSIS — T81.41XS INFECTION OF SUPERFICIAL INCISIONAL SURGICAL SITE AFTER PROCEDURE, SEQUELA: Primary | ICD-10-CM

## 2019-05-09 NOTE — TELEPHONE ENCOUNTER
74 oxycodone prescribed since C/S.  60 of them since incision opened.  Will not prescribe anymore pain medication.  Patient sent portal message.

## 2019-05-10 ENCOUNTER — TELEPHONE (OUTPATIENT)
Dept: OBSTETRICS AND GYNECOLOGY | Facility: CLINIC | Age: 39
End: 2019-05-10

## 2019-05-10 NOTE — TELEPHONE ENCOUNTER
----- Message from JONAH Haney MD sent at 5/10/2019  2:58 PM CDT -----  Contact: HILLARY TYLER [[id number]]  Please check that she is OK with the contents of the letter, then fax it.  Thanks.    ----- Message -----  From: Candy Abebe MA  Sent: 5/10/2019   1:13 PM  To: JONAH Haney MD        ----- Message -----  From: Rigoberto Lindo  Sent: 5/10/2019  12:19 PM  To: Lyndon Rankin Staff    Name of Who is Calling: HILLRAY TYLER [[id number]]      What is the request in detail: Pt is requesting a call back from clinical team in regard to     getting a letter stating why she will be out of work until June 28 th in order to get paid for the     time she going be out of work the letter can be fax to [phone]. Please make     sure Pt correct date if birth is on letter. Pt also state letter needs to be faxed as soon as     possible.   Please contact to further discuss and advise.  Thank You         Can the clinic reply by [facility]: Y but prefers a phone call       What Number to [facility] Back [facility]: [phone]

## 2019-05-14 ENCOUNTER — PATIENT MESSAGE (OUTPATIENT)
Dept: INTERNAL MEDICINE | Facility: CLINIC | Age: 39
End: 2019-05-14

## 2019-05-14 ENCOUNTER — OFFICE VISIT (OUTPATIENT)
Dept: INTERNAL MEDICINE | Facility: CLINIC | Age: 39
End: 2019-05-14
Payer: COMMERCIAL

## 2019-05-14 VITALS
WEIGHT: 239.88 LBS | HEART RATE: 78 BPM | BODY MASS INDEX: 34.34 KG/M2 | HEIGHT: 70 IN | SYSTOLIC BLOOD PRESSURE: 126 MMHG | DIASTOLIC BLOOD PRESSURE: 74 MMHG | OXYGEN SATURATION: 98 %

## 2019-05-14 DIAGNOSIS — T14.8XXA SKIN WOUND FROM SURGICAL INCISION: ICD-10-CM

## 2019-05-14 DIAGNOSIS — G89.18 PAIN AT SURGICAL SITE: Primary | ICD-10-CM

## 2019-05-14 DIAGNOSIS — Z98.891 S/P CESAREAN SECTION: ICD-10-CM

## 2019-05-14 PROCEDURE — 3008F BODY MASS INDEX DOCD: CPT | Mod: CPTII,S$GLB,, | Performed by: FAMILY MEDICINE

## 2019-05-14 PROCEDURE — 3078F PR MOST RECENT DIASTOLIC BLOOD PRESSURE < 80 MM HG: ICD-10-PCS | Mod: CPTII,S$GLB,, | Performed by: FAMILY MEDICINE

## 2019-05-14 PROCEDURE — 99214 OFFICE O/P EST MOD 30 MIN: CPT | Mod: S$GLB,,, | Performed by: FAMILY MEDICINE

## 2019-05-14 PROCEDURE — 3074F SYST BP LT 130 MM HG: CPT | Mod: CPTII,S$GLB,, | Performed by: FAMILY MEDICINE

## 2019-05-14 PROCEDURE — 3008F PR BODY MASS INDEX (BMI) DOCUMENTED: ICD-10-PCS | Mod: CPTII,S$GLB,, | Performed by: FAMILY MEDICINE

## 2019-05-14 PROCEDURE — 3074F PR MOST RECENT SYSTOLIC BLOOD PRESSURE < 130 MM HG: ICD-10-PCS | Mod: CPTII,S$GLB,, | Performed by: FAMILY MEDICINE

## 2019-05-14 PROCEDURE — 99999 PR PBB SHADOW E&M-EST. PATIENT-LVL III: CPT | Mod: PBBFAC,,, | Performed by: FAMILY MEDICINE

## 2019-05-14 PROCEDURE — 99999 PR PBB SHADOW E&M-EST. PATIENT-LVL III: ICD-10-PCS | Mod: PBBFAC,,, | Performed by: FAMILY MEDICINE

## 2019-05-14 PROCEDURE — 99214 PR OFFICE/OUTPT VISIT, EST, LEVL IV, 30-39 MIN: ICD-10-PCS | Mod: S$GLB,,, | Performed by: FAMILY MEDICINE

## 2019-05-14 PROCEDURE — 3078F DIAST BP <80 MM HG: CPT | Mod: CPTII,S$GLB,, | Performed by: FAMILY MEDICINE

## 2019-05-14 RX ORDER — DICLOFENAC SODIUM 50 MG/1
50 TABLET, DELAYED RELEASE ORAL 3 TIMES DAILY PRN
Qty: 60 TABLET | Refills: 0 | Status: SHIPPED | OUTPATIENT
Start: 2019-05-14 | End: 2019-05-20

## 2019-05-14 RX ORDER — GABAPENTIN 300 MG/1
300 CAPSULE ORAL NIGHTLY PRN
Qty: 20 CAPSULE | Refills: 0 | Status: SHIPPED | OUTPATIENT
Start: 2019-05-14 | End: 2019-05-20

## 2019-05-19 DIAGNOSIS — E11.9 DIABETES MELLITUS WITHOUT COMPLICATION: Primary | ICD-10-CM

## 2019-05-20 ENCOUNTER — LAB VISIT (OUTPATIENT)
Dept: LAB | Facility: OTHER | Age: 39
End: 2019-05-20
Attending: OBSTETRICS & GYNECOLOGY
Payer: COMMERCIAL

## 2019-05-20 ENCOUNTER — POSTPARTUM VISIT (OUTPATIENT)
Dept: OBSTETRICS AND GYNECOLOGY | Facility: CLINIC | Age: 39
End: 2019-05-20
Attending: OBSTETRICS & GYNECOLOGY
Payer: COMMERCIAL

## 2019-05-20 VITALS
WEIGHT: 236.56 LBS | HEIGHT: 70 IN | SYSTOLIC BLOOD PRESSURE: 118 MMHG | DIASTOLIC BLOOD PRESSURE: 78 MMHG | BODY MASS INDEX: 33.87 KG/M2

## 2019-05-20 DIAGNOSIS — T81.41XS INFECTION OF SUPERFICIAL INCISIONAL SURGICAL SITE AFTER PROCEDURE, SEQUELA: Primary | ICD-10-CM

## 2019-05-20 DIAGNOSIS — T81.41XS INFECTION OF SUPERFICIAL INCISIONAL SURGICAL SITE AFTER PROCEDURE, SEQUELA: ICD-10-CM

## 2019-05-20 LAB
BASOPHILS # BLD AUTO: 0.02 K/UL (ref 0–0.2)
BASOPHILS NFR BLD: 0.2 % (ref 0–1.9)
DIFFERENTIAL METHOD: ABNORMAL
EOSINOPHIL # BLD AUTO: 0.2 K/UL (ref 0–0.5)
EOSINOPHIL NFR BLD: 1.7 % (ref 0–8)
ERYTHROCYTE [DISTWIDTH] IN BLOOD BY AUTOMATED COUNT: 15 % (ref 11.5–14.5)
HCT VFR BLD AUTO: 32.8 % (ref 37–48.5)
HGB BLD-MCNC: 10 G/DL (ref 12–16)
LYMPHOCYTES # BLD AUTO: 1.9 K/UL (ref 1–4.8)
LYMPHOCYTES NFR BLD: 20.5 % (ref 18–48)
MCH RBC QN AUTO: 23.4 PG (ref 27–31)
MCHC RBC AUTO-ENTMCNC: 30.5 G/DL (ref 32–36)
MCV RBC AUTO: 77 FL (ref 82–98)
MONOCYTES # BLD AUTO: 0.5 K/UL (ref 0.3–1)
MONOCYTES NFR BLD: 5.1 % (ref 4–15)
NEUTROPHILS # BLD AUTO: 6.6 K/UL (ref 1.8–7.7)
NEUTROPHILS NFR BLD: 72.2 % (ref 38–73)
PLATELET # BLD AUTO: 421 K/UL (ref 150–350)
PMV BLD AUTO: 9 FL (ref 9.2–12.9)
RBC # BLD AUTO: 4.27 M/UL (ref 4–5.4)
WBC # BLD AUTO: 9.17 K/UL (ref 3.9–12.7)

## 2019-05-20 PROCEDURE — 99999 PR PBB SHADOW E&M-EST. PATIENT-LVL III: CPT | Mod: PBBFAC,,, | Performed by: OBSTETRICS & GYNECOLOGY

## 2019-05-20 PROCEDURE — 99999 PR PBB SHADOW E&M-EST. PATIENT-LVL III: ICD-10-PCS | Mod: PBBFAC,,, | Performed by: OBSTETRICS & GYNECOLOGY

## 2019-05-20 PROCEDURE — 36415 COLL VENOUS BLD VENIPUNCTURE: CPT

## 2019-05-20 PROCEDURE — 85025 COMPLETE CBC W/AUTO DIFF WBC: CPT

## 2019-05-20 RX ORDER — HYDROCODONE BITARTRATE AND ACETAMINOPHEN 5; 325 MG/1; MG/1
1 TABLET ORAL EVERY 6 HOURS PRN
Qty: 20 TABLET | Refills: 0 | Status: SHIPPED | OUTPATIENT
Start: 2019-05-20 | End: 2019-05-31 | Stop reason: SDUPTHER

## 2019-05-20 NOTE — PROGRESS NOTES
"Anitra Chaney is a 39 y.o. female  presents for a postpartum visit.  She is status post c/s 8 weeks ago.  Her hospitalization was not complicated.  She is not breastfeeding.  She states that she doesn't feel good.  Her wound still gives her pain.  She describes headaches, nausea.  She has not seen GI since delivery.  She was told at [facility] that her hct was 17.    Her last pap was normal    Past Medical History:   Diagnosis Date    Anxiety and depression     DM2 (diabetes mellitus, type 2)     diet control    GERD (gastroesophageal reflux disease)     HTN (hypertension), benign 3/10/2014    Migraines     Obesity     PFO (patent foramen ovale)     found after stroke "too small/risky to close"    Polycystic disease, ovaries     Stroke 2017    Hospitalized at [facility]; given tPA     Past Surgical History:   Procedure Laterality Date    ANKLE FRACTURE SURGERY      ARTHRODESIS-ANKLE Left 3/27/2018    Performed by Lele [facility]dez MD at [facility] OR 1ST FLR     SECTION N/A 2019    Performed by [facility] at [facility] L&D     SECTION, CLASSIC  2005/2009    x2    CHOLECYSTECTOMY  3/2002    lap maribel    EXCISION-MASS FOOT Left 3/2/2016    Performed by Lele Hernández MD at [facility] OR 1ST FLR    FOOT SURGERY      gastric sleeve  2017     Review of patient's allergies indicates:   Allergen Reactions    Ibuprofen Nausea And Vomiting     Other reaction(s): gastric    Tramadol Nausea And Vomiting       Current Outpatient Medications:     hydrOXYzine HCl (ATARAX) 25 MG tablet, Take 1 tablet (25 mg total) by mouth every evening., Disp: 30 tablet, Rfl: 1    ibuprofen (ADVIL,MOTRIN) 600 MG tablet, Take 1 tablet (600 mg total) by mouth every 6 (six) hours., Disp: 30 tablet, Rfl: 0    HYDROcodone-acetaminophen (NORCO) 5-325 mg per tablet, Take 1 tablet by mouth every 6 (six) hours as needed for Pain., Disp: 20 tablet, Rfl: 0      Vitals:    19 1315   BP: 118/78 "       GENERAL: no distress  ABDOMEN: no masses, hepatosplenomegaly, no hernias.  Wound packed.  EXTERNAL GENITALIA POSTPARTUM: normal, well-healed, without lesions or masses   VAGINA POSTPARTUM: normal, well-healed, physiologic discharge, without lesions   CERVIX POSTPARTUM: normal, well-healed, without lesions   UTERUS POSTPARTUM: normal size, well involuted, firm, non-tender, ADNEXA POSTPARTUM: no masses palpable and nontender    Anitra was seen today for postpartum care.    Diagnoses and all orders for this visit:    Infection of superficial incisional surgical site after procedure, sequela  -     CBC auto differential; Future    Other orders  -     HYDROcodone-acetaminophen (NORCO) 5-325 mg per tablet; Take 1 tablet by mouth every 6 (six) hours as needed for Pain.     she will call Dr. Tapia for an appt as well.

## 2019-05-25 ENCOUNTER — PATIENT MESSAGE (OUTPATIENT)
Dept: ORTHOPEDICS | Facility: CLINIC | Age: 39
End: 2019-05-25

## 2019-05-28 ENCOUNTER — HOSPITAL ENCOUNTER (EMERGENCY)
Facility: OTHER | Age: 39
Discharge: HOME | End: 2019-05-28
Attending: OBSTETRICS & GYNECOLOGY
Payer: COMMERCIAL

## 2019-05-28 VITALS
TEMPERATURE: 97 F | DIASTOLIC BLOOD PRESSURE: 85 MMHG | RESPIRATION RATE: 18 BRPM | SYSTOLIC BLOOD PRESSURE: 121 MMHG | HEART RATE: 82 BPM

## 2019-05-28 DIAGNOSIS — N93.9 VAGINAL BLEEDING: Primary | ICD-10-CM

## 2019-05-28 LAB
BASOPHILS # BLD AUTO: 0.03 K/UL (ref 0–0.2)
BASOPHILS NFR BLD: 0.3 % (ref 0–1.9)
DIFFERENTIAL METHOD: ABNORMAL
EOSINOPHIL # BLD AUTO: 0.2 K/UL (ref 0–0.5)
EOSINOPHIL NFR BLD: 2 % (ref 0–8)
ERYTHROCYTE [DISTWIDTH] IN BLOOD BY AUTOMATED COUNT: 14.9 % (ref 11.5–14.5)
HCT VFR BLD AUTO: 34 % (ref 37–48.5)
HGB BLD-MCNC: 10.4 G/DL (ref 12–16)
LYMPHOCYTES # BLD AUTO: 1.4 K/UL (ref 1–4.8)
LYMPHOCYTES NFR BLD: 16.5 % (ref 18–48)
MCH RBC QN AUTO: 23.2 PG (ref 27–31)
MCHC RBC AUTO-ENTMCNC: 30.6 G/DL (ref 32–36)
MCV RBC AUTO: 76 FL (ref 82–98)
MONOCYTES # BLD AUTO: 0.4 K/UL (ref 0.3–1)
MONOCYTES NFR BLD: 4.8 % (ref 4–15)
NEUTROPHILS # BLD AUTO: 6.6 K/UL (ref 1.8–7.7)
NEUTROPHILS NFR BLD: 76.2 % (ref 38–73)
PLATELET # BLD AUTO: 379 K/UL (ref 150–350)
PMV BLD AUTO: 8.9 FL (ref 9.2–12.9)
RBC # BLD AUTO: 4.49 M/UL (ref 4–5.4)
WBC # BLD AUTO: 8.67 K/UL (ref 3.9–12.7)

## 2019-05-28 PROCEDURE — 85025 COMPLETE CBC W/AUTO DIFF WBC: CPT

## 2019-05-28 PROCEDURE — 99284 EMERGENCY DEPT VISIT MOD MDM: CPT

## 2019-05-28 PROCEDURE — 99284 EMERGENCY DEPT VISIT MOD MDM: CPT | Mod: ,,, | Performed by: OBSTETRICS & GYNECOLOGY

## 2019-05-28 PROCEDURE — 99284 PR EMERGENCY DEPT VISIT,LEVEL IV: ICD-10-PCS | Mod: ,,, | Performed by: OBSTETRICS & GYNECOLOGY

## 2019-05-28 NOTE — ED PROVIDER NOTES
"Encounter Date: 2019       History     Chief Complaint   Patient presents with    Vaginal Bleeding     Anitra Chaney is a 39 y.o.  10 weeks PP for  delivery who presents to the OB ED today (2019) with CC of vaginal bleeding. She has had dark brown spotting since delivery, and now for the past two days has had dark red heavy bleeding. She says it is like a period but heavier. She is using several pads a day, and has gushes of blood at times. She has seen some "stringy pink flecks of stuff" in the blood too.  She denies fever/chills. Reports some weakness/fatigue. No CP or SoB. Baby is doing well, she is not breast feeding.             Review of patient's allergies indicates:   Allergen Reactions    Ibuprofen Nausea And Vomiting     Other reaction(s): gastric    Tramadol Nausea And Vomiting     Past Medical History:   Diagnosis Date    Anxiety and depression     DM2 (diabetes mellitus, type 2)     diet control    GERD (gastroesophageal reflux disease)     HTN (hypertension), benign 3/10/2014    Migraines     Obesity     PFO (patent foramen ovale)     found after stroke "too small/risky to close"    Polycystic disease, ovaries     Stroke 2017    Hospitalized at [facility]; given tPA     Past Surgical History:   Procedure Laterality Date    ANKLE FRACTURE SURGERY      ARTHRODESIS-ANKLE Left 3/27/2018    Performed by [facility]dez [facility] at [facility] OR 1ST FLR     SECTION N/A 2019    Performed by [facility] Flowers MD at [facility] [place] L&D     SECTION, CLASSIC  2005/2009    x2    CHOLECYSTECTOMY  3/2002    lap maribel    EXCISION-MASS FOOT Left 3/2/2016    Performed by Lele [facility]dez MD at [facility] OR 1ST FLR    FOOT SURGERY      gastric sleeve  2017     Family History   Problem Relation Age of Onset    Other Father         house fire    Breast cancer Neg Hx     Colon cancer Neg Hx     Ovarian cancer Neg Hx     Congenital heart disease Neg Hx     " Pacemaker/defibrilator Neg Hx     Early death Neg Hx      Social History     Tobacco Use    Smoking status: Current Every Day Smoker     Packs/day: 1.00     Years: 18.00     Pack years: 18.00     Types: Cigarettes    Smokeless tobacco: Never Used    Tobacco comment: cutting to 2 cigarettes per day   Substance Use Topics    Alcohol use: No     Alcohol/week: 0.0 oz    Drug use: No     Review of Systems   Constitutional: Positive for fatigue. Negative for chills, diaphoresis and fever.   HENT: Negative for nosebleeds and sore throat.    Eyes: Negative for photophobia and visual disturbance.   Respiratory: Negative for cough and shortness of breath.    Cardiovascular: Negative for chest pain.   Gastrointestinal: Negative for constipation, diarrhea, nausea and vomiting.   Genitourinary: Positive for vaginal bleeding. Negative for dysuria, flank pain, vaginal discharge and vaginal pain.   Musculoskeletal: Negative for back pain.   Skin: Negative for rash.   Neurological: Positive for weakness. Negative for syncope and headaches.   Hematological: Does not bruise/bleed easily.       Physical Exam     Initial Vitals [05/28/19 1700]   BP Pulse Resp Temp SpO2   121/85 82 18 96.8 °F (36 °C) --      MAP       --         Physical Exam    Vitals reviewed.  Constitutional: She appears well-developed and well-nourished. She is not diaphoretic. No distress.   HENT:   Head: Normocephalic and atraumatic.   Eyes: Conjunctivae are normal.   Neck: Normal range of motion.   Cardiovascular: Normal rate, regular rhythm, normal heart sounds and intact distal pulses.   Pulmonary/Chest: Breath sounds normal. No respiratory distress.   Abdominal: Soft. Bowel sounds are normal. There is no tenderness. There is no rebound and no guarding.   Incision has healed well after wound vac, but has evidence of yeast infection   Genitourinary: Vagina normal.   Genitourinary Comments: Very small amount of dark blood in the vaginal vault, cervix closed,  NT, uterus small, NT, no adnexal masses   Musculoskeletal: She exhibits no edema or tenderness.   Neurological: She is alert and oriented to person, place, and time.   Skin: Skin is warm and dry.   Psychiatric: She has a normal mood and affect. Her behavior is normal. Judgment and thought content normal.         ED Course   Procedures  Labs Reviewed   CBC W/ AUTO DIFFERENTIAL - Abnormal; Notable for the following components:       Result Value    Hemoglobin 10.4 (*)     Hematocrit 34.0 (*)     Mean Corpuscular Volume 76 (*)     Mean Corpuscular Hemoglobin 23.2 (*)     Mean Corpuscular Hemoglobin Conc 30.6 (*)     RDW 14.9 (*)     Platelets 379 (*)     MPV 8.9 (*)     Gran% 76.2 (*)     Lymph% 16.5 (*)     All other components within normal limits          Imaging Results    None          Medical Decision Making:   Initial Assessment:   39 y.o.  10 weeks PP with vaginal bleeding.   Differential Diagnosis:   Normal menstrual period  Retained products of conception   Endometritis (unlikely)  ED Management:  Patients VSS and WNL  CBC wnl  Speculum exam shows < 1 tsp dark blood in the vault, no visible active bleeding, cervix small and closed  Abdomen nontender    Incision with appearance of yeast infection - patient reports has Rx at home from [facility] and has been using it, but not for the past couple days because she needs to shower before she can put it on.     Patient stable for discharge.    Other:   I discussed test(s) with the performing physician.  I have discussed this case with another health care provider.              Attending Attestation:   Physician Attestation Statement for Resident:  As the supervising MD   Physician Attestation Statement: I have personally seen and examined this patient.   I agree with the above history. -:   As the supervising MD I agree with the above PE.    As the supervising MD I agree with the above treatment, course, plan, and disposition.   -: Patient evaluated and found to  be stable, agree with resident's assessment and plan.  I was personally present during the critical portions of the procedure(s) performed by the resident and was immediately available in the ED to provide services and assistance as needed during the entire procedure.  I have reviewed and agree with the residents interpretation of the following: lab data.  I have reviewed the following: old records at this facility.                       Clinical Impression:     1. Vaginal bleeding            Disposition:   Disposition: Discharged  Condition: Stable                        Susu Nicole MD  Resident  05/28/19 4012       Deandra Nunez MD  05/28/19 5520

## 2019-05-28 NOTE — DISCHARGE INSTRUCTIONS
Follow up in Main Emergency Room if bleeding increases to where you are saturating a pad in an hour, feel light headed, faint or dizzy.

## 2019-05-29 RX ORDER — HYDROCODONE BITARTRATE AND ACETAMINOPHEN 5; 325 MG/1; MG/1
1 TABLET ORAL EVERY 6 HOURS PRN
Qty: 20 TABLET | Refills: 0 | OUTPATIENT
Start: 2019-05-29

## 2019-05-30 ENCOUNTER — PATIENT MESSAGE (OUTPATIENT)
Dept: OBSTETRICS AND GYNECOLOGY | Facility: CLINIC | Age: 39
End: 2019-05-30

## 2019-05-31 ENCOUNTER — HOSPITAL ENCOUNTER (OUTPATIENT)
Dept: RADIOLOGY | Facility: HOSPITAL | Age: 39
Discharge: HOME | End: 2019-05-31
Attending: PHYSICIAN ASSISTANT
Payer: COMMERCIAL

## 2019-05-31 ENCOUNTER — OFFICE VISIT (OUTPATIENT)
Dept: ORTHOPEDICS | Facility: CLINIC | Age: 39
End: 2019-05-31
Payer: COMMERCIAL

## 2019-05-31 VITALS — WEIGHT: 236.56 LBS | HEIGHT: 70 IN | BODY MASS INDEX: 33.87 KG/M2

## 2019-05-31 DIAGNOSIS — M25.572 ACUTE LEFT ANKLE PAIN: ICD-10-CM

## 2019-05-31 DIAGNOSIS — M25.572 ACUTE LEFT ANKLE PAIN: Primary | ICD-10-CM

## 2019-05-31 PROCEDURE — 99213 OFFICE O/P EST LOW 20 MIN: CPT | Mod: S$GLB,,, | Performed by: PHYSICIAN ASSISTANT

## 2019-05-31 PROCEDURE — 73610 X-RAY EXAM OF ANKLE: CPT | Mod: 26,LT,, | Performed by: SPECIALIST

## 2019-05-31 PROCEDURE — 3008F BODY MASS INDEX DOCD: CPT | Mod: CPTII,S$GLB,, | Performed by: PHYSICIAN ASSISTANT

## 2019-05-31 PROCEDURE — 73610 X-RAY EXAM OF ANKLE: CPT | Mod: TC,LT

## 2019-05-31 PROCEDURE — 99213 PR OFFICE/OUTPT VISIT, EST, LEVL III, 20-29 MIN: ICD-10-PCS | Mod: S$GLB,,, | Performed by: PHYSICIAN ASSISTANT

## 2019-05-31 PROCEDURE — 99999 PR PBB SHADOW E&M-EST. PATIENT-LVL III: CPT | Mod: PBBFAC,,, | Performed by: PHYSICIAN ASSISTANT

## 2019-05-31 PROCEDURE — 99999 PR PBB SHADOW E&M-EST. PATIENT-LVL III: ICD-10-PCS | Mod: PBBFAC,,, | Performed by: PHYSICIAN ASSISTANT

## 2019-05-31 PROCEDURE — 3008F PR BODY MASS INDEX (BMI) DOCUMENTED: ICD-10-PCS | Mod: CPTII,S$GLB,, | Performed by: PHYSICIAN ASSISTANT

## 2019-05-31 PROCEDURE — 73610 XR ANKLE COMPLETE 3 VIEW LEFT: ICD-10-PCS | Mod: 26,LT,, | Performed by: SPECIALIST

## 2019-05-31 RX ORDER — HYDROCODONE BITARTRATE AND ACETAMINOPHEN 5; 325 MG/1; MG/1
1 TABLET ORAL EVERY 6 HOURS PRN
Qty: 20 TABLET | Refills: 0 | Status: SHIPPED | OUTPATIENT
Start: 2019-05-31 | End: 2019-07-31

## 2019-06-03 ENCOUNTER — TELEPHONE (OUTPATIENT)
Dept: ORTHOPEDICS | Facility: CLINIC | Age: 39
End: 2019-06-03

## 2019-06-03 NOTE — TELEPHONE ENCOUNTER
Left a voice mail for pt to return my call regarding a follow up appointment for pt.  Pt was seen by MILEY Honeycutt 5/31   Dr Hernández was to see pt again 6/14  Scheduled and mailed appointment

## 2019-06-03 NOTE — PROGRESS NOTES
Subjective:      Patient ID: Anitra Chaney is a 39 y.o. female.    Chief Complaint: Pain of the Left Ankle and Follow-up (left ankle)    HPI    Patient is a 39 year old female who is s/p  Left ankle arthrodesis 03/2018 by  who presents to clinic with chief complaint of left ankle deformity as well as pain since twisting her ankle. Patient reports increased pain and deformity while wearing certain shoes as well as with standing. Patient has treated rest and ice with out relief. She denied any new numbness or tingling.     Review of Systems   Constitution: Negative for chills and fever.   Cardiovascular: Negative for chest pain.   Respiratory: Negative for cough and shortness of breath.    Skin: Negative for color change, dry skin, itching, nail changes, poor wound healing and rash.   Musculoskeletal:        Left ankle pain   Neurological: Negative for dizziness.   Psychiatric/Behavioral: Negative for altered mental status. The patient is not nervous/anxious.    All other systems reviewed and are negative.        Objective:            General    Constitutional: She is oriented to person, place, and time. She appears well-developed and well-nourished. No distress.   HENT:   Head: Atraumatic.   Eyes: Conjunctivae are normal.   Cardiovascular: Normal rate.    Pulmonary/Chest: Effort normal.   Neurological: She is alert and oriented to person, place, and time.   Psychiatric: She has a normal mood and affect. Her behavior is normal.         Left Ankle/Foot Exam     Inspection  Deformity: present    Range of Motion   Ankle Joint  Dorsiflexion: abnormal   Plantar flexion: abnormal     Subtalar Joint   Inversion: abnormal   Eversion: abnormal       RADS: Prominent chronic and postoperative change again noted appearing similar to the prior examination with mild progression, as above without evidence for superimposed acute findings.      Assessment:       Encounter Diagnosis   Name Primary?    Acute left  ankle pain Yes          Plan:       Discussed plan with patient. At this time she will use her tall cam boot. I will discuss with

## 2019-06-10 DIAGNOSIS — M25.572 ACUTE LEFT ANKLE PAIN: ICD-10-CM

## 2019-06-10 RX ORDER — HYDROCODONE BITARTRATE AND ACETAMINOPHEN 5; 325 MG/1; MG/1
1 TABLET ORAL EVERY 6 HOURS PRN
Qty: 20 TABLET | Refills: 0 | OUTPATIENT
Start: 2019-06-10

## 2019-06-11 ENCOUNTER — PATIENT MESSAGE (OUTPATIENT)
Dept: INTERNAL MEDICINE | Facility: CLINIC | Age: 39
End: 2019-06-11

## 2019-06-11 RX ORDER — HYDROCODONE BITARTRATE AND ACETAMINOPHEN 5; 325 MG/1; MG/1
1 TABLET ORAL EVERY 6 HOURS PRN
Qty: 20 TABLET | Refills: 0 | OUTPATIENT
Start: 2019-06-11

## 2019-06-13 ENCOUNTER — OFFICE VISIT (OUTPATIENT)
Dept: INTERNAL MEDICINE | Facility: CLINIC | Age: 39
End: 2019-06-13
Attending: FAMILY MEDICINE
Payer: COMMERCIAL

## 2019-06-13 VITALS
WEIGHT: 241.63 LBS | DIASTOLIC BLOOD PRESSURE: 84 MMHG | HEIGHT: 70 IN | BODY MASS INDEX: 34.59 KG/M2 | SYSTOLIC BLOOD PRESSURE: 112 MMHG

## 2019-06-13 DIAGNOSIS — Z00.00 PREVENTATIVE HEALTH CARE: Primary | ICD-10-CM

## 2019-06-13 DIAGNOSIS — M25.542 ARTHRALGIA OF BOTH HANDS: ICD-10-CM

## 2019-06-13 DIAGNOSIS — M25.541 ARTHRALGIA OF BOTH HANDS: ICD-10-CM

## 2019-06-13 DIAGNOSIS — M79.10 MYALGIA: ICD-10-CM

## 2019-06-13 PROCEDURE — 99395 PR PREVENTIVE VISIT,EST,18-39: ICD-10-PCS | Mod: S$GLB,,, | Performed by: FAMILY MEDICINE

## 2019-06-13 PROCEDURE — 3074F PR MOST RECENT SYSTOLIC BLOOD PRESSURE < 130 MM HG: ICD-10-PCS | Mod: CPTII,S$GLB,, | Performed by: FAMILY MEDICINE

## 2019-06-13 PROCEDURE — 3079F DIAST BP 80-89 MM HG: CPT | Mod: CPTII,S$GLB,, | Performed by: FAMILY MEDICINE

## 2019-06-13 PROCEDURE — 3074F SYST BP LT 130 MM HG: CPT | Mod: CPTII,S$GLB,, | Performed by: FAMILY MEDICINE

## 2019-06-13 PROCEDURE — 3079F PR MOST RECENT DIASTOLIC BLOOD PRESSURE 80-89 MM HG: ICD-10-PCS | Mod: CPTII,S$GLB,, | Performed by: FAMILY MEDICINE

## 2019-06-13 PROCEDURE — 99999 PR PBB SHADOW E&M-EST. PATIENT-LVL III: CPT | Mod: PBBFAC,,, | Performed by: FAMILY MEDICINE

## 2019-06-13 PROCEDURE — 99999 PR PBB SHADOW E&M-EST. PATIENT-LVL III: ICD-10-PCS | Mod: PBBFAC,,, | Performed by: FAMILY MEDICINE

## 2019-06-13 PROCEDURE — 99395 PREV VISIT EST AGE 18-39: CPT | Mod: S$GLB,,, | Performed by: FAMILY MEDICINE

## 2019-06-13 RX ORDER — DULOXETIN HYDROCHLORIDE 30 MG/1
30 CAPSULE, DELAYED RELEASE ORAL DAILY
Qty: 90 CAPSULE | Refills: 3 | Status: SHIPPED | OUTPATIENT
Start: 2019-06-13 | End: 2019-11-21 | Stop reason: SDUPTHER

## 2019-06-13 RX ORDER — DULOXETIN HYDROCHLORIDE 30 MG/1
30 CAPSULE, DELAYED RELEASE ORAL DAILY
Qty: 30 CAPSULE | Refills: 11 | Status: SHIPPED | OUTPATIENT
Start: 2019-06-13 | End: 2019-06-13 | Stop reason: SDUPTHER

## 2019-06-13 NOTE — PROGRESS NOTES
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  The patient has a long psychiatric history.      She is currently about 12 weeks postpartum.  She is c/o fibromyalgia due to the fact that she has had bilateral upper and lower extremity Arthralgias and myalgias for several months.    She has a history of mood disorder.  She sees psychiatry.     REVIEW OF SYSTEMS:  GENERAL: No fever, chills, fatigability or weight loss.  SKIN: No rashes, itching or changes in color or texture of skin.  HEAD: No headaches or recent head trauma.  EYES: Visual acuity fine. No photophobia, ocular pain or diplopia.  EARS: Denies ear pain, discharge or vertigo.  NOSE: No loss of smell, no epistaxis or postnasal drip.  MOUTH & THROAT: No hoarseness or change in voice. No excessive gum bleeding.  NODES: Denies swollen glands.  CHEST: Denies OLIVARES, cyanosis, wheezing, cough and sputum production.  CARDIOVASCULAR: Denies PND, orthopnea or reduced exercise tolerance.  ABDOMEN: Appetite fine. No weight loss. Denies diarrhea, abdominal pain, hematemesis or blood in stool.  URINARY: No flank pain, dysuria or hematuria.  PERIPHERAL VASCULAR: No claudication or cyanosis.  MUSCULOSKELETAL: No joint stiffness or swelling.  Except as mentioned above  NEUROLOGIC: No history of seizures.  She was admitted for a TIA.    SOCIAL HISTORY: The patient does not smoke.  The patient consumes alcohol socially.  The patient is .  She works as a dispatcher for [employer].      PHYSICAL EXAMINATION:   Blood pressure 112/84, height 5' 10" (1.778 m), weight 109.6 kg (241 lb 10 oz), last menstrual period 06/05/2019, not currently breastfeeding.      APPEARANCE: Well nourished, well developed, in no acute distress.    HEAD: Normocephalic, atraumatic.  EYES: PERRL. EOMI.  Conjunctivae without injection and  anicteric  EARS: " TM's intact. Light reflex normal. No retraction or perforation.    NOSE: Mucosa pink. Airway clear.  MOUTH & THROAT: No tonsillar enlargement. No pharyngeal erythema or exudate. No stridor.  NECK: Supple.   NODES: No cervical, axillary or inguinal lymph node enlargement.  CHEST: Lungs clear to auscultation.  No retractions are noted.  No rales or rhonchi are present.  CARDIOVASCULAR: Normal S1, S2. No rubs, murmurs or gallops.  ABDOMEN: Bowel sounds normal. Not distended. Soft. No tenderness or masses.  No ascites is noted.  MUSCULOSKELETAL:  There is no clubbing, cyanosis, or edema of the extremities x4.  There is full range of motion of the lumbar spine.  There is full range of motion of the extremities x4.  There is no deformity noted.    NEUROLOGIC:       Normal speech development.      Hearing normal.      Normal gait.      Motor and sensory exams grossly normal.  PSYCHIATRIC: Patient is alert and oriented x3.  Thought processes are all normal.  There is no homicidality.  There is no suicidality.  There is no evidence of psychosis.    LABORATORY/RADIOLOGY:   Chart reviewed.     ASSESSMENT:   Annual  c/o fibromyalgia  Arthralgias and myalgias for several months involving both the upper and lower extremities  Obesity with a BMI of 35, status post gastric sleeve with substantial weight loss  SARAH / snoring improved with weight loss  HTN improved with weight loss    PLAN:  cymbalta  Rheum referral  Lamictal  Patient has been by sleep medicine.  Continue current medications     Return to clinic in 6 months

## 2019-06-20 ENCOUNTER — PATIENT MESSAGE (OUTPATIENT)
Dept: OBSTETRICS AND GYNECOLOGY | Facility: CLINIC | Age: 39
End: 2019-06-20

## 2019-06-20 NOTE — LETTER
June 24, 2019    Anitra Chaney  [address]  [place] [address]         [place] [facility]  [address] [address]  Phone: [phone] June 24, 2019     Patient: Anitra Chaney   YOB: 1980   Date of Visit: 6/20/2019       To Whom It May Concern:    It is my medical opinion that Anitra Chaney may return to full duty immediately with no restrictions.    If you have any questions or concerns, please don't hesitate to call.    Sincerely,        Stella Yen MD

## 2019-07-19 DIAGNOSIS — E11.9 TYPE 2 DIABETES MELLITUS WITHOUT COMPLICATION: ICD-10-CM

## 2019-07-30 ENCOUNTER — PATIENT OUTREACH (OUTPATIENT)
Dept: ADMINISTRATIVE | Facility: OTHER | Age: 39
End: 2019-07-30

## 2019-07-31 ENCOUNTER — OFFICE VISIT (OUTPATIENT)
Dept: OBSTETRICS AND GYNECOLOGY | Facility: CLINIC | Age: 39
End: 2019-07-31
Payer: COMMERCIAL

## 2019-07-31 VITALS
HEIGHT: 70 IN | SYSTOLIC BLOOD PRESSURE: 118 MMHG | DIASTOLIC BLOOD PRESSURE: 76 MMHG | BODY MASS INDEX: 35.98 KG/M2 | WEIGHT: 251.31 LBS

## 2019-07-31 DIAGNOSIS — N90.89 VULVAL LESION: Primary | ICD-10-CM

## 2019-07-31 PROCEDURE — 99999 PR PBB SHADOW E&M-EST. PATIENT-LVL III: ICD-10-PCS | Mod: PBBFAC,,, | Performed by: OBSTETRICS & GYNECOLOGY

## 2019-07-31 PROCEDURE — 3078F PR MOST RECENT DIASTOLIC BLOOD PRESSURE < 80 MM HG: ICD-10-PCS | Mod: CPTII,S$GLB,, | Performed by: OBSTETRICS & GYNECOLOGY

## 2019-07-31 PROCEDURE — 3074F SYST BP LT 130 MM HG: CPT | Mod: CPTII,S$GLB,, | Performed by: OBSTETRICS & GYNECOLOGY

## 2019-07-31 PROCEDURE — 3008F PR BODY MASS INDEX (BMI) DOCUMENTED: ICD-10-PCS | Mod: CPTII,S$GLB,, | Performed by: OBSTETRICS & GYNECOLOGY

## 2019-07-31 PROCEDURE — 3008F BODY MASS INDEX DOCD: CPT | Mod: CPTII,S$GLB,, | Performed by: OBSTETRICS & GYNECOLOGY

## 2019-07-31 PROCEDURE — 99213 PR OFFICE/OUTPT VISIT, EST, LEVL III, 20-29 MIN: ICD-10-PCS | Mod: S$GLB,,, | Performed by: OBSTETRICS & GYNECOLOGY

## 2019-07-31 PROCEDURE — 3078F DIAST BP <80 MM HG: CPT | Mod: CPTII,S$GLB,, | Performed by: OBSTETRICS & GYNECOLOGY

## 2019-07-31 PROCEDURE — 99213 OFFICE O/P EST LOW 20 MIN: CPT | Mod: S$GLB,,, | Performed by: OBSTETRICS & GYNECOLOGY

## 2019-07-31 PROCEDURE — 99999 PR PBB SHADOW E&M-EST. PATIENT-LVL III: CPT | Mod: PBBFAC,,, | Performed by: OBSTETRICS & GYNECOLOGY

## 2019-07-31 PROCEDURE — 87529 HSV DNA AMP PROBE: CPT

## 2019-07-31 PROCEDURE — 3074F PR MOST RECENT SYSTOLIC BLOOD PRESSURE < 130 MM HG: ICD-10-PCS | Mod: CPTII,S$GLB,, | Performed by: OBSTETRICS & GYNECOLOGY

## 2019-07-31 RX ORDER — VALACYCLOVIR HYDROCHLORIDE 1 G/1
1000 TABLET, FILM COATED ORAL 2 TIMES DAILY
Qty: 10 TABLET | Status: SHIPPED | OUTPATIENT
Start: 2019-07-31 | End: 2019-12-13

## 2019-07-31 RX ORDER — LIDOCAINE HYDROCHLORIDE 20 MG/ML
JELLY TOPICAL
Qty: 30 ML | Refills: 1 | Status: SHIPPED | OUTPATIENT
Start: 2019-07-31 | End: 2019-12-13

## 2019-07-31 NOTE — PROGRESS NOTES
SUBJECTIVE:   39 y.o. female  complains of vulvar lesions near her clitoris.    ROS:  GENERAL: No fever, chills, fatigability or weight loss.  VULVAR: No pain, no lesions and no itching.  VAGINAL: No relaxation, no itching, no discharge, no abnormal bleeding and no lesions.  ABDOMEN: No abdominal pain. Denies nausea. Denies vomiting. No diarrhea. No constipation  BREAST: Denies pain. No lumps. No discharge.  URINARY: No incontinence, no nocturia, no frequency and no dysuria.  CARDIOVASCULAR: No chest pain. No shortness of breath. No leg cramps.  NEUROLOGICAL: No headaches. No vision changes.        Vitals:    19 1449   BP: 118/76         OBJECTIVE:   She appears well, afebrile.  Abdomen: benign, soft, nontender, no masses.  VULVA: ulcerative lesions superior to the clitoris    ASSESSMENT:   Anitra was seen today for genital warts.    Diagnoses and all orders for this visit:    Vulval lesion  -     Cancel: Herpes simplex Virus (HSV) Type 1 & 2 DNA by PCR; Future  -     lidocaine HCL 2% (XYLOCAINE) 2 % jelly; Apply to affected area daily prn  -     valACYclovir (VALTREX) 1000 MG tablet; Take 1 tablet (1,000 mg total) by mouth 2 (two) times daily. for 7 days  -     Herpes simplex virus culture [facility]; Vagina

## 2019-08-02 LAB
HSV1 DNA SPEC QL NAA+PROBE: NEGATIVE
HSV2 DNA SPEC QL NAA+PROBE: NEGATIVE
SPECIMEN SOURCE: NORMAL

## 2019-08-19 ENCOUNTER — HOSPITAL ENCOUNTER (EMERGENCY)
Facility: OTHER | Age: 39
Discharge: HOME | End: 2019-08-20
Attending: EMERGENCY MEDICINE
Payer: COMMERCIAL

## 2019-08-19 DIAGNOSIS — R10.13 EPIGASTRIC PAIN: ICD-10-CM

## 2019-08-19 LAB
ALBUMIN SERPL BCP-MCNC: 3.7 G/DL (ref 3.5–5.2)
ALP SERPL-CCNC: 61 U/L (ref 55–135)
ALT SERPL W/O P-5'-P-CCNC: 13 U/L (ref 10–44)
ANION GAP SERPL CALC-SCNC: 11 MMOL/L (ref 8–16)
AST SERPL-CCNC: 25 U/L (ref 10–40)
B-HCG UR QL: NEGATIVE
BACTERIA #/AREA URNS HPF: ABNORMAL /HPF
BASOPHILS # BLD AUTO: 0.08 K/UL (ref 0–0.2)
BASOPHILS NFR BLD: 0.9 % (ref 0–1.9)
BILIRUB SERPL-MCNC: 0.4 MG/DL (ref 0.1–1)
BILIRUB UR QL STRIP: NEGATIVE
BNP SERPL-MCNC: 11 PG/ML (ref 0–99)
BUN SERPL-MCNC: 10 MG/DL (ref 6–20)
CALCIUM SERPL-MCNC: 9.4 MG/DL (ref 8.7–10.5)
CHLORIDE SERPL-SCNC: 109 MMOL/L (ref 95–110)
CLARITY UR: CLEAR
CO2 SERPL-SCNC: 20 MMOL/L (ref 23–29)
COLOR UR: YELLOW
CREAT SERPL-MCNC: 0.7 MG/DL (ref 0.5–1.4)
CTP QC/QA: YES
D DIMER PPP IA.FEU-MCNC: 0.3 MG/L FEU
DIFFERENTIAL METHOD: ABNORMAL
EOSINOPHIL # BLD AUTO: 0.2 K/UL (ref 0–0.5)
EOSINOPHIL NFR BLD: 2.3 % (ref 0–8)
ERYTHROCYTE [DISTWIDTH] IN BLOOD BY AUTOMATED COUNT: 17.9 % (ref 11.5–14.5)
EST. GFR  (AFRICAN AMERICAN): >60 ML/MIN/1.73 M^2
EST. GFR  (NON AFRICAN AMERICAN): >60 ML/MIN/1.73 M^2
GLUCOSE SERPL-MCNC: 99 MG/DL (ref 70–110)
GLUCOSE UR QL STRIP: NEGATIVE
HCT VFR BLD AUTO: 33 % (ref 37–48.5)
HGB BLD-MCNC: 9.9 G/DL (ref 12–16)
HGB UR QL STRIP: NEGATIVE
IMM GRANULOCYTES # BLD AUTO: 0.09 K/UL (ref 0–0.04)
IMM GRANULOCYTES NFR BLD AUTO: 1.1 % (ref 0–0.5)
KETONES UR QL STRIP: NEGATIVE
LEUKOCYTE ESTERASE UR QL STRIP: ABNORMAL
LYMPHOCYTES # BLD AUTO: 2.4 K/UL (ref 1–4.8)
LYMPHOCYTES NFR BLD: 28.4 % (ref 18–48)
MCH RBC QN AUTO: 21.8 PG (ref 27–31)
MCHC RBC AUTO-ENTMCNC: 30 G/DL (ref 32–36)
MCV RBC AUTO: 73 FL (ref 82–98)
MICROSCOPIC COMMENT: ABNORMAL
MONOCYTES # BLD AUTO: 0.5 K/UL (ref 0.3–1)
MONOCYTES NFR BLD: 5.8 % (ref 4–15)
NEUTROPHILS # BLD AUTO: 5.2 K/UL (ref 1.8–7.7)
NEUTROPHILS NFR BLD: 61.5 % (ref 38–73)
NITRITE UR QL STRIP: NEGATIVE
NRBC BLD-RTO: 0 /100 WBC
PH UR STRIP: 6 [PH] (ref 5–8)
PLATELET # BLD AUTO: 360 K/UL (ref 150–350)
PMV BLD AUTO: 9.3 FL (ref 9.2–12.9)
POTASSIUM SERPL-SCNC: 4.7 MMOL/L (ref 3.5–5.1)
PROT SERPL-MCNC: 7.4 G/DL (ref 6–8.4)
PROT UR QL STRIP: NEGATIVE
RBC # BLD AUTO: 4.55 M/UL (ref 4–5.4)
SODIUM SERPL-SCNC: 140 MMOL/L (ref 136–145)
SP GR UR STRIP: 1.01 (ref 1–1.03)
TROPONIN I SERPL DL<=0.01 NG/ML-MCNC: <0.006 NG/ML (ref 0–0.03)
URN SPEC COLLECT METH UR: ABNORMAL
UROBILINOGEN UR STRIP-ACNC: NEGATIVE EU/DL
WBC # BLD AUTO: 8.44 K/UL (ref 3.9–12.7)
WBC #/AREA URNS HPF: 7 /HPF (ref 0–5)

## 2019-08-19 PROCEDURE — 80053 COMPREHEN METABOLIC PANEL: CPT

## 2019-08-19 PROCEDURE — 93010 EKG 12-LEAD: ICD-10-PCS | Mod: ,,, | Performed by: INTERNAL MEDICINE

## 2019-08-19 PROCEDURE — S0028 INJECTION, FAMOTIDINE, 20 MG: HCPCS | Performed by: EMERGENCY MEDICINE

## 2019-08-19 PROCEDURE — 84484 ASSAY OF TROPONIN QUANT: CPT

## 2019-08-19 PROCEDURE — 96375 TX/PRO/DX INJ NEW DRUG ADDON: CPT

## 2019-08-19 PROCEDURE — 93005 ELECTROCARDIOGRAM TRACING: CPT

## 2019-08-19 PROCEDURE — 85379 FIBRIN DEGRADATION QUANT: CPT

## 2019-08-19 PROCEDURE — 25000003 PHARM REV CODE 250: Performed by: EMERGENCY MEDICINE

## 2019-08-19 PROCEDURE — 81025 URINE PREGNANCY TEST: CPT | Performed by: EMERGENCY MEDICINE

## 2019-08-19 PROCEDURE — 63600175 PHARM REV CODE 636 W HCPCS: Performed by: EMERGENCY MEDICINE

## 2019-08-19 PROCEDURE — 93010 ELECTROCARDIOGRAM REPORT: CPT | Mod: ,,, | Performed by: INTERNAL MEDICINE

## 2019-08-19 PROCEDURE — 96374 THER/PROPH/DIAG INJ IV PUSH: CPT

## 2019-08-19 PROCEDURE — 99284 EMERGENCY DEPT VISIT MOD MDM: CPT | Mod: 25

## 2019-08-19 PROCEDURE — 85025 COMPLETE CBC W/AUTO DIFF WBC: CPT

## 2019-08-19 PROCEDURE — 83880 ASSAY OF NATRIURETIC PEPTIDE: CPT

## 2019-08-19 PROCEDURE — 81000 URINALYSIS NONAUTO W/SCOPE: CPT

## 2019-08-19 PROCEDURE — 96361 HYDRATE IV INFUSION ADD-ON: CPT

## 2019-08-19 RX ORDER — FAMOTIDINE 10 MG/ML
20 INJECTION INTRAVENOUS
Status: COMPLETED | OUTPATIENT
Start: 2019-08-19 | End: 2019-08-19

## 2019-08-19 RX ORDER — KETOROLAC TROMETHAMINE 30 MG/ML
15 INJECTION, SOLUTION INTRAMUSCULAR; INTRAVENOUS
Status: COMPLETED | OUTPATIENT
Start: 2019-08-19 | End: 2019-08-19

## 2019-08-19 RX ADMIN — KETOROLAC TROMETHAMINE 15 MG: 30 INJECTION, SOLUTION INTRAMUSCULAR at 10:08

## 2019-08-19 RX ADMIN — FAMOTIDINE 20 MG: 10 INJECTION, SOLUTION INTRAVENOUS at 10:08

## 2019-08-19 RX ADMIN — SODIUM CHLORIDE 500 ML: 0.9 INJECTION, SOLUTION INTRAVENOUS at 10:08

## 2019-08-19 RX ADMIN — ALUMINUM HYDROXIDE, MAGNESIUM HYDROXIDE, SIMETHICONE: 400; 400; 40 SUSPENSION ORAL at 10:08

## 2019-08-20 VITALS
HEIGHT: 70 IN | BODY MASS INDEX: 34.36 KG/M2 | TEMPERATURE: 98 F | SYSTOLIC BLOOD PRESSURE: 110 MMHG | RESPIRATION RATE: 16 BRPM | WEIGHT: 240 LBS | OXYGEN SATURATION: 98 % | DIASTOLIC BLOOD PRESSURE: 67 MMHG | HEART RATE: 85 BPM

## 2019-08-20 RX ORDER — CYCLOBENZAPRINE HCL 10 MG
10 TABLET ORAL 2 TIMES DAILY PRN
Qty: 15 TABLET | Refills: 0 | Status: SHIPPED | OUTPATIENT
Start: 2019-08-20 | End: 2019-08-25

## 2019-08-20 NOTE — ED NOTES
Appearance: Pt awake, alert & oriented to person, place & time. Pt in no acute distress at present time. Pt is clean and well groomed with clothes appropriately fastened.   Skin: Skin warm, dry & intact. Color consistent with ethnicity. Mucous membranes moist. No breakdown or brusing noted.   Musculoskeletal: Patient moving all extremities well, no obvious swelling or deformities noted.   Respiratory: Respirations spontaneous, even, and non-labored. Visible chest rise noted. Airway is open and patent. No accessory muscle use noted. Lung sounds clear.   Neurologic: Sensation is intact. Speech is clear and appropriate. Eyes open spontaneously, behavior appropriate to situation, follows commands,  purposeful motor response noted.   Cardiac:  No Bilateral lower extremity edema. Cap refill is <3 seconds. Pt denies dizziness, blurred vision, weakness or fatigue at this time.   Abdomen: SEE HPI.   : Pt reports no dysuria or hematuria.

## 2019-08-20 NOTE — ED NOTES
Pt c/o epigastric pain and lower midline back pain x today; pmh of anxiety; believes pain may be related to her anxiety; SOB reported earlier today; denies SOB at this time. Denies pain to mid sternum, nausea, vomiting, or diarrhea. Pt AAOx4 and appropriate at this time. Respirations even and unlabored. No acute distress noted.

## 2019-08-20 NOTE — ED PROVIDER NOTES
"Encounter Date: 2019    SCRIBE #1 NOTE: I, Anjel Salguero , am scribing for, and in the presence of, Dr. Graf.       History     Chief Complaint   Patient presents with    Chest Pain     chest heaviness, SOB, and fatigue x 2-3 days.      Time seen by provider: 10:00 PM    This is a 39 y.o. female who has a Hx of fibromyalgia presents with complaint of epigastric pain. Pt reports pressure in her chest x 2-3 hours PTA. She notes that at onset of pain she began sweating and would feel " hot on the outside and like ice water on the outside". She also states that she feel like her heart is " flipping".  She states that the pain is constant and radiates to her back, though now less severe than onset. Pt also endorse SOB associated with the initial episode, though none now. She reports that she is unsure if this is occurring due to multiple stressors in her work and home life. Pt notes that positional changes do not effect the pain. No recent meals, she did not eat since breakfast. Pt also reports that the arches of her feet cramp up when ever she ambulates. Pt denies any other emergent health concerns at this time.     The history is provided by the patient.     Review of patient's allergies indicates:   Allergen Reactions    Ibuprofen Nausea And Vomiting     Other reaction(s): gastric    Tramadol Nausea And Vomiting     Past Medical History:   Diagnosis Date    Anxiety and depression     DM2 (diabetes mellitus, type 2)     diet control    GERD (gastroesophageal reflux disease)     HTN (hypertension), benign 3/10/2014    Migraines     Obesity     PFO (patent foramen ovale)     found after stroke "too small/risky to close"    Polycystic disease, ovaries     Stroke 2017    Hospitalized at [facility]; given tPA     Past Surgical History:   Procedure Laterality Date    ANKLE FRACTURE SURGERY      ARTHRODESIS-ANKLE Left 3/27/2018    Performed by Lele Hern[facility]dez MD at [facility] OR [address] [place] [address]     " SECTION N/A 2019    Per[facility]D     SECTION, CLASSIC  2005/2009    x2    CHOLECYSTECTOMY  3/2002    lap maribel    EXCISION-MASS FOOT Left 3/2/2016    Performed by Lele Hern[facility]dez [facility] at [facility] OR [facility]    FOOT SURGERY      gastric sleeve  2017     Family History   Problem Relation Age of Onset    Other Father         house fire    Breast cancer Neg Hx     Colon cancer Neg Hx     Ovarian cancer Neg Hx     Congenital heart disease Neg Hx     Pacemaker/defibrilator Neg Hx     Early death Neg Hx      Social History     Tobacco Use    Smoking status: Current Every Day Smoker     Packs/day: 1.00     Years: 18.00     Pack years: 18.00     Types: Cigarettes    Smokeless tobacco: Never Used    Tobacco comment: cutting to 2 cigarettes per day   Substance Use Topics    Alcohol use: No     Alcohol/week: 0.0 oz     Frequency: Never     Drinks per session: Patient refused     Binge frequency: Patient refused    Drug use: No     Review of Systems   Constitutional: Positive for diaphoresis. Negative for chills and fever.   HENT: Negative for congestion, rhinorrhea and sore throat.    Eyes: Negative for redness and visual disturbance.   Respiratory: Positive for shortness of breath. Negative for cough and wheezing.    Cardiovascular: Positive for chest pain and palpitations.   Gastrointestinal: Negative for abdominal pain, diarrhea, nausea and vomiting.   Genitourinary: Negative for dysuria and hematuria.   Musculoskeletal: Negative for back pain, myalgias and neck pain.   Skin: Negative for rash.   Neurological: Negative for dizziness, weakness and light-headedness.   Psychiatric/Behavioral: Negative for confusion.       Physical Exam     Initial Vitals [19 1935]   BP Pulse Resp Temp SpO2   (!) 144/91 87 16 99.7 °F (37.6 °C) 99 %      MAP       --         Physical Exam    Constitutional: She appears well-developed and well-nourished.   HENT:   Head:  Normocephalic and atraumatic.   Mouth/Throat: No oropharyngeal exudate.   Eyes: Conjunctivae and EOM are normal. Pupils are equal, round, and reactive to light.   Neck: Normal range of motion. Neck supple.   Cardiovascular: Normal rate, regular rhythm, normal heart sounds and normal pulses.   No murmur heard.  Pulmonary/Chest: Breath sounds normal. She has no wheezes. She has no rhonchi. She has no rales.   Abdominal: Soft. There is tenderness (mild) in the epigastric area. There is no rebound and no guarding.   Neurological: She is alert and oriented to person, place, and time. She has normal strength. No cranial nerve deficit.   Skin: Skin is warm and dry.   Psychiatric: She has a normal mood and affect. Her behavior is normal. Thought content normal.         ED Course   Procedures  Labs Reviewed   CBC W/ AUTO DIFFERENTIAL - Abnormal; Notable for the following components:       Result Value    Hemoglobin 9.9 (*)     Hematocrit 33.0 (*)     Mean Corpuscular Volume 73 (*)     Mean Corpuscular Hemoglobin 21.8 (*)     Mean Corpuscular Hemoglobin Conc 30.0 (*)     RDW 17.9 (*)     Platelets 360 (*)     Immature Granulocytes 1.1 (*)     Immature Grans (Abs) 0.09 (*)     All other components within normal limits   COMPREHENSIVE METABOLIC PANEL - Abnormal; Notable for the following components:    CO2 20 (*)     All other components within normal limits   URINALYSIS, REFLEX TO URINE CULTURE - Abnormal; Notable for the following components:    Leukocytes, UA 1+ (*)     All other components within normal limits    Narrative:     Preferred Collection Type->Urine, Clean Catch   URINALYSIS MICROSCOPIC - Abnormal; Notable for the following components:    WBC, UA 7 (*)     Bacteria Few (*)     All other components within normal limits    Narrative:     Preferred Collection Type->Urine, Clean Catch   D DIMER, QUANTITATIVE   B-TYPE NATRIURETIC PEPTIDE   TROPONIN I   POCT URINE PREGNANCY     EKG Readings: (Independently Interpreted)    Heart Rate: 82.   Normal sinus  No acute ischemia or arrhythmia      ECG Results          EKG 12-lead (In process)  Result time 08/20/19 06:41:19    In process by Interface, Lab In [facility] (08/20/19 06:41:19)                 Narrative:    Test Reason : R07.9,    Vent. Rate : 082 BPM     Atrial Rate : 082 BPM     P-R Int : 126 ms          QRS Dur : 078 ms      QT Int : 374 ms       P-R-T Axes : 042 055 058 degrees     QTc Int : 436 ms    Sinus rhythm with occasional Premature ventricular complexes and Fusion  complexes  Otherwise normal ECG      Referred By: [id number]   SELF           Confirmed By:                             Imaging Results          X-Ray Chest PA And Lateral (Final result)  Result time 08/19/19 22:00:28    Final result by Luan Becker MD (08/19/19 22:00:28)                 Impression:      No acute cardiopulmonary process.      Electronically signed by: Luan Becker MD  Date:    08/19/2019  Time:    22:00             Narrative:    EXAMINATION:  XR CHEST PA AND LATERAL    CLINICAL HISTORY:  Epigastric pain    TECHNIQUE:  PA and lateral views of the chest were performed.    COMPARISON:  None.    FINDINGS:  There is no consolidation, effusion, or pneumothorax.    Cardiomediastinal silhouette is unremarkable.    Regional osseous structures are unremarkable.                                 Medical Decision Making:   Initial Assessment:       39-year-old female presents after acute onset of epigastric pain radiating to her back; initially associated with diaphoresis and SOB, but this has since resolved, and pain a mild pressure now.  She has otherwise been at baseline recently except for high stress job and increased family stressors.  She also notes some diffuse muscle cramping with ambulation on the way here, but no OLIVARES or worsening epigastric pain with exertion.  No associated nausea or vomiting, and she not have any recent meals prior to onset.  On exam she has mild epigastric tenderness  but no other concerning findings.  She did have HTN and dm but since weight loss is off all of meds for this, only takes meds for fibromyalgia.  She notes some increased fatigue and some fibromyalgia pain in the past few days, but attributes this to lack of sleep and stress.       Basic labs checked with no acute findings, no elevated LFTs or lipase, and she is s/p cholecystectomy.  Could be esophagitis related to stress and since she eats infrequently after gastric sleeve, and has not eat anything since breakfast.  ACS considered given SOB and diaphoresis onset, but unlikely given lack of chest pain, exertional symptoms. EKG with no sign of acute ischemia and troponin negative. PE considered given distant history of CVA and reported PFO, but no current SOB or OLIVARES or hypoxia or sign of DVT, and D-dimer negative.  Patient felt little relief with GI cocktail, so was given Pepcid and Toradol with some improvement.  No muscle spasm seen in the ED; no hypokalemia or other electrolyte abnormalities to account for this, though could be related to dehydration.  Will give Rx for Flexeril in case of recurrent spasms. Pain could be esophagitis from stress and gastric sleeve diet, versus fibromyalgia.  Patient requesting discharge, and given reassuring workup this is reasonable.  She will closely follow up with PCP for further outpatient workup as indicated, and return to ED for any worsening pain, recurrent SOB or diaphoresis, or any other concerns.                  Scribe Attestation:   Scribe #1: I performed the above scribed service and the documentation accurately describes the services I performed. I attest to the accuracy of the note.    Attending Attestation:           Physician Attestation for Scribe:  Physician Attestation Statement for Scribe #1: I, Dr. Graf, reviewed documentation, as scribed by Anjel Salguero in my presence, and it is both accurate and complete.                    Clinical Impression:     1.  Epigastric pain                                   Vaughn Graf MD  08/20/19 0315       Vaughn Graf MD  08/20/19 0093

## 2019-08-26 ENCOUNTER — PATIENT MESSAGE (OUTPATIENT)
Dept: OBSTETRICS AND GYNECOLOGY | Facility: CLINIC | Age: 39
End: 2019-08-26

## 2019-08-26 ENCOUNTER — TELEPHONE (OUTPATIENT)
Dept: OBSTETRICS AND GYNECOLOGY | Facility: HOSPITAL | Age: 39
End: 2019-08-26

## 2019-08-26 DIAGNOSIS — F41.9 ANXIETY: ICD-10-CM

## 2019-08-26 RX ORDER — HYDROXYZINE HYDROCHLORIDE 25 MG/1
25 TABLET, FILM COATED ORAL NIGHTLY
Qty: 30 TABLET | Refills: 5 | Status: SHIPPED | OUTPATIENT
Start: 2019-08-26 | End: 2019-12-13

## 2019-08-26 NOTE — TELEPHONE ENCOUNTER
----- Message from Candy Abebe MA sent at 8/23/2019  3:03 PM CDT -----    Anitra Chaney would like a refill of the following medications:         hydrOXYzine HCl (ATARAX) 25 MG tablet [Stella Yen MD]     Preferred pharmacy: [id number] - [address]   Delivery method: Pickup

## 2019-09-23 ENCOUNTER — PATIENT OUTREACH (OUTPATIENT)
Dept: ADMINISTRATIVE | Facility: OTHER | Age: 39
End: 2019-09-23

## 2019-10-18 ENCOUNTER — HOSPITAL ENCOUNTER (EMERGENCY)
Facility: HOSPITAL | Age: 39
Discharge: HOME | End: 2019-10-18
Attending: EMERGENCY MEDICINE
Payer: COMMERCIAL

## 2019-10-18 VITALS
RESPIRATION RATE: 16 BRPM | HEART RATE: 91 BPM | WEIGHT: 240 LBS | DIASTOLIC BLOOD PRESSURE: 75 MMHG | HEIGHT: 69 IN | SYSTOLIC BLOOD PRESSURE: 135 MMHG | TEMPERATURE: 99 F | BODY MASS INDEX: 35.55 KG/M2 | OXYGEN SATURATION: 98 %

## 2019-10-18 DIAGNOSIS — S93.402A SPRAIN OF LEFT ANKLE, UNSPECIFIED LIGAMENT, INITIAL ENCOUNTER: Primary | ICD-10-CM

## 2019-10-18 DIAGNOSIS — S93.402A LEFT ANKLE SPRAIN: ICD-10-CM

## 2019-10-18 PROCEDURE — 99284 EMERGENCY DEPT VISIT MOD MDM: CPT | Mod: 25

## 2019-10-18 PROCEDURE — 25000003 PHARM REV CODE 250: Performed by: EMERGENCY MEDICINE

## 2019-10-18 RX ORDER — ACETAMINOPHEN 325 MG/1
TABLET ORAL
Status: DISCONTINUED
Start: 2019-10-18 | End: 2019-10-18 | Stop reason: HOSPADM

## 2019-10-18 RX ORDER — ACETAMINOPHEN 325 MG/1
650 TABLET ORAL
Status: COMPLETED | OUTPATIENT
Start: 2019-10-18 | End: 2019-10-18

## 2019-10-18 RX ORDER — ONDANSETRON 4 MG/1
4 TABLET, ORALLY DISINTEGRATING ORAL EVERY 6 HOURS PRN
Qty: 12 TABLET | Refills: 0 | Status: SHIPPED | OUTPATIENT
Start: 2019-10-18 | End: 2019-11-21 | Stop reason: SDUPTHER

## 2019-10-18 RX ORDER — TRAMADOL HYDROCHLORIDE 50 MG/1
50 TABLET ORAL EVERY 6 HOURS PRN
Qty: 12 TABLET | Refills: 0 | Status: SHIPPED | OUTPATIENT
Start: 2019-10-18 | End: 2019-12-13

## 2019-10-18 RX ADMIN — ACETAMINOPHEN 650 MG: 325 TABLET ORAL at 05:10

## 2019-10-18 NOTE — ED PROVIDER NOTES
"Encounter Date: 10/18/2019    SCRIBE #1 NOTE: I, Tonja Guzmán, am scribing for, and in the presence of, José Almanzar MD.       History     Chief Complaint   Patient presents with    Ankle Pain     left. reports twisted ankle PTA. previous surgeries to left ankle       Time seen by provider: 5:26 PM on 10/18/2019    Anitra Chaney is a 39 y.o. female with a PMHx of left ankle fracture who presents to the ED with an onset of left ankle pain s/p twisting her ankle while walking over uneven ground immediately PTA. Patient denies taking any medication for pain. The patient denies any other symptoms at this time. PSHx of left foot and ankle fracture surgery. Allergic to Ibuprofen and Tramadol.      The history is provided by the patient.     Review of patient's allergies indicates:   Allergen Reactions    Ibuprofen Nausea And Vomiting     Other reaction(s): gastric    Tramadol Nausea And Vomiting     Past Medical History:   Diagnosis Date    Anxiety and depression     DM2 (diabetes mellitus, type 2)     diet control    GERD (gastroesophageal reflux disease)     HTN (hypertension), benign 3/10/2014    Migraines     Obesity     PFO (patent foramen ovale)     found after stroke "too small/risky to close"    Polycystic disease, ovaries     Stroke 2017    Hospitalized at [facility]; given tPA     Past Surgical History:   Procedure Laterality Date    ANKLE FRACTURE SURGERY       SECTION N/A 2019    Procedure:  SECTION;  Surgeon: Gianna Flowers MD;  Location: [facility] L&D;  Service: OB/GYN;  Laterality: N/A;     SECTION, CLASSIC  2005/2009    x2    CHOLECYSTECTOMY  3/2002    lap maribel    FOOT SURGERY      gastric sleeve  2017     Family History   Problem Relation Age of Onset    Other Father         house fire    Breast cancer Neg Hx     Colon cancer Neg Hx     Ovarian cancer Neg Hx     Congenital heart disease Neg Hx     Pacemaker/defibrilator Neg Hx     " Early death Neg Hx      Social History     Tobacco Use    Smoking status: Current Every Day Smoker     Packs/day: 1.00     Years: 18.00     Pack years: 18.00     Types: Cigarettes    Smokeless tobacco: Never Used    Tobacco comment: cutting to 2 cigarettes per day   Substance Use Topics    Alcohol use: No     Alcohol/week: 0.0 standard drinks     Frequency: Never     Drinks per session: Patient refused     Binge frequency: Patient refused    Drug use: No     Review of Systems   Constitutional: Negative for fever.   HENT: Negative for sore throat.    Respiratory: Negative for shortness of breath.    Cardiovascular: Negative for chest pain.   Gastrointestinal: Negative for nausea.   Genitourinary: Negative for dysuria.   Musculoskeletal: Positive for arthralgias (left ankle). Negative for back pain.   Skin: Negative for rash.   Neurological: Negative for weakness.   Hematological: Does not bruise/bleed easily.       Physical Exam     Initial Vitals [10/18/19 1703]   BP Pulse Resp Temp SpO2   135/75 91 16 98.9 °F (37.2 °C) 98 %      MAP       --         Physical Exam   Nursing note and vitals reviewed.   Constitutional: Oriented to person, place, and time. Appears well-developed and well-nourished.   HENT:   Head: Normocephalic and atraumatic.   Eyes: EOM are normal.   Neck: Normal range of motion. Neck supple.   Cardiovascular: Normal rate.   Pulmonary/Chest: Effort normal. Clear BS b/l   Abdominal: Soft, Non tender, no distension.   Musculoskeletal:  There is old deformity to the left ankle with overlying scar tissue.  Mild swelling and bruising.  There is tenderness over the inferior portion of the left lateral malleolus.  2+ pulses. Normal sensation.  Normal range of motion.   Neurological:Alert and oriented to person, place, and time.   Psychiatric: Normal mood and affect. Behavior is normal.             ED Course   Procedures  Labs Reviewed - No data to display       Imaging Results          X-Ray Ankle  Complete Left (Final result)  Result time 10/18/19 17:40:42    Final result by Carmenza Alfaro MD (10/18/19 17:40:42)                 Impression:      The orthopedic hardware remains in place and intact and the appearance of the ankle not significantly changed compared to the prior exam      Electronically signed by: Carmenza Alfaro MD  Date:    10/18/2019  Time:    17:40             Narrative:    EXAMINATION:  XR ANKLE COMPLETE 3 VIEW LEFT    CLINICAL HISTORY:  Sprain of unspecified ligament of left ankle, initial encounter    TECHNIQUE:  AP, lateral and oblique views of the left ankle were performed.    COMPARISON:  5/31/2019    FINDINGS:  There are postoperative changes as before.  There is been resection of the distal fibula.  There are 3 orthopedic screws extending from talus to tibia across the tibiotalar joint.  There are 2 orthopedic screws laterally appearing to fixed what may have been distal fibula 1 extending into the tibia and the other into the talus.  Two orthopedic screws extend from the posterior aspect of the calcaneus toward the anterior aspect of the calcaneus.  The orthopedic hardware remains in place and intact and the appearance not significantly changed.  Joint space narrowing and degenerative change at the tibiotalar joint as before.                                 Medical Decision Making:   History:   Old Medical Records: I decided to obtain old medical records.  Initial Assessment:   This is an emergent evaluation for a patient complaining of ankle pain.    The patient appears to have an ankle sprain.   The patient's xrays show no signs of fracture, dislocation, or subluxation.  The patient could have a ligamentous injury, but the ankle doesn't appear to be unstable.   They will be treated with supportive care.  Offered brace her crutches but patient refuses.  She has a walking boot at home she will wear.  The patient may take Tylenol and tramadol for their pain.The patient will be  discharged home to follow up with their physician or the orthopedic doctor provided.     The results and physical exam findings were reviewed with the patient. Pt agrees with assessment, disposition and treatment plan and has no further questions or complaints at this time.    José Almanzar M.D. 8:17 PM 10/18/2019          Independently Interpreted Test(s):   I have ordered and independently interpreted X-rays - see summary below.  Clinical Tests:   Radiological Study: Ordered and Reviewed            Scribe Attestation:   Scribe #1: I performed the above scribed service and the documentation accurately describes the services I performed. I attest to the accuracy of the note.     I, Yohan Costa, personally performed the services described in this documentation. All medical record entries made by the scribe were at my direction and in my presence.  I have reviewed the chart and agree that the record reflects my personal performance and is accurate and complete. José Almanzar MD.             Clinical Impression:       ICD-10-CM ICD-9-CM   1. Left ankle sprain S93.402A 845.00         Disposition:   Disposition: Discharged  Condition: Stable                        José Almanzar MD  10/18/19 2019

## 2019-11-21 ENCOUNTER — OFFICE VISIT (OUTPATIENT)
Dept: INTERNAL MEDICINE | Facility: CLINIC | Age: 39
End: 2019-11-21
Attending: FAMILY MEDICINE
Payer: COMMERCIAL

## 2019-11-21 VITALS
DIASTOLIC BLOOD PRESSURE: 72 MMHG | BODY MASS INDEX: 38.14 KG/M2 | HEIGHT: 69 IN | SYSTOLIC BLOOD PRESSURE: 98 MMHG | HEART RATE: 90 BPM | OXYGEN SATURATION: 98 % | WEIGHT: 257.5 LBS

## 2019-11-21 DIAGNOSIS — M54.42 ACUTE LEFT-SIDED LOW BACK PAIN WITH BILATERAL SCIATICA: Primary | ICD-10-CM

## 2019-11-21 DIAGNOSIS — M54.41 ACUTE LEFT-SIDED LOW BACK PAIN WITH BILATERAL SCIATICA: Primary | ICD-10-CM

## 2019-11-21 DIAGNOSIS — R11.0 NAUSEA: ICD-10-CM

## 2019-11-21 DIAGNOSIS — M25.541 ARTHRALGIA OF BOTH HANDS: ICD-10-CM

## 2019-11-21 DIAGNOSIS — M79.10 MYALGIA: ICD-10-CM

## 2019-11-21 DIAGNOSIS — M25.542 ARTHRALGIA OF BOTH HANDS: ICD-10-CM

## 2019-11-21 PROCEDURE — 3074F PR MOST RECENT SYSTOLIC BLOOD PRESSURE < 130 MM HG: ICD-10-PCS | Mod: CPTII,S$GLB,, | Performed by: FAMILY MEDICINE

## 2019-11-21 PROCEDURE — 99999 PR PBB SHADOW E&M-EST. PATIENT-LVL III: CPT | Mod: PBBFAC,,, | Performed by: FAMILY MEDICINE

## 2019-11-21 PROCEDURE — 99214 OFFICE O/P EST MOD 30 MIN: CPT | Mod: S$GLB,,, | Performed by: FAMILY MEDICINE

## 2019-11-21 PROCEDURE — 3074F SYST BP LT 130 MM HG: CPT | Mod: CPTII,S$GLB,, | Performed by: FAMILY MEDICINE

## 2019-11-21 PROCEDURE — 3008F PR BODY MASS INDEX (BMI) DOCUMENTED: ICD-10-PCS | Mod: CPTII,S$GLB,, | Performed by: FAMILY MEDICINE

## 2019-11-21 PROCEDURE — 99214 PR OFFICE/OUTPT VISIT, EST, LEVL IV, 30-39 MIN: ICD-10-PCS | Mod: S$GLB,,, | Performed by: FAMILY MEDICINE

## 2019-11-21 PROCEDURE — 3078F PR MOST RECENT DIASTOLIC BLOOD PRESSURE < 80 MM HG: ICD-10-PCS | Mod: CPTII,S$GLB,, | Performed by: FAMILY MEDICINE

## 2019-11-21 PROCEDURE — 3008F BODY MASS INDEX DOCD: CPT | Mod: CPTII,S$GLB,, | Performed by: FAMILY MEDICINE

## 2019-11-21 PROCEDURE — 3078F DIAST BP <80 MM HG: CPT | Mod: CPTII,S$GLB,, | Performed by: FAMILY MEDICINE

## 2019-11-21 PROCEDURE — 99999 PR PBB SHADOW E&M-EST. PATIENT-LVL III: ICD-10-PCS | Mod: PBBFAC,,, | Performed by: FAMILY MEDICINE

## 2019-11-21 RX ORDER — ONDANSETRON 4 MG/1
4 TABLET, ORALLY DISINTEGRATING ORAL EVERY 6 HOURS PRN
Qty: 12 TABLET | Refills: 3 | Status: SHIPPED | OUTPATIENT
Start: 2019-11-21 | End: 2019-11-21 | Stop reason: SDUPTHER

## 2019-11-21 RX ORDER — DULOXETIN HYDROCHLORIDE 30 MG/1
30 CAPSULE, DELAYED RELEASE ORAL DAILY
Qty: 90 CAPSULE | Refills: 3 | Status: SHIPPED | OUTPATIENT
Start: 2019-11-21 | End: 2019-12-27

## 2019-11-21 RX ORDER — ONDANSETRON 4 MG/1
4 TABLET, ORALLY DISINTEGRATING ORAL EVERY 6 HOURS PRN
Qty: 12 TABLET | Refills: 3 | Status: SHIPPED | OUTPATIENT
Start: 2019-11-21 | End: 2019-12-27

## 2019-11-21 RX ORDER — HYDROCODONE BITARTRATE AND ACETAMINOPHEN 5; 325 MG/1; MG/1
1 TABLET ORAL EVERY 6 HOURS PRN
Qty: 20 TABLET | Refills: 0 | Status: SHIPPED | OUTPATIENT
Start: 2019-11-21 | End: 2019-11-27 | Stop reason: SDUPTHER

## 2019-11-21 RX ORDER — METHYLPREDNISOLONE 4 MG/1
TABLET ORAL
Qty: 21 TABLET | Refills: 0 | Status: SHIPPED | OUTPATIENT
Start: 2019-11-21 | End: 2019-12-13

## 2019-11-21 RX ORDER — CYCLOBENZAPRINE HCL 10 MG
10 TABLET ORAL 3 TIMES DAILY PRN
Qty: 20 TABLET | Refills: 0 | Status: SHIPPED | OUTPATIENT
Start: 2019-11-21 | End: 2019-12-01

## 2019-11-21 NOTE — PROGRESS NOTES
"CHIEF COMPLAINT:  2 weeks of left LBP in a patient with a history of diabetes hypertension and SARAH     HISTORY OF PRESENT ILLNESS: The patient is a pleasant 39 year-old white female .  The patient has a long psychiatric history.      She has 2 weeks of left LBP with jose proximal radiculopathy and no myelopathy.  Seen in outside ER and not getting better.  No trauma    She has a history of mood disorder.  She sees psychiatry.     REVIEW OF SYSTEMS:  GENERAL: No fever, chills, fatigability or weight loss.  SKIN: No rashes, itching or changes in color or texture of skin.  HEAD: No headaches or recent head trauma.  EYES: Visual acuity fine. No photophobia, ocular pain or diplopia.  EARS: Denies ear pain, discharge or vertigo.  NOSE: No loss of smell, no epistaxis or postnasal drip.  MOUTH & THROAT: No hoarseness or change in voice. No excessive gum bleeding.  NODES: Denies swollen glands.  CHEST: Denies OLIVARES, cyanosis, wheezing, cough and sputum production.  CARDIOVASCULAR: Denies PND, orthopnea or reduced exercise tolerance.  ABDOMEN: Appetite fine. No weight loss. Denies diarrhea, abdominal pain, hematemesis or blood in stool.  URINARY: No flank pain, dysuria or hematuria.  PERIPHERAL VASCULAR: No claudication or cyanosis.  MUSCULOSKELETAL: No joint stiffness or swelling.  Except as mentioned above  NEUROLOGIC: No history of seizures.  She was admitted for a TIA.    SOCIAL HISTORY: The patient does not smoke.  The patient consumes alcohol socially.  The patient is .  She works as a dispatcher for [employer].      PHYSICAL EXAMINATION:   Blood pressure 98/72, pulse 90, height 5' 9" (1.753 m), weight 116.8 kg (257 lb 8 oz), SpO2 98 %, not currently breastfeeding.      APPEARANCE: Well nourished, well developed, in no acute distress.    HEAD: Normocephalic, atraumatic.  EYES: PERRL. EOMI.  Conjunctivae without injection and  anicteric  EARS: TM's intact. Light reflex normal. No retraction or perforation.  "   NOSE: Mucosa pink. Airway clear.  MOUTH & THROAT: No tonsillar enlargement. No pharyngeal erythema or exudate. No stridor.  NECK: Supple.   NODES: No cervical, axillary or inguinal lymph node enlargement.  CHEST: Lungs clear to auscultation.  No retractions are noted.  No rales or rhonchi are present.  CARDIOVASCULAR: Normal S1, S2. No rubs, murmurs or gallops.  ABDOMEN: Bowel sounds normal. Not distended. Soft. No tenderness or masses.  No ascites is noted.  MUSCULOSKELETAL:  There is no clubbing, cyanosis, or edema of the extremities x4.  There is full range of motion of the lumbar spine.  There is full range of motion of the extremities x4.  There is no deformity noted.    NEUROLOGIC:       Normal speech development.      Hearing normal.      Normal gait.      Motor and sensory exams grossly normal.  PSYCHIATRIC: Patient is alert and oriented x3.  Thought processes are all normal.  There is no homicidality.  There is no suicidality.  There is no evidence of psychosis.    LABORATORY/RADIOLOGY:   Chart reviewed.     ASSESSMENT:   2 weeks of left LBP with jose proximal radiculopathy and no myelopathy    Obesity with a BMI of 35, status post gastric sleeve with substantial weight loss  SARAH / snoring improved with weight loss  HTN improved with weight loss    PLAN:  Norco Flexeril and Medrol Dosepak  cymbalta  Rheum referral  Lamictal  Patient has been by sleep medicine.  Continue current medications     Return to clinic in 6 months        Answers for HPI/ROS submitted by the patient on 11/15/2019   Back pain  Chronicity: chronic  Onset: 1 to 4 weeks ago  Frequency: constantly  Progression since onset: gradually worsening  Pain location: sacro-iliac  Pain quality: aching, cramping, stabbing  Radiates to: left thigh, right thigh  Pain - numeric: 7/10  Pain is: the same all the time  Aggravated by: bending, lying down, sitting, standing  Stiffness is present: all day  abdominal pain: No  bladder incontinence: No  bowel  incontinence: No  chest pain: No  dysuria: No  fever: No  headaches: Yes  leg pain: Yes  numbness: Yes  paresis: No  paresthesias: Yes  pelvic pain: No  perianal numbness: No  tingling: No  weakness: Yes  weight loss: No  genital pain: No  hematuria: No  Risk factors: obesity, poor posture, pregnancy  Pain severity: moderate  Treatments tried: analgesics, NSAIDs, bed rest  Improvement on treatment: no relief

## 2019-11-27 DIAGNOSIS — M54.41 ACUTE LEFT-SIDED LOW BACK PAIN WITH BILATERAL SCIATICA: ICD-10-CM

## 2019-11-27 DIAGNOSIS — M54.42 ACUTE LEFT-SIDED LOW BACK PAIN WITH BILATERAL SCIATICA: ICD-10-CM

## 2019-11-27 RX ORDER — HYDROCODONE BITARTRATE AND ACETAMINOPHEN 5; 325 MG/1; MG/1
1 TABLET ORAL EVERY 6 HOURS PRN
Qty: 20 TABLET | Refills: 0 | Status: SHIPPED | OUTPATIENT
Start: 2019-11-27 | End: 2019-12-03 | Stop reason: SDUPTHER

## 2019-12-03 DIAGNOSIS — M54.41 ACUTE LEFT-SIDED LOW BACK PAIN WITH BILATERAL SCIATICA: ICD-10-CM

## 2019-12-03 DIAGNOSIS — M54.42 ACUTE LEFT-SIDED LOW BACK PAIN WITH BILATERAL SCIATICA: ICD-10-CM

## 2019-12-04 ENCOUNTER — TELEPHONE (OUTPATIENT)
Dept: INTERNAL MEDICINE | Facility: CLINIC | Age: 39
End: 2019-12-04

## 2019-12-04 ENCOUNTER — PATIENT MESSAGE (OUTPATIENT)
Dept: FAMILY MEDICINE | Facility: CLINIC | Age: 39
End: 2019-12-04

## 2019-12-04 RX ORDER — HYDROCODONE BITARTRATE AND ACETAMINOPHEN 5; 325 MG/1; MG/1
1 TABLET ORAL EVERY 6 HOURS PRN
Qty: 10 TABLET | Refills: 0 | Status: SHIPPED | OUTPATIENT
Start: 2019-12-04 | End: 2019-12-13

## 2019-12-04 NOTE — TELEPHONE ENCOUNTER
We can send 10 pills in but she needs to see pain clinic for any further Norco refills and management of her symptoms.

## 2019-12-09 ENCOUNTER — PATIENT MESSAGE (OUTPATIENT)
Dept: FAMILY MEDICINE | Facility: CLINIC | Age: 39
End: 2019-12-09

## 2019-12-12 ENCOUNTER — PATIENT OUTREACH (OUTPATIENT)
Dept: ADMINISTRATIVE | Facility: OTHER | Age: 39
End: 2019-12-12

## 2019-12-13 ENCOUNTER — OFFICE VISIT (OUTPATIENT)
Dept: PAIN MEDICINE | Facility: CLINIC | Age: 39
End: 2019-12-13
Payer: COMMERCIAL

## 2019-12-13 ENCOUNTER — HOSPITAL ENCOUNTER (OUTPATIENT)
Dept: RADIOLOGY | Facility: HOSPITAL | Age: 39
Discharge: HOME | End: 2019-12-13
Attending: PAIN MEDICINE
Payer: COMMERCIAL

## 2019-12-13 ENCOUNTER — TELEPHONE (OUTPATIENT)
Dept: PAIN MEDICINE | Facility: CLINIC | Age: 39
End: 2019-12-13

## 2019-12-13 VITALS
WEIGHT: 257 LBS | SYSTOLIC BLOOD PRESSURE: 138 MMHG | BODY MASS INDEX: 37.95 KG/M2 | DIASTOLIC BLOOD PRESSURE: 75 MMHG | HEART RATE: 96 BPM

## 2019-12-13 DIAGNOSIS — M54.16 LUMBAR RADICULOPATHY: ICD-10-CM

## 2019-12-13 DIAGNOSIS — M54.41 CHRONIC BILATERAL LOW BACK PAIN WITH BILATERAL SCIATICA: ICD-10-CM

## 2019-12-13 DIAGNOSIS — R29.818 NEUROGENIC CLAUDICATION: ICD-10-CM

## 2019-12-13 DIAGNOSIS — M54.42 CHRONIC BILATERAL LOW BACK PAIN WITH BILATERAL SCIATICA: Primary | ICD-10-CM

## 2019-12-13 DIAGNOSIS — M54.41 CHRONIC BILATERAL LOW BACK PAIN WITH BILATERAL SCIATICA: Primary | ICD-10-CM

## 2019-12-13 DIAGNOSIS — G89.29 CHRONIC BILATERAL LOW BACK PAIN WITH BILATERAL SCIATICA: Primary | ICD-10-CM

## 2019-12-13 DIAGNOSIS — M54.42 CHRONIC BILATERAL LOW BACK PAIN WITH BILATERAL SCIATICA: ICD-10-CM

## 2019-12-13 DIAGNOSIS — G89.29 CHRONIC BILATERAL LOW BACK PAIN WITH BILATERAL SCIATICA: ICD-10-CM

## 2019-12-13 PROCEDURE — 99214 OFFICE O/P EST MOD 30 MIN: CPT | Mod: S$GLB,,, | Performed by: PAIN MEDICINE

## 2019-12-13 PROCEDURE — 72110 XR LUMBAR SPINE COMPLETE 5 VIEW: ICD-10-PCS | Mod: 26,,, | Performed by: RADIOLOGY

## 2019-12-13 PROCEDURE — 72110 X-RAY EXAM L-2 SPINE 4/>VWS: CPT | Mod: TC,PN

## 2019-12-13 PROCEDURE — 99999 PR PBB SHADOW E&M-EST. PATIENT-LVL III: CPT | Mod: PBBFAC,,, | Performed by: PAIN MEDICINE

## 2019-12-13 PROCEDURE — 72110 X-RAY EXAM L-2 SPINE 4/>VWS: CPT | Mod: 26,,, | Performed by: RADIOLOGY

## 2019-12-13 PROCEDURE — 99999 PR PBB SHADOW E&M-EST. PATIENT-LVL III: ICD-10-PCS | Mod: PBBFAC,,, | Performed by: PAIN MEDICINE

## 2019-12-13 PROCEDURE — 99214 PR OFFICE/OUTPT VISIT, EST, LEVL IV, 30-39 MIN: ICD-10-PCS | Mod: S$GLB,,, | Performed by: PAIN MEDICINE

## 2019-12-13 RX ORDER — GABAPENTIN 100 MG/1
100 CAPSULE ORAL 3 TIMES DAILY
Qty: 90 CAPSULE | Refills: 0 | Status: SHIPPED | OUTPATIENT
Start: 2019-12-13 | End: 2019-12-27

## 2019-12-13 NOTE — TELEPHONE ENCOUNTER
Spoke with pt in regards to being seen today. Pt stated she's having left shoulder pain and would like to be seen today. Pt is scheduled to see Dr. White today at 2:45 pm. Pt verbalized understanding and confirmed appt date and time.

## 2019-12-13 NOTE — TELEPHONE ENCOUNTER
----- Message from Neha Sanford sent at 12/13/2019 10:19 AM CST -----  Pt missed a call and would like the nurse Jane  to return their call at [phone].        Thank you!   No

## 2019-12-13 NOTE — LETTER
December 16, 2019      Tien Watts MD  [address]  [facility] [address]           [facility]  [address]  [facility] [address]  Phone: [phone]  Fax: [phone]          Patient: Anitra Chaney   MR Number: [id number]   YOB: 1980   Date of Visit: 12/13/2019       Dear Dr. Tien Watts:    Thank you for referring Anitra Chaney to me for evaluation. Attached you will find relevant portions of my assessment and plan of care.    If you have questions, please do not hesitate to call me. I look forward to following Anitra Chaney along with you.    Sincerely,    Hunter White Jr., MD    Enclosure  CC:  No Recipients    If you would like to receive this communication electronically, please contact [email or web address] or [phone] to request more information on [facility] [facility] [employer] access.    For providers and/or their staff who would like to refer a patient to [facility], please contact us through our one-stop-shop provider referral line, [facility], at [phone].    If you feel you have received this communication in error or would no longer like to receive these types of communications, please e-mail [email or web address]

## 2019-12-13 NOTE — PROGRESS NOTES
[facility] New Patient Evaluation    Referred by: Tien Watts MD   Reason for referral: Acute left-sided low back pain with bilateral sciatica     CC:   Chief Complaint   Patient presents with    Low-back Pain     Last 3 PDI Scores 2019   Pain Disability Index (PDI) 56       HPI:   Anitra Chaney is a 39 y.o. female who complains of chronic low back pain radiating into the anterior and lateral thigh, bilaterally.  Radiating pain is associated with numbness in the thighs.  Numbness and tingling in the anterolateral thigh increases with the length of time she has been standing or walking.  She states that her legs become weak in feeling jello at times.  The onset of her symptoms is associated with a complicated childbirth in 2019.  She has a history of gastric sleeve and significant weight loss.  Following a  for delivery of her child, blood extravasated and clotted in the dermal layers requiring surgical removal and prolonged use of a wound VAC.    Location: low back   Onset: 8 mos  Current Pain Score: 8/10  Daily Pain of Range: 6-8/10  Quality: Aching and Throbbing  Radiation: into both legs  Worsened by: sitting and standing  Improved by: medications    Previous Therapies:  PT/OT:  Denies physical therapy  HEP:  Denies home exercise  Interventions:  Denies interventional procedures for management of current symptoms  Surgery:  Denies back surgery.  Endorses history of gastric sleeve with significant weight loss; however, she has regained much that weight recently.  Medications:   - NSAIDS:   - MSK Relaxants:  Flexeril 10 mg  - TCAs:   - SNRIs:   - Topicals:   - Anticonvulsants:  - Opioids:     Current Pain Medications:  1. Duloxetine 30 mg daily     Review of Systems:  Review of Systems   Constitutional: Negative for chills and fever.   HENT: Negative for nosebleeds.    Eyes: Negative for blurred vision and pain.   Respiratory: Negative for hemoptysis.   "  Cardiovascular: Negative for chest pain and palpitations.   Gastrointestinal: Negative for heartburn, nausea and vomiting.   Genitourinary: Negative for dysuria and hematuria.   Musculoskeletal: Positive for back pain and myalgias.   Skin: Negative for rash.   Neurological: Positive for tingling and focal weakness (Bilateral leg). Negative for seizures and loss of consciousness.   Endo/Heme/Allergies: Does not bruise/bleed easily.   Psychiatric/Behavioral: Positive for depression. Negative for hallucinations and suicidal ideas. The patient is nervous/anxious and has insomnia.        History:    Current Outpatient Medications:     ondansetron (ZOFRAN-ODT) 4 MG TbDL, Dissolve 1 tablet (4 mg total) by mouth every 6 (six) hours as needed., Disp: 12 tablet, Rfl: 3    cyclobenzaprine (FLEXERIL) 10 MG tablet, Take 1 tablet (10 mg total) by mouth 3 (three) times daily as needed for Muscle spasms., Disp: 15 tablet, Rfl: 0    DULoxetine (CYMBALTA) 30 MG capsule, Take 1 capsule (30 mg total) by mouth once daily., Disp: 90 capsule, Rfl: 3  No current facility-administered medications for this visit.     Past Medical History:   Diagnosis Date    Anxiety and depression     DM2 (diabetes mellitus, type 2)     diet control    GERD (gastroesophageal reflux disease)     HTN (hypertension), benign 3/10/2014    Migraines     Obesity     PFO (patent foramen ovale)     found after stroke "too small/risky to close"    Polycystic disease, ovaries     Stroke 2017    Hospital[facility] at [facility]; given tPA       Past Surgical History:   Procedure Laterality Date    ANKLE FRACTURE SURGERY       SECTION N/A 2019    Procedure:  SECTION;  Surgeon: Gianna Flowers MD;  Location: [facility] L&D;  Service: OB/GYN;  Laterality: N/A;     SECTION, CLASSIC  2005/2009    x2    CHOLECYSTECTOMY  3/2002    lap maribel    FOOT SURGERY      gastric sleeve  2017       Family History   Problem Relation Age of Onset "    Other Father         house fire    Breast cancer Neg Hx     Colon cancer Neg Hx     Ovarian cancer Neg Hx     Congenital heart disease Neg Hx     Pacemaker/defibrilator Neg Hx     Early death Neg Hx        Social History     Socioeconomic History    Marital status:      Spouse name: Not on file    Number of children: Not on file    Years of education: Not on file    Highest education level: Not on file   Occupational History     Employer: [employer]   Social Needs    Financial resource strain: Hard    Food insecurity:     Worry: Sometimes true     Inability: Sometimes true    Transportation needs:     Medical: No     Non-medical: No   Tobacco Use    Smoking status: Current Every Day Smoker     Packs/day: 1.00     Years: 18.00     Pack years: 18.00     Types: Cigarettes    Smokeless tobacco: Never Used    Tobacco comment: cutting to 2 cigarettes per day   Substance and Sexual Activity    Alcohol use: No     Alcohol/week: 0.0 standard drinks     Frequency: Never     Drinks per session: Patient refused     Binge frequency: Patient refused    Drug use: No    Sexual activity: Yes     Partners: Male   Lifestyle    Physical activity:     Days per week: 0 days     Minutes per session: 0 min    Stress: Very much   Relationships    Social connections:     Talks on phone: Twice a week     Gets together: Never     Attends Religion service: Not on file     Active member of club or organization: No     Attends meetings of clubs or organizations: Never     Relationship status:    Other Topics Concern    Caffeine Use Not Asked    Financial Status: Disabled Not Asked    Legal: Involved in criminal litigation Not Asked    Caffeine Use: Frequent Not Asked    Financial Status: Employed Not Asked    Legal: Other Not Asked    Caffeine Use: Moderate Not Asked    Financial Status: Unemployed Not Asked    Leisure: Exercise Not Asked    Caffeine Use: Substantial Not Asked     Financial Status: Other Not Asked    Leisure: Fishing Not Asked    Childhood History: Adopted Not Asked    Firearms: Does patient have access to a firearm? Not Asked    Leisure: Hunting Not Asked    Childhood History: Early trauma Not Asked    Home situation: homeless Not Asked    Leisure: Movie Watching Not Asked    Childhood History: Raised by parents Not Asked    Home situation: lives alone Not Asked    Leisure: Shopping Not Asked    Childhood History: Uneventful Not Asked    Home situation: lives in group home Not Asked    Leisure: Sports Not Asked    Childhood History: Other Not Asked    Home situation: lives in nursing home Not Asked    Leisure: Time with family Not Asked    Education: Unfinished High School Not Asked    Home situation: lives in shelter Not Asked     Service Not Asked    Education: High School Graduate Not Asked    Home situation: lives with family Not Asked    Spirituality: Active Participation Not Asked    Education: Unfinished college Not Asked    Home situation: lives with friends Not Asked    Spirituality: Organized Roman Catholic Not Asked    Education: Trade School Not Asked    Home situation: lives with significant other Not Asked    Spirituality: Private Participation Not Asked    Education: Associate's Degree Not Asked    Home situation: lives with spouse Not Asked    Patient feels they ought to cut down on drinking/drug use Not Asked    Education: Bachelor's Degree Not Asked    Legal consequences of chemical use Not Asked    Patient annoyed by others criticizing their drinking/drug use Not Asked    Education: More than one Bachelor's or Professional Not Asked    Legal: Arrest history Not Asked    Patient has felt bad or guilty about drinking/drug use Not Asked    Education: Master's, PhD Not Asked    Legal: Involved in civil litigation Not Asked    Patient has had a drink/used drugs as an eye opener in the AM Not Asked   Social History  Narrative    Not on file       Review of patient's allergies indicates:   Allergen Reactions    Ibuprofen Nausea And Vomiting     Other reaction(s): gastric    Tramadol Nausea And Vomiting       Physical Exam:  Vitals:    19 1448   BP: 138/75   Pulse: 96   Weight: 116.6 kg (257 lb)   PainSc:   8     General    Nursing note and vitals reviewed.  Constitutional: She is oriented to person, place, and time. She appears well-developed and well-nourished. No distress.   HENT:   Head: Normocephalic and atraumatic.   Nose: Nose normal.   Eyes: Conjunctivae and EOM are normal. Pupils are equal, round, and reactive to light. Right eye exhibits no discharge. Left eye exhibits no discharge. No scleral icterus.   Neck: No JVD present.   Cardiovascular: Intact distal pulses.    Pulmonary/Chest: Effort normal. No respiratory distress.   Abdominal: She exhibits no distension.   Neurological: She is alert and oriented to person, place, and time. Coordination normal.   Psychiatric: She has a normal mood and affect. Her behavior is normal. Judgment and thought content normal.     General Musculoskeletal Exam   Gait: normal     Back (L-Spine & T-Spine) / Neck (C-Spine) Exam     Tenderness Right paramedian tenderness of the Lower L-Spine. Left paramedian tenderness of the Lower L-Spine.     Back (L-Spine & T-Spine) Range of Motion   Back extension: facet loading is positive and exacerabtes/reproduces the patient's typical low back pain    Back flexion: limited ROM but partial relief of low back pain noted.     Spinal Sensation   Right Side Sensation  L-Spine Level: normal  Left Side Sensation  L-Spine Level: normal    Other She has no scoliosis .      Muscle Strength   Right Lower Extremity   Hip Flexion: 5/5   Hip Extensors: 5/5  Quadriceps:  5/5   Hamstrin/5   Gastrocsoleus:  5/5/5  Left Lower Extremity   Hip Flexion: 5/5   Hip Extensors: 5/5  Quadriceps:  5/5   Hamstrin/5   Gastrocsoleus:  5/5/5    Reflexes      Left Side  Quadriceps:  2+  Achilles:  2+    Right Side   Quadriceps:  2+  Achilles:  2+      Imaging:  No relevant imaging is available to review    Labs:  BMP  Lab Results   Component Value Date     2019    K 4.7 2019     2019    CO2 20 (L) 2019    BUN 10 2019    CREATININE 0.7 2019    CALCIUM 9.4 2019    ANIONGAP 11 2019    ESTGFRAFRICA >60 2019    EGFRNONAA >60 2019     Lab Results   Component Value Date    ALT 13 2019    AST 25 2019    ALKPHOS 61 2019    BILITOT 0.4 2019       Assessment:  Problem List Items Addressed This Visit     Neurogenic claudication    Relevant Medications    gabapentin (NEURONTIN) 100 MG capsule    Other Relevant Orders    Ambulatory Referral to Physical/Occupational Therapy    X-Ray Lumbar Spine Complete 5 View    MRI Lumbar Spine Without Contrast    Lumbar radiculopathy    Relevant Medications    gabapentin (NEURONTIN) 100 MG capsule    Other Relevant Orders    Ambulatory Referral to Physical/Occupational Therapy    X-Ray Lumbar Spine Complete 5 View    MRI Lumbar Spine Without Contrast    Chronic bilateral low back pain with bilateral sciatica - Primary    Relevant Medications    gabapentin (NEURONTIN) 100 MG capsule    Other Relevant Orders    Ambulatory Referral to Physical/Occupational Therapy    X-Ray Lumbar Spine Complete 5 View    MRI Lumbar Spine Without Contrast          2019 - Anitra Chaeny is a 39 y.o. female who reports symptoms consistent with lumbar neurogenic claudication, lumbar radiculopathy, and myofascial pain. She has a history of being diagnosed with fibromyalgia and was prescribed Cymbalta for this, which she continues to take.  Onset of her low back pain is associated with delivery of her son via  in 2019 which was complicated by a large dermal hematoma in the excess tissue left behind from rapid weight loss following gastric bypass  (or gastric sleeve) surgery.  Since that time she has been very sedentary and was never referred to, or attended, physical therapy for rehabilitation of her body.  She reports having [place] 4 years ago which showed a significant disc bulge at L5-S1. Her current symptoms of neurogenic claudication symptoms are affecting the L3 and L4 dermatome bilaterally.  Repeat MRI to understand her underlying pathology and referral to PT for core rehabilitation will be our first steps.    : Reviewed and consistent with medication use as prescribed.    Treatment Plan:   Procedures: None pending MRI.  Consider LC.  PT/OT/HEP: Referral placed today for PT to strengthen back and core toward restoring function  Medications: No changes recommended at this time.  Labs: reviewed and medications are appropriately dosed for current hepatorenal function.  Imaging: No additional recommended at this time.    Follow Up: RTC 2 weeks to discuss MRI results and treatment plan    Hunter White Jr, MD  Interventional Pain Medicine / Anesthesiology    Disclaimer: This note was partly generated using dictation software which may occasionally result in transcription errors.

## 2019-12-13 NOTE — TELEPHONE ENCOUNTER
Returned call to patient and left detailed message informing her that  is not at this location today and she must see him since she is a new patient to pain management. I informed patient that I would leave her appointment on 12/16/19 with , but if she needs to reschedule to please give us a call and we would be glad to do so.       ----- Message from Franchesca Nair sent at 12/13/2019  9:29 AM CST -----  Contact: self [phone]  Patient has an appointment on Monday but would like to be seen today. Please call and advise.

## 2019-12-18 ENCOUNTER — PATIENT MESSAGE (OUTPATIENT)
Dept: OBSTETRICS AND GYNECOLOGY | Facility: CLINIC | Age: 39
End: 2019-12-18

## 2019-12-19 ENCOUNTER — TELEPHONE (OUTPATIENT)
Dept: OBSTETRICS AND GYNECOLOGY | Facility: CLINIC | Age: 39
End: 2019-12-19

## 2019-12-19 ENCOUNTER — PATIENT MESSAGE (OUTPATIENT)
Dept: OBSTETRICS AND GYNECOLOGY | Facility: CLINIC | Age: 39
End: 2019-12-19

## 2019-12-20 ENCOUNTER — TELEPHONE (OUTPATIENT)
Dept: OBSTETRICS AND GYNECOLOGY | Facility: CLINIC | Age: 39
End: 2019-12-20

## 2019-12-20 NOTE — TELEPHONE ENCOUNTER
----- Message from Evette Urbina sent at 12/20/2019 11:10 AM CST -----  Contact: Self      ----- Message -----  From: Lenny Rojas  Sent: 12/20/2019  11:01 AM CST  To: Evette Urbina      Can the clinic reply in [facility]:     Who Called: HILLARY TYLER [[id number]]    Date of Positive Preg Test: 12/19/19    1st day of Last Menstrual Cycle: Unsure Oct or Nov    List Any Difficulties: None    What Number to Call Back: [phone]

## 2019-12-24 RX ORDER — CYCLOBENZAPRINE HCL 10 MG
10 TABLET ORAL
COMMUNITY
Start: 2019-11-04 | End: 2019-12-27

## 2019-12-27 ENCOUNTER — PATIENT MESSAGE (OUTPATIENT)
Dept: OBSTETRICS AND GYNECOLOGY | Facility: CLINIC | Age: 39
End: 2019-12-27

## 2019-12-27 ENCOUNTER — HOSPITAL ENCOUNTER (EMERGENCY)
Facility: OTHER | Age: 39
Discharge: HOME | End: 2019-12-27
Attending: EMERGENCY MEDICINE
Payer: COMMERCIAL

## 2019-12-27 VITALS
OXYGEN SATURATION: 98 % | TEMPERATURE: 99 F | SYSTOLIC BLOOD PRESSURE: 136 MMHG | HEART RATE: 88 BPM | DIASTOLIC BLOOD PRESSURE: 88 MMHG | RESPIRATION RATE: 18 BRPM | WEIGHT: 245 LBS | BODY MASS INDEX: 36.29 KG/M2 | HEIGHT: 69 IN

## 2019-12-27 DIAGNOSIS — R11.0 NAUSEA: Primary | ICD-10-CM

## 2019-12-27 DIAGNOSIS — Z34.91 FIRST TRIMESTER PREGNANCY: ICD-10-CM

## 2019-12-27 DIAGNOSIS — O23.41 URINARY TRACT INFECTION IN MOTHER DURING FIRST TRIMESTER OF PREGNANCY: ICD-10-CM

## 2019-12-27 LAB
B-HCG UR QL: POSITIVE
BACTERIA #/AREA URNS HPF: ABNORMAL /HPF
BILIRUB UR QL STRIP: NEGATIVE
CLARITY UR: ABNORMAL
COLOR UR: YELLOW
CTP QC/QA: YES
GLUCOSE UR QL STRIP: NEGATIVE
HGB UR QL STRIP: NEGATIVE
KETONES UR QL STRIP: NEGATIVE
LEUKOCYTE ESTERASE UR QL STRIP: ABNORMAL
MICROSCOPIC COMMENT: ABNORMAL
NITRITE UR QL STRIP: NEGATIVE
PH UR STRIP: 6 [PH] (ref 5–8)
PROT UR QL STRIP: NEGATIVE
SP GR UR STRIP: >=1.03 (ref 1–1.03)
SQUAMOUS #/AREA URNS HPF: 45 /HPF
URN SPEC COLLECT METH UR: ABNORMAL
UROBILINOGEN UR STRIP-ACNC: NEGATIVE EU/DL
WBC #/AREA URNS HPF: 12 /HPF (ref 0–5)

## 2019-12-27 PROCEDURE — 99284 EMERGENCY DEPT VISIT MOD MDM: CPT

## 2019-12-27 PROCEDURE — 81025 URINE PREGNANCY TEST: CPT | Performed by: PHYSICIAN ASSISTANT

## 2019-12-27 PROCEDURE — 25000003 PHARM REV CODE 250: Performed by: EMERGENCY MEDICINE

## 2019-12-27 PROCEDURE — 81000 URINALYSIS NONAUTO W/SCOPE: CPT

## 2019-12-27 RX ORDER — NITROFURANTOIN 25; 75 MG/1; MG/1
100 CAPSULE ORAL
Status: COMPLETED | OUTPATIENT
Start: 2019-12-27 | End: 2019-12-27

## 2019-12-27 RX ORDER — PYRIDOXINE HCL (VITAMIN B6) 25 MG
25 TABLET ORAL EVERY 6 HOURS PRN
Qty: 30 TABLET | Refills: 0 | Status: SHIPPED | OUTPATIENT
Start: 2019-12-27 | End: 2020-01-07

## 2019-12-27 RX ORDER — METOCLOPRAMIDE 10 MG/1
10 TABLET ORAL
Status: COMPLETED | OUTPATIENT
Start: 2019-12-27 | End: 2019-12-27

## 2019-12-27 RX ORDER — NITROFURANTOIN 25; 75 MG/1; MG/1
100 CAPSULE ORAL 2 TIMES DAILY
Qty: 10 CAPSULE | Refills: 0 | Status: SHIPPED | OUTPATIENT
Start: 2019-12-27 | End: 2020-01-01

## 2019-12-27 RX ORDER — METOCLOPRAMIDE 10 MG/1
10 TABLET ORAL EVERY 6 HOURS PRN
Qty: 10 TABLET | Refills: 0 | Status: SHIPPED | OUTPATIENT
Start: 2019-12-27 | End: 2020-01-07

## 2019-12-27 RX ORDER — ONDANSETRON 4 MG/1
4 TABLET, ORALLY DISINTEGRATING ORAL EVERY 8 HOURS
Qty: 60 TABLET | Refills: 3 | Status: SHIPPED | OUTPATIENT
Start: 2019-12-27 | End: 2020-02-05

## 2019-12-27 RX ADMIN — NITROFURANTOIN (MONOHYDRATE/MACROCRYSTALS) 100 MG: 75; 25 CAPSULE ORAL at 09:12

## 2019-12-27 RX ADMIN — METOCLOPRAMIDE HYDROCHLORIDE 10 MG: 10 TABLET ORAL at 09:12

## 2019-12-28 NOTE — ED TRIAGE NOTES
Patient presents to ER with c/o nausea, lower back pain and a headache for several days.  Patient reports having a positive UPT at home.  Patient denies abdominal pain and vaginal bleeding.

## 2019-12-28 NOTE — ED PROVIDER NOTES
"Encounter Date: 2019    SCRIBE #1 NOTE: I, Stephany Mandel, am scribing for, and in the presence of, Dr. Warren.       History     Chief Complaint   Patient presents with    Nausea     Pt pregnant, unsure haw far along she is; + nausea, HA, and lower back pain x several days.      Time seen by provider: 9:20 PM    Patient presents with complaint of several days of aching lower back pain, headache, and nausea. The patient reports one episode of emesis yesterday but none today. She denies associated fever, diarrhea, dysuria, vaginal bleeding, or vaginal discharge. The patient reports taking Tylenol 6 hours ago and has had some relief in back pain. The patient reports medical hx of migraines and her last one was 9 months ago. She states she would take Fioricet for her migraines in the past but her current HA is not severe enough for her to take Fioricet now. The patient reports she is pregnant but is unsure of how far along she is. She states her normal menstrual periods are irregular and her last one was at the end of September. The patient reports she has an OBGYN appointment on 2020 with Dr. Yen. The patient reports she had a UTI two months ago. She reports medical hx of stroke in 2017.    The history is provided by the patient and medical records.     Review of patient's allergies indicates:   Allergen Reactions    Ibuprofen Nausea And Vomiting     Other reaction(s): gastric    Tramadol Nausea And Vomiting     Past Medical History:   Diagnosis Date    Anxiety and depression     DM2 (diabetes mellitus, type 2)     diet control    GERD (gastroesophageal reflux disease)     HTN (hypertension), benign 3/10/2014    Migraines     Obesity     PFO (patent foramen ovale)     found after stroke "too small/risky to close"    Polycystic disease, ovaries     Stroke 2017    Hospitalized at [facility]; given tPA     Past Surgical History:   Procedure Laterality Date    ANKLE FRACTURE SURGERY       " SECTION N/A 2019    Procedure:  SECTION;  Surgeon: Gianna Flowers MD;  Location: [facility] L&D;  Service: OB/GYN;  Laterality: N/A;     SECTION, CLASSIC  2005/2009    x2    CHOLECYSTECTOMY  3/2002    lap maribel    FOOT SURGERY      gastric sleeve  2017     Family History   Problem Relation Age of Onset    Other Father         house fire    Breast cancer Neg Hx     Colon cancer Neg Hx     Ovarian cancer Neg Hx     Congenital heart disease Neg Hx     Pacemaker/defibrilator Neg Hx     Early death Neg Hx      Social History     Tobacco Use    Smoking status: Current Every Day Smoker     Packs/day: 1.00     Years: 18.00     Pack years: 18.00     Types: Cigarettes    Smokeless tobacco: Never Used    Tobacco comment: cutting to 2 cigarettes per day   Substance Use Topics    Alcohol use: No     Alcohol/week: 0.0 standard drinks     Frequency: Never     Drinks per session: Patient refused     Binge frequency: Patient refused    Drug use: No     Review of Systems   All other systems reviewed and are negative.  ROS: As per HPI and below:   General: No fever.   HENT: No facial pain.   Eyes: Negative for eye pain.   Cardiovascular: No chest pain.   Respiratory:  No dyspnea.   GI: Nausea. Vomiting. No abdominal pain. No diarrhea.   : No dysuria. No vaginal discharge. No vaginal bleeding.  Skin: No rashes.   Neuro:  Headache. No syncope.  No focal deficits.   Musculoskeletal: Lower back pain.  All other systems reviewed and are negative.     Physical Exam     Initial Vitals [19]   BP Pulse Resp Temp SpO2   (!) 142/100 95 16 99.9 °F (37.7 °C) 99 %      MAP       --         Physical Exam    Nursing note and vitals reviewed.    Nursing note and vitals reviewed.  Constitutional: Morbidly obese. AAOx3. Well-developed and well-nourished. No distress.  HENT:   Mouth/Throat: Oropharynx is clear and moist.  Eyes: Pupils are equal, round, and reactive to light. No discharge.  Anicteric.  Neck: Normal range of motion. Neck supple. No midline spinal tenderness.  Cardiovascular: Normal rate.  Pulmonary/Chest: Effort normal.  Abdominal: Soft. Bowel sounds normal. No distension and no mass. There is no tenderness. There is no rebound, no guarding.  Musculoskeletal: Normal range of motion. No midline spinal tenderness. No stepoffs or deformities.  paraspinal tenderness and spasm.   Neurological: Alert and oriented to person, place, and time. No gross cranial nerve deficit. Coordination normal. No UE/LE light  touch or strength deficits. Normal gait.   Skin: Skin is warm and dry.   Ext: 2+ radial pulses  Psychiatric: Eco-manic. Very pleasant. Behavior is normal. Judgment normal.    ED Course   Procedures  Labs Reviewed   URINALYSIS - Abnormal; Notable for the following components:       Result Value    Appearance, UA Hazy (*)     Specific Gravity, UA >=1.030 (*)     Leukocytes, UA 1+ (*)     All other components within normal limits   URINALYSIS MICROSCOPIC - Abnormal; Notable for the following components:    WBC, UA 12 (*)     Bacteria Moderate (*)     All other components within normal limits   POCT URINE PREGNANCY - Abnormal; Notable for the following components:    POC Preg Test, Ur Positive (*)     All other components within normal limits          Imaging Results    None          Medical Decision Making:   History:   Old Medical Records: I decided to obtain old medical records.  Differential Diagnosis:   Hyperemesis gravidarum, dehydration, gross metabolic abnormality, UTI, pyleonephritis  Clinical Tests:   Lab Tests: Ordered and Reviewed            Scribe Attestation:   Scribe #1: I performed the above scribed service and the documentation accurately describes the services I performed. I attest to the accuracy of the note.    Attending Attestation:           Physician Attestation for Scribe:  Physician Attestation Statement for Scribe #1: I, Dr. Warren, reviewed documentation, as scribed  by Stephany Mandel in my presence, and it is both accurate and complete.                 ED Course as of Dec 28 1926   Fri Dec 27, 2019   1955 Anitra Chaney, 39 y.o.  presented to the ED complaining of nausea and fatigue and lower back pain with headace. She reports that her LMP was sometime in September. She denies vaginal bleeding or abdominal pain. She denies vaginal discharge or dysuria. She has not been taking any meds PTA. Her OB/GYN is Dr. Mooney. UPT, UA, fetal heart tones pending. Benign abdomen on exam.     Patient seen and medically screened by myself in the Provider in Triage Sort system due to ED crowding.  Appropriate tests and/or medications ordered.  A medical screening exam has been performed.  The care will be assumed by myself or another provider when treatment space becomes available.  I am not assuming care of this patient at this time. 7:55 PM. FIORELLA BUSTOS        [AM]   2121 I independently interpreted and reviewed the patient's labs notable for pyuria, positive urine pregnancy test, bacteriuria.  I doubt pyelonephritis, renal stones.  We will give the patient metoclopramide for her nausea and headache, as well as initial dose nitrofurantoin for her bacteriuria.    [RC]   2232 Patient reports resolution of her symptoms, was able to tolerate oral intake.  She states that she feels back at baseline.  I discussed with patient and/or guardian/caretaker that this evaluation in the ED does not suggest any emergent or life threatening medical condition requiring admission or immediate intervention beyond what was provided in the ED. Regardless, an unremarkable evaluation in the ED does not preclude the development or presence of a serious or life threatening condition. As such, patient was instructed to return immediately for any worsening or change in current symptoms.     I had a detailed discussion with patient  and/or guardian/caretaker regarding findings, plan, return precautions,  importance of medication adherence, need to follow-up with a PCP and specialist. All questions answered.     Note was created using voice recognition software. It may have occasional typographical errors not identified and edited despite initial review prior to signing.          [RC]      ED Course User Index  [AM] Sandy Flowers PA-C  [RC] Marko Warren MD                Clinical Impression:     1. Nausea    2. First trimester pregnancy    3. Urinary tract infection in mother during first trimester of pregnancy                                Marko Warren MD  12/28/19 1924

## 2019-12-28 NOTE — DISCHARGE INSTRUCTIONS
Call your primary care doctor to make the first available appointment.     Keep all your medical appointments.     Take your regular medication as prescribed. Contact your primary care provider before running out of medication, or for any problems obtaining them.    Do not drive or operate heavy machinery while taking opioid or muscle relaxing medications. Examples include norco, percocet, xanax, valium, flexeril.     Overuse or long term use of pain and sedating medication may lead to addiction, dependence, organ failure, family and work problems, legal problems, accidental overdose and death.    If you do not have health insurance, you probably qualify for heavily discounted rates:  Call [phone] ([facility] hotline) or go to www.[employer].[place].gov    Your evaluation in the ED does not suggest any emergent or life threatening medical condition requiring admission or immediate intervention beyond that provided in the ED.   However, the signs of a serious problem sometimes take more time to appear.   RETURN TO THE ER if any of the following occur:    Weakness, dizziness, fainting, or loss of consciousness   Fever of 100.4ºF (38ºC) or higher  Any worse symptoms  Any new or concerning symptoms

## 2020-01-05 ENCOUNTER — HOSPITAL ENCOUNTER (EMERGENCY)
Facility: OTHER | Age: 40
Discharge: HOME | End: 2020-01-05
Attending: EMERGENCY MEDICINE
Payer: COMMERCIAL

## 2020-01-05 VITALS
DIASTOLIC BLOOD PRESSURE: 85 MMHG | BODY MASS INDEX: 35.07 KG/M2 | HEART RATE: 71 BPM | WEIGHT: 245 LBS | SYSTOLIC BLOOD PRESSURE: 133 MMHG | OXYGEN SATURATION: 100 % | RESPIRATION RATE: 18 BRPM | TEMPERATURE: 98 F | HEIGHT: 70 IN

## 2020-01-05 DIAGNOSIS — O26.891 ABDOMINAL PAIN DURING PREGNANCY IN FIRST TRIMESTER: Primary | ICD-10-CM

## 2020-01-05 DIAGNOSIS — R10.9 ABDOMINAL PAIN DURING PREGNANCY IN FIRST TRIMESTER: Primary | ICD-10-CM

## 2020-01-05 DIAGNOSIS — R10.9 ABDOMINAL PAIN IN PREGNANCY: ICD-10-CM

## 2020-01-05 DIAGNOSIS — O26.899 ABDOMINAL PAIN IN PREGNANCY: ICD-10-CM

## 2020-01-05 LAB
ALBUMIN SERPL BCP-MCNC: 3.4 G/DL (ref 3.5–5.2)
ALP SERPL-CCNC: 63 U/L (ref 55–135)
ALT SERPL W/O P-5'-P-CCNC: 11 U/L (ref 10–44)
ANION GAP SERPL CALC-SCNC: 10 MMOL/L (ref 8–16)
AST SERPL-CCNC: 9 U/L (ref 10–40)
B-HCG UR QL: POSITIVE
BASOPHILS # BLD AUTO: 0.05 K/UL (ref 0–0.2)
BASOPHILS NFR BLD: 0.5 % (ref 0–1.9)
BILIRUB SERPL-MCNC: <0.1 MG/DL (ref 0.1–1)
BILIRUB UR QL STRIP: NEGATIVE
BUN SERPL-MCNC: 8 MG/DL (ref 6–20)
CALCIUM SERPL-MCNC: 9.1 MG/DL (ref 8.7–10.5)
CHLORIDE SERPL-SCNC: 109 MMOL/L (ref 95–110)
CLARITY UR: CLEAR
CO2 SERPL-SCNC: 19 MMOL/L (ref 23–29)
COLOR UR: YELLOW
CREAT SERPL-MCNC: 0.6 MG/DL (ref 0.5–1.4)
CTP QC/QA: YES
DIFFERENTIAL METHOD: ABNORMAL
EOSINOPHIL # BLD AUTO: 0.2 K/UL (ref 0–0.5)
EOSINOPHIL NFR BLD: 1.6 % (ref 0–8)
ERYTHROCYTE [DISTWIDTH] IN BLOOD BY AUTOMATED COUNT: 18.1 % (ref 11.5–14.5)
EST. GFR  (AFRICAN AMERICAN): >60 ML/MIN/1.73 M^2
EST. GFR  (NON AFRICAN AMERICAN): >60 ML/MIN/1.73 M^2
GLUCOSE SERPL-MCNC: 93 MG/DL (ref 70–110)
GLUCOSE UR QL STRIP: NEGATIVE
HCG INTACT+B SERPL-ACNC: NORMAL MIU/ML
HCT VFR BLD AUTO: 30.8 % (ref 37–48.5)
HGB BLD-MCNC: 9.5 G/DL (ref 12–16)
HGB UR QL STRIP: NEGATIVE
IMM GRANULOCYTES # BLD AUTO: 0.05 K/UL (ref 0–0.04)
IMM GRANULOCYTES NFR BLD AUTO: 0.5 % (ref 0–0.5)
KETONES UR QL STRIP: NEGATIVE
LEUKOCYTE ESTERASE UR QL STRIP: NEGATIVE
LYMPHOCYTES # BLD AUTO: 2.5 K/UL (ref 1–4.8)
LYMPHOCYTES NFR BLD: 23.3 % (ref 18–48)
MCH RBC QN AUTO: 21.7 PG (ref 27–31)
MCHC RBC AUTO-ENTMCNC: 30.8 G/DL (ref 32–36)
MCV RBC AUTO: 71 FL (ref 82–98)
MONOCYTES # BLD AUTO: 0.5 K/UL (ref 0.3–1)
MONOCYTES NFR BLD: 4.9 % (ref 4–15)
NEUTROPHILS # BLD AUTO: 7.5 K/UL (ref 1.8–7.7)
NEUTROPHILS NFR BLD: 69.2 % (ref 38–73)
NITRITE UR QL STRIP: NEGATIVE
NRBC BLD-RTO: 0 /100 WBC
PH UR STRIP: 7 [PH] (ref 5–8)
PLATELET # BLD AUTO: 325 K/UL (ref 150–350)
PMV BLD AUTO: 8.9 FL (ref 9.2–12.9)
POTASSIUM SERPL-SCNC: 3.5 MMOL/L (ref 3.5–5.1)
PROT SERPL-MCNC: 6.9 G/DL (ref 6–8.4)
PROT UR QL STRIP: NEGATIVE
RBC # BLD AUTO: 4.37 M/UL (ref 4–5.4)
SODIUM SERPL-SCNC: 138 MMOL/L (ref 136–145)
SP GR UR STRIP: 1.02 (ref 1–1.03)
URN SPEC COLLECT METH UR: NORMAL
UROBILINOGEN UR STRIP-ACNC: NEGATIVE EU/DL
WBC # BLD AUTO: 10.82 K/UL (ref 3.9–12.7)

## 2020-01-05 PROCEDURE — 85025 COMPLETE CBC W/AUTO DIFF WBC: CPT

## 2020-01-05 PROCEDURE — 36000 PLACE NEEDLE IN VEIN: CPT

## 2020-01-05 PROCEDURE — 81025 URINE PREGNANCY TEST: CPT | Performed by: PHYSICIAN ASSISTANT

## 2020-01-05 PROCEDURE — 99284 EMERGENCY DEPT VISIT MOD MDM: CPT | Mod: 25

## 2020-01-05 PROCEDURE — 84702 CHORIONIC GONADOTROPIN TEST: CPT

## 2020-01-05 PROCEDURE — 81003 URINALYSIS AUTO W/O SCOPE: CPT

## 2020-01-05 PROCEDURE — 63600175 PHARM REV CODE 636 W HCPCS: Performed by: PHYSICIAN ASSISTANT

## 2020-01-05 PROCEDURE — 80053 COMPREHEN METABOLIC PANEL: CPT

## 2020-01-05 PROCEDURE — 25000003 PHARM REV CODE 250: Performed by: PHYSICIAN ASSISTANT

## 2020-01-05 RX ORDER — ACETAMINOPHEN 500 MG
1000 TABLET ORAL
Status: COMPLETED | OUTPATIENT
Start: 2020-01-05 | End: 2020-01-05

## 2020-01-05 RX ADMIN — SODIUM CHLORIDE 1000 ML: 0.9 INJECTION, SOLUTION INTRAVENOUS at 08:01

## 2020-01-05 RX ADMIN — ACETAMINOPHEN 1000 MG: 500 TABLET ORAL at 09:01

## 2020-01-06 ENCOUNTER — PATIENT MESSAGE (OUTPATIENT)
Dept: OBSTETRICS AND GYNECOLOGY | Facility: CLINIC | Age: 40
End: 2020-01-06

## 2020-01-06 ENCOUNTER — PATIENT OUTREACH (OUTPATIENT)
Dept: ADMINISTRATIVE | Facility: OTHER | Age: 40
End: 2020-01-06

## 2020-01-06 NOTE — ED TRIAGE NOTES
Patient presents to ER with c/o abdominal cramping and lower back pain for 2 days.  Pain 7/10.  Patient states she is pregnant and don't know how far along.  Patient states she has an appt with her ob gyn on Tuesday.  Patient denies vaginal bleeding.

## 2020-01-07 ENCOUNTER — OFFICE VISIT (OUTPATIENT)
Dept: OBSTETRICS AND GYNECOLOGY | Facility: CLINIC | Age: 40
End: 2020-01-07
Payer: COMMERCIAL

## 2020-01-07 ENCOUNTER — APPOINTMENT (OUTPATIENT)
Dept: LAB | Facility: OTHER | Age: 40
End: 2020-01-07
Attending: OBSTETRICS & GYNECOLOGY
Payer: COMMERCIAL

## 2020-01-07 VITALS
SYSTOLIC BLOOD PRESSURE: 110 MMHG | WEIGHT: 263.25 LBS | HEIGHT: 70 IN | BODY MASS INDEX: 37.69 KG/M2 | DIASTOLIC BLOOD PRESSURE: 76 MMHG

## 2020-01-07 DIAGNOSIS — Z98.84 PERSONAL HISTORY OF GASTRIC BYPASS: ICD-10-CM

## 2020-01-07 DIAGNOSIS — Z86.73 HISTORY OF TIA (TRANSIENT ISCHEMIC ATTACK): ICD-10-CM

## 2020-01-07 DIAGNOSIS — O34.211 ENCOUNTER FOR MATERNAL CARE FOR LOW TRANSVERSE SCAR FROM REPEAT CESAREAN DELIVERY: ICD-10-CM

## 2020-01-07 DIAGNOSIS — Z87.19 HISTORY OF GI BLEED: ICD-10-CM

## 2020-01-07 DIAGNOSIS — E66.01 CLASS 2 SEVERE OBESITY DUE TO EXCESS CALORIES WITH SERIOUS COMORBIDITY AND BODY MASS INDEX (BMI) OF 37.0 TO 37.9 IN ADULT: ICD-10-CM

## 2020-01-07 DIAGNOSIS — Z32.01 POSITIVE PREGNANCY TEST: ICD-10-CM

## 2020-01-07 DIAGNOSIS — I10 ESSENTIAL HYPERTENSION: ICD-10-CM

## 2020-01-07 DIAGNOSIS — D64.9 ANEMIA, UNSPECIFIED TYPE: ICD-10-CM

## 2020-01-07 DIAGNOSIS — N91.2 AMENORRHEA: Primary | ICD-10-CM

## 2020-01-07 DIAGNOSIS — Z12.4 CERVICAL CANCER SCREENING: ICD-10-CM

## 2020-01-07 PROBLEM — M79.7 FIBROMYALGIA MUSCLE PAIN: Status: ACTIVE | Noted: 2020-01-07

## 2020-01-07 PROBLEM — E66.812 CLASS 2 SEVERE OBESITY DUE TO EXCESS CALORIES WITH SERIOUS COMORBIDITY AND BODY MASS INDEX (BMI) OF 37.0 TO 37.9 IN ADULT: Status: ACTIVE | Noted: 2020-01-07

## 2020-01-07 LAB
C TRACH DNA SPEC QL NAA+PROBE: NOT DETECTED
N GONORRHOEA DNA SPEC QL NAA+PROBE: NOT DETECTED

## 2020-01-07 PROCEDURE — 3078F PR MOST RECENT DIASTOLIC BLOOD PRESSURE < 80 MM HG: ICD-10-PCS | Mod: CPTII,S$GLB,, | Performed by: OBSTETRICS & GYNECOLOGY

## 2020-01-07 PROCEDURE — 3008F PR BODY MASS INDEX (BMI) DOCUMENTED: ICD-10-PCS | Mod: CPTII,S$GLB,, | Performed by: OBSTETRICS & GYNECOLOGY

## 2020-01-07 PROCEDURE — 88175 CYTOPATH C/V AUTO FLUID REDO: CPT

## 2020-01-07 PROCEDURE — 99213 PR OFFICE/OUTPT VISIT, EST, LEVL III, 20-29 MIN: ICD-10-PCS | Mod: 25,S$GLB,, | Performed by: OBSTETRICS & GYNECOLOGY

## 2020-01-07 PROCEDURE — 99999 PR PBB SHADOW E&M-EST. PATIENT-LVL III: CPT | Mod: PBBFAC,,, | Performed by: OBSTETRICS & GYNECOLOGY

## 2020-01-07 PROCEDURE — 90471 FLU VACCINE (QUAD) GREATER THAN OR EQUAL TO 3YO PRESERVATIVE FREE IM: ICD-10-PCS | Mod: S$GLB,,, | Performed by: OBSTETRICS & GYNECOLOGY

## 2020-01-07 PROCEDURE — 99213 OFFICE O/P EST LOW 20 MIN: CPT | Mod: 25,S$GLB,, | Performed by: OBSTETRICS & GYNECOLOGY

## 2020-01-07 PROCEDURE — 3078F DIAST BP <80 MM HG: CPT | Mod: CPTII,S$GLB,, | Performed by: OBSTETRICS & GYNECOLOGY

## 2020-01-07 PROCEDURE — 3008F BODY MASS INDEX DOCD: CPT | Mod: CPTII,S$GLB,, | Performed by: OBSTETRICS & GYNECOLOGY

## 2020-01-07 PROCEDURE — 87624 HPV HI-RISK TYP POOLED RSLT: CPT

## 2020-01-07 PROCEDURE — 82570 ASSAY OF URINE CREATININE: CPT

## 2020-01-07 PROCEDURE — 99999 PR PBB SHADOW E&M-EST. PATIENT-LVL III: ICD-10-PCS | Mod: PBBFAC,,, | Performed by: OBSTETRICS & GYNECOLOGY

## 2020-01-07 PROCEDURE — 87086 URINE CULTURE/COLONY COUNT: CPT

## 2020-01-07 PROCEDURE — 87591 N.GONORRHOEAE DNA AMP PROB: CPT

## 2020-01-07 PROCEDURE — 90686 FLU VACCINE (QUAD) GREATER THAN OR EQUAL TO 3YO PRESERVATIVE FREE IM: ICD-10-PCS | Mod: S$GLB,,, | Performed by: OBSTETRICS & GYNECOLOGY

## 2020-01-07 PROCEDURE — 3074F SYST BP LT 130 MM HG: CPT | Mod: CPTII,S$GLB,, | Performed by: OBSTETRICS & GYNECOLOGY

## 2020-01-07 PROCEDURE — 90686 IIV4 VACC NO PRSV 0.5 ML IM: CPT | Mod: S$GLB,,, | Performed by: OBSTETRICS & GYNECOLOGY

## 2020-01-07 PROCEDURE — 3074F PR MOST RECENT SYSTOLIC BLOOD PRESSURE < 130 MM HG: ICD-10-PCS | Mod: CPTII,S$GLB,, | Performed by: OBSTETRICS & GYNECOLOGY

## 2020-01-07 PROCEDURE — 90471 IMMUNIZATION ADMIN: CPT | Mod: S$GLB,,, | Performed by: OBSTETRICS & GYNECOLOGY

## 2020-01-07 RX ORDER — FERROUS SULFATE 325(65) MG
325 TABLET ORAL
COMMUNITY
End: 2020-01-08 | Stop reason: SDUPTHER

## 2020-01-07 RX ORDER — ASPIRIN 81 MG/1
81 TABLET ORAL DAILY
Qty: 30 TABLET | Refills: 8 | Status: SHIPPED | OUTPATIENT
Start: 2020-01-07 | End: 2020-02-05

## 2020-01-07 RX ORDER — FOLIC ACID 0.4 MG
800 TABLET ORAL DAILY
Qty: 100 TABLET | Refills: 3 | Status: SHIPPED | OUTPATIENT
Start: 2020-01-07 | End: 2020-03-25 | Stop reason: SDUPTHER

## 2020-01-07 RX ORDER — OMEPRAZOLE 20 MG/1
20 TABLET, DELAYED RELEASE ORAL DAILY
Qty: 28 TABLET | Refills: 1 | Status: SHIPPED | OUTPATIENT
Start: 2020-01-07 | End: 2020-03-25 | Stop reason: SDUPTHER

## 2020-01-07 NOTE — PROGRESS NOTES
Anitra Chaney is a 39 y.o. [id number] who presents to clinic for amenorrhea. Patient had a positive pregnancy test in the ED last week.   Patient denies contractions, denies vaginal bleeding, denies LOF.   Fetal Movement: not yet present.  She denies nausea/vomiting. She has no complaints.     OB History    Para Term  AB Living   5 3 2 1 1 3   SAB TAB Ectopic Multiple Live Births   1     0 3      # Outcome Date GA Lbr Yuval/2nd Weight Sex Delivery Anes PTL Lv   5 Current            4  19 34w3d   M CS-LTranv Spinal  JIGAR   3 SAB 18 10w0d          2 Term 09 37w0d  2.722 kg (6 lb) F CS-Unspec EPI  JIGAR   1 Term 05 37w0d  3.345 kg (7 lb 6 oz) M CS-Unspec EPI  JIGAR       GYNHx:  LMP: Unsure, 10 weeks by ED U/S  STDs: denies  Prior contraception: none, had Mirena ordered but not placed  Last pap: . Pap + HPV collected today.       FamHx:  - Family history of ovarian/breast cancer: denies  - Family history of clotting/bleeding disorders: denies    SocHx  - Smoking: yes  - Alcohol: no  - Drugs: no    Review of Systems   Constitutional: Negative for chills and fever.   Respiratory: Negative for shortness of breath.    Cardiovascular: Negative for chest pain.   Gastrointestinal: Negative for abdominal pain.   Genitourinary: Negative for dysuria.   Musculoskeletal: Positive for back pain (taking tylenol). Negative for myalgias.   Neurological: Negative for headaches.   Psychiatric/Behavioral: Negative for depression.         Physical Exam   Constitutional: She appears well-developed and well-nourished.   Genitourinary:   Genitourinary Comments: External genitalia normal.  Cervix visually closed on speculum exam. No cervical discharge. No blood in the vaginal vault.  On bimanual exam, no CMT. Negative fundal tenderness. No fullness, masses, or tenderness of left adnexa. No fullness, masses, or tenderness or right adnexa.   Urethra normal. Urethral meatus normal. Bladder  non-tender.   HENT:   Head: Atraumatic.   Eyes: EOM are normal.   Neck: Neck supple.   Cardiovascular: Normal rate.   Pulmonary/Chest: Effort normal.   Abdominal: Soft.   Musculoskeletal: Normal range of motion.   Neurological: She is alert.   Skin: Skin is warm.   Psychiatric: She has a normal mood and affect.           Anitra was seen today for amenorrhea.    Diagnoses and all orders for this visit:    Amenorrhea  -     Type & Screen; Future  -     Hepatitis B surface antigen; Future  -     RPR; Future  -     HIV 1/2 Ag/Ab (4th Gen); Future  -     Varicella zoster antibody, IgG; Future  -     Urine culture  -     C. trachomatis/N. gonorrhoeae by AMP DNA  -     Rubella antibody, IgG; Future  -     Hemoglobin Electrophoresis,Hgb A2 Devin.; Future  -     Hepatitis C antibody; Future  -     US OB/GYN Procedure (Viewpoint); Future    Positive pregnancy test  -     aspirin (ECOTRIN) 81 MG EC tablet; Take 1 tablet (81 mg total) by mouth once daily.  -     Ambulatory consult to Maternal Fetal Medicine    CHTN on no medications  -     Protein / creatinine ratio, urine  -     Comprehensive metabolic panel; Future  -     aspirin (ECOTRIN) 81 MG EC tablet; Take 1 tablet (81 mg total) by mouth once daily.  -     Ambulatory consult to Maternal Fetal Medicine    Class 2 severe obesity due to excess calories with serious comorbidity and body mass index (BMI) of 37.0 to 37.9 in adult  -     aspirin (ECOTRIN) 81 MG EC tablet; Take 1 tablet (81 mg total) by mouth once daily.  -     Hemoglobin A1c; Future  -     Ambulatory consult to Maternal Fetal Medicine    History of TIA (transient ischemic attack)  -     Ambulatory consult to Maternal Fetal Medicine    Encounter for maternal care for low transverse scar from repeat  delivery    Personal history of gastric bypass  -     folic acid (FOLVITE) 400 MCG tablet; Take 2 tablets (800 mcg total) by mouth once daily.  -     Ambulatory consult to Maternal Fetal Medicine  -      Vitamin D; Future  -     VITAMIN B12; Future    Cervical cancer screening  -     Liquid-Based Pap Smear, Screening  -     HPV High Risk Genotypes, PCR    Anemia, unspecified type  -     Iron and TIBC; Future  -     Ferritin; Future    History of GI bleed  -     omeprazole (PRILOSEC OTC) 20 MG tablet; Take 1 tablet (20 mg total) by mouth once daily.    Other orders  -     Influenza - Quadrivalent (PF)            She will accept Mat21 for genetic screening.  She will accept flu shot.         NEW PREGNANCY COUNSELING  Patient was counseled today on:  - Routine prenatal blood tests including HIV and anticipated course of prenatal care  - Prenatal vitamins and folic acid  - Weight gain, nutrition, and exercise  - Seafood and mercury  - Properly heating deli and prepared meats and avoiding unrefrigerated deli  meats, cheeses, and milk products,   - Avoiding cat litter and raw meats due to risk of Toxoplasmosis precautions   - Accuracy of the LMP-based ANIL and the value of an early TV-u/s  - Aneuploidy and neural tube screening -- cffDNA, sequential screening, and AFP screen at 15 weeks  - OTC medication in the first trimester  - Harmful effects of smoking, etOH, and recreational drugs  - MFM u/s  at 18-20 weeks.  - Common complaints of pregnancy  - Seat belt use  - Childbirth classes and hospital facilities  - All questions were answered        Bessie Bal M.D.  KATTY PGY1

## 2020-01-08 ENCOUNTER — PATIENT MESSAGE (OUTPATIENT)
Dept: OBSTETRICS AND GYNECOLOGY | Facility: HOSPITAL | Age: 40
End: 2020-01-08

## 2020-01-08 DIAGNOSIS — O99.019 IRON DEFICIENCY ANEMIA OF PREGNANCY: Primary | ICD-10-CM

## 2020-01-08 DIAGNOSIS — D50.9 IRON DEFICIENCY ANEMIA OF PREGNANCY: Primary | ICD-10-CM

## 2020-01-08 DIAGNOSIS — E55.9 VITAMIN D DEFICIENCY: ICD-10-CM

## 2020-01-08 PROBLEM — Z67.91 RH NEGATIVE STATE IN ANTEPARTUM PERIOD: Status: ACTIVE | Noted: 2020-01-08

## 2020-01-08 PROBLEM — O26.899 RH NEGATIVE STATE IN ANTEPARTUM PERIOD: Status: ACTIVE | Noted: 2020-01-08

## 2020-01-08 LAB
CREAT UR-MCNC: 192 MG/DL (ref 15–325)
PROT UR-MCNC: 18 MG/DL (ref 0–15)
PROT/CREAT UR: 0.09 MG/G{CREAT} (ref 0–0.2)

## 2020-01-08 RX ORDER — FERROUS SULFATE 325(65) MG
325 TABLET ORAL DAILY
Qty: 30 TABLET | Refills: 8 | Status: SHIPPED | OUTPATIENT
Start: 2020-01-08 | End: 2020-03-25 | Stop reason: SDUPTHER

## 2020-01-08 RX ORDER — VIT C/E/ZN/COPPR/LUTEIN/ZEAXAN 250MG-90MG
2000 CAPSULE ORAL DAILY
Qty: 720 CAPSULE | Refills: 0 | Status: SHIPPED | OUTPATIENT
Start: 2020-01-08 | End: 2020-01-27 | Stop reason: SDUPTHER

## 2020-01-09 ENCOUNTER — PATIENT MESSAGE (OUTPATIENT)
Dept: MATERNAL FETAL MEDICINE | Facility: CLINIC | Age: 40
End: 2020-01-09

## 2020-01-09 ENCOUNTER — PATIENT MESSAGE (OUTPATIENT)
Dept: OBSTETRICS AND GYNECOLOGY | Facility: HOSPITAL | Age: 40
End: 2020-01-09

## 2020-01-09 LAB
BACTERIA UR CULT: NORMAL
BACTERIA UR CULT: NORMAL

## 2020-01-10 DIAGNOSIS — E11.9 TYPE 2 DIABETES MELLITUS WITHOUT COMPLICATION: ICD-10-CM

## 2020-01-10 LAB
HPV HR 12 DNA SPEC QL NAA+PROBE: NEGATIVE
HPV16 AG SPEC QL: NEGATIVE
HPV18 DNA SPEC QL NAA+PROBE: NEGATIVE

## 2020-01-14 ENCOUNTER — PATIENT MESSAGE (OUTPATIENT)
Dept: OBSTETRICS AND GYNECOLOGY | Facility: CLINIC | Age: 40
End: 2020-01-14

## 2020-01-16 RX ORDER — TERCONAZOLE 4 MG/G
1 CREAM VAGINAL DAILY
Qty: 1 TUBE | Refills: 0 | Status: SHIPPED | OUTPATIENT
Start: 2020-01-16 | End: 2020-08-06

## 2020-01-21 LAB
FINAL PATHOLOGIC DIAGNOSIS: NORMAL
Lab: NORMAL

## 2020-01-27 ENCOUNTER — TELEPHONE (OUTPATIENT)
Dept: OBSTETRICS AND GYNECOLOGY | Facility: HOSPITAL | Age: 40
End: 2020-01-27

## 2020-01-27 ENCOUNTER — PATIENT MESSAGE (OUTPATIENT)
Dept: MATERNAL FETAL MEDICINE | Facility: CLINIC | Age: 40
End: 2020-01-27

## 2020-01-27 ENCOUNTER — PATIENT MESSAGE (OUTPATIENT)
Dept: OBSTETRICS AND GYNECOLOGY | Facility: CLINIC | Age: 40
End: 2020-01-27

## 2020-01-27 DIAGNOSIS — E55.9 VITAMIN D DEFICIENCY: ICD-10-CM

## 2020-01-27 RX ORDER — BUTALBITAL, ACETAMINOPHEN AND CAFFEINE 50; 325; 40 MG/1; MG/1; MG/1
1 TABLET ORAL EVERY 4 HOURS PRN
Qty: 30 TABLET | Refills: 3 | Status: SHIPPED | OUTPATIENT
Start: 2020-01-27 | End: 2020-03-25 | Stop reason: SDUPTHER

## 2020-01-27 RX ORDER — VIT C/E/ZN/COPPR/LUTEIN/ZEAXAN 250MG-90MG
2000 CAPSULE ORAL DAILY
Qty: 720 CAPSULE | Refills: 0 | Status: SHIPPED | OUTPATIENT
Start: 2020-01-27 | End: 2020-03-25 | Stop reason: SDUPTHER

## 2020-01-27 NOTE — TELEPHONE ENCOUNTER
----- Message from Serenity Laguna MA sent at 1/27/2020 10:23 AM CST -----  Patient would like to know if she can get Rx for Fioricet sent to her pharmacy. Please advise,   Serenity

## 2020-01-30 ENCOUNTER — PATIENT MESSAGE (OUTPATIENT)
Dept: MATERNAL FETAL MEDICINE | Facility: CLINIC | Age: 40
End: 2020-01-30

## 2020-01-30 ENCOUNTER — TELEPHONE (OUTPATIENT)
Dept: OBSTETRICS AND GYNECOLOGY | Facility: CLINIC | Age: 40
End: 2020-01-30

## 2020-01-30 DIAGNOSIS — O09.92 SUPERVISION OF HIGH RISK PREGNANCY IN SECOND TRIMESTER: ICD-10-CM

## 2020-01-30 PROBLEM — O09.90 PREGNANCY, SUPERVISION, HIGH-RISK: Status: ACTIVE | Noted: 2020-01-30

## 2020-01-30 NOTE — TELEPHONE ENCOUNTER
----- Message from Cely Hale RN sent at 1/30/2020  1:51 PM CST -----  We have been trying to schedule the patient's MFM consult but she also needs her anatomy scan scheduled. Can you drop an order for that when you have a minute.  Many thanks,    Cely

## 2020-02-04 ENCOUNTER — PATIENT OUTREACH (OUTPATIENT)
Dept: ADMINISTRATIVE | Facility: OTHER | Age: 40
End: 2020-02-04

## 2020-02-05 ENCOUNTER — ROUTINE PRENATAL (OUTPATIENT)
Dept: OBSTETRICS AND GYNECOLOGY | Facility: CLINIC | Age: 40
End: 2020-02-05
Payer: COMMERCIAL

## 2020-02-05 VITALS
WEIGHT: 257.69 LBS | DIASTOLIC BLOOD PRESSURE: 78 MMHG | SYSTOLIC BLOOD PRESSURE: 116 MMHG | BODY MASS INDEX: 38.06 KG/M2

## 2020-02-05 DIAGNOSIS — Z86.19 HISTORY OF WOUND INFECTION: ICD-10-CM

## 2020-02-05 DIAGNOSIS — O99.212 OBESITY AFFECTING PREGNANCY IN SECOND TRIMESTER: ICD-10-CM

## 2020-02-05 DIAGNOSIS — O34.219 HISTORY OF CESAREAN SECTION COMPLICATING PREGNANCY: ICD-10-CM

## 2020-02-05 PROBLEM — O99.210 OBESITY COMPLICATING PREGNANCY: Status: ACTIVE | Noted: 2020-02-05

## 2020-02-05 PROCEDURE — 0502F PR SUBSEQUENT PRENATAL CARE: ICD-10-PCS | Mod: CPTII,S$GLB,, | Performed by: OBSTETRICS & GYNECOLOGY

## 2020-02-05 PROCEDURE — 0502F SUBSEQUENT PRENATAL CARE: CPT | Mod: CPTII,S$GLB,, | Performed by: OBSTETRICS & GYNECOLOGY

## 2020-02-05 PROCEDURE — 99999 PR PBB SHADOW E&M-EST. PATIENT-LVL III: ICD-10-PCS | Mod: PBBFAC,,, | Performed by: OBSTETRICS & GYNECOLOGY

## 2020-02-05 PROCEDURE — 99999 PR PBB SHADOW E&M-EST. PATIENT-LVL III: CPT | Mod: PBBFAC,,, | Performed by: OBSTETRICS & GYNECOLOGY

## 2020-02-05 RX ORDER — OMEPRAZOLE 20 MG/1
CAPSULE, DELAYED RELEASE ORAL
COMMUNITY
Start: 2020-01-07 | End: 2020-08-06

## 2020-02-05 NOTE — PROGRESS NOTES
Subjective:       Patient ID: Anitra Chaney is a 39 y.o. female.    Chief Complaint:  Routine Prenatal Visit      History of Present Illness  Anitra Chaney is a 39 y.o. [id number] at 15w0d presents for routine ob visit. She is overall without complaint today. She has been taking iron daily for low ferritin on recent labs.    This IUP is complicated by AMA, h/o  section, history of incisional infection, history of preeclampsia in prior pregnancy.  Patient denies contractions, denies vaginal bleeding, denies LOF.   Fetal Movement: not yet felt.          GYN & OB History  No LMP recorded (lmp unknown). Patient is pregnant.   Date of Last Pap: 3/15/2016    OB History    Para Term  AB Living   5 3 2 1 1 3   SAB TAB Ectopic Multiple Live Births   1     0 3      # Outcome Date GA Lbr Yuval/2nd Weight Sex Delivery Anes PTL Lv   5 Current            4  19 34w3d   M CS-LTranv Spinal  JIGAR   3 SAB 18 10w0d          2 Term 09 37w0d  2.722 kg (6 lb) F CS-Unspec EPI  JIGAR   1 Term 05 37w0d  3.345 kg (7 lb 6 oz) M CS-Unspec EPI  JIGAR       Review of Systems  Review of Systems   Constitutional: Negative for activity change, appetite change, chills and fever.   Eyes: Negative for visual disturbance.   Respiratory: Negative for cough and shortness of breath.    Cardiovascular: Negative for chest pain.   Gastrointestinal: Negative for abdominal pain, constipation, nausea and vomiting.   Genitourinary: Negative for dysuria, frequency, hematuria, pelvic pain, urgency, vaginal bleeding, vaginal discharge and vaginal odor.   Musculoskeletal: Negative for myalgias.   Integumentary:  Negative for rash.   Neurological: Negative for headaches.   Psychiatric/Behavioral: Negative for depression and sleep disturbance. The patient is not nervous/anxious.            Objective:    Physical Exam:   Constitutional: She is oriented to person, place, and time. She appears  well-developed and well-nourished. No distress.    HENT:   Head: Normocephalic and atraumatic.    Eyes: Conjunctivae and EOM are normal.    Neck: Normal range of motion. Neck supple.    Cardiovascular: Normal rate and intact distal pulses.     Pulmonary/Chest: Effort normal. No respiratory distress.        Abdominal: Soft. She exhibits no distension. There is no tenderness.             Musculoskeletal: Normal range of motion and moves all extremeties. She exhibits no edema or tenderness.       Neurological: She is alert and oriented to person, place, and time.    Skin: Skin is warm and dry. No rash noted.    Psychiatric: She has a normal mood and affect. Her behavior is normal.          Assessment:        1. Obesity affecting pregnancy in second trimester    2. History of  section complicating pregnancy    3. History of wound infection               Plan:      Anitra was seen today for routine prenatal visit.    Diagnoses and all orders for this visit:    Supervision of high risk preganncy  -  materniti 21 neg; single marker AFP ordered   - continue daily iron for low ferritin     History of  section complicating pregnancy  - tubal consent signed   - plan for repeat  section     History of wound infection  - increased risk for incisional infection   - encourage wound care in post partum period       Orders Placed This Encounter   Procedures    Maternal Screen AFP (Single Marker)       Follow up in about 4 weeks (around 3/4/2020).    Susu Nicole MD  OBGYN, PGY-2

## 2020-02-07 ENCOUNTER — PATIENT MESSAGE (OUTPATIENT)
Dept: OBSTETRICS AND GYNECOLOGY | Facility: CLINIC | Age: 40
End: 2020-02-07

## 2020-02-10 ENCOUNTER — TELEPHONE (OUTPATIENT)
Dept: OBSTETRICS AND GYNECOLOGY | Facility: HOSPITAL | Age: 40
End: 2020-02-10

## 2020-02-10 DIAGNOSIS — F41.9 ANXIETY: Primary | ICD-10-CM

## 2020-02-10 RX ORDER — HYDROXYZINE HYDROCHLORIDE 10 MG/1
10 TABLET, FILM COATED ORAL 2 TIMES DAILY
Qty: 60 TABLET | Refills: 3 | Status: SHIPPED | OUTPATIENT
Start: 2020-02-10 | End: 2020-03-04 | Stop reason: SDUPTHER

## 2020-02-10 NOTE — TELEPHONE ENCOUNTER
----- Message from Marcie Harden LPN sent at 2/7/2020  9:57 AM CST -----  hiwot bee     What can i take for anxiety before i get fired, my anxiety level is high maam and its really messing with my life

## 2020-02-19 ENCOUNTER — PROCEDURE VISIT (OUTPATIENT)
Dept: MATERNAL FETAL MEDICINE | Facility: CLINIC | Age: 40
End: 2020-02-19
Payer: COMMERCIAL

## 2020-02-19 ENCOUNTER — INITIAL CONSULT (OUTPATIENT)
Dept: MATERNAL FETAL MEDICINE | Facility: CLINIC | Age: 40
End: 2020-02-19
Attending: OBSTETRICS & GYNECOLOGY
Payer: COMMERCIAL

## 2020-02-19 DIAGNOSIS — Q21.12 PFO (PATENT FORAMEN OVALE): Primary | ICD-10-CM

## 2020-02-19 DIAGNOSIS — Q21.12 PFO (PATENT FORAMEN OVALE): ICD-10-CM

## 2020-02-19 PROCEDURE — 99999 PR PBB SHADOW E&M-EST. PATIENT-LVL II: ICD-10-PCS | Mod: PBBFAC,,, | Performed by: OBSTETRICS & GYNECOLOGY

## 2020-02-19 PROCEDURE — 99214 OFFICE O/P EST MOD 30 MIN: CPT | Mod: S$GLB,,, | Performed by: OBSTETRICS & GYNECOLOGY

## 2020-02-19 PROCEDURE — 99999 PR PBB SHADOW E&M-EST. PATIENT-LVL II: CPT | Mod: PBBFAC,,, | Performed by: OBSTETRICS & GYNECOLOGY

## 2020-02-19 PROCEDURE — 99214 PR OFFICE/OUTPT VISIT, EST, LEVL IV, 30-39 MIN: ICD-10-PCS | Mod: S$GLB,,, | Performed by: OBSTETRICS & GYNECOLOGY

## 2020-02-19 NOTE — LETTER
February 21, 2020      Stella Yen MD  [address] [facility]  [address]  [place] [address]           [facility] [address]  [address]  [place] [address]  Phone: [phone]          Patient: Anitra Chaney   MR Number: [id number]   YOB: 1980   Date of Visit: 2/19/2020       Dear Dr. Stella Yen:    Thank you for referring Anitra Chaney to me for evaluation. Attached you will find relevant portions of my assessment and plan of care.    If you have questions, please do not hesitate to call me. I look forward to following Anitra Chaney along with you.    Sincerely,    Pau Viramontes MD    Enclosure  CC:  No Recipients    If you would like to receive this communication electronically, please contact [email or web address] or [phone] to request more information on [employer] access.    For providers and/or their staff who would like to refer a patient to [facility], please contact us through our one-stop-shop provider referral line, [facility], at [phone].    If you feel you have received this communication in error or would no longer like to receive these types of communications, please e-mail [email or web address]

## 2020-02-19 NOTE — NURSING
"Medical Record release faxed to [facility] at fax [phone] to obtain records on prior "TIA" x2 evaluated at [facility] in December 2018 and in February 2019 prior to patient's delivery in April 2019.  "

## 2020-03-03 ENCOUNTER — PATIENT OUTREACH (OUTPATIENT)
Dept: ADMINISTRATIVE | Facility: OTHER | Age: 40
End: 2020-03-03

## 2020-03-04 ENCOUNTER — ROUTINE PRENATAL (OUTPATIENT)
Dept: OBSTETRICS AND GYNECOLOGY | Facility: CLINIC | Age: 40
End: 2020-03-04
Payer: COMMERCIAL

## 2020-03-04 VITALS — BODY MASS INDEX: 38.03 KG/M2 | DIASTOLIC BLOOD PRESSURE: 80 MMHG | WEIGHT: 257.5 LBS | SYSTOLIC BLOOD PRESSURE: 120 MMHG

## 2020-03-04 DIAGNOSIS — O09.92 SUPERVISION OF HIGH RISK PREGNANCY IN SECOND TRIMESTER: Primary | ICD-10-CM

## 2020-03-04 DIAGNOSIS — F41.9 ANXIETY: ICD-10-CM

## 2020-03-04 DIAGNOSIS — O34.219 HISTORY OF CESAREAN SECTION COMPLICATING PREGNANCY: ICD-10-CM

## 2020-03-04 DIAGNOSIS — O99.212 OBESITY AFFECTING PREGNANCY IN SECOND TRIMESTER: ICD-10-CM

## 2020-03-04 PROCEDURE — 99999 PR PBB SHADOW E&M-EST. PATIENT-LVL III: CPT | Mod: PBBFAC,,, | Performed by: OBSTETRICS & GYNECOLOGY

## 2020-03-04 PROCEDURE — 3008F BODY MASS INDEX DOCD: CPT | Mod: CPTII,S$GLB,, | Performed by: OBSTETRICS & GYNECOLOGY

## 2020-03-04 PROCEDURE — 0502F PR SUBSEQUENT PRENATAL CARE: ICD-10-PCS | Mod: S$GLB,,, | Performed by: OBSTETRICS & GYNECOLOGY

## 2020-03-04 PROCEDURE — 99999 PR PBB SHADOW E&M-EST. PATIENT-LVL III: ICD-10-PCS | Mod: PBBFAC,,, | Performed by: OBSTETRICS & GYNECOLOGY

## 2020-03-04 PROCEDURE — 3008F PR BODY MASS INDEX (BMI) DOCUMENTED: ICD-10-PCS | Mod: CPTII,S$GLB,, | Performed by: OBSTETRICS & GYNECOLOGY

## 2020-03-04 PROCEDURE — 0502F SUBSEQUENT PRENATAL CARE: CPT | Mod: S$GLB,,, | Performed by: OBSTETRICS & GYNECOLOGY

## 2020-03-04 RX ORDER — HYDROXYZINE HYDROCHLORIDE 10 MG/1
10 TABLET, FILM COATED ORAL 2 TIMES DAILY
Qty: 60 TABLET | Refills: 3 | Status: SHIPPED | OUTPATIENT
Start: 2020-03-04 | End: 2020-07-27

## 2020-03-04 NOTE — PROGRESS NOTES
Complaints today: none, Good fetal movements reported.  Denies vb, lof, ctx    /80   Wt 116.8 kg (257 lb 8 oz)   LMP  (LMP Unknown)   BMI 38.03 kg/m²     40 y.o., at 19w0d by Estimated Date of Delivery: 20  Patient Active Problem List   Diagnosis    Migraine syndrome    Neurogenic claudication    Ankle arthritis    CHTN on no medications    Lower GI bleed    Migraine    Lumbar radiculopathy    Chronic bilateral low back pain with bilateral sciatica    Fibromyalgia muscle pain    Class 2 severe obesity due to excess calories with serious comorbidity and body mass index (BMI) of 37.0 to 37.9 in adult    Rh negative state in antepartum period    Pregnancy, supervision, high-risk    Obesity complicating pregnancy    History of  section complicating pregnancy    History of wound infection     OB History    Para Term  AB Living   5 3 2 1 1 3   SAB TAB Ectopic Multiple Live Births   1     0 3      # Outcome Date GA Lbr Yuval/2nd Weight Sex Delivery Anes PTL Lv   5 Current            4  19 34w3d   M CS-LTranv Spinal  JIGAR   3 SAB 18 10w0d          2 Term 09 37w0d  2.722 kg (6 lb) F CS-Unspec EPI  JIGAR   1 Term 05 37w0d  3.345 kg (7 lb 6 oz) M CS-Unspec EPI  JIGAR       Dating reviewed    Allergies and problem list reviewed and updated    Medical and surgical history reviewed    Prenatal labs reviewed and updated    Physical Exam:  ABD: soft, gravid, nontender,     Assessment:  Anitra was seen today for routine prenatal visit.    Diagnoses and all orders for this visit:    Supervision of high risk pregnancy in second trimester    Obesity affecting pregnancy in second trimester    History of  section complicating pregnancy         Plan:   follow up labs   follow up 4 Weeks, bleeding/pain

## 2020-03-04 NOTE — PROGRESS NOTES
Care Everywhere: updated  Immunization: updated  Health Maintenance: updated  Media Review: reviewed for possible outside mammogram or eye exam report  Legacy Review: n/a  Order placed: n/a  Upcoming appts:n/a  Mammogram and pneumococcal postpone due to pregnancy

## 2020-03-10 ENCOUNTER — TELEPHONE (OUTPATIENT)
Dept: MATERNAL FETAL MEDICINE | Facility: CLINIC | Age: 40
End: 2020-03-10

## 2020-03-18 ENCOUNTER — PROCEDURE VISIT (OUTPATIENT)
Dept: MATERNAL FETAL MEDICINE | Facility: CLINIC | Age: 40
End: 2020-03-18
Payer: COMMERCIAL

## 2020-03-18 ENCOUNTER — INITIAL CONSULT (OUTPATIENT)
Dept: MATERNAL FETAL MEDICINE | Facility: CLINIC | Age: 40
End: 2020-03-18
Attending: OBSTETRICS & GYNECOLOGY
Payer: COMMERCIAL

## 2020-03-18 VITALS
DIASTOLIC BLOOD PRESSURE: 76 MMHG | SYSTOLIC BLOOD PRESSURE: 104 MMHG | BODY MASS INDEX: 38.2 KG/M2 | HEIGHT: 69 IN | WEIGHT: 257.94 LBS

## 2020-03-18 DIAGNOSIS — O99.332 MATERNAL TOBACCO USE IN SECOND TRIMESTER: ICD-10-CM

## 2020-03-18 DIAGNOSIS — O99.212 OBESITY AFFECTING PREGNANCY IN SECOND TRIMESTER: ICD-10-CM

## 2020-03-18 DIAGNOSIS — Z36.3 ANTENATAL SCREENING FOR MALFORMATION USING ULTRASONICS: ICD-10-CM

## 2020-03-18 DIAGNOSIS — O09.522 MULTIGRAVIDA OF ADVANCED MATERNAL AGE IN SECOND TRIMESTER: ICD-10-CM

## 2020-03-18 DIAGNOSIS — O09.92 SUPERVISION OF HIGH RISK PREGNANCY IN SECOND TRIMESTER: ICD-10-CM

## 2020-03-18 DIAGNOSIS — Z36.89 ENCOUNTER FOR ULTRASOUND TO ASSESS FETAL GROWTH: Primary | ICD-10-CM

## 2020-03-18 PROCEDURE — 3008F PR BODY MASS INDEX (BMI) DOCUMENTED: ICD-10-PCS | Mod: CPTII,S$GLB,, | Performed by: OBSTETRICS & GYNECOLOGY

## 2020-03-18 PROCEDURE — 99999 PR PBB SHADOW E&M-EST. PATIENT-LVL III: CPT | Mod: PBBFAC,,, | Performed by: OBSTETRICS & GYNECOLOGY

## 2020-03-18 PROCEDURE — 3008F BODY MASS INDEX DOCD: CPT | Mod: CPTII,S$GLB,, | Performed by: OBSTETRICS & GYNECOLOGY

## 2020-03-18 PROCEDURE — 99213 OFFICE O/P EST LOW 20 MIN: CPT | Mod: 25,S$GLB,, | Performed by: OBSTETRICS & GYNECOLOGY

## 2020-03-18 PROCEDURE — 76811 PR US, OB FETAL EVAL & EXAM, TRANSABDOM,FIRST GESTATION: ICD-10-PCS | Mod: 26,S$GLB,, | Performed by: OBSTETRICS & GYNECOLOGY

## 2020-03-18 PROCEDURE — 99999 PR PBB SHADOW E&M-EST. PATIENT-LVL III: ICD-10-PCS | Mod: PBBFAC,,, | Performed by: OBSTETRICS & GYNECOLOGY

## 2020-03-18 PROCEDURE — 99213 PR OFFICE/OUTPT VISIT, EST, LEVL III, 20-29 MIN: ICD-10-PCS | Mod: 25,S$GLB,, | Performed by: OBSTETRICS & GYNECOLOGY

## 2020-03-18 PROCEDURE — 76811 OB US DETAILED SNGL FETUS: CPT | Mod: 26,S$GLB,, | Performed by: OBSTETRICS & GYNECOLOGY

## 2020-03-24 ENCOUNTER — PATIENT MESSAGE (OUTPATIENT)
Dept: OBSTETRICS AND GYNECOLOGY | Facility: CLINIC | Age: 40
End: 2020-03-24

## 2020-03-24 DIAGNOSIS — O26.892 PREGNANCY HEADACHE IN SECOND TRIMESTER: Primary | ICD-10-CM

## 2020-03-24 DIAGNOSIS — R51.9 PREGNANCY HEADACHE IN SECOND TRIMESTER: Primary | ICD-10-CM

## 2020-03-25 DIAGNOSIS — G43.019 INTRACTABLE MIGRAINE WITHOUT AURA AND WITHOUT STATUS MIGRAINOSUS: ICD-10-CM

## 2020-03-25 DIAGNOSIS — Z3A.22 22 WEEKS GESTATION OF PREGNANCY: Primary | ICD-10-CM

## 2020-03-25 DIAGNOSIS — E55.9 VITAMIN D DEFICIENCY: ICD-10-CM

## 2020-03-25 DIAGNOSIS — O99.019 IRON DEFICIENCY ANEMIA OF PREGNANCY: ICD-10-CM

## 2020-03-25 DIAGNOSIS — Z98.84 PERSONAL HISTORY OF GASTRIC BYPASS: ICD-10-CM

## 2020-03-25 DIAGNOSIS — D50.9 IRON DEFICIENCY ANEMIA OF PREGNANCY: ICD-10-CM

## 2020-03-25 DIAGNOSIS — R20.2 PARESTHESIA OF RIGHT ARM AND LEG: ICD-10-CM

## 2020-03-25 DIAGNOSIS — Z87.19 HISTORY OF GI BLEED: ICD-10-CM

## 2020-03-25 RX ORDER — BUTALBITAL, ACETAMINOPHEN AND CAFFEINE 50; 325; 40 MG/1; MG/1; MG/1
1 TABLET ORAL EVERY 6 HOURS PRN
Qty: 30 TABLET | Refills: 3 | Status: SHIPPED | OUTPATIENT
Start: 2020-03-25 | End: 2020-04-24

## 2020-03-25 RX ORDER — FOLIC ACID 0.4 MG
800 TABLET ORAL DAILY
Qty: 100 TABLET | Refills: 3 | Status: SHIPPED | OUTPATIENT
Start: 2020-03-25 | End: 2020-08-06

## 2020-03-25 RX ORDER — FERROUS SULFATE 325(65) MG
325 TABLET ORAL DAILY
Qty: 30 TABLET | Refills: 8 | Status: SHIPPED | OUTPATIENT
Start: 2020-03-25 | End: 2020-08-06

## 2020-03-25 RX ORDER — VIT C/E/ZN/COPPR/LUTEIN/ZEAXAN 250MG-90MG
2000 CAPSULE ORAL DAILY
Qty: 720 CAPSULE | Refills: 0 | Status: SHIPPED | OUTPATIENT
Start: 2020-03-25 | End: 2020-08-06

## 2020-03-25 RX ORDER — OMEPRAZOLE 20 MG/1
20 TABLET, DELAYED RELEASE ORAL DAILY
Qty: 28 TABLET | Refills: 1 | Status: SHIPPED | OUTPATIENT
Start: 2020-03-25 | End: 2020-08-11

## 2020-03-26 ENCOUNTER — PATIENT MESSAGE (OUTPATIENT)
Dept: OBSTETRICS AND GYNECOLOGY | Facility: CLINIC | Age: 40
End: 2020-03-26

## 2020-03-31 ENCOUNTER — TELEPHONE (OUTPATIENT)
Dept: OBSTETRICS AND GYNECOLOGY | Facility: CLINIC | Age: 40
End: 2020-03-31

## 2020-04-05 ENCOUNTER — PATIENT OUTREACH (OUTPATIENT)
Dept: ADMINISTRATIVE | Facility: OTHER | Age: 40
End: 2020-04-05

## 2020-04-06 ENCOUNTER — PATIENT MESSAGE (OUTPATIENT)
Dept: CARDIOLOGY | Facility: CLINIC | Age: 40
End: 2020-04-06

## 2020-04-06 ENCOUNTER — OFFICE VISIT (OUTPATIENT)
Dept: CARDIOLOGY | Facility: CLINIC | Age: 40
End: 2020-04-06
Attending: INTERNAL MEDICINE
Payer: COMMERCIAL

## 2020-04-06 VITALS
HEART RATE: 68 BPM | WEIGHT: 262 LBS | SYSTOLIC BLOOD PRESSURE: 118 MMHG | BODY MASS INDEX: 38.8 KG/M2 | DIASTOLIC BLOOD PRESSURE: 68 MMHG | HEIGHT: 69 IN

## 2020-04-06 DIAGNOSIS — I10 ESSENTIAL HYPERTENSION: ICD-10-CM

## 2020-04-06 DIAGNOSIS — G43.019 INTRACTABLE MIGRAINE WITHOUT AURA AND WITHOUT STATUS MIGRAINOSUS: Primary | ICD-10-CM

## 2020-04-06 DIAGNOSIS — I63.00 CEREBROVASCULAR ACCIDENT (CVA) DUE TO THROMBOSIS OF PRECEREBRAL ARTERY: ICD-10-CM

## 2020-04-06 DIAGNOSIS — O99.212 OBESITY AFFECTING PREGNANCY IN SECOND TRIMESTER: ICD-10-CM

## 2020-04-06 DIAGNOSIS — G43.001 MIGRAINE WITHOUT AURA AND WITH STATUS MIGRAINOSUS, NOT INTRACTABLE: ICD-10-CM

## 2020-04-06 DIAGNOSIS — Z98.84 HX OF BARIATRIC SURGERY: ICD-10-CM

## 2020-04-06 PROBLEM — I63.9 CEREBROVASCULAR ACCIDENT (CVA) DUE TO THROMBOSIS: Status: ACTIVE | Noted: 2020-04-06

## 2020-04-06 PROCEDURE — 99214 OFFICE O/P EST MOD 30 MIN: CPT | Mod: S$GLB,,, | Performed by: INTERNAL MEDICINE

## 2020-04-06 PROCEDURE — 99214 PR OFFICE/OUTPT VISIT, EST, LEVL IV, 30-39 MIN: ICD-10-PCS | Mod: S$GLB,,, | Performed by: INTERNAL MEDICINE

## 2020-04-06 NOTE — PROGRESS NOTES
Subjective:    Patient ID:  Anitra Chaney is a 40 y.o. female     HPI   Here for follow-up of hypertension, obesity, history of stroke in January of 2017, received IV tPA at [facility], had gastric sleeve surgery in September 2017, now 23 weeks pregnant with her 4th child.  Feels well, has no complaints.    Current Outpatient Medications   Medication Sig    butalbital-acetaminophen-caffeine -40 mg (FIORICET, ESGIC) -40 mg per tablet Take 1 tablet by mouth every 6 (six) hours as needed for Headaches. Do not take more than 6 tablets in 24 hours.    cholecalciferol, vitamin D3, (VITAMIN D3) 25 mcg (1,000 unit) capsule Take 2 capsules (2,000 Units total) by mouth once daily.    ferrous sulfate (FEOSOL) 325 mg (65 mg iron) Tab tablet Take 1 tablet (325 mg total) by mouth once daily.    folic acid (FOLVITE) 400 MCG tablet Take 2 tablets (800 mcg total) by mouth once daily.    hydrOXYzine HCl (ATARAX) 10 MG Tab Take 1 tablet (10 mg total) by mouth 2 (two) times daily.    omeprazole (PRILOSEC OTC) 20 MG tablet Take 1 tablet (20 mg total) by mouth once daily.    omeprazole (PRILOSEC) 20 MG capsule     prenatal vit37/iron/folic acid (PRENATA ORAL) Take by mouth.    terconazole (TERAZOL 7) 0.4 % Crea Place 1 applicator vaginally once daily. (Patient not taking: Reported on 3/18/2020)     No current facility-administered medications for this visit.          Review of Systems   Constitution: Negative for chills, decreased appetite, fever, weight gain and weight loss.   HENT: Negative for congestion, hearing loss and sore throat.    Eyes: Negative for blurred vision, double vision and visual disturbance.   Cardiovascular: Negative for chest pain, claudication, dyspnea on exertion, leg swelling, palpitations and syncope.   Respiratory: Negative for cough, hemoptysis, shortness of breath, sputum production and wheezing.    Endocrine: Negative for cold intolerance and heat intolerance.    Hematologic/Lymphatic: Negative for bleeding problem. Does not bruise/bleed easily.   Skin: Negative for color change, dry skin, flushing and itching.   Musculoskeletal: Negative for back pain, joint pain and myalgias.   Gastrointestinal: Negative for abdominal pain, anorexia, constipation, diarrhea, dysphagia, nausea and vomiting.        No bleeding per rectum   Genitourinary: Negative for dysuria, flank pain, frequency, hematuria and nocturia.   Neurological: Negative for dizziness, headaches, light-headedness, loss of balance, seizures and tremors.   Psychiatric/Behavioral: Negative for altered mental status and depression.       118/72  Pulse 70/ mt.    Objective:    Physical Exam   Constitutional: She is oriented to person, place, and time. She appears well-developed and well-nourished.   HENT:   Head: Normocephalic and atraumatic.   Nose: Nose normal.   Mouth/Throat: Oropharynx is clear and moist.   Eyes: Pupils are equal, round, and reactive to light. Conjunctivae and EOM are normal.   Neck: Neck supple. No tracheal deviation present. No thyromegaly present.   Cardiovascular: Normal rate, regular rhythm and intact distal pulses. Exam reveals no gallop and no friction rub.   No murmur heard.  Pulmonary/Chest: No respiratory distress. She has no wheezes. She has no rales. She exhibits no tenderness.   Abdominal: Soft. Bowel sounds are normal. She exhibits no distension and no mass. There is no tenderness. There is no rebound and no guarding.   Musculoskeletal: Normal range of motion.   Lymphadenopathy:     She has no cervical adenopathy.   Neurological: She is alert and oriented to person, place, and time.   Skin: Skin is warm and dry.   Psychiatric: Her behavior is normal.         Assessment:       1. Iron deficiency anemia   2. CHTN on no medications    3. Migraine without aura and with status migrainosus, not intractable    4. Obesity affecting pregnancy in second trimester    5. Hx of bariatric surgery     6. Cerebrovascular accident (CVA) due to thrombosis of precerebral artery         Plan:       Stable, from a cardiovascular standpoint.

## 2020-04-07 ENCOUNTER — ROUTINE PRENATAL (OUTPATIENT)
Dept: OBSTETRICS AND GYNECOLOGY | Facility: CLINIC | Age: 40
End: 2020-04-07
Payer: COMMERCIAL

## 2020-04-07 ENCOUNTER — PATIENT MESSAGE (OUTPATIENT)
Dept: ADMINISTRATIVE | Facility: OTHER | Age: 40
End: 2020-04-07

## 2020-04-07 ENCOUNTER — TELEPHONE (OUTPATIENT)
Dept: OBSTETRICS AND GYNECOLOGY | Facility: CLINIC | Age: 40
End: 2020-04-07

## 2020-04-07 ENCOUNTER — PATIENT MESSAGE (OUTPATIENT)
Dept: OBSTETRICS AND GYNECOLOGY | Facility: CLINIC | Age: 40
End: 2020-04-07

## 2020-04-07 VITALS
WEIGHT: 258.38 LBS | BODY MASS INDEX: 38.16 KG/M2 | DIASTOLIC BLOOD PRESSURE: 66 MMHG | SYSTOLIC BLOOD PRESSURE: 108 MMHG

## 2020-04-07 DIAGNOSIS — Z86.39 HISTORY OF DIABETES MELLITUS: ICD-10-CM

## 2020-04-07 DIAGNOSIS — O34.219 HISTORY OF CESAREAN SECTION COMPLICATING PREGNANCY: ICD-10-CM

## 2020-04-07 DIAGNOSIS — O09.92 SUPERVISION OF HIGH RISK PREGNANCY IN SECOND TRIMESTER: Primary | ICD-10-CM

## 2020-04-07 DIAGNOSIS — O09.522 MULTIGRAVIDA OF ADVANCED MATERNAL AGE IN SECOND TRIMESTER: ICD-10-CM

## 2020-04-07 DIAGNOSIS — O99.212 OBESITY AFFECTING PREGNANCY IN SECOND TRIMESTER: ICD-10-CM

## 2020-04-07 DIAGNOSIS — O09.299 HISTORY OF PRE-ECLAMPSIA IN PRIOR PREGNANCY, CURRENTLY PREGNANT: ICD-10-CM

## 2020-04-07 DIAGNOSIS — E66.01 CLASS 2 SEVERE OBESITY DUE TO EXCESS CALORIES WITH SERIOUS COMORBIDITY AND BODY MASS INDEX (BMI) OF 37.0 TO 37.9 IN ADULT: ICD-10-CM

## 2020-04-07 DIAGNOSIS — Z30.09 UNWANTED FERTILITY: ICD-10-CM

## 2020-04-07 DIAGNOSIS — I10 ESSENTIAL HYPERTENSION: ICD-10-CM

## 2020-04-07 DIAGNOSIS — R29.818 NEUROGENIC CLAUDICATION: ICD-10-CM

## 2020-04-07 PROCEDURE — 0502F PR SUBSEQUENT PRENATAL CARE: ICD-10-PCS | Mod: CPTII,S$GLB,, | Performed by: OBSTETRICS & GYNECOLOGY

## 2020-04-07 PROCEDURE — 99999 PR PBB SHADOW E&M-EST. PATIENT-LVL II: CPT | Mod: PBBFAC,,, | Performed by: OBSTETRICS & GYNECOLOGY

## 2020-04-07 PROCEDURE — 99999 PR PBB SHADOW E&M-EST. PATIENT-LVL II: ICD-10-PCS | Mod: PBBFAC,,, | Performed by: OBSTETRICS & GYNECOLOGY

## 2020-04-07 PROCEDURE — 0502F SUBSEQUENT PRENATAL CARE: CPT | Mod: CPTII,S$GLB,, | Performed by: OBSTETRICS & GYNECOLOGY

## 2020-04-07 NOTE — PROGRESS NOTES
Good fetal movement.  No contractions, no vaginal bleeding, and no loss of fluid.    Signed up for connected mom. Saw cardiology- no new changes from his standpoint.  Waiting to see neurology.  Working on quitting smoking, has cut down to 4 cig a day.    Currently taking clindamycin for an abscess for the past four days.  Given to her by her Dentist.  Dentist is trying to treat without a procedure for now.  Advised patient to follow up with him (reports Dentist plans on checking on her tomorrow).  Advised to have procedure if necessary.  She has not had fever/chills at home.  Discussed the risks of  labor with infection.  Has good follow up with him.   Plan for ob glucose/cbc.  Had gastric sleeve, safe for testing. Will plan with next visit.  Tdap and rhogam ordered for next visit. Has next f/u with Dr. Viramontes planned.  Next appt scheduled with Dr. Yen.

## 2020-04-07 NOTE — TELEPHONE ENCOUNTER
Spoke with patient , changed appointment , patient verbalized understanding     ----- Message from Kristi Hernandez sent at 4/7/2020  9:28 AM CDT -----  Contact: HILLARY TYLER [[id number]]  Name of Who is Calling: HILLARY TYLER [[id number]]    What is the request in detail: Would like to speak with staff to reschedule 4/8 appointment Please contact to further discuss and advise      Can the clinic reply by [facility]: yes    What Number to Call Back [facility] not [facility]: [phone]

## 2020-04-10 ENCOUNTER — PATIENT MESSAGE (OUTPATIENT)
Dept: OBSTETRICS AND GYNECOLOGY | Facility: CLINIC | Age: 40
End: 2020-04-10

## 2020-04-12 LAB
AORTIC ROOT ANNULUS: 3.22 CM
AORTIC VALVE CUSP SEPERATION: 1.96 CM
AV INDEX (PROSTH): 0.72
AV MEAN GRADIENT: 6 MMHG
AV PEAK GRADIENT: 10 MMHG
AV VALVE AREA: 2.9 CM2
AV VELOCITY RATIO: 0.75
CV ECHO LV RWT: 0.47 CM
DOP CALC AO PEAK VEL: 1.62 M/S
DOP CALC AO VTI: 34.38 CM
DOP CALC LVOT AREA: 4 CM2
DOP CALC LVOT DIAMETER: 2.26 CM
DOP CALC LVOT PEAK VEL: 1.21 M/S
DOP CALC LVOT STROKE VOLUME: 99.76 CM3
DOP CALCLVOT PEAK VEL VTI: 24.88 CM
E WAVE DECELERATION TIME: 181.44 MSEC
E/A RATIO: 1.5
ECHO EF ESTIMATED: 64 %
ECHO LV POSTERIOR WALL: 1.27 CM (ref 0.6–1.1)
FRACTIONAL SHORTENING: 35 % (ref 28–44)
INTERVENTRICULAR SEPTUM: 1.21 CM (ref 0.6–1.1)
IVC PROX: 1.4 CM
IVRT: 117.98 MSEC
LEFT ATRIUM SIZE: 3.95 CM
LEFT INTERNAL DIMENSION IN SYSTOLE: 3.49 CM (ref 2.1–4)
LEFT VENTRICLE DIASTOLIC VOLUME: 141.52 ML
LEFT VENTRICLE SYSTOLIC VOLUME: 50.51 ML
LEFT VENTRICULAR INTERNAL DIMENSION IN DIASTOLE: 5.4 CM (ref 3.5–6)
LEFT VENTRICULAR MASS: 276.69 G
LV LATERAL E/E' RATIO: 0.06 M/S
MV A" WAVE DURATION": 164 MSEC
MV PEAK A VEL: 0.56 M/S
MV PEAK E VEL: 0.84 M/S
PISA TR MAX VEL: 2.03 M/S
PULM VEIN A" WAVE DURATION": 61 MSEC
PULM VEIN S/D RATIO: 1.26
PV PEAK D VEL: 0.46 M/S
PV PEAK S VEL: 0.58 M/S
PV PEAK VELOCITY: 1.1 CM/S
RA PRESSURE: 3 MMHG
RIGHT VENTRICULAR END-DIASTOLIC DIMENSION: 2.87 CM
TDI LATERAL: 15 M/S
TR MAX PG: 16 MMHG
TRICUSPID ANNULAR PLANE SYSTOLIC EXCURSION: 1.99 CM
TRICUSPID VALVE PEAK A WAVE VELOCITY: 0.48 M/S
TV PEAK E VEL: 0.73 M/S
TV REST PULMONARY ARTERY PRESSURE: 19 MMHG

## 2020-04-16 ENCOUNTER — PATIENT MESSAGE (OUTPATIENT)
Dept: MATERNAL FETAL MEDICINE | Facility: CLINIC | Age: 40
End: 2020-04-16

## 2020-04-17 ENCOUNTER — TELEPHONE (OUTPATIENT)
Dept: NEUROLOGY | Facility: CLINIC | Age: 40
End: 2020-04-17

## 2020-04-17 NOTE — TELEPHONE ENCOUNTER
----- Message from Anitra Nielson RN sent at 4/17/2020  8:23 AM CDT -----  Regarding: neurology consult  Good Morning  Dr Viramontes would like for Ms Chaney to have a neurology consult due to her history of migraines and CVA.  Is it possible for your office to set up this consult?    Thanks  Argelia

## 2020-04-22 ENCOUNTER — INITIAL CONSULT (OUTPATIENT)
Dept: MATERNAL FETAL MEDICINE | Facility: CLINIC | Age: 40
End: 2020-04-22
Attending: OBSTETRICS & GYNECOLOGY
Payer: COMMERCIAL

## 2020-04-22 ENCOUNTER — PROCEDURE VISIT (OUTPATIENT)
Dept: MATERNAL FETAL MEDICINE | Facility: CLINIC | Age: 40
End: 2020-04-22
Payer: COMMERCIAL

## 2020-04-22 VITALS
DIASTOLIC BLOOD PRESSURE: 70 MMHG | HEIGHT: 69 IN | WEIGHT: 262.13 LBS | SYSTOLIC BLOOD PRESSURE: 100 MMHG | BODY MASS INDEX: 38.82 KG/M2

## 2020-04-22 DIAGNOSIS — O99.212 OBESITY COMPLICATING PREGNANCY IN SECOND TRIMESTER: ICD-10-CM

## 2020-04-22 DIAGNOSIS — Z36.89 ENCOUNTER FOR ULTRASOUND TO ASSESS FETAL GROWTH: ICD-10-CM

## 2020-04-22 DIAGNOSIS — O10.012 HYPERTENSION IN PREGNANCY, ESSENTIAL, ANTEPARTUM, SECOND TRIMESTER: ICD-10-CM

## 2020-04-22 DIAGNOSIS — O09.522 MULTIGRAVIDA OF ADVANCED MATERNAL AGE IN SECOND TRIMESTER: ICD-10-CM

## 2020-04-22 DIAGNOSIS — I63.00 CEREBROVASCULAR ACCIDENT (CVA) DUE TO THROMBOSIS OF PRECEREBRAL ARTERY: ICD-10-CM

## 2020-04-22 DIAGNOSIS — Z86.73 HISTORY OF CEREBRAL INFARCTION: Primary | ICD-10-CM

## 2020-04-22 PROCEDURE — 3008F PR BODY MASS INDEX (BMI) DOCUMENTED: ICD-10-PCS | Mod: CPTII,S$GLB,, | Performed by: OBSTETRICS & GYNECOLOGY

## 2020-04-22 PROCEDURE — 99213 OFFICE O/P EST LOW 20 MIN: CPT | Mod: 25,S$GLB,, | Performed by: OBSTETRICS & GYNECOLOGY

## 2020-04-22 PROCEDURE — 76816 OB US FOLLOW-UP PER FETUS: CPT | Mod: S$GLB,,, | Performed by: OBSTETRICS & GYNECOLOGY

## 2020-04-22 PROCEDURE — 99999 PR PBB SHADOW E&M-EST. PATIENT-LVL III: ICD-10-PCS | Mod: PBBFAC,,, | Performed by: OBSTETRICS & GYNECOLOGY

## 2020-04-22 PROCEDURE — 99999 PR PBB SHADOW E&M-EST. PATIENT-LVL III: CPT | Mod: PBBFAC,,, | Performed by: OBSTETRICS & GYNECOLOGY

## 2020-04-22 PROCEDURE — 3008F BODY MASS INDEX DOCD: CPT | Mod: CPTII,S$GLB,, | Performed by: OBSTETRICS & GYNECOLOGY

## 2020-04-22 PROCEDURE — 76816 PR  US,PREGNANT UTERUS,F/U,TRANSABD APP: ICD-10-PCS | Mod: S$GLB,,, | Performed by: OBSTETRICS & GYNECOLOGY

## 2020-04-22 PROCEDURE — 99213 PR OFFICE/OUTPT VISIT, EST, LEVL III, 20-29 MIN: ICD-10-PCS | Mod: 25,S$GLB,, | Performed by: OBSTETRICS & GYNECOLOGY

## 2020-04-23 NOTE — PROGRESS NOTES
Please see imaging tab for Viewpoint study performed today  See Consultative Note in US reports please      Pt to [facility]rology FU and needs completion of thrombophilia workup which she will pursue at her future Primary OB visit with Dr. Yen

## 2020-05-04 ENCOUNTER — ROUTINE PRENATAL (OUTPATIENT)
Dept: OBSTETRICS AND GYNECOLOGY | Facility: CLINIC | Age: 40
End: 2020-05-04
Attending: OBSTETRICS & GYNECOLOGY
Payer: COMMERCIAL

## 2020-05-04 ENCOUNTER — LAB VISIT (OUTPATIENT)
Dept: LAB | Facility: OTHER | Age: 40
End: 2020-05-04
Attending: OBSTETRICS & GYNECOLOGY
Payer: COMMERCIAL

## 2020-05-04 ENCOUNTER — CLINICAL SUPPORT (OUTPATIENT)
Dept: OBSTETRICS AND GYNECOLOGY | Facility: CLINIC | Age: 40
End: 2020-05-04
Payer: COMMERCIAL

## 2020-05-04 ENCOUNTER — PATIENT MESSAGE (OUTPATIENT)
Dept: INTERNAL MEDICINE | Facility: CLINIC | Age: 40
End: 2020-05-04

## 2020-05-04 VITALS — DIASTOLIC BLOOD PRESSURE: 70 MMHG | WEIGHT: 263 LBS | BODY MASS INDEX: 38.84 KG/M2 | SYSTOLIC BLOOD PRESSURE: 120 MMHG

## 2020-05-04 DIAGNOSIS — Z86.19 HISTORY OF WOUND INFECTION: ICD-10-CM

## 2020-05-04 DIAGNOSIS — O99.212 OBESITY AFFECTING PREGNANCY IN SECOND TRIMESTER: ICD-10-CM

## 2020-05-04 DIAGNOSIS — O09.522 MULTIGRAVIDA OF ADVANCED MATERNAL AGE IN SECOND TRIMESTER: ICD-10-CM

## 2020-05-04 DIAGNOSIS — O09.92 SUPERVISION OF HIGH RISK PREGNANCY IN SECOND TRIMESTER: ICD-10-CM

## 2020-05-04 DIAGNOSIS — Z67.91 RH NEGATIVE STATE IN ANTEPARTUM PERIOD: ICD-10-CM

## 2020-05-04 DIAGNOSIS — I10 HYPERTENSION, ESSENTIAL: ICD-10-CM

## 2020-05-04 DIAGNOSIS — I10 ESSENTIAL HYPERTENSION: ICD-10-CM

## 2020-05-04 DIAGNOSIS — O09.92 SUPERVISION OF HIGH RISK PREGNANCY IN SECOND TRIMESTER: Primary | ICD-10-CM

## 2020-05-04 DIAGNOSIS — O34.219 HISTORY OF CESAREAN SECTION COMPLICATING PREGNANCY: ICD-10-CM

## 2020-05-04 DIAGNOSIS — Z86.39 HISTORY OF DIABETES MELLITUS: Primary | ICD-10-CM

## 2020-05-04 DIAGNOSIS — O26.899 RH NEGATIVE STATE IN ANTEPARTUM PERIOD: ICD-10-CM

## 2020-05-04 DIAGNOSIS — O26.899 RH NEGATIVE STATE IN ANTEPARTUM PERIOD: Primary | ICD-10-CM

## 2020-05-04 DIAGNOSIS — O09.299 HISTORY OF PRE-ECLAMPSIA IN PRIOR PREGNANCY, CURRENTLY PREGNANT: ICD-10-CM

## 2020-05-04 DIAGNOSIS — I63.00 CEREBROVASCULAR ACCIDENT (CVA) DUE TO THROMBOSIS OF PRECEREBRAL ARTERY: ICD-10-CM

## 2020-05-04 DIAGNOSIS — Z67.91 RH NEGATIVE STATE IN ANTEPARTUM PERIOD: Primary | ICD-10-CM

## 2020-05-04 DIAGNOSIS — I51.7 LEFT VENTRICULAR HYPERTROPHY: ICD-10-CM

## 2020-05-04 LAB
BASOPHILS # BLD AUTO: 0.05 K/UL (ref 0–0.2)
BASOPHILS NFR BLD: 0.5 % (ref 0–1.9)
DIFFERENTIAL METHOD: ABNORMAL
EOSINOPHIL # BLD AUTO: 0.1 K/UL (ref 0–0.5)
EOSINOPHIL NFR BLD: 1.3 % (ref 0–8)
ERYTHROCYTE [DISTWIDTH] IN BLOOD BY AUTOMATED COUNT: 15.6 % (ref 11.5–14.5)
GLUCOSE SERPL-MCNC: 121 MG/DL (ref 70–140)
HCT VFR BLD AUTO: 32.6 % (ref 37–48.5)
HGB BLD-MCNC: 10 G/DL (ref 12–16)
IMM GRANULOCYTES # BLD AUTO: 0.07 K/UL (ref 0–0.04)
IMM GRANULOCYTES NFR BLD AUTO: 0.7 % (ref 0–0.5)
LYMPHOCYTES # BLD AUTO: 1.6 K/UL (ref 1–4.8)
LYMPHOCYTES NFR BLD: 16.5 % (ref 18–48)
MCH RBC QN AUTO: 24.1 PG (ref 27–31)
MCHC RBC AUTO-ENTMCNC: 30.7 G/DL (ref 32–36)
MCV RBC AUTO: 79 FL (ref 82–98)
MONOCYTES # BLD AUTO: 0.5 K/UL (ref 0.3–1)
MONOCYTES NFR BLD: 4.6 % (ref 4–15)
NEUTROPHILS # BLD AUTO: 7.5 K/UL (ref 1.8–7.7)
NEUTROPHILS NFR BLD: 76.4 % (ref 38–73)
NRBC BLD-RTO: 0 /100 WBC
PLATELET # BLD AUTO: 255 K/UL (ref 150–350)
PMV BLD AUTO: 9.7 FL (ref 9.2–12.9)
RBC # BLD AUTO: 4.15 M/UL (ref 4–5.4)
WBC # BLD AUTO: 9.81 K/UL (ref 3.9–12.7)

## 2020-05-04 PROCEDURE — 90471 TDAP VACCINE GREATER THAN OR EQUAL TO 7YO IM: ICD-10-PCS | Mod: 59,S$GLB,, | Performed by: OBSTETRICS & GYNECOLOGY

## 2020-05-04 PROCEDURE — 99999 PR PBB SHADOW E&M-EST. PATIENT-LVL III: CPT | Mod: PBBFAC,,, | Performed by: OBSTETRICS & GYNECOLOGY

## 2020-05-04 PROCEDURE — 36415 COLL VENOUS BLD VENIPUNCTURE: CPT

## 2020-05-04 PROCEDURE — 96372 THER/PROPH/DIAG INJ SC/IM: CPT | Mod: S$GLB,,, | Performed by: OBSTETRICS & GYNECOLOGY

## 2020-05-04 PROCEDURE — 0502F SUBSEQUENT PRENATAL CARE: CPT | Mod: CPTII,S$GLB,, | Performed by: OBSTETRICS & GYNECOLOGY

## 2020-05-04 PROCEDURE — 96372 RHO (D) IMMUNE GLOBULIN: ICD-10-PCS | Mod: S$GLB,,, | Performed by: OBSTETRICS & GYNECOLOGY

## 2020-05-04 PROCEDURE — 82950 GLUCOSE TEST: CPT

## 2020-05-04 PROCEDURE — 99999 PR PBB SHADOW E&M-EST. PATIENT-LVL III: ICD-10-PCS | Mod: PBBFAC,,, | Performed by: OBSTETRICS & GYNECOLOGY

## 2020-05-04 PROCEDURE — 0502F PR SUBSEQUENT PRENATAL CARE: ICD-10-PCS | Mod: CPTII,S$GLB,, | Performed by: OBSTETRICS & GYNECOLOGY

## 2020-05-04 PROCEDURE — 90471 IMMUNIZATION ADMIN: CPT | Mod: 59,S$GLB,, | Performed by: OBSTETRICS & GYNECOLOGY

## 2020-05-04 PROCEDURE — 99999 PR PBB SHADOW E&M-EST. PATIENT-LVL I: CPT | Mod: PBBFAC,,,

## 2020-05-04 PROCEDURE — 85025 COMPLETE CBC W/AUTO DIFF WBC: CPT

## 2020-05-04 PROCEDURE — 90715 TDAP VACCINE GREATER THAN OR EQUAL TO 7YO IM: ICD-10-PCS | Mod: S$GLB,,, | Performed by: OBSTETRICS & GYNECOLOGY

## 2020-05-04 PROCEDURE — 99999 PR PBB SHADOW E&M-EST. PATIENT-LVL I: ICD-10-PCS | Mod: PBBFAC,,,

## 2020-05-04 PROCEDURE — 90715 TDAP VACCINE 7 YRS/> IM: CPT | Mod: S$GLB,,, | Performed by: OBSTETRICS & GYNECOLOGY

## 2020-05-04 RX ORDER — BUTALBITAL, ACETAMINOPHEN AND CAFFEINE 50; 325; 40 MG/1; MG/1; MG/1
TABLET ORAL
Status: ON HOLD | COMMUNITY
Start: 2020-04-25 | End: 2020-06-01 | Stop reason: SDUPTHER

## 2020-05-04 NOTE — PROGRESS NOTES
Here for TDAP injection. Patient without complaint of pain at this time, Injection given. Tolerated well. No pain noted after injection.  Advised to wait in lobby 15 minutes and report any adverse reactions.     Site: ANNALISA    Baby Friendly Handout Given to Patient        Here for rhogam injection, no complaints at this time. Verified patient is RH negative. No complaint of pain before or after injection. Patient advised to wait 15 minutes before leaving and report any adverse reactions.    Site: YOKO

## 2020-05-04 NOTE — PROGRESS NOTES
Good fm.  Denies ctx, vb, lof.  Active Problem List with Overview Notes    Diagnosis Date Noted    Left ventricular hypertrophy 2020    Multigravida of advanced maternal age in second trimester 2020    History of pre-eclampsia- leading to delivery at 34 weeks 2020    Unwanted fertility- tubal papers signed 2020    History of diabetes mellitus- resolved after gastric bypass; last A1c 4.9 2020    Hx of bariatric surgery 2020    Cerebrovascular accident (CVA) due to thrombosis 2020    Obesity complicating pregnancy 2020    History of  section complicating pregnancy 2020    History of wound infection 2020    Pregnancy, supervision, high-risk 2020    Rh negative state in antepartum period 2020    Fibromyalgia muscle pain 2020    Class 2 severe obesity due to excess calories with serious comorbidity and body mass index (BMI) of 37.0 to 37.9 in adult 2020    Lumbar radiculopathy 2019    Chronic bilateral low back pain with bilateral sciatica 2019    Migraine 2019    Lower GI bleed 2018    CHTN on no medications 10/17/2018    Ankle arthritis 2018    Neurogenic claudication 03/10/2014    Migraine syndrome 2013     Anitra was seen today for routine prenatal visit.    Diagnoses and all orders for this visit:    Supervision of high risk pregnancy in second trimester    Left ventricular hypertrophy    Obesity affecting pregnancy in second trimester    History of  section complicating pregnancy    History of wound infection    Multigravida of advanced maternal age in second trimester    History of pre-eclampsia- leading to delivery at 34 weeks    Cerebrovascular accident (CVA) due to thrombosis of precerebral artery  -     Ambulatory referral/consult to Neurology; Future    Rh negative state in antepartum period

## 2020-05-06 ENCOUNTER — PATIENT MESSAGE (OUTPATIENT)
Dept: OBSTETRICS AND GYNECOLOGY | Facility: CLINIC | Age: 40
End: 2020-05-06

## 2020-05-19 ENCOUNTER — HOSPITAL ENCOUNTER (EMERGENCY)
Facility: OTHER | Age: 40
Discharge: HOME | End: 2020-05-19
Attending: OBSTETRICS & GYNECOLOGY
Payer: COMMERCIAL

## 2020-05-19 VITALS
RESPIRATION RATE: 18 BRPM | SYSTOLIC BLOOD PRESSURE: 119 MMHG | HEIGHT: 69 IN | WEIGHT: 260 LBS | TEMPERATURE: 98 F | HEART RATE: 69 BPM | DIASTOLIC BLOOD PRESSURE: 67 MMHG | OXYGEN SATURATION: 97 % | BODY MASS INDEX: 38.51 KG/M2

## 2020-05-19 DIAGNOSIS — Z3A.29 29 WEEKS GESTATION OF PREGNANCY: Primary | ICD-10-CM

## 2020-05-19 DIAGNOSIS — H53.8 VISUAL BLURRINESS: ICD-10-CM

## 2020-05-19 DIAGNOSIS — I63.9 STROKE: ICD-10-CM

## 2020-05-19 DIAGNOSIS — G44.201 INTRACTABLE TENSION-TYPE HEADACHE, UNSPECIFIED CHRONICITY PATTERN: ICD-10-CM

## 2020-05-19 PROBLEM — G43.511 MIGRAINE AURA, PERSISTENT, INTRACTABLE, WITH STATUS MIGRAINOSUS: Status: ACTIVE | Noted: 2020-05-19

## 2020-05-19 LAB
ALBUMIN SERPL BCP-MCNC: 2.8 G/DL (ref 3.5–5.2)
ALP SERPL-CCNC: 90 U/L (ref 55–135)
ALT SERPL W/O P-5'-P-CCNC: 7 U/L (ref 10–44)
ANION GAP SERPL CALC-SCNC: 11 MMOL/L (ref 8–16)
APTT BLDCRRT: 25.6 SEC (ref 21–32)
AST SERPL-CCNC: 8 U/L (ref 10–40)
BACTERIA #/AREA URNS HPF: ABNORMAL /HPF
BASOPHILS # BLD AUTO: 0.06 K/UL (ref 0–0.2)
BASOPHILS NFR BLD: 0.6 % (ref 0–1.9)
BILIRUB SERPL-MCNC: 0.1 MG/DL (ref 0.1–1)
BILIRUB UR QL STRIP: NEGATIVE
BUN SERPL-MCNC: 5 MG/DL (ref 6–20)
CALCIUM SERPL-MCNC: 8.5 MG/DL (ref 8.7–10.5)
CHLORIDE SERPL-SCNC: 110 MMOL/L (ref 95–110)
CLARITY UR: CLEAR
CO2 SERPL-SCNC: 19 MMOL/L (ref 23–29)
COLOR UR: YELLOW
CREAT SERPL-MCNC: 0.4 MG/DL (ref 0.5–1.4)
CREAT SERPL-MCNC: 0.6 MG/DL (ref 0.5–1.4)
DIFFERENTIAL METHOD: ABNORMAL
EOSINOPHIL # BLD AUTO: 0.1 K/UL (ref 0–0.5)
EOSINOPHIL NFR BLD: 1.2 % (ref 0–8)
ERYTHROCYTE [DISTWIDTH] IN BLOOD BY AUTOMATED COUNT: 15.1 % (ref 11.5–14.5)
EST. GFR  (AFRICAN AMERICAN): >60 ML/MIN/1.73 M^2
EST. GFR  (NON AFRICAN AMERICAN): >60 ML/MIN/1.73 M^2
GLUCOSE SERPL-MCNC: 90 MG/DL (ref 70–110)
GLUCOSE UR QL STRIP: NEGATIVE
HCT VFR BLD AUTO: 30.6 % (ref 37–48.5)
HGB BLD-MCNC: 9.5 G/DL (ref 12–16)
HGB UR QL STRIP: NEGATIVE
IMM GRANULOCYTES # BLD AUTO: 0.04 K/UL (ref 0–0.04)
IMM GRANULOCYTES NFR BLD AUTO: 0.4 % (ref 0–0.5)
INR PPP: 0.9 (ref 0.8–1.2)
KETONES UR QL STRIP: NEGATIVE
LEUKOCYTE ESTERASE UR QL STRIP: ABNORMAL
LYMPHOCYTES # BLD AUTO: 1.7 K/UL (ref 1–4.8)
LYMPHOCYTES NFR BLD: 17.9 % (ref 18–48)
MCH RBC QN AUTO: 23.6 PG (ref 27–31)
MCHC RBC AUTO-ENTMCNC: 31 G/DL (ref 32–36)
MCV RBC AUTO: 76 FL (ref 82–98)
MICROSCOPIC COMMENT: ABNORMAL
MONOCYTES # BLD AUTO: 0.7 K/UL (ref 0.3–1)
MONOCYTES NFR BLD: 6.8 % (ref 4–15)
NEUTROPHILS # BLD AUTO: 7.1 K/UL (ref 1.8–7.7)
NEUTROPHILS NFR BLD: 73.1 % (ref 38–73)
NITRITE UR QL STRIP: NEGATIVE
NRBC BLD-RTO: 0 /100 WBC
PH UR STRIP: 7 [PH] (ref 5–8)
PLATELET # BLD AUTO: 270 K/UL (ref 150–350)
PMV BLD AUTO: 9.1 FL (ref 9.2–12.9)
POC PTINR: 1.1 (ref 0.9–1.2)
POC PTWBT: 13 SEC (ref 9.7–14.3)
POCT GLUCOSE: 77 MG/DL (ref 70–110)
POTASSIUM SERPL-SCNC: 3.4 MMOL/L (ref 3.5–5.1)
PROT SERPL-MCNC: 6.8 G/DL (ref 6–8.4)
PROT UR QL STRIP: NEGATIVE
PROTHROMBIN TIME: 9.7 SEC (ref 9–12.5)
RBC # BLD AUTO: 4.02 M/UL (ref 4–5.4)
RBC #/AREA URNS HPF: 0 /HPF (ref 0–4)
SAMPLE: ABNORMAL
SAMPLE: NORMAL
SARS-COV-2 RDRP RESP QL NAA+PROBE: NEGATIVE
SODIUM SERPL-SCNC: 140 MMOL/L (ref 136–145)
SP GR UR STRIP: 1.01 (ref 1–1.03)
SQUAMOUS #/AREA URNS HPF: 90 /HPF
TSH SERPL DL<=0.005 MIU/L-ACNC: 1.45 UIU/ML (ref 0.4–4)
URN SPEC COLLECT METH UR: ABNORMAL
UROBILINOGEN UR STRIP-ACNC: NEGATIVE EU/DL
WBC # BLD AUTO: 9.7 K/UL (ref 3.9–12.7)
WBC #/AREA URNS HPF: 4 /HPF (ref 0–5)
WBC CLUMPS URNS QL MICRO: ABNORMAL

## 2020-05-19 PROCEDURE — 85730 THROMBOPLASTIN TIME PARTIAL: CPT

## 2020-05-19 PROCEDURE — U0002 COVID-19 LAB TEST NON-CDC: HCPCS

## 2020-05-19 PROCEDURE — 96366 THER/PROPH/DIAG IV INF ADDON: CPT | Mod: 59

## 2020-05-19 PROCEDURE — 99214 OFFICE O/P EST MOD 30 MIN: CPT | Mod: GT,,, | Performed by: PSYCHIATRY & NEUROLOGY

## 2020-05-19 PROCEDURE — 96375 TX/PRO/DX INJ NEW DRUG ADDON: CPT

## 2020-05-19 PROCEDURE — 93010 EKG 12-LEAD: ICD-10-PCS | Mod: ,,, | Performed by: INTERNAL MEDICINE

## 2020-05-19 PROCEDURE — 59025 PR FETAL 2N-STRESS TEST: ICD-10-PCS | Mod: 26,,, | Performed by: OBSTETRICS & GYNECOLOGY

## 2020-05-19 PROCEDURE — 63600175 PHARM REV CODE 636 W HCPCS: Performed by: OBSTETRICS & GYNECOLOGY

## 2020-05-19 PROCEDURE — 85610 PROTHROMBIN TIME: CPT

## 2020-05-19 PROCEDURE — 93005 ELECTROCARDIOGRAM TRACING: CPT | Mod: 59

## 2020-05-19 PROCEDURE — 99214 PR OFFICE/OUTPT VISIT, EST, LEVL IV, 30-39 MIN: ICD-10-PCS | Mod: GT,,, | Performed by: PSYCHIATRY & NEUROLOGY

## 2020-05-19 PROCEDURE — 84443 ASSAY THYROID STIM HORMONE: CPT

## 2020-05-19 PROCEDURE — 99284 EMERGENCY DEPT VISIT MOD MDM: CPT | Mod: 25

## 2020-05-19 PROCEDURE — 96365 THER/PROPH/DIAG IV INF INIT: CPT

## 2020-05-19 PROCEDURE — 99900035 HC TECH TIME PER 15 MIN (STAT)

## 2020-05-19 PROCEDURE — 80053 COMPREHEN METABOLIC PANEL: CPT

## 2020-05-19 PROCEDURE — 81000 URINALYSIS NONAUTO W/SCOPE: CPT

## 2020-05-19 PROCEDURE — 59025 FETAL NON-STRESS TEST: CPT | Mod: 26,,, | Performed by: OBSTETRICS & GYNECOLOGY

## 2020-05-19 PROCEDURE — 85025 COMPLETE CBC W/AUTO DIFF WBC: CPT

## 2020-05-19 PROCEDURE — 93010 ELECTROCARDIOGRAM REPORT: CPT | Mod: ,,, | Performed by: INTERNAL MEDICINE

## 2020-05-19 PROCEDURE — 99284 PR EMERGENCY DEPT VISIT,LEVEL IV: ICD-10-PCS | Mod: 25,,, | Performed by: OBSTETRICS & GYNECOLOGY

## 2020-05-19 PROCEDURE — 99284 EMERGENCY DEPT VISIT MOD MDM: CPT | Mod: 25,,, | Performed by: OBSTETRICS & GYNECOLOGY

## 2020-05-19 PROCEDURE — 80061 LIPID PANEL: CPT

## 2020-05-19 PROCEDURE — 63600175 PHARM REV CODE 636 W HCPCS: Performed by: STUDENT IN AN ORGANIZED HEALTH CARE EDUCATION/TRAINING PROGRAM

## 2020-05-19 PROCEDURE — 82565 ASSAY OF CREATININE: CPT

## 2020-05-19 PROCEDURE — 25000003 PHARM REV CODE 250: Performed by: OBSTETRICS & GYNECOLOGY

## 2020-05-19 PROCEDURE — 59025 FETAL NON-STRESS TEST: CPT

## 2020-05-19 RX ORDER — PROCHLORPERAZINE EDISYLATE 5 MG/ML
10 INJECTION INTRAMUSCULAR; INTRAVENOUS ONCE
Status: COMPLETED | OUTPATIENT
Start: 2020-05-19 | End: 2020-05-19

## 2020-05-19 RX ORDER — ACETAMINOPHEN 500 MG
1000 TABLET ORAL ONCE
Status: COMPLETED | OUTPATIENT
Start: 2020-05-19 | End: 2020-05-19

## 2020-05-19 RX ORDER — PROCHLORPERAZINE MALEATE 10 MG
10 TABLET ORAL 2 TIMES DAILY PRN
Qty: 45 TABLET | Refills: 1 | Status: SHIPPED | OUTPATIENT
Start: 2020-05-19 | End: 2020-07-02 | Stop reason: SDUPTHER

## 2020-05-19 RX ORDER — DIPHENHYDRAMINE HYDROCHLORIDE 50 MG/ML
25 INJECTION INTRAMUSCULAR; INTRAVENOUS ONCE
Status: COMPLETED | OUTPATIENT
Start: 2020-05-19 | End: 2020-05-19

## 2020-05-19 RX ORDER — MAGNESIUM SULFATE 1 G/100ML
1 INJECTION INTRAVENOUS ONCE
Status: COMPLETED | OUTPATIENT
Start: 2020-05-19 | End: 2020-05-19

## 2020-05-19 RX ORDER — LANOLIN ALCOHOL/MO/W.PET/CERES
400 CREAM (GRAM) TOPICAL NIGHTLY PRN
Qty: 30 TABLET | Refills: 1 | COMMUNITY
Start: 2020-05-19 | End: 2020-05-21 | Stop reason: DRUGHIGH

## 2020-05-19 RX ADMIN — PROCHLORPERAZINE EDISYLATE 10 MG: 5 INJECTION INTRAMUSCULAR; INTRAVENOUS at 08:05

## 2020-05-19 RX ADMIN — MAGNESIUM SULFATE 1 G: 1 INJECTION INTRAVENOUS at 08:05

## 2020-05-19 RX ADMIN — ACETAMINOPHEN 1000 MG: 500 TABLET ORAL at 07:05

## 2020-05-19 RX ADMIN — DIPHENHYDRAMINE HYDROCHLORIDE 25 MG: 50 INJECTION INTRAMUSCULAR; INTRAVENOUS at 08:05

## 2020-05-19 NOTE — ED PROVIDER NOTES
Encounter Date: 2020       History     Chief Complaint   Patient presents with    Abdominal Pain    Headache    Back Pain     HPI   Anitra Chaney is a 40 y.o. [id number] at 29w6d presents complaining severe HA that was sudden onset today. She notes that she has had intermittent Headaches over the past few days, but approximately 1 hour prior to arrival to the hospital she had a severe unrelenting HA that is located on the R side of her head and radiates down her R neck. She endorses balance difficulties at the onset of her HA, as well as R sided blurry vision/ spots. She denies weakness or numbness of extremities. Denies difficulty with mentation or word forming. 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   She also has a h/o migraines but has not had problems with any migraines throughout this pregnancy and notes this feels different than her normal migraines she has experienced. She took a fioricet at the onset of her HA, and states that it remained a 10/10 in severity and actua     She also is complaining of severe onset abdominal pain around the same time as her HA onset. She notes that around this time last year when she delivered her son at 33 weeks, she had a similar onset of acute abdominal pain.     This IUP is complicated by LTCS x3, cHTN (no meds), h/o stroke, PFO, MO, h/o bariatric surgery, migraines, fibromyalgia/ chronic pain, currently smoking.      Patient denies contractions, denies vaginal bleeding, denies LOF.   Fetal Movement: normal.     Review of patient's allergies indicates:   Allergen Reactions    Ibuprofen Nausea And Vomiting     Other reaction(s): gastric    Tramadol Nausea And Vomiting     Past Medical History:   Diagnosis Date    Anxiety and depression     GERD (gastroesophageal reflux disease)     HTN (hypertension), benign 3/10/2014    Migraines     Obesity     PFO (patent foramen ovale)     found  "after stroke "too small/risky to close"    Stroke 2017    Hospitalized at [facility]; given tPA     Past Surgical History:   Procedure Laterality Date    ANKLE FRACTURE SURGERY       SECTION N/A 2019    Procedure:  SECTION;  Surgeon: Gianna Flowers MD;  Location: [facility] L&D;  Service: OB/GYN;  Laterality: N/A;     SECTION, CLASSIC  2005/2009/2019    x3    CHOLECYSTECTOMY  3/2002    lap maribel    FOOT SURGERY      gastric sleeve  2017     Family History   Problem Relation Age of Onset    Other Father         house fire    Breast cancer Neg Hx     Colon cancer Neg Hx     Ovarian cancer Neg Hx     Congenital heart disease Neg Hx     Pacemaker/defibrilator Neg Hx     Early death Neg Hx      Social History     Tobacco Use    Smoking status: Current Every Day Smoker     Packs/day: 1.00     Years: 18.00     Pack years: 18.00     Types: Cigarettes    Smokeless tobacco: Never Used    Tobacco comment: cutting to 2 cigarettes per day   Substance Use Topics    Alcohol use: No     Alcohol/week: 0.0 standard drinks     Frequency: Never     Drinks per session: Patient refused     Binge frequency: Patient refused    Drug use: No     Review of Systems   Constitutional: Negative for fever.   HENT: Negative for sinus pain and sore throat.    Eyes: Positive for photophobia and visual disturbance.   Respiratory: Negative for cough, chest tightness and shortness of breath.    Cardiovascular: Negative for chest pain and palpitations.   Gastrointestinal: Positive for abdominal pain. Negative for abdominal distention, nausea and vomiting.   Genitourinary: Negative for dysuria, flank pain, pelvic pain and vaginal bleeding.   Musculoskeletal: Negative for back pain and gait problem.   Skin: Negative for rash.   Neurological: Positive for weakness and headaches. Negative for tremors, seizures, syncope, facial asymmetry, speech difficulty, light-headedness and numbness.   Hematological: Does not " bruise/bleed easily.       Physical Exam     Initial Vitals   BP Pulse Resp Temp SpO2   05/19/20 1752 05/19/20 1752 05/19/20 1752 05/19/20 1752 05/19/20 1850   127/74 92 18 98.4 °F (36.9 °C) 100 %      MAP       --                Physical Exam    Vitals reviewed.  Constitutional: She appears well-developed and well-nourished. She is not diaphoretic. No distress.   HENT:   Head: Normocephalic and atraumatic.   Eyes: EOM are normal. Pupils are equal, round, and reactive to light.   Neck: Normal range of motion. Neck supple.   Cardiovascular: Normal rate, regular rhythm and normal heart sounds.   Pulmonary/Chest: Breath sounds normal. No respiratory distress.   Abdominal: Soft. Bowel sounds are normal. She exhibits no distension and no mass. There is no tenderness. There is no rebound and no guarding.   Genitourinary: Uterus normal.   Musculoskeletal: Normal range of motion. She exhibits no edema or tenderness.   Homans sign negative   Neurological: She is alert and oriented to person, place, and time. She has normal strength and normal reflexes. She displays normal reflexes (DTR wnl UE and LE). No cranial nerve deficit (CN 2-12 grossly intact) or sensory deficit.   Normal strength bilaterally in UE and LE  kernig and brudzinski sign negative   Skin: Skin is warm and dry. No rash noted.   Psychiatric: She has a normal mood and affect.     OB LABOR EXAM:     Membranes ruptured: No.   Method: Sterile vaginal exam per MD.   Vaginal Bleeding: none present.     Dilatation: 0.   Station: -4.   Effacement: 40%.   Amniotic Fluid Color: no fluid.            ED Course   Obtain Fetal nonstress test (NST)  Date/Time: 5/20/2020 6:39 AM  Performed by: Deandra Nunez MD  Authorized by: Chana Renteria MD     Nonstress Test:     Variability:  6-25 BPM    Decelerations:  None    Accelerations:  10 bpm    Baseline:  140    Contractions:  Not present  Biophysical Profile:     Nonstress Test Interpretation: appropriate for  gestational age      Overall Impression:  Reassuring      Labs Reviewed   CBC W/ AUTO DIFFERENTIAL - Abnormal; Notable for the following components:       Result Value    Hemoglobin 9.5 (*)     Hematocrit 30.6 (*)     Mean Corpuscular Volume 76 (*)     Mean Corpuscular Hemoglobin 23.6 (*)     Mean Corpuscular Hemoglobin Conc 31.0 (*)     RDW 15.1 (*)     MPV 9.1 (*)     Gran% 73.1 (*)     Lymph% 17.9 (*)     All other components within normal limits   COMPREHENSIVE METABOLIC PANEL - Abnormal; Notable for the following components:    Potassium 3.4 (*)     CO2 19 (*)     BUN, Bld 5 (*)     Calcium 8.5 (*)     Albumin 2.8 (*)     AST 8 (*)     ALT 7 (*)     All other components within normal limits   URINALYSIS, REFLEX TO URINE CULTURE - Abnormal; Notable for the following components:    Leukocytes, UA Trace (*)     All other components within normal limits    Narrative:     Preferred Collection Type->Urine, Clean Catch   URINALYSIS MICROSCOPIC - Abnormal; Notable for the following components:    Bacteria Few (*)     All other components within normal limits    Narrative:     Preferred Collection Type->Urine, Clean Catch   ISTAT CREATININE - Abnormal; Notable for the following components:    POC Creatinine 0.4 (*)     All other components within normal limits   PROTIME-INR   TSH   APTT   SARS-COV-2 RNA AMPLIFICATION, QUAL   LIPID PANEL   POCT GLUCOSE, HAND-HELD DEVICE   TYPE & SCREEN   POCT GLUCOSE   ISTAT PROCEDURE          Imaging Results          MRV Brain Without Contrast (Final result)  Result time 05/19/20 22:25:57    Final result by Luan Becker MD (05/19/20 22:25:57)                 Impression:      No acute intracranial abnormality.    MRA of the intracranial arteries is unremarkable without significant stenosis, occlusion, or aneurysm.    MRV is unremarkable without evidence of venous thrombosis, as above.      Electronically signed by resident: Rhianna  Jose  Date:    05/19/2020  Time:    22:00    Electronically signed by: Luan Becker MD  Date:    05/19/2020  Time:    22:25             Narrative:    EXAMINATION:  MRI BRAIN WITHOUT CONTRAST; MRV BRAIN WITHOUT CONTRAST; MRA BRAIN WITHOUT CONTRAST    CLINICAL HISTORY:  Headache, chronic, no new features, norm neuro exam;; headache and visual distrubance;    TECHNIQUE:  Multiplanar multisequence MR imaging of the brain was performed without contrast.  By separate acquisition, noncontrast MR angiography was performed of the intracranial vasculature with 3D time-of-flight technique through the [place], with MIP reformatting.  In addition, Time-of-flight MR venogram of the head was also obtained.  Maximum intensity pixel projections were obtained and presented in rotating format.  3-D analysis was also performed.    COMPARISON:  CT head 05/19/2020    FINDINGS:  Intracranial Compartment:    Ventricles and sulci are normal in size for age without evidence of hydrocephalus. No extra-axial blood or fluid collections.    The brain parenchyma appears normal. No mass lesion, acute hemorrhage, edema, or acute infarct.    Skull/Extracranial Contents (limited evaluation): Bone marrow signal intensity is normal.    MRA (Intracranial Arteries):No focal high-grade stenosis, occlusion, or aneurysm involving the intracranial ICAs, intracranial vertebral arteries, basilar artery, MCAs, PCAs, and ACAs.    MRV: No dural venous sinus, cortical vein, or deep cerebral vein thrombosis.                               MRA Brain without contrast (Final result)  Result time 05/19/20 22:25:57    Final result by Luan Becker MD (05/19/20 22:25:57)                 Impression:      No acute intracranial abnormality.    MRA of the intracranial arteries is unremarkable without significant stenosis, occlusion, or aneurysm.    MRV is unremarkable without evidence of venous thrombosis, as above.      Electronically signed by  resident: Rhianna Garcia  Date:    05/19/2020  Time:    22:00    Electronically signed by: Luan Becker MD  Date:    05/19/2020  Time:    22:25             Narrative:    EXAMINATION:  MRI BRAIN WITHOUT CONTRAST; MRV BRAIN WITHOUT CONTRAST; MRA BRAIN WITHOUT CONTRAST    CLINICAL HISTORY:  Headache, chronic, no new features, norm neuro exam;; headache and visual distrubance;    TECHNIQUE:  Multiplanar multisequence MR imaging of the brain was performed without contrast.  By separate acquisition, noncontrast MR angiography was performed of the intracranial vasculature with 3D time-of-flight technique through the [place] of [place], with MIP reformatting.  In addition, Time-of-flight MR venogram of the head was also obtained.  Maximum intensity pixel projections were obtained and presented in rotating format.  3-D analysis was also performed.    COMPARISON:  CT head 05/19/2020    FINDINGS:  Intracranial Compartment:    Ventricles and sulci are normal in size for age without evidence of hydrocephalus. No extra-axial blood or fluid collections.    The brain parenchyma appears normal. No mass lesion, acute hemorrhage, edema, or acute infarct.    Skull/Extracranial Contents (limited evaluation): Bone marrow signal intensity is normal.    MRA (Intracranial Arteries):No focal high-grade stenosis, occlusion, or aneurysm involving the intracranial ICAs, intracranial vertebral arteries, basilar artery, MCAs, PCAs, and ACAs.    MRV: No dural venous sinus, cortical vein, or deep cerebral vein thrombosis.                               MRI Brain Without Contrast (Final result)  Result time 05/19/20 22:25:57    Final result by Luan Becker MD (05/19/20 22:25:57)                 Impression:      No acute intracranial abnormality.    MRA of the intracranial arteries is unremarkable without significant stenosis, occlusion, or aneurysm.    MRV is unremarkable without evidence of venous thrombosis, as above.      Electronically  signed by resident: Rhianna Garcia  Date:    05/19/2020  Time:    22:00    Electronically signed by: Luan Becker MD  Date:    05/19/2020  Time:    22:25             Narrative:    EXAMINATION:  MRI BRAIN WITHOUT CONTRAST; MRV BRAIN WITHOUT CONTRAST; MRA BRAIN WITHOUT CONTRAST    CLINICAL HISTORY:  Headache, chronic, no new features, norm neuro exam;; headache and visual distrubance;    TECHNIQUE:  Multiplanar multisequence MR imaging of the brain was performed without contrast.  By separate acquisition, noncontrast MR angiography was performed of the intracranial vasculature with 3D time-of-flight technique through the [place] of [place], with MIP reformatting.  In addition, Time-of-flight MR venogram of the head was also obtained.  Maximum intensity pixel projections were obtained and presented in rotating format.  3-D analysis was also performed.    COMPARISON:  CT head 05/19/2020    FINDINGS:  Intracranial Compartment:    Ventricles and sulci are normal in size for age without evidence of hydrocephalus. No extra-axial blood or fluid collections.    The brain parenchyma appears normal. No mass lesion, acute hemorrhage, edema, or acute infarct.    Skull/Extracranial Contents (limited evaluation): Bone marrow signal intensity is normal.    MRA (Intracranial Arteries):No focal high-grade stenosis, occlusion, or aneurysm involving the intracranial ICAs, intracranial vertebral arteries, basilar artery, MCAs, PCAs, and ACAs.    MRV: No dural venous sinus, cortical vein, or deep cerebral vein thrombosis.                               CT Head Without Contrast (Final result)  Result time 05/19/20 18:42:51    Final result by Albertina Bennett MD (05/19/20 18:42:51)                 Impression:      No acute intracranial abnormalities identified.      Electronically signed by: Albertina Bennett MD  Date:    05/19/2020  Time:    18:42             Narrative:    EXAMINATION:  CT HEAD WITHOUT CONTRAST    CLINICAL  HISTORY:  Headache, acute, norm neuro exam;    TECHNIQUE:  Low dose axial images were obtained through the head.  Coronal and sagittal reformations were also performed. Contrast was not administered.    COMPARISON:  MRI brain from 2019.  CT head from 2017.    FINDINGS:  The brain is normally formed and exhibits normal density throughout with no indication of acute/recent major vascular distribution cerebral infarction, intraparenchymal hemorrhage, or intra-axial space occupying lesion. The ventricular system is normal in size and configuration with no evidence of hydrocephalus. No effacement of the skull-base cisterns. No abnormal extra-axial fluid collections or blood products. Visualized paranasal sinuses and mastoid air cells are clear. The calvarium shows no significant abnormality.                               X-Ray Chest AP Portable (Final result)  Result time 20 18:39:22    Final result by Albertina Bennett MD (20 18:39:22)                 Impression:      No acute cardiopulmonary process identified.      Electronically signed by: Albertina Bennett MD  Date:    2020  Time:    18:39             Narrative:    EXAMINATION:  XR CHEST AP PORTABLE    CLINICAL HISTORY:  Stroke;    TECHNIQUE:  Single frontal view of the chest was performed.    COMPARISON:  2019.    FINDINGS:  Cardiac silhouette is normal in size.  Lungs are symmetrically expanded.  No evidence of focal consolidative process, pneumothorax, or significant effusion.  No acute osseous abnormality identified.                                 Medical Decision Making:   Initial Assessment:   Anitra Chaney is a 40 y.o.  @29w6d w/ a h/o a CVA in 2017 and a current smoker who presents with sudden onset severe HA with vision changes   Differential Diagnosis:   CVA  TIA  Atypical Migraine  Tension HA  ED Management:  VSS on admission to [facility]- Normotensive and O2 sats wnl  First patient contact [phone]  Code  stroke called after initial evaluation of patient due to severe onset of HA with vision/ balance changes/smoker/history of stroke 2017  Stroke pathway initiated: IV placed, POCT glucose  Labs: CBC, CMP, TSH, PT, PTT, lipid panel  EKG ordered  Stroke team to bedside and patient transferred down to CT scanner  Neurology consult placed    Lab results: CBC 9/9.5/31/270, PT/INR wnl, covid neg, UA with trace leuks, TSH 1.45, CMP wnl other than K 3.4, POCT gluc 77    CT scan with no acute intracranial abnormalities    with no acute abnormalities    Neuro consulted- recommend HA cocktail with Magnesium, reglan, tylenol and benadryl as well as proceeding with MRI/MRA/MRV.   No capabilities to perform MRI at [facility] so will transfer pt to [facility] for scan and have patient return when scan ended.     NST reactive and reassuring without ctx noted on the monitor prior to patient being transferred to [facility]. Overall patient in stable condition from pregnancy standpoint. SVE 1/50/-3.     MRI/MRV/MRA wnl  Patient feeling overall much improved when presenting back from scans at [facility]. Abd pain and head pain better with cocktail from neuro.     Will plan to dc home with compazine and PO mag oxide per neuro recs. Counseled to return if any symptoms return/  worsen and to f/u with primary OBGYN within 1 week.               Attending Attestation:   Physician Attestation Statement for Resident:  As the supervising MD   Physician Attestation Statement: I have personally seen and examined this patient.   I agree with the above history. -:   As the supervising MD I agree with the above PE.    As the supervising MD I agree with the above treatment, course, plan, and disposition.   -: Patient evaluated and found to be stable, agree with resident's assessment and plan.  I was personally present during the entire procedure.  I have reviewed and agree with the residents interpretation of the following: lab data, x-rays, CT scans and EKG.  I  have reviewed the following: old records at this facility.                    ED Course as of May 19 2249   Tue May 19, 2020   1858 NST reactive/reassuring. No contractions. Cervix closed.  CT neg, teleneuro consult rec MRI/MRV/MRA    [AR]   1859 Will try HA cocktail without NSAIDS.     [AR]   2240 HA resolved, abdominal pain resolved. All imaging without abnormality.     [AR]      ED Course User Index  [AR] Deandra Nunez MD                Clinical Impression:       ICD-10-CM ICD-9-CM   1. 29 weeks gestation of pregnancy Z3A.29 V22.2   2. Stroke I63.9 434.91   3. Intractable tension-type headache, unspecified chronicity pattern G44.201 339.10   4. Visual blurriness H53.8 368.8         Disposition:   Disposition: Discharged  Condition: Stable     ED Disposition Condition    Discharge Stable        ED Prescriptions     None        Follow-up Information    None                                    Jeanne Pereira MD  Resident  05/19/20 0273       Deandra Nunez MD  05/20/20 [id number]

## 2020-05-19 NOTE — ED NOTES
Arrived for code stroke. Lab not present. Blood draws were pulled in OB ED. Awaiting for nurse Kaur to pull more blood for PT/INR and Creatinine. RT arrived on time.

## 2020-05-20 LAB
CHOLEST SERPL-MCNC: 210 MG/DL (ref 120–199)
CHOLEST/HDLC SERPL: 3.1 {RATIO} (ref 2–5)
HDLC SERPL-MCNC: 67 MG/DL (ref 40–75)
HDLC SERPL: 31.9 % (ref 20–50)
LDLC SERPL CALC-MCNC: 91.8 MG/DL (ref 63–159)
NONHDLC SERPL-MCNC: 143 MG/DL
TRIGL SERPL-MCNC: 256 MG/DL (ref 30–150)

## 2020-05-20 NOTE — ASSESSMENT & PLAN NOTE
39 y/o pregnant woman presenting with severe headache, associated with blurry vision.  No deficits noted on exam.  CTH unremarkable.  Suspect exacerbation of chronic migraines.  However, recommend MRI brain (with MRA and MRV) to r/o secondary headache.  If negative, can treat with migraine cocktail (tylenol, IV Mg, Relgan).

## 2020-05-20 NOTE — HPI
41 y/o woman, currently ~30 weeks pregnant (prior history of pre-eclapmsia but not during this pregnancy), complicated migraines, reported stroke in 2017 (hosptialized at [facility], subsequent MRIs normal), tobacco abuse, who presents with severe headache and vision changes.  Patient reports she's had a dull headache for the last several days.  Today while driving the headache escalated to 8/10 in severity, and she developed blurry vision b/l.  Headache located in occiput and R retro-orbital region.  It is throbbing.  No N/V.  +photophobia.  Took a Fioricet without relief.

## 2020-05-20 NOTE — CONSULTS
[facility]  Vascular Neurology  Comprehensive Stroke Center  Tele-Consultation Note      Consults    Consulting Provider: DESHAWN KEMP  Current Providers  No providers found    Patient Location:  [facility] OBSTETRICS EMERGENCY * Emergency Department  Spoke hospital nurse at bedside with patient assisting consultant.     Patient information was obtained from patient.         Assessment/Plan:     STROKE DOCUMENTATION     Acute Stroke Times:   Acute Stroke Times   Last Known Normal Date: 05/19/20  Last Known Normal Time: 1700  Stroke Team Called Date: 05/19/20  Stroke Team Called Time: 0635  Stroke Team Arrival Date: 05/19/20  Stroke Team Arrival Time: 0640  CT Interpretation Time: 0640    NIH Scale:  1a. Level of Consciousness: 0-->Alert, keenly responsive  1b. LOC Questions: 0-->Answers both questions correctly  1c. LOC Commands: 0-->Performs both tasks correctly  2. Best Gaze: 0-->Normal  3. Visual: 0-->No visual loss  4. Facial Palsy: 0-->Normal symmetrical movements  5a. Motor Arm, Left: 0-->No drift, limb holds 90 (or 45) degrees for full 10 secs  5b. Motor Arm, Right: 0-->No drift, limb holds 90 (or 45) degrees for full 10 secs  6a. Motor Leg, Left: 0-->No drift, leg holds 30 degree position for full 5 secs  6b. Motor Leg, Right: 0-->No drift, leg holds 30 degree position for full 5 secs  7. Limb Ataxia: 0-->Absent  8. Sensory: 0-->Normal, no sensory loss  9. Best Language: 0-->No aphasia, normal  10. Dysarthria: 0-->Normal  11. Extinction and Inattention (formerly Neglect): 0-->No abnormality  Total (NIH Stroke Scale): 0     Modified Humphreys    Katja Coma Scale:    ABCD2 Score:    [id number] Score:   HAS -BLED Score:   ICH Score:   Hunt & Dennison Classification:       Diagnoses:   Migraine aura, persistent, intractable, with status migrainosus  39 y/o pregnant woman presenting with severe headache, associated with blurry vision.  No deficits noted on exam.  CTH unremarkable.   "Suspect exacerbation of chronic migraines.  However, recommend MRI brain (with MRA and MRV) to r/o secondary headache.  If negative, can treat with migraine cocktail (tylenol, IV Mg, Relgan).        Blood pressure 127/74, pulse 92, temperature 98.4 °F (36.9 °C), temperature source Oral, resp. rate 18, height 5' 9" (1.753 m), weight 117.9 kg (260 lb), not currently breastfeeding.  Alteplase Eligible?: Yes  Alteplase Recommendation: Alteplase not recommended due to Suspected stroke mimic   Possible Interventional Revascularization Candidate? No; No significant neurological deficit    Disposition Recommendation: admit to inpatient    Subjective:     History of Present Illness:  41 y/o woman, currently ~30 weeks pregnant (prior history of pre-eclapmsia but not during this pregnancy), complicated migraines, reported stroke in 2017 (hosptialized at [facility], subsequent MRIs normal), tobacco abuse, who presents with severe headache and vision changes.  Patient reports she's had a dull headache for the last several days.  Today while driving the headache escalated to 8/10 in severity, and she developed blurry vision b/l.  Headache located in occiput and R retro-orbital region.  It is throbbing.  No N/V.  +photophobia.  Took a Fioricet without relief.      Woke up with symptoms?: no    Recent bleeding noted: no  Does the patient take any Blood Thinners? no  Medications: No relevant medications      Past Medical History: stroke    Past Surgical History: no major surgeries within the last 2 weeks    Family History: no relevant history    Social History: smoker (active)    Allergies: Ibuprofen  Tramadol No relevant allergies    Review of Systems   Constitutional: Negative for fever.   HENT: Negative for hearing loss.    Eyes: Positive for photophobia and visual disturbance.   Respiratory: Negative for shortness of breath.    Cardiovascular: Negative for chest pain.   Genitourinary: Negative for dysuria.   Neurological: Positive " "for headaches. Negative for speech difficulty and weakness.     Objective:   Vitals: Blood pressure 127/74, pulse 92, temperature 98.4 °F (36.9 °C), temperature source Oral, resp. rate 18, height 5' 9" (1.753 m), weight 117.9 kg (260 lb), not currently breastfeeding. Heart Rate: ,    CT READ: Yes  No hemmorhage. No mass effect. No early infarct signs.     Physical Exam   Constitutional: No distress.   HENT:   Head: Normocephalic and atraumatic.   Eyes: Pupils are equal, round, and reactive to light. EOM are normal.   Pulmonary/Chest: Effort normal.   Neurological:   MS: A&XO3, speech fluent, follows commands, no neglect  CN: PERRL, EOMI, counts fingers b/l, sensation intact, face symmetric, no dysarthria  Motor: no arm or leg drift  Sens: intact to LT  Coord: no ataxia on finger to nose                 Recommended the emergency room physician to have a brief discussion with the patient and/or family if available regarding the risks and benefits of treatment, and to briefly document the occurrence of that discussion in his clinical encounter note.     The attending portion of this evaluation, treatment, and documentation was performed per Komal Steen MD via audiovisual.    Billing code:  (moderate to severe stroke, large areas of edema, some mimics)    · This patient has a critical neurological condition/illness, with high morbidity and mortality.  · There is a high probability for acute neurological change leading to clinical and possibly life-threatening deterioration requiring highest level of physician preparedness for urgent intervention.  · Care was coordinated with other physicians involved in the patient's care.  · Radiologic studies and laboratory data were reviewed and interpreted, and plan of care was re-assessed based on the results.  · Diagnosis, treatment options and prognosis may have been discussed with the patient and/or family members or caregiver.  · Further advanced medical management and " further evaluation is warranted for his care.      In your opinion, this was a: Tier 1 Van Negative    Consult End Time: 7:33 PM     Komal Steen MD  Comprehensive Stroke Center  Vascular Neurology   [facility]

## 2020-05-20 NOTE — SUBJECTIVE & OBJECTIVE
"  Woke up with symptoms?: no    Recent bleeding noted: no  Does the patient take any Blood Thinners? no  Medications: No relevant medications      Past Medical History: stroke    Past Surgical History: no major surgeries within the last 2 weeks    Family History: no relevant history    Social History: smoker (active)    Allergies: Ibuprofen  Tramadol No relevant allergies    Review of Systems   Constitutional: Negative for fever.   HENT: Negative for hearing loss.    Eyes: Positive for photophobia and visual disturbance.   Respiratory: Negative for shortness of breath.    Cardiovascular: Negative for chest pain.   Genitourinary: Negative for dysuria.   Neurological: Positive for headaches. Negative for speech difficulty and weakness.     Objective:   Vitals: Blood pressure 127/74, pulse 92, temperature 98.4 °F (36.9 °C), temperature source Oral, resp. rate 18, height 5' 9" (1.753 m), weight 117.9 kg (260 lb), not currently breastfeeding. Heart Rate: ,    CT READ: Yes  No hemmorhage. No mass effect. No early infarct signs.     Physical Exam   Constitutional: No distress.   HENT:   Head: Normocephalic and atraumatic.   Eyes: Pupils are equal, round, and reactive to light. EOM are normal.   Pulmonary/Chest: Effort normal.   Neurological:   MS: A&XO3, speech fluent, follows commands, no neglect  CN: PERRL, EOMI, counts fingers b/l, sensation intact, face symmetric, no dysarthria  Motor: no arm or leg drift  Sens: intact to LT  Coord: no ataxia on finger to nose             "

## 2020-05-21 ENCOUNTER — ROUTINE PRENATAL (OUTPATIENT)
Dept: OBSTETRICS AND GYNECOLOGY | Facility: CLINIC | Age: 40
End: 2020-05-21
Payer: COMMERCIAL

## 2020-05-21 ENCOUNTER — HOSPITAL ENCOUNTER (EMERGENCY)
Facility: OTHER | Age: 40
Discharge: HOME | End: 2020-05-21
Attending: OBSTETRICS & GYNECOLOGY
Payer: COMMERCIAL

## 2020-05-21 VITALS
SYSTOLIC BLOOD PRESSURE: 110 MMHG | RESPIRATION RATE: 16 BRPM | HEART RATE: 78 BPM | OXYGEN SATURATION: 99 % | TEMPERATURE: 98 F | DIASTOLIC BLOOD PRESSURE: 72 MMHG

## 2020-05-21 VITALS
SYSTOLIC BLOOD PRESSURE: 130 MMHG | WEIGHT: 258.38 LBS | BODY MASS INDEX: 38.16 KG/M2 | DIASTOLIC BLOOD PRESSURE: 80 MMHG

## 2020-05-21 DIAGNOSIS — I10 ESSENTIAL HYPERTENSION: ICD-10-CM

## 2020-05-21 DIAGNOSIS — G43.511 MIGRAINE AURA, PERSISTENT, INTRACTABLE, WITH STATUS MIGRAINOSUS: Primary | ICD-10-CM

## 2020-05-21 DIAGNOSIS — T39.95XA ANALGESIC REBOUND HEADACHE: ICD-10-CM

## 2020-05-21 DIAGNOSIS — O26.893 PREGNANCY HEADACHE IN THIRD TRIMESTER: ICD-10-CM

## 2020-05-21 DIAGNOSIS — R51.9 CHRONIC INTRACTABLE HEADACHE, UNSPECIFIED HEADACHE TYPE: ICD-10-CM

## 2020-05-21 DIAGNOSIS — Z87.59 HISTORY OF PRE-ECLAMPSIA: ICD-10-CM

## 2020-05-21 DIAGNOSIS — O99.013 ANEMIA IN PREGNANCY, THIRD TRIMESTER: ICD-10-CM

## 2020-05-21 DIAGNOSIS — Z86.73 HISTORY OF TIA (TRANSIENT ISCHEMIC ATTACK): ICD-10-CM

## 2020-05-21 DIAGNOSIS — O09.522 MULTIGRAVIDA OF ADVANCED MATERNAL AGE IN SECOND TRIMESTER: ICD-10-CM

## 2020-05-21 DIAGNOSIS — R51.9 PREGNANCY HEADACHE IN THIRD TRIMESTER: ICD-10-CM

## 2020-05-21 DIAGNOSIS — G44.40 ANALGESIC REBOUND HEADACHE: ICD-10-CM

## 2020-05-21 DIAGNOSIS — O34.219 HISTORY OF CESAREAN SECTION COMPLICATING PREGNANCY: ICD-10-CM

## 2020-05-21 DIAGNOSIS — Z98.84 HX OF BARIATRIC SURGERY: ICD-10-CM

## 2020-05-21 DIAGNOSIS — O99.213 OBESITY AFFECTING PREGNANCY IN THIRD TRIMESTER: ICD-10-CM

## 2020-05-21 DIAGNOSIS — O09.93 SUPERVISION OF HIGH RISK PREGNANCY IN THIRD TRIMESTER: Primary | ICD-10-CM

## 2020-05-21 DIAGNOSIS — G89.29 CHRONIC INTRACTABLE HEADACHE, UNSPECIFIED HEADACHE TYPE: ICD-10-CM

## 2020-05-21 DIAGNOSIS — O99.212 OBESITY AFFECTING PREGNANCY IN SECOND TRIMESTER: ICD-10-CM

## 2020-05-21 LAB
ALBUMIN SERPL BCP-MCNC: 2.8 G/DL (ref 3.5–5.2)
ALP SERPL-CCNC: 90 U/L (ref 55–135)
ALT SERPL W/O P-5'-P-CCNC: 10 U/L (ref 10–44)
ANION GAP SERPL CALC-SCNC: 13 MMOL/L (ref 8–16)
AST SERPL-CCNC: 20 U/L (ref 10–40)
BASOPHILS # BLD AUTO: 0.06 K/UL (ref 0–0.2)
BASOPHILS NFR BLD: 0.5 % (ref 0–1.9)
BILIRUB SERPL-MCNC: 0.1 MG/DL (ref 0.1–1)
BILIRUB SERPL-MCNC: NORMAL MG/DL
BLOOD URINE, POC: NORMAL
BUN SERPL-MCNC: 5 MG/DL (ref 6–20)
CALCIUM SERPL-MCNC: 8.5 MG/DL (ref 8.7–10.5)
CHLORIDE SERPL-SCNC: 108 MMOL/L (ref 95–110)
CO2 SERPL-SCNC: 16 MMOL/L (ref 23–29)
COLOR, POC UA: NORMAL
CREAT SERPL-MCNC: 0.6 MG/DL (ref 0.5–1.4)
CREAT UR-MCNC: 145.7 MG/DL (ref 15–325)
DIFFERENTIAL METHOD: ABNORMAL
EOSINOPHIL # BLD AUTO: 0.1 K/UL (ref 0–0.5)
EOSINOPHIL NFR BLD: 1 % (ref 0–8)
ERYTHROCYTE [DISTWIDTH] IN BLOOD BY AUTOMATED COUNT: 15.1 % (ref 11.5–14.5)
EST. GFR  (AFRICAN AMERICAN): >60 ML/MIN/1.73 M^2
EST. GFR  (NON AFRICAN AMERICAN): >60 ML/MIN/1.73 M^2
GLUCOSE SERPL-MCNC: 77 MG/DL (ref 70–110)
GLUCOSE UR QL STRIP: NORMAL
HCT VFR BLD AUTO: 29.7 % (ref 37–48.5)
HGB BLD-MCNC: 9.2 G/DL (ref 12–16)
IMM GRANULOCYTES # BLD AUTO: 0.06 K/UL (ref 0–0.04)
IMM GRANULOCYTES NFR BLD AUTO: 0.5 % (ref 0–0.5)
KETONES UR QL STRIP: NORMAL
LEUKOCYTE ESTERASE URINE, POC: NORMAL
LYMPHOCYTES # BLD AUTO: 1.6 K/UL (ref 1–4.8)
LYMPHOCYTES NFR BLD: 14.3 % (ref 18–48)
MCH RBC QN AUTO: 24 PG (ref 27–31)
MCHC RBC AUTO-ENTMCNC: 31 G/DL (ref 32–36)
MCV RBC AUTO: 77 FL (ref 82–98)
MONOCYTES # BLD AUTO: 0.6 K/UL (ref 0.3–1)
MONOCYTES NFR BLD: 5.2 % (ref 4–15)
NEUTROPHILS # BLD AUTO: 8.6 K/UL (ref 1.8–7.7)
NEUTROPHILS NFR BLD: 78.5 % (ref 38–73)
NITRITE, POC UA: NORMAL
NRBC BLD-RTO: 0 /100 WBC
PH, POC UA: NORMAL
PLATELET # BLD AUTO: 271 K/UL (ref 150–350)
PMV BLD AUTO: 10.7 FL (ref 9.2–12.9)
POTASSIUM SERPL-SCNC: 4.1 MMOL/L (ref 3.5–5.1)
PROT SERPL-MCNC: 6.9 G/DL (ref 6–8.4)
PROT UR-MCNC: 17 MG/DL (ref 0–15)
PROT/CREAT UR: 0.12 MG/G{CREAT} (ref 0–0.2)
PROTEIN, POC: NORMAL
RBC # BLD AUTO: 3.84 M/UL (ref 4–5.4)
SARS-COV-2 RDRP RESP QL NAA+PROBE: NEGATIVE
SODIUM SERPL-SCNC: 137 MMOL/L (ref 136–145)
SPECIFIC GRAVITY, POC UA: NORMAL
UROBILINOGEN, POC UA: NORMAL
WBC # BLD AUTO: 10.93 K/UL (ref 3.9–12.7)

## 2020-05-21 PROCEDURE — 0502F PR SUBSEQUENT PRENATAL CARE: ICD-10-PCS | Mod: S$GLB,,, | Performed by: OBSTETRICS & GYNECOLOGY

## 2020-05-21 PROCEDURE — 59025 PR FETAL 2N-STRESS TEST: ICD-10-PCS | Mod: 26,,, | Performed by: OBSTETRICS & GYNECOLOGY

## 2020-05-21 PROCEDURE — 86147 CARDIOLIPIN ANTIBODY EA IG: CPT | Mod: 59

## 2020-05-21 PROCEDURE — 99999 PR PBB SHADOW E&M-EST. PATIENT-LVL II: CPT | Mod: PBBFAC,,, | Performed by: OBSTETRICS & GYNECOLOGY

## 2020-05-21 PROCEDURE — 85025 COMPLETE CBC W/AUTO DIFF WBC: CPT

## 2020-05-21 PROCEDURE — 99284 EMERGENCY DEPT VISIT MOD MDM: CPT | Mod: 25,,, | Performed by: OBSTETRICS & GYNECOLOGY

## 2020-05-21 PROCEDURE — 99284 PR EMERGENCY DEPT VISIT,LEVEL IV: ICD-10-PCS | Mod: 25,,, | Performed by: OBSTETRICS & GYNECOLOGY

## 2020-05-21 PROCEDURE — 86146 BETA-2 GLYCOPROTEIN ANTIBODY: CPT | Mod: 59

## 2020-05-21 PROCEDURE — U0002 COVID-19 LAB TEST NON-CDC: HCPCS

## 2020-05-21 PROCEDURE — 0502F SUBSEQUENT PRENATAL CARE: CPT | Mod: S$GLB,,, | Performed by: OBSTETRICS & GYNECOLOGY

## 2020-05-21 PROCEDURE — 80053 COMPREHEN METABOLIC PANEL: CPT

## 2020-05-21 PROCEDURE — 85613 RUSSELL VIPER VENOM DILUTED: CPT

## 2020-05-21 PROCEDURE — 25000003 PHARM REV CODE 250: Performed by: STUDENT IN AN ORGANIZED HEALTH CARE EDUCATION/TRAINING PROGRAM

## 2020-05-21 PROCEDURE — 3008F BODY MASS INDEX DOCD: CPT | Mod: CPTII,S$GLB,, | Performed by: OBSTETRICS & GYNECOLOGY

## 2020-05-21 PROCEDURE — 59025 FETAL NON-STRESS TEST: CPT

## 2020-05-21 PROCEDURE — 99999 PR PBB SHADOW E&M-EST. PATIENT-LVL II: ICD-10-PCS | Mod: PBBFAC,,, | Performed by: OBSTETRICS & GYNECOLOGY

## 2020-05-21 PROCEDURE — 99284 EMERGENCY DEPT VISIT MOD MDM: CPT | Mod: 25

## 2020-05-21 PROCEDURE — 81002 URINALYSIS NONAUTO W/O SCOPE: CPT

## 2020-05-21 PROCEDURE — 3008F PR BODY MASS INDEX (BMI) DOCUMENTED: ICD-10-PCS | Mod: CPTII,S$GLB,, | Performed by: OBSTETRICS & GYNECOLOGY

## 2020-05-21 PROCEDURE — 82570 ASSAY OF URINE CREATININE: CPT

## 2020-05-21 PROCEDURE — 59025 FETAL NON-STRESS TEST: CPT | Mod: 26,,, | Performed by: OBSTETRICS & GYNECOLOGY

## 2020-05-21 RX ORDER — BACLOFEN 20 MG
1 TABLET ORAL 2 TIMES DAILY
Refills: 0 | Status: ON HOLD | COMMUNITY
Start: 2020-05-21 | End: 2020-06-01 | Stop reason: HOSPADM

## 2020-05-21 RX ORDER — LANOLIN ALCOHOL/MO/W.PET/CERES
400 CREAM (GRAM) TOPICAL ONCE
Status: COMPLETED | OUTPATIENT
Start: 2020-05-21 | End: 2020-05-21

## 2020-05-21 RX ORDER — OXYCODONE AND ACETAMINOPHEN 5; 325 MG/1; MG/1
1 TABLET ORAL ONCE
Status: COMPLETED | OUTPATIENT
Start: 2020-05-21 | End: 2020-05-21

## 2020-05-21 RX ORDER — DIPHENHYDRAMINE HCL 25 MG
25 CAPSULE ORAL ONCE
Status: COMPLETED | OUTPATIENT
Start: 2020-05-21 | End: 2020-05-21

## 2020-05-21 RX ORDER — HYDROCODONE BITARTRATE AND ACETAMINOPHEN 7.5; 325 MG/1; MG/1
TABLET ORAL
Status: ON HOLD | COMMUNITY
Start: 2020-05-04 | End: 2020-07-10 | Stop reason: HOSPADM

## 2020-05-21 RX ADMIN — OXYCODONE HYDROCHLORIDE AND ACETAMINOPHEN 1 TABLET: 5; 325 TABLET ORAL at 05:05

## 2020-05-21 RX ADMIN — Medication 400 MG: at 03:05

## 2020-05-21 RX ADMIN — DIPHENHYDRAMINE HYDROCHLORIDE 25 MG: 25 CAPSULE ORAL at 03:05

## 2020-05-21 NOTE — ED PROVIDER NOTES
"Encounter Date: 2020       History     Chief Complaint   Patient presents with    Headache    Blurred Vision     HPI   Anitra Chaney is a 40 y.o. [id number] at 30w1d presents complaining of headache and blurry vision. Patient was seen earlier today for routine prenatal visit where she complained of headache, which started at 0700 today and associated blurry vision. She describes the headache as a "wrap around headache" just on that one side. She took compazine and Fioricet today with no relief. She states "I'm not having a stroke, I'm just having a HA". She was seen for similar symptoms in the [facility] 2 days ago where code stroke was activated and patient was evaluated by neurology; no intracranial process was found. CT imaging was negative. She denies and CP, SOB, RUQ pain, scotoma. Denies vaginal bleeding, contractions, loss of fluid. Confirms good fetal movement. Does have occasional ctx when moving. Has also been having diarrhea and vomited twice.     This IUP is complicated by hx of LTCS x 4, MO, AMA, h/o of CVA, h/o of atypical preeclampsia in previous pregnancy, tobacco use, hx of gastric sleeve surgery,       .    Review of patient's allergies indicates:   Allergen Reactions    Ibuprofen Nausea And Vomiting     Other reaction(s): gastric    Tramadol Nausea And Vomiting     Past Medical History:   Diagnosis Date    Anxiety and depression     GERD (gastroesophageal reflux disease)     HTN (hypertension), benign 3/10/2014    Migraines     Obesity     PFO (patent foramen ovale)     found after stroke "too small/risky to close"    Stroke 2017    Hospitalized at [facility]; given tPA     Past Surgical History:   Procedure Laterality Date    ANKLE FRACTURE SURGERY       SECTION N/A 2019    Procedure:  SECTION;  Surgeon: Gianna Flowers MD;  Location: [facility] L&D;  Service: OB/GYN;  Laterality: N/A;     SECTION, CLASSIC  2005/2009/2019    x3    CHOLECYSTECTOMY  " 3/2002    lap maribel    FOOT SURGERY      gastric sleeve  09/01/2017     Family History   Problem Relation Age of Onset    Other Father         house fire    Breast cancer Neg Hx     Colon cancer Neg Hx     Ovarian cancer Neg Hx     Congenital heart disease Neg Hx     Pacemaker/defibrilator Neg Hx     Early death Neg Hx      Social History     Tobacco Use    Smoking status: Current Every Day Smoker     Packs/day: 1.00     Years: 18.00     Pack years: 18.00     Types: Cigarettes    Smokeless tobacco: Never Used    Tobacco comment: cutting to 2 cigarettes per day   Substance Use Topics    Alcohol use: No     Alcohol/week: 0.0 standard drinks     Frequency: Never     Drinks per session: Patient refused     Binge frequency: Patient refused    Drug use: No     Review of Systems   Constitutional: Negative for chills, diaphoresis and fever.   HENT: Negative for congestion, postnasal drip, rhinorrhea, sneezing and sore throat.    Eyes: Negative for photophobia.   Respiratory: Negative for cough, chest tightness and shortness of breath.    Cardiovascular: Negative for chest pain, palpitations and leg swelling.   Gastrointestinal: Negative for abdominal pain, constipation, diarrhea, nausea and vomiting.   Genitourinary: Negative for dysuria, frequency and hematuria.   Musculoskeletal: Negative for arthralgias, back pain and joint swelling.   Neurological: Positive for headaches. Negative for syncope and light-headedness.   Psychiatric/Behavioral: Negative for self-injury, sleep disturbance and suicidal ideas. The patient is not nervous/anxious.      Physical Exam     Initial Vitals   BP Pulse Resp Temp SpO2   05/21/20 1512 05/21/20 1512 05/21/20 1513 05/21/20 1530 05/21/20 1513   110/72 65 16 97.5 °F (36.4 °C) 99 %      MAP       --                Physical Exam    Constitutional: Vital signs are normal. She appears well-developed and well-nourished. She is not diaphoretic. No distress.   HENT:   Head:  Normocephalic and atraumatic.   Cardiovascular: Normal rate, regular rhythm and normal heart sounds.   Pulmonary/Chest: Breath sounds normal. No respiratory distress. She has no wheezes.   Abdominal: Soft. There is no tenderness. There is no rebound and no guarding.   Gravid abdomen, size = dates  No RUQ pain on palpation      Neurological: She is alert and oriented to person, place, and time. She has normal reflexes. No cranial nerve deficit.   Skin: Skin is warm and dry. Capillary refill takes less than 2 seconds.   Psychiatric: She has a normal mood and affect. Her behavior is normal. Judgment and thought content normal.       ED Course   Obtain Fetal nonstress test (NST)  Date/Time: 5/21/2020 3:18 PM  Performed by: Sandy Hartley MD  Authorized by: Opal Cabrales MD     Nonstress Test:     Variability:  6-25 BPM    Decelerations:  None    Accelerations:  15 bpm    Acoustic Stimulator: No      Baseline:  140    Uterine Irritability: No      Contractions:  Not present  Biophysical Profile:     Nonstress Test Interpretation: reactive      Overall Impression:  Reassuring      Labs Reviewed   PROTEIN / CREATININE RATIO, URINE - Abnormal; Notable for the following components:       Result Value    Protein, Urine Random 17 (*)     All other components within normal limits   CBC W/ AUTO DIFFERENTIAL - Abnormal; Notable for the following components:    RBC 3.84 (*)     Hemoglobin 9.2 (*)     Hematocrit 29.7 (*)     Mean Corpuscular Volume 77 (*)     Mean Corpuscular Hemoglobin 24.0 (*)     Mean Corpuscular Hemoglobin Conc 31.0 (*)     RDW 15.1 (*)     Gran # (ANC) 8.6 (*)     Immature Grans (Abs) 0.06 (*)     Gran% 78.5 (*)     Lymph% 14.3 (*)     All other components within normal limits   COMPREHENSIVE METABOLIC PANEL - Abnormal; Notable for the following components:    CO2 16 (*)     BUN, Bld 5 (*)     Calcium 8.5 (*)     Albumin 2.8 (*)     All other components within normal limits   SARS-COV-2 RNA AMPLIFICATION,  QUAL   LUPUS ANTICOAGULANT (DRVVT)   BETA-2 GLYCOPROTEIN ANTIBODIES   ANTIPHOSPHOLIPID AB (ANTICARDIOLIPIN)   POCT URINALYSIS, DIPSTICK OR TABLET REAGENT, AUTOMATED, WITH MICROSCOP          Imaging Results    None          Medical Decision Making:   ED Management:  Has had previous HA  Will try magnesium oxide and benadryl for HA  Pre E labs as well as thrombophilia labs collected   CBC and CMP WNL  P/C 0.12  Normotensive here.   MCV low on CBC and patient with pika. Ferritin low with TIBC high. Taking oral Fe but 2/2 to gastric sleeve likely can't absorb it. May need to consider IV Fe. Anemia could be contributing to HA as well as rebound HA from multiple medications.   HA improved with medication and Dr. Hankins assumed care of patient. She was discharged home with precautions.                                  Clinical Impression:       ICD-10-CM ICD-9-CM   1. Migraine aura, persistent, intractable, with status migrainosus G43.511 346.53   2. Hx of bariatric surgery Z98.84 V45.86   3. Obesity affecting pregnancy in third trimester O99.213 649.13   4. CHTN on no medications I10 401.9   5. History of  section complicating pregnancy O34.219 654.20   6. Multigravida of advanced maternal age in second trimester O09.522 659.63   7. Analgesic rebound headache G44.40 339.3    T39.95XA E935.9   8. Anemia in pregnancy, third trimester O99.013 648.23   9. Chronic intractable headache, unspecified headache type R51 784.0             ED Disposition Condition    Discharge Stable        ED Prescriptions     Medication Sig Dispense Start Date End Date Auth. Provider    magnesium oxide 500 mg Tab Take 1 g by mouth 2 (two) times a day.  2020  Thee Hankins MD        Follow-up Information    None

## 2020-05-21 NOTE — PROGRESS NOTES
"  Subjective:       Patient ID: Anitra Chaney is a 40 y.o. female.    Chief Complaint:  Routine Prenatal Visit; Back Pain; Contractions (8/10 on pain scale); and Headache      History of Present Illness  Anitra Chaney is a 40 y.o. [id number] at 30w1d presents for routine ob visit. Patient was recently seen in [facility] for complaint of headache and neurologic symptoms. Code stroke was activated and patient was evaluated by neurology. No intracranial process was found. Patient was subsequently discharged home. Today she is continuing to complain of not feeling well and she feels as if the baby is coming. She reports she feels similar to her last pregnancy just before she delivered. She reports a headache rated at 7/10. She took compazine today which is her home medication without relief. She also reports a "haziness" but denies blurry vision or spots in her vision. Denies nausea/vomiting. Reports she has had watery diarrhea today. Denies fever. Denies any known sick contacts.   This IUP is complicated by h/o CS, AMA, obesity, h/o CVA.  Patient reports contractions, denies vaginal bleeding, denies LOF.   Fetal Movement: normal.          GYN & OB History  No LMP recorded (lmp unknown). Patient is pregnant.   Date of Last Pap: 3/15/2016    OB History    Para Term  AB Living   5 3 2 1 1 3   SAB TAB Ectopic Multiple Live Births   1     0 3      # Outcome Date GA Lbr Uyval/2nd Weight Sex Delivery Anes PTL Lv   5 Current            4  19 34w3d   M CS-LTranv Spinal  JIGAR   3 SAB 18 10w0d          2 Term 09 37w0d  2.722 kg (6 lb) F CS-Unspec EPI  JIGAR   1 Term 05 37w0d  3.345 kg (7 lb 6 oz) M CS-Unspec EPI  JIGAR       Review of Systems  Review of Systems   Constitutional: Negative for activity change, appetite change, chills and fever.   Eyes: Negative for visual disturbance.   Respiratory: Negative for cough and shortness of breath.    Cardiovascular: Negative for chest " pain.   Gastrointestinal: Negative for abdominal pain, constipation, nausea and vomiting.   Genitourinary: Negative for dysuria, frequency, hematuria, pelvic pain, urgency, vaginal bleeding, vaginal discharge and vaginal odor.   Musculoskeletal: Negative for myalgias.   Integumentary:  Negative for rash.   Neurological: Negative for headaches.   Psychiatric/Behavioral: Negative for depression and sleep disturbance. The patient is not nervous/anxious.            Objective:    Physical Exam:   Constitutional: She is oriented to person, place, and time. She appears well-developed and well-nourished. No distress.    HENT:   Head: Normocephalic and atraumatic.    Eyes: Conjunctivae and EOM are normal.    Neck: Normal range of motion. Neck supple. No thyromegaly present.    Cardiovascular: Normal rate and regular rhythm.     Pulmonary/Chest: Effort normal. No respiratory distress.        Abdominal: Soft. She exhibits no distension. There is no tenderness.             Musculoskeletal: Normal range of motion and moves all extremeties. She exhibits no edema or tenderness.       Neurological: She is alert and oriented to person, place, and time. She displays normal reflexes. No cranial nerve deficit. Coordination normal.    Skin: Skin is warm and dry. No rash noted.    Psychiatric: She has a normal mood and affect. Her behavior is normal.          Assessment:        1. Supervision of high risk pregnancy in third trimester    2. Obesity affecting pregnancy in second trimester    3. History of  section complicating pregnancy    4. History of TIA (transient ischemic attack)               Plan:      Anitra was seen today for routine prenatal visit, back pain, contractions and headache.    Diagnoses and all orders for this visit:    Supervision of high risk pregnancy in third trimester  - see TIA and h/o pre E    Obesity affecting pregnancy in second trimester  - BMI 38   - at risk for wound infection     History of  "preeclampsia   - preeclampsia at 34 weeks in last pregnancy   - severe feature of intractable headache   - did develop severe range pressures   - currently on no blood pressure medications   - BP normal today   - To [facility] for serial BP, labs and headache cocktail     History of  section complicating pregnancy  - plan for repeat  section with tubal ligation     History of TIA (transient ischemic attack)  - recently seen for complaint of headache   - Stroke eval performed including imaging and neuro consult  - patient reportign 7/10 headache today and "haziness"   - neuro exam benign   - did not respond to home compazine  - to [facility] for preE rule out     No orders of the defined types were placed in this encounter.        Susu Nicole MD  OBGYN, PGY-2        "

## 2020-05-21 NOTE — LETTER
May 21, 2020         [address]  [facility] [address]  Phone: [phone]       Patient: Anitra Chaney   YOB: 1980  Date of Visit: 05/21/2020    To Whom It May Concern:    Vickie Chaney  was at [facility] on 05/21/2020. She may return to work/school on 5/22/20 with no restrictions. If you have any questions or concerns, or if I can be of further assistance, please do not hesitate to contact me.    Sincerely,    Jeanne Pereira MD

## 2020-05-22 ENCOUNTER — TELEPHONE (OUTPATIENT)
Dept: OBSTETRICS AND GYNECOLOGY | Facility: CLINIC | Age: 40
End: 2020-05-22

## 2020-05-22 ENCOUNTER — PATIENT MESSAGE (OUTPATIENT)
Dept: OBSTETRICS AND GYNECOLOGY | Facility: CLINIC | Age: 40
End: 2020-05-22

## 2020-05-22 NOTE — TELEPHONE ENCOUNTER
----- Message from JONAH Haney MD sent at 5/22/2020 11:02 AM CDT -----  She needs follow-up scheduled for next week.  Thanks.    ----- Message -----  From: Marcie Harden LPN  Sent: 5/22/2020   8:13 AM CDT  To: JONAH Haney MD        ----- Message -----  From: Thee Hankins MD  Sent: 5/21/2020   8:07 PM CDT  To: JONAH Haney MD, [facility] Staff    y DR Haney,   I know you're familiar with this patient. Headache improved today. She has not been taking mag oxide daily. Her ferritin is extremely low and she has severe pica despite taking iron daily. Do you think she could benefit from iron infusion therapy? Apparently iron deficiency anemia can exacerbate/cause headaches. Just a thought!    Thee

## 2020-05-24 ENCOUNTER — HOSPITAL ENCOUNTER (EMERGENCY)
Facility: OTHER | Age: 40
Discharge: HOME | End: 2020-05-25
Attending: OBSTETRICS & GYNECOLOGY
Payer: COMMERCIAL

## 2020-05-24 DIAGNOSIS — R51.9 NONINTRACTABLE HEADACHE, UNSPECIFIED CHRONICITY PATTERN, UNSPECIFIED HEADACHE TYPE: ICD-10-CM

## 2020-05-24 DIAGNOSIS — Z3A.30 30 WEEKS GESTATION OF PREGNANCY: Primary | ICD-10-CM

## 2020-05-24 LAB
ALBUMIN SERPL BCP-MCNC: 2.7 G/DL (ref 3.5–5.2)
ALP SERPL-CCNC: 94 U/L (ref 55–135)
ALT SERPL W/O P-5'-P-CCNC: 5 U/L (ref 10–44)
ANION GAP SERPL CALC-SCNC: 12 MMOL/L (ref 8–16)
AST SERPL-CCNC: 8 U/L (ref 10–40)
BASOPHILS # BLD AUTO: 0.04 K/UL (ref 0–0.2)
BASOPHILS NFR BLD: 0.4 % (ref 0–1.9)
BILIRUB SERPL-MCNC: 0.1 MG/DL (ref 0.1–1)
BUN SERPL-MCNC: 7 MG/DL (ref 6–20)
CALCIUM SERPL-MCNC: 8.6 MG/DL (ref 8.7–10.5)
CHLORIDE SERPL-SCNC: 109 MMOL/L (ref 95–110)
CO2 SERPL-SCNC: 18 MMOL/L (ref 23–29)
CREAT SERPL-MCNC: 0.6 MG/DL (ref 0.5–1.4)
CREAT UR-MCNC: 88.8 MG/DL (ref 15–325)
DIFFERENTIAL METHOD: ABNORMAL
EOSINOPHIL # BLD AUTO: 0.2 K/UL (ref 0–0.5)
EOSINOPHIL NFR BLD: 1.6 % (ref 0–8)
ERYTHROCYTE [DISTWIDTH] IN BLOOD BY AUTOMATED COUNT: 15.4 % (ref 11.5–14.5)
EST. GFR  (AFRICAN AMERICAN): >60 ML/MIN/1.73 M^2
EST. GFR  (NON AFRICAN AMERICAN): >60 ML/MIN/1.73 M^2
GLUCOSE SERPL-MCNC: 91 MG/DL (ref 70–110)
HCT VFR BLD AUTO: 28.9 % (ref 37–48.5)
HGB BLD-MCNC: 9.1 G/DL (ref 12–16)
IMM GRANULOCYTES # BLD AUTO: 0.09 K/UL (ref 0–0.04)
IMM GRANULOCYTES NFR BLD AUTO: 0.9 % (ref 0–0.5)
LYMPHOCYTES # BLD AUTO: 2.3 K/UL (ref 1–4.8)
LYMPHOCYTES NFR BLD: 23.3 % (ref 18–48)
MCH RBC QN AUTO: 24.1 PG (ref 27–31)
MCHC RBC AUTO-ENTMCNC: 31.5 G/DL (ref 32–36)
MCV RBC AUTO: 77 FL (ref 82–98)
MONOCYTES # BLD AUTO: 0.5 K/UL (ref 0.3–1)
MONOCYTES NFR BLD: 5.6 % (ref 4–15)
NEUTROPHILS # BLD AUTO: 6.6 K/UL (ref 1.8–7.7)
NEUTROPHILS NFR BLD: 68.2 % (ref 38–73)
NRBC BLD-RTO: 0 /100 WBC
PLATELET # BLD AUTO: 261 K/UL (ref 150–350)
PMV BLD AUTO: 9.1 FL (ref 9.2–12.9)
POTASSIUM SERPL-SCNC: 3.4 MMOL/L (ref 3.5–5.1)
PROT SERPL-MCNC: 6.4 G/DL (ref 6–8.4)
PROT UR-MCNC: 13 MG/DL (ref 0–15)
PROT/CREAT UR: 0.15 MG/G{CREAT} (ref 0–0.2)
RBC # BLD AUTO: 3.78 M/UL (ref 4–5.4)
SODIUM SERPL-SCNC: 139 MMOL/L (ref 136–145)
WBC # BLD AUTO: 9.71 K/UL (ref 3.9–12.7)

## 2020-05-24 PROCEDURE — 96374 THER/PROPH/DIAG INJ IV PUSH: CPT | Mod: 59

## 2020-05-24 PROCEDURE — 85025 COMPLETE CBC W/AUTO DIFF WBC: CPT

## 2020-05-24 PROCEDURE — 99284 EMERGENCY DEPT VISIT MOD MDM: CPT | Mod: 25

## 2020-05-24 PROCEDURE — 25000003 PHARM REV CODE 250: Performed by: STUDENT IN AN ORGANIZED HEALTH CARE EDUCATION/TRAINING PROGRAM

## 2020-05-24 PROCEDURE — 59025 FETAL NON-STRESS TEST: CPT

## 2020-05-24 PROCEDURE — 84156 ASSAY OF PROTEIN URINE: CPT

## 2020-05-24 PROCEDURE — 80053 COMPREHEN METABOLIC PANEL: CPT

## 2020-05-24 RX ORDER — DIPHENHYDRAMINE HCL 25 MG
25 CAPSULE ORAL ONCE
Status: COMPLETED | OUTPATIENT
Start: 2020-05-24 | End: 2020-05-24

## 2020-05-24 RX ORDER — ACETAMINOPHEN 500 MG
1000 TABLET ORAL ONCE
Status: COMPLETED | OUTPATIENT
Start: 2020-05-25 | End: 2020-05-24

## 2020-05-24 RX ADMIN — DIPHENHYDRAMINE HYDROCHLORIDE 25 MG: 25 CAPSULE ORAL at 11:05

## 2020-05-24 RX ADMIN — ACETAMINOPHEN 1000 MG: 500 TABLET ORAL at 11:05

## 2020-05-25 VITALS
RESPIRATION RATE: 18 BRPM | HEART RATE: 77 BPM | DIASTOLIC BLOOD PRESSURE: 89 MMHG | OXYGEN SATURATION: 100 % | SYSTOLIC BLOOD PRESSURE: 119 MMHG | TEMPERATURE: 98 F

## 2020-05-25 PROCEDURE — 59025 FETAL NON-STRESS TEST: CPT | Mod: 26,,, | Performed by: OBSTETRICS & GYNECOLOGY

## 2020-05-25 PROCEDURE — 63600175 PHARM REV CODE 636 W HCPCS: Performed by: STUDENT IN AN ORGANIZED HEALTH CARE EDUCATION/TRAINING PROGRAM

## 2020-05-25 PROCEDURE — 59025 PR FETAL 2N-STRESS TEST: ICD-10-PCS | Mod: 26,,, | Performed by: OBSTETRICS & GYNECOLOGY

## 2020-05-25 PROCEDURE — 25000003 PHARM REV CODE 250: Performed by: STUDENT IN AN ORGANIZED HEALTH CARE EDUCATION/TRAINING PROGRAM

## 2020-05-25 PROCEDURE — 99284 EMERGENCY DEPT VISIT MOD MDM: CPT | Mod: 25,,, | Performed by: OBSTETRICS & GYNECOLOGY

## 2020-05-25 PROCEDURE — 99284 PR EMERGENCY DEPT VISIT,LEVEL IV: ICD-10-PCS | Mod: 25,,, | Performed by: OBSTETRICS & GYNECOLOGY

## 2020-05-25 RX ORDER — KETOROLAC TROMETHAMINE 30 MG/ML
30 INJECTION, SOLUTION INTRAMUSCULAR; INTRAVENOUS ONCE
Status: COMPLETED | OUTPATIENT
Start: 2020-05-25 | End: 2020-05-25

## 2020-05-25 RX ADMIN — KETOROLAC TROMETHAMINE 30 MG: 30 INJECTION, SOLUTION INTRAMUSCULAR; INTRAVENOUS at 01:05

## 2020-05-25 NOTE — ED NOTES
Patient arrived in OB ED with complaint of ongoing headache, abdominal pain, & generally just not feeling good.  States she also had a nosebleed earlier today.  States + fetal movement.  Denies vaginal bleeding & rupture of membranes.  External fetal monitor applied for NST.

## 2020-05-25 NOTE — DISCHARGE INSTRUCTIONS
"    Self-Care for Headaches  Most headaches aren't serious and can be relieved with self-care. But some headaches may be a sign of another health problem like eye trouble or high blood pressure. To find the best treatment, learn what kind of headaches you get. For tension headaches, self-care will usually help. To treat migraines, ask your healthcare provider for advice. It is also possible to get both tension and migraine headaches. Self-care involves relieving the pain and avoiding headache triggers if you can.    Ways to reduce pain and tension  Try these steps:  · Apply a cold compress or ice pack to the pain site.  · Drink fluids. If nausea makes it hard to drink, try sucking on ice.  · Rest. Protect yourself from bright light and loud noises.  · Calm your emotions by imagining a peaceful scene.  · Massage tight neck, shoulder, and head muscles.  · To relax muscles, soak in a hot bath or use a hot shower.  Use medicines  Aspirin or aspirin substitutes, such as ibuprofen and acetaminophen, can relieve headache. Remember: Never give aspirin to anyone 18 years old or younger because of the risk of developing Reye syndrome. Use pain medicines only when necessary.  Track your headaches  Keeping a headache diary can help you and your healthcare provider identify what's causing your headaches:  · Note when each headache happens.  · Identify your activities and the foods you've eaten 6 to 8 hours before the headache began.  · Look for any trends or "triggers."  Signs of tension headache  Any of the following can be signs:  · Dull pain or feeling of pressure in a tight band around your head  · Pain in your neck or shoulders  · Headache without a definite beginning or end  · Headache after an activity such as driving or working on a computer  Signs of migraine  Any of the following can be signs:  · Throbbing pain on one or both sides of your head  · Nausea or vomiting  · Extreme sensitivity to light, sound, and " "smells  · Bright spots, flashes, or other visual changes  · Pain or nausea so severe that you can't continue your daily activities  Call your healthcare provider   If you have any of the following symptoms, contact your healthcare provider:  · A headache that lingers after a recent injury or bump to the head.  · A fever with a stiff neck or pain when you bend your head toward your chest.  · A headache along with slurred speech, changes in your vision, or numbness or weakness in your arms or legs.  · A headache for longer than 3 days.  · Frequent headaches, especially in the morning.  · Headaches with seizures   · Seek immediate medical attention if you have a headache that you would call "the worst headache you have ever had."   Date Last Reviewed: 10/4/2015  © 5737-0562 [employer]. [address] [place] [address], [place] [address]. All rights reserved. This information is not intended as a substitute for professional medical care. Always follow your healthcare professional's instructions.        Quitting Smoking During Pregnancy    You may have just learned you are pregnant, or you may be months along. Either way, its not too late to make a change. Every cigarette you dont smoke is a benefit to you and your baby. Deciding not to smoke can be a tough choice, but you can change. Even if youve tried before, dont give up. Many smokers try quitting several times before they succeed.  Babies born to women who smoke  Problems babies born to women who smoke include:  · May be smaller than normal at birth  · Are more likely to die of sudden infant death syndrome (SIDS)  · May be cranky, restless, and get sick more often  · Are more likely to have learning problems as they develop   · Are more likely to have childhood obesity  Pick a way to quit  You can stop smoking in either of the ways listed below:  · Cold turkey. Today you smoke, tomorrow you dont. This is rough at first, but changes take place quickly and " withdrawal may be shorter.  · Tapering off. Over time, reduce the number of cigarettes you smoke each day. To do so, increase the amount of time between each smoke. Try not to inhale.  Set a quit date  No matter which method you choose, pick a date to quit smoking entirely:  · Choose a date  · After picking the day, armand it in bold letters on a calendar.  Your quit list  Why do I want to quit? What are my triggers for wanting to smoke?    Start by giving up cigarettes at the times you least need them. Write down a few more ideas. These might include writing down your triggers and avoiding them, joining a support group, keeping busy by finding new things to do, and rewarding yourself for your success.     Set limits  Whether you decide to quit cold turkey or to taper off, setting limits can help you quit:  · Limit where you smoke. Pick 1 room or a porch, and smoke only in that place.  · Hang a list of quit benefits in the spot where you smoke. Put 1 on the refrigerator and 1 on your car dashboard.  · Join a stop-smoking group.     For more information  Here are some resources to help you quit smoking:  · smokefree.gov/ndan-wb-fr-expert  · women.smokefree.gov  · [employer] Smoking Quitline: 877-44U-QUIT (275-387-9431)   Date Last Reviewed: 8/9/2015  © 6992-1317 [employer]. [address] [place] [address], [place] [address]. All rights reserved. This information is not intended as a substitute for professional medical care. Always follow your healthcare professional's instructions.

## 2020-05-25 NOTE — ED PROVIDER NOTES
"Encounter Date: 2020       History     Chief Complaint   Patient presents with    Headache    Abdominal Pain    Epistaxis     Anitra Chaney is a 40 y.o. [id number] at 30w4d presents complaining of headache, abdominal pain, bloody nose. She describes the headache as band like and mostly on the left side as well as behind the eye.   This IUP is complicated by LTCS x 4, MO, AMA, h/o of CVA, h/o of atypical preeclampsia in previous pregnancy, tobacco use, hx of gastric sleeve surgery.  Patient denies contractions, denies vaginal bleeding, denies LOF.   Fetal Movement: normal.    Patient denies chest pain, shortness of breath, RUQ pain. Does acknowledge blurry vision.     Patient seen on OB ED multiple times over the past week. Please see below from most recent visit .      Anitra Chaney is a 40 y.o. [id number] at 30w1d presents complaining of headache and blurry vision. Patient was seen earlier today for routine prenatal visit where she complained of headache, which started at 0700 today and associated blurry vision. She describes the headache as a "wrap around headache" just on that one side. She took compazine and Fioricet today with no relief. She states "I'm not having a stroke, I'm just having a HA". She was seen for similar symptoms in the [facility] 2 days ago where code stroke was activated and patient was evaluated by neurology; no intracranial process was found. CT imaging was negative. She denies and CP, SOB, RUQ pain, scotoma. Denies vaginal bleeding, contractions, loss of fluid. Confirms good fetal movement. Does have occasional ctx when moving. Has also been having diarrhea and vomited twice.      This IUP is complicated by hx of LTCS x 4, MO, AMA, h/o of CVA, h/o of atypical preeclampsia in previous pregnancy, tobacco use, hx of gastric sleeve surgery.         Review of patient's allergies indicates:   Allergen Reactions    Ibuprofen Nausea And Vomiting     Other reaction(s): " "gastric    Tramadol Nausea And Vomiting     Past Medical History:   Diagnosis Date    Anxiety and depression     GERD (gastroesophageal reflux disease)     HTN (hypertension), benign 3/10/2014    Migraines     Obesity     PFO (patent foramen ovale)     found after stroke "too small/risky to close"    Stroke 2017    Hospitalized at [facility]; given tPA     Past Surgical History:   Procedure Laterality Date    ANKLE FRACTURE SURGERY       SECTION N/A 2019    Procedure:  SECTION;  Surgeon: Gianna Flowers MD;  [facility]: [facility];  Service: OB/GYN;  Laterality: N/A;     SECTION, CLASSIC  2005/2009/2019    x3    CHOLECYSTECTOMY  3/2002    lap maribel    FOOT SURGERY      gastric sleeve  2017     Family History   Problem Relation Age of Onset    Other Father         house fire    Breast cancer Neg Hx     Colon cancer Neg Hx     Ovarian cancer Neg Hx     Congenital heart disease Neg Hx     Pacemaker/defibrilator Neg Hx     Early death Neg Hx      Social History     Tobacco Use    Smoking status: Current Every Day Smoker     Packs/day: 1.00     Years: 18.00     Pack years: 18.00     Types: Cigarettes    Smokeless tobacco: Never Used    Tobacco comment: cutting to 2 cigarettes per day   Substance Use Topics    Alcohol use: No     Alcohol/week: 0.0 standard drinks     Frequency: Never     Drinks per session: Patient refused     Binge frequency: Patient refused    Drug use: No     Review of Systems   Constitutional: Negative for chills and fever.   HENT: Negative for facial swelling.    Eyes: Negative for visual disturbance.   Respiratory: Negative for chest tightness and shortness of breath.    Cardiovascular: Negative for chest pain.   Gastrointestinal: Negative for abdominal pain, nausea and vomiting.   Genitourinary: Negative for dysuria, vaginal bleeding and vaginal discharge.   Skin: Negative.    Neurological: Positive for headaches.   Psychiatric/Behavioral: " Negative.        Physical Exam     Initial Vitals   BP Pulse Resp Temp SpO2   05/24/20 2251 05/24/20 2251 05/24/20 2251 05/24/20 2251 05/24/20 2252   98/61 78 18 98.1 °F (36.7 °C) 100 %      MAP       --                Physical Exam    Vitals reviewed.  Constitutional: She appears well-developed and well-nourished.   HENT:   Head: Normocephalic and atraumatic.   Eyes: EOM are normal.   Neck: Normal range of motion. Neck supple.   Cardiovascular: Normal rate.   Pulmonary/Chest: No respiratory distress.   Abdominal: Soft. There is no tenderness.   Genitourinary: Vagina normal and uterus normal.   Musculoskeletal: Normal range of motion.   Neurological: She is alert and oriented to person, place, and time.   Skin: Skin is warm and dry.   Psychiatric: She has a normal mood and affect.         ED Course   Obtain Fetal nonstress test (NST)  Date/Time: 5/24/2020 11:00 PM  Performed by: Bessie Bal MD  Authorized by: Bessie Bal MD     Nonstress Test:     Variability:  6-25 BPM    Decelerations:  None    Accelerations:  10 bpm    Baseline:  135    Contractions:  Not present  Biophysical Profile:     Nonstress Test Interpretation: reactive      Overall Impression:  Reassuring  Post-procedure:     Patient tolerance:  Patient tolerated the procedure well with no immediate complications      Labs Reviewed   CBC W/ AUTO DIFFERENTIAL - Abnormal; Notable for the following components:       Result Value    RBC 3.78 (*)     Hemoglobin 9.1 (*)     Hematocrit 28.9 (*)     Mean Corpuscular Volume 77 (*)     Mean Corpuscular Hemoglobin 24.1 (*)     Mean Corpuscular Hemoglobin Conc 31.5 (*)     RDW 15.4 (*)     MPV 9.1 (*)     Immature Granulocytes 0.9 (*)     Immature Grans (Abs) 0.09 (*)     All other components within normal limits   COMPREHENSIVE METABOLIC PANEL - Abnormal; Notable for the following components:    Potassium 3.4 (*)     CO2 18 (*)     Calcium 8.6 (*)     Albumin 2.7 (*)     AST 8 (*)     ALT 5 (*)     All  other components within normal limits   PROTEIN / CREATININE RATIO, URINE          Imaging Results    None          Medical Decision Making:   ED Management:  NST x 20 minutes reactive and reassuring  [place] shows no contractions  Patient has had fioricet, compazine, and mag oxide.   Will give 1 gram of Tylenol and 25 mg of Benadryl.   P/C labs wnl  Headache unrelieved. Will give Toradol.  Headache improved with Toradol and giving patient food. She had not eaten since 6pm.   Patient desires discharge at this time. Medically stable.  Has appointment with Dr. Haney Thursday.   Also has outpatient neurology appointment.   Will discharge home.   Other:   I have discussed this case with another health care provider.       <> Summary of the Discussion: Dr. Gunter              Attending Attestation:   Physician Attestation Statement for Resident:  As the supervising MD   Physician Attestation Statement: I have personally seen and examined this patient.   I agree with the above history. -:   As the supervising MD I agree with the above PE.    As the supervising MD I agree with the above treatment, course, plan, and disposition.   -:   NST  I independently reviewed the fetal non-stress test with the following interpretation:  135 BPM baseline  Variability: moderate  Accelerations: present  Decelerations: absent  Contractions: none  Category 1    Clinical Interpretation:reactive    Patient evaluated and found to be stable, agree with resident's assessment of headache unresponsive to multiple medications but with no s/s pre-eclampsia or eclampsia, recent negative CT scan and no other localizing signs for stroke and plan to discharge to home with neurology follow up.  I was personally present during the critical portions of the procedure(s) performed by the resident and was immediately available in the ED to provide services and assistance as needed during the entire procedure.  I have reviewed and agree with the residents  interpretation of the following: lab data.  I have reviewed the following: old records at this facility.                                  Clinical Impression:       ICD-10-CM ICD-9-CM   1. 30 weeks gestation of pregnancy Z3A.30 V22.2   2. Nonintractable headache, unspecified chronicity pattern, unspecified headache type R51 784.0                   Bessie Bal M.D.  [facility] PGY1       Bessie Bal MD  Resident  05/25/20 0343       Keshia Gunter MD  05/25/20 0645

## 2020-05-26 LAB
B2 GLYCOPROT1 IGA SER QL: <9 SAU
B2 GLYCOPROT1 IGG SER QL: <9 SGU
B2 GLYCOPROT1 IGM SER QL: <9 SMU
CARDIOLIPIN IGG SER IA-ACNC: <9.4 GPL (ref 0–14.99)
CARDIOLIPIN IGM SER IA-ACNC: <9.4 MPL (ref 0–12.49)
LA PPP-IMP: NEGATIVE

## 2020-05-28 ENCOUNTER — ROUTINE PRENATAL (OUTPATIENT)
Dept: OBSTETRICS AND GYNECOLOGY | Facility: CLINIC | Age: 40
End: 2020-05-28
Payer: COMMERCIAL

## 2020-05-28 ENCOUNTER — TELEPHONE (OUTPATIENT)
Dept: NEUROLOGY | Facility: CLINIC | Age: 40
End: 2020-05-28

## 2020-05-28 VITALS
WEIGHT: 262.13 LBS | SYSTOLIC BLOOD PRESSURE: 130 MMHG | BODY MASS INDEX: 38.71 KG/M2 | DIASTOLIC BLOOD PRESSURE: 72 MMHG

## 2020-05-28 DIAGNOSIS — O09.522 MULTIGRAVIDA OF ADVANCED MATERNAL AGE IN SECOND TRIMESTER: Primary | ICD-10-CM

## 2020-05-28 DIAGNOSIS — Z86.73 HISTORY OF TIA (TRANSIENT ISCHEMIC ATTACK): ICD-10-CM

## 2020-05-28 DIAGNOSIS — O09.299 HISTORY OF PRE-ECLAMPSIA IN PRIOR PREGNANCY, CURRENTLY PREGNANT: ICD-10-CM

## 2020-05-28 DIAGNOSIS — G43.909 MIGRAINE SYNDROME: ICD-10-CM

## 2020-05-28 PROCEDURE — 0502F PR SUBSEQUENT PRENATAL CARE: ICD-10-PCS | Mod: S$GLB,,, | Performed by: OBSTETRICS & GYNECOLOGY

## 2020-05-28 PROCEDURE — 0502F SUBSEQUENT PRENATAL CARE: CPT | Mod: S$GLB,,, | Performed by: OBSTETRICS & GYNECOLOGY

## 2020-05-28 PROCEDURE — 99999 PR PBB SHADOW E&M-EST. PATIENT-LVL III: CPT | Mod: PBBFAC,,, | Performed by: OBSTETRICS & GYNECOLOGY

## 2020-05-28 PROCEDURE — 3008F BODY MASS INDEX DOCD: CPT | Mod: CPTII,S$GLB,, | Performed by: OBSTETRICS & GYNECOLOGY

## 2020-05-28 PROCEDURE — 99999 PR PBB SHADOW E&M-EST. PATIENT-LVL III: ICD-10-PCS | Mod: PBBFAC,,, | Performed by: OBSTETRICS & GYNECOLOGY

## 2020-05-28 PROCEDURE — 3008F PR BODY MASS INDEX (BMI) DOCUMENTED: ICD-10-PCS | Mod: CPTII,S$GLB,, | Performed by: OBSTETRICS & GYNECOLOGY

## 2020-05-28 NOTE — PROGRESS NOTES
Chief Complaint   Patient presents with    Routine Prenatal Visit    Migraine       40 y.o., at 31w1d by Estimated Date of Delivery: 20    Complaints today: persistent throbbing headaches. Patient doesn't feel like she can get out of bed. She states that this is worse than when she finally was called to deliver her son. She feels she cant function. She wants to stop going to work. She works as a  and drives an hour to get there and is on the phone all day. She feels unsafe going to work. She is  Nervous something bad might happen. Currently she wake sup and takes compazine, fiorocet, mag, atarax. She says they are tearing up her stomach (also history of gastric bypass) and she is on prilosec  She does have a history of migraines out of pregnancy, specifically noting that she was on fiorocet and was relieved by that. She does have a history of a stroke in 2017, records obtained at [facility]. She was on a statin and asa after that.    Of note, patient was seen in the [facility] on , four days ago, and full imaging and pre-e labs were normal.     Pt has been referred to neurology by Dr. Wei and Dr. Hankins but she states they have never called her. She is adamant about that because she wants follo wup and to figure out what to do.      ROS  OBSTETRICS:   Contractions n   Bleeding n   Loss of fluid n   Fetal mvmnt y  GASTRO:   Nausea n   Vomiting n      OB History    Para Term  AB Living   5 3 2 1 1 3   SAB TAB Ectopic Multiple Live Births   1     0 3      # Outcome Date GA Lbr Yuval/2nd Weight Sex Delivery Anes PTL Lv   5 Current            4  19 34w3d   M CS-LTranv Spinal  JIGAR   3 SAB 18 10w0d          2 Term 09 37w0d  2.722 kg (6 lb) F CS-Unspec EPI  JIGAR   1 Term 05 37w0d  3.345 kg (7 lb 6 oz) M CS-Unspec EPI  JIGAR       Dating reviewed  Allergies and problem list reviewed and updated  Medical and surgical history reviewed  Prenatal labs reviewed and  updated    PHYSICAL EXAM  /72   Wt 118.9 kg (262 lb 2 oz)   LMP  (LMP Unknown)   BMI 38.71 kg/m²     GENERAL: No acute distress  HEENT: Normocephalic  NEURO: Alert and oriented x3  PSYCH: Normal mood and affect  PULMONARY: Non-labored respiration; no tachypnea  ABD: Soft, gravid, nontender; no hernia or hepatosplenomegaly    ASSESSMENT AND PLAN    PREGNANCY Problems (from 20 to present)     Problem Noted Resolved    Multigravida of advanced maternal age in second trimester 2020 by Florence Carvalho MD No    History of pre-eclampsia- leading to delivery at 34 weeks 2020 by Florence Carvalho MD No    Obesity complicating pregnancy 2020 by Stella Yen MD No    History of  section complicating pregnancy 2020 by Stella Yen MD No    History of wound infection 2020 by Stella Yen MD No    Pregnancy, supervision, high-risk 2020 by Stella Yen MD No    Rh negative state in antepartum period 2020 by Stella Yen MD No          Anitra was seen today for routine prenatal visit and migraine.    Diagnoses and all orders for this visit:    CHTN on no medications  -     Prenatal Testing; Standing  - Last pre - e labs on , wnl  - Will start weekly pre - e lab at next appointment next week    Migraine syndrome  -     Prenatal Testing; Standing  -     Ambulatory referral/consult to Neurology; Future  - Previous referrals made by patient states she has never been called. Dr. Yen last made referral on . Placing another in case that one was lost.     labor precautions given  Follow-up: 1 weeks    Lavinia Weber MD  OBGYN PGY-1

## 2020-05-28 NOTE — TELEPHONE ENCOUNTER
Called pt with appt. She stated she was at another providers appt. She will call back when she is through.

## 2020-05-31 ENCOUNTER — HOSPITAL ENCOUNTER (OUTPATIENT)
Facility: OTHER | Age: 40
LOS: 1 days | Discharge: HOME | End: 2020-06-01
Attending: OBSTETRICS & GYNECOLOGY | Admitting: OBSTETRICS & GYNECOLOGY
Payer: COMMERCIAL

## 2020-05-31 DIAGNOSIS — R51.9 NONINTRACTABLE HEADACHE, UNSPECIFIED CHRONICITY PATTERN, UNSPECIFIED HEADACHE TYPE: ICD-10-CM

## 2020-05-31 DIAGNOSIS — R51.9 INTRACTABLE HEADACHE, UNSPECIFIED CHRONICITY PATTERN, UNSPECIFIED HEADACHE TYPE: Primary | ICD-10-CM

## 2020-05-31 DIAGNOSIS — Z3A.31 31 WEEKS GESTATION OF PREGNANCY: ICD-10-CM

## 2020-05-31 DIAGNOSIS — G43.511 MIGRAINE AURA, PERSISTENT, INTRACTABLE, WITH STATUS MIGRAINOSUS: ICD-10-CM

## 2020-05-31 LAB
ABO + RH BLD: NORMAL
ALBUMIN SERPL BCP-MCNC: 2.9 G/DL (ref 3.5–5.2)
ALP SERPL-CCNC: 104 U/L (ref 55–135)
ALT SERPL W/O P-5'-P-CCNC: 9 U/L (ref 10–44)
ANION GAP SERPL CALC-SCNC: 12 MMOL/L (ref 8–16)
AST SERPL-CCNC: 9 U/L (ref 10–40)
BASOPHILS # BLD AUTO: 0.06 K/UL (ref 0–0.2)
BASOPHILS NFR BLD: 0.5 % (ref 0–1.9)
BILIRUB SERPL-MCNC: 0.1 MG/DL (ref 0.1–1)
BLD GP AB SCN CELLS X3 SERPL QL: NORMAL
BLOOD GROUP ANTIBODIES SERPL: NORMAL
BUN SERPL-MCNC: 6 MG/DL (ref 6–20)
CALCIUM SERPL-MCNC: 8.9 MG/DL (ref 8.7–10.5)
CHLORIDE SERPL-SCNC: 106 MMOL/L (ref 95–110)
CO2 SERPL-SCNC: 20 MMOL/L (ref 23–29)
CREAT SERPL-MCNC: 0.6 MG/DL (ref 0.5–1.4)
CREAT UR-MCNC: 55.3 MG/DL (ref 15–325)
DIFFERENTIAL METHOD: ABNORMAL
EOSINOPHIL # BLD AUTO: 0.2 K/UL (ref 0–0.5)
EOSINOPHIL NFR BLD: 1.4 % (ref 0–8)
ERYTHROCYTE [DISTWIDTH] IN BLOOD BY AUTOMATED COUNT: 15.4 % (ref 11.5–14.5)
EST. GFR  (AFRICAN AMERICAN): >60 ML/MIN/1.73 M^2
EST. GFR  (NON AFRICAN AMERICAN): >60 ML/MIN/1.73 M^2
GLUCOSE SERPL-MCNC: 89 MG/DL (ref 70–110)
HCT VFR BLD AUTO: 29.7 % (ref 37–48.5)
HGB BLD-MCNC: 9.2 G/DL (ref 12–16)
IMM GRANULOCYTES # BLD AUTO: 0.08 K/UL (ref 0–0.04)
IMM GRANULOCYTES NFR BLD AUTO: 0.7 % (ref 0–0.5)
LYMPHOCYTES # BLD AUTO: 2.2 K/UL (ref 1–4.8)
LYMPHOCYTES NFR BLD: 19.1 % (ref 18–48)
MAGNESIUM SERPL-MCNC: 2 MG/DL (ref 1.6–2.6)
MCH RBC QN AUTO: 23.5 PG (ref 27–31)
MCHC RBC AUTO-ENTMCNC: 31 G/DL (ref 32–36)
MCV RBC AUTO: 76 FL (ref 82–98)
MONOCYTES # BLD AUTO: 0.7 K/UL (ref 0.3–1)
MONOCYTES NFR BLD: 5.7 % (ref 4–15)
NEUTROPHILS # BLD AUTO: 8.4 K/UL (ref 1.8–7.7)
NEUTROPHILS NFR BLD: 72.6 % (ref 38–73)
NRBC BLD-RTO: 0 /100 WBC
PHOSPHATE SERPL-MCNC: 3.3 MG/DL (ref 2.7–4.5)
PLATELET # BLD AUTO: 260 K/UL (ref 150–350)
PMV BLD AUTO: 9.6 FL (ref 9.2–12.9)
POTASSIUM SERPL-SCNC: 3.6 MMOL/L (ref 3.5–5.1)
PROT SERPL-MCNC: 6.7 G/DL (ref 6–8.4)
PROT UR-MCNC: 13 MG/DL (ref 0–15)
PROT/CREAT UR: 0.24 MG/G{CREAT} (ref 0–0.2)
RBC # BLD AUTO: 3.91 M/UL (ref 4–5.4)
SODIUM SERPL-SCNC: 138 MMOL/L (ref 136–145)
WBC # BLD AUTO: 11.59 K/UL (ref 3.9–12.7)

## 2020-05-31 PROCEDURE — 63600175 PHARM REV CODE 636 W HCPCS: Performed by: OBSTETRICS & GYNECOLOGY

## 2020-05-31 PROCEDURE — 82570 ASSAY OF URINE CREATININE: CPT

## 2020-05-31 PROCEDURE — 59025 FETAL NON-STRESS TEST: CPT

## 2020-05-31 PROCEDURE — 80053 COMPREHEN METABOLIC PANEL: CPT

## 2020-05-31 PROCEDURE — 96374 THER/PROPH/DIAG INJ IV PUSH: CPT

## 2020-05-31 PROCEDURE — 99285 EMERGENCY DEPT VISIT HI MDM: CPT | Mod: 25

## 2020-05-31 PROCEDURE — 81002 URINALYSIS NONAUTO W/O SCOPE: CPT

## 2020-05-31 PROCEDURE — 96375 TX/PRO/DX INJ NEW DRUG ADDON: CPT

## 2020-05-31 PROCEDURE — 86870 RBC ANTIBODY IDENTIFICATION: CPT

## 2020-05-31 PROCEDURE — 96372 THER/PROPH/DIAG INJ SC/IM: CPT | Mod: 59

## 2020-05-31 PROCEDURE — 85025 COMPLETE CBC W/AUTO DIFF WBC: CPT

## 2020-05-31 PROCEDURE — 86901 BLOOD TYPING SEROLOGIC RH(D): CPT

## 2020-05-31 PROCEDURE — 83735 ASSAY OF MAGNESIUM: CPT

## 2020-05-31 PROCEDURE — 84100 ASSAY OF PHOSPHORUS: CPT

## 2020-05-31 RX ORDER — BETAMETHASONE SODIUM PHOSPHATE AND BETAMETHASONE ACETATE 3; 3 MG/ML; MG/ML
12 INJECTION, SUSPENSION INTRA-ARTICULAR; INTRALESIONAL; INTRAMUSCULAR; SOFT TISSUE ONCE
Status: COMPLETED | OUTPATIENT
Start: 2020-05-31 | End: 2020-05-31

## 2020-05-31 RX ORDER — METOCLOPRAMIDE HYDROCHLORIDE 5 MG/ML
10 INJECTION INTRAMUSCULAR; INTRAVENOUS EVERY 6 HOURS
Status: DISCONTINUED | OUTPATIENT
Start: 2020-05-31 | End: 2020-06-01

## 2020-05-31 RX ORDER — DIPHENHYDRAMINE HYDROCHLORIDE 50 MG/ML
25 INJECTION INTRAMUSCULAR; INTRAVENOUS ONCE
Status: COMPLETED | OUTPATIENT
Start: 2020-05-31 | End: 2020-05-31

## 2020-05-31 RX ORDER — KETOROLAC TROMETHAMINE 30 MG/ML
30 INJECTION, SOLUTION INTRAMUSCULAR; INTRAVENOUS ONCE
Status: COMPLETED | OUTPATIENT
Start: 2020-05-31 | End: 2020-05-31

## 2020-05-31 RX ADMIN — BETAMETHASONE SODIUM PHOSPHATE AND BETAMETHASONE ACETATE 12 MG: 3; 3 INJECTION, SUSPENSION INTRA-ARTICULAR; INTRALESIONAL; INTRAMUSCULAR at 10:05

## 2020-05-31 RX ADMIN — KETOROLAC TROMETHAMINE 30 MG: 30 INJECTION, SOLUTION INTRAMUSCULAR; INTRAVENOUS at 10:05

## 2020-05-31 RX ADMIN — METOCLOPRAMIDE 10 MG: 5 INJECTION, SOLUTION INTRAMUSCULAR; INTRAVENOUS at 10:05

## 2020-05-31 RX ADMIN — DIPHENHYDRAMINE HYDROCHLORIDE 25 MG: 50 INJECTION INTRAMUSCULAR; INTRAVENOUS at 10:05

## 2020-05-31 NOTE — LETTER
June 1, 2020    Anitra Chaney  [address]  [place] [address]            [address]  [facility] [address]  Phone: [phone]  Fax: [phone] June 1, 2020     Patient: Anitra Chaney   YOB: 1980   Date of Visit: 5/31/2020       To Whom It May Concern:    Anitra Chaney was seen at [facility] from 5/31/2020 to 6/1/2020. Ms. Chaney is currently 31 weeks pregnant. Her pregnancy is complicated by an intractable headache and elevated blood pressures. We believe that stress is contributing to her headache and blood pressures and advise that the patient take off of work until delivery.    If you have any questions or concerns, please don't hesitate to call.    Sincerely,        Ashlee Sena MD

## 2020-06-01 ENCOUNTER — ANESTHESIA (OUTPATIENT)
Dept: OBSTETRICS AND GYNECOLOGY | Facility: OTHER | Age: 40
End: 2020-06-01

## 2020-06-01 ENCOUNTER — ANESTHESIA EVENT (OUTPATIENT)
Dept: OBSTETRICS AND GYNECOLOGY | Facility: OTHER | Age: 40
End: 2020-06-01

## 2020-06-01 VITALS
BODY MASS INDEX: 36.79 KG/M2 | DIASTOLIC BLOOD PRESSURE: 72 MMHG | HEART RATE: 68 BPM | RESPIRATION RATE: 18 BRPM | HEIGHT: 70 IN | OXYGEN SATURATION: 99 % | WEIGHT: 257 LBS | TEMPERATURE: 98 F | SYSTOLIC BLOOD PRESSURE: 110 MMHG

## 2020-06-01 DIAGNOSIS — Z86.73 HISTORY OF CEREBRAL INFARCTION: ICD-10-CM

## 2020-06-01 DIAGNOSIS — H53.8 BLURRY VISION, RIGHT EYE: Primary | ICD-10-CM

## 2020-06-01 DIAGNOSIS — Z36.89 ENCOUNTER FOR ULTRASOUND TO CHECK FETAL GROWTH: Primary | ICD-10-CM

## 2020-06-01 DIAGNOSIS — O10.012 HYPERTENSION IN PREGNANCY, ESSENTIAL, ANTEPARTUM, SECOND TRIMESTER: Primary | ICD-10-CM

## 2020-06-01 PROBLEM — D64.9 ANEMIA: Status: ACTIVE | Noted: 2020-06-01

## 2020-06-01 PROBLEM — R51.9 NONINTRACTABLE HEADACHE: Status: ACTIVE | Noted: 2020-06-01

## 2020-06-01 PROBLEM — R51.9 INTRACTABLE HEADACHE: Status: ACTIVE | Noted: 2020-06-01

## 2020-06-01 PROBLEM — R51.9 NONINTRACTABLE HEADACHE: Status: RESOLVED | Noted: 2020-06-01 | Resolved: 2020-06-01

## 2020-06-01 PROBLEM — Z3A.31 31 WEEKS GESTATION OF PREGNANCY: Status: ACTIVE | Noted: 2020-06-01

## 2020-06-01 LAB
BILIRUB SERPL-MCNC: NORMAL MG/DL
BLOOD URINE, POC: NORMAL
COLOR, POC UA: YELLOW
GLUCOSE UR QL STRIP: NORMAL
KETONES UR QL STRIP: NORMAL
LEUKOCYTE ESTERASE URINE, POC: NORMAL
NITRITE, POC UA: NORMAL
PH, POC UA: 6
PROTEIN, POC: NORMAL
SARS-COV-2 RDRP RESP QL NAA+PROBE: NEGATIVE
SPECIFIC GRAVITY, POC UA: 1
UROBILINOGEN, POC UA: NORMAL

## 2020-06-01 PROCEDURE — 99220 PR INITIAL OBSERVATION CARE,LEVL III: CPT | Mod: 25,,, | Performed by: OBSTETRICS & GYNECOLOGY

## 2020-06-01 PROCEDURE — 25000003 PHARM REV CODE 250: Performed by: STUDENT IN AN ORGANIZED HEALTH CARE EDUCATION/TRAINING PROGRAM

## 2020-06-01 PROCEDURE — 99220 PR INITIAL OBSERVATION CARE,LEVL III: ICD-10-PCS | Mod: 25,,, | Performed by: OBSTETRICS & GYNECOLOGY

## 2020-06-01 PROCEDURE — 76816 PR  US,PREGNANT UTERUS,F/U,TRANSABD APP: ICD-10-PCS | Mod: 26,,, | Performed by: OBSTETRICS & GYNECOLOGY

## 2020-06-01 PROCEDURE — 59025 PR FETAL 2N-STRESS TEST: ICD-10-PCS | Mod: 26,,, | Performed by: OBSTETRICS & GYNECOLOGY

## 2020-06-01 PROCEDURE — 59025 FETAL NON-STRESS TEST: CPT | Mod: 26,,, | Performed by: OBSTETRICS & GYNECOLOGY

## 2020-06-01 PROCEDURE — G0378 HOSPITAL OBSERVATION PER HR: HCPCS

## 2020-06-01 PROCEDURE — 63600175 PHARM REV CODE 636 W HCPCS: Performed by: STUDENT IN AN ORGANIZED HEALTH CARE EDUCATION/TRAINING PROGRAM

## 2020-06-01 PROCEDURE — 59025 FETAL NON-STRESS TEST: CPT

## 2020-06-01 PROCEDURE — U0002 COVID-19 LAB TEST NON-CDC: HCPCS

## 2020-06-01 PROCEDURE — 76816 OB US FOLLOW-UP PER FETUS: CPT | Mod: 26,,, | Performed by: OBSTETRICS & GYNECOLOGY

## 2020-06-01 RX ORDER — PRENATAL WITH FERROUS FUM AND FOLIC ACID 3080; 920; 120; 400; 22; 1.84; 3; 20; 10; 1; 12; 200; 27; 25; 2 [IU]/1; [IU]/1; MG/1; [IU]/1; MG/1; MG/1; MG/1; MG/1; MG/1; MG/1; UG/1; MG/1; MG/1; MG/1; MG/1
1 TABLET ORAL DAILY
Status: DISCONTINUED | OUTPATIENT
Start: 2020-06-01 | End: 2020-06-01 | Stop reason: HOSPADM

## 2020-06-01 RX ORDER — BETAMETHASONE SODIUM PHOSPHATE AND BETAMETHASONE ACETATE 3; 3 MG/ML; MG/ML
12 INJECTION, SUSPENSION INTRA-ARTICULAR; INTRALESIONAL; INTRAMUSCULAR; SOFT TISSUE EVERY 24 HOURS
Status: DISCONTINUED | OUTPATIENT
Start: 2020-06-02 | End: 2020-06-01 | Stop reason: HOSPADM

## 2020-06-01 RX ORDER — DIPHENHYDRAMINE HYDROCHLORIDE 50 MG/ML
25 INJECTION INTRAMUSCULAR; INTRAVENOUS EVERY 4 HOURS PRN
Status: DISCONTINUED | OUTPATIENT
Start: 2020-06-01 | End: 2020-06-01 | Stop reason: HOSPADM

## 2020-06-01 RX ORDER — LANOLIN ALCOHOL/MO/W.PET/CERES
400 CREAM (GRAM) TOPICAL 2 TIMES DAILY
Refills: 0 | COMMUNITY
Start: 2020-06-01 | End: 2020-08-06

## 2020-06-01 RX ORDER — MAGNESIUM SULFATE 1 G/100ML
1 INJECTION INTRAVENOUS ONCE
Status: COMPLETED | OUTPATIENT
Start: 2020-06-01 | End: 2020-06-01

## 2020-06-01 RX ORDER — BUTALBITAL, ACETAMINOPHEN AND CAFFEINE 50; 325; 40 MG/1; MG/1; MG/1
1 TABLET ORAL EVERY 4 HOURS PRN
Qty: 20 TABLET | Refills: 1 | Status: SHIPPED | OUTPATIENT
Start: 2020-06-01 | End: 2020-07-02 | Stop reason: SDUPTHER

## 2020-06-01 RX ORDER — ONDANSETRON 8 MG/1
8 TABLET, ORALLY DISINTEGRATING ORAL EVERY 8 HOURS PRN
Status: DISCONTINUED | OUTPATIENT
Start: 2020-06-01 | End: 2020-06-01 | Stop reason: HOSPADM

## 2020-06-01 RX ORDER — OXYCODONE HYDROCHLORIDE 5 MG/1
5 TABLET ORAL EVERY 6 HOURS PRN
Status: DISCONTINUED | OUTPATIENT
Start: 2020-06-01 | End: 2020-06-01 | Stop reason: HOSPADM

## 2020-06-01 RX ORDER — OXYCODONE HYDROCHLORIDE 5 MG/1
10 TABLET ORAL EVERY 6 HOURS PRN
Status: DISCONTINUED | OUTPATIENT
Start: 2020-06-01 | End: 2020-06-01 | Stop reason: HOSPADM

## 2020-06-01 RX ORDER — SIMETHICONE 80 MG
1 TABLET,CHEWABLE ORAL EVERY 6 HOURS PRN
Status: DISCONTINUED | OUTPATIENT
Start: 2020-06-01 | End: 2020-06-01 | Stop reason: HOSPADM

## 2020-06-01 RX ORDER — LANOLIN ALCOHOL/MO/W.PET/CERES
400 CREAM (GRAM) TOPICAL 2 TIMES DAILY
Status: DISCONTINUED | OUTPATIENT
Start: 2020-06-01 | End: 2020-06-01 | Stop reason: HOSPADM

## 2020-06-01 RX ORDER — AMOXICILLIN 250 MG
1 CAPSULE ORAL NIGHTLY PRN
Status: DISCONTINUED | OUTPATIENT
Start: 2020-06-01 | End: 2020-06-01 | Stop reason: HOSPADM

## 2020-06-01 RX ORDER — BUTALBITAL, ACETAMINOPHEN AND CAFFEINE 50; 325; 40 MG/1; MG/1; MG/1
1 TABLET ORAL EVERY 4 HOURS PRN
Status: DISCONTINUED | OUTPATIENT
Start: 2020-06-01 | End: 2020-06-01 | Stop reason: HOSPADM

## 2020-06-01 RX ORDER — PRENATAL WITH FERROUS FUM AND FOLIC ACID 3080; 920; 120; 400; 22; 1.84; 3; 20; 10; 1; 12; 200; 27; 25; 2 [IU]/1; [IU]/1; MG/1; [IU]/1; MG/1; MG/1; MG/1; MG/1; MG/1; MG/1; UG/1; MG/1; MG/1; MG/1; MG/1
1 TABLET ORAL DAILY
Status: DISCONTINUED | OUTPATIENT
Start: 2020-06-01 | End: 2020-06-01

## 2020-06-01 RX ORDER — PANTOPRAZOLE SODIUM 40 MG/1
40 TABLET, DELAYED RELEASE ORAL DAILY
Status: DISCONTINUED | OUTPATIENT
Start: 2020-06-01 | End: 2020-06-01 | Stop reason: HOSPADM

## 2020-06-01 RX ORDER — DIPHENHYDRAMINE HCL 25 MG
25 CAPSULE ORAL EVERY 4 HOURS PRN
Status: DISCONTINUED | OUTPATIENT
Start: 2020-06-01 | End: 2020-06-01 | Stop reason: HOSPADM

## 2020-06-01 RX ORDER — ACETAMINOPHEN 325 MG/1
650 TABLET ORAL EVERY 6 HOURS PRN
Status: DISCONTINUED | OUTPATIENT
Start: 2020-06-01 | End: 2020-06-01 | Stop reason: HOSPADM

## 2020-06-01 RX ORDER — RIBOFLAVIN (VITAMIN B2) 100 MG
100 TABLET ORAL 2 TIMES DAILY
Qty: 60 TABLET | Refills: 0 | Status: SHIPPED | OUTPATIENT
Start: 2020-06-01 | End: 2020-07-01

## 2020-06-01 RX ORDER — CYPROHEPTADINE HYDROCHLORIDE 4 MG/1
4 TABLET ORAL ONCE
Status: COMPLETED | OUTPATIENT
Start: 2020-06-01 | End: 2020-06-01

## 2020-06-01 RX ORDER — FERROUS SULFATE 325(65) MG
325 TABLET, DELAYED RELEASE (ENTERIC COATED) ORAL DAILY
Status: DISCONTINUED | OUTPATIENT
Start: 2020-06-01 | End: 2020-06-01 | Stop reason: HOSPADM

## 2020-06-01 RX ADMIN — PANTOPRAZOLE SODIUM 40 MG: 40 TABLET, DELAYED RELEASE ORAL at 09:06

## 2020-06-01 RX ADMIN — FERROUS SULFATE TAB EC 325 MG (65 MG FE EQUIVALENT) 325 MG: 325 (65 FE) TABLET DELAYED RESPONSE at 09:06

## 2020-06-01 RX ADMIN — CYPROHEPTADINE HYDROCHLORIDE 4 MG: 4 TABLET ORAL at 01:06

## 2020-06-01 RX ADMIN — BUTALBITAL, ACETAMINOPHEN AND CAFFEINE 1 TABLET: 50; 325; 40 TABLET ORAL at 10:06

## 2020-06-01 RX ADMIN — Medication 400 MG: at 09:06

## 2020-06-01 RX ADMIN — MAGNESIUM SULFATE 1 G: 1 INJECTION INTRAVENOUS at 11:06

## 2020-06-01 RX ADMIN — BETAMETHASONE SODIUM PHOSPHATE AND BETAMETHASONE ACETATE 12 MG: 3; 3 INJECTION, SUSPENSION INTRA-ARTICULAR; INTRALESIONAL; INTRAMUSCULAR at 05:06

## 2020-06-01 RX ADMIN — Medication 1 TABLET: at 09:06

## 2020-06-01 NOTE — DISCHARGE INSTRUCTIONS
Call clinic [phone] or L & D after hours at [phone] for vaginal bleeding, leakage of fluids, contractions 4-5 in one hour, decreased fetal movements ( 10 kicks in 2 hours), headache not relieved by Tylenol, blurry vision, or temp of 100.4 or greater.  Begin doing fetal kick counts, at least 10 movements in 2 hours starting at 28 weeks gestation.  Keep next clinic appointment

## 2020-06-01 NOTE — PLAN OF CARE
VSS. Pt afebrile. Pt c/o headache, with mild relief from cyproheptadine. Denies blurry vision or RUQ pain. 24 hour urine started at 0155. Plan of care reviewed with pt. Pt has no questions at this time. No signs of distress

## 2020-06-01 NOTE — H&P
"[facility] ED  Obstetrics  History & Physical    Patient Name: Anitra Tyler  MRN: [id number]  Admission Date: 2020  Primary Care Provider: Tien Watts MD    Subjective:     Principal Problem:Intractable headache    History of Present Illness:  Anitra Tyler is a 40 y.o. [id number] at 31w4d presents complaining of headache that is "band-like" and blurry vision. Patient has been seen in ED multiple times for this same complaint. Has Mag oxide, fioricet, compazine, and high dose tylenol at home. She had full stroke work up this month secondary to this complaint w/ history of CVA and it was non-remarkable.    This IUP is complicated by hx of LTCS x 4, MO, AMA, h/o of CVA, h/o of atypical preeclampsia in previous pregnancy, tobacco use, hx of gastric sleeve surgery. Patient denies contractions, denies vaginal bleeding, denies LOF.   Fetal Movement: normal.\        Tonight has taken 1g Tylenol, compazine, Atirax, fioricet.    Obstetric HPI:  Patient denies contractions, active fetal movement, No vaginal bleeding , No loss of fluid     This pregnancy has been complicated by hx of LTCS x 4, MO, AMA, h/o of CVA, h/o of atypical preeclampsia in previous pregnancy, tobacco use, hx of gastric sleeve surgery.     OB History    Para Term  AB Living   5 3 2 1 1 3   SAB TAB Ectopic Multiple Live Births   1 0 0 0 3      # Outcome Date GA Lbr Yuval/2nd Weight Sex Delivery Anes PTL Lv   5 Current            4  19 34w3d   M CS-LTranv Spinal  JIGAR      Name: AZAM TYLER      Apgar1: 8  Apgar5: 9   3 SAB 18 10w0d          2 Term 09 37w0d  2.722 kg (6 lb) F CS-Unspec EPI  JIGAR   1 Term 05 37w0d  3.345 kg (7 lb 6 oz) M CS-Unspec EPI  JIGAR     Past Medical History:   Diagnosis Date    Anxiety and depression     GERD (gastroesophageal reflux disease)     HTN (hypertension), benign 3/10/2014    Migraines     Obesity     PFO " "(patent foramen ovale)     found after stroke "too small/risky to close"    Stroke 2017    Hospital[facility] at [facility]; given tPA     Past Surgical History:   Procedure Laterality Date    ANKLE FRACTURE SURGERY       SECTION N/A 2019    Procedure:  SECTION;  Surgeon: Gianna Flowers MD;  [facility];  Service: OB/GYN;  Laterality: N/A;     SECTION, CLASSIC  2005/2009/2019    x3    CHOLECYSTECTOMY  3/2002    lap maribel    FOOT SURGERY      gastric sleeve  2017         (Not in a hospital admission)    Review of patient's allergies indicates:   Allergen Reactions    Ibuprofen Nausea And Vomiting     Other reaction(s): gastric    Tramadol Nausea And Vomiting        Family History     Problem Relation (Age of Onset)    Other Father        Tobacco Use    Smoking status: Current Every Day Smoker     Packs/day: 0.50     Years: 18.00     Pack years: 9.00     Types: Cigarettes    Smokeless tobacco: Never Used    Tobacco comment: cutting to 2 cigarettes per day   Substance and Sexual Activity    Alcohol use: No     Alcohol/week: 0.0 standard drinks     Frequency: Never     Drinks per session: Patient refused     Binge frequency: Patient refused    Drug use: No    Sexual activity: Not Currently     Partners: Male     Review of Systems   Constitutional: Negative for chills and fever.   Respiratory: Negative for shortness of breath.    Cardiovascular: Negative for chest pain.   Gastrointestinal: Negative for abdominal pain.   Endocrine: Negative for diabetes.   Genitourinary: Negative for dyspareunia, dysuria, vaginal bleeding, vaginal discharge and vaginal pain.   Integumentary:  Negative for acne.   Neurological: Positive for headaches. Negative for syncope.      Objective:     Vital Signs (Most Recent):  Temp: 98 °F (36.7 °C) (20)  Pulse: 66 (20)  Resp: 18 (20)  BP: 110/71 (200)  SpO2: 100 % (20) Vital Signs (24h " Range):  Temp:  [98 °F (36.7 °C)] 98 °F (36.7 °C)  Pulse:  [56-93] 66  Resp:  [18] 18  SpO2:  [98 %-100 %] 100 %  BP: ()/(52-78) 110/71     Weight: 116.6 kg (257 lb)  Body mass index is 37.41 kg/m².    FHT: Baseline 140, mod btbv, accels present, no decels Cat 1 (reassuring)  TOCO: Not present    Physical Exam:   Constitutional: She is oriented to person, place, and time. She appears well-developed and well-nourished.    HENT:   Head: Normocephalic and atraumatic.    Eyes: Pupils are equal, round, and reactive to light. EOM are normal.    Neck: Normal range of motion. Neck supple.    Cardiovascular: Normal rate and regular rhythm.     Pulmonary/Chest: Effort normal.        Abdominal: Soft.             Musculoskeletal: Normal range of motion and moves all extremeties.       Neurological: She is alert and oriented to person, place, and time. She displays normal reflexes. No cranial nerve deficit. She exhibits normal muscle tone. Coordination normal.    Skin: Skin is warm and dry.        Cervix: Deferred     Significant Labs:  Lab Results   Component Value Date    GROUPTRH A NEG 2020    HEPBSAG Negative 2020       I have personallly reviewed all pertinent lab results from the last 24 hours.    Assessment/Plan:     40 y.o. female  at 31w5d for:    * Intractable headache  - S/P Tylenol, Compazine, Hydroxyzine, Fioricet at home  - S/P Toradol, Benadryl, Reglan in ED   - Headache remains at 8/10 and is associated with non-crisp vision in right eye   - Blood pressures non-severe  - P/C ratio: 0.24 (increase from 0.12); AST/ALT: ; Cr: 0.6; Plt: 260  - Will try Periactin (Cyproheptadine)  - Consider neurology consult    Anemia  - Most recent H/H:   - Could be contributing to headache  - Patient on iron supplementation at home    31 weeks gestation of pregnancy  - Beta-methasone series for fetal lung maturity  - If moving towards delivery, will give Magnesium for neuroprotection  - NST  BID    Unwanted fertility- tubal papers signed  - Tubal papers signed    Multigravida of advanced maternal age in second trimester  - Negative Mat21    Cerebrovascular accident (CVA) due to thrombosis  - MRA/MRV performed  wnl   - Will not re image at this time    Hx of bariatric surgery  - Ma.0    - Phos: 3.3    History of  section complicating pregnancy  - Hx of  x 4  - Will need repeat  if delivered    Class 2 severe obesity due to excess calories with serious comorbidity and body mass index (BMI) of 37.0 to 37.9 in adult  - Encourage ambulation  - TEDs/SCDs while in bed        Bessie Bal MD  Obstetrics  [facility]

## 2020-06-01 NOTE — HPI
"Anitra Chaney is a 40 y.o. [id number] at 31w4d presents complaining of headache that is "band-like" and blurry vision. Patient has been seen in ED multiple times for this same complaint. Has Mag oxide, fioricet, compazine, and high dose tylenol at home. She had full stroke work up this month secondary to this complaint w/ history of CVA and it was non-remarkable.    This IUP is complicated by hx of LTCS x 4, MO, AMA, h/o of CVA, h/o of atypical preeclampsia in previous pregnancy, tobacco use, hx of gastric sleeve surgery. Patient denies contractions, denies vaginal bleeding, denies LOF.   Fetal Movement: normal.\        Tonight has taken 1g Tylenol, compazine, Atirax, fioricet.  "

## 2020-06-01 NOTE — DISCHARGE SUMMARY
"[facility]  Obstetrics  Discharge Summary      Patient Name: Anitra Chaney  MRN: [id number]  Admission Date: 2020  Hospital Length of Stay: 1 days  Discharge Date and Time:  2020 4:36 PM  Attending Physician: Joseph Ross MD   Discharging Provider: Ashlee Stewart MD   Primary Care Provider: Tien Watts MD    HPI: Anitra Chaney is a 40 y.o. [id number] at 31w4d presents complaining of headache that is "band-like" and blurry vision. Patient has been seen in ED multiple times for this same complaint. Has Mag oxide, fioricet, compazine, and high dose tylenol at home. She had full stroke work up this month secondary to this complaint w/ history of CVA and it was non-remarkable.    This IUP is complicated by hx of LTCS x 4, MO, AMA, h/o of CVA, h/o of atypical preeclampsia in previous pregnancy, tobacco use, hx of gastric sleeve surgery. Patient denies contractions, denies vaginal bleeding, denies LOF.   Fetal Movement: normal.\        Tonight has taken 1g Tylenol, compazine, Atirax, fioricet.    * No surgery found *     Hospital Course:   : Admitted to observation secondary to intractable headache in patient with history of CVA and delivery for atypical PreE in prior pregnancy. BMZ administered. 24 hr urine protein pending secondary to P:C 0.24.  2020: Curbsided neurology who felt that there was nothing further to be done at this point. Growth scan performed today - EFW 17%. IV MgSO4 1g administered with some improvement in headache. 2nd dose BMZ administered early. Discharged home with additional prescriptions for mag oxide 400 BID and riboflavin 100 BID per neuro recs. Follow up at [facility] on 6/3.     Consults (From admission, onward)        Status Ordering Provider     Inpatient consult to Neurology  Once     Provider:  (Not yet assigned)    Acknowledged ASHLEE STEWART          Final Active Diagnoses:    Diagnosis Date Noted POA "    PRINCIPAL PROBLEM:  Intractable headache [R51] 2020 Yes    31 weeks gestation of pregnancy [Z3A.31] 2020 Not Applicable    Anemia [D64.9] 2020 Yes    Unwanted fertility- tubal papers signed [Z30.09] 2020 Yes    Multigravida of advanced maternal age in second trimester [O09.522] 2020 Yes    Cerebrovascular accident (CVA) due to thrombosis [I63.9] 2020 Yes    Hx of bariatric surgery [Z98.84] 2020 Not Applicable    History of  section complicating pregnancy [O34.219] 2020 Yes    Class 2 severe obesity due to excess calories with serious comorbidity and body mass index (BMI) of 37.0 to 37.9 in adult [E66.01, Z68.37] 2020 Not Applicable      Problems Resolved During this Admission:    Diagnosis Date Noted Date Resolved POA    Nonintractable headache [R51] 2020 Yes        Immunizations     None          This patient has no babies on file.  Pending Diagnostic Studies:     None          Discharged Condition: good    Disposition: Home or Self Care    Follow Up:  Follow-up Information     [facility]/ [facility]YN. Go on 6/3/2020.    Specialty:  Obstetrics and Gynecology  Contact information:  [address] [place] [address] [phone]           Malu Ramos PA-C. Go on 2020.    Specialty:  Neurology  Why:  Neurology appointment  Contact information:  [address]  [facility] [address]  [phone]             [facility] - Ophthalmology. Schedule an appointment as soon as possible for a visit in 1 week.    Specialty:  Ophthalmology  Why:  Blurry vision  Contact information:  [address] [facility] [address] [phone]  Additional information:  The Optometry department is located on the 10th floor. If you are seeing Dr. Jennings, her clinic is located in the [facility] on the 1st floor.               Patient Instructions:      Diet Adult Regular     Notify your health care  provider if you experience any of the following:   Order Comments: Heavy vaginal bleeding saturating more than 1 pad per hr for at least consecutive 2 hrs.     Notify your health care provider if you experience any of the following:  persistent dizziness, light-headedness, or visual disturbances     Notify your health care provider if you experience any of the following:  difficulty breathing or increased cough     Notify your health care provider if you experience any of the following:  severe uncontrolled pain     Notify your health care provider if you experience any of the following:  persistent nausea and vomiting or diarrhea     Notify your health care provider if you experience any of the following:  temperature >100.4     Notify your health care provider if you experience any of the following:  increased confusion or weakness     Activity as tolerated     Medications:  Current Discharge Medication List      START taking these medications    Details   riboflavin, vitamin B2, (VITAMIN B-2) 100 mg Tab tablet Take 1 tablet (100 mg total) by mouth 2 (two) times daily.  Qty: 60 tablet, Refills: 0         CONTINUE these medications which have CHANGED    Details   butalbital-acetaminophen-caffeine -40 mg (FIORICET, ESGIC) -40 mg per tablet Take 1 tablet by mouth every 4 (four) hours as needed for Headaches.  Qty: 20 tablet, Refills: 1      magnesium oxide (MAG-OX) 400 mg (241.3 mg magnesium) tablet Take 1 tablet (400 mg total) by mouth 2 (two) times daily.  Refills: 0         CONTINUE these medications which have NOT CHANGED    Details   cholecalciferol, vitamin D3, (VITAMIN D3) 25 mcg (1,000 unit) capsule Take 2 capsules (2,000 Units total) by mouth once daily.  Qty: 720 capsule, Refills: 0    Associated Diagnoses: Vitamin D deficiency      ferrous sulfate (FEOSOL) 325 mg (65 mg iron) Tab tablet Take 1 tablet (325 mg total) by mouth once daily.  Qty: 30 tablet, Refills: 8    Associated Diagnoses: Iron  deficiency anemia of pregnancy      folic acid (FOLVITE) 400 MCG tablet Take 2 tablets (800 mcg total) by mouth once daily.  Qty: 100 tablet, Refills: 3    Associated Diagnoses: Personal history of gastric bypass      HYDROcodone-acetaminophen (NORCO) 7.5-325 mg per tablet     Comments: Quantity prescribed more than 7 day supply? Press F2 and select one:[id number]        hydrOXYzine HCl (ATARAX) 10 MG Tab Take 1 tablet (10 mg total) by mouth 2 (two) times daily.  Qty: 60 tablet, Refills: 3    Associated Diagnoses: Anxiety      omeprazole (PRILOSEC OTC) 20 MG tablet Take 1 tablet (20 mg total) by mouth once daily.  Qty: 28 tablet, Refills: 1    Associated Diagnoses: History of GI bleed      omeprazole (PRILOSEC) 20 MG capsule       prenatal vit37/iron/folic acid (PRENATA ORAL) Take by mouth.      prochlorperazine (COMPAZINE) 10 MG tablet Take 1 tablet (10 mg total) by mouth 2 (two) times daily as needed (Headaches or nausea).  Qty: 45 tablet, Refills: 1      terconazole (TERAZOL 7) 0.4 % Crea Place 1 applicator vaginally once daily.  Qty: 1 Tube, Refills: 0             Ashlee Sena MD  Obstetrics  [facility]

## 2020-06-01 NOTE — NURSING
Pt discharged home per MD order. Peripheral IV removed, catheter intact. Second dose of BMZ administered early per MD order. Rx delivered to bedside and reviewed with pt. Pt instructed to continue 24 hour urine, proper storage of urine specimen  at home and to bring it back when completed. Discharge instructions and precautions reviewed with pt.Pt verbalized understanding and had no questions at this time. Pt declined transport and ambulated off of unit with spouse.

## 2020-06-01 NOTE — SUBJECTIVE & OBJECTIVE
"Obstetric HPI:  Patient denies contractions, active fetal movement, No vaginal bleeding , No loss of fluid     This pregnancy has been complicated by hx of LTCS x 4, MO, AMA, h/o of CVA, h/o of atypical preeclampsia in previous pregnancy, tobacco use, hx of gastric sleeve surgery.     OB History    Para Term  AB Living   5 3 2 1 1 3   SAB TAB Ectopic Multiple Live Births   1 0 0 0 3      # Outcome Date GA Lbr Yuval/2nd Weight Sex Delivery Anes PTL Lv   5 Current            4  19 34w3d   M CS-LTranv Spinal  JIGAR      Name: AZAM TYLER      Apgar1: 8  Apgar5: 9   3 SAB 18 10w0d          2 Term 09 37w0d  2.722 kg (6 lb) F CS-Unspec EPI  JIGAR   1 Term 05 37w0d  3.345 kg (7 lb 6 oz) M CS-Unspec EPI  JIGAR     Past Medical History:   Diagnosis Date    Anxiety and depression     GERD (gastroesophageal reflux disease)     HTN (hypertension), benign 3/10/2014    Migraines     Obesity     PFO (patent foramen ovale)     found after stroke "too small/risky to close"    Stroke 2017    Hospitalized at [facility]; given tPA     Past Surgical History:   Procedure Laterality Date    ANKLE FRACTURE SURGERY       SECTION N/A 2019    Procedure:  SECTION;  Surgeon: Gianna Flowers MD;  Location: [facility]&D;  Service: OB/GYN;  Laterality: N/A;     SECTION, CLASSIC  2005/2009/2019    x3    CHOLECYSTECTOMY  3/2002    lap maribel    FOOT SURGERY      gastric sleeve  2017         (Not in a hospital admission)    Review of patient's allergies indicates:   Allergen Reactions    Ibuprofen Nausea And Vomiting     Other reaction(s): gastric    Tramadol Nausea And Vomiting        Family History     Problem Relation (Age of Onset)    Other Father        Tobacco Use    Smoking status: Current Every Day Smoker     Packs/day: 0.50     Years: 18.00     Pack years: 9.00     Types: Cigarettes    Smokeless tobacco: Never Used    Tobacco comment: cutting to 2 " cigarettes per day   Substance and Sexual Activity    Alcohol use: No     Alcohol/week: 0.0 standard drinks     Frequency: Never     Drinks per session: Patient refused     Binge frequency: Patient refused    Drug use: No    Sexual activity: Not Currently     Partners: Male     Review of Systems   Constitutional: Negative for chills and fever.   Respiratory: Negative for shortness of breath.    Cardiovascular: Negative for chest pain.   Gastrointestinal: Negative for abdominal pain.   Endocrine: Negative for diabetes.   Genitourinary: Negative for dyspareunia, dysuria, vaginal bleeding, vaginal discharge and vaginal pain.   Integumentary:  Negative for acne.   Neurological: Positive for headaches. Negative for syncope.      Objective:     Vital Signs (Most Recent):  Temp: 98 °F (36.7 °C) (05/31/20 2119)  Pulse: 66 (06/01/20 0039)  Resp: 18 (05/31/20 2119)  BP: 110/71 (06/01/20 0030)  SpO2: 100 % (06/01/20 0039) Vital Signs (24h Range):  Temp:  [98 °F (36.7 °C)] 98 °F (36.7 °C)  Pulse:  [56-93] 66  Resp:  [18] 18  SpO2:  [98 %-100 %] 100 %  BP: ()/(52-78) 110/71     Weight: 116.6 kg (257 lb)  Body mass index is 37.41 kg/m².    FHT: Baseline 140, mod btbv, accels present, no decels Cat 1 (reassuring)  TOCO: Not present    Physical Exam:   Constitutional: She is oriented to person, place, and time. She appears well-developed and well-nourished.    HENT:   Head: Normocephalic and atraumatic.    Eyes: Pupils are equal, round, and reactive to light. EOM are normal.    Neck: Normal range of motion. Neck supple.    Cardiovascular: Normal rate and regular rhythm.     Pulmonary/Chest: Effort normal.        Abdominal: Soft.             Musculoskeletal: Normal range of motion and moves all extremeties.       Neurological: She is alert and oriented to person, place, and time. She displays normal reflexes. No cranial nerve deficit. She exhibits normal muscle tone. Coordination normal.    Skin: Skin is warm and dry.         Cervix: Deferred     Significant Labs:  Lab Results   Component Value Date    GROUPTRH A NEG 05/31/2020    HEPBSAG Negative 01/07/2020       I have personallly reviewed all pertinent lab results from the last 24 hours.

## 2020-06-01 NOTE — ASSESSMENT & PLAN NOTE
- S/P Tylenol, Compazine, Hydroxyzine, Fioricet at home  - S/P Toradol, Benadryl, Reglan in ED   - Headache remains at 8/10 and is associated with non-crisp vision in right eye   - Blood pressures non-severe  - P/C ratio: 0.24 (increase from 0.12); AST/ALT: 9/9; Cr: 0.6; Plt: 260  - Will try Periactin (Cyproheptadine)  - Consider neurology consult

## 2020-06-01 NOTE — PROGRESS NOTES
Spoke to neurologist, Dr. Rodríguez, this morning who is on tele-call. Due to patient's recent full stroke workup, including negative MRI/MRA/MRV on 5/19, neurology feels comfortable seeing Ms. Chaney on an outpatient basis. They recommended MgSO4 1g BID while inpatient. As an outpatient, they recommend Mag Oxide 400mg BID and riboflavin 100mg BID. They do not feel that a nerve block is warranted. I was asked to perform a visual acuity test and patient has 20/25 vision in both eyes. They cannot get her into the office before 6/29/20.    Patient was counseled about her reassuring imaging and importance of delaying delivery until at least 34 weeks. She is in agreement.     Plan:  - Will administer IV MgSO4 1g now  - BMZ 1/2 administered at 2230 last night. Will plan to give 2nd dose BMZ around 1700 this evening and discharge home  - Rx for Mag oxide 400mg BID and riboflavin 100mg BID sent to pharmacy  - Patient has growth ultrasound scheduled for 6/3; will arrange for patient to have u/s performed while inpatient today   - P:C 0.24 on admit; 24 hour urine protein pending  - Follow up at [facility] on 6/3/2020    Ashlee Sena MD  OBGYN, PGY-2

## 2020-06-01 NOTE — ED PROVIDER NOTES
"Encounter Date: 2020       History     Chief Complaint   Patient presents with    Headache    Abdominal Cramping     per patient "tightening down below that feels like stretching"       Anitra Chaney is a 40 y.o. [id number] at 31w4d presents complaining of headache that is "band-like" and blurry vision. Patient has been seen in ED multiple times for this same complaint. Has Mag oxide, fioricet, compazine, and high dose tylenol at home. She had full stroke work up this month secondary to this complaint w/ history of CVA and it was non-remarkable.    This IUP is complicated by hx of LTCS x 4, MO, AMA, h/o of CVA, h/o of atypical preeclampsia in previous pregnancy, tobacco use, hx of gastric sleeve surgery. Patient denies contractions, denies vaginal bleeding, denies LOF.   Fetal Movement: normal.\      Tonight has taken 1g Tylenol, compazine, Atirax, fioricet.    Patient also complaining of "ripping abdominal pain" in her lower abdomen.         Review of patient's allergies indicates:   Allergen Reactions    Ibuprofen Nausea And Vomiting     Other reaction(s): gastric    Tramadol Nausea And Vomiting     Past Medical History:   Diagnosis Date    Anxiety and depression     GERD (gastroesophageal reflux disease)     HTN (hypertension), benign 3/10/2014    Migraines     Obesity     PFO (patent foramen ovale)     found after stroke "too small/risky to close"    Stroke 2017    Hospitalized at [facility]; given tPA     Past Surgical History:   Procedure Laterality Date    ANKLE FRACTURE SURGERY       SECTION N/A 2019    Procedure:  SECTION;  Surgeon: Gianna Flowers MD;  Location: [facility];  Service: OB/GYN;  Laterality: N/A;     SECTION, CLASSIC  2005/2009/2019    x3    CHOLECYSTECTOMY  3/2002    lap maribel    FOOT SURGERY      gastric sleeve  2017     Family History   Problem Relation Age of Onset    Other Father         house fire    Breast cancer Neg Hx  "    Colon cancer Neg Hx     Ovarian cancer Neg Hx     Congenital heart disease Neg Hx     Pacemaker/defibrilator Neg Hx     Early death Neg Hx      Social History     Tobacco Use    Smoking status: Current Every Day Smoker     Packs/day: 0.50     Years: 18.00     Pack years: 9.00     Types: Cigarettes    Smokeless tobacco: Never Used    Tobacco comment: cutting to 2 cigarettes per day   Substance Use Topics    Alcohol use: No     Alcohol/week: 0.0 standard drinks     Frequency: Never     Drinks per session: Patient refused     Binge frequency: Patient refused    Drug use: No     Review of Systems   Constitutional: Negative for chills and fever.   HENT: Negative for facial swelling.    Eyes: Negative for visual disturbance.   Respiratory: Negative for chest tightness and shortness of breath.    Cardiovascular: Negative for chest pain.   Gastrointestinal: Negative for abdominal pain, nausea and vomiting.   Genitourinary: Negative for dysuria, vaginal bleeding and vaginal discharge.   Skin: Negative.    Neurological: Positive for headaches.   Psychiatric/Behavioral: Negative.    No focal neuro complaints    Physical Exam     Initial Vitals [05/31/20 2119]   BP Pulse Resp Temp SpO2   130/78 77 -- -- 99 %      MAP       --         Physical Exam    Vitals reviewed.  Constitutional: She appears well-developed and well-nourished.   HENT:   Head: Normocephalic and atraumatic.   Eyes: EOM are normal.   Neck: Normal range of motion. Neck supple.   Cardiovascular: Normal rate and regular rhythm.   Pulmonary/Chest: Breath sounds normal.   Abdominal: Soft. There is no tenderness.   Musculoskeletal: Normal range of motion.   Neurological: She is alert and oriented to person, place, and time. She has normal strength and normal reflexes. She displays normal reflexes. No cranial nerve deficit or sensory deficit.   Skin: Skin is warm and dry.   Psychiatric: She has a normal mood and affect.     CN 2-12 intact    ED Course    Obtain Fetal nonstress test (NST)  Date/Time: 2020 12:41 AM  Performed by: Bessie Bal MD  Authorized by: Bessie Bal MD     Nonstress Test:     Variability:  6-25 BPM    Accelerations:  10 bpm    Baseline:  140    Contractions:  Not present  Biophysical Profile:     Nonstress Test Interpretation: reactive      Overall Impression:  Reassuring  Post-procedure:     Patient tolerance:  Patient tolerated the procedure well with no immediate complications      Labs Reviewed   CBC W/ AUTO DIFFERENTIAL - Abnormal; Notable for the following components:       Result Value    RBC 3.91 (*)     Hemoglobin 9.2 (*)     Hematocrit 29.7 (*)     Mean Corpuscular Volume 76 (*)     Mean Corpuscular Hemoglobin 23.5 (*)     Mean Corpuscular Hemoglobin Conc 31.0 (*)     RDW 15.4 (*)     Immature Granulocytes 0.7 (*)     Gran # (ANC) 8.4 (*)     Immature Grans (Abs) 0.08 (*)     All other components within normal limits   COMPREHENSIVE METABOLIC PANEL - Abnormal; Notable for the following components:    CO2 20 (*)     Albumin 2.9 (*)     AST 9 (*)     ALT 9 (*)     All other components within normal limits   PROTEIN / CREATININE RATIO, URINE - Abnormal; Notable for the following components:    Prot/Creat Ratio, Ur 0.24 (*)     All other components within normal limits   MAGNESIUM   PHOSPHORUS   TYPE & SCREEN   ANTIBODY IDENTIFICATION          Imaging Results    None          Medical Decision Making:   ED Management:  NST x 20 minutes reactive and reassuring  Haslett shows no contractions  Serial blood pressure monitoring  CBC, CMP, P/C ratio pending  Will give headache cocktail for headache  Will give BMZ for FLM  P/C: 0.24. Will need 24 hour urine.   AST/ALT:    Cr: 0.6  Plt: 260  Ma.0   Phos: 3.3  Patient headache not improved with headache cocktail.  Spoke with MFM who agreed with admit for observation.  Other:   I have discussed this case with another health care provider.       <> Summary of the Discussion:   Sabrina                                 Clinical Impression:       ICD-10-CM ICD-9-CM   1. Nonintractable headache, unspecified chronicity pattern, unspecified headache type R51 784.0   2. 31 weeks gestation of pregnancy Z3A.31 V22.2                   Bessie Bal M.D.  [facility] PGY1         Bessie Bal MD  Resident  06/01/20 0035  The patient has been seen and examined by me, and I agree with the Resident assessment and plan as above. Will admit to [facility] service for further evaluation.  Blood pressures remained stable during evaluation in [facility]. FHR reassuring.      Yuliet Dyson MD  06/01/20 [id number]

## 2020-06-01 NOTE — ASSESSMENT & PLAN NOTE
- Beta-methasone series for fetal lung maturity  - If moving towards delivery, will give Magnesium for neuroprotection  - NST BID

## 2020-06-01 NOTE — HOSPITAL COURSE
0531/2020: Admitted to observation secondary to intractable headache in patient with history of CVA and delivery for atypical PreE in prior pregnancy. BMZ administered. 24 hr urine protein pending secondary to P:C 0.24.  06/01/2020: Curbsided neurology who felt that there was nothing further to be done at this point. Growth scan performed today - EFW 17%. IV MgSO4 1g administered with some improvement in headache. 2nd dose BMZ administered early. Discharged home with additional prescriptions for mag oxide 400 BID and riboflavin 100 BID per neuro recs. Follow up at [facility] on 6/3.

## 2020-06-02 ENCOUNTER — LAB VISIT (OUTPATIENT)
Dept: LAB | Facility: OTHER | Age: 40
End: 2020-06-02
Attending: STUDENT IN AN ORGANIZED HEALTH CARE EDUCATION/TRAINING PROGRAM
Payer: COMMERCIAL

## 2020-06-02 DIAGNOSIS — O12.13 GESTATIONAL PROTEINURIA IN THIRD TRIMESTER: ICD-10-CM

## 2020-06-02 DIAGNOSIS — O12.13 GESTATIONAL PROTEINURIA IN THIRD TRIMESTER: Primary | ICD-10-CM

## 2020-06-02 LAB
PROT 24H UR-MRATE: 105 MG/SPEC (ref 0–100)
PROT UR-MCNC: 14 MG/DL (ref 0–15)
URINE COLLECTION DURATION: 24 HR
URINE VOLUME: 750 ML

## 2020-06-02 PROCEDURE — 84166 PROTEIN E-PHORESIS/URINE/CSF: CPT | Mod: 26,,, | Performed by: PATHOLOGY

## 2020-06-02 PROCEDURE — 84166 PROTEIN E-PHORESIS/URINE/CSF: CPT

## 2020-06-02 PROCEDURE — 84166 PATHOLOGIST INTERPRETATION UPE: ICD-10-PCS | Mod: 26,,, | Performed by: PATHOLOGY

## 2020-06-02 PROCEDURE — 84156 ASSAY OF PROTEIN URINE: CPT

## 2020-06-03 ENCOUNTER — ROUTINE PRENATAL (OUTPATIENT)
Dept: OBSTETRICS AND GYNECOLOGY | Facility: CLINIC | Age: 40
End: 2020-06-03
Payer: COMMERCIAL

## 2020-06-03 VITALS
WEIGHT: 263.69 LBS | DIASTOLIC BLOOD PRESSURE: 84 MMHG | BODY MASS INDEX: 38.38 KG/M2 | SYSTOLIC BLOOD PRESSURE: 124 MMHG

## 2020-06-03 DIAGNOSIS — O99.013 ANEMIA DURING PREGNANCY IN THIRD TRIMESTER: ICD-10-CM

## 2020-06-03 DIAGNOSIS — O09.523 MULTIGRAVIDA OF ADVANCED MATERNAL AGE IN THIRD TRIMESTER: Primary | ICD-10-CM

## 2020-06-03 DIAGNOSIS — O09.299 HISTORY OF PRE-ECLAMPSIA IN PRIOR PREGNANCY, CURRENTLY PREGNANT: ICD-10-CM

## 2020-06-03 DIAGNOSIS — G43.909 MIGRAINE SYNDROME: ICD-10-CM

## 2020-06-03 PROCEDURE — 99999 PR PBB SHADOW E&M-EST. PATIENT-LVL II: CPT | Mod: PBBFAC,,, | Performed by: OBSTETRICS & GYNECOLOGY

## 2020-06-03 PROCEDURE — 0502F PR SUBSEQUENT PRENATAL CARE: ICD-10-PCS | Mod: S$GLB,,, | Performed by: OBSTETRICS & GYNECOLOGY

## 2020-06-03 PROCEDURE — 3008F BODY MASS INDEX DOCD: CPT | Mod: CPTII,S$GLB,, | Performed by: OBSTETRICS & GYNECOLOGY

## 2020-06-03 PROCEDURE — 0502F SUBSEQUENT PRENATAL CARE: CPT | Mod: S$GLB,,, | Performed by: OBSTETRICS & GYNECOLOGY

## 2020-06-03 PROCEDURE — 99999 PR PBB SHADOW E&M-EST. PATIENT-LVL II: ICD-10-PCS | Mod: PBBFAC,,, | Performed by: OBSTETRICS & GYNECOLOGY

## 2020-06-03 PROCEDURE — 3008F PR BODY MASS INDEX (BMI) DOCUMENTED: ICD-10-PCS | Mod: CPTII,S$GLB,, | Performed by: OBSTETRICS & GYNECOLOGY

## 2020-06-03 NOTE — PROGRESS NOTES
Complaints today:  Patient reports continued headache since discharge from hospital on  for this issue. States currently her pain is a 7/10 which is the same baseline pain as she has daily. She has bene taking the mag oxide, riboflavin, fioricet and benadryl as prescribed which has been helpful. Denies any vision changes, SOB/CP, RUQ pain or edema.  Otherwise no vaginal bleeding or LOF. No contractions. +FM    /84   Wt 119.6 kg (263 lb 10.7 oz)   LMP  (LMP Unknown)   BMI 38.38 kg/m²     40 y.o., at 32w0d by Estimated Date of Delivery: 20  Patient Active Problem List   Diagnosis    Migraine syndrome    Neurogenic claudication    Ankle arthritis    CHTN on no medications    Lower GI bleed    Migraine    Lumbar radiculopathy    Chronic bilateral low back pain with bilateral sciatica    Fibromyalgia muscle pain    Class 2 severe obesity due to excess calories with serious comorbidity and body mass index (BMI) of 37.0 to 37.9 in adult    Rh negative state in antepartum period    Pregnancy, supervision, high-risk    Obesity complicating pregnancy    History of  section complicating pregnancy    History of wound infection    Hx of bariatric surgery    Cerebrovascular accident (CVA) due to thrombosis    Multigravida of advanced maternal age in second trimester    History of pre-eclampsia- leading to delivery at 34 weeks    Unwanted fertility- tubal papers signed    History of diabetes mellitus- resolved after gastric bypass; last A1c 4.9    Left ventricular hypertrophy    Hypertension, essential    Migraine aura, persistent, intractable, with status migrainosus    Intractable headache    31 weeks gestation of pregnancy    Anemia     OB History    Para Term  AB Living   5 3 2 1 1 3   SAB TAB Ectopic Multiple Live Births   1     0 3      # Outcome Date GA Lbr Yuval/2nd Weight Sex Delivery Anes PTL Lv   5 Current            4  19 34w3d   M CS-LTranv  Spinal  JIGAR   3 SAB 06/30/18 10w0d          2 Term 07/29/09 37w0d  2.722 kg (6 lb) F CS-Unspec EPI  JIGAR   1 Term 05/01/05 37w0d  3.345 kg (7 lb 6 oz) M CS-Unspec EPI  JIGAR       Dating reviewed    Allergies and problem list reviewed and updated    Medical and surgical history reviewed    Prenatal labs reviewed and updated    Physical Exam:  ABD: soft, gravid, nontender, S=D    Assessment:  Anitra was seen today for routine prenatal visit.    Diagnoses and all orders for this visit:    Migraine syndrome    Cerebrovascular accident (CVA) due to thrombosis of precerebral artery    Hypertension, essential    Unwanted fertility- tubal papers signed    Hx of bariatric surgery    History of diabetes mellitus- resolved after gastric bypass; last A1c 4.9    Rh negative state in antepartum period    Plan:   1. Routine prenatal visit  - Patient with long standing history of migraines this pregnancy s/p extensive work up, still experiencing symptoms however unchanged from previous evaluations  - VSS, blood pressures stable in the 120s/80s  - Considering breastfeeding  - Desires bilateral tubal ligation for contraception, consents signed  - Most recent US on 06/01 with AC lagging behind 2 weeks. Repeat growth on 06/22, scheduled    2. Antepartum anemia  - Asymptomatic  - Last CBC on 05/31 9/30, most likely due to iron deficiency as MCV 76  - Patient has not been taking iron  - Discussed foods with iron, and to increase intake    3. Rh negative status  - Mother A NEG  - Rhogam to be given if necessary within 72 hours post partum    4. History of pre eclampsia  - Blood pressures stable today  - Labs checked often given recent admissions  - Most recent on 05/31 CBC/CMP WNL, 24 hour timed urine 105  - Continue to monitor     follow up in 1 Weeks, kick counts, labor precautions

## 2020-06-04 LAB
PATHOLOGIST INTERPRETATION UPE: NORMAL
PROT PATTERN UR ELPH-IMP: NORMAL

## 2020-06-06 NOTE — PROGRESS NOTES
Seen and examined.  Agree with note.  All questions answered.    1. Multigravida of advanced maternal age in third trimester     2. History of pre-eclampsia- leading to delivery at 34 weeks     3. Migraine syndrome     4. Anemia during pregnancy in third trimester         - Seen by MFM in [facility] on 6/1/2020 for headaches.  BP was normal and labs were normal at that time.  - MFM believes headaches are more related to her headache syndrome.  - Delivery is not recommended at this time  - Will continue close monitoring with PNT, weekly BP checks, and weekly labs

## 2020-06-06 NOTE — PROGRESS NOTES
Seen and examined.  Agree with note.  All questions answered.    1. Multigravida of advanced maternal age in second trimester  Prenatal Testing   2. History of pre-eclampsia- leading to delivery at 34 weeks  Prenatal Testing   3. Migraine syndrome  Ambulatory referral/consult to Neurology    CANCELED: Ambulatory referral/consult to Neurology   4. History of TIA (transient ischemic attack)  Ambulatory referral/consult to Neurology       - Pt continues to c/o headaches.  She does have a history of headaches outside of pregnancy.  She has been evaluated in the [facility] 3 times in the past week for headaches and BP.  Labs were normal.  Spoke with MFM.  At this time, they do not recommend delivery.  Likely a worsening of her headache syndrome.  Will continue to monitor closely.  Will follow weekly labs and PNT.  Pt counseled to present to [facility] for significantly worsening headache or for elevated BP when she checks at home.  Will follow in clinic weekly for BP checks.

## 2020-06-06 NOTE — PROGRESS NOTES
Seen and examined.  Agree with note.  All questions answered.    1. Supervision of high risk pregnancy in third trimester     2. History of pre-eclampsia     3. History of TIA (transient ischemic attack)     4. Pregnancy headache in third trimester     5. Obesity affecting pregnancy in second trimester     6. History of  section complicating pregnancy         - Continued headaches despite fioricet and compazine  - Pt states that headaches or similar to when she got delivered last year  - BP normal today  - To [facility] for BP eval due to continued headache

## 2020-06-08 ENCOUNTER — ANESTHESIA (OUTPATIENT)
Dept: EMERGENCY MEDICINE | Facility: OTHER | Age: 40
End: 2020-06-08
Payer: COMMERCIAL

## 2020-06-08 ENCOUNTER — ANESTHESIA EVENT (OUTPATIENT)
Dept: EMERGENCY MEDICINE | Facility: OTHER | Age: 40
End: 2020-06-08
Payer: COMMERCIAL

## 2020-06-08 ENCOUNTER — HOSPITAL ENCOUNTER (EMERGENCY)
Facility: OTHER | Age: 40
Discharge: HOME | End: 2020-06-08
Attending: OBSTETRICS & GYNECOLOGY
Payer: COMMERCIAL

## 2020-06-08 VITALS
RESPIRATION RATE: 19 BRPM | DIASTOLIC BLOOD PRESSURE: 69 MMHG | SYSTOLIC BLOOD PRESSURE: 106 MMHG | OXYGEN SATURATION: 99 % | TEMPERATURE: 99 F | HEART RATE: 62 BPM

## 2020-06-08 DIAGNOSIS — Z3A.32 32 WEEKS GESTATION OF PREGNANCY: ICD-10-CM

## 2020-06-08 DIAGNOSIS — R51.9 NONINTRACTABLE HEADACHE, UNSPECIFIED CHRONICITY PATTERN, UNSPECIFIED HEADACHE TYPE: Primary | ICD-10-CM

## 2020-06-08 DIAGNOSIS — R11.2 NAUSEA AND VOMITING, INTRACTABILITY OF VOMITING NOT SPECIFIED, UNSPECIFIED VOMITING TYPE: ICD-10-CM

## 2020-06-08 LAB
ALBUMIN SERPL BCP-MCNC: 2.8 G/DL (ref 3.5–5.2)
ALP SERPL-CCNC: 113 U/L (ref 55–135)
ALT SERPL W/O P-5'-P-CCNC: 6 U/L (ref 10–44)
ANION GAP SERPL CALC-SCNC: 11 MMOL/L (ref 8–16)
AST SERPL-CCNC: 9 U/L (ref 10–40)
BASOPHILS # BLD AUTO: 0.04 K/UL (ref 0–0.2)
BASOPHILS NFR BLD: 0.4 % (ref 0–1.9)
BILIRUB SERPL-MCNC: 0.2 MG/DL (ref 0.1–1)
BUN SERPL-MCNC: 7 MG/DL (ref 6–20)
CALCIUM SERPL-MCNC: 9 MG/DL (ref 8.7–10.5)
CHLORIDE SERPL-SCNC: 108 MMOL/L (ref 95–110)
CO2 SERPL-SCNC: 20 MMOL/L (ref 23–29)
CREAT SERPL-MCNC: 0.6 MG/DL (ref 0.5–1.4)
CREAT UR-MCNC: 130 MG/DL (ref 15–325)
DIFFERENTIAL METHOD: ABNORMAL
EOSINOPHIL # BLD AUTO: 0.2 K/UL (ref 0–0.5)
EOSINOPHIL NFR BLD: 1.5 % (ref 0–8)
ERYTHROCYTE [DISTWIDTH] IN BLOOD BY AUTOMATED COUNT: 15.7 % (ref 11.5–14.5)
EST. GFR  (AFRICAN AMERICAN): >60 ML/MIN/1.73 M^2
EST. GFR  (NON AFRICAN AMERICAN): >60 ML/MIN/1.73 M^2
GLUCOSE SERPL-MCNC: 87 MG/DL (ref 70–110)
HCT VFR BLD AUTO: 29.8 % (ref 37–48.5)
HGB BLD-MCNC: 9.2 G/DL (ref 12–16)
IMM GRANULOCYTES # BLD AUTO: 0.07 K/UL (ref 0–0.04)
IMM GRANULOCYTES NFR BLD AUTO: 0.7 % (ref 0–0.5)
LYMPHOCYTES # BLD AUTO: 2 K/UL (ref 1–4.8)
LYMPHOCYTES NFR BLD: 20.7 % (ref 18–48)
MAGNESIUM SERPL-MCNC: 2.1 MG/DL (ref 1.6–2.6)
MCH RBC QN AUTO: 23.5 PG (ref 27–31)
MCHC RBC AUTO-ENTMCNC: 30.9 G/DL (ref 32–36)
MCV RBC AUTO: 76 FL (ref 82–98)
MONOCYTES # BLD AUTO: 0.6 K/UL (ref 0.3–1)
MONOCYTES NFR BLD: 5.9 % (ref 4–15)
NEUTROPHILS # BLD AUTO: 7 K/UL (ref 1.8–7.7)
NEUTROPHILS NFR BLD: 70.8 % (ref 38–73)
NRBC BLD-RTO: 0 /100 WBC
PHOSPHATE SERPL-MCNC: 2.8 MG/DL (ref 2.7–4.5)
PLATELET # BLD AUTO: 292 K/UL (ref 150–350)
PMV BLD AUTO: 9.2 FL (ref 9.2–12.9)
POTASSIUM SERPL-SCNC: 3.8 MMOL/L (ref 3.5–5.1)
PROT SERPL-MCNC: 6.6 G/DL (ref 6–8.4)
PROT UR-MCNC: 15 MG/DL (ref 0–15)
PROT/CREAT UR: 0.12 MG/G{CREAT} (ref 0–0.2)
RBC # BLD AUTO: 3.92 M/UL (ref 4–5.4)
SODIUM SERPL-SCNC: 139 MMOL/L (ref 136–145)
WBC # BLD AUTO: 9.87 K/UL (ref 3.9–12.7)

## 2020-06-08 PROCEDURE — 63600175 PHARM REV CODE 636 W HCPCS: Performed by: STUDENT IN AN ORGANIZED HEALTH CARE EDUCATION/TRAINING PROGRAM

## 2020-06-08 PROCEDURE — 96365 THER/PROPH/DIAG IV INF INIT: CPT

## 2020-06-08 PROCEDURE — 59025 PR FETAL 2N-STRESS TEST: ICD-10-PCS | Mod: 26,,, | Performed by: OBSTETRICS & GYNECOLOGY

## 2020-06-08 PROCEDURE — 84100 ASSAY OF PHOSPHORUS: CPT

## 2020-06-08 PROCEDURE — 59025 FETAL NON-STRESS TEST: CPT

## 2020-06-08 PROCEDURE — 96375 TX/PRO/DX INJ NEW DRUG ADDON: CPT

## 2020-06-08 PROCEDURE — 36000 PLACE NEEDLE IN VEIN: CPT | Performed by: STUDENT IN AN ORGANIZED HEALTH CARE EDUCATION/TRAINING PROGRAM

## 2020-06-08 PROCEDURE — 25000003 PHARM REV CODE 250: Performed by: STUDENT IN AN ORGANIZED HEALTH CARE EDUCATION/TRAINING PROGRAM

## 2020-06-08 PROCEDURE — 85025 COMPLETE CBC W/AUTO DIFF WBC: CPT

## 2020-06-08 PROCEDURE — 80053 COMPREHEN METABOLIC PANEL: CPT

## 2020-06-08 PROCEDURE — 99284 EMERGENCY DEPT VISIT MOD MDM: CPT | Mod: 25

## 2020-06-08 PROCEDURE — 99284 PR EMERGENCY DEPT VISIT,LEVEL IV: ICD-10-PCS | Mod: 25,,, | Performed by: OBSTETRICS & GYNECOLOGY

## 2020-06-08 PROCEDURE — 82570 ASSAY OF URINE CREATININE: CPT

## 2020-06-08 PROCEDURE — 99284 EMERGENCY DEPT VISIT MOD MDM: CPT | Mod: 25,,, | Performed by: OBSTETRICS & GYNECOLOGY

## 2020-06-08 PROCEDURE — 59025 FETAL NON-STRESS TEST: CPT | Mod: 26,,, | Performed by: OBSTETRICS & GYNECOLOGY

## 2020-06-08 PROCEDURE — 83735 ASSAY OF MAGNESIUM: CPT

## 2020-06-08 PROCEDURE — 96361 HYDRATE IV INFUSION ADD-ON: CPT

## 2020-06-08 RX ORDER — MAGNESIUM SULFATE 1 G/100ML
1 INJECTION INTRAVENOUS ONCE
Status: COMPLETED | OUTPATIENT
Start: 2020-06-08 | End: 2020-06-08

## 2020-06-08 RX ADMIN — PROMETHAZINE HYDROCHLORIDE 12.5 MG: 25 INJECTION INTRAMUSCULAR; INTRAVENOUS at 07:06

## 2020-06-08 RX ADMIN — MAGNESIUM SULFATE 1 G: 1 INJECTION INTRAVENOUS at 08:06

## 2020-06-08 RX ADMIN — SODIUM CHLORIDE, SODIUM LACTATE, POTASSIUM CHLORIDE, AND CALCIUM CHLORIDE 1000 ML: .6; .31; .03; .02 INJECTION, SOLUTION INTRAVENOUS at 07:06

## 2020-06-08 NOTE — ED PROVIDER NOTES
"Encounter Date: 2020       History     Chief Complaint   Patient presents with    Headache     Anitra Chaney is a 40 y.o. [id number] at 32w5d presents complaining of headache - band like pain around her head. Also complains of blurry vision and nausea/vomiting. Has had 5 episodes of vomiting since last night. She has taken mag oxide, fioricet, compazine, zofran without any relief.    This IUP is complicated by hx of LTCS x 4, MO, AMA, h/o of CVA, h/o of atypical preeclampsia in previous pregnancy, tobacco use, hx of gastric sleeve surgery.  Patient denies contractions, denies vaginal bleeding, denies LOF.   Fetal Movement: normal.        Review of patient's allergies indicates:   Allergen Reactions    Ibuprofen Nausea And Vomiting     Other reaction(s): gastric    Tramadol Nausea And Vomiting     Past Medical History:   Diagnosis Date    Anxiety and depression     GERD (gastroesophageal reflux disease)     HTN (hypertension), benign 3/10/2014    Migraines     Obesity     PFO (patent foramen ovale)     found after stroke "too small/risky to close"    Stroke 2017    Hospitalized at [facility]; given tPA     Past Surgical History:   Procedure Laterality Date    ANKLE FRACTURE SURGERY       SECTION N/A 2019    Procedure:  SECTION;  Surgeon: Gianna Flowers MD;  Location: [facility];  Service: OB/GYN;  Laterality: N/A;     SECTION, CLASSIC  2005/2009/2019    x3    CHOLECYSTECTOMY  3/2002    lap maribel    FOOT SURGERY      gastric sleeve  2017     Family History   Problem Relation Age of Onset    Other Father         house fire    Breast cancer Neg Hx     Colon cancer Neg Hx     Ovarian cancer Neg Hx     Congenital heart disease Neg Hx     Pacemaker/defibrilator Neg Hx     Early death Neg Hx      Social History     Tobacco Use    Smoking status: Current Every Day Smoker     Packs/day: 0.50     Years: 18.00     Pack years: 9.00     Types: Cigarettes    " Smokeless tobacco: Never Used    Tobacco comment: cutting to 2 cigarettes per day   Substance Use Topics    Alcohol use: No     Alcohol/week: 0.0 standard drinks     Frequency: Never     Drinks per session: Patient refused     Binge frequency: Patient refused    Drug use: No     Review of Systems   Constitutional: Negative for chills and fever.   HENT: Negative for facial swelling.    Eyes: Negative for visual disturbance.   Respiratory: Negative for chest tightness and shortness of breath.    Cardiovascular: Negative for chest pain.   Gastrointestinal: Positive for nausea and vomiting. Negative for abdominal pain.   Genitourinary: Negative for dysuria, vaginal bleeding and vaginal discharge.   Skin: Negative.    Neurological: Positive for headaches.   Psychiatric/Behavioral: Negative.        Physical Exam     Initial Vitals   BP Pulse Resp Temp SpO2   06/08/20 1752 06/08/20 1751 06/08/20 1752 06/08/20 1752 06/08/20 1751   122/71 81 19 98.7 °F (37.1 °C) 99 %      MAP       --                Physical Exam    Vitals reviewed.  Constitutional: She appears well-developed and well-nourished.   HENT:   Head: Normocephalic and atraumatic.   Eyes: EOM are normal.   Neck: Normal range of motion. Neck supple.   Cardiovascular: Normal rate and regular rhythm.   Pulmonary/Chest: No respiratory distress.   Abdominal: Soft. There is no tenderness.   Musculoskeletal: Normal range of motion.   Neurological: She is alert and oriented to person, place, and time.   Skin: Skin is warm and dry.   Psychiatric: She has a normal mood and affect.         ED Course   Fetal non-stress test  Date/Time: 6/8/2020 8:17 PM  Performed by: Bessie Bal MD  Authorized by: Geno Golden MD     Nonstress Test:     Variability:  6-25 BPM    Accelerations:  15 bpm    Baseline:  130    Contractions:  Not present  Biophysical Profile:     Nonstress Test Interpretation: reactive      Overall Impression:  Reassuring  Post-procedure:     Patient  tolerance:  Patient tolerated the procedure well with no immediate complications      Labs Reviewed   CBC W/ AUTO DIFFERENTIAL - Abnormal; Notable for the following components:       Result Value    RBC 3.92 (*)     Hemoglobin 9.2 (*)     Hematocrit 29.8 (*)     Mean Corpuscular Volume 76 (*)     Mean Corpuscular Hemoglobin 23.5 (*)     Mean Corpuscular Hemoglobin Conc 30.9 (*)     RDW 15.7 (*)     Immature Granulocytes 0.7 (*)     Immature Grans (Abs) 0.07 (*)     All other components within normal limits   COMPREHENSIVE METABOLIC PANEL - Abnormal; Notable for the following components:    CO2 20 (*)     Albumin 2.8 (*)     AST 9 (*)     ALT 6 (*)     All other components within normal limits   PROTEIN / CREATININE RATIO, URINE   MAGNESIUM   PHOSPHORUS          Imaging Results    None          Medical Decision Making:   ED Management:  NST x 20 minutes  Donnelly   IV fluid bolus  CMP, CBC, P/C ratio, Mag, phos wnl.   IV phenergan  IV Mag bolus x 1 g for headache per previous neurology recommendations.   Headache improved mildly with medicine. Patient has follow up with neurology outpatient.   Tolerating PO.  Will discharge home.     Other:   I have discussed this case with another health care provider.       <> Summary of the Discussion: Dr. Gunter              Attending Attestation:   Physician Attestation Statement for Resident:  As the supervising MD   Physician Attestation Statement: I have personally seen and examined this patient.   I agree with the above history. -:   As the supervising MD I agree with the above PE.    As the supervising MD I agree with the above treatment, course, plan, and disposition.   -:   NST  I independently reviewed the fetal non-stress test with the following interpretation:  130 BPM baseline  Variability: moderate  Accelerations: present  Decelerations: absent  Contractions: none  Category 1    Clinical Interpretation:reactive    Patient evaluated and found to be stable, agree with  resident's assessment of chronic headache responsive to IV magnesium and vomiting responsive to IV fluids and Zofran with no s/s pre-eclampsia or recurrent stroke and plan to discharge to home with precautions re s/s eclampsia.  I was personally present during the critical portions of the procedure(s) performed by the resident and was immediately available in the ED to provide services and assistance as needed during the entire procedure.  I have reviewed and agree with the residents interpretation of the following: lab data.  I have reviewed the following: old records at this facility.                                   Clinical Impression:       ICD-10-CM ICD-9-CM   1. Nonintractable headache, unspecified chronicity pattern, unspecified headache type R51 784.0   2. 32 weeks gestation of pregnancy Z3A.32 V22.2   3. Nausea and vomiting, intractability of vomiting not specified, unspecified vomiting type R11.2 787.01                     Bessie Bal M.D.  OBGYN PGY1           Bessie Bal MD  Resident  06/08/20 2215       Keshia Gunter MD  06/09/20 0055

## 2020-06-09 NOTE — ED NOTES
Discharge instructions given.  Patient verbalized understanding.  Patient left unit, ambulatory.

## 2020-06-09 NOTE — ED NOTES
Received report & assumed care of patient.  States + headache, blurry vision- which has been an ongoing condition.

## 2020-06-09 NOTE — DISCHARGE INSTRUCTIONS
Call L&D @ [phone] if contractions every 5 minutes x 2 hours, decreased fetal movement, vaginal bleeding (soaking a maxipad in an hour), or if your water bag breaks.  Call L&D with headache unrelieved/unchanged with Tylenol, vision changes.

## 2020-06-09 NOTE — ANESTHESIA PROCEDURE NOTES
Peripheral IV Insertion    Diagnosis: I87.9 Disorder of vein, unspecified.    Patient location during procedure: ED  Procedure start time: 6/8/2020 6:42 PM  Timeout: 6/8/2020 6:40 PM  Procedure end time: 6/8/2020 6:45 PM    Staffing  Authorizing Provider: Mireille Petersen MD  Performing Provider: Mireille Petersen MD      Preanesthetic Checklist  Completed: patient identified, pre-op evaluation, timeout performed, risks and benefits discussed and monitors and equipment checkedPeripheral IV Insertion  Skin Prep: alcohol swabs  Local Infiltration: none  Orientation: left  Location: antecubital  Catheter Size: 18 G  Catheter placement by Ultrasound guidance. Heme positive aspiration all ports.  Vessel Caliber: large, patent, compressibility normal  Needle advanced into vessel with real time Ultrasound guidance.Insertion Attempts: 1  Assessment  Dressing: secured with tape and tegaderm  Patient: Tolerated well  Line flushed easily.

## 2020-06-12 ENCOUNTER — ROUTINE PRENATAL (OUTPATIENT)
Dept: OBSTETRICS AND GYNECOLOGY | Facility: CLINIC | Age: 40
End: 2020-06-12
Payer: COMMERCIAL

## 2020-06-12 VITALS
DIASTOLIC BLOOD PRESSURE: 70 MMHG | BODY MASS INDEX: 37.54 KG/M2 | WEIGHT: 257.94 LBS | SYSTOLIC BLOOD PRESSURE: 114 MMHG

## 2020-06-12 DIAGNOSIS — G43.001 MIGRAINE WITHOUT AURA AND WITH STATUS MIGRAINOSUS, NOT INTRACTABLE: ICD-10-CM

## 2020-06-12 DIAGNOSIS — O99.013 ANEMIA AFFECTING PREGNANCY IN THIRD TRIMESTER: ICD-10-CM

## 2020-06-12 DIAGNOSIS — O09.293 PREGNANCY WITH POOR REPRODUCTIVE HISTORY IN THIRD TRIMESTER: Primary | ICD-10-CM

## 2020-06-12 PROCEDURE — 0502F SUBSEQUENT PRENATAL CARE: CPT | Mod: S$GLB,,, | Performed by: OBSTETRICS & GYNECOLOGY

## 2020-06-12 PROCEDURE — 0502F PR SUBSEQUENT PRENATAL CARE: ICD-10-PCS | Mod: S$GLB,,, | Performed by: OBSTETRICS & GYNECOLOGY

## 2020-06-12 PROCEDURE — 3008F PR BODY MASS INDEX (BMI) DOCUMENTED: ICD-10-PCS | Mod: CPTII,S$GLB,, | Performed by: OBSTETRICS & GYNECOLOGY

## 2020-06-12 PROCEDURE — 99999 PR PBB SHADOW E&M-EST. PATIENT-LVL III: CPT | Mod: PBBFAC,,, | Performed by: OBSTETRICS & GYNECOLOGY

## 2020-06-12 PROCEDURE — 99999 PR PBB SHADOW E&M-EST. PATIENT-LVL III: ICD-10-PCS | Mod: PBBFAC,,, | Performed by: OBSTETRICS & GYNECOLOGY

## 2020-06-12 PROCEDURE — 3008F BODY MASS INDEX DOCD: CPT | Mod: CPTII,S$GLB,, | Performed by: OBSTETRICS & GYNECOLOGY

## 2020-06-12 NOTE — PROGRESS NOTES
Complaints today:  Patient reports continued headache; states currently her pain is a 7/10 which is the same baseline pain as she has daily. She has been taking the mag oxide, riboflavin, fioricet and benadryl as prescribed which has been helpful. Recent ED visit due to headache on . Denies any vision changes, SOB/CP, RUQ pain or edema.  Otherwise no vaginal bleeding or LOF. No contractions. +FM    /70   Wt 117 kg (257 lb 15 oz)   LMP  (LMP Unknown)   BMI 37.54 kg/m²     40 y.o., at 33w2d by Estimated Date of Delivery: 20  Patient Active Problem List   Diagnosis    Migraine syndrome    Neurogenic claudication    Ankle arthritis    CHTN on no medications    Lower GI bleed    Migraine    Lumbar radiculopathy    Chronic bilateral low back pain with bilateral sciatica    Fibromyalgia muscle pain    Class 2 severe obesity due to excess calories with serious comorbidity and body mass index (BMI) of 37.0 to 37.9 in adult    Rh negative state in antepartum period    Pregnancy, supervision, high-risk    Obesity complicating pregnancy    History of  section complicating pregnancy    History of wound infection    Hx of bariatric surgery    Cerebrovascular accident (CVA) due to thrombosis    Multigravida of advanced maternal age in second trimester    History of pre-eclampsia- leading to delivery at 34 weeks    Unwanted fertility- tubal papers signed    History of diabetes mellitus- resolved after gastric bypass; last A1c 4.9    Left ventricular hypertrophy    Hypertension, essential    Migraine aura, persistent, intractable, with status migrainosus    Intractable headache    31 weeks gestation of pregnancy    Anemia     OB History    Para Term  AB Living   5 3 2 1 1 3   SAB TAB Ectopic Multiple Live Births   1     0 3      # Outcome Date GA Lbr Yuval/2nd Weight Sex Delivery Anes PTL Lv   5 Current            4  19 34w3d   M CS-LTranv Spinal  JIGAR   3  SAB 18 10w0d          2 Term 09 37w0d  2.722 kg (6 lb) F CS-Unspec EPI  JIGAR   1 Term 05 37w0d  3.345 kg (7 lb 6 oz) M CS-Unspec EPI  JIGAR       Dating reviewed    Allergies and problem list reviewed and updated    Medical and surgical history reviewed    Prenatal labs reviewed and updated    Physical Exam:  ABD: soft, gravid, nontender, S=D    Assessment:  Anitra was seen today for routine prenatal visit and headache.    Diagnoses and all orders for this visit:    Migraine without aura and with status migrainosus, not intractable    CHTN on no medications    Unwanted fertility- tubal papers signed    Anemia, unspecified type      Plan:   1. Routine prenatal visit  - Patient with long standing history of migraines this pregnancy s/p extensive work up, still experiencing symptoms however unchanged from previous evaluations  - VSS, blood pressures stable in the 110s/70s  - Considering breastfeeding  - Desires bilateral tubal ligation for contraception, consents signed  - Most recent US on  with AC lagging behind 2 weeks. Repeat growth on , scheduled  -  appointment on 06/15     2. Antepartum anemia  - Asymptomatic  - Last CBC on  9.2/29.8, most likely due to iron deficiency as MCV 76  - Patient has not been taking iron  - Discussed foods with iron, and to increase intake     3. Rh negative status  - Mother A NEG  - Rhogam to be given if necessary within 72 hours post partum     4. History of pre eclampsia  - Blood pressures stable today  - Labs checked often given recent admissions  - Most recent on  CBC/CMP WNL, P.C: 0.12  - Continue to monitor      follow up in 1 Weeks, kick counts, labor precautions

## 2020-06-17 ENCOUNTER — HOSPITAL ENCOUNTER (OUTPATIENT)
Dept: PERINATAL CARE | Facility: OTHER | Age: 40
Discharge: HOME | End: 2020-06-17
Attending: ADVANCED PRACTICE MIDWIFE
Payer: COMMERCIAL

## 2020-06-17 ENCOUNTER — ROUTINE PRENATAL (OUTPATIENT)
Dept: OBSTETRICS AND GYNECOLOGY | Facility: CLINIC | Age: 40
End: 2020-06-17
Payer: COMMERCIAL

## 2020-06-17 VITALS
SYSTOLIC BLOOD PRESSURE: 110 MMHG | DIASTOLIC BLOOD PRESSURE: 80 MMHG | BODY MASS INDEX: 37.74 KG/M2 | WEIGHT: 259.25 LBS

## 2020-06-17 DIAGNOSIS — Z3A.34 34 WEEKS GESTATION OF PREGNANCY: Primary | ICD-10-CM

## 2020-06-17 DIAGNOSIS — O10.012 HYPERTENSION IN PREGNANCY, ESSENTIAL, ANTEPARTUM, SECOND TRIMESTER: ICD-10-CM

## 2020-06-17 PROCEDURE — 76815 OB US LIMITED FETUS(S): CPT

## 2020-06-17 PROCEDURE — 76815 PR  US,PREGNANT UTERUS,LIMITED, 1/> FETUSES: ICD-10-PCS | Mod: 26,,, | Performed by: OBSTETRICS & GYNECOLOGY

## 2020-06-17 PROCEDURE — 99999 PR PBB SHADOW E&M-EST. PATIENT-LVL III: CPT | Mod: PBBFAC,,, | Performed by: ADVANCED PRACTICE MIDWIFE

## 2020-06-17 PROCEDURE — 76815 OB US LIMITED FETUS(S): CPT | Mod: 26,,, | Performed by: OBSTETRICS & GYNECOLOGY

## 2020-06-17 PROCEDURE — 59025 FETAL NON-STRESS TEST: CPT | Mod: 26,,, | Performed by: OBSTETRICS & GYNECOLOGY

## 2020-06-17 PROCEDURE — 99999 PR PBB SHADOW E&M-EST. PATIENT-LVL III: ICD-10-PCS | Mod: PBBFAC,,, | Performed by: ADVANCED PRACTICE MIDWIFE

## 2020-06-17 PROCEDURE — 59025 PR FETAL 2N-STRESS TEST: ICD-10-PCS | Mod: 26,,, | Performed by: OBSTETRICS & GYNECOLOGY

## 2020-06-17 PROCEDURE — 0502F PR SUBSEQUENT PRENATAL CARE: ICD-10-PCS | Mod: CPTII,S$GLB,, | Performed by: ADVANCED PRACTICE MIDWIFE

## 2020-06-17 PROCEDURE — 59025 FETAL NON-STRESS TEST: CPT

## 2020-06-17 PROCEDURE — 0502F SUBSEQUENT PRENATAL CARE: CPT | Mod: CPTII,S$GLB,, | Performed by: ADVANCED PRACTICE MIDWIFE

## 2020-06-17 NOTE — PROGRESS NOTES
Chief Complaint   Patient presents with    Routine Prenatal Visit     HA, N&V, pelvic pressure-dec fm-       40 y.o., at 34w0d by Estimated Date of Delivery: 20    Complaints today: Reports decreased fetal mvmt- reports only 6 mvmts yesterday    ROS  OBSTETRICS:   Contractions denies   Bleeding denies   Loss of fluid denies   Fetal mvmnt As above  GASTRO:   Nausea Some nausea   Vomiting None      OB History    Para Term  AB Living   5 3 2 1 1 3   SAB TAB Ectopic Multiple Live Births   1     0 3      # Outcome Date GA Lbr Yuval/2nd Weight Sex Delivery Anes PTL Lv   5 Current            4  19 34w3d   M CS-LTranv Spinal  JIGAR   3 SAB 18 10w0d          2 Term 09 37w0d  2.722 kg (6 lb) F CS-Unspec EPI  JIGAR   1 Term 05 37w0d  3.345 kg (7 lb 6 oz) M CS-Unspec EPI  JIGAR       Dating reviewed  Allergies and problem list reviewed and updated  Medical and surgical history reviewed  Prenatal labs reviewed and updated    PHYSICAL EXAM  /80   Wt 117.6 kg (259 lb 4.2 oz)   LMP  (LMP Unknown)   BMI 37.74 kg/m²     GENERAL: No acute distress  HEENT: Normocephalic  NEURO: Alert and oriented x3  PSYCH: Normal mood and affect  PULMONARY: Non-labored respiration; no tachypnea  ABD: Soft, gravid, nontender; no hernia or hepatosplenomegaly    ASSESSMENT AND PLAN    PREGNANCY Problems (from 20 to present)     Problem Noted Resolved    31 weeks gestation of pregnancy 2020 by Bessie Bal MD No    Anemia 2020 by Bessie Bal MD No    Multigravida of advanced maternal age in second trimester 2020 by Florence Carvalho MD No    History of pre-eclampsia- leading to delivery at 34 weeks 2020 by Florence Carvalho MD No    Obesity complicating pregnancy 2020 by Stella Yen MD No    History of  section complicating pregnancy 2020 by Stella Yen MD No    History of wound infection 2020 by Stella Yen MD No    Pregnancy,  supervision, high-risk 2020 by Stella Yen MD No    Rh negative state in antepartum period 2020 by Stella Yen MD No            Was unable to keep prenatal testing visit on Monday- sent to PNT today sec to report of decreased fetal mvmt- reports only 6 x yesterday   labor precautions given  Follow-up: 1 weeks

## 2020-06-19 ENCOUNTER — HOSPITAL ENCOUNTER (EMERGENCY)
Facility: OTHER | Age: 40
Discharge: HOME | End: 2020-06-19
Attending: OBSTETRICS & GYNECOLOGY
Payer: COMMERCIAL

## 2020-06-19 VITALS
OXYGEN SATURATION: 99 % | DIASTOLIC BLOOD PRESSURE: 80 MMHG | TEMPERATURE: 98 F | SYSTOLIC BLOOD PRESSURE: 126 MMHG | HEART RATE: 82 BPM

## 2020-06-19 DIAGNOSIS — G89.29 CHRONIC INTRACTABLE HEADACHE, UNSPECIFIED HEADACHE TYPE: Primary | ICD-10-CM

## 2020-06-19 DIAGNOSIS — Z3A.34 34 WEEKS GESTATION OF PREGNANCY: ICD-10-CM

## 2020-06-19 DIAGNOSIS — R51.9 CHRONIC INTRACTABLE HEADACHE, UNSPECIFIED HEADACHE TYPE: Primary | ICD-10-CM

## 2020-06-19 LAB
ALBUMIN SERPL BCP-MCNC: 2.8 G/DL (ref 3.5–5.2)
ALP SERPL-CCNC: 116 U/L (ref 55–135)
ALT SERPL W/O P-5'-P-CCNC: 6 U/L (ref 10–44)
ANION GAP SERPL CALC-SCNC: 10 MMOL/L (ref 8–16)
AST SERPL-CCNC: 9 U/L (ref 10–40)
BASOPHILS # BLD AUTO: 0.04 K/UL (ref 0–0.2)
BASOPHILS NFR BLD: 0.4 % (ref 0–1.9)
BILIRUB SERPL-MCNC: 0.1 MG/DL (ref 0.1–1)
BUN SERPL-MCNC: 6 MG/DL (ref 6–20)
CALCIUM SERPL-MCNC: 9.3 MG/DL (ref 8.7–10.5)
CHLORIDE SERPL-SCNC: 109 MMOL/L (ref 95–110)
CO2 SERPL-SCNC: 19 MMOL/L (ref 23–29)
CREAT SERPL-MCNC: 0.6 MG/DL (ref 0.5–1.4)
CREAT UR-MCNC: 80.8 MG/DL (ref 15–325)
DIFFERENTIAL METHOD: ABNORMAL
EOSINOPHIL # BLD AUTO: 0.1 K/UL (ref 0–0.5)
EOSINOPHIL NFR BLD: 0.8 % (ref 0–8)
ERYTHROCYTE [DISTWIDTH] IN BLOOD BY AUTOMATED COUNT: 15.6 % (ref 11.5–14.5)
EST. GFR  (AFRICAN AMERICAN): >60 ML/MIN/1.73 M^2
EST. GFR  (NON AFRICAN AMERICAN): >60 ML/MIN/1.73 M^2
GLUCOSE SERPL-MCNC: 77 MG/DL (ref 70–110)
HCT VFR BLD AUTO: 29.4 % (ref 37–48.5)
HGB BLD-MCNC: 9.1 G/DL (ref 12–16)
IMM GRANULOCYTES # BLD AUTO: 0.05 K/UL (ref 0–0.04)
IMM GRANULOCYTES NFR BLD AUTO: 0.5 % (ref 0–0.5)
LYMPHOCYTES # BLD AUTO: 1.7 K/UL (ref 1–4.8)
LYMPHOCYTES NFR BLD: 15.6 % (ref 18–48)
MCH RBC QN AUTO: 23.2 PG (ref 27–31)
MCHC RBC AUTO-ENTMCNC: 31 G/DL (ref 32–36)
MCV RBC AUTO: 75 FL (ref 82–98)
MONOCYTES # BLD AUTO: 0.7 K/UL (ref 0.3–1)
MONOCYTES NFR BLD: 6.1 % (ref 4–15)
NEUTROPHILS # BLD AUTO: 8.4 K/UL (ref 1.8–7.7)
NEUTROPHILS NFR BLD: 76.6 % (ref 38–73)
NRBC BLD-RTO: 0 /100 WBC
PLATELET # BLD AUTO: 279 K/UL (ref 150–350)
PMV BLD AUTO: 10 FL (ref 9.2–12.9)
POTASSIUM SERPL-SCNC: 3.6 MMOL/L (ref 3.5–5.1)
PROT SERPL-MCNC: 6.7 G/DL (ref 6–8.4)
PROT UR-MCNC: 9 MG/DL (ref 0–15)
PROT/CREAT UR: 0.11 MG/G{CREAT} (ref 0–0.2)
RBC # BLD AUTO: 3.93 M/UL (ref 4–5.4)
SODIUM SERPL-SCNC: 138 MMOL/L (ref 136–145)
WBC # BLD AUTO: 10.93 K/UL (ref 3.9–12.7)

## 2020-06-19 PROCEDURE — 99284 EMERGENCY DEPT VISIT MOD MDM: CPT | Mod: 25,,, | Performed by: OBSTETRICS & GYNECOLOGY

## 2020-06-19 PROCEDURE — 96365 THER/PROPH/DIAG IV INF INIT: CPT

## 2020-06-19 PROCEDURE — 80053 COMPREHEN METABOLIC PANEL: CPT

## 2020-06-19 PROCEDURE — 96361 HYDRATE IV INFUSION ADD-ON: CPT | Mod: 59

## 2020-06-19 PROCEDURE — 82570 ASSAY OF URINE CREATININE: CPT

## 2020-06-19 PROCEDURE — 85025 COMPLETE CBC W/AUTO DIFF WBC: CPT

## 2020-06-19 PROCEDURE — 59025 FETAL NON-STRESS TEST: CPT

## 2020-06-19 PROCEDURE — 59025 PR FETAL 2N-STRESS TEST: ICD-10-PCS | Mod: 26,,, | Performed by: OBSTETRICS & GYNECOLOGY

## 2020-06-19 PROCEDURE — 63600175 PHARM REV CODE 636 W HCPCS: Performed by: STUDENT IN AN ORGANIZED HEALTH CARE EDUCATION/TRAINING PROGRAM

## 2020-06-19 PROCEDURE — 99284 EMERGENCY DEPT VISIT MOD MDM: CPT | Mod: 25

## 2020-06-19 PROCEDURE — 99284 PR EMERGENCY DEPT VISIT,LEVEL IV: ICD-10-PCS | Mod: 25,,, | Performed by: OBSTETRICS & GYNECOLOGY

## 2020-06-19 PROCEDURE — 59025 FETAL NON-STRESS TEST: CPT | Mod: 26,,, | Performed by: OBSTETRICS & GYNECOLOGY

## 2020-06-19 RX ORDER — MAGNESIUM SULFATE 1 G/100ML
1 INJECTION INTRAVENOUS ONCE
Status: COMPLETED | OUTPATIENT
Start: 2020-06-19 | End: 2020-06-19

## 2020-06-19 RX ADMIN — SODIUM CHLORIDE, SODIUM LACTATE, POTASSIUM CHLORIDE, AND CALCIUM CHLORIDE 1000 ML: .6; .31; .03; .02 INJECTION, SOLUTION INTRAVENOUS at 03:06

## 2020-06-19 RX ADMIN — MAGNESIUM SULFATE 1 G: 1 INJECTION INTRAVENOUS at 04:06

## 2020-06-19 NOTE — ED NOTES
Pt to [facility] c/o vomiting, headache which rates 8/10, and the pain goes down the right side of her head, down the neck to the right shoulder, and right eye fog. Pt also states she's having perineum pressure that started this week. Pt denies contraction, leaking of fluid, vaginal bleeding, and reports +FM. MD notified of pt arrival.

## 2020-06-19 NOTE — ED NOTES
VSS. Pt afebrile. LR bolus and 1g magnesium sulfate given for HA. Pre-e labs sent and WNL. PC ratio 0.11. NST reactive and reviewed by Dr. Sena. Discharge orders per MD. Pre-e signs and symptoms and labor precautions reviewed with patient. Pt has an [facility] Monday [facility]. Ambulated from [facility] by herself. No signs of distress

## 2020-06-19 NOTE — ED PROVIDER NOTES
"Encounter Date: 2020       History     Chief Complaint   Patient presents with    Headache    Vomiting       Anitra Chaney is a 40 y.o. [id number] at 34w2d presents complaining of headache and nausea. Headache started at 10 am, she took 2 doses of benadryl, compazine, Fioricet and 1 g tylenol. No relief. Rates it 8/10 now, worse one its been in awhile. She feels like she is in a smoky room in her Right eye. She states she made a deal with Dr. Rutledge that she could be delivered at 34 weeks due to the headaches and now this headache is debilitating and she is ready to be delivered.    For headaches at home she is prescribed mag oxide, riboflavin, fioricet and benadryl. Denies any vision changes, SOB/CP, RUQ pain or edema.    This IUP is complicated by chronic Headaches (MRI and MRV negative in May), BMZ course in May, hx of LTCS x 4, MO, AMA, h/o of CVA, h/o of atypical preeclampsia in previous pregnancy, tobacco use, hx of gastric sleeve surgery.      Patient denies contractions, denies vaginal bleeding, denies LOF.   Fetal Movement: normal.    Review of patient's allergies indicates:   Allergen Reactions    Ibuprofen Nausea And Vomiting     Other reaction(s): gastric    Tramadol Nausea And Vomiting     Past Medical History:   Diagnosis Date    Anxiety and depression     GERD (gastroesophageal reflux disease)     HTN (hypertension), benign 3/10/2014    Migraines     Obesity     PFO (patent foramen ovale)     found after stroke "too small/risky to close"    Stroke 2017    Hospitalized at [facility]; given tPA     Past Surgical History:   Procedure Laterality Date    ANKLE FRACTURE SURGERY       SECTION N/A 2019    Procedure:  SECTION;  Surgeon: Gianna Flowers MD;  Location: [facility] L&D;  Service: OB/GYN;  Laterality: N/A;     SECTION, CLASSIC  2005/2009/2019    x3    CHOLECYSTECTOMY  3/2002    lap maribel    FOOT SURGERY      gastric sleeve  2017     Family " History   Problem Relation Age of Onset    Other Father         house fire    Breast cancer Neg Hx     Colon cancer Neg Hx     Ovarian cancer Neg Hx     Congenital heart disease Neg Hx     Pacemaker/defibrilator Neg Hx     Early death Neg Hx      Social History     Tobacco Use    Smoking status: Current Every Day Smoker     Packs/day: 0.50     Years: 18.00     Pack years: 9.00     Types: Cigarettes    Smokeless tobacco: Never Used    Tobacco comment: cutting to 2 cigarettes per day   Substance Use Topics    Alcohol use: No     Alcohol/week: 0.0 standard drinks     Frequency: Never     Drinks per session: Patient refused     Binge frequency: Patient refused    Drug use: No     Review of Systems   Constitutional: Negative for fever.   HENT: Negative for sore throat.    Eyes: Negative for visual disturbance.   Respiratory: Negative for shortness of breath.    Cardiovascular: Negative for chest pain.   Gastrointestinal: Positive for nausea.   Genitourinary: Negative for dysuria and vaginal bleeding.   Musculoskeletal: Negative for back pain.   Skin: Negative for rash.   Neurological: Positive for headaches. Negative for weakness.   Hematological: Does not bruise/bleed easily.       Physical Exam     Initial Vitals   BP Pulse Resp Temp SpO2   06/19/20 1523 06/19/20 1522 -- 06/19/20 1523 06/19/20 1522   117/79 87  98.3 °F (36.8 °C) 100 %      MAP       --                Physical Exam    Vitals reviewed.  Constitutional: Vital signs are normal. She appears well-developed and well-nourished. Breathing: Normal.   HENT:   Head: Normocephalic and atraumatic.   Eyes: EOM are normal.   Neck: Normal range of motion.   Cardiovascular: Normal rate, intact distal pulses and normal pulses.   Pulmonary/Chest:   Normal work of breathing   Abdominal: Soft. She exhibits no distension. There is no abdominal tenderness.   Musculoskeletal: Normal range of motion. No edema.   Neurological: She is alert and oriented to person,  place, and time. She has normal strength. No cranial nerve deficit or sensory deficit.   Skin: Skin is warm and dry.   Psychiatric: Her behavior is normal.         ED Course   Fetal non-stress test    Date/Time: 6/19/2020 3:32 PM  Performed by: Jane Weber MD  Authorized by: Geno Golden MD     Nonstress Test:     Variability:  6-25 BPM    Decelerations:  None    Accelerations:  15 bpm    Baseline:  150    Uterine Irritability: No      Contractions:  Not present  Biophysical Profile:     Nonstress Test Interpretation: reactive      Overall Impression:  Reassuring  Post-procedure:     Patient tolerance:  Patient tolerated the procedure well with no immediate complications      Labs Reviewed - No data to display       Imaging Results    None          Medical Decision Making:   ED Management:  VSS  Initial /79  NST reactive and reassuring, no contractions on toco  IV LR 1 L bolus  IV 1 g mag sulfate bolus per last ED note per neuro curbside rec with last headache  Patient continues to have normal range blood pressures  CBC and CMP WNL  P:C ratio 0.11  Patient strongly desires RLTCS for relief from her headache. She was counseled that given her normal BPs and PreE labs, there is no indication for delivery at 34 weeks. Patient declined imaging for her headache at this time, stating that it is always the same and she does not want to expose the fetus to anymore radiation this pregnancy. She was given PreE precautions. Will follow up with MFM on 6/22.    Other:   I have discussed this case with another health care provider.       <> Summary of the Discussion: Dr. Gunter              Attending Attestation:   Physician Attestation Statement for Resident:  As the supervising MD   Physician Attestation Statement: I have personally seen and examined this patient.   I agree with the above history. -:   As the supervising MD I agree with the above PE.    As the supervising MD I agree with the above treatment,  course, plan, and disposition.   -:   NST  I independently reviewed the fetal non-stress test with the following interpretation:  150 BPM baseline  Variability: moderate  Accelerations: present  Decelerations: absent  Contractions: none  Category 1    Clinical Interpretation:reactive    Patient evaluated and found to be stable, agree with resident's assessment of headache with no s/s pre-eclampsia or stroke and plan per M to discharge to home with follow up Tuesday.  I was personally present during the critical portions of the procedure(s) performed by the resident and was immediately available in the ED to provide services and assistance as needed during the entire procedure.  I have reviewed the following: old records at this facility.                                  Clinical Impression:       ICD-10-CM ICD-9-CM   1. Chronic intractable headache, unspecified headache type  R51 784.0   2. 34 weeks gestation of pregnancy  Z3A.34 V22.2                                Ashlee Sena MD  Resident  06/19/20 1817       Keshia Gunter MD  06/19/20 [id number]

## 2020-06-22 ENCOUNTER — PROCEDURE VISIT (OUTPATIENT)
Dept: MATERNAL FETAL MEDICINE | Facility: CLINIC | Age: 40
End: 2020-06-22
Payer: COMMERCIAL

## 2020-06-22 ENCOUNTER — INITIAL CONSULT (OUTPATIENT)
Dept: MATERNAL FETAL MEDICINE | Facility: CLINIC | Age: 40
End: 2020-06-22
Payer: COMMERCIAL

## 2020-06-22 VITALS — SYSTOLIC BLOOD PRESSURE: 130 MMHG | DIASTOLIC BLOOD PRESSURE: 84 MMHG

## 2020-06-22 DIAGNOSIS — O09.523 AMA (ADVANCED MATERNAL AGE) MULTIGRAVIDA 35+, THIRD TRIMESTER: ICD-10-CM

## 2020-06-22 DIAGNOSIS — Z36.89 ENCOUNTER FOR ULTRASOUND TO CHECK FETAL GROWTH: ICD-10-CM

## 2020-06-22 DIAGNOSIS — O99.213 OBESITY AFFECTING PREGNANCY IN THIRD TRIMESTER: ICD-10-CM

## 2020-06-22 PROCEDURE — 76816 OB US FOLLOW-UP PER FETUS: CPT | Mod: S$GLB,,, | Performed by: OBSTETRICS & GYNECOLOGY

## 2020-06-22 PROCEDURE — 76816 PR  US,PREGNANT UTERUS,F/U,TRANSABD APP: ICD-10-PCS | Mod: S$GLB,,, | Performed by: OBSTETRICS & GYNECOLOGY

## 2020-06-22 PROCEDURE — 99213 PR OFFICE/OUTPT VISIT, EST, LEVL III, 20-29 MIN: ICD-10-PCS | Mod: 25,S$GLB,, | Performed by: OBSTETRICS & GYNECOLOGY

## 2020-06-22 PROCEDURE — 76819 PR US, OB, FETAL BIOPHYSICAL, W/O NST: ICD-10-PCS | Mod: S$GLB,,, | Performed by: OBSTETRICS & GYNECOLOGY

## 2020-06-22 PROCEDURE — 76819 FETAL BIOPHYS PROFIL W/O NST: CPT | Mod: S$GLB,,, | Performed by: OBSTETRICS & GYNECOLOGY

## 2020-06-22 PROCEDURE — 99213 OFFICE O/P EST LOW 20 MIN: CPT | Mod: 25,S$GLB,, | Performed by: OBSTETRICS & GYNECOLOGY

## 2020-06-24 ENCOUNTER — ROUTINE PRENATAL (OUTPATIENT)
Dept: OBSTETRICS AND GYNECOLOGY | Facility: CLINIC | Age: 40
End: 2020-06-24
Payer: COMMERCIAL

## 2020-06-24 VITALS — DIASTOLIC BLOOD PRESSURE: 80 MMHG | BODY MASS INDEX: 38.28 KG/M2 | SYSTOLIC BLOOD PRESSURE: 130 MMHG | WEIGHT: 263 LBS

## 2020-06-24 DIAGNOSIS — Z3A.37 37 WEEKS GESTATION OF PREGNANCY: Primary | ICD-10-CM

## 2020-06-24 PROCEDURE — 99999 PR PBB SHADOW E&M-EST. PATIENT-LVL IV: ICD-10-PCS | Mod: PBBFAC,,, | Performed by: ADVANCED PRACTICE MIDWIFE

## 2020-06-24 PROCEDURE — 99999 PR PBB SHADOW E&M-EST. PATIENT-LVL IV: CPT | Mod: PBBFAC,,, | Performed by: ADVANCED PRACTICE MIDWIFE

## 2020-06-24 PROCEDURE — 0502F SUBSEQUENT PRENATAL CARE: CPT | Mod: S$GLB,,, | Performed by: ADVANCED PRACTICE MIDWIFE

## 2020-06-24 PROCEDURE — 3008F PR BODY MASS INDEX (BMI) DOCUMENTED: ICD-10-PCS | Mod: CPTII,S$GLB,, | Performed by: ADVANCED PRACTICE MIDWIFE

## 2020-06-24 PROCEDURE — 0502F PR SUBSEQUENT PRENATAL CARE: ICD-10-PCS | Mod: S$GLB,,, | Performed by: ADVANCED PRACTICE MIDWIFE

## 2020-06-24 PROCEDURE — 3008F BODY MASS INDEX DOCD: CPT | Mod: CPTII,S$GLB,, | Performed by: ADVANCED PRACTICE MIDWIFE

## 2020-06-24 NOTE — PROGRESS NOTES
Chief Complaint   Patient presents with    Routine Prenatal Visit    Migraine     5/10       40 y.o., at 35w0d by Estimated Date of Delivery: 20    Complaints today: Continues with headaches    ROS  OBSTETRICS:   Contractions denies   Bleeding denies   Loss of fluid denies   Fetal mvmnt Reports good FM, is doing BID mvmt counts  GASTRO:   Nausea none   Vomiting none      OB History    Para Term  AB Living   5 3 2 1 1 3   SAB TAB Ectopic Multiple Live Births   1     0 3      # Outcome Date GA Lbr Yuval/2nd Weight Sex Delivery Anes PTL Lv   5 Current            4  19 34w3d   M CS-LTranv Spinal  JIGAR   3 SAB 18 10w0d          2 Term 09 37w0d  2.722 kg (6 lb) F CS-Unspec EPI  JIGAR   1 Term 05 37w0d  3.345 kg (7 lb 6 oz) M CS-Unspec EPI  JIGAR       Dating reviewed  Allergies and problem list reviewed and updated  Medical and surgical history reviewed  Prenatal labs reviewed and updated    PHYSICAL EXAM  /80   Wt 119.3 kg (263 lb 0.1 oz)   LMP  (LMP Unknown)   BMI 38.28 kg/m²     GENERAL: No acute distress  HEENT: Normocephalic  NEURO: Alert and oriented x3  PSYCH: Normal mood and affect  PULMONARY: Non-labored respiration; no tachypnea  ABD: Soft, gravid, nontender; no hernia or hepatosplenomegaly    ASSESSMENT AND PLAN    PREGNANCY Problems (from 20 to present)     Problem Noted Resolved    31 weeks gestation of pregnancy 2020 by Bessie Bal MD No    Anemia 2020 by Bessie Bal MD No    Multigravida of advanced maternal age in second trimester 2020 by Florence Carvalho MD No    History of pre-eclampsia- leading to delivery at 34 weeks 2020 by Florence Carvalho MD No    Obesity complicating pregnancy 2020 by Stella Yen MD No    History of  section complicating pregnancy 2020 by Stella Yen MD No    History of wound infection 2020 by Stella Yen MD No    Pregnancy, supervision, high-risk 2020  by Stella Yen MD No    Rh negative state in antepartum period 2020 by Stella Yen MD No        Has follow up scheduled with Neurology sec to continued headaches   Case request placed for 20 for repeat  delivery with BTL- see last note per Dr. Rutledge   labor precautions given  Follow-up: 1 week

## 2020-06-29 ENCOUNTER — TELEPHONE (OUTPATIENT)
Dept: NEUROLOGY | Facility: CLINIC | Age: 40
End: 2020-06-29

## 2020-07-02 ENCOUNTER — ROUTINE PRENATAL (OUTPATIENT)
Dept: OBSTETRICS AND GYNECOLOGY | Facility: CLINIC | Age: 40
End: 2020-07-02
Payer: COMMERCIAL

## 2020-07-02 VITALS
WEIGHT: 262.38 LBS | BODY MASS INDEX: 38.19 KG/M2 | SYSTOLIC BLOOD PRESSURE: 130 MMHG | DIASTOLIC BLOOD PRESSURE: 80 MMHG

## 2020-07-02 DIAGNOSIS — O99.343 MENTAL DISORDER AFFECTING PREGNANCY IN THIRD TRIMESTER: ICD-10-CM

## 2020-07-02 DIAGNOSIS — O34.211 MATERNAL CARE DUE TO LOW TRANSVERSE UTERINE SCAR FROM PREVIOUS CESAREAN DELIVERY: ICD-10-CM

## 2020-07-02 DIAGNOSIS — G43.001 MIGRAINE WITHOUT AURA AND WITH STATUS MIGRAINOSUS, NOT INTRACTABLE: ICD-10-CM

## 2020-07-02 DIAGNOSIS — O09.293 PREGNANCY WITH POOR REPRODUCTIVE HISTORY IN THIRD TRIMESTER: Primary | ICD-10-CM

## 2020-07-02 PROCEDURE — 99999 PR PBB SHADOW E&M-EST. PATIENT-LVL III: CPT | Mod: PBBFAC,,, | Performed by: OBSTETRICS & GYNECOLOGY

## 2020-07-02 PROCEDURE — 0502F PR SUBSEQUENT PRENATAL CARE: ICD-10-PCS | Mod: S$GLB,,, | Performed by: OBSTETRICS & GYNECOLOGY

## 2020-07-02 PROCEDURE — 0502F SUBSEQUENT PRENATAL CARE: CPT | Mod: S$GLB,,, | Performed by: OBSTETRICS & GYNECOLOGY

## 2020-07-02 PROCEDURE — 3008F BODY MASS INDEX DOCD: CPT | Mod: CPTII,S$GLB,, | Performed by: OBSTETRICS & GYNECOLOGY

## 2020-07-02 PROCEDURE — 87081 CULTURE SCREEN ONLY: CPT

## 2020-07-02 PROCEDURE — 3008F PR BODY MASS INDEX (BMI) DOCUMENTED: ICD-10-PCS | Mod: CPTII,S$GLB,, | Performed by: OBSTETRICS & GYNECOLOGY

## 2020-07-02 PROCEDURE — 99999 PR PBB SHADOW E&M-EST. PATIENT-LVL III: ICD-10-PCS | Mod: PBBFAC,,, | Performed by: OBSTETRICS & GYNECOLOGY

## 2020-07-02 RX ORDER — BUTALBITAL, ACETAMINOPHEN AND CAFFEINE 50; 325; 40 MG/1; MG/1; MG/1
1 TABLET ORAL EVERY 4 HOURS PRN
Qty: 20 TABLET | Refills: 1 | Status: SHIPPED | OUTPATIENT
Start: 2020-07-02 | End: 2020-08-01

## 2020-07-02 RX ORDER — PROCHLORPERAZINE MALEATE 10 MG
10 TABLET ORAL 2 TIMES DAILY PRN
Qty: 45 TABLET | Refills: 1 | Status: SHIPPED | OUTPATIENT
Start: 2020-07-02 | End: 2020-08-06

## 2020-07-02 NOTE — PROGRESS NOTES
Subjective:   Anitra Chaney is a 40 y.o. at 36w1d who presents for routine prenatal care. Estimated Date of Delivery: 20.  She complains of anxiety not controlled by Atarax, as well as being out of her HA medications. Has checked BP and BG during anxiety episodes to ensure no issues and BP has been baseline and BG not below 70 at any time.     OB ROS:  Nausea: No  Vomiting: No  Vaginal bleeding: No  Leakage of fluid: No  Contractions: BH contractions  Fetal Movement: Normal     OB History    Para Term  AB Living   5 3 2 1 1 3   SAB TAB Ectopic Multiple Live Births   1     0 3      # Outcome Date GA Lbr Yuval/2nd Weight Sex Delivery Anes PTL Lv   5 Current            4  19 34w3d   M CS-LTranv Spinal  JIGAR   3 SAB 18 10w0d          2 Term 09 37w0d  2.722 kg (6 lb) F CS-Unspec EPI  JIGAR   1 Term 05 37w0d  3.345 kg (7 lb 6 oz) M CS-Unspec EPI  JIGAR       Dating Reviewed - Estimated Date of Delivery: 20 by 10 week ultrasound.  Medical and surgical history reviewed.   Allergies and problem list reviewed and updated.  Prenatal labs and imaging reviewed.     /80   Wt 119 kg (262 lb 5.6 oz)   LMP  (LMP Unknown)   BMI 38.19 kg/m²     Physical Exam:  General: Well-appearing, well nourished, no acute distress.   Abdomen: Soft, gravid, nontender. Size consistent with GA.   Extremities: No lower extremity edema.      Assessment:  40 y.o.  at 36w1d here for routine PNC.  Patient Active Problem List   Diagnosis    Migraine syndrome    Neurogenic claudication    Ankle arthritis    CHTN on no medications    Lower GI bleed    Migraine    Lumbar radiculopathy    Chronic bilateral low back pain with bilateral sciatica    Fibromyalgia muscle pain    Class 2 severe obesity due to excess calories with serious comorbidity and body mass index (BMI) of 37.0 to 37.9 in adult    Rh negative state in antepartum period    Pregnancy, supervision, high-risk     Obesity complicating pregnancy    History of  section complicating pregnancy    History of wound infection    Hx of bariatric surgery    Cerebrovascular accident (CVA) due to thrombosis    Multigravida of advanced maternal age in second trimester    History of pre-eclampsia- leading to delivery at 34 weeks    Unwanted fertility- tubal papers signed    History of diabetes mellitus- resolved after gastric bypass; last A1c 4.9    Left ventricular hypertrophy    Hypertension in pregnancy, essential, antepartum, second trimester    Migraine aura, persistent, intractable, with status migrainosus    Intractable headache    31 weeks gestation of pregnancy    Anemia       Plan:   OB care - has CS scheduled 20 at 37w with BTL; medicaid papers signed. GBS done today  HA - Continue to affect her. Has neuro f/u scheduled mid-month. Refills of fioricet and compazine snet to pharmacy. Precautions given  Anxiety - Will plan to restart prepregnancy antidepressants in the hospital and will need PP mood check  Continue kick counts. Labor precautions given.   Follow up in 2.5 weeks for PP BP check    Polo Rodriguez M.D.  Obstetrics & Gynecology  PGY-4

## 2020-07-04 LAB — BACTERIA SPEC AEROBE CULT: NORMAL

## 2020-07-07 ENCOUNTER — ANESTHESIA EVENT (OUTPATIENT)
Dept: OBSTETRICS AND GYNECOLOGY | Facility: OTHER | Age: 40
End: 2020-07-07
Payer: COMMERCIAL

## 2020-07-08 ENCOUNTER — ANESTHESIA (OUTPATIENT)
Dept: OBSTETRICS AND GYNECOLOGY | Facility: OTHER | Age: 40
End: 2020-07-08
Payer: COMMERCIAL

## 2020-07-08 ENCOUNTER — HOSPITAL ENCOUNTER (INPATIENT)
Facility: OTHER | Age: 40
LOS: 2 days | Discharge: HOME | End: 2020-07-10
Attending: OBSTETRICS & GYNECOLOGY | Admitting: OBSTETRICS & GYNECOLOGY
Payer: COMMERCIAL

## 2020-07-08 DIAGNOSIS — Z98.891 S/P CESAREAN SECTION: Primary | ICD-10-CM

## 2020-07-08 DIAGNOSIS — Z98.891 HISTORY OF CESAREAN DELIVERY: ICD-10-CM

## 2020-07-08 DIAGNOSIS — O09.90 PREGNANCY, SUPERVISION, HIGH-RISK: ICD-10-CM

## 2020-07-08 LAB
ABO + RH BLD: NORMAL
BASOPHILS # BLD AUTO: 0.05 K/UL (ref 0–0.2)
BASOPHILS NFR BLD: 0.5 % (ref 0–1.9)
BLD GP AB SCN CELLS X3 SERPL QL: NORMAL
BLOOD GROUP ANTIBODIES SERPL: NORMAL
DIFFERENTIAL METHOD: ABNORMAL
EOSINOPHIL # BLD AUTO: 0.1 K/UL (ref 0–0.5)
EOSINOPHIL NFR BLD: 1.1 % (ref 0–8)
ERYTHROCYTE [DISTWIDTH] IN BLOOD BY AUTOMATED COUNT: 15.4 % (ref 11.5–14.5)
HCT VFR BLD AUTO: 28.7 % (ref 37–48.5)
HGB BLD-MCNC: 8.8 G/DL (ref 12–16)
HIV1+2 IGG SERPL QL IA.RAPID: NORMAL
IMM GRANULOCYTES # BLD AUTO: 0.06 K/UL (ref 0–0.04)
IMM GRANULOCYTES NFR BLD AUTO: 0.6 % (ref 0–0.5)
LYMPHOCYTES # BLD AUTO: 1.8 K/UL (ref 1–4.8)
LYMPHOCYTES NFR BLD: 17.9 % (ref 18–48)
MCH RBC QN AUTO: 22.1 PG (ref 27–31)
MCHC RBC AUTO-ENTMCNC: 30.7 G/DL (ref 32–36)
MCV RBC AUTO: 72 FL (ref 82–98)
MONOCYTES # BLD AUTO: 0.6 K/UL (ref 0.3–1)
MONOCYTES NFR BLD: 5.5 % (ref 4–15)
NEUTROPHILS # BLD AUTO: 7.5 K/UL (ref 1.8–7.7)
NEUTROPHILS NFR BLD: 74.4 % (ref 38–73)
NRBC BLD-RTO: 0 /100 WBC
PLATELET # BLD AUTO: 273 K/UL (ref 150–350)
PMV BLD AUTO: 9.8 FL (ref 9.2–12.9)
RBC # BLD AUTO: 3.98 M/UL (ref 4–5.4)
SARS-COV-2 RDRP RESP QL NAA+PROBE: NEGATIVE
WBC # BLD AUTO: 10.13 K/UL (ref 3.9–12.7)

## 2020-07-08 PROCEDURE — 36004725 HC OB OR TIME LEV III - EA ADD 15 MIN: Performed by: OBSTETRICS & GYNECOLOGY

## 2020-07-08 PROCEDURE — 37000008 HC ANESTHESIA 1ST 15 MINUTES: Performed by: OBSTETRICS & GYNECOLOGY

## 2020-07-08 PROCEDURE — 85025 COMPLETE CBC W/AUTO DIFF WBC: CPT

## 2020-07-08 PROCEDURE — 37000009 HC ANESTHESIA EA ADD 15 MINS: Performed by: OBSTETRICS & GYNECOLOGY

## 2020-07-08 PROCEDURE — 59510 PR FULL ROUT OBSTE CARE,CESAREAN DELIV: ICD-10-PCS | Mod: AT,,, | Performed by: OBSTETRICS & GYNECOLOGY

## 2020-07-08 PROCEDURE — 25000003 PHARM REV CODE 250: Performed by: UROLOGY

## 2020-07-08 PROCEDURE — 71000033 HC RECOVERY, INTIAL HOUR: Performed by: OBSTETRICS & GYNECOLOGY

## 2020-07-08 PROCEDURE — 86703 HIV-1/HIV-2 1 RESULT ANTBDY: CPT

## 2020-07-08 PROCEDURE — 58611 PR LIGATION,FALLOPIAN TUBE W/C-SECTION: ICD-10-PCS | Mod: ,,, | Performed by: OBSTETRICS & GYNECOLOGY

## 2020-07-08 PROCEDURE — 36004724 HC OB OR TIME LEV III - 1ST 15 MIN: Performed by: OBSTETRICS & GYNECOLOGY

## 2020-07-08 PROCEDURE — 58611 LIGATE OVIDUCT(S) ADD-ON: CPT | Mod: ,,, | Performed by: OBSTETRICS & GYNECOLOGY

## 2020-07-08 PROCEDURE — 86901 BLOOD TYPING SEROLOGIC RH(D): CPT

## 2020-07-08 PROCEDURE — 59514 PRA REAN DELIVERY ONLY: ICD-10-PCS | Mod: ,,, | Performed by: ANESTHESIOLOGY

## 2020-07-08 PROCEDURE — 11000001 HC ACUTE MED/SURG PRIVATE ROOM

## 2020-07-08 PROCEDURE — 88302 TISSUE EXAM BY PATHOLOGIST: CPT | Mod: 26,,, | Performed by: PATHOLOGY

## 2020-07-08 PROCEDURE — 63600175 PHARM REV CODE 636 W HCPCS: Performed by: STUDENT IN AN ORGANIZED HEALTH CARE EDUCATION/TRAINING PROGRAM

## 2020-07-08 PROCEDURE — S0028 INJECTION, FAMOTIDINE, 20 MG: HCPCS | Performed by: STUDENT IN AN ORGANIZED HEALTH CARE EDUCATION/TRAINING PROGRAM

## 2020-07-08 PROCEDURE — 63600175 PHARM REV CODE 636 W HCPCS: Performed by: UROLOGY

## 2020-07-08 PROCEDURE — 27200033

## 2020-07-08 PROCEDURE — U0002 COVID-19 LAB TEST NON-CDC: HCPCS

## 2020-07-08 PROCEDURE — 25000003 PHARM REV CODE 250: Performed by: STUDENT IN AN ORGANIZED HEALTH CARE EDUCATION/TRAINING PROGRAM

## 2020-07-08 PROCEDURE — 88302 TISSUE EXAM BY PATHOLOGIST: CPT | Performed by: PATHOLOGY

## 2020-07-08 PROCEDURE — 59510 CESAREAN DELIVERY: CPT | Mod: AT,,, | Performed by: OBSTETRICS & GYNECOLOGY

## 2020-07-08 PROCEDURE — 88302 PR  SURG PATH,LEVEL II: ICD-10-PCS | Mod: 26,,, | Performed by: PATHOLOGY

## 2020-07-08 PROCEDURE — 59514 CESAREAN DELIVERY ONLY: CPT | Mod: ,,, | Performed by: ANESTHESIOLOGY

## 2020-07-08 PROCEDURE — 86592 SYPHILIS TEST NON-TREP QUAL: CPT

## 2020-07-08 PROCEDURE — 71000039 HC RECOVERY, EACH ADD'L HOUR: Performed by: OBSTETRICS & GYNECOLOGY

## 2020-07-08 PROCEDURE — 86870 RBC ANTIBODY IDENTIFICATION: CPT

## 2020-07-08 RX ORDER — NALBUPHINE HYDROCHLORIDE 10 MG/ML
5 INJECTION, SOLUTION INTRAMUSCULAR; INTRAVENOUS; SUBCUTANEOUS ONCE AS NEEDED
Status: DISCONTINUED | OUTPATIENT
Start: 2020-07-08 | End: 2020-07-10

## 2020-07-08 RX ORDER — ONDANSETRON 2 MG/ML
4 INJECTION INTRAMUSCULAR; INTRAVENOUS EVERY 6 HOURS PRN
Status: DISCONTINUED | OUTPATIENT
Start: 2020-07-08 | End: 2020-07-10

## 2020-07-08 RX ORDER — DIPHENHYDRAMINE HYDROCHLORIDE 50 MG/ML
12.5 INJECTION INTRAMUSCULAR; INTRAVENOUS
Status: DISCONTINUED | OUTPATIENT
Start: 2020-07-08 | End: 2020-07-10

## 2020-07-08 RX ORDER — LIDOCAINE HYDROCHLORIDE AND EPINEPHRINE 15; 5 MG/ML; UG/ML
INJECTION, SOLUTION EPIDURAL
Status: DISCONTINUED | OUTPATIENT
Start: 2020-07-08 | End: 2020-07-08

## 2020-07-08 RX ORDER — SODIUM CHLORIDE, SODIUM LACTATE, POTASSIUM CHLORIDE, CALCIUM CHLORIDE 600; 310; 30; 20 MG/100ML; MG/100ML; MG/100ML; MG/100ML
INJECTION, SOLUTION INTRAVENOUS CONTINUOUS
Status: DISCONTINUED | OUTPATIENT
Start: 2020-07-08 | End: 2020-07-10

## 2020-07-08 RX ORDER — MISOPROSTOL 200 UG/1
800 TABLET ORAL ONCE AS NEEDED
Status: DISCONTINUED | OUTPATIENT
Start: 2020-07-08 | End: 2020-07-10

## 2020-07-08 RX ORDER — SODIUM CITRATE AND CITRIC ACID MONOHYDRATE 334; 500 MG/5ML; MG/5ML
30 SOLUTION ORAL
Status: COMPLETED | OUTPATIENT
Start: 2020-07-08 | End: 2020-07-08

## 2020-07-08 RX ORDER — MORPHINE SULFATE 0.5 MG/ML
INJECTION, SOLUTION EPIDURAL; INTRATHECAL; INTRAVENOUS
Status: DISCONTINUED | OUTPATIENT
Start: 2020-07-08 | End: 2020-07-08

## 2020-07-08 RX ORDER — OXYTOCIN/RINGER'S LACTATE 30/500 ML
334 PLASTIC BAG, INJECTION (ML) INTRAVENOUS ONCE
Status: DISCONTINUED | OUTPATIENT
Start: 2020-07-08 | End: 2020-07-10

## 2020-07-08 RX ORDER — OXYCODONE HYDROCHLORIDE 5 MG/1
5 TABLET ORAL EVERY 4 HOURS PRN
Status: DISCONTINUED | OUTPATIENT
Start: 2020-07-08 | End: 2020-07-10

## 2020-07-08 RX ORDER — DEXAMETHASONE SODIUM PHOSPHATE 4 MG/ML
INJECTION, SOLUTION INTRA-ARTICULAR; INTRALESIONAL; INTRAMUSCULAR; INTRAVENOUS; SOFT TISSUE
Status: DISCONTINUED | OUTPATIENT
Start: 2020-07-08 | End: 2020-07-08

## 2020-07-08 RX ORDER — PRENATAL WITH FERROUS FUM AND FOLIC ACID 3080; 920; 120; 400; 22; 1.84; 3; 20; 10; 1; 12; 200; 27; 25; 2 [IU]/1; [IU]/1; MG/1; [IU]/1; MG/1; MG/1; MG/1; MG/1; MG/1; MG/1; UG/1; MG/1; MG/1; MG/1; MG/1
1 TABLET ORAL DAILY
Status: DISCONTINUED | OUTPATIENT
Start: 2020-07-09 | End: 2020-07-10

## 2020-07-08 RX ORDER — FAMOTIDINE 10 MG/ML
20 INJECTION INTRAVENOUS
Status: COMPLETED | OUTPATIENT
Start: 2020-07-08 | End: 2020-07-08

## 2020-07-08 RX ORDER — ONDANSETRON 8 MG/1
8 TABLET, ORALLY DISINTEGRATING ORAL EVERY 8 HOURS PRN
Status: DISCONTINUED | OUTPATIENT
Start: 2020-07-08 | End: 2020-07-10

## 2020-07-08 RX ORDER — SODIUM CHLORIDE, SODIUM LACTATE, POTASSIUM CHLORIDE, CALCIUM CHLORIDE 600; 310; 30; 20 MG/100ML; MG/100ML; MG/100ML; MG/100ML
INJECTION, SOLUTION INTRAVENOUS CONTINUOUS PRN
Status: DISCONTINUED | OUTPATIENT
Start: 2020-07-08 | End: 2020-07-08

## 2020-07-08 RX ORDER — OXYTOCIN 10 [USP'U]/ML
INJECTION, SOLUTION INTRAMUSCULAR; INTRAVENOUS
Status: DISCONTINUED | OUTPATIENT
Start: 2020-07-08 | End: 2020-07-08

## 2020-07-08 RX ORDER — DIPHENHYDRAMINE HCL 25 MG
25 CAPSULE ORAL EVERY 6 HOURS PRN
Status: DISCONTINUED | OUTPATIENT
Start: 2020-07-08 | End: 2020-07-10

## 2020-07-08 RX ORDER — IBUPROFEN 400 MG/1
800 TABLET ORAL
Status: CANCELLED | OUTPATIENT
Start: 2020-07-09

## 2020-07-08 RX ORDER — BUPIVACAINE HYDROCHLORIDE 7.5 MG/ML
INJECTION, SOLUTION INTRASPINAL
Status: DISCONTINUED | OUTPATIENT
Start: 2020-07-08 | End: 2020-07-08

## 2020-07-08 RX ORDER — AMOXICILLIN 250 MG
1 CAPSULE ORAL NIGHTLY PRN
Status: DISCONTINUED | OUTPATIENT
Start: 2020-07-08 | End: 2020-07-10

## 2020-07-08 RX ORDER — ENOXAPARIN SODIUM 100 MG/ML
40 INJECTION SUBCUTANEOUS EVERY 24 HOURS
Status: DISCONTINUED | OUTPATIENT
Start: 2020-07-09 | End: 2020-07-10

## 2020-07-08 RX ORDER — CARBOPROST TROMETHAMINE 250 UG/ML
250 INJECTION, SOLUTION INTRAMUSCULAR
Status: DISCONTINUED | OUTPATIENT
Start: 2020-07-08 | End: 2020-07-10

## 2020-07-08 RX ORDER — SIMETHICONE 80 MG
1 TABLET,CHEWABLE ORAL EVERY 6 HOURS PRN
Status: DISCONTINUED | OUTPATIENT
Start: 2020-07-08 | End: 2020-07-10

## 2020-07-08 RX ORDER — HYDROCORTISONE 25 MG/G
CREAM TOPICAL 3 TIMES DAILY PRN
Status: DISCONTINUED | OUTPATIENT
Start: 2020-07-08 | End: 2020-07-10

## 2020-07-08 RX ORDER — ONDANSETRON 2 MG/ML
INJECTION INTRAMUSCULAR; INTRAVENOUS
Status: DISCONTINUED | OUTPATIENT
Start: 2020-07-08 | End: 2020-07-08

## 2020-07-08 RX ORDER — OXYTOCIN/RINGER'S LACTATE 30/500 ML
95 PLASTIC BAG, INJECTION (ML) INTRAVENOUS ONCE
Status: DISCONTINUED | OUTPATIENT
Start: 2020-07-08 | End: 2020-07-10

## 2020-07-08 RX ORDER — DOCUSATE SODIUM 100 MG/1
200 CAPSULE, LIQUID FILLED ORAL 2 TIMES DAILY
Status: DISCONTINUED | OUTPATIENT
Start: 2020-07-08 | End: 2020-07-10

## 2020-07-08 RX ORDER — ACETAMINOPHEN 500 MG
1000 TABLET ORAL
Status: COMPLETED | OUTPATIENT
Start: 2020-07-08 | End: 2020-07-08

## 2020-07-08 RX ORDER — OXYCODONE HYDROCHLORIDE 5 MG/1
10 TABLET ORAL EVERY 4 HOURS PRN
Status: DISCONTINUED | OUTPATIENT
Start: 2020-07-08 | End: 2020-07-10

## 2020-07-08 RX ORDER — ACETAMINOPHEN 325 MG/1
650 TABLET ORAL
Status: DISCONTINUED | OUTPATIENT
Start: 2020-07-08 | End: 2020-07-10

## 2020-07-08 RX ORDER — METHYLERGONOVINE MALEATE 0.2 MG/ML
200 INJECTION INTRAVENOUS ONCE AS NEEDED
Status: DISCONTINUED | OUTPATIENT
Start: 2020-07-08 | End: 2020-07-10

## 2020-07-08 RX ORDER — FENTANYL CITRATE 50 UG/ML
INJECTION, SOLUTION INTRAMUSCULAR; INTRAVENOUS
Status: DISCONTINUED | OUTPATIENT
Start: 2020-07-08 | End: 2020-07-08

## 2020-07-08 RX ORDER — KETOROLAC TROMETHAMINE 30 MG/ML
30 INJECTION, SOLUTION INTRAMUSCULAR; INTRAVENOUS
Status: CANCELLED | OUTPATIENT
Start: 2020-07-08 | End: 2020-07-09

## 2020-07-08 RX ADMIN — Medication 0.15 MG: at 01:07

## 2020-07-08 RX ADMIN — LIDOCAINE HYDROCHLORIDE,EPINEPHRINE BITARTRATE 3 ML: 15; .005 INJECTION, SOLUTION EPIDURAL; INFILTRATION; INTRACAUDAL; PERINEURAL at 02:07

## 2020-07-08 RX ADMIN — ONDANSETRON 4 MG: 2 INJECTION INTRAMUSCULAR; INTRAVENOUS at 01:07

## 2020-07-08 RX ADMIN — SODIUM CHLORIDE 3 G: 9 INJECTION, SOLUTION INTRAVENOUS at 01:07

## 2020-07-08 RX ADMIN — BUPIVACAINE HYDROCHLORIDE IN DEXTROSE 1.6 ML: 7.5 INJECTION, SOLUTION SUBARACHNOID at 01:07

## 2020-07-08 RX ADMIN — ACETAMINOPHEN 1000 MG: 500 TABLET ORAL at 12:07

## 2020-07-08 RX ADMIN — ACETAMINOPHEN 650 MG: 325 TABLET ORAL at 09:07

## 2020-07-08 RX ADMIN — PHENYLEPHRINE HYDROCHLORIDE 50 MCG: 10 INJECTION INTRAVENOUS at 01:07

## 2020-07-08 RX ADMIN — ONDANSETRON 4 MG: 2 INJECTION INTRAMUSCULAR; INTRAVENOUS at 07:07

## 2020-07-08 RX ADMIN — FAMOTIDINE 20 MG: 10 INJECTION, SOLUTION INTRAVENOUS at 12:07

## 2020-07-08 RX ADMIN — SODIUM CHLORIDE, SODIUM LACTATE, POTASSIUM CHLORIDE, AND CALCIUM CHLORIDE: 600; 310; 30; 20 INJECTION, SOLUTION INTRAVENOUS at 01:07

## 2020-07-08 RX ADMIN — SODIUM CITRATE AND CITRIC ACID MONOHYDRATE 30 ML: 500; 334 SOLUTION ORAL at 12:07

## 2020-07-08 RX ADMIN — DEXAMETHASONE SODIUM PHOSPHATE 4 MG: 4 INJECTION, SOLUTION INTRAMUSCULAR; INTRAVENOUS at 01:07

## 2020-07-08 RX ADMIN — OXYCODONE 10 MG: 5 TABLET ORAL at 05:07

## 2020-07-08 RX ADMIN — SODIUM CHLORIDE, SODIUM LACTATE, POTASSIUM CHLORIDE, AND CALCIUM CHLORIDE: .6; .31; .03; .02 INJECTION, SOLUTION INTRAVENOUS at 12:07

## 2020-07-08 RX ADMIN — DIPHENHYDRAMINE HYDROCHLORIDE 25 MG: 25 CAPSULE ORAL at 06:07

## 2020-07-08 RX ADMIN — DOCUSATE SODIUM 200 MG: 100 CAPSULE, LIQUID FILLED ORAL at 08:07

## 2020-07-08 RX ADMIN — OXYTOCIN 30 UNITS: 10 INJECTION, SOLUTION INTRAMUSCULAR; INTRAVENOUS at 02:07

## 2020-07-08 RX ADMIN — OXYCODONE 10 MG: 5 TABLET ORAL at 09:07

## 2020-07-08 RX ADMIN — FENTANYL CITRATE 10 MCG: 50 INJECTION, SOLUTION INTRAMUSCULAR; INTRAVENOUS at 01:07

## 2020-07-08 NOTE — ANESTHESIA PROCEDURE NOTES
CSE    Patient location during procedure: OR  Start time: 7/8/2020 1:05 PM  Timeout: 7/8/2020 1:05 PM  End time: 7/8/2020 1:09 PM    Staffing  Authorizing Provider: Loli Ocampo MD  Performing Provider: Ady Ramirez MD    Preanesthetic Checklist  Completed: patient identified, site marked, surgical consent, pre-op evaluation, timeout performed, IV checked, risks and benefits discussed and monitors and equipment checked  CSE  Patient position: sitting  Prep: ChloraPrep  Patient monitoring: heart rate, cardiac monitor, continuous pulse ox and frequent blood pressure checks  Approach: midline  Spinal Needle  Needle type: pencil-tip   Needle gauge: 25 G  Needle length: 3.5 in  Epidural Needle  Injection technique: LEVON saline  Needle type: Tuohy   Needle gauge: 17 G  Needle length: 3.5 in  Needle insertion depth: 9 cm  Location: L4-5  Needle localization: anatomical landmarks  Catheter  Catheter type: springwound  Catheter size: 19 G  Catheter at skin depth: 14 cm  Additional Documentation: incremental injection  Assessment  Sensory level: T4   Dermatomal levels determined by pinch or prick  Intrathecal Medications:  administered: primary anesthetic mcg of

## 2020-07-08 NOTE — L&D DELIVERY NOTE
Section Procedure Note    Procedure: Repeat  Section via Pfannenstiel skin incision & Bilateral Tubal Ligation    Indications: 40 y.o.  at 37w0d with CHTN and unrelenting HA and undesired fertility    Pre-operative Diagnosis:   1. IUP at 37 week 0 day pregnancy  2. Undesired fertility  3. AMA  4. H/o 3 prior LTCS  5. H/o of bariatric surgery  6. CHTN ( on no meds)  7. H/o preE in prior pregnancy  8. Migraines  9. 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 H/of of GI bleed  11. Anemia     Post-operative Diagnosis: S/p RLTCS/BTL    Surgeon: Corina Mao MD      Assistants: Opal Cabrales MD PGY-4, Aaliyah Laguna MD PGY-1    Anesthesia: Spinal Anesthesia     Findings:   1. Dense scar tissue upon entering the fascia and  muscles off of fascia.   2. Adhesions between bladder and lower uterine segment  3. BTL performed without difficulty. Grossly normal bilateral ovaries.     Estimated Blood Loss:  650           Total IV Fluids: 2000 mL     UOP: 250 mL    Specimens:   1. Left and right fallopian tube segments    PreOp CBC:   Lab Results   Component Value Date    WBC 7.89 2020    HGB 6.7 (L) 2020    HCT 22.2 (L) 2020    MCV 74 (L) 2020     2020                     Complications:  None; patient tolerated the procedure well.           Disposition: PACU - hemodynamically stable.           Condition: stable    Procedure Details   The patient was seen in the Holding Room. The risks, benefits, complications, treatment options, and expected outcomes were discussed with the patient.  The patient concurred with the proposed plan, giving informed consent.  The site of surgery properly noted. The patient was taken to Operating Room, identified as Anitra Chaney and the procedure verified as a repat  Delivery and bilateral tubal ligation. A Time Out was held and the above information confirmed.    After induction of anesthesia, the patient was prepped and draped in  the usual sterile manner while placed in a dorsal supine position with a left lateral tilt.  A cuba catheter was also placed per nursing. Preoperative antibiotics 3g were administered and an allis test was performed yielding adequate anesthesia.  A Pfannenstiel incision was made and carried down through the subcutaneous tissue to the fascia. Scar tissue was encountered Fascial incision was made and extended transversely. The fascia was grasped with [facility] clamps and  from the underlying rectus tissue superiorly and inferiorly sharply. The peritoneum was identified, found to be free of adherent bowel and entered sharply. Peritoneal incision was extended longitudinally. The vesico-uterine peritoneum was identified and bladder blade was inserted. The vesico-uterine peritoneum was incised transversely and the bladder flap was bluntly freed from the lower uterine segment. The bladder blade was reinserted to keep the bladder out of the operative field. A low transverse uterine incision was made with knife and extended in a cephalo caudal direction using finger fraction. The amniotic sac was ruptured and the infant was noted to be in vertex position. The head was brought to the incision and elevated out of the pelvis. The patient delivered a single viable female infant without difficulty. After the umbilical cord was clamped and cut cord blood was obtained for evaluation. The placenta was removed intact and appeared normal and was discarded . The uterus was exteriorized. The uterine outline, tubes and ovaries appeared normal. The uterine incision was closed with running locked sutures of 1-0 chromic GUT. Hemostasis was observed.     The right tube was identified followed out to the fimbriated end.  The isthmic portion of the tube was grasped in an avascular portion with a richard clamp and elevated with the richard clamp and a 2 cm portion of fallopian tube was ligated with 0- plain gut using the timothy  method. The tubal portion was then excised. The left tube was then identified followed out to the fimbriated end.  The isthmic portion of the tube was grasped in an avascular portion with a richard clamp and elevated with the richard clamp and a 2 cm portion of fallopian tube was ligated with 0 plain gut  using the parkland method. The tubal portion was then excised. Hemostasis was verified. Both the right and left fallopian tube segments were sent to pathology.     The uterus was returned to the abdominal cavity. Incision was reinspected and good hemostasis was noted.  The ends of the fallopian tubes were visualized bilaterally, suture in place, hemostasis noted.   The fascia was then reapproximated with running sutures of 0 PDS. The subcutaneous fat was closed in 2 layers using 2-0 vicryl. The skin was closed with staples.   Instrument, sponge, and needle counts were correct prior the abdominal closure and at the conclusion of the case.     Pt tolerated procedure well and was in stable condition after the procedure.               Delivery Information for Bert Chaney    Birth information:  YOB: 2020   Time of birth: 1:47 PM   Sex: female   Head Delivery Date/Time: 2020  1:47 PM   Delivery type: , Low Transverse   Gestational Age: 37w0d    Delivery Providers    Delivering clinician: Corina Mao MD   Provider Role    Aaliyah Laguna MD Assisting Surgeon    Opal Cabrales MD Resident    Shirin Aguilar Surgical Tech    Da Brewster RN Circulator    Amie Lewis RN Registered Nurse    Ady Ramirez MD Anesthesiologist            Measurements    Weight: 2710 g  Length: 45.1 cm  Head circumference: 33 cm  Chest circumference: 31.8 cm         Apgars    Living status: Living  Apgars:  1 min.:  5 min.:  10 min.:  15 min.:  20 min.:    Skin color:  1  1       Heart rate:  2  2       Reflex irritability:  2  2       Muscle tone:  2  2       Respiratory effort:   2  2       Total:  9  9       Apgars assigned by: CHERYL JONES RN         Operative Delivery    Forceps attempted?: No  Vacuum extractor attempted?: No         Shoulder Dystocia    Shoulder dystocia present?: No           Presentation    Presentation: Vertex  Position: Anterior           Interventions/Resuscitation    Method: Bulb Suctioning, Tactile Stimulation, Deep Suctioning       Cord    Vessels: 3 vessels  Complications: None  Delayed Cord Clamping?: No  Cord Clamped Date/Time: 2020  1:48 PM  Cord Blood Disposition: Sent with Baby  Gases Sent?: No  Stem Cell Collection (by MD): No       Placenta    Placenta delivery date/time: 2020 1349  Placenta removal: Manual removal  Placenta appearance: Intact  Placenta disposition: discarded           Labor Events:       labor:       Labor Onset Date/Time:         Dilation Complete Date/Time:         Start Pushing Date/Time:         Start Pushing Date/Time:       Rupture Date/Time:            Rupture type:          Fluid Amount:       Fluid Color:       Fluid Odor:       Membrane Status:                steroids:       Antibiotics given for GBS:       Induction:       Indications for induction:        Augmentation:       Indications for augmentation:       Labor complications:       Additional complications:          Cervical ripening:                     Delivery:      Episiotomy: None     Indication for Episiotomy:       Perineal Lacerations: None Repaired:      Periurethral Laceration:   Repaired:     Labial Laceration:   Repaired:     Sulcus Laceration:   Repaired:     Vaginal Laceration:   Repaired:     Cervical Laceration:   Repaired:     Repair suture:       Repair # of packets:       Last Value - EBL - Nursing (mL):       Sum - EBL - Nursing (mL): 0     Last Value - EBL - Anesthesia (mL):      Calculated QBL (mL): 660      Vaginal Sweep Performed:       [facility] Correct:         Other providers:       Anesthesia    Method: Spinal, Epidural           Details (if applicable):  Trial of Labor No    Categorization: Repeat    Priority: Routine   Indications for : Repeat Section   Incision Type: low transverse     Additional  information:  Forceps:    Vacuum:    Breech:    Observed anomalies    Other (Comments):         Opal Cabrales M.D.  Obstetrics and Gynecology   PGY-4

## 2020-07-08 NOTE — TRANSFER OF CARE
Anesthesia Transfer of Care Note    Patient: Anitra Chaney    Procedure(s) Performed: Procedure(s) (LRB):   SECTION, WITH TUBAL LIGATION (Bilateral)    Patient location: PACU    Anesthesia Type: CSE    Transport from OR: Transported from OR on room air with adequate spontaneous ventilation    Post pain: adequate analgesia    Post assessment: tolerated procedure well and no apparent anesthetic complications    Post vital signs: stable    Level of consciousness: awake, alert and oriented    Nausea/Vomiting: no nausea/vomiting    Complications: none    Transfer of care protocol was followed      Last vitals:   Visit Vitals  LMP  (LMP Unknown)

## 2020-07-08 NOTE — ANESTHESIA PREPROCEDURE EVALUATION
[facility]  Anesthesia Pre-Operative Evaluation         Patient Name: Anitra Chaney  YOB: 1980  MRN: [id number]    2020      Anitra Chaney is a 40 y.o. female  @ 37w0d who presents for elective C/Sx (4x) and BTL by Dr. Mao.   Patient with extensive medical history including DM, cHTN, Hx of Pre-E, Fibromyalgia    C/Sx #1 - failure to progress in labor  C/Sx #2 - patient wanted to TOLAC but unknown type of surgical approach on prior C/Sx  C/Sx #3 - Pre-Ecclampsia / Repeat  C/Sx #4 - Elective    No issues with GETA or Neuraxials in past  Allergies to Tramadol and NSAIDs  All questions and concerns discussed at length.  NPO x 18 hours.     OB History    Para Term  AB Living   5 3 2 1 1 3   SAB TAB Ectopic Multiple Live Births   1     0 3      # Outcome Date GA Lbr Yuval/2nd Weight Sex Delivery Anes PTL Lv   5 Current            4  19 34w3d   M CS-LTranv Spinal  JIGAR   3 SAB 18 10w0d          2 Term 09 37w0d  2.722 kg (6 lb) F CS-Unspec EPI  JIGAR   1 Term 05 37w0d  3.345 kg (7 lb 6 oz) M CS-Unspec EPI  JIGAR       Review of patient's allergies indicates:   Allergen Reactions    Ibuprofen Nausea And Vomiting     Other reaction(s): gastric    Tramadol Nausea And Vomiting       Wt Readings from Last 1 Encounters:   20 1101 119 kg (262 lb 5.6 oz)       BP Readings from Last 3 Encounters:   20 130/80   20 130/80   20 130/84       Patient Active Problem List   Diagnosis    Migraine syndrome    Neurogenic claudication    Ankle arthritis    CHTN on no medications    Lower GI bleed    Migraine    Lumbar radiculopathy    Chronic bilateral low back pain with bilateral sciatica    Fibromyalgia muscle pain    Class 2 severe obesity due to excess calories with serious comorbidity and body mass index (BMI) of 37.0 to 37.9  in adult    Rh negative state in antepartum period    Pregnancy, supervision, high-risk    Obesity complicating pregnancy    History of  section complicating pregnancy    History of wound infection    Hx of bariatric surgery    Cerebrovascular accident (CVA) due to thrombosis    Multigravida of advanced maternal age in second trimester    History of pre-eclampsia- leading to delivery at 34 weeks    Unwanted fertility- tubal papers signed    History of diabetes mellitus- resolved after gastric bypass; last A1c 4.9    Left ventricular hypertrophy    Hypertension in pregnancy, essential, antepartum, second trimester    Migraine aura, persistent, intractable, with status migrainosus    Intractable headache    31 weeks gestation of pregnancy    Anemia       Past Surgical History:   Procedure Laterality Date    ANKLE FRACTURE SURGERY       SECTION N/A 2019    Procedure:  SECTION;  Surgeon: Gianna Flowers MD;  [facility];  Service: OB/GYN;  Laterality: N/A;     SECTION, CLASSIC  2005/2009/2019    x3    CHOLECYSTECTOMY  3/2002    lap maribel    FOOT SURGERY      gastric sleeve  2017       Social History     Socioeconomic History    Marital status:      Spouse name: Not on file    Number of children: Not on file    Years of education: Not on file    Highest education level: Not on file   Occupational History     Employer: [employer]   Social Needs    Financial resource strain: Hard    Food insecurity     Worry: Sometimes true     Inability: Sometimes true    Transportation needs     Medical: No     Non-medical: No   Tobacco Use    Smoking status: Current Every Day Smoker     Packs/day: 0.50     Years: 18.00     Pack years: 9.00     Types: Cigarettes    Smokeless tobacco: Never Used    Tobacco comment: cutting to 2 cigarettes per day   Substance and Sexual Activity    Alcohol use: No     Alcohol/week: 0.0 standard  drinks     Frequency: Never     Drinks per session: Patient refused     Binge frequency: Patient refused    Drug use: No    Sexual activity: Not Currently     Partners: Male   Lifestyle    Physical activity     Days per week: 0 days     Minutes per session: 0 min    Stress: Very much   Relationships    Social connections     Talks on phone: Twice a week     Gets together: Never     Attends Mormonism service: Not on file     Active member of club or organization: No     Attends meetings of clubs or organizations: Never     Relationship status:    Other Topics Concern    Caffeine Use Not Asked    Financial Status: Disabled Not Asked    Legal: Involved in criminal litigation Not Asked    Caffeine Use: Frequent Not Asked    Financial Status: Employed Not Asked    Legal: Other Not Asked    Caffeine Use: Moderate Not Asked    Financial Status: Unemployed Not Asked    Leisure: Exercise Not Asked    Caffeine Use: Substantial Not Asked    Financial Status: Other Not Asked    Leisure: Fishing Not Asked    Childhood History: Adopted Not Asked    Firearms: Does patient have access to a firearm? Not Asked    Leisure: Hunting Not Asked    Childhood History: Early trauma Not Asked    Home situation: homeless Not Asked    Leisure: Movie Watching Not Asked    Childhood History: Raised by parents Not Asked    Home situation: lives alone Not Asked    Leisure: Shopping Not Asked    Childhood History: Uneventful Not Asked    Home situation: lives in group home Not Asked    Leisure: Sports Not Asked    Childhood History: Other Not Asked    Home situation: lives in nursing home Not Asked    Leisure: Time with family Not Asked    Education: Unfinished High School Not Asked    Home situation: lives in shelter Not Asked     Service Not Asked    Education: High School Graduate Not Asked    Home situation: lives with family Not Asked    Spirituality: Active Participation Not Asked     Education: Unfinished college Not Asked    Home situation: lives with friends Not Asked    Spirituality: Organized Congregational Not Asked    Education: Trade School Not Asked    Home situation: lives with significant other Not Asked    Spirituality: Private Participation Not Asked    Education: Associate's Degree Not Asked    Home situation: lives with spouse Not Asked    Patient feels they ought to cut down on drinking/drug use Not Asked    Education: Bachelor's Degree Not Asked    Legal consequences of chemical use Not Asked    Patient annoyed by others criticizing their drinking/drug use Not Asked    Education: More than one Bachelor's or Professional Not Asked    Legal: Arrest history Not Asked    Patient has felt bad or guilty about drinking/drug use Not Asked    Education: Master's, PhD Not Asked    Legal: Involved in civil litigation Not Asked    Patient has had a drink/used drugs as an eye opener in the AM Not Asked   Social History Narrative    Not on file         Chemistry        Component Value Date/Time     06/19/2020 1600    K 3.6 06/19/2020 1600     06/19/2020 1600    CO2 19 (L) 06/19/2020 1600    BUN 6 06/19/2020 1600    CREATININE 0.6 06/19/2020 1600    GLU 77 06/19/2020 1600        Component Value Date/Time    CALCIUM 9.3 06/19/2020 1600    ALKPHOS 116 06/19/2020 1600    AST 9 (L) 06/19/2020 1600    ALT 6 (L) 06/19/2020 1600    BILITOT 0.1 06/19/2020 1600    ESTGFRAFRICA >60 06/19/2020 1600    EGFRNONAA >60 06/19/2020 1600            Lab Results   Component Value Date    WBC 10.93 06/19/2020    HGB 9.1 (L) 06/19/2020    HCT 29.4 (L) 06/19/2020    MCV 75 (L) 06/19/2020     06/19/2020       No results for input(s): PT, INR, PROTIME, APTT in the last 72 hours.          Anesthesia Evaluation    I have reviewed the Patient Summary Reports.    I have reviewed the Nursing Notes. I have reviewed the NPO Status.      Review of Systems  Anesthesia Hx:  History of prior surgery  of interest to airway management or planning: Previous anesthesia: General, Epidural, Spinal Denies Family Hx of Anesthesia complications.   Denies Personal Hx of Anesthesia complications.       Physical Exam  General:  Well nourished, Morbid Obesity    Airway/Jaw/Neck:  Airway Findings: Mouth Opening: Normal Tongue: Normal  Mallampati: II  Improves to I with phonation.  TM Distance: Normal, at least 6 cm      Dental:  Dental Findings: Periodontal disease, Severe, Upper Dentures         Mental Status:  Mental Status Findings:  Cooperative, Alert and Oriented         Anesthesia Plan  Type of Anesthesia, risks & benefits discussed:  Anesthesia Type:  CSE  Patient's Preference:   Intra-op Monitoring Plan: standard ASA monitors  Intra-op Monitoring Plan Comments:   Post Op Pain Control Plan: multimodal analgesia, IV/PO Opioids PRN and per primary service following discharge from PACU  Post Op Pain Control Plan Comments:   Induction:    Beta Blocker:  Patient is not currently on a Beta-Blocker (No further documentation required).       Informed Consent: Patient understands risks and agrees with Anesthesia plan.  Questions answered. Anesthesia consent signed with patient.  ASA Score: 3     Day of Surgery Review of History & Physical:            Ready For Surgery From Anesthesia Perspective.

## 2020-07-08 NOTE — H&P
HISTORY AND PHYSICAL                                                OBSTETRICS          Subjective:       Anitra Chaney is a 40 y.o.  female with IUP at 37w0d weeks gestation who presents for repeat scheduled     Patient denies contractions, denies vaginal bleeding, denies LOF.   Fetal Movement: normal.    Patient denies  fever, denies cough denies SOB, denies chest pain,  denies diarrhea/abdominal pain , denies anosmia denies positive COVID test in the past 7 days.     This IUP is complicated by   1. hx of 3 prior c-sections all low transverse uterine incisions.  2. AMA  3. Obesity, Pre Preg BMI 38   4. History of bariatric surgery (no NSAIDs)  -Note: History of DM resolved with gastric bypass, A1C 4.9  5. History of wound infection with last   -completely reopened, hematoma drained, packed and the wound vac placed per wound care  6. Rh neg  7. History of pre-e in last pregnancy with delivery at 34 wga   8. cHTN on no meds  -last labs : plt 279, cr 0.6, AST/ALT , PCR 0.11  9. Migraine syndrome  -followed by neurology  -managed with difficulty throughout pregnancy, has headache cocktail regimen (fiorocet, atarax, mag oxide, compazine)  10. 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 Unwanted fertility-BTL papers signed  12. Anemia: Last  9.29.4 (iron)  13. Current Smoker- 2 cigarettes/day     Review of Systems   Constitutional: Negative for chills and fever.   Eyes: Negative for visual disturbance.   Respiratory: Negative for cough.    Cardiovascular: Negative for chest pain, palpitations and leg swelling.   Gastrointestinal: Negative for constipation, diarrhea and nausea.   Genitourinary: Negative for vaginal bleeding and vaginal discharge (declines rom).   Musculoskeletal: Negative for arthralgias.   Integumentary:  Negative for breast mass.   Neurological: Positive for headaches (baseline).  "  Psychiatric/Behavioral: Negative for depression.   Breast: Negative for mass      PMHx:   Past Medical History:   Diagnosis Date    Anxiety and depression     GERD (gastroesophageal reflux disease)     HTN (hypertension), benign 3/10/2014    Migraines     Obesity     PFO (patent foramen ovale)     found after stroke "too small/risky to close"    Stroke 2017    Hospitalized at [facility]; given tPA       PSHx:   Past Surgical History:   Procedure Laterality Date    ANKLE FRACTURE SURGERY       SECTION N/A 2019    Procedure:  SECTION;  Surgeon: Gianna Flowers MD;  Location: [facility] L&D;  Service: OB/GYN;  Laterality: N/A;     SECTION, CLASSIC  2005/2009/2019    x3    CHOLECYSTECTOMY  3/2002    lap maribel    FOOT SURGERY      gastric sleeve  2017       All:   Review of patient's allergies indicates:   Allergen Reactions    Ibuprofen Nausea And Vomiting     Other reaction(s): gastric    Tramadol Nausea And Vomiting       Meds:   Medications Prior to Admission   Medication Sig Dispense Refill Last Dose    butalbital-acetaminophen-caffeine -40 mg (FIORICET, ESGIC) -40 mg per tablet Take 1 tablet by mouth every 4 (four) hours as needed for Headaches. 20 tablet 1     cholecalciferol, vitamin D3, (VITAMIN D3) 25 mcg (1,000 unit) capsule Take 2 capsules (2,000 Units total) by mouth once daily. 720 capsule 0     ferrous sulfate (FEOSOL) 325 mg (65 mg iron) Tab tablet Take 1 tablet (325 mg total) by mouth once daily. 30 tablet 8     folic acid (FOLVITE) 400 MCG tablet Take 2 tablets (800 mcg total) by mouth once daily. 100 tablet 3     HYDROcodone-acetaminophen (NORCO) 7.5-325 mg per tablet        hydrOXYzine HCl (ATARAX) 10 MG Tab Take 1 tablet (10 mg total) by mouth 2 (two) times daily. 60 tablet 3     magnesium oxide (MAG-OX) 400 mg (241.3 mg magnesium) tablet Take 1 tablet (400 mg total) by mouth 2 (two) times daily.  0     omeprazole (PRILOSEC OTC) 20 MG " tablet Take 1 tablet (20 mg total) by mouth once daily. 28 tablet 1     omeprazole (PRILOSEC) 20 MG capsule        prenatal vit37/iron/folic acid (PRENATA ORAL) Take by mouth.       prochlorperazine (COMPAZINE) 10 MG tablet Take 1 tablet (10 mg total) by mouth 2 (two) times daily as needed (Headaches or nausea). 45 tablet 1     terconazole (TERAZOL 7) 0.4 % Crea Place 1 applicator vaginally once daily. 1 Tube 0        SH:   Social History     Socioeconomic History    Marital status:      Spouse name: Not on file    Number of children: Not on file    Years of education: Not on file    Highest education level: Not on file   Occupational History     Employer: [employer]   Social Needs    Financial resource strain: Hard    Food insecurity     Worry: Sometimes true     Inability: Sometimes true    Transportation needs     Medical: No     Non-medical: No   Tobacco Use    Smoking status: Current Every Day Smoker     Packs/day: 0.50     Years: 18.00     Pack years: 9.00     Types: Cigarettes    Smokeless tobacco: Never Used    Tobacco comment: cutting to 2 cigarettes per day   Substance and Sexual Activity    Alcohol use: No     Alcohol/week: 0.0 standard drinks     Frequency: Never     Drinks per session: Patient refused     Binge frequency: Patient refused    Drug use: No    Sexual activity: Not Currently     Partners: Male   Lifestyle    Physical activity     Days per week: 0 days     Minutes per session: 0 min    Stress: Very much   Relationships    Social connections     Talks on phone: Twice a week     Gets together: Never     Attends Taoism service: Not on file     Active member of club or organization: No     Attends meetings of clubs or organizations: Never     Relationship status:    Other Topics Concern    Caffeine Use Not Asked    Financial Status: Disabled Not Asked    Legal: Involved in criminal litigation Not Asked    Caffeine Use: Frequent Not  Asked    Financial Status: Employed Not Asked    Legal: Other Not Asked    Caffeine Use: Moderate Not Asked    Financial Status: Unemployed Not Asked    Leisure: Exercise Not Asked    Caffeine Use: Substantial Not Asked    Financial Status: Other Not Asked    Leisure: Fishing Not Asked    Childhood History: Adopted Not Asked    Firearms: Does patient have access to a firearm? Not Asked    Leisure: Hunting Not Asked    Childhood History: Early trauma Not Asked    Home situation: homeless Not Asked    Leisure: Movie Watching Not Asked    Childhood History: Raised by parents Not Asked    Home situation: lives alone Not Asked    Leisure: Shopping Not Asked    Childhood History: Uneventful Not Asked    Home situation: lives in group home Not Asked    Leisure: Sports Not Asked    Childhood History: Other Not Asked    Home situation: lives in nursing home Not Asked    Leisure: Time with family Not Asked    Education: Unfinished High School Not Asked    Home situation: lives in shelter Not Asked     Service Not Asked    Education: High School Graduate Not Asked    Home situation: lives with family Not Asked    Spirituality: Active Participation Not Asked    Education: Unfinished college Not Asked    Home situation: lives with friends Not Asked    Spirituality: Organized Jewish Not Asked    Education: Trade School Not Asked    Home situation: lives with significant other Not Asked    Spirituality: Private Participation Not Asked    Education: Associate's Degree Not Asked    Home situation: lives with spouse Not Asked    Patient feels they ought to cut down on drinking/drug use Not Asked    Education: Bachelor's Degree Not Asked    Legal consequences of chemical use Not Asked    Patient annoyed by others criticizing their drinking/drug use Not Asked    Education: More than one Bachelor's or Professional Not Asked    Legal: Arrest history Not Asked    Patient has felt bad  or guilty about drinking/drug use Not Asked    Education: Master's, PhD Not Asked    Legal: Involved in civil litigation Not Asked    Patient has had a drink/used drugs as an eye opener in the AM Not Asked   Social History Narrative    Not on file       FH:   Family History   Problem Relation Age of Onset    Other Father         house fire    Breast cancer Neg Hx     Colon cancer Neg Hx     Ovarian cancer Neg Hx     Congenital heart disease Neg Hx     Pacemaker/defibrilator Neg Hx     Early death Neg Hx        OBHx:   OB History    Para Term  AB Living   5 3 2 1 1 3   SAB TAB Ectopic Multiple Live Births   1 0 0 0 3      # Outcome Date GA Lbr Yuval/2nd Weight Sex Delivery Anes PTL Lv   5 Current            4  19 34w3d   M CS-LTranv Spinal  JIGAR      Name: AZAM TYLER      Apgar1: 8  Apgar5: 9   3 SAB 18 10w0d          2 Term 09 37w0d  2.722 kg (6 lb) F CS-Unspec EPI  JIGAR   1 Term 05 37w0d  3.345 kg (7 lb 6 oz) M CS-Unspec EPI  JIGAR       Objective:       LMP  (LMP Unknown)     There were no vitals filed for this visit.    General:   alert, appears stated age and cooperative, no apparent distress   HENT:  normocephalic, atraumatic   Eyes:  extraocular movements and conjunctivae normal   Neck:  supple, range of motion normal, no thyromegaly   Lungs:   no respiratory distress   Heart:   regular rate   Abdomen:  soft, non-tender, non-distended but gravid, no rebound or guarding    Extremities negative edema, negative erythema   FHT: 140, moderate BTBV, +accels, -decels;  Cat 1 (reassuring)                 TOCO: irritabiltiy   Presentations: cephalic by ultrasound   Cervix: deferred   EFW by [facility]: 6.5 pounds    Recent Growth Scan:  34w5d 2,251 16%    Lab Review  Blood Type A NEG  GBBS: negative  Rubella: Immune  RPR: NR  HIV: negative  HepB: negative       Assessment:         At 37w0d weeks gestation presents for schedule repeat  .    Active Hospital Problems    Diagnosis  POA    *Pregnancy, supervision, high-risk [O09.90]  Not Applicable      Resolved Hospital Problems   No resolved problems to display.          Plan:    and BTL   Risks, benefits, alternatives and possible complications have been discussed in detail with the patient.   - Consents signed and to chart  - Admit to Labor and Delivery unit  - Epidural per Anesthesia  - Draw CBC, T&S  - EFW 6.5 pounds    1. hx of 3 prior c-sections all low transverse uterine incisions.  -indication for repeat  today    2. AMA    3. Obesity, Pre Preg BMI 38   -encourage SCD and HERMILO hose while in bed  -encourage early ambulation after     4. History of bariatric surgery  -Note: History of DM resolved with gastric bypass, A1C 4.9 and 5.4 during pregnancy   -no NSAIDs in postpartum period    5. History of wound infection with last   -completely reopened, hematoma drained, packed and the wound vac placed per wound care    6. Rh neg  -evaluate for rho apolinar in postpartum period    8. cHTN on no meds  -last labs : plt 279, cr 0.6, AST/ALT , PCR 0.11  -History of pre-e in last pregnancy with delivery at 34 wga   -monitor blood pressure during delivery and postpartum period    9. Migraine syndrome  -followed by neurology  -managed with difficulty throughout pregnancy, has headache cocktail regimen (fiorocet, atarax, mag oxide, compazine)    10. 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 Unwanted fertility-BTL papers signed  -BTL today with C/S    12. Anemia  -Last HH ..4  -Iron/colace  -Admit HH pending, will monitor EBL    13. Current Smoker- 2 cigarettes/day   -risk of wound infection    Post-Partum Hemorrhage risk - low    Lavinia Weber MD  OBGYN PGY-2

## 2020-07-09 LAB
ABO + RH BLD: NORMAL
BASOPHILS # BLD AUTO: 0.02 K/UL (ref 0–0.2)
BASOPHILS NFR BLD: 0.3 % (ref 0–1.9)
BLD GP AB SCN CELLS X3 SERPL QL: NORMAL
DIFFERENTIAL METHOD: ABNORMAL
EOSINOPHIL # BLD AUTO: 0.1 K/UL (ref 0–0.5)
EOSINOPHIL NFR BLD: 1.6 % (ref 0–8)
ERYTHROCYTE [DISTWIDTH] IN BLOOD BY AUTOMATED COUNT: 15.5 % (ref 11.5–14.5)
FETAL CELL SCN BLD QL ROSETTE: NORMAL
HCT VFR BLD AUTO: 22.2 % (ref 37–48.5)
HGB BLD-MCNC: 6.7 G/DL (ref 12–16)
IMM GRANULOCYTES # BLD AUTO: 0.06 K/UL (ref 0–0.04)
IMM GRANULOCYTES NFR BLD AUTO: 0.8 % (ref 0–0.5)
INJECT RH IG VOL PATIENT: NORMAL ML
LYMPHOCYTES # BLD AUTO: 1.6 K/UL (ref 1–4.8)
LYMPHOCYTES NFR BLD: 20 % (ref 18–48)
MCH RBC QN AUTO: 22.2 PG (ref 27–31)
MCHC RBC AUTO-ENTMCNC: 30.2 G/DL (ref 32–36)
MCV RBC AUTO: 74 FL (ref 82–98)
MONOCYTES # BLD AUTO: 0.6 K/UL (ref 0.3–1)
MONOCYTES NFR BLD: 7.2 % (ref 4–15)
NEUTROPHILS # BLD AUTO: 5.5 K/UL (ref 1.8–7.7)
NEUTROPHILS NFR BLD: 70.1 % (ref 38–73)
NRBC BLD-RTO: 0 /100 WBC
PLATELET # BLD AUTO: 206 K/UL (ref 150–350)
PMV BLD AUTO: 9.9 FL (ref 9.2–12.9)
RBC # BLD AUTO: 3.02 M/UL (ref 4–5.4)
RPR SER QL: NORMAL
WBC # BLD AUTO: 7.89 K/UL (ref 3.9–12.7)

## 2020-07-09 PROCEDURE — 25000003 PHARM REV CODE 250: Performed by: STUDENT IN AN ORGANIZED HEALTH CARE EDUCATION/TRAINING PROGRAM

## 2020-07-09 PROCEDURE — 85025 COMPLETE CBC W/AUTO DIFF WBC: CPT

## 2020-07-09 PROCEDURE — 86920 COMPATIBILITY TEST SPIN: CPT

## 2020-07-09 PROCEDURE — 86850 RBC ANTIBODY SCREEN: CPT

## 2020-07-09 PROCEDURE — 11000001 HC ACUTE MED/SURG PRIVATE ROOM

## 2020-07-09 PROCEDURE — 51702 INSERT TEMP BLADDER CATH: CPT

## 2020-07-09 PROCEDURE — 63600175 PHARM REV CODE 636 W HCPCS: Performed by: STUDENT IN AN ORGANIZED HEALTH CARE EDUCATION/TRAINING PROGRAM

## 2020-07-09 PROCEDURE — 85461 HEMOGLOBIN FETAL: CPT

## 2020-07-09 PROCEDURE — 63600519 RHOGAM PHARM REV CODE 636 ALT 250 W HCPCS: Performed by: OBSTETRICS & GYNECOLOGY

## 2020-07-09 RX ORDER — IBUPROFEN 200 MG
1 TABLET ORAL DAILY PRN
Status: DISCONTINUED | OUTPATIENT
Start: 2020-07-09 | End: 2020-07-10

## 2020-07-09 RX ORDER — FERROUS SULFATE 325(65) MG
325 TABLET, DELAYED RELEASE (ENTERIC COATED) ORAL DAILY
Status: DISCONTINUED | OUTPATIENT
Start: 2020-07-09 | End: 2020-07-10

## 2020-07-09 RX ADMIN — ACETAMINOPHEN 650 MG: 325 TABLET ORAL at 09:07

## 2020-07-09 RX ADMIN — ACETAMINOPHEN 650 MG: 325 TABLET ORAL at 02:07

## 2020-07-09 RX ADMIN — OXYCODONE 10 MG: 5 TABLET ORAL at 03:07

## 2020-07-09 RX ADMIN — OXYCODONE 10 MG: 5 TABLET ORAL at 11:07

## 2020-07-09 RX ADMIN — ACETAMINOPHEN 650 MG: 325 TABLET ORAL at 08:07

## 2020-07-09 RX ADMIN — OXYCODONE 10 MG: 5 TABLET ORAL at 06:07

## 2020-07-09 RX ADMIN — HUMAN RHO(D) IMMUNE GLOBULIN 300 MCG: 300 INJECTION, SOLUTION INTRAMUSCULAR at 05:07

## 2020-07-09 RX ADMIN — PRENATAL VIT W/ FE FUMARATE-FA TAB 27-0.8 MG 1 TABLET: 27-0.8 TAB at 08:07

## 2020-07-09 RX ADMIN — SIMETHICONE 80 MG: 80 TABLET, CHEWABLE ORAL at 09:07

## 2020-07-09 RX ADMIN — ENOXAPARIN SODIUM 40 MG: 40 INJECTION SUBCUTANEOUS at 05:07

## 2020-07-09 RX ADMIN — DOCUSATE SODIUM 200 MG: 100 CAPSULE, LIQUID FILLED ORAL at 08:07

## 2020-07-09 RX ADMIN — FERROUS SULFATE TAB EC 325 MG (65 MG FE EQUIVALENT) 325 MG: 325 (65 FE) TABLET DELAYED RESPONSE at 08:07

## 2020-07-09 RX ADMIN — ACETAMINOPHEN 650 MG: 325 TABLET ORAL at 01:07

## 2020-07-09 RX ADMIN — OXYCODONE 10 MG: 5 TABLET ORAL at 08:07

## 2020-07-09 RX ADMIN — OXYCODONE 10 MG: 5 TABLET ORAL at 02:07

## 2020-07-09 NOTE — PROGRESS NOTES
POSTPARTUM PROGRESS NOTE     Anitra Chaney is a 40 y.o. female POD #1 status post Repeat  section and BTL at 37w0d. Pregnancy was complicated by AMA, MO, Rh neg, hx post op wound infection (CS), cHTN, chronic migraines, anemia, current smoker. Delivery complicated by no issues.     Patient is doing well this morning. She denies nausea, vomiting, fever or chills. Patient reports mild abdominal pain that is well relieved by oral pain medications. Lochia is mild and decreasing. Patient is voiding freely without difficulty and ambulating without difficulty. She has passed flatus, and has not had Bm.    Patient does not plan to breast feed.    Objective:       Temp:  [96.6 °F (35.9 °C)-98.5 °F (36.9 °C)] 98.2 °F (36.8 °C)  Pulse:  [65-80] 67  Resp:  [17-20] 17  SpO2:  [97 %-100 %] 99 %  BP: (116-139)/(62-88) 116/68    General:   healthy, alert, no distress   Lungs:   Normal respiratory effort bilaterally   Heart:   regular rate   Abdomen:  soft, non-tender; bowel sounds normal; no masses,  no organomegaly   Uterus:  firm located at the umblicus.    Incision: Bandage in place, clean, dry and intact   Extremities: peripheral pulses normal, no pedal edema, no clubbing or cyanosis     Lab Review  Lab Results   Component Value Date    WBC 10.13 2020    HGB 8.8 (L) 2020    HCT 28.7 (L) 2020    MCV 72 (L) 2020     2020        I/O    Intake/Output Summary (Last 24 hours) at 2020 0632  Last data filed at 2020 0500  Gross per 24 hour   Intake 2000 ml   Output 2010 ml   Net -10 ml     UOP: .5 mL/kg/hr over past 20 hours + unmeasured output     Assessment:     Patient Active Problem List   Diagnosis    Migraine syndrome    Neurogenic claudication    Ankle arthritis    CHTN on no medications    Lower GI bleed    Migraine    Lumbar radiculopathy    Chronic bilateral low back pain with bilateral sciatica    Fibromyalgia muscle pain    Class 2 severe obesity due  to excess calories with serious comorbidity and body mass index (BMI) of 37.0 to 37.9 in adult    Rh negative state in antepartum period    Pregnancy, supervision, high-risk    Obesity complicating pregnancy    History of  section complicating pregnancy    History of wound infection    Hx of bariatric surgery    Cerebrovascular accident (CVA) due to thrombosis    Multigravida of advanced maternal age in second trimester    History of pre-eclampsia- leading to delivery at 34 weeks    Unwanted fertility- tubal papers signed    History of diabetes mellitus- resolved after gastric bypass; last A1c 4.9    Left ventricular hypertrophy    Hypertension in pregnancy, essential, antepartum, second trimester    Migraine aura, persistent, intractable, with status migrainosus    Intractable headache    31 weeks gestation of pregnancy    Anemia    History of  delivery        Plan:   1. Postpartum care:  - Patient doing well. Continue routine management and advances.  - Continue PO pain meds. Pain well controlled.  - Heme: Pre: 8.8/28.7 PP:p  - Encourage ambulation  - Contraception: per primary OB  - Lactation: bottle  - UOP adequate    2. cHTN  - BP: (116-139)/(62-88) 116/68   - No Pre E sx currently  - No meds    3. Rh neg  -Mom Rh neg  -Baby Rh pos  -Xena pending  -Rho apolinar ordered for postpartum period     4. Anemia of pregnancy  -Post op H/H pending  -Vital signs stable  -Asymptomatic  -PO Iron supplement   -Colace     5. Hx wound infection  - Remove dressing today  - Extra attention to keeping area clean, dry    6. Smoker  - Nicotine patches prn      Dispo: As patient meets milestones, will plan to discharge as appropriate.    JONAH Hernandez MD  OBGYN PGY2

## 2020-07-09 NOTE — ANESTHESIA POSTPROCEDURE EVALUATION
Anesthesia Post Evaluation    Patient: Anitra Chaney    Procedure(s) Performed: Procedure(s) (LRB):   SECTION, WITH TUBAL LIGATION (Bilateral)    Final Anesthesia Type: CSE    Patient location during evaluation: floor  Patient participation: Yes- Able to Participate  Level of consciousness: awake and alert  Post-procedure vital signs: reviewed and stable  Pain management: adequate  Airway patency: patent    PONV status at discharge: No PONV  Anesthetic complications: no      Cardiovascular status: blood pressure returned to baseline  Respiratory status: unassisted, spontaneous ventilation and room air  Hydration status: euvolemic  Follow-up not needed.          Vitals Value Taken Time   /69 20 0829   Temp 36.8 °C (98.3 °F) 20 08   Pulse 68 20 0829   Resp 18 20 0829   SpO2 98 % 20         Event Time   Out of Recovery 17:28:00         Pain/Stanley Score: Pain Rating Prior to Med Admin: 0 (2020  8:37 AM)  Pain Rating Post Med Admin: 0 (2020  3:17 AM)

## 2020-07-10 VITALS
TEMPERATURE: 98 F | OXYGEN SATURATION: 98 % | HEIGHT: 70 IN | DIASTOLIC BLOOD PRESSURE: 85 MMHG | BODY MASS INDEX: 37.56 KG/M2 | HEART RATE: 67 BPM | SYSTOLIC BLOOD PRESSURE: 138 MMHG | RESPIRATION RATE: 18 BRPM | WEIGHT: 262.38 LBS

## 2020-07-10 PROBLEM — Z98.891 S/P CESAREAN SECTION: Status: ACTIVE | Noted: 2020-07-10

## 2020-07-10 PROBLEM — Z98.891 HISTORY OF CESAREAN DELIVERY: Status: RESOLVED | Noted: 2020-07-08 | Resolved: 2020-07-10

## 2020-07-10 LAB
BLD PROD TYP BPU: NORMAL
BLOOD UNIT EXPIRATION DATE: NORMAL
BLOOD UNIT TYPE CODE: 600
BLOOD UNIT TYPE: NORMAL
CODING SYSTEM: NORMAL
DISPENSE STATUS: NORMAL
TRANS ERYTHROCYTES VOL PATIENT: NORMAL ML

## 2020-07-10 PROCEDURE — P9021 RED BLOOD CELLS UNIT: HCPCS

## 2020-07-10 PROCEDURE — 25000003 PHARM REV CODE 250: Performed by: STUDENT IN AN ORGANIZED HEALTH CARE EDUCATION/TRAINING PROGRAM

## 2020-07-10 PROCEDURE — 36430 TRANSFUSION BLD/BLD COMPNT: CPT

## 2020-07-10 RX ORDER — OXYCODONE HYDROCHLORIDE 10 MG/1
10 TABLET ORAL EVERY 4 HOURS
Qty: 5 TABLET | Refills: 0 | Status: SHIPPED | OUTPATIENT
Start: 2020-07-10 | End: 2020-08-06

## 2020-07-10 RX ORDER — ACETAMINOPHEN 325 MG/1
650 TABLET ORAL EVERY 6 HOURS
Qty: 60 TABLET | Refills: 2 | Status: SHIPPED | OUTPATIENT
Start: 2020-07-10 | End: 2020-08-11

## 2020-07-10 RX ORDER — DOCUSATE SODIUM 100 MG/1
200 CAPSULE, LIQUID FILLED ORAL 2 TIMES DAILY
Qty: 60 CAPSULE | Refills: 2 | Status: SHIPPED | OUTPATIENT
Start: 2020-07-10 | End: 2020-08-06

## 2020-07-10 RX ORDER — OXYCODONE HYDROCHLORIDE 5 MG/1
10 TABLET ORAL EVERY 4 HOURS
Status: DISCONTINUED | OUTPATIENT
Start: 2020-07-10 | End: 2020-07-10

## 2020-07-10 RX ORDER — HYDROCODONE BITARTRATE AND ACETAMINOPHEN 500; 5 MG/1; MG/1
TABLET ORAL
Status: DISCONTINUED | OUTPATIENT
Start: 2020-07-10 | End: 2020-07-10

## 2020-07-10 RX ORDER — OXYCODONE HYDROCHLORIDE 5 MG/1
5 TABLET ORAL EVERY 4 HOURS PRN
Qty: 20 TABLET | Refills: 0 | Status: SHIPPED | OUTPATIENT
Start: 2020-07-10 | End: 2020-08-06

## 2020-07-10 RX ADMIN — ACETAMINOPHEN 650 MG: 325 TABLET ORAL at 03:07

## 2020-07-10 RX ADMIN — DOCUSATE SODIUM 200 MG: 100 CAPSULE, LIQUID FILLED ORAL at 08:07

## 2020-07-10 RX ADMIN — OXYCODONE 10 MG: 5 TABLET ORAL at 12:07

## 2020-07-10 RX ADMIN — OXYCODONE 10 MG: 5 TABLET ORAL at 08:07

## 2020-07-10 RX ADMIN — OXYCODONE 10 MG: 5 TABLET ORAL at 11:07

## 2020-07-10 RX ADMIN — OXYCODONE 10 MG: 5 TABLET ORAL at 04:07

## 2020-07-10 RX ADMIN — FERROUS SULFATE TAB EC 325 MG (65 MG FE EQUIVALENT) 325 MG: 325 (65 FE) TABLET DELAYED RESPONSE at 08:07

## 2020-07-10 RX ADMIN — ACETAMINOPHEN 650 MG: 325 TABLET ORAL at 08:07

## 2020-07-10 NOTE — NURSING
Educated pt on discharge instructions on pt and baby. Pt stated understanding. RN answered all questions and concerns. Transport requested and medication received. Copy of [place] car seat law handout provided.Pt stable and will continue to monitor.

## 2020-07-10 NOTE — PROGRESS NOTES
POSTPARTUM PROGRESS NOTE     Anitra Chaney is a 40 y.o. female POD #2 status post Repeat  section and BTL at 37w0d. Pregnancy was complicated by AMA, MO, Rh neg, hx post op wound infection (CS), cHTN, chronic migraines, anemia, current smoker. Delivery complicated by no issues.     Patient is doing well this morning. She denies nausea, vomiting, fever or chills. Patient reports mild abdominal pain that is well relieved by oral pain medications. Lochia is mild and decreasing. Patient is voiding freely without difficulty and ambulating without difficulty. She has passed flatus, and has not had Bm.    Patient does not plan to breast feed.    Objective:       Temp:  [97 °F (36.1 °C)-98.7 °F (37.1 °C)] 98.6 °F (37 °C)  Pulse:  [56-73] 62  Resp:  [16-20] 20  SpO2:  [98 %-100 %] 100 %  BP: (114-140)/(75-80) 126/77    General:   healthy, alert, no distress   Lungs:   Normal respiratory effort bilaterally   Heart:   regular rate   Abdomen:  soft, non-tender; bowel sounds normal; no masses,  no organomegaly   Uterus:  firm located at the umblicus.    Incision: Bandage in place, clean, dry and intact; staples in place   Extremities: peripheral pulses normal, no pedal edema, no clubbing or cyanosis     Lab Review  Lab Results   Component Value Date    WBC 7.89 2020    HGB 6.7 (L) 2020    HCT 22.2 (L) 2020    MCV 74 (L) 2020     2020        I/O    Intake/Output Summary (Last 24 hours) at 7/10/2020 0931  Last data filed at 2020 1800  Gross per 24 hour   Intake --   Output 1400 ml   Net -1400 ml          Assessment:     Patient Active Problem List   Diagnosis    Migraine syndrome    Neurogenic claudication    Ankle arthritis    CHTN on no medications    Lower GI bleed    Migraine    Lumbar radiculopathy    Chronic bilateral low back pain with bilateral sciatica    Fibromyalgia muscle pain    Class 2 severe obesity due to excess calories with serious comorbidity  and body mass index (BMI) of 37.0 to 37.9 in adult    Rh negative state in antepartum period    Pregnancy, supervision, high-risk    Obesity complicating pregnancy    History of  section complicating pregnancy    History of wound infection    Hx of bariatric surgery    Cerebrovascular accident (CVA) due to thrombosis    Multigravida of advanced maternal age in second trimester    History of pre-eclampsia- leading to delivery at 34 weeks    Unwanted fertility- tubal papers signed    History of diabetes mellitus- resolved after gastric bypass; last A1c 4.9    Left ventricular hypertrophy    Hypertension in pregnancy, essential, antepartum, second trimester    Migraine aura, persistent, intractable, with status migrainosus    Intractable headache    31 weeks gestation of pregnancy    Anemia    History of  delivery        Plan:   1. Postpartum care:  - Patient doing well. Continue routine management and advances.  - Continue PO pain meds. Pain well controlled.  - Heme: Pre: 8.8/28.7  - Encourage ambulation  - Contraception: per primary OB  - Lactation: bottle  - UOP adequate    2. cHTN  - BP: (114-140)/(75-80) 126/77   - No Pre E sx currently  - No meds    3. Rh neg  -Mom Rh neg  -Baby Rh pos  -Xena pending  -Rho apolinar ordered for postpartum period     4. Anemia of pregnancy  -    -Vital signs stable  -Asymptomatic  -PO Iron supplement   -Colace   -Will transfuse to avoid future sx of anemia following discharge    5. Hx wound infection  - Remove dressing today  - Extra attention to keeping area clean, dry    6. Smoker  - Nicotine patches prn      Dispo: As patient meets milestones, will plan to discharge as appropriate.    JONAH Hernandez MD  OBGYN PGY2

## 2020-07-13 LAB
FINAL PATHOLOGIC DIAGNOSIS: NORMAL
GROSS: NORMAL

## 2020-07-13 NOTE — DISCHARGE SUMMARY
Delivery Discharge Summary  Obstetrics      Primary OB Clinician: Farshad     Admission date: 2020  Discharge date: 2020    Disposition: To home, self care    Admit Dx:      Patient Active Problem List   Diagnosis    Migraine syndrome    Neurogenic claudication    Ankle arthritis    CHTN on no medications    Lower GI bleed    Migraine    Lumbar radiculopathy    Chronic bilateral low back pain with bilateral sciatica    Fibromyalgia muscle pain    Class 2 severe obesity due to excess calories with serious comorbidity and body mass index (BMI) of 37.0 to 37.9 in adult    Rh negative state in antepartum period    Pregnancy, supervision, high-risk    Obesity complicating pregnancy    History of  section complicating pregnancy    History of wound infection    Hx of bariatric surgery    Cerebrovascular accident (CVA) due to thrombosis    Multigravida of advanced maternal age in second trimester    History of pre-eclampsia- leading to delivery at 34 weeks    Unwanted fertility- tubal papers signed    History of diabetes mellitus- resolved after gastric bypass; last A1c 4.9    Left ventricular hypertrophy    Hypertension in pregnancy, essential, antepartum, second trimester    Migraine aura, persistent, intractable, with status migrainosus    Intractable headache    31 weeks gestation of pregnancy    Anemia    S/P  section     Discharge Dx:    Patient Active Problem List   Diagnosis    Migraine syndrome    Neurogenic claudication    Ankle arthritis    CHTN on no medications    Lower GI bleed    Migraine    Lumbar radiculopathy    Chronic bilateral low back pain with bilateral sciatica    Fibromyalgia muscle pain    Class 2 severe obesity due to excess calories with serious comorbidity and body mass index (BMI) of 37.0 to 37.9 in adult    Rh negative state in antepartum period    Pregnancy, supervision, high-risk    Obesity complicating pregnancy    History of   section complicating pregnancy    History of wound infection    Hx of bariatric surgery    Cerebrovascular accident (CVA) due to thrombosis    Multigravida of advanced maternal age in second trimester    History of pre-eclampsia- leading to delivery at 34 weeks    Unwanted fertility- tubal papers signed    History of diabetes mellitus- resolved after gastric bypass; last A1c 4.9    Left ventricular hypertrophy    Hypertension in pregnancy, essential, antepartum, second trimester    Migraine aura, persistent, intractable, with status migrainosus    Intractable headache    31 weeks gestation of pregnancy    Anemia    S/P  section       Procedure: [facility]    Hospital Course:  Anitra Chaney is a 40 y.o. now , POD #2 who was admitted on 2020 at 37w0d for RLTCS/BTL because of CHTN, h/x of preE and h/x of CVA. On initial assessment, vital signs were stable and physical exam was normal. Infant was in cephalic presentation. Patient was subsequently admitted to labor and delivery unit with signed consents. RLTCS/BTL was uncomplicated.  Please see delivery note for further details. Pt was in stable condition post delivery and was transferred to the Mother-Baby Unit. Her postpartum course was complicated by anemia, her H/H dropped to 6.7/22 from 8.8/28. The patient was asymptomatic. We recommended a blood transfusion and patient received 1uPRBC. She desired DC home on POD# 2. On discharge day, patient's pain is controlled with oral pain medications. Pt is tolerating ambulation without SOB or CP, and PO diet without N/V. Reports lochia is mild. Denies any HA, vision changes, F/C, LE swelling. Denies any breast pain/soreness.  Pt in stable condition and ready for discharge. She has been instructed to continue PNV as indicated as well as pain medications as needed and to follow up in the OB clinic 7-10 days for staple removal as well as BP check.     Pertinent studies:  Postpartum  CBC  Lab Results   Component Value Date    WBC 7.89 2020    HGB 6.7 (L) 2020    HCT 22.2 (L) 2020    MCV 74 (L) 2020     2020       Tubal Ligation: done with C/S      Delivery:    Episiotomy: None   Lacerations: None   Repair suture:     Repair # of packets:     Blood loss (ml):       Birth information:  YOB: 2020   Time of birth: 1:47 PM   Sex: female   Delivery type: , Low Transverse   Gestational Age: 37w0d    Delivery Clinician:      Other providers:       Additional  information:  Forceps:    Vacuum:    Breech:    Observed anomalies      Living?:           APGARS  One minute Five minutes Ten minutes   Skin color:         Heart rate:         Grimace:         Muscle tone:         Breathing:         Totals: 9  9        Placenta: Delivered:       appearance      Patient Instructions:   Discharge Medication List as of 7/10/2020  2:00 PM      START taking these medications    Details   acetaminophen (TYLENOL) 325 MG tablet Take 2 tablets (650 mg total) by mouth every 6 (six) hours., Starting Fri 7/10/2020, Normal      docusate sodium (COLACE) 100 MG capsule Take 2 capsules (200 mg total) by mouth 2 (two) times daily., Starting Fri 7/10/2020, Normal      !! oxyCODONE (ROXICODONE) 10 mg Tab immediate release tablet Take 1 tablet (10 mg total) by mouth every 4 (four) hours., Starting Fri 7/10/2020, Normal      !! oxyCODONE (ROXICODONE) 5 MG immediate release tablet Take 1 tablet (5 mg total) by mouth every 4 (four) hours as needed., Starting Fri 7/10/2020, Normal       !! - Potential duplicate medications found. Please discuss with provider.      CONTINUE these medications which have NOT CHANGED    Details   butalbital-acetaminophen-caffeine -40 mg (FIORICET, ESGIC) -40 mg per tablet Take 1 tablet by mouth every 4 (four) hours as needed for Headaches., Starting Thu 2020, Until Sat 2020, Normal      cholecalciferol, vitamin D3, (VITAMIN D3)  25 mcg (1,000 unit) capsule Take 2 capsules (2,000 Units total) by mouth once daily., Starting Wed 3/25/2020, Until Thu 3/25/2021, Normal      ferrous sulfate (FEOSOL) 325 mg (65 mg iron) Tab tablet Take 1 tablet (325 mg total) by mouth once daily., Starting Wed 3/25/2020, Until Thu 3/25/2021, Normal      folic acid (FOLVITE) 400 MCG tablet Take 2 tablets (800 mcg total) by mouth once daily., Starting Wed 3/25/2020, Until Thu 3/25/2021, Normal      hydrOXYzine HCl (ATARAX) 10 MG Tab Take 1 tablet (10 mg total) by mouth 2 (two) times daily., Starting Wed 3/4/2020, Normal      magnesium oxide (MAG-OX) 400 mg (241.3 mg magnesium) tablet Take 1 tablet (400 mg total) by mouth 2 (two) times daily., Starting Mon 6/1/2020, OTC      omeprazole (PRILOSEC OTC) 20 MG tablet Take 1 tablet (20 mg total) by mouth once daily., Starting Wed 3/25/2020, Until Thu 3/25/2021, Normal      omeprazole (PRILOSEC) 20 MG capsule Starting Tue 1/7/2020, Historical Med      prochlorperazine (COMPAZINE) 10 MG tablet Take 1 tablet (10 mg total) by mouth 2 (two) times daily as needed (Headaches or nausea)., Starting Thu 7/2/2020, Until Fri 7/2/2021, Normal      terconazole (TERAZOL 7) 0.4 % Crea Place 1 applicator vaginally once daily., Starting Thu 1/16/2020, Normal         STOP taking these medications       HYDROcodone-acetaminophen (NORCO) 7.5-325 mg per tablet Comments:   Reason for Stopping:         prenatal vit37/iron/folic acid (PRENATA ORAL) Comments:   Reason for Stopping:               Discharge Procedure Orders   Diet Adult Regular     Pelvic Rest   Order Comments: Nothing in vagina until seen in clinic     Lifting restrictions   Order Comments: No lifting anything larger than baby for 6 weeks     No driving until:   Order Comments: No driving while taking narcotics     Notify your health care provider if you experience any of the following:  temperature >100.4     Notify your health care provider if you experience any of the following:   persistent nausea and vomiting or diarrhea     Notify your health care provider if you experience any of the following:  severe uncontrolled pain     Notify your health care provider if you experience any of the following:  redness, tenderness, or signs of infection (pain, swelling, redness, odor or green/yellow discharge around incision site)     Notify your health care provider if you experience any of the following:  difficulty breathing or increased cough     Notify your health care provider if you experience any of the following:  severe persistent headache     Notify your health care provider if you experience any of the following:  worsening rash     Notify your health care provider if you experience any of the following:  persistent dizziness, light-headedness, or visual disturbances     Notify your health care provider if you experience any of the following:   Order Comments: Heavy vaginal bleeding     Pelvic Rest   Order Comments: Nothing in the vagina for 6 weeks     No driving until:   Order Comments: No driving if taking narcotics     Notify your health care provider if you experience any of the following:  temperature >100.4     Notify your health care provider if you experience any of the following:  persistent nausea and vomiting or diarrhea     Notify your health care provider if you experience any of the following:  severe uncontrolled pain     Notify your health care provider if you experience any of the following:  redness, tenderness, or signs of infection (pain, swelling, redness, odor or green/yellow discharge around incision site)     Notify your health care provider if you experience any of the following:  difficulty breathing or increased cough     Notify your health care provider if you experience any of the following:  severe persistent headache     Notify your health care provider if you experience any of the following:  worsening rash     Notify your health care provider if you  experience any of the following:  persistent dizziness, light-headedness, or visual disturbances     Notify your health care provider if you experience any of the following:  increased confusion or weakness     Notify your health care provider if you experience any of the following:     Activity as tolerated       Opal Cabrales M.D.  Obstetrics and Gynecology   PGY-4

## 2020-07-15 ENCOUNTER — TELEPHONE (OUTPATIENT)
Dept: OBSTETRICS AND GYNECOLOGY | Facility: CLINIC | Age: 40
End: 2020-07-15

## 2020-07-15 NOTE — TELEPHONE ENCOUNTER
Pt advised to alternate between tylenol and ibprofen and if that doesn't help tp cpmtact office for appointment to evaluate

## 2020-07-15 NOTE — TELEPHONE ENCOUNTER
----- Message from Kevyn Almeida sent at 7/15/2020 11:53 AM CDT -----  Pp pt needs refill on her pain medication. Pt can be reached at [phone].

## 2020-07-16 NOTE — PROGRESS NOTES
Seen and examined.  Agree with note.  All questions answered.    1. Pregnancy with poor reproductive history in third trimester  Strep B Screen, Vaginal / Rectal   2. Maternal care due to low transverse uterine scar from previous  delivery     3. Migraine without aura and with status migrainosus, not intractable     4. Mental disorder affecting pregnancy in third trimester

## 2020-07-16 NOTE — PROGRESS NOTES
Seen and examined.  Agree with note.  All questions answered.    1. Pregnancy with poor reproductive history in third trimester     2. Migraine without aura and with status migrainosus, not intractable     3. Anemia affecting pregnancy in third trimester

## 2020-07-21 ENCOUNTER — TELEPHONE (OUTPATIENT)
Dept: INTERNAL MEDICINE | Facility: CLINIC | Age: 40
End: 2020-07-21

## 2020-07-21 ENCOUNTER — TELEPHONE (OUTPATIENT)
Dept: OBSTETRICS AND GYNECOLOGY | Facility: CLINIC | Age: 40
End: 2020-07-21

## 2020-07-21 NOTE — TELEPHONE ENCOUNTER
----- Message from Sandy Ty sent at 7/20/2020 12:51 PM CDT -----  Regarding: incision smells  Type: Patient Call Back    Who called:pt    What is the request in detail:pt is calling to get appt for tomorrow instead of Wednesday. She says the incision smells and red. Call pt    Can the clinic reply by ANGELANER?    Would the patient rather a call back or a response via [facility]? call    Best call back number:[phone] (home)       Additional Information:

## 2020-07-24 ENCOUNTER — POSTPARTUM VISIT (OUTPATIENT)
Dept: OBSTETRICS AND GYNECOLOGY | Facility: CLINIC | Age: 40
End: 2020-07-24
Payer: COMMERCIAL

## 2020-07-24 VITALS
WEIGHT: 245.13 LBS | TEMPERATURE: 98 F | BODY MASS INDEX: 35.68 KG/M2 | DIASTOLIC BLOOD PRESSURE: 80 MMHG | SYSTOLIC BLOOD PRESSURE: 130 MMHG

## 2020-07-24 PROCEDURE — 99999 PR PBB SHADOW E&M-EST. PATIENT-LVL III: ICD-10-PCS | Mod: PBBFAC,,, | Performed by: ADVANCED PRACTICE MIDWIFE

## 2020-07-24 PROCEDURE — 99999 PR PBB SHADOW E&M-EST. PATIENT-LVL III: CPT | Mod: PBBFAC,,, | Performed by: ADVANCED PRACTICE MIDWIFE

## 2020-07-24 NOTE — PROGRESS NOTES
HISTORY OF PRESENT ILLNESS:  Anitra Chaney is a 40 y.o. female   11 days s/p repeat  delivery with BTL present for incision check and staple removal. Pt reports doing well, still with some lower abdominal cramping but that is controlled with Motrin. Pt reports ingant doing well.      PREGNANCY COMPLICATED BY:    Current Outpatient Medications on File Prior to Visit   Medication Sig Dispense Refill    acetaminophen (TYLENOL) 325 MG tablet Take 2 tablets (650 mg total) by mouth every 6 (six) hours. 60 tablet 2    butalbital-acetaminophen-caffeine -40 mg (FIORICET, ESGIC) -40 mg per tablet Take 1 tablet by mouth every 4 (four) hours as needed for Headaches. 20 tablet 1    hydrOXYzine HCl (ATARAX) 10 MG Tab Take 1 tablet (10 mg total) by mouth 2 (two) times daily. 60 tablet 3    omeprazole (PRILOSEC OTC) 20 MG tablet Take 1 tablet (20 mg total) by mouth once daily. 28 tablet 1    cholecalciferol, vitamin D3, (VITAMIN D3) 25 mcg (1,000 unit) capsule Take 2 capsules (2,000 Units total) by mouth once daily. 720 capsule 0    docusate sodium (COLACE) 100 MG capsule Take 2 capsules (200 mg total) by mouth 2 (two) times daily. 60 capsule 2    ferrous sulfate (FEOSOL) 325 mg (65 mg iron) Tab tablet Take 1 tablet (325 mg total) by mouth once daily. 30 tablet 8    folic acid (FOLVITE) 400 MCG tablet Take 2 tablets (800 mcg total) by mouth once daily. 100 tablet 3    magnesium oxide (MAG-OX) 400 mg (241.3 mg magnesium) tablet Take 1 tablet (400 mg total) by mouth 2 (two) times daily.  0    omeprazole (PRILOSEC) 20 MG capsule       oxyCODONE (ROXICODONE) 10 mg Tab immediate release tablet Take 1 tablet (10 mg total) by mouth every 4 (four) hours. 5 tablet 0    oxyCODONE (ROXICODONE) 5 MG immediate release tablet Take 1 tablet (5 mg total) by mouth every 4 (four) hours as needed. 20 tablet 0    prochlorperazine (COMPAZINE) 10 MG tablet Take 1 tablet (10 mg total) by mouth 2 (two)  times daily as needed (Headaches or nausea). 45 tablet 1    terconazole (TERAZOL 7) 0.4 % Crea Place 1 applicator vaginally once daily. 1 Tube 0     No current facility-administered medications on file prior to visit.        /80   Temp 97.9 °F (36.6 °C)   Wt 111.2 kg (245 lb 2.4 oz)   LMP  (LMP Unknown)   Breastfeeding No   BMI 35.68 kg/m²     PE:   APPEARANCE: Well nourished, well developed, in no acute distress.   AFFECT: WNL, alert and oriented x 3.   ABDOMEN: Soft. No tenderness or masses. No hepatosplenomegaly. No hernias. Incision clean, somewhat moist sec to pannus but intact, healing well. No redness, no erythema noted, no s/s infection. Incision cleaned with betadine, staples removed without incident, steri strips applied.   EXTREMITIES: No edema     DIAGNOSIS:   1. Normal Post Partum Exam     LABS AND TESTS ORDERED: None    MEDICATIONS PRESCRIBED: NOne      COUNSELING:   The patient was counseled today on:  Continuing with wound care- discussed wwashing with mild antibacterial soap, advised importance of drying with blow dryer and can use blow dryer BID to dry the area as the pannus is causing the area to stay moist. Discussed adelina pad over incision to keep dry.      FOLLOW-UP- 3 weeks for routine PP exam                          HISTORY OF PRESENT ILLNESS:  Anitra Chaney is a 40 y.o. female   Presents today for a    weeks post partum exam and (has complaints of     ) (has no complaints) following a (, Vacuum assisted vaginal delivery, primary LTC, repeat LTC).  -The labor and delivery was (uncomplicated) (complicated by          ).    Delivery Date:    Delivery MD:    Gender:    Baby Name:    Birth Weight:    Work plans after contraception: Return to work  Breast Feeding: No  Vaginal Bleeding: None  Incontinence: No  Depression: No  Veblen yet: No  Contraception discussed and patient desires Oral contraceptives  Support system: Good.    PREGNANCY COMPLICATED  BY:    Current Outpatient Medications on File Prior to Visit   Medication Sig Dispense Refill    acetaminophen (TYLENOL) 325 MG tablet Take 2 tablets (650 mg total) by mouth every 6 (six) hours. 60 tablet 2    butalbital-acetaminophen-caffeine -40 mg (FIORICET, ESGIC) -40 mg per tablet Take 1 tablet by mouth every 4 (four) hours as needed for Headaches. 20 tablet 1    hydrOXYzine HCl (ATARAX) 10 MG Tab Take 1 tablet (10 mg total) by mouth 2 (two) times daily. 60 tablet 3    omeprazole (PRILOSEC OTC) 20 MG tablet Take 1 tablet (20 mg total) by mouth once daily. 28 tablet 1    cholecalciferol, vitamin D3, (VITAMIN D3) 25 mcg (1,000 unit) capsule Take 2 capsules (2,000 Units total) by mouth once daily. 720 capsule 0    docusate sodium (COLACE) 100 MG capsule Take 2 capsules (200 mg total) by mouth 2 (two) times daily. 60 capsule 2    ferrous sulfate (FEOSOL) 325 mg (65 mg iron) Tab tablet Take 1 tablet (325 mg total) by mouth once daily. 30 tablet 8    folic acid (FOLVITE) 400 MCG tablet Take 2 tablets (800 mcg total) by mouth once daily. 100 tablet 3    magnesium oxide (MAG-OX) 400 mg (241.3 mg magnesium) tablet Take 1 tablet (400 mg total) by mouth 2 (two) times daily.  0    omeprazole (PRILOSEC) 20 MG capsule       oxyCODONE (ROXICODONE) 10 mg Tab immediate release tablet Take 1 tablet (10 mg total) by mouth every 4 (four) hours. 5 tablet 0    oxyCODONE (ROXICODONE) 5 MG immediate release tablet Take 1 tablet (5 mg total) by mouth every 4 (four) hours as needed. 20 tablet 0    prochlorperazine (COMPAZINE) 10 MG tablet Take 1 tablet (10 mg total) by mouth 2 (two) times daily as needed (Headaches or nausea). 45 tablet 1    terconazole (TERAZOL 7) 0.4 % Crea Place 1 applicator vaginally once daily. 1 Tube 0     No current facility-administered medications on file prior to visit.        /80   Temp 97.9 °F (36.6 °C)   Wt 111.2 kg (245 lb 2.4 oz)   LMP  (LMP Unknown)   Breastfeeding No    BMI 35.68 kg/m²     PE:   APPEARANCE: Well nourished, well developed, in no acute distress.   AFFECT: WNL, alert and oriented x 3.   NECK: Neck symmetric, without masses or thyromegaly.   NODES: No inguinal, cervical, axillary or femoral lymph node enlargement.   ABDOMEN: Soft. No tenderness or masses. No hepatosplenomegaly. No hernias.   BREASTS: (Symmetrical, no skin changes, visible lesions, palpable masses or nipple discharge bilaterally. ) (Not examined due to breast feeding).  PELVIC: External female genitalia without lesions. Female hair distribution. Adequate perineal body, Normal urethral meatus. Vagina moist and well rugated without lesions or discharge. No significant cystocele or rectocele present. Cervix pink without lesions, discharge or tenderness. Uterus is involuted to 4-6 week size, regular, mobile and nontender. Adnexa without masses or tenderness.   EXTREMITIES: No edema     DIAGNOSIS:   1. Normal Post Partum Exam     LABS AND TESTS ORDERED:     MEDICATIONS PRESCRIBED:       COUNSELING:   The patient was counseled today on:    -all contraceptive options and she chose   -may resume usual activities;  -A.C.S. Pap and pelvic exam guidelines, recomendations for yearly mammogram, monthly self breast exams and to follow up with her PCP for other health maintenance.     FOLLOW-UP for a WWE and Pap.

## 2020-08-03 RX ORDER — BUTALBITAL, ACETAMINOPHEN AND CAFFEINE 50; 325; 40 MG/1; MG/1; MG/1
1 TABLET ORAL EVERY 4 HOURS PRN
Qty: 20 TABLET | Refills: 1 | Status: SHIPPED | OUTPATIENT
Start: 2020-08-03 | End: 2020-08-11 | Stop reason: CLARIF

## 2020-08-06 ENCOUNTER — POSTPARTUM VISIT (OUTPATIENT)
Dept: OBSTETRICS AND GYNECOLOGY | Facility: CLINIC | Age: 40
End: 2020-08-06
Payer: COMMERCIAL

## 2020-08-06 VITALS
HEIGHT: 70 IN | DIASTOLIC BLOOD PRESSURE: 80 MMHG | BODY MASS INDEX: 34.97 KG/M2 | SYSTOLIC BLOOD PRESSURE: 130 MMHG | WEIGHT: 244.25 LBS

## 2020-08-06 DIAGNOSIS — F41.8 POSTPARTUM ANXIETY: ICD-10-CM

## 2020-08-06 PROCEDURE — 99999 PR PBB SHADOW E&M-EST. PATIENT-LVL III: ICD-10-PCS | Mod: PBBFAC,,, | Performed by: OBSTETRICS & GYNECOLOGY

## 2020-08-06 PROCEDURE — 99999 PR PBB SHADOW E&M-EST. PATIENT-LVL III: CPT | Mod: PBBFAC,,, | Performed by: OBSTETRICS & GYNECOLOGY

## 2020-08-06 PROCEDURE — 0503F PR POSTPARTUM CARE VISIT: ICD-10-PCS | Mod: S$GLB,,, | Performed by: OBSTETRICS & GYNECOLOGY

## 2020-08-06 PROCEDURE — 0503F POSTPARTUM CARE VISIT: CPT | Mod: S$GLB,,, | Performed by: OBSTETRICS & GYNECOLOGY

## 2020-08-06 NOTE — LETTER
August 6, 2020      [facility]  [address] [place] [address]  Phone: [phone]       Patient: Anitra Chaney   YOB: 1980  Date of Visit: 08/06/2020    To Whom It May Concern:    Vickie Chaney  was at [facility] on 08/06/2020.  She may return to work/school on 8/12/2020 with no restrictions.  If you have any questions or concerns, or if I can be of further assistance, please do not hesitate to contact me.    Sincerely,          JONAH Haney MD

## 2020-08-06 NOTE — PROGRESS NOTES
"  Reason for visit: Postpartum Care    HPI:    40 y.o. female  presents for a post-partum check-up    No LMP recorded (lmp unknown).    Delivery: 2020, REPEAT LTCS WITH BTL  Hospitalization: UNCOMPLICATED  Ambulating, tolerating Po, moving bowels: YES  Pain controlled: YES  Bleeding: TAPERING  Breastfeeding: NO  Breast pain or engorgement: DENIES  Postpartum depression: PT WITH SIGNIFICANT STRESS FROM CHILDCARE AND OTHER LIFE STRESSORS.  SHE NOTES WORSENING ANXIETY AND SYMPTOMS OF DEPRESSION INCLUDING DECREASED MOTIVATION.  STARTED ON WELLBUTRIN 3 DAYS AGO BY HER PCP.  Incision: NO COMPLAINTS  Contraception: S/P BTL      Pap: 2020, NILM/HPV(-)      Past medical, surgical, social, family, and obstetric history: Reviewed and updated in EMR.  Medications: Reviewed and updated in EMR.  Allergies: Ibuprofen and Tramadol       Review of Systems   Constitutional: Negative for fever.   Respiratory: Negative for shortness of breath.    Cardiovascular: Negative for chest pain.   Gastrointestinal: Negative for abdominal pain, nausea and vomiting.   Genitourinary: Positive for vaginal bleeding. Negative for pelvic pain.   Integumentary:  Negative for breast mass.   Neurological: Negative for headaches.   Psychiatric/Behavioral: Positive for depression. The patient is nervous/anxious.    Breast: Negative for mass and mastodynia        Vitals: /80   Ht 5' 10" (1.778 m)   Wt 110.8 kg (244 lb 4.3 oz)   LMP  (LMP Unknown)   BMI 35.05 kg/m²     Physical Exam  Constitutional:       General: She is not in acute distress.     Appearance: Normal appearance. She is well-developed.   Neck:      Musculoskeletal: Normal range of motion and neck supple.   Pulmonary:      Effort: Pulmonary effort is normal. No respiratory distress.   Abdominal:      General: A surgical scar is present. There is no distension.      Palpations: Abdomen is soft. There is no hepatomegaly, splenomegaly or mass.      Tenderness: There is no " abdominal tenderness. There is no guarding or rebound.      Hernia: No hernia is present.      Comments: Incision: Clean, dry, and intact; no erythema, induration, or drainage   Musculoskeletal:      Right lower leg: No edema.      Left lower leg: No edema.   Neurological:      Mental Status: She is alert and oriented to person, place, and time.   Skin:     Findings: No rash.   Psychiatric:         Mood and Affect: Mood and affect normal.           Assessment & Plan:    Encounter for routine postpartum follow-up    Postpartum depression    Postpartum anxiety         Postpartum  o Doing well  o Exam: NORMAL  o Lactation referral: NOT INDICATED   DEPRESSION / ANXIETY  o STARTED WELLBUTRIN 3 DAYS AGO  o COUNSELED ON POSSIBLE THOUGHTS OF SELF-HARM WITH INITIATION.  INSTRUCTED TO PRESENT TO ED WITH THOUGHTS OF SELF-HARM  o FOLLOW-UP IN 2 WEEKS TO ASSESS RESPONSE TO MEDICATION    Follow-up: 2 WEEKS

## 2020-08-11 ENCOUNTER — POSTPARTUM VISIT (OUTPATIENT)
Dept: OBSTETRICS AND GYNECOLOGY | Facility: CLINIC | Age: 40
End: 2020-08-11
Payer: MEDICAID

## 2020-08-11 VITALS
DIASTOLIC BLOOD PRESSURE: 98 MMHG | WEIGHT: 243.19 LBS | BODY MASS INDEX: 34.82 KG/M2 | SYSTOLIC BLOOD PRESSURE: 164 MMHG | HEIGHT: 70 IN

## 2020-08-11 DIAGNOSIS — F41.0 PANIC ATTACKS: Primary | ICD-10-CM

## 2020-08-11 DIAGNOSIS — R51.9 NONINTRACTABLE HEADACHE, UNSPECIFIED CHRONICITY PATTERN, UNSPECIFIED HEADACHE TYPE: ICD-10-CM

## 2020-08-11 PROCEDURE — 99213 OFFICE O/P EST LOW 20 MIN: CPT | Mod: S$PBB,24,TH, | Performed by: OBSTETRICS & GYNECOLOGY

## 2020-08-11 PROCEDURE — 99999 PR PBB SHADOW E&M-EST. PATIENT-LVL III: ICD-10-PCS | Mod: PBBFAC,,, | Performed by: OBSTETRICS & GYNECOLOGY

## 2020-08-11 PROCEDURE — 99213 OFFICE O/P EST LOW 20 MIN: CPT | Mod: PBBFAC,PO | Performed by: OBSTETRICS & GYNECOLOGY

## 2020-08-11 PROCEDURE — 99213 PR OFFICE/OUTPT VISIT, EST, LEVL III, 20-29 MIN: ICD-10-PCS | Mod: S$PBB,24,TH, | Performed by: OBSTETRICS & GYNECOLOGY

## 2020-08-11 PROCEDURE — 99999 PR PBB SHADOW E&M-EST. PATIENT-LVL III: CPT | Mod: PBBFAC,,, | Performed by: OBSTETRICS & GYNECOLOGY

## 2020-08-11 RX ORDER — BUTALBITAL, ACETAMINOPHEN AND CAFFEINE 50; 325; 40 MG/1; MG/1; MG/1
1 TABLET ORAL EVERY 6 HOURS PRN
Qty: 30 TABLET | Refills: 1 | Status: SHIPPED | OUTPATIENT
Start: 2020-08-11 | End: 2020-09-10

## 2020-08-11 RX ORDER — CLONAZEPAM 0.5 MG/1
0.5 TABLET ORAL 2 TIMES DAILY
Qty: 60 TABLET | Refills: 0 | Status: SHIPPED | OUTPATIENT
Start: 2020-08-11 | End: 2020-10-28 | Stop reason: SDDI

## 2020-08-11 NOTE — PROGRESS NOTES
"Anitra Chaney is a 40 y.o. female  presents for a postpartum mood check. She says she has been on Wellbutrin for 2 weeks and it is not working for her. She has had panic attacks multiple times a day - feels like someone is pouring ice cold water in her veins. She is currently also taking Atarax twice a day as well. She scored a 9 on her pp depression scale.     Past Medical History:   Diagnosis Date    Anxiety and depression     GERD (gastroesophageal reflux disease)     HTN (hypertension), benign 3/10/2014    Migraines     Obesity     PFO (patent foramen ovale)     found after stroke "too small/risky to close"    Stroke 2017    [place]; given tPA     Past Surgical History:   Procedure Laterality Date    ANKLE FRACTURE SURGERY       SECTION N/A 2019    Procedure:  SECTION;  Surgeon: Gianna Flowers MD;  Location: [facility] L&D;  Service: OB/GYN;  Laterality: N/A;     SECTION WITH TUBAL LIGATION Bilateral 2020    Procedure:  SECTION, WITH TUBAL LIGATION;  Surgeon: Corina Mao MD;  Location: [facility] L&D;  Service: OB/GYN;  Laterality: Bilateral;     SECTION, CLASSIC  2005/2009/2019    x3    CHOLECYSTECTOMY  3/2002    lap maribel    FOOT SURGERY      gastric sleeve  2017     Review of patient's allergies indicates:   Allergen Reactions    Ibuprofen Nausea And Vomiting     Other reaction(s): gastric    Tramadol Nausea And Vomiting       Current Outpatient Medications:     buPROPion (WELLBUTRIN XL) 150 MG TB24 tablet, Take 1 tablet (150 mg total) by mouth 2 (two) times a day., Disp: 60 tablet, Rfl: 11    hydrOXYzine HCL (ATARAX) 10 MG Tab, Take 1 tablet (10 mg total) by mouth 2 (two) times daily., Disp: 60 tablet, Rfl: 3    ibuprofen (MOTRIN ORAL), Take by mouth., Disp: , Rfl:     butalbital-acetaminophen-caffeine -40 mg (FIORICET, ESGIC) -40 mg per tablet, Take 1 tablet by mouth every 6 (six) hours as " needed for Headaches. Do not exceed 6 tablets in 24 hours., Disp: 30 tablet, Rfl: 1    clonazePAM (KLONOPIN) 0.5 MG tablet, Take 1 tablet (0.5 mg total) by mouth 2 (two) times daily., Disp: 60 tablet, Rfl: 0      Vitals:    08/11/20 0935   BP: (!) 164/98   Repeat blood pressure: 147/85    Physical Exam:   Constitutional: She is oriented to person, place, and time. She appears well-developed and well-nourished.    HENT:   Head: Normocephalic and atraumatic.    Eyes: Pupils are equal, round, and reactive to light. EOM are normal.    Neck: Normal range of motion. Neck supple.    Cardiovascular: Normal rate and regular rhythm.     Pulmonary/Chest: Effort normal.        Abdominal: Soft.             Musculoskeletal: Normal range of motion and moves all extremeties.       Neurological: She is alert and oriented to person, place, and time.    Skin: Skin is warm and dry.          Anitra was seen today for postpartum care and anxiety.    Diagnoses and all orders for this visit:    Panic attacks  -     clonazePAM (KLONOPIN) 0.5 MG tablet; Take 1 tablet (0.5 mg total) by mouth 2 (two) times daily.    Nonintractable headache, unspecified chronicity pattern, unspecified headache type  -     butalbital-acetaminophen-caffeine -40 mg (FIORICET, ESGIC) -40 mg per tablet; Take 1 tablet by mouth every 6 (six) hours as needed for Headaches. Do not exceed 6 tablets in 24 hours.    Postpartum care and examination          Assessment:  Patient counseled on need for longer time for Wellbutrin to really have desired effect. Will order Klonopin for acute panic attacks until Wellbutrin fully in effect.     Plan:  RTC in 1 week

## 2020-08-30 NOTE — TELEPHONE ENCOUNTER
----- Message from Adelina Cameron sent at 9/17/2018  2:58 PM CDT -----  Contact: self  Pt called returning a call back for Digna regarding a call she just missed from her and could be reached at [phone]  
Spoke with pt.  Advised that script is ready for  at our office   
DC instructions

## 2020-09-01 ENCOUNTER — PATIENT OUTREACH (OUTPATIENT)
Dept: ADMINISTRATIVE | Facility: OTHER | Age: 40
End: 2020-09-01

## 2020-09-02 DIAGNOSIS — M25.552 PAIN OF BOTH HIP JOINTS: Primary | ICD-10-CM

## 2020-09-02 DIAGNOSIS — M25.551 PAIN OF BOTH HIP JOINTS: Primary | ICD-10-CM

## 2020-09-03 ENCOUNTER — OFFICE VISIT (OUTPATIENT)
Dept: ORTHOPEDICS | Facility: CLINIC | Age: 40
End: 2020-09-03
Attending: FAMILY MEDICINE
Payer: COMMERCIAL

## 2020-09-03 ENCOUNTER — APPOINTMENT (OUTPATIENT)
Dept: RADIOLOGY | Facility: OTHER | Age: 40
End: 2020-09-03
Attending: ORTHOPAEDIC SURGERY
Payer: COMMERCIAL

## 2020-09-03 VITALS — WEIGHT: 249.13 LBS | BODY MASS INDEX: 37.76 KG/M2 | HEIGHT: 68 IN

## 2020-09-03 DIAGNOSIS — M25.552 PAIN OF BOTH HIP JOINTS: ICD-10-CM

## 2020-09-03 DIAGNOSIS — M25.551 PAIN OF BOTH HIP JOINTS: ICD-10-CM

## 2020-09-03 PROCEDURE — 99213 PR OFFICE/OUTPT VISIT, EST, LEVL III, 20-29 MIN: ICD-10-PCS | Mod: S$GLB,,, | Performed by: ORTHOPAEDIC SURGERY

## 2020-09-03 PROCEDURE — 73521 X-RAY EXAM HIPS BI 2 VIEWS: CPT | Mod: 26,,, | Performed by: RADIOLOGY

## 2020-09-03 PROCEDURE — 73521 X-RAY EXAM HIPS BI 2 VIEWS: CPT | Mod: TC

## 2020-09-03 PROCEDURE — 99999 PR PBB SHADOW E&M-EST. PATIENT-LVL III: CPT | Mod: PBBFAC,,, | Performed by: ORTHOPAEDIC SURGERY

## 2020-09-03 PROCEDURE — 3008F PR BODY MASS INDEX (BMI) DOCUMENTED: ICD-10-PCS | Mod: CPTII,S$GLB,, | Performed by: ORTHOPAEDIC SURGERY

## 2020-09-03 PROCEDURE — 99999 PR PBB SHADOW E&M-EST. PATIENT-LVL III: ICD-10-PCS | Mod: PBBFAC,,, | Performed by: ORTHOPAEDIC SURGERY

## 2020-09-03 PROCEDURE — 99213 OFFICE O/P EST LOW 20 MIN: CPT | Mod: S$GLB,,, | Performed by: ORTHOPAEDIC SURGERY

## 2020-09-03 PROCEDURE — 3008F BODY MASS INDEX DOCD: CPT | Mod: CPTII,S$GLB,, | Performed by: ORTHOPAEDIC SURGERY

## 2020-09-03 PROCEDURE — 73521 XR HIPS BILATERAL 2 VIEW INCL AP PELVIS: ICD-10-PCS | Mod: 26,,, | Performed by: RADIOLOGY

## 2020-09-03 RX ORDER — MELOXICAM 15 MG/1
15 TABLET ORAL DAILY
Qty: 30 TABLET | Refills: 1 | Status: ON HOLD | OUTPATIENT
Start: 2020-09-03 | End: 2021-07-06 | Stop reason: HOSPADM

## 2020-09-03 NOTE — LETTER
September 4, 2020      Tien Watts MD  [address]  [facility] [address]  [facility] [address]           [place]  [address]  [facility] [address]  Phone: [phone]  Fax: [phone]          Patient: Anitra Chaney   MR Number: [id number]   YOB: 1980   Date of Visit: 9/3/2020       Dear Dr. Tien Watts:    Thank you for referring Anitra Chaney to me for evaluation. Attached you will find relevant portions of my assessment and plan of care.    If you have questions, please do not hesitate to call me. I look forward to following Anitra Chaney along with you.    Sincerely,    Anthony Baez MD    Enclosure  CC:  No Recipients    If you would like to receive this communication electronically, please contact [email or web address] or [phone] to request more information on [employer] Link access.    For providers and/or their staff who would like to refer a patient to [facility], please contact us through our one-stop-shop provider referral line, [facility], at [phone].    If you feel you have received this communication in error or would no longer like to receive these types of communications, please e-mail [email or web address]

## 2020-09-04 NOTE — PROGRESS NOTES
"Subjective:       Patient ID: Anitra Chaney is a 40 y.o. female.    Chief Complaint:   Pain of the Right Hip and Pain of the Left Hip   She comes in today for pain in both hips.  She has had a few weeks of pain.  When she rolls over in bed she feels a popping in the hip which actually seems to make her feel better.  When she says hip, she is not referring to the groin, but rather more the lateral hip and buttock area.  The left hurts more than the right.  She has tried ice, heat, Tylenol, ibuprofen, Biofreeze without relief.  The pain is worse at night.  She recently had a baby, about 6 weeks ago.  This pain has come on since then.  No fall, accident, injury, history of pertinent surgery.  She does have a history of stroke.  She does not have any significant residual orthopedic issues related to that that she knows of.    Past Medical History:   Diagnosis Date    Anxiety and depression     GERD (gastroesophageal reflux disease)     HTN (hypertension), benign 3/10/2014    Migraines     Obesity     PFO (patent foramen ovale)     found after stroke "too small/risky to close"    Stroke 2017    Hospitalized at [facility]; given tPA     Past Surgical History:   Procedure Laterality Date    ANKLE FRACTURE SURGERY       SECTION N/A 2019    Procedure:  SECTION;  Surgeon: Gianna Flowers MD;  Location: [facility] L&D;  Service: OB/GYN;  Laterality: N/A;     SECTION WITH TUBAL LIGATION Bilateral 2020    Procedure:  SECTION, WITH TUBAL LIGATION;  Surgeon: Corina Mao MD;  Location: [facility] L&D;  Service: OB/GYN;  Laterality: Bilateral;     SECTION, CLASSIC  2005/2009/2019    x3    CHOLECYSTECTOMY  3/2002    lap maribel    FOOT SURGERY      gastric sleeve  2017     Family History   Problem Relation Age of Onset    Other Father         house fire    Breast cancer Neg Hx     Colon cancer Neg Hx     Ovarian cancer Neg Hx     Congenital heart disease Neg " Hx     Pacemaker/defibrilator Neg Hx     Early death Neg Hx      Social History     Socioeconomic History    Marital status:      Spouse name: Not on file    Number of children: Not on file    Years of education: Not on file    Highest education level: Not on file   Occupational History     Employer: [employer]   Social Needs    Financial resource strain: Hard    Food insecurity     Worry: Sometimes true     Inability: Sometimes true    Transportation needs     Medical: No     Non-medical: No   Tobacco Use    Smoking status: Current Every Day Smoker     Packs/day: 0.50     Years: 18.00     Pack years: 9.00     Types: Cigarettes    Smokeless tobacco: Never Used    Tobacco comment: cutting to 2 cigarettes per day   Substance and Sexual Activity    Alcohol use: No     Alcohol/week: 0.0 standard drinks     Frequency: Never     Drinks per session: Patient refused     Binge frequency: Patient refused    Drug use: No    Sexual activity: Not Currently     Partners: Male   Lifestyle    Physical activity     Days per week: 0 days     Minutes per session: 0 min    Stress: Very much   Relationships    Social connections     Talks on phone: Twice a week     Gets together: Never     Attends Synagogue service: Not on file     Active member of club or organization: No     Attends meetings of clubs or organizations: Never     Relationship status:    Other Topics Concern    Caffeine Use Not Asked    Financial Status: Disabled Not Asked    Legal: Involved in criminal litigation Not Asked    Caffeine Use: Frequent Not Asked    Financial Status: Employed Not Asked    Legal: Other Not Asked    Caffeine Use: Moderate Not Asked    Financial Status: Unemployed Not Asked    Leisure: Exercise Not Asked    Caffeine Use: Substantial Not Asked    Financial Status: Other Not Asked    Leisure: Fishing Not Asked    Childhood History: Adopted Not Asked    Firearms: Does patient have  access to a firearm? Not Asked    Leisure: Hunting Not Asked    Childhood History: Early trauma Not Asked    Home situation: homeless Not Asked    Leisure: Movie Watching Not Asked    Childhood History: Raised by parents Not Asked    Home situation: lives alone Not Asked    Leisure: Shopping Not Asked    Childhood History: Uneventful Not Asked    Home situation: lives in group home Not Asked    Leisure: Sports Not Asked    Childhood History: Other Not Asked    Home situation: lives in nursing home Not Asked    Leisure: Time with family Not Asked    Education: Unfinished High School Not Asked    Home situation: lives in shelter Not Asked     Service Not Asked    Education: High School Graduate Not Asked    Home situation: lives with family Not Asked    Spirituality: Active Participation Not Asked    Education: Unfinished college Not Asked    Home situation: lives with friends Not Asked    Spirituality: Organized Taoist Not Asked    Education: Trade School Not Asked    Home situation: lives with significant other Not Asked    Spirituality: Private Participation Not Asked    Education: Associate's Degree Not Asked    Home situation: lives with spouse Not Asked    Patient feels they ought to cut down on drinking/drug use Not Asked    Education: Bachelor's Degree Not Asked    Legal consequences of chemical use Not Asked    Patient annoyed by others criticizing their drinking/drug use Not Asked    Education: More than one Bachelor's or Professional Not Asked    Legal: Arrest history Not Asked    Patient has felt bad or guilty about drinking/drug use Not Asked    Education: Master's, PhD Not Asked    Legal: Involved in civil litigation Not Asked    Patient has had a drink/used drugs as an eye opener in the AM Not Asked   Social History Narrative    Not on file       Current Outpatient Medications   Medication Sig Dispense Refill    buPROPion (WELLBUTRIN XL) 150 MG TB24  "tablet Take 1 tablet (150 mg total) by mouth 2 (two) times a day. 60 tablet 11    butalbital-acetaminophen-caffeine -40 mg (FIORICET, ESGIC) -40 mg per tablet Take 1 tablet by mouth every 6 (six) hours as needed for Headaches. Do not exceed 6 tablets in 24 hours. 30 tablet 1    clonazePAM (KLONOPIN) 0.5 MG tablet Take 1 tablet (0.5 mg total) by mouth 2 (two) times daily. 60 tablet 0    hydrOXYzine HCL (ATARAX) 10 MG Tab Take 1 tablet (10 mg total) by mouth 2 (two) times daily. 60 tablet 3    meloxicam (MOBIC) 15 MG tablet Take 1 tablet (15 mg total) by mouth once daily. 30 tablet 1     No current facility-administered medications for this visit.      Review of patient's allergies indicates:   Allergen Reactions    Ibuprofen Nausea And Vomiting     Other reaction(s): gastric    Tramadol Nausea And Vomiting       ROS:  A review of systems is updated and there are no reported vision changes, ear/nose/mouth/throat complaints,  chest pain, shortness of breath, abdominal pain, urological complaints, fevers or chills, psychiatric complaints. Musculoskeletal and neurologcial symptoms are as documented. All other systems are negative.    Objective:      Vitals:    09/03/20 0925   Weight: 113 kg (249 lb 1.9 oz)   Height: 5' 8" (1.727 m)     Physical Exam  On exam she is somewhat tender at the greater trochanter, and she does have some tenderness throughout the soft tissues of the abductors and buttock.  Negative SABINE.  Negative Stinchfield, negative C sign.  No pain with flexion and internal rotation of the hips passively.  She walks with a normal gait.  Imaging Review:   X-rays done today show no significant hip arthrosis.  There are no focal bone defects, and no other abnormalities seen.  Assessment:   Bilateral lateral hip pain  Plan:       I reassured her that her problem is not hip arthritis, but rather related to the soft tissues of the pelvic girdle.  I will refer her to Dr. Osmar Hernandez for his " osteopathic evaluation.  In lieu of her OTC NSAIDs, we will do a daily dose of meloxicam as well as some Tylenol Arthritis as I explained to her in detail today.  She will see Dr. Hernandez next week.  The patient's pathophysiology was explained in detail with reference to x-rays, models, other visual aids as appropriate.  Treatment options were discussed in detail.  Questions were invited and answered to the patient's satisfaction.

## 2020-09-08 ENCOUNTER — TELEPHONE (OUTPATIENT)
Dept: SPORTS MEDICINE | Facility: CLINIC | Age: 40
End: 2020-09-08

## 2020-09-16 ENCOUNTER — PATIENT MESSAGE (OUTPATIENT)
Dept: INTERNAL MEDICINE | Facility: CLINIC | Age: 40
End: 2020-09-16

## 2020-09-16 DIAGNOSIS — Z12.31 ENCOUNTER FOR SCREENING MAMMOGRAM FOR BREAST CANCER: Primary | ICD-10-CM

## 2020-09-16 RX ORDER — BUTALBITAL, ACETAMINOPHEN AND CAFFEINE 50; 325; 40 MG/1; MG/1; MG/1
1 TABLET ORAL EVERY 4 HOURS PRN
Qty: 25 TABLET | Refills: 0 | Status: SHIPPED | OUTPATIENT
Start: 2020-09-16 | End: 2020-09-30 | Stop reason: SDUPTHER

## 2020-09-29 ENCOUNTER — PATIENT MESSAGE (OUTPATIENT)
Dept: INTERNAL MEDICINE | Facility: CLINIC | Age: 40
End: 2020-09-29

## 2020-09-29 ENCOUNTER — PATIENT MESSAGE (OUTPATIENT)
Dept: PAIN MEDICINE | Facility: CLINIC | Age: 40
End: 2020-09-29

## 2020-09-29 DIAGNOSIS — H92.09 OTALGIA, UNSPECIFIED LATERALITY: Primary | ICD-10-CM

## 2020-10-04 ENCOUNTER — PATIENT OUTREACH (OUTPATIENT)
Dept: ADMINISTRATIVE | Facility: OTHER | Age: 40
End: 2020-10-04

## 2020-10-04 NOTE — PROGRESS NOTES
LINKS immunization registry updated  Care Everywhere updated  Health Maintenance updated  Chart reviewed for overdue Proactive [facility] Encounters ([facility]) health maintenance testing (CRS, Breast Ca, Diabetic Eye Exam)   Orders entered:N/A

## 2020-10-06 ENCOUNTER — TELEPHONE (OUTPATIENT)
Dept: INTERNAL MEDICINE | Facility: CLINIC | Age: 40
End: 2020-10-06

## 2020-10-08 ENCOUNTER — HOSPITAL ENCOUNTER (EMERGENCY)
Facility: HOSPITAL | Age: 40
Discharge: HOME | End: 2020-10-08
Attending: EMERGENCY MEDICINE
Payer: COMMERCIAL

## 2020-10-08 VITALS
DIASTOLIC BLOOD PRESSURE: 84 MMHG | OXYGEN SATURATION: 100 % | RESPIRATION RATE: 18 BRPM | HEART RATE: 80 BPM | WEIGHT: 245 LBS | TEMPERATURE: 98 F | SYSTOLIC BLOOD PRESSURE: 120 MMHG | HEIGHT: 69 IN | BODY MASS INDEX: 36.29 KG/M2

## 2020-10-08 DIAGNOSIS — S16.1XXA CERVICAL STRAIN, ACUTE, INITIAL ENCOUNTER: Primary | ICD-10-CM

## 2020-10-08 DIAGNOSIS — S29.019A THORACIC MYOFASCIAL STRAIN, INITIAL ENCOUNTER: ICD-10-CM

## 2020-10-08 PROCEDURE — 99284 EMERGENCY DEPT VISIT MOD MDM: CPT | Mod: ER

## 2020-10-08 RX ORDER — CYCLOBENZAPRINE HCL 5 MG
5 TABLET ORAL NIGHTLY
Qty: 30 TABLET | Refills: 0 | Status: SHIPPED | OUTPATIENT
Start: 2020-10-08 | End: 2021-07-08

## 2020-10-08 RX ORDER — HYDROCODONE BITARTRATE AND ACETAMINOPHEN 5; 325 MG/1; MG/1
1 TABLET ORAL EVERY 8 HOURS PRN
Qty: 6 TABLET | Refills: 0 | Status: SHIPPED | OUTPATIENT
Start: 2020-10-08 | End: 2021-07-08

## 2020-10-08 NOTE — ED NOTES
Patient identifiers verified and correct for Anitra Chaney.    LOC: The patient is awake, alert and aware of environment with an appropriate affect, the patient is oriented x 3 and speaking appropriately.  APPEARANCE: Patient resting comfortably and in no acute distress, patient is clean and well groomed, patient's clothing is properly fastened.  SKIN: The skin is warm and dry, color consistent with ethnicity, patient has normal skin turgor and moist mucus membranes, skin intact, no breakdown or bruising noted.  MUSCULOSKELETAL: Patient moving all extremities spontaneously, no obvious swelling or deformities noted. See Assessment  RESPIRATORY: Airway is open and patent, respirations are spontaneous, patient has a normal effort and rate, no accessory muscle use noted, bilateral breath sounds   CARDIAC: Patient has a normal rate and regular rhythm, no periphreal edema noted, capillary refill < 3 seconds.  ABDOMEN: Soft and non tender to palpation, no distention noted, normoactive bowel sounds present in all four quadrants.  NEUROLOGIC: PERRL,eyes open spontaneously, behavior appropriate to situation, follows commands, facial expression symmetrical, bilateral hand grasp equal and even, purposeful motor response noted, normal sensation in all extremities when touched with a finger.

## 2020-10-08 NOTE — ED PROVIDER NOTES
"Encounter Date: 10/8/2020       History     Chief Complaint   Patient presents with    Shoulder Pain     states LEFT shoulder pain starting a couple days ago. states a "crunch" with movement, DENIES trauma, numbness or tingling      Patient is a 40-year-old female presenting with constant moderate to severe aching pain and spasm to the left side of the neck and thoracic paraspinous area.  Her initial complaint states shoulder pain however when she points to the area that is hurting it is more in the neck and parascapular area.  No known injury.  The pain began a few days ago upon waking and has gradually worsened.  She had Flexeril for low back pain but it is sedating and she can only take it at night.  She would however like a refill on it.  No numbness, focal weakness or recent known injury. She has had a gastric sleeve and cannot have NSAIDs.        Review of patient's allergies indicates:   Allergen Reactions    Tramadol Nausea And Vomiting    Ibuprofen Nausea And Vomiting     Other reaction(s): gastric     Past Medical History:   Diagnosis Date    Anxiety and depression     GERD (gastroesophageal reflux disease)     HTN (hypertension), benign 3/10/2014    Migraines     Obesity     PFO (patent foramen ovale)     found after stroke "too small/risky to close"    Stroke 2017    Hospitalized at [facility]; given tPA     Past Surgical History:   Procedure Laterality Date    ANKLE FRACTURE SURGERY       SECTION N/A 2019    Procedure:  SECTION;  Surgeon: Gianna Flowers MD;  Location: [facility] L&D;  Service: OB/GYN;  Laterality: N/A;     SECTION WITH TUBAL LIGATION Bilateral 2020    Procedure:  SECTION, WITH TUBAL LIGATION;  Surgeon: Corina Mao MD;  Location: [facility] L&D;  Service: OB/GYN;  Laterality: Bilateral;     SECTION, CLASSIC  2005/2009/2019    x3    CHOLECYSTECTOMY  3/2002    lap maribel    FOOT SURGERY      gastric sleeve  2017     Family " History   Problem Relation Age of Onset    Other Father         house fire    Breast cancer Neg Hx     Colon cancer Neg Hx     Ovarian cancer Neg Hx     Congenital heart disease Neg Hx     Pacemaker/defibrilator Neg Hx     Early death Neg Hx      Social History     Tobacco Use    Smoking status: Current Every Day Smoker     Packs/day: 0.50     Years: 18.00     Pack years: 9.00     Types: Cigarettes    Smokeless tobacco: Never Used    Tobacco comment: cutting to 2 cigarettes per day   Substance Use Topics    Alcohol use: No     Alcohol/week: 0.0 standard drinks     Frequency: Never     Drinks per session: Patient refused     Binge frequency: Patient refused    Drug use: No     Review of Systems   Constitutional: Negative for activity change, appetite change, chills and fever.   Respiratory: Negative for cough, wheezing and stridor.    Musculoskeletal: Positive for neck pain and neck stiffness. Negative for back pain.   Skin: Negative for color change, rash and wound.   Neurological: Negative for dizziness, weakness, numbness and headaches.   All other systems reviewed and are negative.      Physical Exam     Initial Vitals [10/08/20 1203]   BP Pulse Resp Temp SpO2   120/84 80 18 98.4 °F (36.9 °C) 100 %      MAP       --         Physical Exam    Nursing note and vitals reviewed.  Constitutional: She appears well-developed and well-nourished. She appears distressed.   HENT:   Head: Normocephalic and atraumatic.   Nose: Nose normal.   Mouth/Throat: Oropharynx is clear and moist.   Eyes: Conjunctivae and EOM are normal. Pupils are equal, round, and reactive to light.   Neck: Normal range of motion. Neck supple.   No midline or spinous tenderness.  Left cervical paraspinous tenderness to palpation and palpable spasm.  Normal range of motion with discomfort.  Tenderness extends along the left parascapular area and left thoracic paraspinous muscles.  Pain in the thoracic paraspinous muscles exacerbated by range  of motion of the left shoulder.   Cardiovascular: Normal rate, regular rhythm, normal heart sounds and intact distal pulses.   Pulmonary/Chest: Breath sounds normal. No respiratory distress.   Musculoskeletal:      Comments: No midline or spinous tenderness in the thoracic or lumbar region.  No swelling or deformity of the left shoulder.  No tenderness in the shoulder.  Normal range of motion of elbow without pain.  Good distal pulses.   Lymphadenopathy:     She has no cervical adenopathy.   Neurological: She is alert and oriented to person, place, and time. She has normal strength. No sensory deficit.   Skin: Skin is warm and dry. No rash noted.   Psychiatric: She has a normal mood and affect. Her behavior is normal. Judgment and thought content normal.         ED Course   Procedures  Labs Reviewed - No data to display       Imaging Results    None          Medical Decision Making:   Differential Diagnosis:   Including but not limited to cervical strain/spasm, meningitis, fracture, other infectious process                             Clinical Impression:       ICD-10-CM ICD-9-CM   1. Cervical strain, acute, initial encounter  S16.1XXA 847.0   2. Thoracic myofascial strain, initial encounter  S29.019A 847.1                      Disposition:   Disposition: Discharged     ED Disposition Condition    Discharge Stable        ED Prescriptions     Medication Sig Dispense Start Date End Date Auth. Provider    cyclobenzaprine (FLEXERIL) 5 MG tablet Take 1 tablet (5 mg total) by mouth nightly. 30 tablet 10/8/2020  MILEY Dutta    HYDROcodone-acetaminophen (NORCO) 5-325 mg per tablet Take 1 tablet by mouth every 8 (eight) hours as needed (severe pain). 6 tablet 10/8/2020  MILEY Dutta        Follow-up Information     Follow up With Specialties Details Why Contact Info    Tien Watts MD Family Medicine In 2 days  [address]  [facility] [address]  [phone]                                          MILEY Dutta  10/08/20 7758

## 2020-10-12 ENCOUNTER — OFFICE VISIT (OUTPATIENT)
Dept: INTERNAL MEDICINE | Facility: CLINIC | Age: 40
End: 2020-10-12
Attending: FAMILY MEDICINE
Payer: COMMERCIAL

## 2020-10-12 ENCOUNTER — LAB VISIT (OUTPATIENT)
Dept: LAB | Facility: OTHER | Age: 40
End: 2020-10-12
Attending: FAMILY MEDICINE
Payer: COMMERCIAL

## 2020-10-12 ENCOUNTER — PATIENT MESSAGE (OUTPATIENT)
Dept: INTERNAL MEDICINE | Facility: CLINIC | Age: 40
End: 2020-10-12

## 2020-10-12 ENCOUNTER — LAB VISIT (OUTPATIENT)
Dept: PRIMARY CARE CLINIC | Facility: CLINIC | Age: 40
End: 2020-10-12
Attending: FAMILY MEDICINE
Payer: COMMERCIAL

## 2020-10-12 VITALS
HEART RATE: 80 BPM | WEIGHT: 253.31 LBS | HEIGHT: 69 IN | SYSTOLIC BLOOD PRESSURE: 128 MMHG | DIASTOLIC BLOOD PRESSURE: 76 MMHG | BODY MASS INDEX: 37.52 KG/M2 | OXYGEN SATURATION: 98 %

## 2020-10-12 DIAGNOSIS — Z00.00 PREVENTATIVE HEALTH CARE: Primary | ICD-10-CM

## 2020-10-12 DIAGNOSIS — I10 HYPERTENSION, ESSENTIAL: ICD-10-CM

## 2020-10-12 DIAGNOSIS — M79.7 FIBROMYALGIA MUSCLE PAIN: ICD-10-CM

## 2020-10-12 DIAGNOSIS — Z03.818 ENCOUNTER FOR OBSERVATION FOR SUSPECTED EXPOSURE TO OTHER BIOLOGICAL AGENTS RULED OUT: ICD-10-CM

## 2020-10-12 DIAGNOSIS — Z20.822 EXPOSURE TO SEVERE ACUTE RESPIRATORY SYNDROME CORONAVIRUS 2 (SARS-COV-2): ICD-10-CM

## 2020-10-12 DIAGNOSIS — Z00.00 PREVENTATIVE HEALTH CARE: ICD-10-CM

## 2020-10-12 LAB
BASOPHILS # BLD AUTO: 0.06 K/UL (ref 0–0.2)
BASOPHILS NFR BLD: 0.7 % (ref 0–1.9)
DIFFERENTIAL METHOD: ABNORMAL
EOSINOPHIL # BLD AUTO: 0.1 K/UL (ref 0–0.5)
EOSINOPHIL NFR BLD: 1.5 % (ref 0–8)
ERYTHROCYTE [DISTWIDTH] IN BLOOD BY AUTOMATED COUNT: 19.4 % (ref 11.5–14.5)
HCT VFR BLD AUTO: 34.2 % (ref 37–48.5)
HGB BLD-MCNC: 9.9 G/DL (ref 12–16)
IMM GRANULOCYTES # BLD AUTO: 0.03 K/UL (ref 0–0.04)
IMM GRANULOCYTES NFR BLD AUTO: 0.3 % (ref 0–0.5)
LYMPHOCYTES # BLD AUTO: 1.6 K/UL (ref 1–4.8)
LYMPHOCYTES NFR BLD: 17.5 % (ref 18–48)
MCH RBC QN AUTO: 21.3 PG (ref 27–31)
MCHC RBC AUTO-ENTMCNC: 28.9 G/DL (ref 32–36)
MCV RBC AUTO: 74 FL (ref 82–98)
MONOCYTES # BLD AUTO: 0.5 K/UL (ref 0.3–1)
MONOCYTES NFR BLD: 5.4 % (ref 4–15)
NEUTROPHILS # BLD AUTO: 6.8 K/UL (ref 1.8–7.7)
NEUTROPHILS NFR BLD: 74.6 % (ref 38–73)
NRBC BLD-RTO: 0 /100 WBC
PLATELET # BLD AUTO: 343 K/UL (ref 150–350)
PMV BLD AUTO: 8.7 FL (ref 9.2–12.9)
RBC # BLD AUTO: 4.65 M/UL (ref 4–5.4)
TSH SERPL DL<=0.005 MIU/L-ACNC: 1.19 UIU/ML (ref 0.4–4)
WBC # BLD AUTO: 9.14 K/UL (ref 3.9–12.7)

## 2020-10-12 PROCEDURE — 36415 COLL VENOUS BLD VENIPUNCTURE: CPT

## 2020-10-12 PROCEDURE — 85025 COMPLETE CBC W/AUTO DIFF WBC: CPT

## 2020-10-12 PROCEDURE — 3078F PR MOST RECENT DIASTOLIC BLOOD PRESSURE < 80 MM HG: ICD-10-PCS | Mod: CPTII,S$GLB,, | Performed by: FAMILY MEDICINE

## 2020-10-12 PROCEDURE — U0003 INFECTIOUS AGENT DETECTION BY NUCLEIC ACID (DNA OR RNA); SEVERE ACUTE RESPIRATORY SYNDROME CORONAVIRUS 2 (SARS-COV-2) (CORONAVIRUS DISEASE [COVID-19]), AMPLIFIED PROBE TECHNIQUE, MAKING USE OF HIGH THROUGHPUT TECHNOLOGIES AS DESCRIBED BY CMS-2020-01-R: HCPCS

## 2020-10-12 PROCEDURE — 99396 PREV VISIT EST AGE 40-64: CPT | Mod: S$GLB,,, | Performed by: FAMILY MEDICINE

## 2020-10-12 PROCEDURE — 99999 PR PBB SHADOW E&M-EST. PATIENT-LVL III: CPT | Mod: PBBFAC,,, | Performed by: FAMILY MEDICINE

## 2020-10-12 PROCEDURE — 3078F DIAST BP <80 MM HG: CPT | Mod: CPTII,S$GLB,, | Performed by: FAMILY MEDICINE

## 2020-10-12 PROCEDURE — 84443 ASSAY THYROID STIM HORMONE: CPT

## 2020-10-12 PROCEDURE — 3074F SYST BP LT 130 MM HG: CPT | Mod: CPTII,S$GLB,, | Performed by: FAMILY MEDICINE

## 2020-10-12 PROCEDURE — 99396 PR PREVENTIVE VISIT,EST,40-64: ICD-10-PCS | Mod: S$GLB,,, | Performed by: FAMILY MEDICINE

## 2020-10-12 PROCEDURE — 3074F PR MOST RECENT SYSTOLIC BLOOD PRESSURE < 130 MM HG: ICD-10-PCS | Mod: CPTII,S$GLB,, | Performed by: FAMILY MEDICINE

## 2020-10-12 PROCEDURE — 99999 PR PBB SHADOW E&M-EST. PATIENT-LVL III: ICD-10-PCS | Mod: PBBFAC,,, | Performed by: FAMILY MEDICINE

## 2020-10-12 PROCEDURE — 3008F PR BODY MASS INDEX (BMI) DOCUMENTED: ICD-10-PCS | Mod: CPTII,S$GLB,, | Performed by: FAMILY MEDICINE

## 2020-10-12 PROCEDURE — 3008F BODY MASS INDEX DOCD: CPT | Mod: CPTII,S$GLB,, | Performed by: FAMILY MEDICINE

## 2020-10-12 NOTE — PROGRESS NOTES
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  The patient has a long psychiatric history.  She had a baby a couple of months ago.    She is c/o fibromyalgia due to the fact that she has had bilateral upper and lower extremity Arthralgias and myalgias for several months.    She has a history of mood disorder.  She sees psychiatry.     REVIEW OF SYSTEMS:  GENERAL: No fever, chills, fatigability or weight loss.  SKIN: No rashes, itching or changes in color or texture of skin.  HEAD: No headaches or recent head trauma.  EYES: Visual acuity fine. No photophobia, ocular pain or diplopia.  EARS: Denies ear pain, discharge or vertigo.  NOSE: No loss of smell, no epistaxis or postnasal drip.  MOUTH & THROAT: No hoarseness or change in voice. No excessive gum bleeding.  NODES: Denies swollen glands.  CHEST: Denies OLIVARES, cyanosis, wheezing, cough and sputum production.  CARDIOVASCULAR: Denies PND, orthopnea or reduced exercise tolerance.  ABDOMEN: Appetite fine. No weight loss. Denies diarrhea, abdominal pain, hematemesis or blood in stool.  URINARY: No flank pain, dysuria or hematuria.  PERIPHERAL VASCULAR: No claudication or cyanosis.  MUSCULOSKELETAL: No joint stiffness or swelling.  Except as mentioned above  NEUROLOGIC: No history of seizures.  She was admitted for a TIA.    SOCIAL HISTORY: The patient does not smoke.  The patient consumes alcohol socially.  The patient is .  She works as a dispatcher for [employer].      PHYSICAL EXAMINATION:   Blood pressure 128/76, pulse 80, height 5' 9" (1.753 m), weight 114.9 kg (253 lb 4.9 oz), last menstrual period 10/01/2020, SpO2 98 %, not currently breastfeeding.  APPEARANCE: Well nourished, well developed, in no acute distress.    HEAD: Normocephalic, atraumatic.  EYES: PERRL. EOMI.  Conjunctivae without injection and  anicteric  NOSE: Mucosa " pink. Airway clear.  MOUTH & THROAT: No tonsillar enlargement. No pharyngeal erythema or exudate. No stridor.  NECK: Supple.   NODES: No cervical, axillary or inguinal lymph node enlargement.  CHEST: Lungs clear to auscultation.  No retractions are noted.  No rales or rhonchi are present.  CARDIOVASCULAR: Normal S1, S2. No rubs, murmurs or gallops.  ABDOMEN: Bowel sounds normal. Not distended. Soft. No tenderness or masses.  No ascites is noted.  MUSCULOSKELETAL:  There is no clubbing, cyanosis, or edema of the extremities x4.  There is full range of motion of the lumbar spine.  There is full range of motion of the extremities x4.  There is no deformity noted.    NEUROLOGIC:       Normal speech development.      Hearing normal.      Normal gait.      Motor and sensory exams grossly normal.  PSYCHIATRIC: Patient is alert and oriented x3.  Thought processes are all normal.  There is no homicidality.  There is no suicidality.  There is no evidence of psychosis.    LABORATORY/RADIOLOGY:   Chart reviewed.     ASSESSMENT:   Annual  c/o fibromyalgia  Arthralgias and myalgias for several months involving both the upper and lower extremities  Obesity with a BMI of 37, status post gastric sleeve with substantial weight loss  SARAH / snoring improved with weight loss  HTN improved with weight loss    PLAN:  cymbalta  Rheum   Lamictal  Patient has been seen by sleep medicine.  Continue current medications     Return to clinic in 6 months

## 2020-10-13 ENCOUNTER — TELEPHONE (OUTPATIENT)
Dept: INTERNAL MEDICINE | Facility: CLINIC | Age: 40
End: 2020-10-13

## 2020-10-13 DIAGNOSIS — M25.541 ARTHRALGIA OF BOTH HANDS: Primary | ICD-10-CM

## 2020-10-13 DIAGNOSIS — M25.542 ARTHRALGIA OF BOTH HANDS: Primary | ICD-10-CM

## 2020-10-13 DIAGNOSIS — M79.10 MYALGIA: ICD-10-CM

## 2020-10-13 LAB — SARS-COV-2 RNA RESP QL NAA+PROBE: NOT DETECTED

## 2020-10-13 NOTE — TELEPHONE ENCOUNTER
----- Message from Tien Watts MD sent at 10/12/2020  4:34 PM CDT -----  Thyroid is normal.  Blood count is much improved.  No changes in medications.  Followup as scheduled.

## 2020-10-20 ENCOUNTER — PATIENT MESSAGE (OUTPATIENT)
Dept: FAMILY MEDICINE | Facility: CLINIC | Age: 40
End: 2020-10-20

## 2020-10-20 ENCOUNTER — TELEPHONE (OUTPATIENT)
Dept: INTERNAL MEDICINE | Facility: CLINIC | Age: 40
End: 2020-10-20

## 2020-10-20 ENCOUNTER — PATIENT MESSAGE (OUTPATIENT)
Dept: PSYCHIATRY | Facility: CLINIC | Age: 40
End: 2020-10-20

## 2020-10-20 NOTE — PROGRESS NOTES
[facility] Established Patient Follow Up    Referred by: Tien Watts MD   Reason for referral: Acute left-sided low back pain with bilateral sciatica     CC:   Chief Complaint   Patient presents with    Low-back Pain    Shoulder Pain    Neck Pain     Last 3 PDI Scores 10/21/2020 2019   Pain Disability Index (PDI) 41 56         Interval Update:  10/21/2020 - Ms. Chaney returns to clinic for follow up visit reporting worse low back/shoulder/neck pain. Pain intensity is currently 7/10.  She is reporting the same pain as she has had in the past.  We were in the process of working up her pain with MRI and PT and this process was interrupted by pregnancy and childbirth.  She denies participation in home exercise, PT, or any other modality of physical improvement.  She is not established with a psychiatrist.      HPI:   Anitra Chaney is a 40 y.o. female who complains of chronic low back pain radiating into the anterior and lateral thigh, bilaterally.  Radiating pain is associated with numbness in the thighs.  Numbness and tingling in the anterolateral thigh increases with the length of time she has been standing or walking.  She states that her legs become weak in feeling jello at times.  The onset of her symptoms is associated with a complicated childbirth in 2019.  She has a history of gastric sleeve and significant weight loss.  Following a  for delivery of her child, blood extravasated and clotted in the dermal layers requiring surgical removal and prolonged use of a wound VAC.    Location: low back   Onset: 8 mos  Current Pain Score: 8/10  Daily Pain of Range: 6-8/10  Quality: Aching and Throbbing  Radiation: into both legs  Worsened by: sitting and standing  Improved by: medications    Previous Therapies:  PT/OT:  Denies physical therapy  HEP:  Denies home exercise  Interventions:  Denies interventional procedures for management of current  symptoms  Surgery:  Denies back surgery.  Endorses history of gastric sleeve with significant weight loss; however, she has regained much that weight recently.  Medications:   - NSAIDS:   - MSK Relaxants:  Flexeril 10 mg  - TCAs:   - SNRIs:   - Topicals:   - Anticonvulsants:  - Opioids:     Current Pain Medications:  1. Duloxetine 30 mg daily     Review of Systems:  Review of Systems   Constitutional: Negative for chills and fever.   HENT: Negative for nosebleeds.    Eyes: Negative for blurred vision and pain.   Respiratory: Negative for hemoptysis.    Cardiovascular: Negative for chest pain and palpitations.   Gastrointestinal: Negative for heartburn, nausea and vomiting.   Genitourinary: Negative for dysuria and hematuria.   Musculoskeletal: Positive for back pain and myalgias.   Skin: Negative for rash.   Neurological: Positive for tingling and focal weakness (Bilateral leg). Negative for seizures and loss of consciousness.   Endo/Heme/Allergies: Does not bruise/bleed easily.   Psychiatric/Behavioral: Positive for depression. Negative for hallucinations and suicidal ideas. The patient is nervous/anxious and has insomnia.        History:    Current Outpatient Medications:     butalbital-acetaminophen-caffeine -40 mg (FIORICET, ESGIC) -40 mg per tablet, Take 1 tablet by mouth every 4 (four) hours as needed for Pain., Disp: 25 tablet, Rfl: 0    butalbital-acetaminophen-caffeine -40 mg (FIORICET, ESGIC) -40 mg per tablet, TAKE ONE TABLET BY MOUTH EVERY FOUR HOURS AS NEEDED FOR PAIN , Disp: 30 tablet, Rfl: 0    buPROPion (WELLBUTRIN XL) 150 MG TB24 tablet, Take 1 tablet (150 mg total) by mouth 2 (two) times a day., Disp: 60 tablet, Rfl: 11    clonazePAM (KLONOPIN) 0.5 MG tablet, Take 1 tablet (0.5 mg total) by mouth 2 (two) times daily., Disp: 60 tablet, Rfl: 0    cyclobenzaprine (FLEXERIL) 5 MG tablet, Take 1 tablet (5 mg total) by mouth nightly., Disp: 30 tablet, Rfl: 0     "HYDROcodone-acetaminophen (NORCO) 5-325 mg per tablet, Take 1 tablet by mouth every 8 (eight) hours as needed (severe pain)., Disp: 6 tablet, Rfl: 0    hydrOXYzine HCL (ATARAX) 10 MG Tab, Take 1 tablet (10 mg total) by mouth 2 (two) times daily., Disp: 60 tablet, Rfl: 3    meloxicam (MOBIC) 15 MG tablet, Take 1 tablet (15 mg total) by mouth once daily., Disp: 30 tablet, Rfl: 1    Past Medical History:   Diagnosis Date    Anxiety and depression     GERD (gastroesophageal reflux disease)     HTN (hypertension), benign 3/10/2014    Migraines     Obesity     PFO (patent foramen ovale)     found after stroke "too small/risky to close"    Stroke 2017    Hospitalized at [facility]; given tPA       Past Surgical History:   Procedure Laterality Date    ANKLE FRACTURE SURGERY       SECTION N/A 2019    Procedure:  SECTION;  Surgeon: Gianna Flowers MD;  Location: [facility], [place], operated by [facility];  Service: OB/GYN;  Laterality: N/A;     SECTION WITH TUBAL LIGATION Bilateral 2020    Procedure:  SECTION, WITH TUBAL LIGATION;  Surgeon: Corina Mao MD;  Location: [facility], [place], operated by [facility];  Service: OB/GYN;  Laterality: Bilateral;     SECTION, CLASSIC  2005/2009/2019    x3    CHOLECYSTECTOMY  3/2002    lap maribel    FOOT SURGERY      gastric sleeve  2017       Family History   Problem Relation Age of Onset    Other Father         house fire    Breast cancer Neg Hx     Colon cancer Neg Hx     Ovarian cancer Neg Hx     Congenital heart disease Neg Hx     Pacemaker/defibrilator Neg Hx     Early death Neg Hx        Social History     Socioeconomic History    Marital status:      Spouse name: Not on file    Number of children: Not on file    Years of education: Not on file    Highest education level: Not on file   Occupational History     Employer: [employer]   Social Needs    Financial resource strain: Hard    Food insecurity     Worry: Sometimes true     " Inability: Sometimes true    Transportation needs     Medical: No     Non-medical: No   Tobacco Use    Smoking status: Current Every Day Smoker     Packs/day: 0.50     Years: 18.00     Pack years: 9.00     Types: Cigarettes    Smokeless tobacco: Never Used    Tobacco comment: cutting to 2 cigarettes per day   Substance and Sexual Activity    Alcohol use: No     Alcohol/week: 0.0 standard drinks     Frequency: Never     Drinks per session: Patient refused     Binge frequency: Patient refused    Drug use: No    Sexual activity: Not Currently     Partners: Male   Lifestyle    Physical activity     Days per week: 0 days     Minutes per session: 0 min    Stress: Very much   Relationships    Social connections     Talks on phone: Twice a week     Gets together: Never     Attends [employer] service: Not on file     Active member of club or organization: No     Attends meetings of clubs or organizations: Never     Relationship status:    Other Topics Concern    Caffeine Use Not Asked    Financial Status: Disabled Not Asked    Legal: Involved in criminal litigation Not Asked    Caffeine Use: Frequent Not Asked    Financial Status: Employed Not Asked    Legal: Other Not Asked    Caffeine Use: Moderate Not Asked    Financial Status: Unemployed Not Asked    Leisure: Exercise Not Asked    Caffeine Use: Substantial Not Asked    Financial Status: Other Not Asked    Leisure: Fishing Not Asked    Childhood History: Adopted Not Asked    Firearms: Does patient have access to a firearm? Not Asked    Leisure: Hunting Not Asked    Childhood History: Early trauma Not Asked    Home situation: homeless Not Asked    Leisure: Movie Watching Not Asked    Childhood History: Raised by parents Not Asked    Home situation: lives alone Not Asked    Leisure: Shopping Not Asked    Childhood History: Uneventful Not Asked    Home situation: lives in group home Not Asked    Leisure: Sports Not Asked    Childhood  History: Other Not Asked    Home situation: lives in nursing home Not Asked    Leisure: Time with family Not Asked    Education: Unfinished High School Not Asked    Home situation: lives in shelter Not Asked     Service Not Asked    Education: High School Graduate Not Asked    Home situation: lives with family Not Asked    Spirituality: Active Participation Not Asked    Education: Unfinished college Not Asked    Home situation: lives with friends Not Asked    Spirituality: Organized Christian Not Asked    Education: Trade School Not Asked    Home situation: lives with significant other Not Asked    Spirituality: Private Participation Not Asked    Education: Associate's Degree Not Asked    Home situation: lives with spouse Not Asked    Patient feels they ought to cut down on drinking/drug use Not Asked    Education: Bachelor's Degree Not Asked    Legal consequences of chemical use Not Asked    Patient annoyed by others criticizing their drinking/drug use Not Asked    Education: More than one Bachelor's or Professional Not Asked    Legal: Arrest history Not Asked    Patient has felt bad or guilty about drinking/drug use Not Asked    Education: Master's, PhD Not Asked    Legal: Involved in civil litigation Not Asked    Patient has had a drink/used drugs as an eye opener in the AM Not Asked   Social History Narrative    Not on file       Review of patient's allergies indicates:   Allergen Reactions    Tramadol Nausea And Vomiting    Ibuprofen Nausea And Vomiting     Other reaction(s): gastric       Physical Exam:  Vitals:    10/21/20 1009   BP: 108/77   Pulse: 81   Weight: 111.1 kg (244 lb 14.9 oz)   PainSc:   7     General    Nursing note and vitals reviewed.  Constitutional: She is oriented to person, place, and time. She appears well-developed and well-nourished. No distress.   HENT:   Head: Normocephalic and atraumatic.   Nose: Nose normal.   Eyes: Conjunctivae and EOM are normal.  Pupils are equal, round, and reactive to light. Right eye exhibits no discharge. Left eye exhibits no discharge. No scleral icterus.   Neck: No JVD present.   Cardiovascular: Intact distal pulses.    Pulmonary/Chest: Effort normal. No respiratory distress.   Abdominal: She exhibits no distension.   Neurological: She is alert and oriented to person, place, and time. Coordination normal.   Psychiatric: She has a normal mood and affect. Her behavior is normal. Judgment and thought content normal.     General Musculoskeletal Exam   Gait: normal     Back (L-Spine & T-Spine) / Neck (C-Spine) Exam     Tenderness Right paramedian tenderness of the Lower L-Spine. Left paramedian tenderness of the Lower L-Spine.     Back (L-Spine & T-Spine) Range of Motion   Back extension: facet loading is positive and exacerabtes/reproduces the patient's typical low back pain    Back flexion: limited ROM but partial relief of low back pain noted.     Spinal Sensation   Right Side Sensation  L-Spine Level: normal  Left Side Sensation  L-Spine Level: normal    Other She has no scoliosis .      Muscle Strength   Right Lower Extremity   Hip Flexion: 5/5   Hip Extensors: 5/5  Quadriceps:  5/5   Hamstrin/5   Gastrocsoleus:  5/5   Left Lower Extremity   Hip Flexion: 5/5   Hip Extensors: 5/5  Quadriceps:  5/5   Hamstrin/5   Gastrocsoleus:  5/5     Reflexes     Left Side  Achilles:  2+  Quadriceps:  2+    Right Side   Achilles:  2+  Quadriceps:  2+      Imaging:  No relevant imaging is available to review    Labs:  BMP  Lab Results   Component Value Date     10/19/2020    K 4.7 10/19/2020     (H) 10/19/2020    CO2 23 10/19/2020    BUN 7 10/19/2020    CREATININE 0.49 (L) 10/19/2020    CALCIUM 9.4 10/19/2020    ANIONGAP 7 (L) 10/19/2020    ESTGFRAFRICA >60.0 10/19/2020    EGFRNONAA >60.0 10/19/2020     Lab Results   Component Value Date    ALT 12 10/19/2020    AST 24 10/19/2020    ALKPHOS 77 10/19/2020    BILITOT 0.3 10/19/2020        Assessment:  Problem List Items Addressed This Visit        Neuro    Lumbar radiculopathy - Primary    Relevant Orders    Ambulatory referral/consult to Physical/Occupational Therapy    MRI Lumbar Spine Without Contrast      Other Visit Diagnoses     Dorsalgia, unspecified        Relevant Orders    MRI Lumbar Spine Without Contrast    Chronic neck pain        Relevant Orders    Ambulatory referral/consult to Physical/Occupational Therapy          2019 - Anitra Chaney is a 40 y.o. female who reports symptoms consistent with lumbar neurogenic claudication, lumbar radiculopathy, and myofascial pain. She has a history of being diagnosed with fibromyalgia and was prescribed Cymbalta for this, which she continues to take.  Onset of her low back pain is associated with delivery of her son via  in 2019 which was complicated by a large dermal hematoma in the excess tissue left behind from rapid weight loss following gastric bypass (or gastric sleeve) surgery.  Since that time she has been very sedentary and was never referred to, or attended, physical therapy for rehabilitation of her body.  She reports having an MRI at [facility] 4 years ago which showed a significant disc bulge at L5-S1. Her current symptoms of neurogenic claudication symptoms are affecting the L3 and L4 dermatome bilaterally.  Repeat MRI to understand her underlying pathology and referral to PT for core rehabilitation will be our first steps.    10/21/2020 - Anitra Chaney is a 40 y.o. female who reports symptoms consistent with lumbar neurogenic claudication, lumbar radiculopathy, and myofascial pain. She has a history of being diagnosed with fibromyalgia and was prescribed Cymbalta for this, which she continues to take.  We started her workup in  with MRI and recommendations for PT, which was put on hold due to pregnancy and childbirth.  She has been very sedentary and was never attended physical therapy  for rehabilitation of her body. We reiterated the need for a repeat MRI to understand her underlying pathology and referral to PT for core rehabilitation.  We discussed with her that she is not a candidate for opioid medication.  She expressed understanding.  The vast majority of her pain is myofascial and exercise is paramount.    : Reviewed and consistent with medication use as prescribed.    Treatment Plan:   Procedures: None pending MRI.  Consider LESI.  PT/OT/HEP: Referral placed for PT to strengthen back and core toward restoring function  Medications: No changes recommended at this time.  Labs: reviewed and medications are appropriately dosed for current hepatorenal function.  Imaging: No additional recommended at this time.    Follow Up: RTC 4 weeks to discuss MRI results and treatment plan    Hunter White Jr, MD  Interventional Pain Medicine / Anesthesiology    Disclaimer: This note was partly generated using dictation software which may occasionally result in transcription errors.

## 2020-10-21 ENCOUNTER — OFFICE VISIT (OUTPATIENT)
Dept: PAIN MEDICINE | Facility: CLINIC | Age: 40
End: 2020-10-21
Payer: COMMERCIAL

## 2020-10-21 ENCOUNTER — PATIENT MESSAGE (OUTPATIENT)
Dept: INTERNAL MEDICINE | Facility: CLINIC | Age: 40
End: 2020-10-21

## 2020-10-21 VITALS
WEIGHT: 244.94 LBS | BODY MASS INDEX: 36.17 KG/M2 | SYSTOLIC BLOOD PRESSURE: 108 MMHG | HEART RATE: 81 BPM | DIASTOLIC BLOOD PRESSURE: 77 MMHG

## 2020-10-21 DIAGNOSIS — M54.2 CHRONIC NECK PAIN: ICD-10-CM

## 2020-10-21 DIAGNOSIS — G89.29 CHRONIC NECK PAIN: ICD-10-CM

## 2020-10-21 DIAGNOSIS — M54.9 DORSALGIA, UNSPECIFIED: ICD-10-CM

## 2020-10-21 DIAGNOSIS — M54.16 LUMBAR RADICULOPATHY: Primary | ICD-10-CM

## 2020-10-21 PROCEDURE — 3078F DIAST BP <80 MM HG: CPT | Mod: CPTII,S$GLB,, | Performed by: PAIN MEDICINE

## 2020-10-21 PROCEDURE — 3008F PR BODY MASS INDEX (BMI) DOCUMENTED: ICD-10-PCS | Mod: CPTII,S$GLB,, | Performed by: PAIN MEDICINE

## 2020-10-21 PROCEDURE — 99999 PR PBB SHADOW E&M-EST. PATIENT-LVL IV: CPT | Mod: PBBFAC,,, | Performed by: PAIN MEDICINE

## 2020-10-21 PROCEDURE — 3074F PR MOST RECENT SYSTOLIC BLOOD PRESSURE < 130 MM HG: ICD-10-PCS | Mod: CPTII,S$GLB,, | Performed by: PAIN MEDICINE

## 2020-10-21 PROCEDURE — 99999 PR PBB SHADOW E&M-EST. PATIENT-LVL IV: ICD-10-PCS | Mod: PBBFAC,,, | Performed by: PAIN MEDICINE

## 2020-10-21 PROCEDURE — 3078F PR MOST RECENT DIASTOLIC BLOOD PRESSURE < 80 MM HG: ICD-10-PCS | Mod: CPTII,S$GLB,, | Performed by: PAIN MEDICINE

## 2020-10-21 PROCEDURE — 99214 PR OFFICE/OUTPT VISIT, EST, LEVL IV, 30-39 MIN: ICD-10-PCS | Mod: S$GLB,,, | Performed by: PAIN MEDICINE

## 2020-10-21 PROCEDURE — 3074F SYST BP LT 130 MM HG: CPT | Mod: CPTII,S$GLB,, | Performed by: PAIN MEDICINE

## 2020-10-21 PROCEDURE — 3008F BODY MASS INDEX DOCD: CPT | Mod: CPTII,S$GLB,, | Performed by: PAIN MEDICINE

## 2020-10-21 PROCEDURE — 99214 OFFICE O/P EST MOD 30 MIN: CPT | Mod: S$GLB,,, | Performed by: PAIN MEDICINE

## 2020-10-26 RX ORDER — MELOXICAM 15 MG/1
TABLET ORAL
Qty: 30 TABLET | Refills: 1 | OUTPATIENT
Start: 2020-10-26

## 2020-10-26 NOTE — TELEPHONE ENCOUNTER
LM for patient informing them to get all her medications from either Pain management or her PCP. Informed patient she can return my call if she has any other questions.  ----- Message from Anthony Baez MD sent at 10/26/2020  6:31 AM CDT -----  She requested meloxicam.  Please ask her to get all her meds from Pain Management or PCP

## 2020-10-28 ENCOUNTER — OFFICE VISIT (OUTPATIENT)
Dept: PSYCHIATRY | Facility: CLINIC | Age: 40
End: 2020-10-28
Payer: COMMERCIAL

## 2020-10-28 DIAGNOSIS — F33.1 MAJOR DEPRESSIVE DISORDER, RECURRENT EPISODE, MODERATE: Primary | ICD-10-CM

## 2020-10-28 DIAGNOSIS — F41.1 GAD (GENERALIZED ANXIETY DISORDER): ICD-10-CM

## 2020-10-28 PROCEDURE — 90792 PSYCH DIAG EVAL W/MED SRVCS: CPT | Mod: 95,AF,HB, | Performed by: PSYCHIATRY & NEUROLOGY

## 2020-10-28 PROCEDURE — 90792 PR PSYCHIATRIC DIAGNOSTIC EVALUATION W/MEDICAL SERVICES: ICD-10-PCS | Mod: 95,AF,HB, | Performed by: PSYCHIATRY & NEUROLOGY

## 2020-10-28 RX ORDER — HYDROXYZINE HYDROCHLORIDE 10 MG/1
10 TABLET, FILM COATED ORAL 3 TIMES DAILY
Qty: 90 TABLET | Refills: 3 | Status: SHIPPED | OUTPATIENT
Start: 2020-10-28 | End: 2020-11-27

## 2020-10-28 RX ORDER — FLUOXETINE HYDROCHLORIDE 20 MG/1
20 CAPSULE ORAL DAILY
Qty: 30 CAPSULE | Refills: 3 | Status: SHIPPED | OUTPATIENT
Start: 2020-10-28 | End: 2021-02-15 | Stop reason: SDUPTHER

## 2020-10-28 NOTE — PROGRESS NOTES
"Outpatient Psychiatry Initial Visit (MD/NP)    10/28/2020 The patient location is: at work  The chief complaint leading to consultation is: severe anxiety and depression  Visit type: audiovisual    Face to Face time with patient: 37"  47 minutes of total time spent on the encounter, which includes face to face time and non-face to face time preparing to see the patient (eg, review of tests), Obtaining and/or reviewing separately obtained history, Documenting clinical information in the electronic or other health record, Independently interpreting results (not separately reported) and communicating results to the patient/family/caregiver, or Care coordination (not separately reported).         Each patient to whom he or she provides medical services by telemedicine is:  (1) informed of the relationship between the physician and patient and the respective role of any other health care provider with respect to management of the patient; and (2) notified that he or she may decline to receive medical services by telemedicine and may withdraw from such care at any time.    Notes:   Anitra Chaney, a 40 y.o. female, presenting for initial evaluation visit. Met with patient and no relatives present.    Reason for Encounter: depression and anxiety*. Patient complains of No chief complaint on file.  .    History of Present Illness: Anxiety  Patient is here for evaluation of anxiety.  She has the following anxiety symptoms: withdraws. Patient still withdraws and becomes more irritable.Patient now denies thoughts of harming herself or others. She denies history of suicide attempts. She has been hospitalized twice for suicidal ideation and the last time was 18 years ago Onset of symptoms was approximately present for anxiety and depression for at least 30 years. years ago.  Symptoms have been gradually worsening since that time. Patient now feels she does not sleep well and the anxiety is worsening.Patient also has " feelings of lack of self esteem, and feels others are critical of her. She denies current suicidal and homicidal ideation. Family history significant for MOther has mood difficulties.Possible organic causes contributing are: medications.History of opioid dependence. Risk factors: previous episode of depression Previous treatment includes medication atarax.   She complains of the following medication side effects: none.    Depression  Patient complains of depression. She complains of depressed mood, feelings of worthlessness/guilt and no active suicidal plans at this time. Onset was approximately many years ago close to 30years ago along with the anxiety ago. Symptoms have been unchanged since that time. Current symptoms include: depressed mood, feelings of worthlessness/guilt and psychomotor retardation. Patient denies anhedonia and feelings of worthlessness/guilt. Family history significant for no psychiatric illness. Possible organic causes contributing are: drug abuse. Risk factors: not sure. Previous treatment includes individual therapy and medication. She complains of the following side effects from the treatment: increased blood pressure with Wellbutrin.      Review Of Systems:     Pertinent items are noted in HPI.  Headache, cardiac issues and possible fibromyalgia    Current Evaluation:     Nutritional Screening: Considering the patient's height and weight, medications, medical history and preferences, should a referral be made to the dietitian? no    Constitutional  Vitals:  Most recent vital signs, dated less than 90 days prior to this appointment, were not reviewed.    There were no vitals filed for this visit.Patient reports vital signs remain stable when checked     General:  unremarkable, age appropriate, casually dressed, neatly groomed, overweight     Musculoskeletal  Muscle Strength/Tone:  no spasicity, no rigidity, no tremor, no tic   Gait & Station:  non-ataxic     Psychiatric  Speech:  no  latency; no press, spontaneous   Mood & Affect:  anxious, depressed  congruent and appropriate   Thought Process:  normal and logical   Associations:  intact   Thought Content:  normal, no suicidality, no homicidality, delusions, or paranoia   Insight:  has awareness of illness   Judgement: behavior is adequate to circumstances   Orientation:  grossly intact   Memory: intact for content of interview   Language: grossly intact   Attention Span & Concentration:  able to focus   Fund of Knowledge:  intact and appropriate to age and level of education       Relevant Elements of Neurological Exam: negative neuro exam but has history of migraine headaches    Functioning in Relationships:  Spouse/partner:  for 3 years and has a good relationship  Peers: enjoys talking with friends  Employers: comfortable at her job    Laboratory Data  Admission on 10/19/2020, Discharged on 10/19/2020   Component Date Value Ref Range Status    Preg Test, Ur 10/19/2020 Negative   Final    Specimen UA 10/19/2020 Urine, Clean Catch   Final    Color, UA 10/19/2020 Yellow  Yellow, Straw, Megha Final    Appearance, UA 10/19/2020 Clear  Clear Final    pH, UA 10/19/2020 7.0  5.0 - 8.0 Final    Specific [place], UA 10/19/2020 1.020  1.005 - 1.030 Final    Protein, UA 10/19/2020 Negative  Negative Final    Glucose, UA 10/19/2020 Negative  Negative Final    Ketones, UA 10/19/2020 Negative  Negative Final    Bilirubin (UA) 10/19/2020 Negative  Negative Final    Occult Blood UA 10/19/2020 Negative  Negative Final    Nitrite, UA 10/19/2020 Negative  Negative Final    Urobilinogen, UA 10/19/2020 Negative  <2.0 EU/dL Final    Leukocytes, UA 10/19/2020 Negative  Negative Final    WBC 10/19/2020 7.43  3.90 - 12.70 K/uL Final    RBC 10/19/2020 4.54  4.00 - 5.40 M/uL Final    Hemoglobin 10/19/2020 9.8* 12.0 - 16.0 g/dL Final    Hematocrit 10/19/2020 33.5* 37.0 - 48.5 % Final    MCV 10/19/2020 74* 82 - 98 fL Final    MCH 10/19/2020  21.6* 27.0 - 31.0 pg Final    MCHC 10/19/2020 29.3* 32.0 - 36.0 g/dL Final    RDW 10/19/2020 19.1* 11.5 - 14.5 % Final    Platelets 10/19/2020 302  150 - 350 K/uL Final    MPV 10/19/2020 8.7* 9.2 - 12.9 fL Final    Immature Granulocytes 10/19/2020 0.4  0.0 - 0.5 % Final    Gran # (ANC) 10/19/2020 5.3  1.8 - 7.7 K/uL Final    Immature Grans (Abs) 10/19/2020 0.03  0.00 - 0.04 K/uL Final    Lymph # 10/19/2020 1.5  1.0 - 4.8 K/uL Final    Mono # 10/19/2020 0.4  0.3 - 1.0 K/uL Final    Eos # 10/19/2020 0.1  0.0 - 0.5 K/uL Final    Baso # 10/19/2020 0.05  0.00 - 0.20 K/uL Final    nRBC 10/19/2020 0  0 /100 WBC Final    Gran % 10/19/2020 71.4  38.0 - 73.0 % Final    Lymph % 10/19/2020 20.1  18.0 - 48.0 % Final    Mono % 10/19/2020 5.5  4.0 - 15.0 % Final    Eosinophil % 10/19/2020 1.9  0.0 - 8.0 % Final    Basophil % 10/19/2020 0.7  0.0 - 1.9 % Final    Differential Method 10/19/2020 Automated   Final    Sodium 10/19/2020 142  136 - 145 mmol/L Final    Potassium 10/19/2020 4.7  3.5 - 5.1 mmol/L Final    Chloride 10/19/2020 112* 95 - 110 mmol/L Final    CO2 10/19/2020 23  23 - 29 mmol/L Final    Glucose 10/19/2020 94  70 - 110 mg/dL Final    BUN 10/19/2020 7  7 - 17 mg/dL Final    Creatinine 10/19/2020 0.49* 0.50 - 1.40 mg/dL Final    Calcium 10/19/2020 9.4  8.7 - 10.5 mg/dL Final    Total Protein 10/19/2020 7.1  6.0 - 8.4 g/dL Final    Albumin 10/19/2020 4.1  3.5 - 5.2 g/dL Final    Total Bilirubin 10/19/2020 0.3  0.1 - 1.0 mg/dL Final    Alkaline Phosphatase 10/19/2020 77  38 - 126 U/L Final    AST 10/19/2020 24  15 - 46 U/L Final    ALT 10/19/2020 12  10 - 44 U/L Final    Anion Gap 10/19/2020 7* 8 - 16 mmol/L Final    eGFR if African American 10/19/2020 >60.0  >60 mL/min/1.73 m^2 Final    eGFR if non African American 10/19/2020 >60.0  >60 mL/min/1.73 m^2 Final    Lipase Result 10/19/2020 215  23 - 300 U/L Final   Lab Visit on 10/12/2020   Component Date Value Ref Range Status    TSH  10/12/2020 1.191  0.400 - 4.000 uIU/mL Final    WBC 10/12/2020 9.14  3.90 - 12.70 K/uL Final    RBC 10/12/2020 4.65  4.00 - 5.40 M/uL Final    Hemoglobin 10/12/2020 9.9* 12.0 - 16.0 g/dL Final    Hematocrit 10/12/2020 34.2* 37.0 - 48.5 % Final    MCV 10/12/2020 74* 82 - 98 fL Final    MCH 10/12/2020 21.3* 27.0 - 31.0 pg Final    MCHC 10/12/2020 28.9* 32.0 - 36.0 g/dL Final    RDW 10/12/2020 19.4* 11.5 - 14.5 % Final    Platelets 10/12/2020 343  150 - 350 K/uL Final    MPV 10/12/2020 8.7* 9.2 - 12.9 fL Final    Immature Granulocytes 10/12/2020 0.3  0.0 - 0.5 % Final    Gran # (ANC) 10/12/2020 6.8  1.8 - 7.7 K/uL Final    Immature Grans (Abs) 10/12/2020 0.03  0.00 - 0.04 K/uL Final    Lymph # 10/12/2020 1.6  1.0 - 4.8 K/uL Final    Mono # 10/12/2020 0.5  0.3 - 1.0 K/uL Final    Eos # 10/12/2020 0.1  0.0 - 0.5 K/uL Final    Baso # 10/12/2020 0.06  0.00 - 0.20 K/uL Final    nRBC 10/12/2020 0  0 /100 WBC Final    Gran % 10/12/2020 74.6* 38.0 - 73.0 % Final    Lymph % 10/12/2020 17.5* 18.0 - 48.0 % Final    Mono % 10/12/2020 5.4  4.0 - 15.0 % Final    Eosinophil % 10/12/2020 1.5  0.0 - 8.0 % Final    Basophil % 10/12/2020 0.7  0.0 - 1.9 % Final    Differential Method 10/12/2020 Automated   Final   Lab Visit on 10/12/2020   Component Date Value Ref Range Status    SARS-CoV2 (COVID-19) Qualitative P* 10/12/2020 Not Detected  Not Detected Final         Medications  Outpatient Encounter Medications as of 10/28/2020   Medication Sig Dispense Refill    buPROPion (WELLBUTRIN XL) 150 MG TB24 tablet Take 1 tablet (150 mg total) by mouth 2 (two) times a day. 60 tablet 11    butalbital-acetaminophen-caffeine -40 mg (FIORICET, ESGIC) -40 mg per tablet Take 1 tablet by mouth every 4 (four) hours as needed for Pain. 25 tablet 0    butalbital-acetaminophen-caffeine -40 mg (FIORICET, ESGIC) -40 mg per tablet TAKE ONE TABLET BY MOUTH EVERY FOUR HOURS AS NEEDED FOR PAIN  30 tablet 0     "clonazePAM (KLONOPIN) 0.5 MG tablet Take 1 tablet (0.5 mg total) by mouth 2 (two) times daily. 60 tablet 0    cyclobenzaprine (FLEXERIL) 5 MG tablet Take 1 tablet (5 mg total) by mouth nightly. 30 tablet 0    HYDROcodone-acetaminophen (NORCO) 5-325 mg per tablet Take 1 tablet by mouth every 8 (eight) hours as needed (severe pain). 6 tablet 0    hydrOXYzine HCL (ATARAX) 10 MG Tab Take 1 tablet (10 mg total) by mouth 2 (two) times daily. 60 tablet 3    meloxicam (MOBIC) 15 MG tablet Take 1 tablet (15 mg total) by mouth once daily. 30 tablet 1     No facility-administered encounter medications on file as of 10/28/2020.            Assessment - Diagnosis - Goals:     Impression: Major Depressive Disorder, Moderate, Recurrent  GADF    No diagnosis found.    Strengths and Liabilities: Strength: Patient accepts guidance/feedback, Strength: Patient is expressive/articulate., Strength: Patient is intelligent., Strength: Patient is motivated for change., Strength: Patient has positive support network., Strength: Patient has reasonable judgment.    Treatment Goals:  Specify outcomes written in observable, behavioral terms:   Medication management and supportive guidance    Treatment Plan/Recommendations:   · Medication Management: The risks and benefits of medication were discussed with the patient. Patient agrees to increase Atarax to 10 mg tid, and to start Prozac 20 mg q am.      Return to Clinic: 1 month    Counseling time: med management  Total time: 37"  Consulting clinician was informed of the encounter and consult note.    "

## 2020-10-29 ENCOUNTER — TELEPHONE (OUTPATIENT)
Dept: INTERNAL MEDICINE | Facility: CLINIC | Age: 40
End: 2020-10-29

## 2020-11-06 ENCOUNTER — HOSPITAL ENCOUNTER (EMERGENCY)
Facility: HOSPITAL | Age: 40
Discharge: HOME | End: 2020-11-06
Attending: EMERGENCY MEDICINE
Payer: COMMERCIAL

## 2020-11-06 VITALS
SYSTOLIC BLOOD PRESSURE: 130 MMHG | OXYGEN SATURATION: 99 % | RESPIRATION RATE: 18 BRPM | HEIGHT: 69 IN | DIASTOLIC BLOOD PRESSURE: 74 MMHG | BODY MASS INDEX: 36.14 KG/M2 | TEMPERATURE: 98 F | WEIGHT: 244 LBS | HEART RATE: 60 BPM

## 2020-11-06 DIAGNOSIS — R10.11 RIGHT UPPER QUADRANT ABDOMINAL PAIN: Primary | ICD-10-CM

## 2020-11-06 DIAGNOSIS — R31.9 HEMATURIA, UNSPECIFIED TYPE: ICD-10-CM

## 2020-11-06 LAB
ALBUMIN SERPL BCP-MCNC: 4 G/DL (ref 3.5–5.2)
ALP SERPL-CCNC: 73 U/L (ref 38–126)
ALT SERPL W/O P-5'-P-CCNC: 12 U/L (ref 10–44)
ANION GAP SERPL CALC-SCNC: 6 MMOL/L (ref 8–16)
AST SERPL-CCNC: 20 U/L (ref 15–46)
B-HCG UR QL: NEGATIVE
BASOPHILS # BLD AUTO: 0.05 K/UL (ref 0–0.2)
BASOPHILS NFR BLD: 0.6 % (ref 0–1.9)
BILIRUB SERPL-MCNC: 0.2 MG/DL (ref 0.1–1)
BILIRUB UR QL STRIP: NEGATIVE
BUN SERPL-MCNC: 9 MG/DL (ref 7–17)
CALCIUM SERPL-MCNC: 9.1 MG/DL (ref 8.7–10.5)
CHLORIDE SERPL-SCNC: 109 MMOL/L (ref 95–110)
CLARITY UR REFRACT.AUTO: ABNORMAL
CO2 SERPL-SCNC: 26 MMOL/L (ref 23–29)
COLOR UR AUTO: YELLOW
CREAT SERPL-MCNC: 0.55 MG/DL (ref 0.5–1.4)
DIFFERENTIAL METHOD: ABNORMAL
EOSINOPHIL # BLD AUTO: 0.1 K/UL (ref 0–0.5)
EOSINOPHIL NFR BLD: 1.5 % (ref 0–8)
ERYTHROCYTE [DISTWIDTH] IN BLOOD BY AUTOMATED COUNT: 19.8 % (ref 11.5–14.5)
EST. GFR  (AFRICAN AMERICAN): >60 ML/MIN/1.73 M^2
EST. GFR  (NON AFRICAN AMERICAN): >60 ML/MIN/1.73 M^2
GLUCOSE SERPL-MCNC: 95 MG/DL (ref 70–110)
GLUCOSE UR QL STRIP: NEGATIVE
HCT VFR BLD AUTO: 33.4 % (ref 37–48.5)
HGB BLD-MCNC: 9.8 G/DL (ref 12–16)
HGB UR QL STRIP: ABNORMAL
IMM GRANULOCYTES # BLD AUTO: 0.03 K/UL (ref 0–0.04)
IMM GRANULOCYTES NFR BLD AUTO: 0.4 % (ref 0–0.5)
KETONES UR QL STRIP: NEGATIVE
LEUKOCYTE ESTERASE UR QL STRIP: NEGATIVE
LYMPHOCYTES # BLD AUTO: 1.4 K/UL (ref 1–4.8)
LYMPHOCYTES NFR BLD: 17 % (ref 18–48)
MCH RBC QN AUTO: 21.8 PG (ref 27–31)
MCHC RBC AUTO-ENTMCNC: 29.3 G/DL (ref 32–36)
MCV RBC AUTO: 74 FL (ref 82–98)
MICROSCOPIC COMMENT: ABNORMAL
MONOCYTES # BLD AUTO: 0.4 K/UL (ref 0.3–1)
MONOCYTES NFR BLD: 4.3 % (ref 4–15)
NEUTROPHILS # BLD AUTO: 6.4 K/UL (ref 1.8–7.7)
NEUTROPHILS NFR BLD: 76.2 % (ref 38–73)
NITRITE UR QL STRIP: NEGATIVE
NRBC BLD-RTO: 0 /100 WBC
PH UR STRIP: 8 [PH] (ref 5–8)
PLATELET # BLD AUTO: 382 K/UL (ref 150–350)
PMV BLD AUTO: 9.2 FL (ref 9.2–12.9)
POTASSIUM SERPL-SCNC: 4.1 MMOL/L (ref 3.5–5.1)
PROT SERPL-MCNC: 7.1 G/DL (ref 6–8.4)
PROT UR QL STRIP: NEGATIVE
RBC # BLD AUTO: 4.49 M/UL (ref 4–5.4)
RBC #/AREA URNS AUTO: 5 /HPF (ref 0–4)
SODIUM SERPL-SCNC: 141 MMOL/L (ref 136–145)
SP GR UR STRIP: 1.01 (ref 1–1.03)
URN SPEC COLLECT METH UR: ABNORMAL
UROBILINOGEN UR STRIP-ACNC: NEGATIVE EU/DL
WBC # BLD AUTO: 8.43 K/UL (ref 3.9–12.7)

## 2020-11-06 PROCEDURE — 85025 COMPLETE CBC W/AUTO DIFF WBC: CPT | Mod: ER

## 2020-11-06 PROCEDURE — 80053 COMPREHEN METABOLIC PANEL: CPT | Mod: ER

## 2020-11-06 PROCEDURE — 99284 EMERGENCY DEPT VISIT MOD MDM: CPT | Mod: 25,ER

## 2020-11-06 PROCEDURE — 81000 URINALYSIS NONAUTO W/SCOPE: CPT | Mod: ER

## 2020-11-06 PROCEDURE — 63600175 PHARM REV CODE 636 W HCPCS: Mod: ER | Performed by: EMERGENCY MEDICINE

## 2020-11-06 PROCEDURE — 81025 URINE PREGNANCY TEST: CPT | Mod: ER

## 2020-11-06 PROCEDURE — 96374 THER/PROPH/DIAG INJ IV PUSH: CPT | Mod: ER

## 2020-11-06 RX ORDER — KETOROLAC TROMETHAMINE 30 MG/ML
10 INJECTION, SOLUTION INTRAMUSCULAR; INTRAVENOUS
Status: COMPLETED | OUTPATIENT
Start: 2020-11-06 | End: 2020-11-06

## 2020-11-06 RX ORDER — NITROFURANTOIN 25; 75 MG/1; MG/1
100 CAPSULE ORAL 2 TIMES DAILY
Qty: 10 CAPSULE | Refills: 0 | Status: SHIPPED | OUTPATIENT
Start: 2020-11-06 | End: 2020-11-06 | Stop reason: SDUPTHER

## 2020-11-06 RX ORDER — FERROUS SULFATE 325(65) MG
325 TABLET ORAL
Status: ON HOLD | COMMUNITY
End: 2021-07-06 | Stop reason: SDUPTHER

## 2020-11-06 RX ORDER — NITROFURANTOIN 25; 75 MG/1; MG/1
100 CAPSULE ORAL 2 TIMES DAILY
Qty: 10 CAPSULE | Refills: 0 | Status: SHIPPED | OUTPATIENT
Start: 2020-11-06 | End: 2020-11-11

## 2020-11-06 RX ORDER — ONDANSETRON 4 MG/1
8 TABLET, ORALLY DISINTEGRATING ORAL EVERY 6 HOURS PRN
Qty: 16 TABLET | Refills: 0 | Status: SHIPPED | OUTPATIENT
Start: 2020-11-06 | End: 2021-07-08

## 2020-11-06 RX ORDER — ONDANSETRON 4 MG/1
8 TABLET, ORALLY DISINTEGRATING ORAL EVERY 6 HOURS PRN
Qty: 16 TABLET | Refills: 0 | Status: SHIPPED | OUTPATIENT
Start: 2020-11-06 | End: 2020-11-06 | Stop reason: SDUPTHER

## 2020-11-06 RX ADMIN — KETOROLAC TROMETHAMINE 10 MG: 30 INJECTION, SOLUTION INTRAMUSCULAR at 11:11

## 2020-11-06 NOTE — ED PROVIDER NOTES
"Encounter Date: 2020       History     Chief Complaint   Patient presents with    Abdominal Pain     c/o right flank and lower abdominal pain for 2 days. Describes as cramping and sharp. Denies any urinary symptoms. No nausea or vomiting. Pt reports chronic diarrhea since getting gastric sleeve.     This is a 40-year-old female who works as a  that has a complex past medical history including a gastric banding procedure, reflux, stroke and PFO that presents for evaluation of pain along her right side which is an aching throb in quality and starts or on the right back and goes along the front.  She also notes feeling somewhat nauseated and slightly unwell.  This feels more in line with previous episodes of infections such as pyelonephritis.  She has had a kidney stone in the past which did require stenting procedure but it was many years prior.  She denies chest pain syncope does endorse some nausea does endorse some fatigue.        Review of patient's allergies indicates:   Allergen Reactions    Tramadol Nausea And Vomiting    Ibuprofen Nausea And Vomiting     Other reaction(s): gastric     Past Medical History:   Diagnosis Date    Anxiety and depression     GERD (gastroesophageal reflux disease)     HTN (hypertension), benign 3/10/2014    Migraines     Obesity     PFO (patent foramen ovale)     found after stroke "too small/risky to close"    Stroke 2017    Hospitalized at [facility]; given tPA     Past Surgical History:   Procedure Laterality Date    ANKLE FRACTURE SURGERY       SECTION N/A 2019    Procedure:  SECTION;  Surgeon: Gianna Flowers MD;  Location: [facility] L&D;  Service: OB/GYN;  Laterality: N/A;     SECTION WITH TUBAL LIGATION Bilateral 2020    Procedure:  SECTION, WITH TUBAL LIGATION;  Surgeon: Corina Mao MD;  Location: [facility] L&D;  Service: OB/GYN;  Laterality: Bilateral;     SECTION, CLASSIC  2005/2009/2019    x3    " CHOLECYSTECTOMY  3/2002    lap maribel    FOOT SURGERY      gastric sleeve  09/01/2017     Family History   Problem Relation Age of Onset    Other Father         house fire    Breast cancer Neg Hx     Colon cancer Neg Hx     Ovarian cancer Neg Hx     Congenital heart disease Neg Hx     Pacemaker/defibrilator Neg Hx     Early death Neg Hx      Social History     Tobacco Use    Smoking status: Current Every Day Smoker     Packs/day: 0.50     Years: 18.00     Pack years: 9.00     Types: Cigarettes    Smokeless tobacco: Never Used    Tobacco comment: cutting to 2 cigarettes per day   Substance Use Topics    Alcohol use: No     Alcohol/week: 0.0 standard drinks     Frequency: Never     Drinks per session: Patient refused     Binge frequency: Patient refused    Drug use: No     Review of Systems  Constitutional-no fever  HEENT-no congestion  Eyes-no redness  Respiratory-no shortness of breath  Cardio-no chest pain  GI-positive abdominal pain  Endocrine-no cold intolerance  -no difficulty urinating  MSK-no myalgias  Skin-no rashes  Allergy-no environmental allergy  Neurologic-, no headache  Hematology-no swollen nodes  Behavioral-no confusion  Physical Exam     Initial Vitals [11/06/20 1031]   BP Pulse Resp Temp SpO2   136/79 69 18 98.3 °F (36.8 °C) 99 %      MAP       --         Physical Exam  Constitutional: Well appearing, no distress.  Eyes: Conjunctivae normal.  ENT       Head: Normocephalic, atraumatic.       Nose: Normal external appearance        Mouth/Throat: no strigulous respirations   Hematological/Lymphatic/Immunilogical: no visible lymphadenopathy   Cardiovascular: Normal rate,   Respiratory: Normal respiratory effort.   Gastrointestinal: non distended soft, no rebound, no guarding, mild tenderness along the right flank, some tenderness to percussion along the right flank  Musculoskeletal: Normal range of motion in all extremities. No obvious deformities or swelling.  Neurologic: Alert,  oriented. Normal speech and language. No gross focal neurologic deficits are appreciated.  Skin: Skin is warm, dry. No rash noted.  Psychiatric: Mood and affect are normal.   ED Course   Procedures  Labs Reviewed   URINALYSIS, REFLEX TO URINE CULTURE - Abnormal; Notable for the following components:       Result Value    Appearance, UA Hazy (*)     Occult Blood UA 3+ (*)     All other components within normal limits    Narrative:     Specimen Source->Urine   COMPREHENSIVE METABOLIC PANEL - Abnormal; Notable for the following components:    Anion Gap 6 (*)     All other components within normal limits   CBC W/ AUTO DIFFERENTIAL - Abnormal; Notable for the following components:    Hemoglobin 9.8 (*)     Hematocrit 33.4 (*)     MCV 74 (*)     MCH 21.8 (*)     MCHC 29.3 (*)     RDW 19.8 (*)     Platelets 382 (*)     Gran % 76.2 (*)     Lymph % 17.0 (*)     All other components within normal limits   URINALYSIS MICROSCOPIC - Abnormal; Notable for the following components:    RBC, UA 5 (*)     All other components within normal limits    Narrative:     Specimen Source->Urine   PREGNANCY TEST, URINE RAPID    Narrative:     Specimen Source->Urine          Imaging Results          CT Renal Stone Study ABD Pelvis WO (Final result)  Result time 11/06/20 12:55:36    Final result by MONIK Ventura Sr., MD (11/06/20 12:55:36)                 Impression:      1. The kidneys, ureters, urinary bladder are normal in appearance.  2. The liver is enlarged.  It measures 21.1 cm in craniocaudal dimension.  3. There are several hypodense masses in the liver.  One of the larger ones measures 5 cm and is located in the inferior aspect of the right lobe of the liver.  On the prior examination it measured 4.8 cm.  This is consistent with the patient's history.  4. There is a 28 mm oval shaped hypodense mass in the body of the left adrenal. This mass has a Hounsfield measurement of -10.  This is characteristic of an adrenal adenoma.  5.  There are surgical changes in the stomach.  There is a small hiatal hernia.  6. There are small calcifications in the liver and spleen.  This is characteristic of a prior granulomatous infection.  All CT scans at this facility use dose modulation, iterative reconstruction, and/or weight base dosing when appropriate to reduce radiation dose when appropriate to reduce radiation dose to as low as reasonably achievable.      Electronically signed by: Lele Ventura MD  Date:    11/06/2020  Time:    12:55             Narrative:    EXAMINATION:  CT RENAL STONE STUDY ABD PELVIS WO    CLINICAL HISTORY:  Flank pain, kidney stone suspected; hepatic hemangiomas    TECHNIQUE:  Standard abdomen and pelvis CT protocol without oral or IV contrast was performed.    COMPARISON:  01/25/2012    FINDINGS:  Finding: The size of the heart is within normal limits. The lungs are clear. There is no pneumothorax or pleural effusion.    The liver is enlarged.  It measures 21.1 cm in craniocaudal dimension.  There are several hypodense masses in the liver.  One of the larger ones measures 5 cm and is located in the inferior aspect of the right lobe of the liver.  On the prior examination it measured 4.8 cm.  The gallbladder is absent.  There are surgical clips in the gallbladder fossa.  There are small calcifications in the liver and spleen.  The pancreas, right adrenal, and kidneys are normal in appearance.  There is no hydronephrosis, nephrolithiasis, or abnormal perinephric fluid collection.  There is a 28 mm oval shaped hypodense mass in the body of the left adrenal.  This mass has a Hounsfield measurement of -10.  The ureters and the urinary bladder are normal in appearance.  The uterus and ovaries are normal in appearance.  The appendix is normal in appearance.  There are surgical changes in the stomach.  There is a small hiatal hernia.  There is no free fluid within the abdomen or pelvis. There is no pneumoperitoneum.                                  Medical Decision Making:   Differential Diagnosis:   Abdominal pain represents a profoundly broad differential, this patient's symptoms are nondescript in nature and while unlikely could represent-  Pancreatitis, cholecystitis, appendicitis, gastritis, cystitis, pyleonephritis, PUD, obstructions constipation neoplasm among a myriad of other diagnoses.     A thorough examination and history was undertaken and appropriate diagnostics ordered with a diagnosis identified as below based on summative findings. It is possible this represents a more sinister underlying process and as such precautions related thereto were specifically discussed with the patient.   Clinical Tests:   Lab Tests: Ordered and Reviewed  Radiological Study: Ordered and Reviewed  ED Management:  Well-appearing woman with hematuria reassuring CT imaging.  Labs relatively reassuring as well, will treat for potential pyuria and hematuria she says this feels like infections in the past however she is otherwise generally well-appearing, believe she is likely safe for discharge with encouraged follow-up and return precautions.                             Clinical Impression:     ICD-10-CM ICD-9-CM   1. Right upper quadrant abdominal pain  R10.11 789.01   2. Hematuria, unspecified type  R31.9 599.70                          ED Disposition Condition    Discharge Stable        ED Prescriptions     Medication Sig Dispense Start Date End Date Auth. Provider    nitrofurantoin, macrocrystal-monohydrate, (MACROBID) 100 MG capsule  (Status: Discontinued) Take 1 capsule (100 mg total) by mouth 2 (two) times daily. for 5 days 10 capsule 11/6/2020 11/6/2020 Mike Barillas MD    nitrofurantoin, macrocrystal-monohydrate, (MACROBID) 100 MG capsule  (Status: Discontinued) Take 1 capsule (100 mg total) by mouth 2 (two) times daily. for 5 days 10 capsule 11/6/2020 11/6/2020 Mike Barillas MD    ondansetron (ZOFRAN-ODT) 4 MG TbDL  (Status:  Discontinued) Take 2 tablets (8 mg total) by mouth every 6 (six) hours as needed. 16 tablet 11/6/2020 11/6/2020 Mike Barillas MD    nitrofurantoin, macrocrystal-monohydrate, (MACROBID) 100 MG capsule Take 1 capsule (100 mg total) by mouth 2 (two) times daily. for 5 days 10 capsule 11/6/2020 11/11/2020 Mike Barillas MD    ondansetron (ZOFRAN-ODT) 4 MG TbDL Take 2 tablets (8 mg total) by mouth every 6 (six) hours as needed. 16 tablet 11/6/2020  Mike Barillas MD        Follow-up Information     Follow up With Specialties Details Why Contact Info    Tien Watts MD Family Medicine Call in 1 day If symptoms worsen, For a follow up visit about today [id number]  [facility] [address]  [facility] [id number]  [phone]                                         Mike Barillas MD  11/06/20 1341

## 2020-11-06 NOTE — Clinical Note
"Anitra Olsen" Ritu was seen and treated in our emergency department on 11/6/2020.  She may return to work on 11/09/2020.       If you have any questions or concerns, please don't hesitate to call.       RN    "

## 2020-11-07 ENCOUNTER — PATIENT MESSAGE (OUTPATIENT)
Dept: INTERNAL MEDICINE | Facility: CLINIC | Age: 40
End: 2020-11-07

## 2020-11-10 ENCOUNTER — PATIENT OUTREACH (OUTPATIENT)
Dept: ADMINISTRATIVE | Facility: OTHER | Age: 40
End: 2020-11-10

## 2020-11-11 NOTE — PROGRESS NOTES
Requested updates within Care Everywhere.  Patient's chart was reviewed for overdue ELBA topics.  Immunizations reconciled.    Tasked appts:Mammogram

## 2020-11-13 ENCOUNTER — PATIENT MESSAGE (OUTPATIENT)
Dept: ORTHOPEDICS | Facility: CLINIC | Age: 40
End: 2020-11-13

## 2020-11-13 ENCOUNTER — PATIENT MESSAGE (OUTPATIENT)
Dept: INTERNAL MEDICINE | Facility: CLINIC | Age: 40
End: 2020-11-13

## 2020-11-13 ENCOUNTER — PATIENT MESSAGE (OUTPATIENT)
Dept: OBSTETRICS AND GYNECOLOGY | Facility: CLINIC | Age: 40
End: 2020-11-13

## 2020-11-17 ENCOUNTER — TELEPHONE (OUTPATIENT)
Dept: INTERNAL MEDICINE | Facility: CLINIC | Age: 40
End: 2020-11-17

## 2020-11-17 ENCOUNTER — PATIENT MESSAGE (OUTPATIENT)
Dept: FAMILY MEDICINE | Facility: CLINIC | Age: 40
End: 2020-11-17

## 2020-11-17 ENCOUNTER — PATIENT MESSAGE (OUTPATIENT)
Dept: INTERNAL MEDICINE | Facility: CLINIC | Age: 40
End: 2020-11-17

## 2020-11-17 ENCOUNTER — OFFICE VISIT (OUTPATIENT)
Dept: INTERNAL MEDICINE | Facility: CLINIC | Age: 40
End: 2020-11-17
Attending: FAMILY MEDICINE
Payer: COMMERCIAL

## 2020-11-17 DIAGNOSIS — R31.29 MICROSCOPIC HEMATURIA: ICD-10-CM

## 2020-11-17 DIAGNOSIS — R10.11 RUQ ABDOMINAL PAIN: Primary | ICD-10-CM

## 2020-11-17 DIAGNOSIS — E27.8 LEFT ADRENAL MASS: ICD-10-CM

## 2020-11-17 PROCEDURE — 99214 OFFICE O/P EST MOD 30 MIN: CPT | Mod: 95,,, | Performed by: FAMILY MEDICINE

## 2020-11-17 PROCEDURE — 99214 PR OFFICE/OUTPT VISIT, EST, LEVL IV, 30-39 MIN: ICD-10-PCS | Mod: 95,,, | Performed by: FAMILY MEDICINE

## 2020-11-17 NOTE — PROGRESS NOTES
"  Subjective:       Anitra Chaney is a 40 y.o. female who is a new patient who was referred by Dr Watts for evaluation of microhematuria, adrenal mass.      Recent CT RSS (noncontrast) done in ER showed 2.8cm L adrenal lesion - likely adenoma. She was also noted to have microhematuria - UA micro 5 RBCs. UA clear today. Endorses episodic palpitations, diaphoresis. Denies HTN issues.     She is s/p cholecystectomy. Currently with back pain.     She reports JONATHON and insensate UI. Wears pads at all times. Two UTIs this year by report.       The following portions of the patient's history were reviewed and updated as appropriate: allergies, current medications, past family history, past medical history, past social history, past surgical history and problem list.    Review of Systems  Constitutional: no fever or chills  ENT: no nasal congestion or sore throat  Respiratory: no cough or shortness of breath  Cardiovascular: no chest pain or palpitations  Gastrointestinal: no nausea or vomiting, tolerating diet  Genitourinary: as per HPI  Hematologic/Lymphatic: no easy bruising or lymphadenopathy  Musculoskeletal: no arthralgias or myalgias  Skin: no rashes or lesions  Neurological: no seizures or tremors  Behavioral/Psych: no auditory or visual hallucinations        Objective:    Vitals: /78 (BP Location: Right arm, Patient Position: Sitting, BP Method: X-Large (Automatic))   Pulse 70   Ht 5' 9" (1.753 m)   Wt 110.7 kg (244 lb)   LMP 10/25/2020   BMI 36.03 kg/m²     Physical Exam   General: well developed, well nourished in no acute distress  Head: normocephalic, atraumatic  Neck: supple, trachea midline, no obvious enlargement of thyroid  HEENT: EOMI, mucus membranes moist, sclera anicteric, no hearing impairment  Lungs: symmetric expansion, non-labored breathing  Cardiovascular: regular rate and rhythm, normal pulses  Abdomen: soft, non tender, non distended, no palpable masses, no " hepatosplenomegaly, no hernias, no CVA tenderness  Musculoskeletal: no peripheral edema, normal ROM in bilateral upper and lower extremities  Lymphatics: no cervical or inguinal lymphadenopathy  Skin: no rashes or lesions  Neuro: alert and oriented x 3, no gross deficits  Psych: normal judgment and insight, normal mood/affect and non-anxious  Genitourinary:   patient declined exam      Lab Review   Urine analysis today in clinic shows negative for all components     Lab Results   Component Value Date    WBC 8.43 11/06/2020    HGB 9.8 (L) 11/06/2020    HCT 33.4 (L) 11/06/2020    MCV 74 (L) 11/06/2020     (H) 11/06/2020     Lab Results   Component Value Date    CREATININE 0.55 11/06/2020    BUN 9 11/06/2020         Imaging  Images and reports were personally reviewed by me and discussed with patient  CT RSS reviewed       Assessment/Plan:      1. Microhematuria    - Discussed etiology and workup of hematuria   - UCx - UA clear   - Cytology - not indicated   - CT urogram   - Office cystoscopy - will consider   - UA clear today     2. Lesion of adrenal gland    - 2.8cm L adrenal adenoma   - Evaluate for possible metabolically active lesion - plasma metanephrines, cortisol, aldosterone   - Recent BMP wnl           Follow up in 3-4 weeks

## 2020-11-17 NOTE — PROGRESS NOTES
The patient location is:  home  The chief complaint leading to consultation is:  ER follow-up    Visit type: audiovisual    Face to Face time with patient:  10 min  Fifteen minutes of total time spent on the encounter, which includes face to face time and non-face to face time preparing to see the patient (eg, review of tests), Obtaining and/or reviewing separately obtained history, Documenting clinical information in the electronic or other health record, Independently interpreting results (not separately reported) and communicating results to the patient/family/caregiver, or Care coordination (not separately reported).         Each patient to whom he or she provides medical services by telemedicine is:  (1) informed of the relationship between the physician and patient and the respective role of any other health care provider with respect to management of the patient; and (2) notified that he or she may decline to receive medical services by telemedicine and may withdraw from such care at any time.    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  The patient has a long psychiatric history.  She had a baby a couple of months ago.    She was recently seen in the emergency room for right upper quadrant pain.  As part of the workup she underwent CT scan which revealed a left adrenal mass.  It is most consistent with an adenoma.  Right upper quadrant pain persists.  She is status post lap choly.    She is c/o fibromyalgia due to the fact that she has had bilateral upper and lower extremity Arthralgias and myalgias for several months.    She has a history of mood disorder.  She sees psychiatry.     REVIEW OF SYSTEMS:  GENERAL: No fever, chills, fatigability or weight loss.  SKIN: No rashes, itching or changes in color or texture of skin.  HEAD: No headaches or recent head  trauma.  EYES: Visual acuity fine. No photophobia, ocular pain or diplopia.  EARS: Denies ear pain, discharge or vertigo.  NOSE: No loss of smell, no epistaxis or postnasal drip.  MOUTH & THROAT: No hoarseness or change in voice. No excessive gum bleeding.  NODES: Denies swollen glands.  CHEST: Denies OLIVARES, cyanosis, wheezing, cough and sputum production.  CARDIOVASCULAR: Denies PND, orthopnea or reduced exercise tolerance.  ABDOMEN: Appetite fine. No weight loss. Denies diarrhea, hematemesis or blood in stool.  URINARY: No flank pain, dysuria or hematuria.  PERIPHERAL VASCULAR: No claudication or cyanosis.  MUSCULOSKELETAL: No joint stiffness or swelling.  Except as mentioned above  NEUROLOGIC: No history of seizures.  She was admitted for a TIA.    SOCIAL HISTORY: The patient does not smoke.  The patient consumes alcohol socially.  The patient is .  She works as a dispatcher for [employer]      PHYSICAL EXAMINATION:   Last menstrual period 10/25/2020, not currently breastfeeding.  APPEARANCE: Well nourished, well developed, in no acute distress.    HEAD: Normocephalic, atraumatic.  EYES: PERRL. EOMI.  Conjunctivae without injection and  anicteric  NEUROLOGIC:       Normal speech development.      Hearing normal.  PSYCHIATRIC: Patient is alert and oriented x3.  Thought processes are all normal.  There is no homicidality.  There is no suicidality.  There is no evidence of psychosis.    LABORATORY/RADIOLOGY:   Chart reviewed.     ASSESSMENT:   ER follow-up for right upper quadrant pain  Left adrenal mass  c/o fibromyalgia  Arthralgias and myalgias for several months involving both the upper and lower extremities  Obesity with a BMI of 37, status post gastric sleeve with substantial weight loss  SARAH / snoring improved with weight loss  HTN improved with weight loss    PLAN:  [facility]  Urology  cymbalta  Rheum   Lamictal  Patient has been seen by sleep medicine.  Continue current medications     Return to clinic in 6  months      Answers for HPI/ROS submitted by the patient on 11/15/2020   activity change: No  unexpected weight change: No  neck pain: No  hearing loss: No  rhinorrhea: No  trouble swallowing: No  eye discharge: No  visual disturbance: No  chest tightness: No  wheezing: No  chest pain: No  palpitations: No  blood in stool: No  constipation: No  vomiting: No  diarrhea: No  polydipsia: No  polyuria: No  difficulty urinating: No  hematuria: No  menstrual problem: No  dysuria: No  joint swelling: No  arthralgias: No  headaches: Yes  weakness: No  confusion: No  dysphoric mood: No

## 2020-11-18 ENCOUNTER — OFFICE VISIT (OUTPATIENT)
Dept: UROLOGY | Facility: CLINIC | Age: 40
End: 2020-11-18
Attending: FAMILY MEDICINE
Payer: COMMERCIAL

## 2020-11-18 VITALS
BODY MASS INDEX: 36.14 KG/M2 | DIASTOLIC BLOOD PRESSURE: 78 MMHG | SYSTOLIC BLOOD PRESSURE: 107 MMHG | HEIGHT: 69 IN | WEIGHT: 244 LBS | HEART RATE: 70 BPM

## 2020-11-18 DIAGNOSIS — E27.8 LEFT ADRENAL MASS: ICD-10-CM

## 2020-11-18 DIAGNOSIS — R31.29 MICROSCOPIC HEMATURIA: ICD-10-CM

## 2020-11-18 DIAGNOSIS — E27.9 LESION OF ADRENAL GLAND: ICD-10-CM

## 2020-11-18 DIAGNOSIS — R31.29 MICROHEMATURIA: Primary | ICD-10-CM

## 2020-11-18 LAB
BILIRUB SERPL-MCNC: NORMAL MG/DL
BLOOD URINE, POC: NORMAL
CLARITY, POC UA: CLEAR
COLOR, POC UA: YELLOW
GLUCOSE UR QL STRIP: NORMAL
KETONES UR QL STRIP: NORMAL
LEUKOCYTE ESTERASE URINE, POC: NORMAL
NITRITE, POC UA: NORMAL
PH, POC UA: 5
PROTEIN, POC: NORMAL
UROBILINOGEN, POC UA: NORMAL

## 2020-11-18 PROCEDURE — 99204 PR OFFICE/OUTPT VISIT, NEW, LEVL IV, 45-59 MIN: ICD-10-PCS | Mod: 25,S$GLB,, | Performed by: UROLOGY

## 2020-11-18 PROCEDURE — 1125F AMNT PAIN NOTED PAIN PRSNT: CPT | Mod: S$GLB,,, | Performed by: UROLOGY

## 2020-11-18 PROCEDURE — 3008F PR BODY MASS INDEX (BMI) DOCUMENTED: ICD-10-PCS | Mod: CPTII,S$GLB,, | Performed by: UROLOGY

## 2020-11-18 PROCEDURE — 81002 URINALYSIS NONAUTO W/O SCOPE: CPT | Mod: S$GLB,,, | Performed by: UROLOGY

## 2020-11-18 PROCEDURE — 81002 POCT URINE DIPSTICK WITHOUT MICROSCOPE: ICD-10-PCS | Mod: S$GLB,,, | Performed by: UROLOGY

## 2020-11-18 PROCEDURE — 99204 OFFICE O/P NEW MOD 45 MIN: CPT | Mod: 25,S$GLB,, | Performed by: UROLOGY

## 2020-11-18 PROCEDURE — 1125F PR PAIN SEVERITY QUANTIFIED, PAIN PRESENT: ICD-10-PCS | Mod: S$GLB,,, | Performed by: UROLOGY

## 2020-11-18 PROCEDURE — 3008F BODY MASS INDEX DOCD: CPT | Mod: CPTII,S$GLB,, | Performed by: UROLOGY

## 2020-11-18 NOTE — LETTER
November 18, 2020      Tien Watts MD  [address]  [facility] [address]           [facility]  [address] [address]  Phone: [phone]  Fax: [phone]          Patient: Anitra Chaney   MR Number: [id number]   YOB: 1980   Date of Visit: 11/18/2020       Dear Dr. Tien Watts:    Thank you for referring Anitra Chaney to me for evaluation. Attached you will find relevant portions of my assessment and plan of care.    If you have questions, please do not hesitate to call me. I look forward to following Anitra Chaney along with you.    Sincerely,    Ashley Seymour MD    Enclosure  CC:  No Recipients    If you would like to receive this communication electronically, please contact [email or web address] or [phone] to request more information on [facility] access.    For providers and/or their staff who would like to refer a patient to [facility], please contact us through our one-stop-shop provider referral line, [facility], at [phone].    If you feel you have received this communication in error or would no longer like to receive these types of communications, please e-mail [email or web address]

## 2020-11-20 ENCOUNTER — PATIENT MESSAGE (OUTPATIENT)
Dept: UROLOGY | Facility: CLINIC | Age: 40
End: 2020-11-20

## 2020-11-23 ENCOUNTER — HOSPITAL ENCOUNTER (OUTPATIENT)
Dept: RADIOLOGY | Facility: HOSPITAL | Age: 40
Discharge: HOME | End: 2020-11-23
Attending: ORTHOPAEDIC SURGERY
Payer: COMMERCIAL

## 2020-11-23 ENCOUNTER — OFFICE VISIT (OUTPATIENT)
Dept: ORTHOPEDICS | Facility: CLINIC | Age: 40
End: 2020-11-23
Payer: COMMERCIAL

## 2020-11-23 DIAGNOSIS — R52 PAIN: Primary | ICD-10-CM

## 2020-11-23 DIAGNOSIS — G89.29 CHRONIC PAIN OF LEFT ANKLE: ICD-10-CM

## 2020-11-23 DIAGNOSIS — R52 PAIN: ICD-10-CM

## 2020-11-23 DIAGNOSIS — M25.572 CHRONIC PAIN OF LEFT ANKLE: ICD-10-CM

## 2020-11-23 DIAGNOSIS — Z98.1 STATUS POST ANKLE ARTHRODESIS: ICD-10-CM

## 2020-11-23 PROCEDURE — 73610 X-RAY EXAM OF ANKLE: CPT | Mod: 26,LT,, | Performed by: RADIOLOGY

## 2020-11-23 PROCEDURE — 99213 OFFICE O/P EST LOW 20 MIN: CPT | Mod: S$GLB,,, | Performed by: ORTHOPAEDIC SURGERY

## 2020-11-23 PROCEDURE — 73630 X-RAY EXAM OF FOOT: CPT | Mod: 26,LT,, | Performed by: RADIOLOGY

## 2020-11-23 PROCEDURE — 99213 PR OFFICE/OUTPT VISIT, EST, LEVL III, 20-29 MIN: ICD-10-PCS | Mod: S$GLB,,, | Performed by: ORTHOPAEDIC SURGERY

## 2020-11-23 PROCEDURE — 73630 XR FOOT COMPLETE 3 VIEW LEFT: ICD-10-PCS | Mod: 26,LT,, | Performed by: RADIOLOGY

## 2020-11-23 PROCEDURE — 99999 PR PBB SHADOW E&M-EST. PATIENT-LVL II: CPT | Mod: PBBFAC,,, | Performed by: ORTHOPAEDIC SURGERY

## 2020-11-23 PROCEDURE — 73610 X-RAY EXAM OF ANKLE: CPT | Mod: TC,LT

## 2020-11-23 PROCEDURE — 99999 PR PBB SHADOW E&M-EST. PATIENT-LVL II: ICD-10-PCS | Mod: PBBFAC,,, | Performed by: ORTHOPAEDIC SURGERY

## 2020-11-23 PROCEDURE — 73630 X-RAY EXAM OF FOOT: CPT | Mod: TC,LT

## 2020-11-23 PROCEDURE — 73610 XR ANKLE COMPLETE 3 VIEW LEFT: ICD-10-PCS | Mod: 26,LT,, | Performed by: RADIOLOGY

## 2020-11-23 RX ORDER — OXYCODONE AND ACETAMINOPHEN 10; 325 MG/1; MG/1
1 TABLET ORAL EVERY 4 HOURS PRN
Qty: 42 TABLET | Refills: 0 | Status: SHIPPED | OUTPATIENT
Start: 2020-11-23 | End: 2020-11-30 | Stop reason: SDUPTHER

## 2020-11-23 NOTE — PROGRESS NOTES
Anitra Chaney     Returns today for follow-up.  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  I have not seen her in over 2 years.  She did not follow-up as recommended after her previous appointment in July 2018.  She comes in today with continued complaints of ankle pain and hindfoot deformity.  She reports her pain is worsened recently since she has been increasing her activity on her job.    Examination:  She walks in with an antalgic gait.  On standing inspection she has some varus alignment of her ankle and hindfoot.  On sitting exam she has full motion of her subtalar joint, but also appears to have more motion and she should of her left ankle joint.  She has moderate tenderness about her left ankle joint in also has some pain to palpation callus formation around the plantar lateral aspect of the foot around the base of the 5th metatarsal.    Imaging:  I ordered and reviewed standing x-rays of her left ankle and foot today.  The ankle fusion site appears to be stable as there is no evidence of any loosening of the screws.  She appears to have some increase cavovarus alignment of her foot and hindfoot however.    Impression:  1.  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  Progressive cavovarus hindfoot status post previous calcaneal osteotomy and soft tissue reconstruction    Recommendation:  She would like to further surgery if possible.  She has not had any significant orthotics or custom bracing.  I am going to prescribe a custom AFO to see if this will help stabilize her hindfoot and proximally reduce some of her pain.

## 2020-11-30 DIAGNOSIS — M25.572 CHRONIC PAIN OF LEFT ANKLE: ICD-10-CM

## 2020-11-30 DIAGNOSIS — Z98.1 STATUS POST ANKLE ARTHRODESIS: ICD-10-CM

## 2020-11-30 DIAGNOSIS — R52 PAIN: ICD-10-CM

## 2020-11-30 DIAGNOSIS — G89.29 CHRONIC PAIN OF LEFT ANKLE: ICD-10-CM

## 2020-12-01 ENCOUNTER — PATIENT MESSAGE (OUTPATIENT)
Dept: ORTHOPEDICS | Facility: CLINIC | Age: 40
End: 2020-12-01

## 2020-12-01 RX ORDER — OXYCODONE AND ACETAMINOPHEN 10; 325 MG/1; MG/1
1 TABLET ORAL EVERY 4 HOURS PRN
Qty: 42 TABLET | Refills: 0 | Status: SHIPPED | OUTPATIENT
Start: 2020-12-01 | End: 2020-12-10 | Stop reason: SDUPTHER

## 2020-12-10 DIAGNOSIS — G89.29 CHRONIC PAIN OF LEFT ANKLE: ICD-10-CM

## 2020-12-10 DIAGNOSIS — M25.572 CHRONIC PAIN OF LEFT ANKLE: ICD-10-CM

## 2020-12-10 DIAGNOSIS — Z98.1 STATUS POST ANKLE ARTHRODESIS: ICD-10-CM

## 2020-12-10 DIAGNOSIS — R52 PAIN: ICD-10-CM

## 2020-12-10 RX ORDER — OXYCODONE AND ACETAMINOPHEN 10; 325 MG/1; MG/1
1 TABLET ORAL EVERY 4 HOURS PRN
Qty: 42 TABLET | Refills: 0 | Status: CANCELLED | OUTPATIENT
Start: 2020-12-10

## 2020-12-11 ENCOUNTER — PATIENT MESSAGE (OUTPATIENT)
Dept: ORTHOPEDICS | Facility: CLINIC | Age: 40
End: 2020-12-11

## 2020-12-21 ENCOUNTER — PATIENT MESSAGE (OUTPATIENT)
Dept: ORTHOPEDICS | Facility: CLINIC | Age: 40
End: 2020-12-21

## 2020-12-21 DIAGNOSIS — M25.572 CHRONIC PAIN OF LEFT ANKLE: ICD-10-CM

## 2020-12-21 DIAGNOSIS — R52 PAIN: ICD-10-CM

## 2020-12-21 DIAGNOSIS — G89.29 CHRONIC PAIN OF LEFT ANKLE: ICD-10-CM

## 2020-12-21 DIAGNOSIS — Z98.1 STATUS POST ANKLE ARTHRODESIS: ICD-10-CM

## 2020-12-21 RX ORDER — OXYCODONE AND ACETAMINOPHEN 10; 325 MG/1; MG/1
1 TABLET ORAL EVERY 4 HOURS PRN
Qty: 42 TABLET | Refills: 0 | Status: SHIPPED | OUTPATIENT
Start: 2020-12-21 | End: 2020-12-28 | Stop reason: SDUPTHER

## 2020-12-28 ENCOUNTER — PATIENT MESSAGE (OUTPATIENT)
Dept: ORTHOPEDICS | Facility: CLINIC | Age: 40
End: 2020-12-28

## 2021-01-04 ENCOUNTER — PATIENT MESSAGE (OUTPATIENT)
Dept: ORTHOPEDICS | Facility: CLINIC | Age: 41
End: 2021-01-04

## 2021-01-05 ENCOUNTER — PATIENT MESSAGE (OUTPATIENT)
Dept: ADMINISTRATIVE | Facility: HOSPITAL | Age: 41
End: 2021-01-05

## 2021-01-17 DIAGNOSIS — Z98.1 STATUS POST ANKLE ARTHRODESIS: ICD-10-CM

## 2021-01-17 DIAGNOSIS — R52 PAIN: ICD-10-CM

## 2021-01-17 DIAGNOSIS — G89.29 CHRONIC PAIN OF LEFT ANKLE: ICD-10-CM

## 2021-01-17 DIAGNOSIS — M25.572 CHRONIC PAIN OF LEFT ANKLE: ICD-10-CM

## 2021-01-17 RX ORDER — OXYCODONE AND ACETAMINOPHEN 10; 325 MG/1; MG/1
1 TABLET ORAL EVERY 4 HOURS PRN
Qty: 42 TABLET | Refills: 0 | Status: CANCELLED | OUTPATIENT
Start: 2021-01-17

## 2021-01-19 ENCOUNTER — PATIENT MESSAGE (OUTPATIENT)
Dept: ORTHOPEDICS | Facility: CLINIC | Age: 41
End: 2021-01-19

## 2021-01-19 ENCOUNTER — PATIENT MESSAGE (OUTPATIENT)
Dept: INTERNAL MEDICINE | Facility: CLINIC | Age: 41
End: 2021-01-19

## 2021-01-19 DIAGNOSIS — U07.1 LAB TEST POSITIVE FOR DETECTION OF COVID-19 VIRUS: Primary | ICD-10-CM

## 2021-01-20 ENCOUNTER — OUTSIDE PLACE OF SERVICE (OUTPATIENT)
Dept: CARDIOLOGY | Facility: CLINIC | Age: 41
End: 2021-01-20
Payer: COMMERCIAL

## 2021-01-20 PROCEDURE — 93010 ELECTROCARDIOGRAM REPORT: CPT | Mod: ,,, | Performed by: INTERNAL MEDICINE

## 2021-01-20 PROCEDURE — 93010 PR ELECTROCARDIOGRAM REPORT: ICD-10-PCS | Mod: ,,, | Performed by: INTERNAL MEDICINE

## 2021-01-25 ENCOUNTER — PATIENT MESSAGE (OUTPATIENT)
Dept: ORTHOPEDICS | Facility: CLINIC | Age: 41
End: 2021-01-25

## 2021-01-27 ENCOUNTER — PATIENT MESSAGE (OUTPATIENT)
Dept: INTERNAL MEDICINE | Facility: CLINIC | Age: 41
End: 2021-01-27

## 2021-01-28 ENCOUNTER — OUTSIDE PLACE OF SERVICE (OUTPATIENT)
Dept: CARDIOLOGY | Facility: CLINIC | Age: 41
End: 2021-01-28
Payer: COMMERCIAL

## 2021-01-28 PROCEDURE — 93010 ELECTROCARDIOGRAM REPORT: CPT | Mod: ,,, | Performed by: INTERNAL MEDICINE

## 2021-01-28 PROCEDURE — 93010 PR ELECTROCARDIOGRAM REPORT: ICD-10-PCS | Mod: ,,, | Performed by: INTERNAL MEDICINE

## 2021-02-01 ENCOUNTER — PATIENT MESSAGE (OUTPATIENT)
Dept: ORTHOPEDICS | Facility: CLINIC | Age: 41
End: 2021-02-01

## 2021-02-08 ENCOUNTER — PATIENT MESSAGE (OUTPATIENT)
Dept: ORTHOPEDICS | Facility: CLINIC | Age: 41
End: 2021-02-08

## 2021-02-08 DIAGNOSIS — Z98.1 STATUS POST ANKLE ARTHRODESIS: ICD-10-CM

## 2021-02-08 DIAGNOSIS — M25.572 CHRONIC PAIN OF LEFT ANKLE: ICD-10-CM

## 2021-02-08 DIAGNOSIS — G89.29 CHRONIC PAIN OF LEFT ANKLE: ICD-10-CM

## 2021-02-08 DIAGNOSIS — R52 PAIN: ICD-10-CM

## 2021-02-08 RX ORDER — OXYCODONE AND ACETAMINOPHEN 10; 325 MG/1; MG/1
1 TABLET ORAL EVERY 4 HOURS PRN
Qty: 42 TABLET | Refills: 0 | Status: SHIPPED | OUTPATIENT
Start: 2021-02-08 | End: 2021-02-14 | Stop reason: SDUPTHER

## 2021-02-15 ENCOUNTER — PATIENT MESSAGE (OUTPATIENT)
Dept: ORTHOPEDICS | Facility: CLINIC | Age: 41
End: 2021-02-15

## 2021-02-21 ENCOUNTER — PATIENT OUTREACH (OUTPATIENT)
Dept: ADMINISTRATIVE | Facility: OTHER | Age: 41
End: 2021-02-21

## 2021-02-22 ENCOUNTER — PATIENT MESSAGE (OUTPATIENT)
Dept: ORTHOPEDICS | Facility: CLINIC | Age: 41
End: 2021-02-22

## 2021-02-23 ENCOUNTER — PATIENT MESSAGE (OUTPATIENT)
Dept: RHEUMATOLOGY | Facility: CLINIC | Age: 41
End: 2021-02-23

## 2021-03-01 ENCOUNTER — PATIENT MESSAGE (OUTPATIENT)
Dept: ORTHOPEDICS | Facility: CLINIC | Age: 41
End: 2021-03-01

## 2021-03-03 ENCOUNTER — PATIENT MESSAGE (OUTPATIENT)
Dept: OBSTETRICS AND GYNECOLOGY | Facility: CLINIC | Age: 41
End: 2021-03-03

## 2021-03-03 DIAGNOSIS — B37.9 YEAST INFECTION: Primary | ICD-10-CM

## 2021-03-04 RX ORDER — FLUCONAZOLE 150 MG/1
150 TABLET ORAL ONCE
Qty: 1 TABLET | Refills: 1 | Status: SHIPPED | OUTPATIENT
Start: 2021-03-04 | End: 2021-03-04

## 2021-03-08 ENCOUNTER — PATIENT MESSAGE (OUTPATIENT)
Dept: ORTHOPEDICS | Facility: CLINIC | Age: 41
End: 2021-03-08

## 2021-03-15 ENCOUNTER — PATIENT MESSAGE (OUTPATIENT)
Dept: ORTHOPEDICS | Facility: CLINIC | Age: 41
End: 2021-03-15

## 2021-03-22 ENCOUNTER — PATIENT MESSAGE (OUTPATIENT)
Dept: ORTHOPEDICS | Facility: CLINIC | Age: 41
End: 2021-03-22

## 2021-03-29 ENCOUNTER — PATIENT MESSAGE (OUTPATIENT)
Dept: ORTHOPEDICS | Facility: CLINIC | Age: 41
End: 2021-03-29

## 2021-04-05 ENCOUNTER — PATIENT MESSAGE (OUTPATIENT)
Dept: ORTHOPEDICS | Facility: CLINIC | Age: 41
End: 2021-04-05

## 2021-04-05 ENCOUNTER — PATIENT MESSAGE (OUTPATIENT)
Dept: ADMINISTRATIVE | Facility: HOSPITAL | Age: 41
End: 2021-04-05

## 2021-04-12 ENCOUNTER — PATIENT MESSAGE (OUTPATIENT)
Dept: ORTHOPEDICS | Facility: CLINIC | Age: 41
End: 2021-04-12

## 2021-04-12 DIAGNOSIS — Z98.1 STATUS POST ANKLE ARTHRODESIS: ICD-10-CM

## 2021-04-12 DIAGNOSIS — R52 PAIN: ICD-10-CM

## 2021-04-12 DIAGNOSIS — M25.572 CHRONIC PAIN OF LEFT ANKLE: ICD-10-CM

## 2021-04-12 DIAGNOSIS — G89.29 CHRONIC PAIN OF LEFT ANKLE: ICD-10-CM

## 2021-04-12 RX ORDER — OXYCODONE AND ACETAMINOPHEN 10; 325 MG/1; MG/1
1 TABLET ORAL EVERY 4 HOURS PRN
Qty: 42 TABLET | Refills: 0 | Status: SHIPPED | OUTPATIENT
Start: 2021-04-12 | End: 2021-04-18 | Stop reason: SDUPTHER

## 2021-04-19 ENCOUNTER — PATIENT MESSAGE (OUTPATIENT)
Dept: ORTHOPEDICS | Facility: CLINIC | Age: 41
End: 2021-04-19

## 2021-04-24 DIAGNOSIS — G89.29 CHRONIC PAIN OF LEFT ANKLE: ICD-10-CM

## 2021-04-24 DIAGNOSIS — M25.572 CHRONIC PAIN OF LEFT ANKLE: ICD-10-CM

## 2021-04-24 DIAGNOSIS — Z98.1 STATUS POST ANKLE ARTHRODESIS: ICD-10-CM

## 2021-04-24 DIAGNOSIS — R52 PAIN: ICD-10-CM

## 2021-04-24 RX ORDER — OXYCODONE AND ACETAMINOPHEN 10; 325 MG/1; MG/1
1 TABLET ORAL EVERY 6 HOURS PRN
Qty: 28 TABLET | Refills: 0 | Status: CANCELLED | OUTPATIENT
Start: 2021-04-24

## 2021-04-26 ENCOUNTER — PATIENT MESSAGE (OUTPATIENT)
Dept: ORTHOPEDICS | Facility: CLINIC | Age: 41
End: 2021-04-26

## 2021-04-26 DIAGNOSIS — G89.29 CHRONIC PAIN OF LEFT ANKLE: ICD-10-CM

## 2021-04-26 DIAGNOSIS — Z98.1 STATUS POST ANKLE ARTHRODESIS: ICD-10-CM

## 2021-04-26 DIAGNOSIS — R52 PAIN: ICD-10-CM

## 2021-04-26 DIAGNOSIS — M25.572 CHRONIC PAIN OF LEFT ANKLE: ICD-10-CM

## 2021-04-26 RX ORDER — OXYCODONE AND ACETAMINOPHEN 10; 325 MG/1; MG/1
1 TABLET ORAL EVERY 6 HOURS PRN
Qty: 28 TABLET | Refills: 0 | Status: CANCELLED | OUTPATIENT
Start: 2021-04-26

## 2021-04-26 RX ORDER — OXYCODONE AND ACETAMINOPHEN 10; 325 MG/1; MG/1
1 TABLET ORAL EVERY 6 HOURS PRN
Qty: 28 TABLET | Refills: 0 | Status: SHIPPED | OUTPATIENT
Start: 2021-04-26 | End: 2021-05-02 | Stop reason: SDUPTHER

## 2021-04-30 ENCOUNTER — PATIENT MESSAGE (OUTPATIENT)
Dept: ORTHOPEDICS | Facility: CLINIC | Age: 41
End: 2021-04-30

## 2021-05-03 ENCOUNTER — PATIENT MESSAGE (OUTPATIENT)
Dept: ORTHOPEDICS | Facility: CLINIC | Age: 41
End: 2021-05-03

## 2021-05-04 ENCOUNTER — PATIENT MESSAGE (OUTPATIENT)
Dept: RESEARCH | Facility: HOSPITAL | Age: 41
End: 2021-05-04

## 2021-05-10 ENCOUNTER — PATIENT MESSAGE (OUTPATIENT)
Dept: ORTHOPEDICS | Facility: CLINIC | Age: 41
End: 2021-05-10

## 2021-05-10 DIAGNOSIS — R52 PAIN: ICD-10-CM

## 2021-05-10 DIAGNOSIS — M25.572 CHRONIC PAIN OF LEFT ANKLE: ICD-10-CM

## 2021-05-10 DIAGNOSIS — G89.29 CHRONIC PAIN OF LEFT ANKLE: ICD-10-CM

## 2021-05-10 DIAGNOSIS — Z98.1 STATUS POST ANKLE ARTHRODESIS: ICD-10-CM

## 2021-05-10 RX ORDER — OXYCODONE AND ACETAMINOPHEN 10; 325 MG/1; MG/1
1 TABLET ORAL EVERY 6 HOURS PRN
Qty: 28 TABLET | Refills: 0 | Status: SHIPPED | OUTPATIENT
Start: 2021-05-10 | End: 2021-05-17 | Stop reason: SDUPTHER

## 2021-05-17 ENCOUNTER — PATIENT MESSAGE (OUTPATIENT)
Dept: ORTHOPEDICS | Facility: CLINIC | Age: 41
End: 2021-05-17

## 2021-05-17 DIAGNOSIS — M25.572 CHRONIC PAIN OF LEFT ANKLE: ICD-10-CM

## 2021-05-17 DIAGNOSIS — G89.29 CHRONIC PAIN OF LEFT ANKLE: ICD-10-CM

## 2021-05-17 DIAGNOSIS — R52 PAIN: ICD-10-CM

## 2021-05-17 DIAGNOSIS — Z98.1 STATUS POST ANKLE ARTHRODESIS: ICD-10-CM

## 2021-05-17 RX ORDER — OXYCODONE AND ACETAMINOPHEN 10; 325 MG/1; MG/1
1 TABLET ORAL EVERY 6 HOURS PRN
Qty: 28 TABLET | Refills: 0 | Status: SHIPPED | OUTPATIENT
Start: 2021-05-17 | End: 2021-05-24 | Stop reason: SDUPTHER

## 2021-05-24 ENCOUNTER — PATIENT MESSAGE (OUTPATIENT)
Dept: ORTHOPEDICS | Facility: CLINIC | Age: 41
End: 2021-05-24

## 2021-05-24 DIAGNOSIS — R52 PAIN: ICD-10-CM

## 2021-05-24 DIAGNOSIS — G89.29 CHRONIC PAIN OF LEFT ANKLE: ICD-10-CM

## 2021-05-24 DIAGNOSIS — M25.572 CHRONIC PAIN OF LEFT ANKLE: ICD-10-CM

## 2021-05-24 DIAGNOSIS — Z98.1 STATUS POST ANKLE ARTHRODESIS: ICD-10-CM

## 2021-05-24 RX ORDER — OXYCODONE AND ACETAMINOPHEN 10; 325 MG/1; MG/1
1 TABLET ORAL EVERY 6 HOURS PRN
Qty: 28 TABLET | Refills: 0 | Status: SHIPPED | OUTPATIENT
Start: 2021-05-24 | End: 2021-05-31 | Stop reason: SDUPTHER

## 2021-05-31 ENCOUNTER — PATIENT MESSAGE (OUTPATIENT)
Dept: ORTHOPEDICS | Facility: CLINIC | Age: 41
End: 2021-05-31

## 2021-05-31 DIAGNOSIS — R52 PAIN: ICD-10-CM

## 2021-05-31 DIAGNOSIS — G89.29 CHRONIC PAIN OF LEFT ANKLE: ICD-10-CM

## 2021-05-31 DIAGNOSIS — M25.572 CHRONIC PAIN OF LEFT ANKLE: ICD-10-CM

## 2021-05-31 DIAGNOSIS — Z98.1 STATUS POST ANKLE ARTHRODESIS: ICD-10-CM

## 2021-05-31 RX ORDER — OXYCODONE AND ACETAMINOPHEN 10; 325 MG/1; MG/1
1 TABLET ORAL EVERY 6 HOURS PRN
Qty: 28 TABLET | Refills: 0 | Status: SHIPPED | OUTPATIENT
Start: 2021-05-31 | End: 2021-06-07 | Stop reason: SDUPTHER

## 2021-06-07 ENCOUNTER — PATIENT MESSAGE (OUTPATIENT)
Dept: ORTHOPEDICS | Facility: CLINIC | Age: 41
End: 2021-06-07

## 2021-06-07 DIAGNOSIS — M25.572 CHRONIC PAIN OF LEFT ANKLE: ICD-10-CM

## 2021-06-07 DIAGNOSIS — R52 PAIN: ICD-10-CM

## 2021-06-07 DIAGNOSIS — Z98.1 STATUS POST ANKLE ARTHRODESIS: ICD-10-CM

## 2021-06-07 DIAGNOSIS — G89.29 CHRONIC PAIN OF LEFT ANKLE: ICD-10-CM

## 2021-06-07 RX ORDER — OXYCODONE AND ACETAMINOPHEN 10; 325 MG/1; MG/1
1 TABLET ORAL EVERY 6 HOURS PRN
Qty: 28 TABLET | Refills: 0 | Status: SHIPPED | OUTPATIENT
Start: 2021-06-07 | End: 2021-06-13 | Stop reason: SDUPTHER

## 2021-06-21 ENCOUNTER — PATIENT MESSAGE (OUTPATIENT)
Dept: ORTHOPEDICS | Facility: CLINIC | Age: 41
End: 2021-06-21

## 2021-06-21 DIAGNOSIS — Z98.1 STATUS POST ANKLE ARTHRODESIS: ICD-10-CM

## 2021-06-21 DIAGNOSIS — R52 PAIN: ICD-10-CM

## 2021-06-21 DIAGNOSIS — G89.29 CHRONIC PAIN OF LEFT ANKLE: ICD-10-CM

## 2021-06-21 DIAGNOSIS — M25.572 CHRONIC PAIN OF LEFT ANKLE: ICD-10-CM

## 2021-06-21 RX ORDER — OXYCODONE AND ACETAMINOPHEN 10; 325 MG/1; MG/1
1 TABLET ORAL EVERY 6 HOURS PRN
Qty: 28 TABLET | Refills: 0 | Status: SHIPPED | OUTPATIENT
Start: 2021-06-21 | End: 2021-06-28 | Stop reason: SDUPTHER

## 2021-06-28 ENCOUNTER — PATIENT MESSAGE (OUTPATIENT)
Dept: ORTHOPEDICS | Facility: CLINIC | Age: 41
End: 2021-06-28

## 2021-06-28 DIAGNOSIS — R52 PAIN: ICD-10-CM

## 2021-06-28 DIAGNOSIS — Z98.1 STATUS POST ANKLE ARTHRODESIS: ICD-10-CM

## 2021-06-28 DIAGNOSIS — G89.29 CHRONIC PAIN OF LEFT ANKLE: ICD-10-CM

## 2021-06-28 DIAGNOSIS — M25.572 CHRONIC PAIN OF LEFT ANKLE: ICD-10-CM

## 2021-06-28 RX ORDER — OXYCODONE AND ACETAMINOPHEN 10; 325 MG/1; MG/1
1 TABLET ORAL EVERY 6 HOURS PRN
Qty: 28 TABLET | Refills: 0 | Status: SHIPPED | OUTPATIENT
Start: 2021-06-28 | End: 2021-07-08

## 2021-07-02 ENCOUNTER — HOSPITAL ENCOUNTER (EMERGENCY)
Facility: HOSPITAL | Age: 41
Discharge: HOME | End: 2021-07-02
Attending: FAMILY MEDICINE
Payer: COMMERCIAL

## 2021-07-02 VITALS
OXYGEN SATURATION: 97 % | BODY MASS INDEX: 35.55 KG/M2 | HEIGHT: 69 IN | TEMPERATURE: 99 F | SYSTOLIC BLOOD PRESSURE: 128 MMHG | RESPIRATION RATE: 18 BRPM | WEIGHT: 240 LBS | HEART RATE: 80 BPM | DIASTOLIC BLOOD PRESSURE: 79 MMHG

## 2021-07-02 DIAGNOSIS — K13.79 MOUTH SORES: Primary | ICD-10-CM

## 2021-07-02 LAB — GROUP A STREP, MOLECULAR: NEGATIVE

## 2021-07-02 PROCEDURE — 99283 EMERGENCY DEPT VISIT LOW MDM: CPT | Mod: ER

## 2021-07-02 PROCEDURE — 87651 STREP A DNA AMP PROBE: CPT | Mod: ER | Performed by: FAMILY MEDICINE

## 2021-07-03 DIAGNOSIS — G89.29 CHRONIC PAIN OF LEFT ANKLE: ICD-10-CM

## 2021-07-03 DIAGNOSIS — R52 PAIN: ICD-10-CM

## 2021-07-03 DIAGNOSIS — M25.572 CHRONIC PAIN OF LEFT ANKLE: ICD-10-CM

## 2021-07-03 DIAGNOSIS — Z98.1 STATUS POST ANKLE ARTHRODESIS: ICD-10-CM

## 2021-07-03 RX ORDER — OXYCODONE AND ACETAMINOPHEN 10; 325 MG/1; MG/1
1 TABLET ORAL EVERY 6 HOURS PRN
Qty: 28 TABLET | Refills: 0 | Status: CANCELLED | OUTPATIENT
Start: 2021-07-03

## 2021-07-04 ENCOUNTER — HOSPITAL ENCOUNTER (OUTPATIENT)
Facility: HOSPITAL | Age: 41
Discharge: HOME | End: 2021-07-06
Attending: EMERGENCY MEDICINE | Admitting: INTERNAL MEDICINE
Payer: COMMERCIAL

## 2021-07-04 DIAGNOSIS — K92.0 HEMATEMESIS WITH NAUSEA: Primary | ICD-10-CM

## 2021-07-04 DIAGNOSIS — K29.60 EROSIVE GASTRITIS: ICD-10-CM

## 2021-07-04 DIAGNOSIS — D50.9 IRON DEFICIENCY ANEMIA, UNSPECIFIED IRON DEFICIENCY ANEMIA TYPE: ICD-10-CM

## 2021-07-04 DIAGNOSIS — K92.2 GIB (GASTROINTESTINAL BLEEDING): ICD-10-CM

## 2021-07-04 PROCEDURE — 99285 EMERGENCY DEPT VISIT HI MDM: CPT | Mod: 25

## 2021-07-04 PROCEDURE — 96374 THER/PROPH/DIAG INJ IV PUSH: CPT

## 2021-07-04 PROCEDURE — 96375 TX/PRO/DX INJ NEW DRUG ADDON: CPT

## 2021-07-05 ENCOUNTER — ANESTHESIA (OUTPATIENT)
Dept: ENDOSCOPY | Facility: HOSPITAL | Age: 41
End: 2021-07-05
Payer: COMMERCIAL

## 2021-07-05 ENCOUNTER — ANESTHESIA EVENT (OUTPATIENT)
Dept: ENDOSCOPY | Facility: HOSPITAL | Age: 41
End: 2021-07-05
Payer: COMMERCIAL

## 2021-07-05 PROBLEM — Z72.0 TOBACCO ABUSE: Status: ACTIVE | Noted: 2021-07-05

## 2021-07-05 PROBLEM — D64.9 ANEMIA: Status: ACTIVE | Noted: 2021-07-05

## 2021-07-05 LAB
ABO + RH BLD: NORMAL
ALBUMIN SERPL BCP-MCNC: 3.7 G/DL (ref 3.5–5.2)
ALP SERPL-CCNC: 59 U/L (ref 55–135)
ALT SERPL W/O P-5'-P-CCNC: 10 U/L (ref 10–44)
ANION GAP SERPL CALC-SCNC: 10 MMOL/L (ref 8–16)
ANION GAP SERPL CALC-SCNC: 11 MMOL/L (ref 8–16)
AST SERPL-CCNC: 10 U/L (ref 10–40)
B-HCG UR QL: NEGATIVE
BASOPHILS # BLD AUTO: 0.06 K/UL (ref 0–0.2)
BASOPHILS # BLD AUTO: 0.06 K/UL (ref 0–0.2)
BASOPHILS # BLD AUTO: 0.07 K/UL (ref 0–0.2)
BASOPHILS # BLD AUTO: 0.07 K/UL (ref 0–0.2)
BASOPHILS NFR BLD: 0.8 % (ref 0–1.9)
BILIRUB SERPL-MCNC: 0.2 MG/DL (ref 0.1–1)
BLD GP AB SCN CELLS X3 SERPL QL: NORMAL
BUN SERPL-MCNC: 7 MG/DL (ref 6–20)
BUN SERPL-MCNC: 7 MG/DL (ref 6–20)
CALCIUM SERPL-MCNC: 8.8 MG/DL (ref 8.7–10.5)
CALCIUM SERPL-MCNC: 9.3 MG/DL (ref 8.7–10.5)
CHLORIDE SERPL-SCNC: 110 MMOL/L (ref 95–110)
CHLORIDE SERPL-SCNC: 111 MMOL/L (ref 95–110)
CO2 SERPL-SCNC: 17 MMOL/L (ref 23–29)
CO2 SERPL-SCNC: 20 MMOL/L (ref 23–29)
CREAT SERPL-MCNC: 0.7 MG/DL (ref 0.5–1.4)
CREAT SERPL-MCNC: 0.8 MG/DL (ref 0.5–1.4)
CTP QC/QA: YES
DIFFERENTIAL METHOD: ABNORMAL
EOSINOPHIL # BLD AUTO: 0.1 K/UL (ref 0–0.5)
EOSINOPHIL # BLD AUTO: 0.1 K/UL (ref 0–0.5)
EOSINOPHIL # BLD AUTO: 0.2 K/UL (ref 0–0.5)
EOSINOPHIL # BLD AUTO: 0.2 K/UL (ref 0–0.5)
EOSINOPHIL NFR BLD: 1.3 % (ref 0–8)
EOSINOPHIL NFR BLD: 1.4 % (ref 0–8)
EOSINOPHIL NFR BLD: 2.1 % (ref 0–8)
EOSINOPHIL NFR BLD: 2.1 % (ref 0–8)
ERYTHROCYTE [DISTWIDTH] IN BLOOD BY AUTOMATED COUNT: 17.8 % (ref 11.5–14.5)
ERYTHROCYTE [DISTWIDTH] IN BLOOD BY AUTOMATED COUNT: 18 % (ref 11.5–14.5)
EST. GFR  (AFRICAN AMERICAN): >60 ML/MIN/1.73 M^2
EST. GFR  (AFRICAN AMERICAN): >60 ML/MIN/1.73 M^2
EST. GFR  (NON AFRICAN AMERICAN): >60 ML/MIN/1.73 M^2
EST. GFR  (NON AFRICAN AMERICAN): >60 ML/MIN/1.73 M^2
FERRITIN SERPL-MCNC: 3 NG/ML (ref 20–300)
FOLATE SERPL-MCNC: 10.6 NG/ML (ref 4–24)
GLUCOSE SERPL-MCNC: 101 MG/DL (ref 70–110)
GLUCOSE SERPL-MCNC: 93 MG/DL (ref 70–110)
HCT VFR BLD AUTO: 27.1 % (ref 37–48.5)
HCT VFR BLD AUTO: 29.2 % (ref 37–48.5)
HCT VFR BLD AUTO: 29.2 % (ref 37–48.5)
HCT VFR BLD AUTO: 33.6 % (ref 37–48.5)
HGB BLD-MCNC: 7.7 G/DL (ref 12–16)
HGB BLD-MCNC: 8.2 G/DL (ref 12–16)
HGB BLD-MCNC: 8.6 G/DL (ref 12–16)
HGB BLD-MCNC: 9.5 G/DL (ref 12–16)
IMM GRANULOCYTES # BLD AUTO: 0.02 K/UL (ref 0–0.04)
IMM GRANULOCYTES # BLD AUTO: 0.03 K/UL (ref 0–0.04)
IMM GRANULOCYTES # BLD AUTO: 0.03 K/UL (ref 0–0.04)
IMM GRANULOCYTES # BLD AUTO: 0.04 K/UL (ref 0–0.04)
IMM GRANULOCYTES NFR BLD AUTO: 0.3 % (ref 0–0.5)
IMM GRANULOCYTES NFR BLD AUTO: 0.4 % (ref 0–0.5)
IRON SERPL-MCNC: 15 UG/DL (ref 30–160)
LIPASE SERPL-CCNC: 49 U/L (ref 4–60)
LYMPHOCYTES # BLD AUTO: 1.4 K/UL (ref 1–4.8)
LYMPHOCYTES # BLD AUTO: 1.7 K/UL (ref 1–4.8)
LYMPHOCYTES # BLD AUTO: 1.7 K/UL (ref 1–4.8)
LYMPHOCYTES # BLD AUTO: 2.3 K/UL (ref 1–4.8)
LYMPHOCYTES NFR BLD: 15.3 % (ref 18–48)
LYMPHOCYTES NFR BLD: 20 % (ref 18–48)
LYMPHOCYTES NFR BLD: 21.6 % (ref 18–48)
LYMPHOCYTES NFR BLD: 30.1 % (ref 18–48)
MAGNESIUM SERPL-MCNC: 2.3 MG/DL (ref 1.6–2.6)
MCH RBC QN AUTO: 19.8 PG (ref 27–31)
MCH RBC QN AUTO: 19.9 PG (ref 27–31)
MCH RBC QN AUTO: 20.2 PG (ref 27–31)
MCH RBC QN AUTO: 20.7 PG (ref 27–31)
MCHC RBC AUTO-ENTMCNC: 28.1 G/DL (ref 32–36)
MCHC RBC AUTO-ENTMCNC: 28.3 G/DL (ref 32–36)
MCHC RBC AUTO-ENTMCNC: 28.4 G/DL (ref 32–36)
MCHC RBC AUTO-ENTMCNC: 29.5 G/DL (ref 32–36)
MCV RBC AUTO: 70 FL (ref 82–98)
MCV RBC AUTO: 70 FL (ref 82–98)
MCV RBC AUTO: 71 FL (ref 82–98)
MCV RBC AUTO: 71 FL (ref 82–98)
MONOCYTES # BLD AUTO: 0.5 K/UL (ref 0.3–1)
MONOCYTES # BLD AUTO: 0.5 K/UL (ref 0.3–1)
MONOCYTES # BLD AUTO: 0.6 K/UL (ref 0.3–1)
MONOCYTES # BLD AUTO: 0.6 K/UL (ref 0.3–1)
MONOCYTES NFR BLD: 5.9 % (ref 4–15)
MONOCYTES NFR BLD: 6.6 % (ref 4–15)
MONOCYTES NFR BLD: 6.9 % (ref 4–15)
MONOCYTES NFR BLD: 7.3 % (ref 4–15)
NEUTROPHILS # BLD AUTO: 4.6 K/UL (ref 1.8–7.7)
NEUTROPHILS # BLD AUTO: 5.2 K/UL (ref 1.8–7.7)
NEUTROPHILS # BLD AUTO: 5.9 K/UL (ref 1.8–7.7)
NEUTROPHILS # BLD AUTO: 6.9 K/UL (ref 1.8–7.7)
NEUTROPHILS NFR BLD: 60 % (ref 38–73)
NEUTROPHILS NFR BLD: 67.9 % (ref 38–73)
NEUTROPHILS NFR BLD: 71.5 % (ref 38–73)
NEUTROPHILS NFR BLD: 75.3 % (ref 38–73)
NRBC BLD-RTO: 0 /100 WBC
PLATELET # BLD AUTO: 289 K/UL (ref 150–450)
PLATELET # BLD AUTO: 296 K/UL (ref 150–450)
PLATELET # BLD AUTO: 321 K/UL (ref 150–450)
PLATELET # BLD AUTO: 329 K/UL (ref 150–450)
PMV BLD AUTO: 8.8 FL (ref 9.2–12.9)
PMV BLD AUTO: 8.9 FL (ref 9.2–12.9)
PMV BLD AUTO: 8.9 FL (ref 9.2–12.9)
PMV BLD AUTO: 9.1 FL (ref 9.2–12.9)
POTASSIUM SERPL-SCNC: 3.8 MMOL/L (ref 3.5–5.1)
POTASSIUM SERPL-SCNC: 3.8 MMOL/L (ref 3.5–5.1)
PROT SERPL-MCNC: 7 G/DL (ref 6–8.4)
RBC # BLD AUTO: 3.87 M/UL (ref 4–5.4)
RBC # BLD AUTO: 4.14 M/UL (ref 4–5.4)
RBC # BLD AUTO: 4.16 M/UL (ref 4–5.4)
RBC # BLD AUTO: 4.71 M/UL (ref 4–5.4)
SARS-COV-2 RDRP RESP QL NAA+PROBE: NEGATIVE
SATURATED IRON: 3 % (ref 20–50)
SODIUM SERPL-SCNC: 139 MMOL/L (ref 136–145)
SODIUM SERPL-SCNC: 140 MMOL/L (ref 136–145)
TOTAL IRON BINDING CAPACITY: 525 UG/DL (ref 250–450)
TRANSFERRIN SERPL-MCNC: 355 MG/DL (ref 200–375)
VIT B12 SERPL-MCNC: 174 PG/ML (ref 210–950)
WBC # BLD AUTO: 7.7 K/UL (ref 3.9–12.7)
WBC # BLD AUTO: 7.71 K/UL (ref 3.9–12.7)
WBC # BLD AUTO: 8.3 K/UL (ref 3.9–12.7)
WBC # BLD AUTO: 9.18 K/UL (ref 3.9–12.7)

## 2021-07-05 PROCEDURE — C9113 INJ PANTOPRAZOLE SODIUM, VIA: HCPCS | Performed by: EMERGENCY MEDICINE

## 2021-07-05 PROCEDURE — 88305 TISSUE EXAM BY PATHOLOGIST: ICD-10-PCS | Mod: 26,,, | Performed by: PATHOLOGY

## 2021-07-05 PROCEDURE — 63600175 PHARM REV CODE 636 W HCPCS: Performed by: NURSE ANESTHETIST, CERTIFIED REGISTERED

## 2021-07-05 PROCEDURE — 82728 ASSAY OF FERRITIN: CPT | Performed by: NURSE PRACTITIONER

## 2021-07-05 PROCEDURE — 96376 TX/PRO/DX INJ SAME DRUG ADON: CPT

## 2021-07-05 PROCEDURE — D9220A PRA ANESTHESIA: ICD-10-PCS | Mod: ANES,,, | Performed by: ANESTHESIOLOGY

## 2021-07-05 PROCEDURE — 25000003 PHARM REV CODE 250: Performed by: NURSE PRACTITIONER

## 2021-07-05 PROCEDURE — 43239 EGD BIOPSY SINGLE/MULTIPLE: CPT | Performed by: INTERNAL MEDICINE

## 2021-07-05 PROCEDURE — 83690 ASSAY OF LIPASE: CPT | Performed by: EMERGENCY MEDICINE

## 2021-07-05 PROCEDURE — 80053 COMPREHEN METABOLIC PANEL: CPT | Performed by: EMERGENCY MEDICINE

## 2021-07-05 PROCEDURE — 99204 PR OFFICE/OUTPT VISIT, NEW, LEVL IV, 45-59 MIN: ICD-10-PCS | Mod: 25,,, | Performed by: INTERNAL MEDICINE

## 2021-07-05 PROCEDURE — 88305 TISSUE EXAM BY PATHOLOGIST: CPT | Mod: 26,,, | Performed by: PATHOLOGY

## 2021-07-05 PROCEDURE — 96372 THER/PROPH/DIAG INJ SC/IM: CPT | Mod: 59

## 2021-07-05 PROCEDURE — D9220A PRA ANESTHESIA: ICD-10-PCS | Mod: CRNA,,, | Performed by: NURSE ANESTHETIST, CERTIFIED REGISTERED

## 2021-07-05 PROCEDURE — 83540 ASSAY OF IRON: CPT | Performed by: NURSE PRACTITIONER

## 2021-07-05 PROCEDURE — 43239 EGD BIOPSY SINGLE/MULTIPLE: CPT | Mod: ,,, | Performed by: INTERNAL MEDICINE

## 2021-07-05 PROCEDURE — 96375 TX/PRO/DX INJ NEW DRUG ADDON: CPT

## 2021-07-05 PROCEDURE — C9113 INJ PANTOPRAZOLE SODIUM, VIA: HCPCS | Performed by: NURSE PRACTITIONER

## 2021-07-05 PROCEDURE — 37000008 HC ANESTHESIA 1ST 15 MINUTES: Performed by: INTERNAL MEDICINE

## 2021-07-05 PROCEDURE — 82746 ASSAY OF FOLIC ACID SERUM: CPT | Performed by: NURSE PRACTITIONER

## 2021-07-05 PROCEDURE — 88312 PR  SPECIAL STAINS,GROUP I: ICD-10-PCS | Mod: 26,,, | Performed by: PATHOLOGY

## 2021-07-05 PROCEDURE — 80048 BASIC METABOLIC PNL TOTAL CA: CPT | Performed by: NURSE PRACTITIONER

## 2021-07-05 PROCEDURE — 88305 TISSUE EXAM BY PATHOLOGIST: CPT | Performed by: PATHOLOGY

## 2021-07-05 PROCEDURE — 43239 PR EGD, FLEX, W/BIOPSY, SGL/MULTI: ICD-10-PCS | Mod: ,,, | Performed by: INTERNAL MEDICINE

## 2021-07-05 PROCEDURE — 85025 COMPLETE CBC W/AUTO DIFF WBC: CPT | Performed by: EMERGENCY MEDICINE

## 2021-07-05 PROCEDURE — 37000009 HC ANESTHESIA EA ADD 15 MINS: Performed by: INTERNAL MEDICINE

## 2021-07-05 PROCEDURE — 63600175 PHARM REV CODE 636 W HCPCS: Performed by: NURSE PRACTITIONER

## 2021-07-05 PROCEDURE — 25000003 PHARM REV CODE 250: Performed by: INTERNAL MEDICINE

## 2021-07-05 PROCEDURE — 85025 COMPLETE CBC W/AUTO DIFF WBC: CPT | Mod: 91 | Performed by: NURSE PRACTITIONER

## 2021-07-05 PROCEDURE — G0378 HOSPITAL OBSERVATION PER HR: HCPCS

## 2021-07-05 PROCEDURE — D9220A PRA ANESTHESIA: Mod: ANES,,, | Performed by: ANESTHESIOLOGY

## 2021-07-05 PROCEDURE — 81025 URINE PREGNANCY TEST: CPT | Performed by: INTERNAL MEDICINE

## 2021-07-05 PROCEDURE — 86900 BLOOD TYPING SEROLOGIC ABO: CPT | Performed by: EMERGENCY MEDICINE

## 2021-07-05 PROCEDURE — 25000003 PHARM REV CODE 250: Performed by: NURSE ANESTHETIST, CERTIFIED REGISTERED

## 2021-07-05 PROCEDURE — S4991 NICOTINE PATCH NONLEGEND: HCPCS | Performed by: INTERNAL MEDICINE

## 2021-07-05 PROCEDURE — D9220A PRA ANESTHESIA: Mod: CRNA,,, | Performed by: NURSE ANESTHETIST, CERTIFIED REGISTERED

## 2021-07-05 PROCEDURE — 63600175 PHARM REV CODE 636 W HCPCS: Performed by: INTERNAL MEDICINE

## 2021-07-05 PROCEDURE — 96365 THER/PROPH/DIAG IV INF INIT: CPT | Mod: 59

## 2021-07-05 PROCEDURE — 63600175 PHARM REV CODE 636 W HCPCS: Performed by: EMERGENCY MEDICINE

## 2021-07-05 PROCEDURE — 88312 SPECIAL STAINS GROUP 1: CPT | Performed by: PATHOLOGY

## 2021-07-05 PROCEDURE — 27201012 HC FORCEPS, HOT/COLD, DISP: Performed by: INTERNAL MEDICINE

## 2021-07-05 PROCEDURE — 36415 COLL VENOUS BLD VENIPUNCTURE: CPT | Performed by: EMERGENCY MEDICINE

## 2021-07-05 PROCEDURE — 36415 COLL VENOUS BLD VENIPUNCTURE: CPT | Performed by: NURSE PRACTITIONER

## 2021-07-05 PROCEDURE — 82607 VITAMIN B-12: CPT | Performed by: NURSE PRACTITIONER

## 2021-07-05 PROCEDURE — 88312 SPECIAL STAINS GROUP 1: CPT | Mod: 26,,, | Performed by: PATHOLOGY

## 2021-07-05 PROCEDURE — U0002 COVID-19 LAB TEST NON-CDC: HCPCS | Performed by: EMERGENCY MEDICINE

## 2021-07-05 PROCEDURE — 83735 ASSAY OF MAGNESIUM: CPT | Performed by: NURSE PRACTITIONER

## 2021-07-05 PROCEDURE — 99204 OFFICE O/P NEW MOD 45 MIN: CPT | Mod: 25,,, | Performed by: INTERNAL MEDICINE

## 2021-07-05 PROCEDURE — 96366 THER/PROPH/DIAG IV INF ADDON: CPT

## 2021-07-05 RX ORDER — SODIUM CHLORIDE 9 MG/ML
INJECTION, SOLUTION INTRAVENOUS CONTINUOUS
Status: DISCONTINUED | OUTPATIENT
Start: 2021-07-05 | End: 2021-07-05

## 2021-07-05 RX ORDER — POLYETHYLENE GLYCOL 3350, SODIUM SULFATE ANHYDROUS, SODIUM BICARBONATE, SODIUM CHLORIDE, POTASSIUM CHLORIDE 236; 22.74; 6.74; 5.86; 2.97 G/4L; G/4L; G/4L; G/4L; G/4L
4000 POWDER, FOR SOLUTION ORAL ONCE
Status: DISCONTINUED | OUTPATIENT
Start: 2021-07-06 | End: 2021-07-06 | Stop reason: HOSPADM

## 2021-07-05 RX ORDER — PANTOPRAZOLE SODIUM 40 MG/1
40 TABLET, DELAYED RELEASE ORAL DAILY
Status: DISCONTINUED | OUTPATIENT
Start: 2021-07-06 | End: 2021-07-06 | Stop reason: HOSPADM

## 2021-07-05 RX ORDER — PANTOPRAZOLE SODIUM 40 MG/10ML
40 INJECTION, POWDER, LYOPHILIZED, FOR SOLUTION INTRAVENOUS
Status: COMPLETED | OUTPATIENT
Start: 2021-07-05 | End: 2021-07-05

## 2021-07-05 RX ORDER — ONDANSETRON 2 MG/ML
4 INJECTION INTRAMUSCULAR; INTRAVENOUS EVERY 8 HOURS PRN
Status: DISCONTINUED | OUTPATIENT
Start: 2021-07-05 | End: 2021-07-06 | Stop reason: HOSPADM

## 2021-07-05 RX ORDER — LIDOCAINE HCL/PF 100 MG/5ML
SYRINGE (ML) INTRAVENOUS
Status: DISCONTINUED | OUTPATIENT
Start: 2021-07-05 | End: 2021-07-05

## 2021-07-05 RX ORDER — ACETAMINOPHEN 325 MG/1
650 TABLET ORAL EVERY 6 HOURS PRN
Status: DISCONTINUED | OUTPATIENT
Start: 2021-07-05 | End: 2021-07-06 | Stop reason: HOSPADM

## 2021-07-05 RX ORDER — MORPHINE SULFATE 2 MG/ML
2 INJECTION, SOLUTION INTRAMUSCULAR; INTRAVENOUS EVERY 4 HOURS PRN
Status: DISCONTINUED | OUTPATIENT
Start: 2021-07-05 | End: 2021-07-06 | Stop reason: HOSPADM

## 2021-07-05 RX ORDER — SODIUM CHLORIDE 9 MG/ML
INJECTION, SOLUTION INTRAVENOUS CONTINUOUS
Status: DISCONTINUED | OUTPATIENT
Start: 2021-07-05 | End: 2021-07-06

## 2021-07-05 RX ORDER — POLYETHYLENE GLYCOL 3350, SODIUM SULFATE ANHYDROUS, SODIUM BICARBONATE, SODIUM CHLORIDE, POTASSIUM CHLORIDE 236; 22.74; 6.74; 5.86; 2.97 G/4L; G/4L; G/4L; G/4L; G/4L
4000 POWDER, FOR SOLUTION ORAL ONCE
Status: COMPLETED | OUTPATIENT
Start: 2021-07-05 | End: 2021-07-05

## 2021-07-05 RX ORDER — CYANOCOBALAMIN 1000 UG/ML
1000 INJECTION, SOLUTION INTRAMUSCULAR; SUBCUTANEOUS DAILY
Status: DISCONTINUED | OUTPATIENT
Start: 2021-07-05 | End: 2021-07-06 | Stop reason: HOSPADM

## 2021-07-05 RX ORDER — IBUPROFEN 200 MG
1 TABLET ORAL DAILY
Status: DISCONTINUED | OUTPATIENT
Start: 2021-07-05 | End: 2021-07-06 | Stop reason: HOSPADM

## 2021-07-05 RX ORDER — MORPHINE SULFATE 4 MG/ML
4 INJECTION, SOLUTION INTRAMUSCULAR; INTRAVENOUS
Status: COMPLETED | OUTPATIENT
Start: 2021-07-05 | End: 2021-07-05

## 2021-07-05 RX ORDER — FERROUS SULFATE 325(65) MG
325 TABLET, DELAYED RELEASE (ENTERIC COATED) ORAL DAILY
Status: DISCONTINUED | OUTPATIENT
Start: 2021-07-05 | End: 2021-07-06 | Stop reason: HOSPADM

## 2021-07-05 RX ORDER — OXYCODONE AND ACETAMINOPHEN 10; 325 MG/1; MG/1
1 TABLET ORAL EVERY 6 HOURS PRN
Status: DISCONTINUED | OUTPATIENT
Start: 2021-07-05 | End: 2021-07-06 | Stop reason: HOSPADM

## 2021-07-05 RX ORDER — PROPOFOL 10 MG/ML
VIAL (ML) INTRAVENOUS
Status: DISCONTINUED | OUTPATIENT
Start: 2021-07-05 | End: 2021-07-05

## 2021-07-05 RX ORDER — FLUOXETINE HYDROCHLORIDE 20 MG/1
20 CAPSULE ORAL DAILY
Status: DISCONTINUED | OUTPATIENT
Start: 2021-07-05 | End: 2021-07-06 | Stop reason: HOSPADM

## 2021-07-05 RX ORDER — HYDROXYZINE PAMOATE 25 MG/1
25 CAPSULE ORAL EVERY 8 HOURS PRN
Status: DISCONTINUED | OUTPATIENT
Start: 2021-07-05 | End: 2021-07-06 | Stop reason: HOSPADM

## 2021-07-05 RX ORDER — IBUPROFEN 200 MG
1 TABLET ORAL DAILY
Status: DISCONTINUED | OUTPATIENT
Start: 2021-07-06 | End: 2021-07-05

## 2021-07-05 RX ADMIN — PROPOFOL 100 MG: 10 INJECTION, EMULSION INTRAVENOUS at 02:07

## 2021-07-05 RX ADMIN — Medication 1 PATCH: at 04:07

## 2021-07-05 RX ADMIN — MORPHINE SULFATE 2 MG: 2 INJECTION, SOLUTION INTRAMUSCULAR; INTRAVENOUS at 09:07

## 2021-07-05 RX ADMIN — CYANOCOBALAMIN 1000 MCG: 1000 INJECTION, SOLUTION INTRAMUSCULAR; SUBCUTANEOUS at 10:07

## 2021-07-05 RX ADMIN — PROPOFOL 30 MG: 10 INJECTION, EMULSION INTRAVENOUS at 02:07

## 2021-07-05 RX ADMIN — POLYETHYLENE GLYCOL-3350 AND ELECTROLYTES 4000 ML: 236; 6.74; 5.86; 2.97; 22.74 POWDER, FOR SOLUTION ORAL at 05:07

## 2021-07-05 RX ADMIN — HYDROXYZINE PAMOATE 25 MG: 25 CAPSULE ORAL at 04:07

## 2021-07-05 RX ADMIN — SODIUM CHLORIDE: 0.9 INJECTION, SOLUTION INTRAVENOUS at 03:07

## 2021-07-05 RX ADMIN — PANTOPRAZOLE SODIUM 8 MG/HR: 40 INJECTION, POWDER, FOR SOLUTION INTRAVENOUS at 03:07

## 2021-07-05 RX ADMIN — OXYCODONE AND ACETAMINOPHEN 1 TABLET: 10; 325 TABLET ORAL at 04:07

## 2021-07-05 RX ADMIN — PANTOPRAZOLE SODIUM 8 MG/HR: 40 INJECTION, POWDER, FOR SOLUTION INTRAVENOUS at 09:07

## 2021-07-05 RX ADMIN — IRON SUCROSE 200 MG: 20 INJECTION, SOLUTION INTRAVENOUS at 11:07

## 2021-07-05 RX ADMIN — MORPHINE SULFATE 4 MG: 4 INJECTION, SOLUTION INTRAMUSCULAR; INTRAVENOUS at 01:07

## 2021-07-05 RX ADMIN — PANTOPRAZOLE SODIUM 40 MG: 40 INJECTION, POWDER, FOR SOLUTION INTRAVENOUS at 12:07

## 2021-07-05 RX ADMIN — LIDOCAINE HYDROCHLORIDE 100 MG: 20 INJECTION INTRAVENOUS at 02:07

## 2021-07-05 RX ADMIN — FLUOXETINE HYDROCHLORIDE 20 MG: 20 CAPSULE ORAL at 09:07

## 2021-07-05 RX ADMIN — OXYCODONE AND ACETAMINOPHEN 1 TABLET: 10; 325 TABLET ORAL at 02:07

## 2021-07-05 RX ADMIN — SODIUM CHLORIDE: 0.9 INJECTION, SOLUTION INTRAVENOUS at 01:07

## 2021-07-05 RX ADMIN — FERROUS SULFATE TAB EC 325 MG (65 MG FE EQUIVALENT) 325 MG: 325 (65 FE) TABLET DELAYED RESPONSE at 09:07

## 2021-07-06 ENCOUNTER — ANESTHESIA EVENT (OUTPATIENT)
Dept: ENDOSCOPY | Facility: HOSPITAL | Age: 41
End: 2021-07-06
Payer: COMMERCIAL

## 2021-07-06 ENCOUNTER — ANESTHESIA (OUTPATIENT)
Dept: ENDOSCOPY | Facility: HOSPITAL | Age: 41
End: 2021-07-06
Payer: COMMERCIAL

## 2021-07-06 VITALS
TEMPERATURE: 98 F | BODY MASS INDEX: 37.88 KG/M2 | HEIGHT: 69 IN | RESPIRATION RATE: 16 BRPM | WEIGHT: 255.75 LBS | SYSTOLIC BLOOD PRESSURE: 131 MMHG | HEART RATE: 53 BPM | OXYGEN SATURATION: 100 % | DIASTOLIC BLOOD PRESSURE: 61 MMHG

## 2021-07-06 DIAGNOSIS — Z98.1 STATUS POST ANKLE ARTHRODESIS: ICD-10-CM

## 2021-07-06 DIAGNOSIS — G89.29 CHRONIC PAIN OF LEFT ANKLE: ICD-10-CM

## 2021-07-06 DIAGNOSIS — M25.572 CHRONIC PAIN OF LEFT ANKLE: ICD-10-CM

## 2021-07-06 DIAGNOSIS — R52 PAIN: ICD-10-CM

## 2021-07-06 LAB
ANION GAP SERPL CALC-SCNC: 8 MMOL/L (ref 8–16)
BASOPHILS # BLD AUTO: 0.05 K/UL (ref 0–0.2)
BASOPHILS # BLD AUTO: 0.07 K/UL (ref 0–0.2)
BASOPHILS NFR BLD: 0.6 % (ref 0–1.9)
BASOPHILS NFR BLD: 0.8 % (ref 0–1.9)
BUN SERPL-MCNC: 6 MG/DL (ref 6–20)
CALCIUM SERPL-MCNC: 8.5 MG/DL (ref 8.7–10.5)
CHLORIDE SERPL-SCNC: 114 MMOL/L (ref 95–110)
CO2 SERPL-SCNC: 20 MMOL/L (ref 23–29)
CREAT SERPL-MCNC: 0.6 MG/DL (ref 0.5–1.4)
DIFFERENTIAL METHOD: ABNORMAL
DIFFERENTIAL METHOD: ABNORMAL
EOSINOPHIL # BLD AUTO: 0.1 K/UL (ref 0–0.5)
EOSINOPHIL # BLD AUTO: 0.1 K/UL (ref 0–0.5)
EOSINOPHIL NFR BLD: 0.6 % (ref 0–8)
EOSINOPHIL NFR BLD: 2.2 % (ref 0–8)
ERYTHROCYTE [DISTWIDTH] IN BLOOD BY AUTOMATED COUNT: 17.9 % (ref 11.5–14.5)
ERYTHROCYTE [DISTWIDTH] IN BLOOD BY AUTOMATED COUNT: 17.9 % (ref 11.5–14.5)
EST. GFR  (AFRICAN AMERICAN): >60 ML/MIN/1.73 M^2
EST. GFR  (NON AFRICAN AMERICAN): >60 ML/MIN/1.73 M^2
GLUCOSE SERPL-MCNC: 82 MG/DL (ref 70–110)
HCT VFR BLD AUTO: 27.6 % (ref 37–48.5)
HCT VFR BLD AUTO: 30.1 % (ref 37–48.5)
HGB BLD-MCNC: 7.7 G/DL (ref 12–16)
HGB BLD-MCNC: 8.7 G/DL (ref 12–16)
IMM GRANULOCYTES # BLD AUTO: 0.02 K/UL (ref 0–0.04)
IMM GRANULOCYTES # BLD AUTO: 0.05 K/UL (ref 0–0.04)
IMM GRANULOCYTES NFR BLD AUTO: 0.3 % (ref 0–0.5)
IMM GRANULOCYTES NFR BLD AUTO: 0.4 % (ref 0–0.5)
LYMPHOCYTES # BLD AUTO: 1.3 K/UL (ref 1–4.8)
LYMPHOCYTES # BLD AUTO: 1.6 K/UL (ref 1–4.8)
LYMPHOCYTES NFR BLD: 10.4 % (ref 18–48)
LYMPHOCYTES NFR BLD: 27 % (ref 18–48)
MAGNESIUM SERPL-MCNC: 2.2 MG/DL (ref 1.6–2.6)
MCH RBC QN AUTO: 19.8 PG (ref 27–31)
MCH RBC QN AUTO: 20.3 PG (ref 27–31)
MCHC RBC AUTO-ENTMCNC: 27.9 G/DL (ref 32–36)
MCHC RBC AUTO-ENTMCNC: 28.9 G/DL (ref 32–36)
MCV RBC AUTO: 70 FL (ref 82–98)
MCV RBC AUTO: 71 FL (ref 82–98)
MONOCYTES # BLD AUTO: 0.4 K/UL (ref 0.3–1)
MONOCYTES # BLD AUTO: 0.5 K/UL (ref 0.3–1)
MONOCYTES NFR BLD: 3.6 % (ref 4–15)
MONOCYTES NFR BLD: 7.6 % (ref 4–15)
NEUTROPHILS # BLD AUTO: 10.2 K/UL (ref 1.8–7.7)
NEUTROPHILS # BLD AUTO: 3.7 K/UL (ref 1.8–7.7)
NEUTROPHILS NFR BLD: 62.1 % (ref 38–73)
NEUTROPHILS NFR BLD: 84.4 % (ref 38–73)
NRBC BLD-RTO: 0 /100 WBC
NRBC BLD-RTO: 0 /100 WBC
PLATELET # BLD AUTO: 302 K/UL (ref 150–450)
PLATELET # BLD AUTO: 348 K/UL (ref 150–450)
PMV BLD AUTO: 9 FL (ref 9.2–12.9)
PMV BLD AUTO: 9.1 FL (ref 9.2–12.9)
POTASSIUM SERPL-SCNC: 3.5 MMOL/L (ref 3.5–5.1)
RBC # BLD AUTO: 3.88 M/UL (ref 4–5.4)
RBC # BLD AUTO: 4.29 M/UL (ref 4–5.4)
SODIUM SERPL-SCNC: 142 MMOL/L (ref 136–145)
WBC # BLD AUTO: 12.09 K/UL (ref 3.9–12.7)
WBC # BLD AUTO: 5.96 K/UL (ref 3.9–12.7)

## 2021-07-06 PROCEDURE — 45378 PR COLONOSCOPY,DIAGNOSTIC: ICD-10-PCS | Mod: ,,, | Performed by: INTERNAL MEDICINE

## 2021-07-06 PROCEDURE — 45378 DIAGNOSTIC COLONOSCOPY: CPT | Performed by: INTERNAL MEDICINE

## 2021-07-06 PROCEDURE — 37000008 HC ANESTHESIA 1ST 15 MINUTES: Performed by: INTERNAL MEDICINE

## 2021-07-06 PROCEDURE — G0378 HOSPITAL OBSERVATION PER HR: HCPCS

## 2021-07-06 PROCEDURE — 96372 THER/PROPH/DIAG INJ SC/IM: CPT

## 2021-07-06 PROCEDURE — 85025 COMPLETE CBC W/AUTO DIFF WBC: CPT | Mod: 91 | Performed by: INTERNAL MEDICINE

## 2021-07-06 PROCEDURE — S4991 NICOTINE PATCH NONLEGEND: HCPCS | Performed by: INTERNAL MEDICINE

## 2021-07-06 PROCEDURE — 36415 COLL VENOUS BLD VENIPUNCTURE: CPT | Performed by: NURSE PRACTITIONER

## 2021-07-06 PROCEDURE — D9220A PRA ANESTHESIA: ICD-10-PCS | Mod: ANES,,, | Performed by: ANESTHESIOLOGY

## 2021-07-06 PROCEDURE — 80048 BASIC METABOLIC PNL TOTAL CA: CPT | Performed by: NURSE PRACTITIONER

## 2021-07-06 PROCEDURE — 25000003 PHARM REV CODE 250: Performed by: INTERNAL MEDICINE

## 2021-07-06 PROCEDURE — 37000009 HC ANESTHESIA EA ADD 15 MINS: Performed by: INTERNAL MEDICINE

## 2021-07-06 PROCEDURE — D9220A PRA ANESTHESIA: Mod: CRNA,,, | Performed by: NURSE ANESTHETIST, CERTIFIED REGISTERED

## 2021-07-06 PROCEDURE — 36415 COLL VENOUS BLD VENIPUNCTURE: CPT | Performed by: INTERNAL MEDICINE

## 2021-07-06 PROCEDURE — 45378 DIAGNOSTIC COLONOSCOPY: CPT | Mod: ,,, | Performed by: INTERNAL MEDICINE

## 2021-07-06 PROCEDURE — 25000003 PHARM REV CODE 250: Performed by: NURSE ANESTHETIST, CERTIFIED REGISTERED

## 2021-07-06 PROCEDURE — 83735 ASSAY OF MAGNESIUM: CPT | Performed by: NURSE PRACTITIONER

## 2021-07-06 PROCEDURE — D9220A PRA ANESTHESIA: Mod: ANES,,, | Performed by: ANESTHESIOLOGY

## 2021-07-06 PROCEDURE — 63600175 PHARM REV CODE 636 W HCPCS: Performed by: INTERNAL MEDICINE

## 2021-07-06 PROCEDURE — 25000003 PHARM REV CODE 250: Performed by: NURSE PRACTITIONER

## 2021-07-06 PROCEDURE — 63600175 PHARM REV CODE 636 W HCPCS: Performed by: NURSE ANESTHETIST, CERTIFIED REGISTERED

## 2021-07-06 PROCEDURE — 85025 COMPLETE CBC W/AUTO DIFF WBC: CPT | Performed by: NURSE PRACTITIONER

## 2021-07-06 PROCEDURE — D9220A PRA ANESTHESIA: ICD-10-PCS | Mod: CRNA,,, | Performed by: NURSE ANESTHETIST, CERTIFIED REGISTERED

## 2021-07-06 RX ORDER — SUCRALFATE 1 G/10ML
1 SUSPENSION ORAL
Qty: 400 ML | Refills: 2 | Status: SHIPPED | OUTPATIENT
Start: 2021-07-06 | End: 2021-07-16

## 2021-07-06 RX ORDER — OXYCODONE AND ACETAMINOPHEN 10; 325 MG/1; MG/1
1 TABLET ORAL EVERY 6 HOURS PRN
Qty: 28 TABLET | Refills: 0 | Status: CANCELLED | OUTPATIENT
Start: 2021-07-06

## 2021-07-06 RX ORDER — SUCRALFATE 1 G/10ML
1 SUSPENSION ORAL EVERY 6 HOURS
Status: DISCONTINUED | OUTPATIENT
Start: 2021-07-06 | End: 2021-07-06 | Stop reason: HOSPADM

## 2021-07-06 RX ORDER — FERROUS SULFATE 325(65) MG
325 TABLET ORAL
Qty: 30 TABLET | Refills: 0 | Status: SHIPPED | OUTPATIENT
Start: 2021-07-06 | End: 2021-08-05

## 2021-07-06 RX ORDER — LIDOCAINE HCL/PF 100 MG/5ML
SYRINGE (ML) INTRAVENOUS
Status: DISCONTINUED | OUTPATIENT
Start: 2021-07-06 | End: 2021-07-06

## 2021-07-06 RX ORDER — CHOLECALCIFEROL (VITAMIN D3) 25 MCG
1 TABLET,CHEWABLE ORAL DAILY
Qty: 30 CAPSULE | Refills: 0 | Status: SHIPPED | OUTPATIENT
Start: 2021-07-06 | End: 2021-08-02

## 2021-07-06 RX ORDER — PROPOFOL 10 MG/ML
VIAL (ML) INTRAVENOUS
Status: DISCONTINUED | OUTPATIENT
Start: 2021-07-06 | End: 2021-07-06

## 2021-07-06 RX ORDER — PANTOPRAZOLE SODIUM 40 MG/1
40 TABLET, DELAYED RELEASE ORAL DAILY
Qty: 30 TABLET | Refills: 11 | Status: SHIPPED | OUTPATIENT
Start: 2021-07-07 | End: 2021-07-08

## 2021-07-06 RX ADMIN — OXYCODONE AND ACETAMINOPHEN 1 TABLET: 10; 325 TABLET ORAL at 02:07

## 2021-07-06 RX ADMIN — PROPOFOL 50 MG: 10 INJECTION, EMULSION INTRAVENOUS at 12:07

## 2021-07-06 RX ADMIN — CYANOCOBALAMIN 1000 MCG: 1000 INJECTION, SOLUTION INTRAMUSCULAR; SUBCUTANEOUS at 10:07

## 2021-07-06 RX ADMIN — LIDOCAINE HYDROCHLORIDE 100 MG: 20 INJECTION INTRAVENOUS at 12:07

## 2021-07-06 RX ADMIN — Medication 1 PATCH: at 10:07

## 2021-07-06 RX ADMIN — PROPOFOL 100 MG: 10 INJECTION, EMULSION INTRAVENOUS at 12:07

## 2021-07-07 ENCOUNTER — PATIENT MESSAGE (OUTPATIENT)
Dept: INTERNAL MEDICINE | Facility: CLINIC | Age: 41
End: 2021-07-07

## 2021-07-07 ENCOUNTER — TELEPHONE (OUTPATIENT)
Dept: EMERGENCY MEDICINE | Facility: HOSPITAL | Age: 41
End: 2021-07-07

## 2021-07-08 ENCOUNTER — TELEPHONE (OUTPATIENT)
Dept: GASTROENTEROLOGY | Facility: CLINIC | Age: 41
End: 2021-07-08

## 2021-07-08 ENCOUNTER — PATIENT MESSAGE (OUTPATIENT)
Dept: PSYCHIATRY | Facility: CLINIC | Age: 41
End: 2021-07-08

## 2021-07-08 ENCOUNTER — TELEPHONE (OUTPATIENT)
Dept: ENDOSCOPY | Facility: HOSPITAL | Age: 41
End: 2021-07-08

## 2021-07-08 ENCOUNTER — TELEPHONE (OUTPATIENT)
Dept: ORTHOPEDICS | Facility: CLINIC | Age: 41
End: 2021-07-08

## 2021-07-08 ENCOUNTER — OFFICE VISIT (OUTPATIENT)
Dept: GASTROENTEROLOGY | Facility: CLINIC | Age: 41
End: 2021-07-08
Payer: COMMERCIAL

## 2021-07-08 ENCOUNTER — OFFICE VISIT (OUTPATIENT)
Dept: INTERNAL MEDICINE | Facility: CLINIC | Age: 41
End: 2021-07-08
Attending: INTERNAL MEDICINE
Payer: COMMERCIAL

## 2021-07-08 VITALS
DIASTOLIC BLOOD PRESSURE: 80 MMHG | HEART RATE: 70 BPM | OXYGEN SATURATION: 90 % | HEIGHT: 69 IN | SYSTOLIC BLOOD PRESSURE: 134 MMHG | WEIGHT: 251.31 LBS | BODY MASS INDEX: 37.22 KG/M2

## 2021-07-08 VITALS
WEIGHT: 251.31 LBS | HEART RATE: 77 BPM | HEIGHT: 69 IN | SYSTOLIC BLOOD PRESSURE: 149 MMHG | DIASTOLIC BLOOD PRESSURE: 67 MMHG | BODY MASS INDEX: 37.22 KG/M2

## 2021-07-08 DIAGNOSIS — K92.0 HEMATEMESIS WITHOUT NAUSEA: ICD-10-CM

## 2021-07-08 DIAGNOSIS — R11.2 NAUSEA AND VOMITING, INTRACTABILITY OF VOMITING NOT SPECIFIED, UNSPECIFIED VOMITING TYPE: ICD-10-CM

## 2021-07-08 DIAGNOSIS — D50.9 IRON DEFICIENCY ANEMIA, UNSPECIFIED IRON DEFICIENCY ANEMIA TYPE: Primary | ICD-10-CM

## 2021-07-08 DIAGNOSIS — Z98.84 HX OF BARIATRIC SURGERY: ICD-10-CM

## 2021-07-08 DIAGNOSIS — N92.0 MENORRHAGIA WITH REGULAR CYCLE: ICD-10-CM

## 2021-07-08 DIAGNOSIS — D50.8 OTHER IRON DEFICIENCY ANEMIA: Primary | ICD-10-CM

## 2021-07-08 DIAGNOSIS — K92.0 HEMATEMESIS WITH NAUSEA: ICD-10-CM

## 2021-07-08 PROCEDURE — 99999 PR PBB SHADOW E&M-EST. PATIENT-LVL IV: CPT | Mod: PBBFAC,,, | Performed by: INTERNAL MEDICINE

## 2021-07-08 PROCEDURE — 3008F PR BODY MASS INDEX (BMI) DOCUMENTED: ICD-10-PCS | Mod: CPTII,S$GLB,, | Performed by: INTERNAL MEDICINE

## 2021-07-08 PROCEDURE — 3008F BODY MASS INDEX DOCD: CPT | Mod: CPTII,S$GLB,, | Performed by: INTERNAL MEDICINE

## 2021-07-08 PROCEDURE — 3008F PR BODY MASS INDEX (BMI) DOCUMENTED: ICD-10-PCS | Mod: CPTII,S$GLB,, | Performed by: STUDENT IN AN ORGANIZED HEALTH CARE EDUCATION/TRAINING PROGRAM

## 2021-07-08 PROCEDURE — 99999 PR PBB SHADOW E&M-EST. PATIENT-LVL IV: ICD-10-PCS | Mod: PBBFAC,,, | Performed by: STUDENT IN AN ORGANIZED HEALTH CARE EDUCATION/TRAINING PROGRAM

## 2021-07-08 PROCEDURE — 99214 OFFICE O/P EST MOD 30 MIN: CPT | Mod: S$GLB,,, | Performed by: STUDENT IN AN ORGANIZED HEALTH CARE EDUCATION/TRAINING PROGRAM

## 2021-07-08 PROCEDURE — 3008F BODY MASS INDEX DOCD: CPT | Mod: CPTII,S$GLB,, | Performed by: STUDENT IN AN ORGANIZED HEALTH CARE EDUCATION/TRAINING PROGRAM

## 2021-07-08 PROCEDURE — 99214 PR OFFICE/OUTPT VISIT, EST, LEVL IV, 30-39 MIN: ICD-10-PCS | Mod: S$GLB,,, | Performed by: STUDENT IN AN ORGANIZED HEALTH CARE EDUCATION/TRAINING PROGRAM

## 2021-07-08 PROCEDURE — 99214 PR OFFICE/OUTPT VISIT, EST, LEVL IV, 30-39 MIN: ICD-10-PCS | Mod: S$GLB,,, | Performed by: INTERNAL MEDICINE

## 2021-07-08 PROCEDURE — 1125F AMNT PAIN NOTED PAIN PRSNT: CPT | Mod: S$GLB,,, | Performed by: STUDENT IN AN ORGANIZED HEALTH CARE EDUCATION/TRAINING PROGRAM

## 2021-07-08 PROCEDURE — 99999 PR PBB SHADOW E&M-EST. PATIENT-LVL IV: CPT | Mod: PBBFAC,,, | Performed by: STUDENT IN AN ORGANIZED HEALTH CARE EDUCATION/TRAINING PROGRAM

## 2021-07-08 PROCEDURE — 99999 PR PBB SHADOW E&M-EST. PATIENT-LVL IV: ICD-10-PCS | Mod: PBBFAC,,, | Performed by: INTERNAL MEDICINE

## 2021-07-08 PROCEDURE — 1125F PR PAIN SEVERITY QUANTIFIED, PAIN PRESENT: ICD-10-PCS | Mod: S$GLB,,, | Performed by: STUDENT IN AN ORGANIZED HEALTH CARE EDUCATION/TRAINING PROGRAM

## 2021-07-08 PROCEDURE — 1126F AMNT PAIN NOTED NONE PRSNT: CPT | Mod: S$GLB,,, | Performed by: INTERNAL MEDICINE

## 2021-07-08 PROCEDURE — 1126F PR PAIN SEVERITY QUANTIFIED, NO PAIN PRESENT: ICD-10-PCS | Mod: S$GLB,,, | Performed by: INTERNAL MEDICINE

## 2021-07-08 PROCEDURE — 99214 OFFICE O/P EST MOD 30 MIN: CPT | Mod: S$GLB,,, | Performed by: INTERNAL MEDICINE

## 2021-07-08 RX ORDER — PANTOPRAZOLE SODIUM 40 MG/1
40 TABLET, DELAYED RELEASE ORAL 2 TIMES DAILY
Qty: 60 TABLET | Refills: 11 | Status: SHIPPED | OUTPATIENT
Start: 2021-07-08 | End: 2021-10-12 | Stop reason: SDUPTHER

## 2021-07-09 ENCOUNTER — TELEPHONE (OUTPATIENT)
Dept: MEDSURG UNIT | Facility: HOSPITAL | Age: 41
End: 2021-07-09

## 2021-07-09 ENCOUNTER — TELEPHONE (OUTPATIENT)
Dept: PSYCHIATRY | Facility: CLINIC | Age: 41
End: 2021-07-09

## 2021-07-09 ENCOUNTER — PATIENT MESSAGE (OUTPATIENT)
Dept: ENDOSCOPY | Facility: HOSPITAL | Age: 41
End: 2021-07-09

## 2021-07-09 DIAGNOSIS — M25.572 CHRONIC PAIN OF LEFT ANKLE: Primary | ICD-10-CM

## 2021-07-09 DIAGNOSIS — Z98.1 STATUS POST ANKLE ARTHRODESIS: ICD-10-CM

## 2021-07-09 DIAGNOSIS — G89.29 CHRONIC PAIN OF LEFT ANKLE: Primary | ICD-10-CM

## 2021-07-09 RX ORDER — OXYCODONE AND ACETAMINOPHEN 10; 325 MG/1; MG/1
1 TABLET ORAL EVERY 4 HOURS PRN
Qty: 42 TABLET | Refills: 0 | Status: SHIPPED | OUTPATIENT
Start: 2021-07-09 | End: 2021-07-16 | Stop reason: SDUPTHER

## 2021-07-09 RX ORDER — LORAZEPAM 0.5 MG/1
0.5 TABLET ORAL EVERY 6 HOURS PRN
Qty: 10 TABLET | Refills: 0 | Status: SHIPPED | OUTPATIENT
Start: 2021-07-09 | End: 2021-07-22 | Stop reason: SDUPTHER

## 2021-07-09 NOTE — PROGRESS NOTES
07/09/2021: Patient has had panic episodes recently manifested by sweating and agitation and chest discomfort. Patient agreed to make an appointment and to use sparingly a prn dose of Ativan 0.5 mg for panic episodes. She took that many years ago and it helped. I explained that could not be a continuous prescription but it was just for temporary use.  Patient has had a recent GI bleed and is scheduled next week for a surgical intervention. Patient has no active thoughts of harming herself.

## 2021-07-12 ENCOUNTER — PATIENT MESSAGE (OUTPATIENT)
Dept: ENDOSCOPY | Facility: HOSPITAL | Age: 41
End: 2021-07-12

## 2021-07-12 ENCOUNTER — PATIENT MESSAGE (OUTPATIENT)
Dept: INTERNAL MEDICINE | Facility: CLINIC | Age: 41
End: 2021-07-12

## 2021-07-12 RX ORDER — LIDOCAINE HYDROCHLORIDE 10 MG/ML
1 INJECTION, SOLUTION EPIDURAL; INFILTRATION; INTRACAUDAL; PERINEURAL ONCE
Status: CANCELLED | OUTPATIENT
Start: 2021-07-13 | End: 2021-07-13

## 2021-07-12 RX ORDER — SODIUM CHLORIDE 9 MG/ML
INJECTION, SOLUTION INTRAVENOUS CONTINUOUS
Status: CANCELLED | OUTPATIENT
Start: 2021-07-13

## 2021-07-13 ENCOUNTER — HOSPITAL ENCOUNTER (OUTPATIENT)
Facility: HOSPITAL | Age: 41
Discharge: HOME | End: 2021-07-13
Attending: STUDENT IN AN ORGANIZED HEALTH CARE EDUCATION/TRAINING PROGRAM | Admitting: STUDENT IN AN ORGANIZED HEALTH CARE EDUCATION/TRAINING PROGRAM
Payer: COMMERCIAL

## 2021-07-13 VITALS
DIASTOLIC BLOOD PRESSURE: 83 MMHG | TEMPERATURE: 99 F | SYSTOLIC BLOOD PRESSURE: 133 MMHG | OXYGEN SATURATION: 99 % | HEART RATE: 69 BPM | RESPIRATION RATE: 20 BRPM

## 2021-07-13 LAB
FINAL PATHOLOGIC DIAGNOSIS: NORMAL
GROSS: NORMAL
Lab: NORMAL

## 2021-07-13 PROCEDURE — 91110 GI TRC IMG INTRAL ESOPH-ILE: CPT | Mod: P3 | Performed by: STUDENT IN AN ORGANIZED HEALTH CARE EDUCATION/TRAINING PROGRAM

## 2021-07-15 ENCOUNTER — PATIENT MESSAGE (OUTPATIENT)
Dept: GASTROENTEROLOGY | Facility: CLINIC | Age: 41
End: 2021-07-15

## 2021-07-15 ENCOUNTER — PATIENT MESSAGE (OUTPATIENT)
Dept: OBSTETRICS AND GYNECOLOGY | Facility: CLINIC | Age: 41
End: 2021-07-15

## 2021-07-15 ENCOUNTER — OFFICE VISIT (OUTPATIENT)
Dept: INTERNAL MEDICINE | Facility: CLINIC | Age: 41
End: 2021-07-15
Attending: FAMILY MEDICINE
Payer: COMMERCIAL

## 2021-07-15 ENCOUNTER — PROCEDURE VISIT (OUTPATIENT)
Dept: OBSTETRICS AND GYNECOLOGY | Facility: CLINIC | Age: 41
End: 2021-07-15
Attending: OBSTETRICS & GYNECOLOGY
Payer: COMMERCIAL

## 2021-07-15 ENCOUNTER — LAB VISIT (OUTPATIENT)
Dept: LAB | Facility: OTHER | Age: 41
End: 2021-07-15
Attending: PHYSICIAN ASSISTANT
Payer: COMMERCIAL

## 2021-07-15 VITALS
WEIGHT: 251.56 LBS | HEIGHT: 69 IN | HEART RATE: 62 BPM | OXYGEN SATURATION: 99 % | DIASTOLIC BLOOD PRESSURE: 92 MMHG | BODY MASS INDEX: 37.26 KG/M2 | SYSTOLIC BLOOD PRESSURE: 140 MMHG

## 2021-07-15 DIAGNOSIS — Z98.84 HX OF BARIATRIC SURGERY: ICD-10-CM

## 2021-07-15 DIAGNOSIS — D52.9 ANEMIA DUE TO FOLIC ACID DEFICIENCY, UNSPECIFIED DEFICIENCY TYPE: Primary | ICD-10-CM

## 2021-07-15 DIAGNOSIS — N93.8 DUB (DYSFUNCTIONAL UTERINE BLEEDING): Primary | ICD-10-CM

## 2021-07-15 DIAGNOSIS — R53.83 OTHER FATIGUE: ICD-10-CM

## 2021-07-15 DIAGNOSIS — N92.0 MENORRHAGIA WITH REGULAR CYCLE: ICD-10-CM

## 2021-07-15 DIAGNOSIS — K92.2 GASTROINTESTINAL HEMORRHAGE, UNSPECIFIED GASTROINTESTINAL HEMORRHAGE TYPE: ICD-10-CM

## 2021-07-15 DIAGNOSIS — N93.8 DUB (DYSFUNCTIONAL UTERINE BLEEDING): ICD-10-CM

## 2021-07-15 DIAGNOSIS — D64.9 ANEMIA, UNSPECIFIED TYPE: ICD-10-CM

## 2021-07-15 LAB
BASOPHILS # BLD AUTO: 0.07 K/UL (ref 0–0.2)
BASOPHILS NFR BLD: 1.1 % (ref 0–1.9)
DIFFERENTIAL METHOD: ABNORMAL
EOSINOPHIL # BLD AUTO: 0.1 K/UL (ref 0–0.5)
EOSINOPHIL NFR BLD: 2 % (ref 0–8)
ERYTHROCYTE [DISTWIDTH] IN BLOOD BY AUTOMATED COUNT: 20.6 % (ref 11.5–14.5)
HCT VFR BLD AUTO: 33.7 % (ref 37–48.5)
HGB BLD-MCNC: 9.6 G/DL (ref 12–16)
IMM GRANULOCYTES # BLD AUTO: 0.02 K/UL (ref 0–0.04)
IMM GRANULOCYTES NFR BLD AUTO: 0.3 % (ref 0–0.5)
LYMPHOCYTES # BLD AUTO: 1.7 K/UL (ref 1–4.8)
LYMPHOCYTES NFR BLD: 25.6 % (ref 18–48)
MCH RBC QN AUTO: 20.4 PG (ref 27–31)
MCHC RBC AUTO-ENTMCNC: 28.5 G/DL (ref 32–36)
MCV RBC AUTO: 72 FL (ref 82–98)
MONOCYTES # BLD AUTO: 0.4 K/UL (ref 0.3–1)
MONOCYTES NFR BLD: 5.8 % (ref 4–15)
NEUTROPHILS # BLD AUTO: 4.3 K/UL (ref 1.8–7.7)
NEUTROPHILS NFR BLD: 65.2 % (ref 38–73)
NRBC BLD-RTO: 0 /100 WBC
PLATELET # BLD AUTO: 381 K/UL (ref 150–450)
PMV BLD AUTO: 9.3 FL (ref 9.2–12.9)
RBC # BLD AUTO: 4.7 M/UL (ref 4–5.4)
TSH SERPL DL<=0.005 MIU/L-ACNC: 1.33 UIU/ML (ref 0.4–4)
WBC # BLD AUTO: 6.6 K/UL (ref 3.9–12.7)

## 2021-07-15 PROCEDURE — 99999 PR PBB SHADOW E&M-EST. PATIENT-LVL III: ICD-10-PCS | Mod: PBBFAC,,, | Performed by: PHYSICIAN ASSISTANT

## 2021-07-15 PROCEDURE — 99999 PR PBB SHADOW E&M-EST. PATIENT-LVL III: CPT | Mod: PBBFAC,,, | Performed by: PHYSICIAN ASSISTANT

## 2021-07-15 PROCEDURE — 83036 HEMOGLOBIN GLYCOSYLATED A1C: CPT | Performed by: OBSTETRICS & GYNECOLOGY

## 2021-07-15 PROCEDURE — 1125F AMNT PAIN NOTED PAIN PRSNT: CPT | Mod: CPTII,S$GLB,, | Performed by: PHYSICIAN ASSISTANT

## 2021-07-15 PROCEDURE — 85025 COMPLETE CBC W/AUTO DIFF WBC: CPT | Performed by: PHYSICIAN ASSISTANT

## 2021-07-15 PROCEDURE — 3008F PR BODY MASS INDEX (BMI) DOCUMENTED: ICD-10-PCS | Mod: CPTII,S$GLB,, | Performed by: PHYSICIAN ASSISTANT

## 2021-07-15 PROCEDURE — 99214 PR OFFICE/OUTPT VISIT, EST, LEVL IV, 30-39 MIN: ICD-10-PCS | Mod: S$GLB,,, | Performed by: PHYSICIAN ASSISTANT

## 2021-07-15 PROCEDURE — 84443 ASSAY THYROID STIM HORMONE: CPT | Performed by: OBSTETRICS & GYNECOLOGY

## 2021-07-15 PROCEDURE — 3008F BODY MASS INDEX DOCD: CPT | Mod: CPTII,S$GLB,, | Performed by: PHYSICIAN ASSISTANT

## 2021-07-15 PROCEDURE — 36415 COLL VENOUS BLD VENIPUNCTURE: CPT | Performed by: PHYSICIAN ASSISTANT

## 2021-07-15 PROCEDURE — 1125F PR PAIN SEVERITY QUANTIFIED, PAIN PRESENT: ICD-10-PCS | Mod: CPTII,S$GLB,, | Performed by: PHYSICIAN ASSISTANT

## 2021-07-15 PROCEDURE — 99214 OFFICE O/P EST MOD 30 MIN: CPT | Mod: S$GLB,,, | Performed by: PHYSICIAN ASSISTANT

## 2021-07-16 ENCOUNTER — PATIENT MESSAGE (OUTPATIENT)
Dept: INTERNAL MEDICINE | Facility: CLINIC | Age: 41
End: 2021-07-16

## 2021-07-16 ENCOUNTER — PATIENT MESSAGE (OUTPATIENT)
Dept: ORTHOPEDICS | Facility: CLINIC | Age: 41
End: 2021-07-16

## 2021-07-16 DIAGNOSIS — M25.572 CHRONIC PAIN OF LEFT ANKLE: ICD-10-CM

## 2021-07-16 DIAGNOSIS — Z98.1 STATUS POST ANKLE ARTHRODESIS: ICD-10-CM

## 2021-07-16 DIAGNOSIS — G89.29 CHRONIC PAIN OF LEFT ANKLE: ICD-10-CM

## 2021-07-16 LAB
ESTIMATED AVG GLUCOSE: 97 MG/DL (ref 68–131)
HBA1C MFR BLD: 5 % (ref 4–5.6)

## 2021-07-16 PROCEDURE — 91110 GI TRC IMG INTRAL ESOPH-ILE: CPT | Mod: 26,,, | Performed by: INTERNAL MEDICINE

## 2021-07-16 PROCEDURE — 91110 PR GI TRACT CAPSULE ENDOSCOPY: ICD-10-PCS | Mod: 26,,, | Performed by: INTERNAL MEDICINE

## 2021-07-16 RX ORDER — OXYCODONE AND ACETAMINOPHEN 10; 325 MG/1; MG/1
1 TABLET ORAL EVERY 4 HOURS PRN
Qty: 42 TABLET | Refills: 0 | Status: SHIPPED | OUTPATIENT
Start: 2021-07-16 | End: 2021-07-22 | Stop reason: SDUPTHER

## 2021-07-22 ENCOUNTER — PATIENT MESSAGE (OUTPATIENT)
Dept: PSYCHIATRY | Facility: CLINIC | Age: 41
End: 2021-07-22

## 2021-07-22 ENCOUNTER — PATIENT MESSAGE (OUTPATIENT)
Dept: ORTHOPEDICS | Facility: CLINIC | Age: 41
End: 2021-07-22

## 2021-07-22 DIAGNOSIS — Z98.1 STATUS POST ANKLE ARTHRODESIS: ICD-10-CM

## 2021-07-22 DIAGNOSIS — M25.572 CHRONIC PAIN OF LEFT ANKLE: ICD-10-CM

## 2021-07-22 DIAGNOSIS — G89.29 CHRONIC PAIN OF LEFT ANKLE: ICD-10-CM

## 2021-07-22 RX ORDER — OXYCODONE AND ACETAMINOPHEN 10; 325 MG/1; MG/1
1 TABLET ORAL EVERY 4 HOURS PRN
Qty: 42 TABLET | Refills: 0 | Status: SHIPPED | OUTPATIENT
Start: 2021-07-22 | End: 2021-07-28 | Stop reason: SDUPTHER

## 2021-07-23 ENCOUNTER — PATIENT MESSAGE (OUTPATIENT)
Dept: INTERNAL MEDICINE | Facility: CLINIC | Age: 41
End: 2021-07-23

## 2021-07-24 ENCOUNTER — HOSPITAL ENCOUNTER (OUTPATIENT)
Dept: RADIOLOGY | Facility: OTHER | Age: 41
Discharge: HOME | End: 2021-07-24
Attending: OBSTETRICS & GYNECOLOGY
Payer: COMMERCIAL

## 2021-07-24 DIAGNOSIS — N93.8 DUB (DYSFUNCTIONAL UTERINE BLEEDING): ICD-10-CM

## 2021-07-24 PROCEDURE — 76856 US EXAM PELVIC COMPLETE: CPT | Mod: TC

## 2021-07-24 PROCEDURE — 76856 US EXAM PELVIC COMPLETE: CPT | Mod: 26,,, | Performed by: RADIOLOGY

## 2021-07-24 PROCEDURE — 76830 TRANSVAGINAL US NON-OB: CPT | Mod: 26,,, | Performed by: RADIOLOGY

## 2021-07-24 PROCEDURE — 76856 US PELVIS COMP WITH TRANSVAG NON-OB (XPD): ICD-10-PCS | Mod: 26,,, | Performed by: RADIOLOGY

## 2021-07-24 PROCEDURE — 76830 US PELVIS COMP WITH TRANSVAG NON-OB (XPD): ICD-10-PCS | Mod: 26,,, | Performed by: RADIOLOGY

## 2021-07-26 ENCOUNTER — PATIENT MESSAGE (OUTPATIENT)
Dept: OBSTETRICS AND GYNECOLOGY | Facility: CLINIC | Age: 41
End: 2021-07-26

## 2021-07-28 DIAGNOSIS — M25.572 CHRONIC PAIN OF LEFT ANKLE: ICD-10-CM

## 2021-07-28 DIAGNOSIS — Z98.1 STATUS POST ANKLE ARTHRODESIS: ICD-10-CM

## 2021-07-28 DIAGNOSIS — G89.29 CHRONIC PAIN OF LEFT ANKLE: ICD-10-CM

## 2021-07-28 RX ORDER — OXYCODONE AND ACETAMINOPHEN 10; 325 MG/1; MG/1
1 TABLET ORAL EVERY 4 HOURS PRN
Qty: 42 TABLET | Refills: 0 | Status: SHIPPED | OUTPATIENT
Start: 2021-07-28 | End: 2021-08-04 | Stop reason: SDUPTHER

## 2021-07-30 ENCOUNTER — PATIENT MESSAGE (OUTPATIENT)
Dept: OBSTETRICS AND GYNECOLOGY | Facility: CLINIC | Age: 41
End: 2021-07-30

## 2021-08-02 ENCOUNTER — PATIENT MESSAGE (OUTPATIENT)
Dept: HEMATOLOGY/ONCOLOGY | Facility: CLINIC | Age: 41
End: 2021-08-02

## 2021-08-02 ENCOUNTER — PATIENT MESSAGE (OUTPATIENT)
Dept: ADMINISTRATIVE | Facility: OTHER | Age: 41
End: 2021-08-02

## 2021-08-02 ENCOUNTER — OFFICE VISIT (OUTPATIENT)
Dept: HEMATOLOGY/ONCOLOGY | Facility: CLINIC | Age: 41
End: 2021-08-02
Attending: INTERNAL MEDICINE
Payer: COMMERCIAL

## 2021-08-02 VITALS
RESPIRATION RATE: 16 BRPM | SYSTOLIC BLOOD PRESSURE: 113 MMHG | TEMPERATURE: 100 F | DIASTOLIC BLOOD PRESSURE: 71 MMHG | WEIGHT: 251.56 LBS | OXYGEN SATURATION: 100 % | HEIGHT: 69 IN | HEART RATE: 60 BPM | BODY MASS INDEX: 37.26 KG/M2

## 2021-08-02 DIAGNOSIS — Z98.84 HX OF BARIATRIC SURGERY: Primary | ICD-10-CM

## 2021-08-02 DIAGNOSIS — D50.8 OTHER IRON DEFICIENCY ANEMIA: ICD-10-CM

## 2021-08-02 DIAGNOSIS — N92.0 MENORRHAGIA WITH REGULAR CYCLE: ICD-10-CM

## 2021-08-02 DIAGNOSIS — D51.8 OTHER VITAMIN B12 DEFICIENCY ANEMIA: ICD-10-CM

## 2021-08-02 DIAGNOSIS — K29.50 OTHER CHRONIC GASTRITIS WITHOUT HEMORRHAGE: ICD-10-CM

## 2021-08-02 DIAGNOSIS — D50.9 IRON DEFICIENCY ANEMIA, UNSPECIFIED IRON DEFICIENCY ANEMIA TYPE: ICD-10-CM

## 2021-08-02 PROCEDURE — 99204 OFFICE O/P NEW MOD 45 MIN: CPT | Mod: S$GLB,,, | Performed by: STUDENT IN AN ORGANIZED HEALTH CARE EDUCATION/TRAINING PROGRAM

## 2021-08-02 PROCEDURE — 99204 PR OFFICE/OUTPT VISIT, NEW, LEVL IV, 45-59 MIN: ICD-10-PCS | Mod: S$GLB,,, | Performed by: STUDENT IN AN ORGANIZED HEALTH CARE EDUCATION/TRAINING PROGRAM

## 2021-08-02 PROCEDURE — 3008F BODY MASS INDEX DOCD: CPT | Mod: CPTII,S$GLB,, | Performed by: STUDENT IN AN ORGANIZED HEALTH CARE EDUCATION/TRAINING PROGRAM

## 2021-08-02 PROCEDURE — 3008F PR BODY MASS INDEX (BMI) DOCUMENTED: ICD-10-PCS | Mod: CPTII,S$GLB,, | Performed by: STUDENT IN AN ORGANIZED HEALTH CARE EDUCATION/TRAINING PROGRAM

## 2021-08-02 PROCEDURE — 1159F PR MEDICATION LIST DOCUMENTED IN MEDICAL RECORD: ICD-10-PCS | Mod: CPTII,S$GLB,, | Performed by: STUDENT IN AN ORGANIZED HEALTH CARE EDUCATION/TRAINING PROGRAM

## 2021-08-02 PROCEDURE — 1160F PR REVIEW ALL MEDS BY PRESCRIBER/CLIN PHARMACIST DOCUMENTED: ICD-10-PCS | Mod: CPTII,S$GLB,, | Performed by: STUDENT IN AN ORGANIZED HEALTH CARE EDUCATION/TRAINING PROGRAM

## 2021-08-02 PROCEDURE — 1125F PR PAIN SEVERITY QUANTIFIED, PAIN PRESENT: ICD-10-PCS | Mod: CPTII,S$GLB,, | Performed by: STUDENT IN AN ORGANIZED HEALTH CARE EDUCATION/TRAINING PROGRAM

## 2021-08-02 PROCEDURE — 3044F HG A1C LEVEL LT 7.0%: CPT | Mod: CPTII,S$GLB,, | Performed by: STUDENT IN AN ORGANIZED HEALTH CARE EDUCATION/TRAINING PROGRAM

## 2021-08-02 PROCEDURE — 3078F DIAST BP <80 MM HG: CPT | Mod: CPTII,S$GLB,, | Performed by: STUDENT IN AN ORGANIZED HEALTH CARE EDUCATION/TRAINING PROGRAM

## 2021-08-02 PROCEDURE — 3078F PR MOST RECENT DIASTOLIC BLOOD PRESSURE < 80 MM HG: ICD-10-PCS | Mod: CPTII,S$GLB,, | Performed by: STUDENT IN AN ORGANIZED HEALTH CARE EDUCATION/TRAINING PROGRAM

## 2021-08-02 PROCEDURE — 1159F MED LIST DOCD IN RCRD: CPT | Mod: CPTII,S$GLB,, | Performed by: STUDENT IN AN ORGANIZED HEALTH CARE EDUCATION/TRAINING PROGRAM

## 2021-08-02 PROCEDURE — 3074F PR MOST RECENT SYSTOLIC BLOOD PRESSURE < 130 MM HG: ICD-10-PCS | Mod: CPTII,S$GLB,, | Performed by: STUDENT IN AN ORGANIZED HEALTH CARE EDUCATION/TRAINING PROGRAM

## 2021-08-02 PROCEDURE — 1160F RVW MEDS BY RX/DR IN RCRD: CPT | Mod: CPTII,S$GLB,, | Performed by: STUDENT IN AN ORGANIZED HEALTH CARE EDUCATION/TRAINING PROGRAM

## 2021-08-02 PROCEDURE — 3044F PR MOST RECENT HEMOGLOBIN A1C LEVEL <7.0%: ICD-10-PCS | Mod: CPTII,S$GLB,, | Performed by: STUDENT IN AN ORGANIZED HEALTH CARE EDUCATION/TRAINING PROGRAM

## 2021-08-02 PROCEDURE — 99999 PR PBB SHADOW E&M-EST. PATIENT-LVL V: ICD-10-PCS | Mod: PBBFAC,,, | Performed by: STUDENT IN AN ORGANIZED HEALTH CARE EDUCATION/TRAINING PROGRAM

## 2021-08-02 PROCEDURE — 1125F AMNT PAIN NOTED PAIN PRSNT: CPT | Mod: CPTII,S$GLB,, | Performed by: STUDENT IN AN ORGANIZED HEALTH CARE EDUCATION/TRAINING PROGRAM

## 2021-08-02 PROCEDURE — 99999 PR PBB SHADOW E&M-EST. PATIENT-LVL V: CPT | Mod: PBBFAC,,, | Performed by: STUDENT IN AN ORGANIZED HEALTH CARE EDUCATION/TRAINING PROGRAM

## 2021-08-02 PROCEDURE — 3074F SYST BP LT 130 MM HG: CPT | Mod: CPTII,S$GLB,, | Performed by: STUDENT IN AN ORGANIZED HEALTH CARE EDUCATION/TRAINING PROGRAM

## 2021-08-02 RX ORDER — DIPHENHYDRAMINE HYDROCHLORIDE 50 MG/ML
50 INJECTION INTRAMUSCULAR; INTRAVENOUS ONCE AS NEEDED
Status: CANCELLED | OUTPATIENT
Start: 2021-08-02

## 2021-08-02 RX ORDER — HEPARIN 100 UNIT/ML
5 SYRINGE INTRAVENOUS
Status: CANCELLED | OUTPATIENT
Start: 2021-08-02

## 2021-08-02 RX ORDER — SODIUM CHLORIDE 0.9 % (FLUSH) 0.9 %
10 SYRINGE (ML) INJECTION
Status: CANCELLED | OUTPATIENT
Start: 2021-08-02

## 2021-08-02 RX ORDER — METHYLPREDNISOLONE SOD SUCC 125 MG
125 VIAL (EA) INJECTION ONCE AS NEEDED
Status: CANCELLED | OUTPATIENT
Start: 2021-08-02

## 2021-08-02 RX ORDER — SODIUM CHLORIDE 9 MG/ML
INJECTION, SOLUTION INTRAVENOUS CONTINUOUS
Status: CANCELLED | OUTPATIENT
Start: 2021-08-02

## 2021-08-02 RX ORDER — SYRINGE W-NEEDLE,DISPOSAB,3 ML 25GX5/8"
SYRINGE, EMPTY DISPOSABLE MISCELLANEOUS
Qty: 16 EACH | Refills: 1 | Status: SHIPPED | OUTPATIENT
Start: 2021-08-02 | End: 2021-09-14 | Stop reason: SDUPTHER

## 2021-08-02 RX ORDER — CYANOCOBALAMIN 1000 UG/ML
INJECTION, SOLUTION INTRAMUSCULAR; SUBCUTANEOUS
Qty: 10 ML | Refills: 1 | Status: SHIPPED | OUTPATIENT
Start: 2021-08-02 | End: 2021-09-14 | Stop reason: SDUPTHER

## 2021-08-02 RX ORDER — EPINEPHRINE 0.3 MG/.3ML
0.3 INJECTION SUBCUTANEOUS ONCE AS NEEDED
Status: CANCELLED | OUTPATIENT
Start: 2021-08-02

## 2021-08-03 ENCOUNTER — PATIENT MESSAGE (OUTPATIENT)
Dept: PSYCHIATRY | Facility: CLINIC | Age: 41
End: 2021-08-03

## 2021-08-03 ENCOUNTER — PATIENT OUTREACH (OUTPATIENT)
Dept: ADMINISTRATIVE | Facility: HOSPITAL | Age: 41
End: 2021-08-03

## 2021-08-04 ENCOUNTER — TELEPHONE (OUTPATIENT)
Dept: OBSTETRICS AND GYNECOLOGY | Facility: CLINIC | Age: 41
End: 2021-08-04

## 2021-08-04 ENCOUNTER — PATIENT MESSAGE (OUTPATIENT)
Dept: ORTHOPEDICS | Facility: CLINIC | Age: 41
End: 2021-08-04

## 2021-08-04 DIAGNOSIS — G89.29 CHRONIC PAIN OF LEFT ANKLE: ICD-10-CM

## 2021-08-04 DIAGNOSIS — M25.572 CHRONIC PAIN OF LEFT ANKLE: ICD-10-CM

## 2021-08-04 DIAGNOSIS — Z98.1 STATUS POST ANKLE ARTHRODESIS: ICD-10-CM

## 2021-08-04 RX ORDER — OXYCODONE AND ACETAMINOPHEN 10; 325 MG/1; MG/1
1 TABLET ORAL EVERY 4 HOURS PRN
Qty: 42 TABLET | Refills: 0 | Status: SHIPPED | OUTPATIENT
Start: 2021-08-04 | End: 2021-08-10 | Stop reason: SDUPTHER

## 2021-08-10 ENCOUNTER — PATIENT MESSAGE (OUTPATIENT)
Dept: OBSTETRICS AND GYNECOLOGY | Facility: CLINIC | Age: 41
End: 2021-08-10

## 2021-08-10 ENCOUNTER — TELEPHONE (OUTPATIENT)
Dept: OBSTETRICS AND GYNECOLOGY | Facility: CLINIC | Age: 41
End: 2021-08-10

## 2021-08-10 DIAGNOSIS — G89.29 CHRONIC PAIN OF LEFT ANKLE: ICD-10-CM

## 2021-08-10 DIAGNOSIS — Z98.1 STATUS POST ANKLE ARTHRODESIS: ICD-10-CM

## 2021-08-10 DIAGNOSIS — M25.572 CHRONIC PAIN OF LEFT ANKLE: ICD-10-CM

## 2021-08-10 RX ORDER — NORETHINDRONE 5 MG/1
5 TABLET ORAL DAILY
Qty: 30 TABLET | Refills: 11 | Status: SHIPPED | OUTPATIENT
Start: 2021-08-10 | End: 2021-09-09 | Stop reason: CLARIF

## 2021-08-11 ENCOUNTER — PATIENT OUTREACH (OUTPATIENT)
Dept: ADMINISTRATIVE | Facility: OTHER | Age: 41
End: 2021-08-11

## 2021-08-11 ENCOUNTER — PATIENT MESSAGE (OUTPATIENT)
Dept: ADMINISTRATIVE | Facility: OTHER | Age: 41
End: 2021-08-11

## 2021-08-11 RX ORDER — OXYCODONE AND ACETAMINOPHEN 10; 325 MG/1; MG/1
1 TABLET ORAL EVERY 4 HOURS PRN
Qty: 42 TABLET | Refills: 0 | Status: SHIPPED | OUTPATIENT
Start: 2021-08-11 | End: 2021-08-16 | Stop reason: SDUPTHER

## 2021-08-11 RX ORDER — SODIUM CHLORIDE 0.9 % (FLUSH) 0.9 %
10 SYRINGE (ML) INJECTION
Status: CANCELLED | OUTPATIENT
Start: 2021-08-11

## 2021-08-11 RX ORDER — HEPARIN 100 UNIT/ML
500 SYRINGE INTRAVENOUS
Status: CANCELLED | OUTPATIENT
Start: 2021-08-11

## 2021-08-12 ENCOUNTER — PATIENT MESSAGE (OUTPATIENT)
Dept: HEMATOLOGY/ONCOLOGY | Facility: CLINIC | Age: 41
End: 2021-08-12

## 2021-08-12 ENCOUNTER — OFFICE VISIT (OUTPATIENT)
Dept: OBSTETRICS AND GYNECOLOGY | Facility: CLINIC | Age: 41
End: 2021-08-12
Payer: COMMERCIAL

## 2021-08-12 ENCOUNTER — PATIENT MESSAGE (OUTPATIENT)
Dept: OBSTETRICS AND GYNECOLOGY | Facility: CLINIC | Age: 41
End: 2021-08-12

## 2021-08-12 VITALS
BODY MASS INDEX: 37.06 KG/M2 | SYSTOLIC BLOOD PRESSURE: 132 MMHG | DIASTOLIC BLOOD PRESSURE: 80 MMHG | WEIGHT: 250.25 LBS | HEIGHT: 69 IN

## 2021-08-12 DIAGNOSIS — R10.2 PELVIC PAIN IN FEMALE: ICD-10-CM

## 2021-08-12 DIAGNOSIS — N93.8 DUB (DYSFUNCTIONAL UTERINE BLEEDING): Primary | ICD-10-CM

## 2021-08-12 PROCEDURE — 3075F PR MOST RECENT SYSTOLIC BLOOD PRESS GE 130-139MM HG: ICD-10-PCS | Mod: CPTII,S$GLB,, | Performed by: OBSTETRICS & GYNECOLOGY

## 2021-08-12 PROCEDURE — 3075F SYST BP GE 130 - 139MM HG: CPT | Mod: CPTII,S$GLB,, | Performed by: OBSTETRICS & GYNECOLOGY

## 2021-08-12 PROCEDURE — 3008F BODY MASS INDEX DOCD: CPT | Mod: CPTII,S$GLB,, | Performed by: OBSTETRICS & GYNECOLOGY

## 2021-08-12 PROCEDURE — 99215 PR OFFICE/OUTPT VISIT, EST, LEVL V, 40-54 MIN: ICD-10-PCS | Mod: 57,S$GLB,, | Performed by: OBSTETRICS & GYNECOLOGY

## 2021-08-12 PROCEDURE — 3079F DIAST BP 80-89 MM HG: CPT | Mod: CPTII,S$GLB,, | Performed by: OBSTETRICS & GYNECOLOGY

## 2021-08-12 PROCEDURE — 3044F HG A1C LEVEL LT 7.0%: CPT | Mod: CPTII,S$GLB,, | Performed by: OBSTETRICS & GYNECOLOGY

## 2021-08-12 PROCEDURE — 99215 OFFICE O/P EST HI 40 MIN: CPT | Mod: 57,S$GLB,, | Performed by: OBSTETRICS & GYNECOLOGY

## 2021-08-12 PROCEDURE — 3044F PR MOST RECENT HEMOGLOBIN A1C LEVEL <7.0%: ICD-10-PCS | Mod: CPTII,S$GLB,, | Performed by: OBSTETRICS & GYNECOLOGY

## 2021-08-12 PROCEDURE — 3008F PR BODY MASS INDEX (BMI) DOCUMENTED: ICD-10-PCS | Mod: CPTII,S$GLB,, | Performed by: OBSTETRICS & GYNECOLOGY

## 2021-08-12 PROCEDURE — 3079F PR MOST RECENT DIASTOLIC BLOOD PRESSURE 80-89 MM HG: ICD-10-PCS | Mod: CPTII,S$GLB,, | Performed by: OBSTETRICS & GYNECOLOGY

## 2021-08-12 PROCEDURE — 1159F PR MEDICATION LIST DOCUMENTED IN MEDICAL RECORD: ICD-10-PCS | Mod: CPTII,S$GLB,, | Performed by: OBSTETRICS & GYNECOLOGY

## 2021-08-12 PROCEDURE — 99999 PR PBB SHADOW E&M-EST. PATIENT-LVL III: CPT | Mod: PBBFAC,,, | Performed by: OBSTETRICS & GYNECOLOGY

## 2021-08-12 PROCEDURE — 1159F MED LIST DOCD IN RCRD: CPT | Mod: CPTII,S$GLB,, | Performed by: OBSTETRICS & GYNECOLOGY

## 2021-08-12 PROCEDURE — 1160F PR REVIEW ALL MEDS BY PRESCRIBER/CLIN PHARMACIST DOCUMENTED: ICD-10-PCS | Mod: CPTII,S$GLB,, | Performed by: OBSTETRICS & GYNECOLOGY

## 2021-08-12 PROCEDURE — 99999 PR PBB SHADOW E&M-EST. PATIENT-LVL III: ICD-10-PCS | Mod: PBBFAC,,, | Performed by: OBSTETRICS & GYNECOLOGY

## 2021-08-12 PROCEDURE — 1160F RVW MEDS BY RX/DR IN RCRD: CPT | Mod: CPTII,S$GLB,, | Performed by: OBSTETRICS & GYNECOLOGY

## 2021-08-13 ENCOUNTER — OFFICE VISIT (OUTPATIENT)
Dept: ORTHOPEDICS | Facility: CLINIC | Age: 41
End: 2021-08-13
Payer: COMMERCIAL

## 2021-08-13 ENCOUNTER — PATIENT OUTREACH (OUTPATIENT)
Dept: ADMINISTRATIVE | Facility: OTHER | Age: 41
End: 2021-08-13

## 2021-08-13 VITALS — HEIGHT: 69 IN | BODY MASS INDEX: 37.06 KG/M2 | WEIGHT: 250.25 LBS

## 2021-08-13 DIAGNOSIS — Z12.31 ENCOUNTER FOR SCREENING MAMMOGRAM FOR MALIGNANT NEOPLASM OF BREAST: Primary | ICD-10-CM

## 2021-08-13 DIAGNOSIS — Z98.1 STATUS POST ANKLE ARTHRODESIS: Primary | ICD-10-CM

## 2021-08-13 DIAGNOSIS — Q66.70 CONGENITAL PES CAVUS: ICD-10-CM

## 2021-08-13 DIAGNOSIS — M25.572 CHRONIC PAIN OF LEFT ANKLE: ICD-10-CM

## 2021-08-13 DIAGNOSIS — G89.29 CHRONIC PAIN OF LEFT ANKLE: ICD-10-CM

## 2021-08-13 PROBLEM — I51.7 LEFT VENTRICULAR HYPERTROPHY: Status: RESOLVED | Noted: 2020-05-04 | Resolved: 2021-08-13

## 2021-08-13 PROBLEM — K92.2 GIB (GASTROINTESTINAL BLEEDING): Status: RESOLVED | Noted: 2018-12-08 | Resolved: 2021-08-13

## 2021-08-13 PROBLEM — Z86.39 HISTORY OF DIABETES MELLITUS: Status: RESOLVED | Noted: 2020-04-07 | Resolved: 2021-08-13

## 2021-08-13 PROBLEM — D64.9 ANEMIA: Status: RESOLVED | Noted: 2021-07-05 | Resolved: 2021-08-13

## 2021-08-13 PROBLEM — M54.16 LUMBAR RADICULOPATHY: Status: RESOLVED | Noted: 2019-12-13 | Resolved: 2021-08-13

## 2021-08-13 PROCEDURE — 3044F PR MOST RECENT HEMOGLOBIN A1C LEVEL <7.0%: ICD-10-PCS | Mod: CPTII,S$GLB,, | Performed by: ORTHOPAEDIC SURGERY

## 2021-08-13 PROCEDURE — 1159F PR MEDICATION LIST DOCUMENTED IN MEDICAL RECORD: ICD-10-PCS | Mod: CPTII,S$GLB,, | Performed by: ORTHOPAEDIC SURGERY

## 2021-08-13 PROCEDURE — 3044F HG A1C LEVEL LT 7.0%: CPT | Mod: CPTII,S$GLB,, | Performed by: ORTHOPAEDIC SURGERY

## 2021-08-13 PROCEDURE — 99999 PR PBB SHADOW E&M-EST. PATIENT-LVL III: ICD-10-PCS | Mod: PBBFAC,,, | Performed by: ORTHOPAEDIC SURGERY

## 2021-08-13 PROCEDURE — 3008F PR BODY MASS INDEX (BMI) DOCUMENTED: ICD-10-PCS | Mod: CPTII,S$GLB,, | Performed by: ORTHOPAEDIC SURGERY

## 2021-08-13 PROCEDURE — 99999 PR PBB SHADOW E&M-EST. PATIENT-LVL III: CPT | Mod: PBBFAC,,, | Performed by: ORTHOPAEDIC SURGERY

## 2021-08-13 PROCEDURE — 1159F MED LIST DOCD IN RCRD: CPT | Mod: CPTII,S$GLB,, | Performed by: ORTHOPAEDIC SURGERY

## 2021-08-13 PROCEDURE — 1125F AMNT PAIN NOTED PAIN PRSNT: CPT | Mod: CPTII,S$GLB,, | Performed by: ORTHOPAEDIC SURGERY

## 2021-08-13 PROCEDURE — 3008F BODY MASS INDEX DOCD: CPT | Mod: CPTII,S$GLB,, | Performed by: ORTHOPAEDIC SURGERY

## 2021-08-13 PROCEDURE — 99213 PR OFFICE/OUTPT VISIT, EST, LEVL III, 20-29 MIN: ICD-10-PCS | Mod: S$GLB,,, | Performed by: ORTHOPAEDIC SURGERY

## 2021-08-13 PROCEDURE — 1125F PR PAIN SEVERITY QUANTIFIED, PAIN PRESENT: ICD-10-PCS | Mod: CPTII,S$GLB,, | Performed by: ORTHOPAEDIC SURGERY

## 2021-08-13 PROCEDURE — 99213 OFFICE O/P EST LOW 20 MIN: CPT | Mod: S$GLB,,, | Performed by: ORTHOPAEDIC SURGERY

## 2021-08-16 ENCOUNTER — PATIENT MESSAGE (OUTPATIENT)
Dept: OBSTETRICS AND GYNECOLOGY | Facility: CLINIC | Age: 41
End: 2021-08-16

## 2021-08-16 ENCOUNTER — PATIENT MESSAGE (OUTPATIENT)
Dept: ORTHOPEDICS | Facility: CLINIC | Age: 41
End: 2021-08-16

## 2021-08-16 ENCOUNTER — HOSPITAL ENCOUNTER (OUTPATIENT)
Dept: RADIOLOGY | Facility: HOSPITAL | Age: 41
Discharge: HOME | End: 2021-08-16
Attending: FAMILY MEDICINE
Payer: COMMERCIAL

## 2021-08-16 DIAGNOSIS — Z98.1 STATUS POST ANKLE ARTHRODESIS: ICD-10-CM

## 2021-08-16 DIAGNOSIS — M25.572 CHRONIC PAIN OF LEFT ANKLE: ICD-10-CM

## 2021-08-16 DIAGNOSIS — G89.29 CHRONIC PAIN OF LEFT ANKLE: ICD-10-CM

## 2021-08-16 DIAGNOSIS — Z12.31 ENCOUNTER FOR SCREENING MAMMOGRAM FOR MALIGNANT NEOPLASM OF BREAST: ICD-10-CM

## 2021-08-16 PROCEDURE — 77067 SCR MAMMO BI INCL CAD: CPT | Mod: TC,PO

## 2021-08-16 RX ORDER — OXYCODONE AND ACETAMINOPHEN 10; 325 MG/1; MG/1
1 TABLET ORAL EVERY 4 HOURS PRN
Qty: 42 TABLET | Refills: 0 | Status: SHIPPED | OUTPATIENT
Start: 2021-08-16 | End: 2021-08-23 | Stop reason: SDUPTHER

## 2021-08-17 ENCOUNTER — PATIENT MESSAGE (OUTPATIENT)
Dept: PSYCHIATRY | Facility: CLINIC | Age: 41
End: 2021-08-17

## 2021-08-18 RX ORDER — LORAZEPAM 0.5 MG/1
TABLET ORAL
Qty: 15 TABLET | Refills: 0 | Status: SHIPPED | OUTPATIENT
Start: 2021-08-18 | End: 2021-09-07 | Stop reason: SDUPTHER

## 2021-08-18 NOTE — PROGRESS NOTES
08/18/2021: Patient has had some recent losses of friends, and is awaiting a hysterectomy as well. Patient is frustrated re covid. Patient is also dealing with her autistic son who lives with her. Patient agrees to restart Ativan at this time. Patient is no longer taking Atarax, and continues Prozac 20 mg daily. Patient denies intent to harm herself and feels overwhelmed at this time.

## 2021-08-19 ENCOUNTER — INFUSION (OUTPATIENT)
Dept: INFUSION THERAPY | Facility: OTHER | Age: 41
End: 2021-08-19
Attending: STUDENT IN AN ORGANIZED HEALTH CARE EDUCATION/TRAINING PROGRAM
Payer: COMMERCIAL

## 2021-08-19 VITALS
SYSTOLIC BLOOD PRESSURE: 141 MMHG | HEIGHT: 69 IN | BODY MASS INDEX: 37.39 KG/M2 | WEIGHT: 252.44 LBS | RESPIRATION RATE: 18 BRPM | HEART RATE: 77 BPM | DIASTOLIC BLOOD PRESSURE: 83 MMHG | OXYGEN SATURATION: 97 % | TEMPERATURE: 99 F

## 2021-08-19 DIAGNOSIS — Z98.84 HX OF BARIATRIC SURGERY: ICD-10-CM

## 2021-08-19 DIAGNOSIS — D50.9 IRON DEFICIENCY ANEMIA, UNSPECIFIED IRON DEFICIENCY ANEMIA TYPE: Primary | ICD-10-CM

## 2021-08-19 PROCEDURE — 96366 THER/PROPH/DIAG IV INF ADDON: CPT

## 2021-08-19 PROCEDURE — 25000003 PHARM REV CODE 250: Performed by: STUDENT IN AN ORGANIZED HEALTH CARE EDUCATION/TRAINING PROGRAM

## 2021-08-19 PROCEDURE — 63600175 PHARM REV CODE 636 W HCPCS: Performed by: STUDENT IN AN ORGANIZED HEALTH CARE EDUCATION/TRAINING PROGRAM

## 2021-08-19 PROCEDURE — 96365 THER/PROPH/DIAG IV INF INIT: CPT

## 2021-08-19 RX ORDER — SODIUM CHLORIDE 0.9 % (FLUSH) 0.9 %
10 SYRINGE (ML) INJECTION
Status: DISCONTINUED | OUTPATIENT
Start: 2021-08-19 | End: 2021-08-19 | Stop reason: HOSPADM

## 2021-08-19 RX ORDER — HEPARIN 100 UNIT/ML
500 SYRINGE INTRAVENOUS
Status: CANCELLED | OUTPATIENT
Start: 2021-08-26

## 2021-08-19 RX ORDER — SODIUM CHLORIDE 0.9 % (FLUSH) 0.9 %
10 SYRINGE (ML) INJECTION
Status: CANCELLED | OUTPATIENT
Start: 2021-08-26

## 2021-08-19 RX ORDER — HEPARIN 100 UNIT/ML
500 SYRINGE INTRAVENOUS
Status: DISCONTINUED | OUTPATIENT
Start: 2021-08-19 | End: 2021-08-19 | Stop reason: HOSPADM

## 2021-08-19 RX ADMIN — SODIUM CHLORIDE: 0.9 INJECTION, SOLUTION INTRAVENOUS at 01:08

## 2021-08-19 RX ADMIN — IRON SUCROSE 300 MG: 20 INJECTION, SOLUTION INTRAVENOUS at 01:08

## 2021-08-22 DIAGNOSIS — Z98.1 STATUS POST ANKLE ARTHRODESIS: ICD-10-CM

## 2021-08-22 DIAGNOSIS — M25.572 CHRONIC PAIN OF LEFT ANKLE: ICD-10-CM

## 2021-08-22 DIAGNOSIS — G89.29 CHRONIC PAIN OF LEFT ANKLE: ICD-10-CM

## 2021-08-22 RX ORDER — OXYCODONE AND ACETAMINOPHEN 10; 325 MG/1; MG/1
1 TABLET ORAL EVERY 4 HOURS PRN
Qty: 42 TABLET | Refills: 0 | Status: CANCELLED | OUTPATIENT
Start: 2021-08-22

## 2021-08-23 DIAGNOSIS — Z98.1 STATUS POST ANKLE ARTHRODESIS: ICD-10-CM

## 2021-08-23 DIAGNOSIS — G89.29 CHRONIC PAIN OF LEFT ANKLE: ICD-10-CM

## 2021-08-23 DIAGNOSIS — M25.572 CHRONIC PAIN OF LEFT ANKLE: ICD-10-CM

## 2021-08-24 ENCOUNTER — PATIENT MESSAGE (OUTPATIENT)
Dept: OBSTETRICS AND GYNECOLOGY | Facility: CLINIC | Age: 41
End: 2021-08-24

## 2021-08-24 ENCOUNTER — PATIENT MESSAGE (OUTPATIENT)
Dept: ORTHOPEDICS | Facility: CLINIC | Age: 41
End: 2021-08-24

## 2021-08-24 RX ORDER — OXYCODONE AND ACETAMINOPHEN 10; 325 MG/1; MG/1
1 TABLET ORAL EVERY 4 HOURS PRN
Qty: 42 TABLET | Refills: 0 | Status: SHIPPED | OUTPATIENT
Start: 2021-08-24 | End: 2021-08-31 | Stop reason: SDUPTHER

## 2021-08-26 ENCOUNTER — INFUSION (OUTPATIENT)
Dept: INFUSION THERAPY | Facility: OTHER | Age: 41
End: 2021-08-26
Attending: STUDENT IN AN ORGANIZED HEALTH CARE EDUCATION/TRAINING PROGRAM
Payer: COMMERCIAL

## 2021-08-26 ENCOUNTER — PATIENT MESSAGE (OUTPATIENT)
Dept: OBSTETRICS AND GYNECOLOGY | Facility: CLINIC | Age: 41
End: 2021-08-26

## 2021-08-26 VITALS
RESPIRATION RATE: 18 BRPM | DIASTOLIC BLOOD PRESSURE: 89 MMHG | TEMPERATURE: 98 F | SYSTOLIC BLOOD PRESSURE: 122 MMHG | OXYGEN SATURATION: 99 % | HEART RATE: 60 BPM

## 2021-08-26 DIAGNOSIS — D50.9 IRON DEFICIENCY ANEMIA, UNSPECIFIED IRON DEFICIENCY ANEMIA TYPE: Primary | ICD-10-CM

## 2021-08-26 DIAGNOSIS — Z98.84 HX OF BARIATRIC SURGERY: ICD-10-CM

## 2021-08-26 PROCEDURE — 25000003 PHARM REV CODE 250: Performed by: STUDENT IN AN ORGANIZED HEALTH CARE EDUCATION/TRAINING PROGRAM

## 2021-08-26 PROCEDURE — 96365 THER/PROPH/DIAG IV INF INIT: CPT

## 2021-08-26 PROCEDURE — 63600175 PHARM REV CODE 636 W HCPCS: Performed by: STUDENT IN AN ORGANIZED HEALTH CARE EDUCATION/TRAINING PROGRAM

## 2021-08-26 PROCEDURE — 96366 THER/PROPH/DIAG IV INF ADDON: CPT

## 2021-08-26 RX ORDER — SODIUM CHLORIDE 0.9 % (FLUSH) 0.9 %
10 SYRINGE (ML) INJECTION
Status: DISCONTINUED | OUTPATIENT
Start: 2021-08-26 | End: 2021-08-26 | Stop reason: HOSPADM

## 2021-08-26 RX ORDER — SODIUM CHLORIDE 0.9 % (FLUSH) 0.9 %
10 SYRINGE (ML) INJECTION
Status: CANCELLED | OUTPATIENT
Start: 2021-09-02

## 2021-08-26 RX ORDER — HEPARIN 100 UNIT/ML
500 SYRINGE INTRAVENOUS
Status: CANCELLED | OUTPATIENT
Start: 2021-09-02

## 2021-08-26 RX ORDER — HEPARIN 100 UNIT/ML
500 SYRINGE INTRAVENOUS
Status: DISCONTINUED | OUTPATIENT
Start: 2021-08-26 | End: 2021-08-26 | Stop reason: HOSPADM

## 2021-08-26 RX ADMIN — SODIUM CHLORIDE: 0.9 INJECTION, SOLUTION INTRAVENOUS at 01:08

## 2021-08-26 RX ADMIN — IRON SUCROSE 300 MG: 20 INJECTION, SOLUTION INTRAVENOUS at 01:08

## 2021-08-30 DIAGNOSIS — M25.572 CHRONIC PAIN OF LEFT ANKLE: ICD-10-CM

## 2021-08-30 DIAGNOSIS — Z98.1 STATUS POST ANKLE ARTHRODESIS: ICD-10-CM

## 2021-08-30 DIAGNOSIS — G89.29 CHRONIC PAIN OF LEFT ANKLE: ICD-10-CM

## 2021-08-30 RX ORDER — OXYCODONE AND ACETAMINOPHEN 10; 325 MG/1; MG/1
1 TABLET ORAL EVERY 4 HOURS PRN
Qty: 42 TABLET | Refills: 0 | Status: CANCELLED | OUTPATIENT
Start: 2021-08-30

## 2021-08-31 DIAGNOSIS — Z98.1 STATUS POST ANKLE ARTHRODESIS: ICD-10-CM

## 2021-08-31 DIAGNOSIS — G89.29 CHRONIC PAIN OF LEFT ANKLE: ICD-10-CM

## 2021-08-31 DIAGNOSIS — M25.572 CHRONIC PAIN OF LEFT ANKLE: ICD-10-CM

## 2021-09-01 RX ORDER — OXYCODONE AND ACETAMINOPHEN 10; 325 MG/1; MG/1
1 TABLET ORAL EVERY 4 HOURS PRN
Qty: 42 TABLET | Refills: 0 | Status: SHIPPED | OUTPATIENT
Start: 2021-09-01 | End: 2021-09-06 | Stop reason: SDUPTHER

## 2021-09-04 ENCOUNTER — PATIENT MESSAGE (OUTPATIENT)
Dept: HEMATOLOGY/ONCOLOGY | Facility: CLINIC | Age: 41
End: 2021-09-04

## 2021-09-06 ENCOUNTER — PATIENT MESSAGE (OUTPATIENT)
Dept: HEMATOLOGY/ONCOLOGY | Facility: CLINIC | Age: 41
End: 2021-09-06

## 2021-09-07 ENCOUNTER — PATIENT MESSAGE (OUTPATIENT)
Dept: ORTHOPEDICS | Facility: CLINIC | Age: 41
End: 2021-09-07

## 2021-09-07 ENCOUNTER — PATIENT MESSAGE (OUTPATIENT)
Dept: PSYCHIATRY | Facility: CLINIC | Age: 41
End: 2021-09-07

## 2021-09-07 RX ORDER — FLUOXETINE HYDROCHLORIDE 20 MG/1
20 CAPSULE ORAL DAILY
Qty: 30 CAPSULE | Refills: 3 | Status: ON HOLD | OUTPATIENT
Start: 2021-09-07 | End: 2021-09-10 | Stop reason: SDUPTHER

## 2021-09-07 RX ORDER — LORAZEPAM 0.5 MG/1
TABLET ORAL
Qty: 21 TABLET | Refills: 0 | Status: SHIPPED | OUTPATIENT
Start: 2021-09-07 | End: 2021-09-18 | Stop reason: SDUPTHER

## 2021-09-08 ENCOUNTER — PATIENT MESSAGE (OUTPATIENT)
Dept: OBSTETRICS AND GYNECOLOGY | Facility: CLINIC | Age: 41
End: 2021-09-08

## 2021-09-08 ENCOUNTER — TELEPHONE (OUTPATIENT)
Dept: OBSTETRICS AND GYNECOLOGY | Facility: CLINIC | Age: 41
End: 2021-09-08

## 2021-09-08 DIAGNOSIS — R10.2 PELVIC PAIN IN FEMALE: ICD-10-CM

## 2021-09-08 DIAGNOSIS — N93.8 DUB (DYSFUNCTIONAL UTERINE BLEEDING): Primary | ICD-10-CM

## 2021-09-09 ENCOUNTER — TELEPHONE (OUTPATIENT)
Dept: OBSTETRICS AND GYNECOLOGY | Facility: CLINIC | Age: 41
End: 2021-09-09

## 2021-09-09 ENCOUNTER — PATIENT MESSAGE (OUTPATIENT)
Dept: SURGERY | Facility: OTHER | Age: 41
End: 2021-09-09

## 2021-09-09 ENCOUNTER — PATIENT MESSAGE (OUTPATIENT)
Dept: HEMATOLOGY/ONCOLOGY | Facility: CLINIC | Age: 41
End: 2021-09-09

## 2021-09-09 ENCOUNTER — ANESTHESIA EVENT (OUTPATIENT)
Dept: SURGERY | Facility: OTHER | Age: 41
End: 2021-09-09
Payer: COMMERCIAL

## 2021-09-09 ENCOUNTER — HOSPITAL ENCOUNTER (OUTPATIENT)
Dept: PREADMISSION TESTING | Facility: OTHER | Age: 41
Discharge: HOME | End: 2021-09-09

## 2021-09-09 ENCOUNTER — OFFICE VISIT (OUTPATIENT)
Dept: OBSTETRICS AND GYNECOLOGY | Facility: CLINIC | Age: 41
End: 2021-09-09
Payer: COMMERCIAL

## 2021-09-09 VITALS
WEIGHT: 250.88 LBS | SYSTOLIC BLOOD PRESSURE: 123 MMHG | HEART RATE: 69 BPM | HEIGHT: 69 IN | DIASTOLIC BLOOD PRESSURE: 94 MMHG | BODY MASS INDEX: 37.16 KG/M2

## 2021-09-09 DIAGNOSIS — R10.2 PELVIC PAIN IN FEMALE: ICD-10-CM

## 2021-09-09 DIAGNOSIS — N93.8 DUB (DYSFUNCTIONAL UTERINE BLEEDING): Primary | ICD-10-CM

## 2021-09-09 PROCEDURE — 99999 PR PBB SHADOW E&M-EST. PATIENT-LVL III: ICD-10-PCS | Mod: PBBFAC,,, | Performed by: OBSTETRICS & GYNECOLOGY

## 2021-09-09 PROCEDURE — 1159F MED LIST DOCD IN RCRD: CPT | Mod: CPTII,S$GLB,, | Performed by: OBSTETRICS & GYNECOLOGY

## 2021-09-09 PROCEDURE — 3008F BODY MASS INDEX DOCD: CPT | Mod: CPTII,S$GLB,, | Performed by: OBSTETRICS & GYNECOLOGY

## 2021-09-09 PROCEDURE — 3074F SYST BP LT 130 MM HG: CPT | Mod: CPTII,S$GLB,, | Performed by: OBSTETRICS & GYNECOLOGY

## 2021-09-09 PROCEDURE — 3044F HG A1C LEVEL LT 7.0%: CPT | Mod: CPTII,S$GLB,, | Performed by: OBSTETRICS & GYNECOLOGY

## 2021-09-09 PROCEDURE — 3080F DIAST BP >= 90 MM HG: CPT | Mod: CPTII,S$GLB,, | Performed by: OBSTETRICS & GYNECOLOGY

## 2021-09-09 PROCEDURE — 3044F PR MOST RECENT HEMOGLOBIN A1C LEVEL <7.0%: ICD-10-PCS | Mod: CPTII,S$GLB,, | Performed by: OBSTETRICS & GYNECOLOGY

## 2021-09-09 PROCEDURE — 3074F PR MOST RECENT SYSTOLIC BLOOD PRESSURE < 130 MM HG: ICD-10-PCS | Mod: CPTII,S$GLB,, | Performed by: OBSTETRICS & GYNECOLOGY

## 2021-09-09 PROCEDURE — 1159F PR MEDICATION LIST DOCUMENTED IN MEDICAL RECORD: ICD-10-PCS | Mod: CPTII,S$GLB,, | Performed by: OBSTETRICS & GYNECOLOGY

## 2021-09-09 PROCEDURE — 3080F PR MOST RECENT DIASTOLIC BLOOD PRESSURE >= 90 MM HG: ICD-10-PCS | Mod: CPTII,S$GLB,, | Performed by: OBSTETRICS & GYNECOLOGY

## 2021-09-09 PROCEDURE — 99999 PR PBB SHADOW E&M-EST. PATIENT-LVL III: CPT | Mod: PBBFAC,,, | Performed by: OBSTETRICS & GYNECOLOGY

## 2021-09-09 PROCEDURE — 99499 UNLISTED E&M SERVICE: CPT | Mod: S$GLB,,, | Performed by: OBSTETRICS & GYNECOLOGY

## 2021-09-09 PROCEDURE — 99499 NO LOS: ICD-10-PCS | Mod: S$GLB,,, | Performed by: OBSTETRICS & GYNECOLOGY

## 2021-09-09 PROCEDURE — 3008F PR BODY MASS INDEX (BMI) DOCUMENTED: ICD-10-PCS | Mod: CPTII,S$GLB,, | Performed by: OBSTETRICS & GYNECOLOGY

## 2021-09-09 RX ORDER — SODIUM CHLORIDE 9 MG/ML
INJECTION, SOLUTION INTRAVENOUS CONTINUOUS
Status: CANCELLED | OUTPATIENT
Start: 2021-09-09

## 2021-09-09 RX ORDER — MUPIROCIN 20 MG/G
OINTMENT TOPICAL
Status: CANCELLED | OUTPATIENT
Start: 2021-09-09

## 2021-09-10 ENCOUNTER — HOSPITAL ENCOUNTER (OUTPATIENT)
Facility: OTHER | Age: 41
Discharge: HOME | End: 2021-09-10
Attending: OBSTETRICS & GYNECOLOGY | Admitting: OBSTETRICS & GYNECOLOGY
Payer: COMMERCIAL

## 2021-09-10 ENCOUNTER — PATIENT MESSAGE (OUTPATIENT)
Dept: SURGERY | Facility: OTHER | Age: 41
End: 2021-09-10

## 2021-09-10 ENCOUNTER — ANESTHESIA (OUTPATIENT)
Dept: SURGERY | Facility: OTHER | Age: 41
End: 2021-09-10
Payer: COMMERCIAL

## 2021-09-10 VITALS
BODY MASS INDEX: 34.8 KG/M2 | SYSTOLIC BLOOD PRESSURE: 132 MMHG | HEART RATE: 85 BPM | OXYGEN SATURATION: 97 % | TEMPERATURE: 99 F | DIASTOLIC BLOOD PRESSURE: 77 MMHG | RESPIRATION RATE: 16 BRPM | HEIGHT: 69 IN | WEIGHT: 235 LBS

## 2021-09-10 DIAGNOSIS — R10.2 PELVIC PAIN IN FEMALE: ICD-10-CM

## 2021-09-10 DIAGNOSIS — Z90.710 STATUS POST TOTAL HYSTERECTOMY: Primary | ICD-10-CM

## 2021-09-10 DIAGNOSIS — Z01.818 PRE-OP TESTING: ICD-10-CM

## 2021-09-10 DIAGNOSIS — N93.8 DUB (DYSFUNCTIONAL UTERINE BLEEDING): ICD-10-CM

## 2021-09-10 LAB
ABO + RH BLD: NORMAL
BLD GP AB SCN CELLS X3 SERPL QL: NORMAL
SARS-COV-2 RDRP RESP QL NAA+PROBE: NEGATIVE

## 2021-09-10 PROCEDURE — 27201423 OPTIME MED/SURG SUP & DEVICES STERILE SUPPLY: Performed by: OBSTETRICS & GYNECOLOGY

## 2021-09-10 PROCEDURE — 37000008 HC ANESTHESIA 1ST 15 MINUTES: Performed by: OBSTETRICS & GYNECOLOGY

## 2021-09-10 PROCEDURE — 37000009 HC ANESTHESIA EA ADD 15 MINS: Performed by: OBSTETRICS & GYNECOLOGY

## 2021-09-10 PROCEDURE — 25000003 PHARM REV CODE 250: Performed by: ANESTHESIOLOGY

## 2021-09-10 PROCEDURE — 88307 TISSUE EXAM BY PATHOLOGIST: CPT | Mod: 26,,, | Performed by: PATHOLOGY

## 2021-09-10 PROCEDURE — 36000710: Performed by: OBSTETRICS & GYNECOLOGY

## 2021-09-10 PROCEDURE — 58571 PR LAPAROSCOPY W TOT HYSTERECTUTERUS <=250 GRAM  W TUBE/OVARY: ICD-10-PCS | Mod: 22,,, | Performed by: OBSTETRICS & GYNECOLOGY

## 2021-09-10 PROCEDURE — P9045 ALBUMIN (HUMAN), 5%, 250 ML: HCPCS | Mod: JG | Performed by: NURSE ANESTHETIST, CERTIFIED REGISTERED

## 2021-09-10 PROCEDURE — 63600175 PHARM REV CODE 636 W HCPCS: Performed by: NURSE ANESTHETIST, CERTIFIED REGISTERED

## 2021-09-10 PROCEDURE — 25000242 PHARM REV CODE 250 ALT 637 W/ HCPCS: Performed by: NURSE ANESTHETIST, CERTIFIED REGISTERED

## 2021-09-10 PROCEDURE — 71000039 HC RECOVERY, EACH ADD'L HOUR: Performed by: OBSTETRICS & GYNECOLOGY

## 2021-09-10 PROCEDURE — 88307 TISSUE EXAM BY PATHOLOGIST: CPT | Performed by: PATHOLOGY

## 2021-09-10 PROCEDURE — 86900 BLOOD TYPING SEROLOGIC ABO: CPT | Performed by: ANESTHESIOLOGY

## 2021-09-10 PROCEDURE — 58571 TLH W/T/O 250 G OR LESS: CPT | Mod: 22,,, | Performed by: OBSTETRICS & GYNECOLOGY

## 2021-09-10 PROCEDURE — U0002 COVID-19 LAB TEST NON-CDC: HCPCS | Performed by: OBSTETRICS & GYNECOLOGY

## 2021-09-10 PROCEDURE — 71000016 HC POSTOP RECOV ADDL HR: Performed by: OBSTETRICS & GYNECOLOGY

## 2021-09-10 PROCEDURE — 63600175 PHARM REV CODE 636 W HCPCS: Performed by: ANESTHESIOLOGY

## 2021-09-10 PROCEDURE — 86901 BLOOD TYPING SEROLOGIC RH(D): CPT | Performed by: ANESTHESIOLOGY

## 2021-09-10 PROCEDURE — 88307 PR  SURG PATH,LEVEL V: ICD-10-PCS | Mod: 26,,, | Performed by: PATHOLOGY

## 2021-09-10 PROCEDURE — 36415 COLL VENOUS BLD VENIPUNCTURE: CPT | Performed by: ANESTHESIOLOGY

## 2021-09-10 PROCEDURE — 71000033 HC RECOVERY, INTIAL HOUR: Performed by: OBSTETRICS & GYNECOLOGY

## 2021-09-10 PROCEDURE — 25000003 PHARM REV CODE 250: Performed by: NURSE ANESTHETIST, CERTIFIED REGISTERED

## 2021-09-10 PROCEDURE — 86850 RBC ANTIBODY SCREEN: CPT | Performed by: ANESTHESIOLOGY

## 2021-09-10 PROCEDURE — 71000015 HC POSTOP RECOV 1ST HR: Performed by: OBSTETRICS & GYNECOLOGY

## 2021-09-10 PROCEDURE — 63600175 PHARM REV CODE 636 W HCPCS: Performed by: OBSTETRICS & GYNECOLOGY

## 2021-09-10 PROCEDURE — 36000711: Performed by: OBSTETRICS & GYNECOLOGY

## 2021-09-10 PROCEDURE — 25000003 PHARM REV CODE 250: Performed by: OBSTETRICS & GYNECOLOGY

## 2021-09-10 RX ORDER — SODIUM CHLORIDE 0.9 % (FLUSH) 0.9 %
3 SYRINGE (ML) INJECTION
Status: DISCONTINUED | OUTPATIENT
Start: 2021-09-10 | End: 2021-09-10 | Stop reason: HOSPADM

## 2021-09-10 RX ORDER — ROCURONIUM BROMIDE 10 MG/ML
INJECTION, SOLUTION INTRAVENOUS
Status: DISCONTINUED | OUTPATIENT
Start: 2021-09-10 | End: 2021-09-10

## 2021-09-10 RX ORDER — PREGABALIN 75 MG/1
75 CAPSULE ORAL ONCE
Status: COMPLETED | OUTPATIENT
Start: 2021-09-10 | End: 2021-09-10

## 2021-09-10 RX ORDER — LIDOCAINE HYDROCHLORIDE 10 MG/ML
0.5 INJECTION, SOLUTION EPIDURAL; INFILTRATION; INTRACAUDAL; PERINEURAL ONCE
Status: DISCONTINUED | OUTPATIENT
Start: 2021-09-10 | End: 2021-09-10 | Stop reason: HOSPADM

## 2021-09-10 RX ORDER — DIPHENHYDRAMINE HYDROCHLORIDE 50 MG/ML
12.5 INJECTION INTRAMUSCULAR; INTRAVENOUS EVERY 30 MIN PRN
Status: DISCONTINUED | OUTPATIENT
Start: 2021-09-10 | End: 2021-09-10 | Stop reason: HOSPADM

## 2021-09-10 RX ORDER — PROCHLORPERAZINE EDISYLATE 5 MG/ML
5 INJECTION INTRAMUSCULAR; INTRAVENOUS EVERY 30 MIN PRN
Status: DISCONTINUED | OUTPATIENT
Start: 2021-09-10 | End: 2021-09-10 | Stop reason: HOSPADM

## 2021-09-10 RX ORDER — PROPOFOL 10 MG/ML
VIAL (ML) INTRAVENOUS
Status: DISCONTINUED | OUTPATIENT
Start: 2021-09-10 | End: 2021-09-10

## 2021-09-10 RX ORDER — SODIUM CHLORIDE 9 MG/ML
INJECTION, SOLUTION INTRAVENOUS CONTINUOUS
Status: DISCONTINUED | OUTPATIENT
Start: 2021-09-10 | End: 2021-09-10 | Stop reason: HOSPADM

## 2021-09-10 RX ORDER — LIDOCAINE HYDROCHLORIDE 20 MG/ML
INJECTION INTRAVENOUS
Status: DISCONTINUED | OUTPATIENT
Start: 2021-09-10 | End: 2021-09-10

## 2021-09-10 RX ORDER — CEFAZOLIN SODIUM 1 G/3ML
2 INJECTION, POWDER, FOR SOLUTION INTRAMUSCULAR; INTRAVENOUS
Status: COMPLETED | OUTPATIENT
Start: 2021-09-10 | End: 2021-09-10

## 2021-09-10 RX ORDER — MUPIROCIN 20 MG/G
OINTMENT TOPICAL
Status: DISCONTINUED | OUTPATIENT
Start: 2021-09-10 | End: 2021-09-10 | Stop reason: HOSPADM

## 2021-09-10 RX ORDER — FENTANYL CITRATE 50 UG/ML
INJECTION, SOLUTION INTRAMUSCULAR; INTRAVENOUS
Status: DISCONTINUED | OUTPATIENT
Start: 2021-09-10 | End: 2021-09-10

## 2021-09-10 RX ORDER — ALBUTEROL SULFATE 90 UG/1
AEROSOL, METERED RESPIRATORY (INHALATION)
Status: DISCONTINUED | OUTPATIENT
Start: 2021-09-10 | End: 2021-09-10

## 2021-09-10 RX ORDER — KETAMINE HCL IN 0.9 % NACL 50 MG/5 ML
SYRINGE (ML) INTRAVENOUS
Status: DISCONTINUED | OUTPATIENT
Start: 2021-09-10 | End: 2021-09-10

## 2021-09-10 RX ORDER — DEXAMETHASONE SODIUM PHOSPHATE 4 MG/ML
INJECTION, SOLUTION INTRA-ARTICULAR; INTRALESIONAL; INTRAMUSCULAR; INTRAVENOUS; SOFT TISSUE
Status: DISCONTINUED | OUTPATIENT
Start: 2021-09-10 | End: 2021-09-10

## 2021-09-10 RX ORDER — ALBUMIN HUMAN 50 G/1000ML
SOLUTION INTRAVENOUS CONTINUOUS PRN
Status: DISCONTINUED | OUTPATIENT
Start: 2021-09-10 | End: 2021-09-10

## 2021-09-10 RX ORDER — FLUOXETINE HYDROCHLORIDE 20 MG/1
20 CAPSULE ORAL DAILY
Qty: 30 CAPSULE | Refills: 3 | Status: SHIPPED | OUTPATIENT
Start: 2021-09-10 | End: 2022-01-10 | Stop reason: SDUPTHER

## 2021-09-10 RX ORDER — SODIUM CHLORIDE, SODIUM LACTATE, POTASSIUM CHLORIDE, CALCIUM CHLORIDE 600; 310; 30; 20 MG/100ML; MG/100ML; MG/100ML; MG/100ML
INJECTION, SOLUTION INTRAVENOUS CONTINUOUS
Status: DISCONTINUED | OUTPATIENT
Start: 2021-09-10 | End: 2021-09-10 | Stop reason: HOSPADM

## 2021-09-10 RX ORDER — OXYCODONE HYDROCHLORIDE 5 MG/1
5 TABLET ORAL
Status: DISCONTINUED | OUTPATIENT
Start: 2021-09-10 | End: 2021-09-10 | Stop reason: HOSPADM

## 2021-09-10 RX ORDER — HYDROMORPHONE HYDROCHLORIDE 2 MG/ML
0.4 INJECTION, SOLUTION INTRAMUSCULAR; INTRAVENOUS; SUBCUTANEOUS EVERY 5 MIN PRN
Status: DISCONTINUED | OUTPATIENT
Start: 2021-09-10 | End: 2021-09-10 | Stop reason: HOSPADM

## 2021-09-10 RX ORDER — ACETAMINOPHEN 500 MG
1000 TABLET ORAL
Status: COMPLETED | OUTPATIENT
Start: 2021-09-10 | End: 2021-09-10

## 2021-09-10 RX ORDER — MIDAZOLAM HYDROCHLORIDE 1 MG/ML
INJECTION INTRAMUSCULAR; INTRAVENOUS
Status: DISCONTINUED | OUTPATIENT
Start: 2021-09-10 | End: 2021-09-10

## 2021-09-10 RX ADMIN — PROPOFOL 200 MG: 10 INJECTION, EMULSION INTRAVENOUS at 07:09

## 2021-09-10 RX ADMIN — MIDAZOLAM HYDROCHLORIDE 2 MG: 1 INJECTION, SOLUTION INTRAMUSCULAR; INTRAVENOUS at 07:09

## 2021-09-10 RX ADMIN — Medication 50 MG: at 07:09

## 2021-09-10 RX ADMIN — ROCURONIUM BROMIDE 20 MG: 10 SOLUTION INTRAVENOUS at 08:09

## 2021-09-10 RX ADMIN — HYDROMORPHONE HYDROCHLORIDE 0.4 MG: 2 INJECTION INTRAMUSCULAR; INTRAVENOUS; SUBCUTANEOUS at 11:09

## 2021-09-10 RX ADMIN — SUGAMMADEX 426 MG: 100 INJECTION, SOLUTION INTRAVENOUS at 10:09

## 2021-09-10 RX ADMIN — ROCURONIUM BROMIDE 40 MG: 10 SOLUTION INTRAVENOUS at 07:09

## 2021-09-10 RX ADMIN — MUPIROCIN: 20 OINTMENT TOPICAL at 06:09

## 2021-09-10 RX ADMIN — HYDROMORPHONE HYDROCHLORIDE 0.4 MG: 2 INJECTION INTRAMUSCULAR; INTRAVENOUS; SUBCUTANEOUS at 12:09

## 2021-09-10 RX ADMIN — ALBUTEROL SULFATE 4 PUFF: 90 AEROSOL, METERED RESPIRATORY (INHALATION) at 07:09

## 2021-09-10 RX ADMIN — ROCURONIUM BROMIDE 10 MG: 10 SOLUTION INTRAVENOUS at 07:09

## 2021-09-10 RX ADMIN — FENTANYL CITRATE 50 MCG: 50 INJECTION, SOLUTION INTRAMUSCULAR; INTRAVENOUS at 09:09

## 2021-09-10 RX ADMIN — CARBOXYMETHYLCELLULOSE SODIUM 2 DROP: 2.5 SOLUTION/ DROPS OPHTHALMIC at 07:09

## 2021-09-10 RX ADMIN — FENTANYL CITRATE 25 MCG: 50 INJECTION, SOLUTION INTRAMUSCULAR; INTRAVENOUS at 07:09

## 2021-09-10 RX ADMIN — DEXAMETHASONE SODIUM PHOSPHATE 12 MG: 4 INJECTION, SOLUTION INTRAMUSCULAR; INTRAVENOUS at 07:09

## 2021-09-10 RX ADMIN — LIDOCAINE HYDROCHLORIDE 75 MG: 20 INJECTION, SOLUTION INTRAVENOUS at 07:09

## 2021-09-10 RX ADMIN — ALBUMIN (HUMAN): 2.5 SOLUTION INTRAVENOUS at 07:09

## 2021-09-10 RX ADMIN — ALBUMIN (HUMAN): 2.5 SOLUTION INTRAVENOUS at 08:09

## 2021-09-10 RX ADMIN — FENTANYL CITRATE 75 MCG: 50 INJECTION, SOLUTION INTRAMUSCULAR; INTRAVENOUS at 08:09

## 2021-09-10 RX ADMIN — SODIUM CHLORIDE, SODIUM LACTATE, POTASSIUM CHLORIDE, AND CALCIUM CHLORIDE: 600; 310; 30; 20 INJECTION, SOLUTION INTRAVENOUS at 06:09

## 2021-09-10 RX ADMIN — PREGABALIN 75 MG: 75 CAPSULE ORAL at 06:09

## 2021-09-10 RX ADMIN — ACETAMINOPHEN 1000 MG: 500 TABLET, FILM COATED ORAL at 06:09

## 2021-09-10 RX ADMIN — OXYCODONE 5 MG: 5 TABLET ORAL at 11:09

## 2021-09-10 RX ADMIN — CEFAZOLIN 2 G: 330 INJECTION, POWDER, FOR SOLUTION INTRAMUSCULAR; INTRAVENOUS at 07:09

## 2021-09-10 RX ADMIN — FENTANYL CITRATE 100 MCG: 50 INJECTION, SOLUTION INTRAMUSCULAR; INTRAVENOUS at 07:09

## 2021-09-10 RX ADMIN — PHENYLEPHRINE HYDROCHLORIDE 0.25 MCG/KG/MIN: 10 INJECTION INTRAVENOUS at 07:09

## 2021-09-11 ENCOUNTER — PATIENT MESSAGE (OUTPATIENT)
Dept: HEMATOLOGY/ONCOLOGY | Facility: CLINIC | Age: 41
End: 2021-09-11

## 2021-09-13 ENCOUNTER — IMMUNIZATION (OUTPATIENT)
Dept: INTERNAL MEDICINE | Facility: CLINIC | Age: 41
End: 2021-09-13
Payer: COMMERCIAL

## 2021-09-13 ENCOUNTER — PATIENT MESSAGE (OUTPATIENT)
Dept: ORTHOPEDICS | Facility: CLINIC | Age: 41
End: 2021-09-13

## 2021-09-13 ENCOUNTER — PATIENT MESSAGE (OUTPATIENT)
Dept: HEMATOLOGY/ONCOLOGY | Facility: CLINIC | Age: 41
End: 2021-09-13

## 2021-09-13 ENCOUNTER — PATIENT MESSAGE (OUTPATIENT)
Dept: SURGERY | Facility: OTHER | Age: 41
End: 2021-09-13

## 2021-09-13 DIAGNOSIS — Z23 NEED FOR VACCINATION: Primary | ICD-10-CM

## 2021-09-13 LAB — POCT GLUCOSE: 87 MG/DL (ref 70–110)

## 2021-09-13 PROCEDURE — 0002A COVID-19, MRNA, LNP-S, PF, 30 MCG/0.3 ML DOSE VACCINE: CPT | Mod: CV19,,, | Performed by: INTERNAL MEDICINE

## 2021-09-13 PROCEDURE — 0002A COVID-19, MRNA, LNP-S, PF, 30 MCG/0.3 ML DOSE VACCINE: ICD-10-PCS | Mod: CV19,,, | Performed by: INTERNAL MEDICINE

## 2021-09-13 PROCEDURE — 91300 COVID-19, MRNA, LNP-S, PF, 30 MCG/0.3 ML DOSE VACCINE: CPT | Mod: ,,, | Performed by: INTERNAL MEDICINE

## 2021-09-13 PROCEDURE — 91300 COVID-19, MRNA, LNP-S, PF, 30 MCG/0.3 ML DOSE VACCINE: ICD-10-PCS | Mod: ,,, | Performed by: INTERNAL MEDICINE

## 2021-09-14 ENCOUNTER — INFUSION (OUTPATIENT)
Dept: INFUSION THERAPY | Facility: OTHER | Age: 41
End: 2021-09-14
Attending: STUDENT IN AN ORGANIZED HEALTH CARE EDUCATION/TRAINING PROGRAM
Payer: COMMERCIAL

## 2021-09-14 VITALS
TEMPERATURE: 100 F | SYSTOLIC BLOOD PRESSURE: 137 MMHG | DIASTOLIC BLOOD PRESSURE: 94 MMHG | OXYGEN SATURATION: 98 % | HEART RATE: 65 BPM | RESPIRATION RATE: 20 BRPM

## 2021-09-14 DIAGNOSIS — D51.8 OTHER VITAMIN B12 DEFICIENCY ANEMIA: ICD-10-CM

## 2021-09-14 DIAGNOSIS — Z98.84 HX OF BARIATRIC SURGERY: ICD-10-CM

## 2021-09-14 DIAGNOSIS — D50.9 IRON DEFICIENCY ANEMIA, UNSPECIFIED IRON DEFICIENCY ANEMIA TYPE: Primary | ICD-10-CM

## 2021-09-14 LAB
FINAL PATHOLOGIC DIAGNOSIS: NORMAL
GROSS: NORMAL
Lab: NORMAL

## 2021-09-14 PROCEDURE — 25000003 PHARM REV CODE 250: Performed by: STUDENT IN AN ORGANIZED HEALTH CARE EDUCATION/TRAINING PROGRAM

## 2021-09-14 PROCEDURE — 63600175 PHARM REV CODE 636 W HCPCS: Performed by: STUDENT IN AN ORGANIZED HEALTH CARE EDUCATION/TRAINING PROGRAM

## 2021-09-14 PROCEDURE — 96366 THER/PROPH/DIAG IV INF ADDON: CPT

## 2021-09-14 PROCEDURE — 96365 THER/PROPH/DIAG IV INF INIT: CPT

## 2021-09-14 RX ORDER — CYANOCOBALAMIN 1000 UG/ML
INJECTION, SOLUTION INTRAMUSCULAR; SUBCUTANEOUS
Qty: 10 ML | Refills: 1 | Status: SHIPPED | OUTPATIENT
Start: 2021-09-14 | End: 2023-01-13

## 2021-09-14 RX ORDER — HEPARIN 100 UNIT/ML
500 SYRINGE INTRAVENOUS
Status: DISCONTINUED | OUTPATIENT
Start: 2021-09-14 | End: 2021-09-14 | Stop reason: HOSPADM

## 2021-09-14 RX ORDER — SODIUM CHLORIDE 0.9 % (FLUSH) 0.9 %
10 SYRINGE (ML) INJECTION
Status: DISCONTINUED | OUTPATIENT
Start: 2021-09-14 | End: 2021-09-14 | Stop reason: HOSPADM

## 2021-09-14 RX ORDER — SODIUM CHLORIDE 0.9 % (FLUSH) 0.9 %
10 SYRINGE (ML) INJECTION
Status: CANCELLED | OUTPATIENT
Start: 2021-09-16

## 2021-09-14 RX ORDER — SYRINGE W-NEEDLE,DISPOSAB,3 ML 25GX5/8"
SYRINGE, EMPTY DISPOSABLE MISCELLANEOUS
Qty: 16 EACH | Refills: 1 | Status: SHIPPED | OUTPATIENT
Start: 2021-09-14 | End: 2023-01-13

## 2021-09-14 RX ORDER — HEPARIN 100 UNIT/ML
500 SYRINGE INTRAVENOUS
Status: CANCELLED | OUTPATIENT
Start: 2021-09-16

## 2021-09-14 RX ADMIN — SODIUM CHLORIDE: 0.9 INJECTION, SOLUTION INTRAVENOUS at 12:09

## 2021-09-14 RX ADMIN — IRON SUCROSE 300 MG: 20 INJECTION, SOLUTION INTRAVENOUS at 12:09

## 2021-09-17 ENCOUNTER — PATIENT MESSAGE (OUTPATIENT)
Dept: INTERNAL MEDICINE | Facility: CLINIC | Age: 41
End: 2021-09-17

## 2021-09-17 DIAGNOSIS — J34.89 NASAL PAIN: Primary | ICD-10-CM

## 2021-09-18 ENCOUNTER — PATIENT MESSAGE (OUTPATIENT)
Dept: FAMILY MEDICINE | Facility: CLINIC | Age: 41
End: 2021-09-18

## 2021-09-18 ENCOUNTER — TELEPHONE (OUTPATIENT)
Dept: INTERNAL MEDICINE | Facility: CLINIC | Age: 41
End: 2021-09-18

## 2021-09-18 DIAGNOSIS — Z98.1 STATUS POST ANKLE ARTHRODESIS: ICD-10-CM

## 2021-09-18 DIAGNOSIS — M25.572 CHRONIC PAIN OF LEFT ANKLE: ICD-10-CM

## 2021-09-18 DIAGNOSIS — G89.29 CHRONIC PAIN OF LEFT ANKLE: ICD-10-CM

## 2021-09-18 RX ORDER — LORAZEPAM 0.5 MG/1
0.5 TABLET ORAL DAILY PRN
Qty: 15 TABLET | Refills: 0 | Status: SHIPPED | OUTPATIENT
Start: 2021-09-18 | End: 2021-09-30 | Stop reason: SDUPTHER

## 2021-09-20 ENCOUNTER — PATIENT MESSAGE (OUTPATIENT)
Dept: ORTHOPEDICS | Facility: CLINIC | Age: 41
End: 2021-09-20

## 2021-09-20 RX ORDER — OXYCODONE AND ACETAMINOPHEN 10; 325 MG/1; MG/1
1 TABLET ORAL EVERY 4 HOURS PRN
Qty: 42 TABLET | Refills: 0 | Status: SHIPPED | OUTPATIENT
Start: 2021-09-20 | End: 2021-09-25 | Stop reason: SDUPTHER

## 2021-09-21 ENCOUNTER — PATIENT MESSAGE (OUTPATIENT)
Dept: INTERNAL MEDICINE | Facility: CLINIC | Age: 41
End: 2021-09-21

## 2021-09-21 ENCOUNTER — PATIENT MESSAGE (OUTPATIENT)
Dept: HEMATOLOGY/ONCOLOGY | Facility: CLINIC | Age: 41
End: 2021-09-21

## 2021-09-21 ENCOUNTER — INFUSION (OUTPATIENT)
Dept: INFUSION THERAPY | Facility: OTHER | Age: 41
End: 2021-09-21
Attending: STUDENT IN AN ORGANIZED HEALTH CARE EDUCATION/TRAINING PROGRAM
Payer: COMMERCIAL

## 2021-09-21 VITALS
TEMPERATURE: 98 F | RESPIRATION RATE: 16 BRPM | HEART RATE: 70 BPM | OXYGEN SATURATION: 98 % | DIASTOLIC BLOOD PRESSURE: 97 MMHG | SYSTOLIC BLOOD PRESSURE: 166 MMHG

## 2021-09-21 DIAGNOSIS — D50.9 IRON DEFICIENCY ANEMIA, UNSPECIFIED IRON DEFICIENCY ANEMIA TYPE: Primary | ICD-10-CM

## 2021-09-21 DIAGNOSIS — Z98.84 HX OF BARIATRIC SURGERY: ICD-10-CM

## 2021-09-21 PROCEDURE — 96365 THER/PROPH/DIAG IV INF INIT: CPT

## 2021-09-21 PROCEDURE — 25000003 PHARM REV CODE 250: Performed by: STUDENT IN AN ORGANIZED HEALTH CARE EDUCATION/TRAINING PROGRAM

## 2021-09-21 PROCEDURE — 63600175 PHARM REV CODE 636 W HCPCS: Performed by: STUDENT IN AN ORGANIZED HEALTH CARE EDUCATION/TRAINING PROGRAM

## 2021-09-21 PROCEDURE — 96366 THER/PROPH/DIAG IV INF ADDON: CPT

## 2021-09-21 RX ORDER — HEPARIN 100 UNIT/ML
500 SYRINGE INTRAVENOUS
Status: CANCELLED | OUTPATIENT
Start: 2021-09-28

## 2021-09-21 RX ORDER — SODIUM CHLORIDE 0.9 % (FLUSH) 0.9 %
10 SYRINGE (ML) INJECTION
Status: CANCELLED | OUTPATIENT
Start: 2021-09-28

## 2021-09-21 RX ORDER — SODIUM CHLORIDE 0.9 % (FLUSH) 0.9 %
10 SYRINGE (ML) INJECTION
Status: DISCONTINUED | OUTPATIENT
Start: 2021-09-21 | End: 2021-09-21 | Stop reason: HOSPADM

## 2021-09-21 RX ORDER — HEPARIN 100 UNIT/ML
500 SYRINGE INTRAVENOUS
Status: DISCONTINUED | OUTPATIENT
Start: 2021-09-21 | End: 2021-09-21 | Stop reason: HOSPADM

## 2021-09-21 RX ADMIN — IRON SUCROSE 300 MG: 20 INJECTION, SOLUTION INTRAVENOUS at 01:09

## 2021-09-21 RX ADMIN — SODIUM CHLORIDE: 0.9 INJECTION, SOLUTION INTRAVENOUS at 01:09

## 2021-09-22 ENCOUNTER — OFFICE VISIT (OUTPATIENT)
Dept: PSYCHIATRY | Facility: CLINIC | Age: 41
End: 2021-09-22
Payer: COMMERCIAL

## 2021-09-22 ENCOUNTER — PATIENT MESSAGE (OUTPATIENT)
Dept: INTERNAL MEDICINE | Facility: CLINIC | Age: 41
End: 2021-09-22

## 2021-09-22 DIAGNOSIS — F41.1 GAD (GENERALIZED ANXIETY DISORDER): ICD-10-CM

## 2021-09-22 DIAGNOSIS — F33.1 MAJOR DEPRESSIVE DISORDER, RECURRENT EPISODE, MODERATE: Primary | ICD-10-CM

## 2021-09-22 PROCEDURE — 3044F PR MOST RECENT HEMOGLOBIN A1C LEVEL <7.0%: ICD-10-PCS | Mod: CPTII,95,, | Performed by: PSYCHIATRY & NEUROLOGY

## 2021-09-22 PROCEDURE — 99213 PR OFFICE/OUTPT VISIT, EST, LEVL III, 20-29 MIN: ICD-10-PCS | Mod: 95,,, | Performed by: PSYCHIATRY & NEUROLOGY

## 2021-09-22 PROCEDURE — 90833 PSYTX W PT W E/M 30 MIN: CPT | Mod: 95,,, | Performed by: PSYCHIATRY & NEUROLOGY

## 2021-09-22 PROCEDURE — 90833 PR PSYCHOTHERAPY W/PATIENT W/E&M, 30 MIN (ADD ON): ICD-10-PCS | Mod: 95,,, | Performed by: PSYCHIATRY & NEUROLOGY

## 2021-09-22 PROCEDURE — 3044F HG A1C LEVEL LT 7.0%: CPT | Mod: CPTII,95,, | Performed by: PSYCHIATRY & NEUROLOGY

## 2021-09-22 PROCEDURE — 1159F MED LIST DOCD IN RCRD: CPT | Mod: CPTII,95,, | Performed by: PSYCHIATRY & NEUROLOGY

## 2021-09-22 PROCEDURE — 1159F PR MEDICATION LIST DOCUMENTED IN MEDICAL RECORD: ICD-10-PCS | Mod: CPTII,95,, | Performed by: PSYCHIATRY & NEUROLOGY

## 2021-09-22 PROCEDURE — 99213 OFFICE O/P EST LOW 20 MIN: CPT | Mod: 95,,, | Performed by: PSYCHIATRY & NEUROLOGY

## 2021-09-22 PROCEDURE — 1160F RVW MEDS BY RX/DR IN RCRD: CPT | Mod: CPTII,95,, | Performed by: PSYCHIATRY & NEUROLOGY

## 2021-09-22 PROCEDURE — 1160F PR REVIEW ALL MEDS BY PRESCRIBER/CLIN PHARMACIST DOCUMENTED: ICD-10-PCS | Mod: CPTII,95,, | Performed by: PSYCHIATRY & NEUROLOGY

## 2021-09-23 ENCOUNTER — PATIENT MESSAGE (OUTPATIENT)
Dept: INTERNAL MEDICINE | Facility: CLINIC | Age: 41
End: 2021-09-23

## 2021-09-25 DIAGNOSIS — G89.29 CHRONIC PAIN OF LEFT ANKLE: ICD-10-CM

## 2021-09-25 DIAGNOSIS — M25.572 CHRONIC PAIN OF LEFT ANKLE: ICD-10-CM

## 2021-09-25 DIAGNOSIS — Z98.1 STATUS POST ANKLE ARTHRODESIS: ICD-10-CM

## 2021-09-27 ENCOUNTER — PATIENT MESSAGE (OUTPATIENT)
Dept: ORTHOPEDICS | Facility: CLINIC | Age: 41
End: 2021-09-27

## 2021-09-27 RX ORDER — OXYCODONE AND ACETAMINOPHEN 10; 325 MG/1; MG/1
1 TABLET ORAL EVERY 4 HOURS PRN
Qty: 42 TABLET | Refills: 0 | Status: SHIPPED | OUTPATIENT
Start: 2021-09-27 | End: 2021-10-04 | Stop reason: SDUPTHER

## 2021-09-28 ENCOUNTER — PATIENT MESSAGE (OUTPATIENT)
Dept: HEMATOLOGY/ONCOLOGY | Facility: CLINIC | Age: 41
End: 2021-09-28

## 2021-09-29 ENCOUNTER — PATIENT MESSAGE (OUTPATIENT)
Dept: HEMATOLOGY/ONCOLOGY | Facility: CLINIC | Age: 41
End: 2021-09-29

## 2021-09-29 ENCOUNTER — PATIENT MESSAGE (OUTPATIENT)
Dept: PSYCHIATRY | Facility: CLINIC | Age: 41
End: 2021-09-29

## 2021-09-30 ENCOUNTER — PATIENT MESSAGE (OUTPATIENT)
Dept: HEMATOLOGY/ONCOLOGY | Facility: CLINIC | Age: 41
End: 2021-09-30

## 2021-09-30 RX ORDER — LORAZEPAM 0.5 MG/1
0.5 TABLET ORAL DAILY PRN
Qty: 15 TABLET | Refills: 1 | Status: SHIPPED | OUTPATIENT
Start: 2021-09-30 | End: 2021-10-17 | Stop reason: DRUGHIGH

## 2021-10-04 ENCOUNTER — INFUSION (OUTPATIENT)
Dept: INFUSION THERAPY | Facility: OTHER | Age: 41
End: 2021-10-04
Attending: STUDENT IN AN ORGANIZED HEALTH CARE EDUCATION/TRAINING PROGRAM
Payer: COMMERCIAL

## 2021-10-04 ENCOUNTER — PATIENT MESSAGE (OUTPATIENT)
Dept: ORTHOPEDICS | Facility: CLINIC | Age: 41
End: 2021-10-04

## 2021-10-04 VITALS
OXYGEN SATURATION: 98 % | RESPIRATION RATE: 16 BRPM | TEMPERATURE: 99 F | HEART RATE: 71 BPM | DIASTOLIC BLOOD PRESSURE: 89 MMHG | SYSTOLIC BLOOD PRESSURE: 137 MMHG

## 2021-10-04 DIAGNOSIS — Z98.1 STATUS POST ANKLE ARTHRODESIS: ICD-10-CM

## 2021-10-04 DIAGNOSIS — M25.572 CHRONIC PAIN OF LEFT ANKLE: ICD-10-CM

## 2021-10-04 DIAGNOSIS — Z98.84 HX OF BARIATRIC SURGERY: ICD-10-CM

## 2021-10-04 DIAGNOSIS — G89.29 CHRONIC PAIN OF LEFT ANKLE: ICD-10-CM

## 2021-10-04 DIAGNOSIS — D50.9 IRON DEFICIENCY ANEMIA, UNSPECIFIED IRON DEFICIENCY ANEMIA TYPE: Primary | ICD-10-CM

## 2021-10-04 PROCEDURE — 25000003 PHARM REV CODE 250: Performed by: STUDENT IN AN ORGANIZED HEALTH CARE EDUCATION/TRAINING PROGRAM

## 2021-10-04 PROCEDURE — 63600175 PHARM REV CODE 636 W HCPCS: Performed by: STUDENT IN AN ORGANIZED HEALTH CARE EDUCATION/TRAINING PROGRAM

## 2021-10-04 PROCEDURE — 96366 THER/PROPH/DIAG IV INF ADDON: CPT

## 2021-10-04 PROCEDURE — 96365 THER/PROPH/DIAG IV INF INIT: CPT

## 2021-10-04 RX ORDER — SODIUM CHLORIDE 0.9 % (FLUSH) 0.9 %
10 SYRINGE (ML) INJECTION
OUTPATIENT
Start: 2021-10-05

## 2021-10-04 RX ORDER — HEPARIN 100 UNIT/ML
500 SYRINGE INTRAVENOUS
Status: DISCONTINUED | OUTPATIENT
Start: 2021-10-04 | End: 2021-10-04 | Stop reason: HOSPADM

## 2021-10-04 RX ORDER — SODIUM CHLORIDE 0.9 % (FLUSH) 0.9 %
10 SYRINGE (ML) INJECTION
Status: DISCONTINUED | OUTPATIENT
Start: 2021-10-04 | End: 2021-10-04 | Stop reason: HOSPADM

## 2021-10-04 RX ORDER — OXYCODONE AND ACETAMINOPHEN 10; 325 MG/1; MG/1
1 TABLET ORAL EVERY 4 HOURS PRN
Qty: 42 TABLET | Refills: 0 | OUTPATIENT
Start: 2021-10-04

## 2021-10-04 RX ORDER — HEPARIN 100 UNIT/ML
500 SYRINGE INTRAVENOUS
OUTPATIENT
Start: 2021-10-05

## 2021-10-04 RX ORDER — OXYCODONE AND ACETAMINOPHEN 10; 325 MG/1; MG/1
1 TABLET ORAL EVERY 4 HOURS PRN
Qty: 42 TABLET | Refills: 0 | Status: SHIPPED | OUTPATIENT
Start: 2021-10-04 | End: 2021-10-11 | Stop reason: SDUPTHER

## 2021-10-04 RX ADMIN — IRON SUCROSE 300 MG: 20 INJECTION, SOLUTION INTRAVENOUS at 11:10

## 2021-10-04 RX ADMIN — SODIUM CHLORIDE: 0.9 INJECTION, SOLUTION INTRAVENOUS at 11:10

## 2021-10-06 ENCOUNTER — PATIENT MESSAGE (OUTPATIENT)
Dept: PRIMARY CARE CLINIC | Facility: CLINIC | Age: 41
End: 2021-10-06

## 2021-10-11 ENCOUNTER — OFFICE VISIT (OUTPATIENT)
Dept: OBSTETRICS AND GYNECOLOGY | Facility: CLINIC | Age: 41
End: 2021-10-11
Payer: COMMERCIAL

## 2021-10-11 VITALS
BODY MASS INDEX: 36.41 KG/M2 | HEIGHT: 69 IN | WEIGHT: 245.81 LBS | DIASTOLIC BLOOD PRESSURE: 84 MMHG | SYSTOLIC BLOOD PRESSURE: 132 MMHG

## 2021-10-11 DIAGNOSIS — G89.29 CHRONIC PAIN OF LEFT ANKLE: ICD-10-CM

## 2021-10-11 DIAGNOSIS — M25.572 CHRONIC PAIN OF LEFT ANKLE: ICD-10-CM

## 2021-10-11 DIAGNOSIS — Z98.1 STATUS POST ANKLE ARTHRODESIS: ICD-10-CM

## 2021-10-11 DIAGNOSIS — Z09 POSTOPERATIVE EXAMINATION: Primary | ICD-10-CM

## 2021-10-11 PROBLEM — Z90.710 STATUS POST TOTAL HYSTERECTOMY: Status: RESOLVED | Noted: 2021-09-10 | Resolved: 2021-10-11

## 2021-10-11 PROCEDURE — 99024 PR POST-OP FOLLOW-UP VISIT: ICD-10-PCS | Mod: S$GLB,,, | Performed by: OBSTETRICS & GYNECOLOGY

## 2021-10-11 PROCEDURE — 3008F BODY MASS INDEX DOCD: CPT | Mod: CPTII,S$GLB,, | Performed by: OBSTETRICS & GYNECOLOGY

## 2021-10-11 PROCEDURE — 99999 PR PBB SHADOW E&M-EST. PATIENT-LVL III: CPT | Mod: PBBFAC,,, | Performed by: OBSTETRICS & GYNECOLOGY

## 2021-10-11 PROCEDURE — 3044F HG A1C LEVEL LT 7.0%: CPT | Mod: CPTII,S$GLB,, | Performed by: OBSTETRICS & GYNECOLOGY

## 2021-10-11 PROCEDURE — 3044F PR MOST RECENT HEMOGLOBIN A1C LEVEL <7.0%: ICD-10-PCS | Mod: CPTII,S$GLB,, | Performed by: OBSTETRICS & GYNECOLOGY

## 2021-10-11 PROCEDURE — 99024 POSTOP FOLLOW-UP VISIT: CPT | Mod: S$GLB,,, | Performed by: OBSTETRICS & GYNECOLOGY

## 2021-10-11 PROCEDURE — 3075F SYST BP GE 130 - 139MM HG: CPT | Mod: CPTII,S$GLB,, | Performed by: OBSTETRICS & GYNECOLOGY

## 2021-10-11 PROCEDURE — 1159F PR MEDICATION LIST DOCUMENTED IN MEDICAL RECORD: ICD-10-PCS | Mod: CPTII,S$GLB,, | Performed by: OBSTETRICS & GYNECOLOGY

## 2021-10-11 PROCEDURE — 3079F DIAST BP 80-89 MM HG: CPT | Mod: CPTII,S$GLB,, | Performed by: OBSTETRICS & GYNECOLOGY

## 2021-10-11 PROCEDURE — 1159F MED LIST DOCD IN RCRD: CPT | Mod: CPTII,S$GLB,, | Performed by: OBSTETRICS & GYNECOLOGY

## 2021-10-11 PROCEDURE — 3075F PR MOST RECENT SYSTOLIC BLOOD PRESS GE 130-139MM HG: ICD-10-PCS | Mod: CPTII,S$GLB,, | Performed by: OBSTETRICS & GYNECOLOGY

## 2021-10-11 PROCEDURE — 3008F PR BODY MASS INDEX (BMI) DOCUMENTED: ICD-10-PCS | Mod: CPTII,S$GLB,, | Performed by: OBSTETRICS & GYNECOLOGY

## 2021-10-11 PROCEDURE — 3079F PR MOST RECENT DIASTOLIC BLOOD PRESSURE 80-89 MM HG: ICD-10-PCS | Mod: CPTII,S$GLB,, | Performed by: OBSTETRICS & GYNECOLOGY

## 2021-10-11 PROCEDURE — 99999 PR PBB SHADOW E&M-EST. PATIENT-LVL III: ICD-10-PCS | Mod: PBBFAC,,, | Performed by: OBSTETRICS & GYNECOLOGY

## 2021-10-11 RX ORDER — OXYCODONE AND ACETAMINOPHEN 10; 325 MG/1; MG/1
1 TABLET ORAL EVERY 4 HOURS PRN
Qty: 42 TABLET | Refills: 0 | Status: SHIPPED | OUTPATIENT
Start: 2021-10-11 | End: 2021-10-16 | Stop reason: SDUPTHER

## 2021-10-11 RX ORDER — OXYCODONE AND ACETAMINOPHEN 10; 325 MG/1; MG/1
1 TABLET ORAL EVERY 4 HOURS PRN
Qty: 42 TABLET | Refills: 0 | Status: CANCELLED | OUTPATIENT
Start: 2021-10-11

## 2021-10-15 ENCOUNTER — OFFICE VISIT (OUTPATIENT)
Dept: PSYCHIATRY | Facility: CLINIC | Age: 41
End: 2021-10-15
Payer: COMMERCIAL

## 2021-10-15 ENCOUNTER — IMMUNIZATION (OUTPATIENT)
Dept: INTERNAL MEDICINE | Facility: CLINIC | Age: 41
End: 2021-10-15
Payer: COMMERCIAL

## 2021-10-15 DIAGNOSIS — F43.23 ADJUSTMENT DISORDER WITH MIXED ANXIETY AND DEPRESSED MOOD: Primary | ICD-10-CM

## 2021-10-15 PROCEDURE — 90471 IMMUNIZATION ADMIN: CPT | Mod: S$GLB,,, | Performed by: FAMILY MEDICINE

## 2021-10-15 PROCEDURE — 1159F PR MEDICATION LIST DOCUMENTED IN MEDICAL RECORD: ICD-10-PCS | Mod: CPTII,S$GLB,, | Performed by: SOCIAL WORKER

## 2021-10-15 PROCEDURE — 3044F HG A1C LEVEL LT 7.0%: CPT | Mod: CPTII,S$GLB,, | Performed by: SOCIAL WORKER

## 2021-10-15 PROCEDURE — 99999 PR PBB SHADOW E&M-EST. PATIENT-LVL I: CPT | Mod: PBBFAC,,, | Performed by: SOCIAL WORKER

## 2021-10-15 PROCEDURE — 90832 PR PSYCHOTHERAPY W/PATIENT, 30 MIN: ICD-10-PCS | Mod: S$GLB,,, | Performed by: SOCIAL WORKER

## 2021-10-15 PROCEDURE — 90471 FLU VACCINE (QUAD) GREATER THAN OR EQUAL TO 3YO PRESERVATIVE FREE IM: ICD-10-PCS | Mod: S$GLB,,, | Performed by: FAMILY MEDICINE

## 2021-10-15 PROCEDURE — 90686 FLU VACCINE (QUAD) GREATER THAN OR EQUAL TO 3YO PRESERVATIVE FREE IM: ICD-10-PCS | Mod: S$GLB,,, | Performed by: FAMILY MEDICINE

## 2021-10-15 PROCEDURE — 90832 PSYTX W PT 30 MINUTES: CPT | Mod: S$GLB,,, | Performed by: SOCIAL WORKER

## 2021-10-15 PROCEDURE — 90686 IIV4 VACC NO PRSV 0.5 ML IM: CPT | Mod: S$GLB,,, | Performed by: FAMILY MEDICINE

## 2021-10-15 PROCEDURE — 99999 PR PBB SHADOW E&M-EST. PATIENT-LVL I: ICD-10-PCS | Mod: PBBFAC,,, | Performed by: SOCIAL WORKER

## 2021-10-15 PROCEDURE — 1159F MED LIST DOCD IN RCRD: CPT | Mod: CPTII,S$GLB,, | Performed by: SOCIAL WORKER

## 2021-10-15 PROCEDURE — 3044F PR MOST RECENT HEMOGLOBIN A1C LEVEL <7.0%: ICD-10-PCS | Mod: CPTII,S$GLB,, | Performed by: SOCIAL WORKER

## 2021-10-16 ENCOUNTER — PATIENT MESSAGE (OUTPATIENT)
Dept: PSYCHIATRY | Facility: CLINIC | Age: 41
End: 2021-10-16
Payer: COMMERCIAL

## 2021-10-16 DIAGNOSIS — Z98.1 STATUS POST ANKLE ARTHRODESIS: ICD-10-CM

## 2021-10-16 DIAGNOSIS — G89.29 CHRONIC PAIN OF LEFT ANKLE: ICD-10-CM

## 2021-10-16 DIAGNOSIS — M25.572 CHRONIC PAIN OF LEFT ANKLE: ICD-10-CM

## 2021-10-17 RX ORDER — LORAZEPAM 0.5 MG/1
0.5 TABLET ORAL DAILY PRN
Qty: 15 TABLET | Refills: 1 | OUTPATIENT
Start: 2021-10-17 | End: 2021-11-16

## 2021-10-17 RX ORDER — LORAZEPAM 0.5 MG/1
TABLET ORAL
Qty: 45 TABLET | Refills: 0 | Status: SHIPPED | OUTPATIENT
Start: 2021-10-17 | End: 2021-11-27 | Stop reason: SDUPTHER

## 2021-10-18 ENCOUNTER — PATIENT MESSAGE (OUTPATIENT)
Dept: ORTHOPEDICS | Facility: CLINIC | Age: 41
End: 2021-10-18
Payer: COMMERCIAL

## 2021-10-18 RX ORDER — OXYCODONE AND ACETAMINOPHEN 10; 325 MG/1; MG/1
1 TABLET ORAL EVERY 4 HOURS PRN
Qty: 42 TABLET | Refills: 0 | Status: SHIPPED | OUTPATIENT
Start: 2021-10-18 | End: 2021-10-24 | Stop reason: SDUPTHER

## 2021-10-24 DIAGNOSIS — Z98.1 STATUS POST ANKLE ARTHRODESIS: ICD-10-CM

## 2021-10-24 DIAGNOSIS — G89.29 CHRONIC PAIN OF LEFT ANKLE: ICD-10-CM

## 2021-10-24 DIAGNOSIS — M25.572 CHRONIC PAIN OF LEFT ANKLE: ICD-10-CM

## 2021-10-25 DIAGNOSIS — G89.29 CHRONIC PAIN OF LEFT ANKLE: ICD-10-CM

## 2021-10-25 DIAGNOSIS — Z98.1 STATUS POST ANKLE ARTHRODESIS: ICD-10-CM

## 2021-10-25 DIAGNOSIS — M25.572 CHRONIC PAIN OF LEFT ANKLE: ICD-10-CM

## 2021-10-25 RX ORDER — OXYCODONE AND ACETAMINOPHEN 10; 325 MG/1; MG/1
1 TABLET ORAL EVERY 4 HOURS PRN
Qty: 42 TABLET | Refills: 0 | Status: SHIPPED | OUTPATIENT
Start: 2021-10-25 | End: 2021-10-31 | Stop reason: SDUPTHER

## 2021-10-25 RX ORDER — OXYCODONE AND ACETAMINOPHEN 10; 325 MG/1; MG/1
1 TABLET ORAL EVERY 4 HOURS PRN
Qty: 42 TABLET | Refills: 0 | OUTPATIENT
Start: 2021-10-25

## 2021-10-31 DIAGNOSIS — G89.29 CHRONIC PAIN OF LEFT ANKLE: ICD-10-CM

## 2021-10-31 DIAGNOSIS — M25.572 CHRONIC PAIN OF LEFT ANKLE: ICD-10-CM

## 2021-10-31 DIAGNOSIS — Z98.1 STATUS POST ANKLE ARTHRODESIS: ICD-10-CM

## 2021-11-01 RX ORDER — OXYCODONE AND ACETAMINOPHEN 10; 325 MG/1; MG/1
1 TABLET ORAL EVERY 4 HOURS PRN
Qty: 42 TABLET | Refills: 0 | Status: SHIPPED | OUTPATIENT
Start: 2021-11-01 | End: 2021-11-06 | Stop reason: SDUPTHER

## 2021-11-06 DIAGNOSIS — G89.29 CHRONIC PAIN OF LEFT ANKLE: ICD-10-CM

## 2021-11-06 DIAGNOSIS — Z98.1 STATUS POST ANKLE ARTHRODESIS: ICD-10-CM

## 2021-11-06 DIAGNOSIS — M25.572 CHRONIC PAIN OF LEFT ANKLE: ICD-10-CM

## 2021-11-08 RX ORDER — OXYCODONE AND ACETAMINOPHEN 10; 325 MG/1; MG/1
1 TABLET ORAL EVERY 4 HOURS PRN
Qty: 42 TABLET | Refills: 0 | Status: SHIPPED | OUTPATIENT
Start: 2021-11-08 | End: 2021-11-13 | Stop reason: SDUPTHER

## 2021-11-13 DIAGNOSIS — Z98.1 STATUS POST ANKLE ARTHRODESIS: ICD-10-CM

## 2021-11-13 DIAGNOSIS — G89.29 CHRONIC PAIN OF LEFT ANKLE: ICD-10-CM

## 2021-11-13 DIAGNOSIS — M25.572 CHRONIC PAIN OF LEFT ANKLE: ICD-10-CM

## 2021-11-15 ENCOUNTER — PATIENT MESSAGE (OUTPATIENT)
Dept: ORTHOPEDICS | Facility: CLINIC | Age: 41
End: 2021-11-15
Payer: COMMERCIAL

## 2021-11-15 DIAGNOSIS — G89.29 CHRONIC PAIN OF LEFT ANKLE: ICD-10-CM

## 2021-11-15 DIAGNOSIS — Z98.1 STATUS POST ANKLE ARTHRODESIS: ICD-10-CM

## 2021-11-15 DIAGNOSIS — M25.572 CHRONIC PAIN OF LEFT ANKLE: ICD-10-CM

## 2021-11-15 RX ORDER — OXYCODONE AND ACETAMINOPHEN 10; 325 MG/1; MG/1
1 TABLET ORAL EVERY 4 HOURS PRN
Qty: 42 TABLET | Refills: 0 | Status: SHIPPED | OUTPATIENT
Start: 2021-11-15 | End: 2021-11-22 | Stop reason: SDUPTHER

## 2021-11-15 RX ORDER — OXYCODONE AND ACETAMINOPHEN 10; 325 MG/1; MG/1
1 TABLET ORAL EVERY 4 HOURS PRN
Qty: 42 TABLET | Refills: 0 | Status: SHIPPED | OUTPATIENT
Start: 2021-11-15 | End: 2021-11-15 | Stop reason: SDUPTHER

## 2021-11-19 ENCOUNTER — PATIENT MESSAGE (OUTPATIENT)
Dept: PSYCHIATRY | Facility: CLINIC | Age: 41
End: 2021-11-19
Payer: COMMERCIAL

## 2021-11-22 ENCOUNTER — PATIENT MESSAGE (OUTPATIENT)
Dept: ORTHOPEDICS | Facility: CLINIC | Age: 41
End: 2021-11-22
Payer: COMMERCIAL

## 2021-11-22 DIAGNOSIS — G89.29 CHRONIC PAIN OF LEFT ANKLE: ICD-10-CM

## 2021-11-22 DIAGNOSIS — M25.572 CHRONIC PAIN OF LEFT ANKLE: ICD-10-CM

## 2021-11-22 DIAGNOSIS — Z98.1 STATUS POST ANKLE ARTHRODESIS: ICD-10-CM

## 2021-11-22 RX ORDER — OXYCODONE AND ACETAMINOPHEN 10; 325 MG/1; MG/1
1 TABLET ORAL EVERY 4 HOURS PRN
Qty: 42 TABLET | Refills: 0 | Status: SHIPPED | OUTPATIENT
Start: 2021-11-22 | End: 2021-11-26 | Stop reason: SDUPTHER

## 2021-11-26 DIAGNOSIS — G89.29 CHRONIC PAIN OF LEFT ANKLE: ICD-10-CM

## 2021-11-26 DIAGNOSIS — Z98.1 STATUS POST ANKLE ARTHRODESIS: ICD-10-CM

## 2021-11-26 DIAGNOSIS — M25.572 CHRONIC PAIN OF LEFT ANKLE: ICD-10-CM

## 2021-11-28 ENCOUNTER — PATIENT MESSAGE (OUTPATIENT)
Dept: PSYCHIATRY | Facility: CLINIC | Age: 41
End: 2021-11-28
Payer: COMMERCIAL

## 2021-11-28 RX ORDER — LORAZEPAM 0.5 MG/1
TABLET ORAL
Qty: 45 TABLET | Refills: 0 | Status: SHIPPED | OUTPATIENT
Start: 2021-11-28 | End: 2021-12-18 | Stop reason: SDUPTHER

## 2021-11-29 RX ORDER — OXYCODONE AND ACETAMINOPHEN 10; 325 MG/1; MG/1
1 TABLET ORAL EVERY 4 HOURS PRN
Qty: 42 TABLET | Refills: 0 | Status: SHIPPED | OUTPATIENT
Start: 2021-11-29 | End: 2021-12-05 | Stop reason: SDUPTHER

## 2021-11-30 ENCOUNTER — PATIENT MESSAGE (OUTPATIENT)
Dept: INTERNAL MEDICINE | Facility: CLINIC | Age: 41
End: 2021-11-30
Payer: COMMERCIAL

## 2021-12-01 ENCOUNTER — PATIENT MESSAGE (OUTPATIENT)
Dept: INTERNAL MEDICINE | Facility: CLINIC | Age: 41
End: 2021-12-01
Payer: COMMERCIAL

## 2021-12-01 ENCOUNTER — OFFICE VISIT (OUTPATIENT)
Dept: FAMILY MEDICINE | Facility: CLINIC | Age: 41
End: 2021-12-01
Payer: COMMERCIAL

## 2021-12-01 DIAGNOSIS — R11.10 VOMITING, INTRACTABILITY OF VOMITING NOT SPECIFIED, PRESENCE OF NAUSEA NOT SPECIFIED, UNSPECIFIED VOMITING TYPE: ICD-10-CM

## 2021-12-01 DIAGNOSIS — R51.9 NONINTRACTABLE HEADACHE, UNSPECIFIED CHRONICITY PATTERN, UNSPECIFIED HEADACHE TYPE: Primary | ICD-10-CM

## 2021-12-01 PROCEDURE — 99214 PR OFFICE/OUTPT VISIT, EST, LEVL IV, 30-39 MIN: ICD-10-PCS | Mod: 95,,, | Performed by: FAMILY MEDICINE

## 2021-12-01 PROCEDURE — 99214 OFFICE O/P EST MOD 30 MIN: CPT | Mod: 95,,, | Performed by: FAMILY MEDICINE

## 2021-12-03 ENCOUNTER — HOSPITAL ENCOUNTER (EMERGENCY)
Facility: OTHER | Age: 41
Discharge: HOME | End: 2021-12-03
Attending: EMERGENCY MEDICINE
Payer: COMMERCIAL

## 2021-12-03 VITALS
RESPIRATION RATE: 18 BRPM | SYSTOLIC BLOOD PRESSURE: 137 MMHG | TEMPERATURE: 98 F | DIASTOLIC BLOOD PRESSURE: 92 MMHG | BODY MASS INDEX: 33.64 KG/M2 | HEIGHT: 70 IN | OXYGEN SATURATION: 99 % | HEART RATE: 71 BPM | WEIGHT: 235 LBS

## 2021-12-03 DIAGNOSIS — R11.2 NON-INTRACTABLE VOMITING WITH NAUSEA, UNSPECIFIED VOMITING TYPE: ICD-10-CM

## 2021-12-03 DIAGNOSIS — R10.31 RIGHT LOWER QUADRANT ABDOMINAL PAIN: Primary | ICD-10-CM

## 2021-12-03 LAB
ALBUMIN SERPL BCP-MCNC: 3.5 G/DL (ref 3.5–5.2)
ALP SERPL-CCNC: 57 U/L (ref 55–135)
ALT SERPL W/O P-5'-P-CCNC: 15 U/L (ref 10–44)
ANION GAP SERPL CALC-SCNC: 9 MMOL/L (ref 8–16)
AST SERPL-CCNC: 14 U/L (ref 10–40)
B-HCG UR QL: NEGATIVE
BASOPHILS # BLD AUTO: 0.04 K/UL (ref 0–0.2)
BASOPHILS NFR BLD: 0.3 % (ref 0–1.9)
BILIRUB SERPL-MCNC: 0.2 MG/DL (ref 0.1–1)
BILIRUB UR QL STRIP: NEGATIVE
BUN SERPL-MCNC: 10 MG/DL (ref 6–20)
CALCIUM SERPL-MCNC: 9.2 MG/DL (ref 8.7–10.5)
CHLORIDE SERPL-SCNC: 107 MMOL/L (ref 95–110)
CLARITY UR: CLEAR
CO2 SERPL-SCNC: 23 MMOL/L (ref 23–29)
COLOR UR: YELLOW
CREAT SERPL-MCNC: 0.6 MG/DL (ref 0.5–1.4)
CTP QC/QA: YES
DIFFERENTIAL METHOD: ABNORMAL
EOSINOPHIL # BLD AUTO: 0.2 K/UL (ref 0–0.5)
EOSINOPHIL NFR BLD: 1.3 % (ref 0–8)
ERYTHROCYTE [DISTWIDTH] IN BLOOD BY AUTOMATED COUNT: 16.6 % (ref 11.5–14.5)
EST. GFR  (AFRICAN AMERICAN): >60 ML/MIN/1.73 M^2
EST. GFR  (NON AFRICAN AMERICAN): >60 ML/MIN/1.73 M^2
GLUCOSE SERPL-MCNC: 116 MG/DL (ref 70–110)
GLUCOSE UR QL STRIP: NEGATIVE
HCT VFR BLD AUTO: 41.4 % (ref 37–48.5)
HGB BLD-MCNC: 13.2 G/DL (ref 12–16)
HGB UR QL STRIP: ABNORMAL
IMM GRANULOCYTES # BLD AUTO: 0.03 K/UL (ref 0–0.04)
IMM GRANULOCYTES NFR BLD AUTO: 0.3 % (ref 0–0.5)
KETONES UR QL STRIP: NEGATIVE
LEUKOCYTE ESTERASE UR QL STRIP: NEGATIVE
LIPASE SERPL-CCNC: 26 U/L (ref 4–60)
LYMPHOCYTES # BLD AUTO: 1.7 K/UL (ref 1–4.8)
LYMPHOCYTES NFR BLD: 14.3 % (ref 18–48)
MCH RBC QN AUTO: 26.9 PG (ref 27–31)
MCHC RBC AUTO-ENTMCNC: 31.9 G/DL (ref 32–36)
MCV RBC AUTO: 85 FL (ref 82–98)
MONOCYTES # BLD AUTO: 0.4 K/UL (ref 0.3–1)
MONOCYTES NFR BLD: 3.4 % (ref 4–15)
NEUTROPHILS # BLD AUTO: 9.5 K/UL (ref 1.8–7.7)
NEUTROPHILS NFR BLD: 80.4 % (ref 38–73)
NITRITE UR QL STRIP: NEGATIVE
NRBC BLD-RTO: 0 /100 WBC
PH UR STRIP: 6 [PH] (ref 5–8)
PLATELET # BLD AUTO: 266 K/UL (ref 150–450)
PMV BLD AUTO: 9 FL (ref 9.2–12.9)
POC MOLECULAR INFLUENZA A AGN: NEGATIVE
POC MOLECULAR INFLUENZA B AGN: NEGATIVE
POTASSIUM SERPL-SCNC: 3.8 MMOL/L (ref 3.5–5.1)
PROT SERPL-MCNC: 6.7 G/DL (ref 6–8.4)
PROT UR QL STRIP: NEGATIVE
RBC # BLD AUTO: 4.9 M/UL (ref 4–5.4)
SARS-COV-2 RDRP RESP QL NAA+PROBE: NEGATIVE
SODIUM SERPL-SCNC: 139 MMOL/L (ref 136–145)
SP GR UR STRIP: 1.02 (ref 1–1.03)
URN SPEC COLLECT METH UR: ABNORMAL
UROBILINOGEN UR STRIP-ACNC: NEGATIVE EU/DL
WBC # BLD AUTO: 11.77 K/UL (ref 3.9–12.7)

## 2021-12-03 PROCEDURE — 96374 THER/PROPH/DIAG INJ IV PUSH: CPT | Mod: 59

## 2021-12-03 PROCEDURE — 81025 URINE PREGNANCY TEST: CPT | Performed by: NURSE PRACTITIONER

## 2021-12-03 PROCEDURE — 96361 HYDRATE IV INFUSION ADD-ON: CPT

## 2021-12-03 PROCEDURE — 85025 COMPLETE CBC W/AUTO DIFF WBC: CPT | Performed by: NURSE PRACTITIONER

## 2021-12-03 PROCEDURE — U0002 COVID-19 LAB TEST NON-CDC: HCPCS | Performed by: PHYSICIAN ASSISTANT

## 2021-12-03 PROCEDURE — 25500020 PHARM REV CODE 255: Performed by: PHYSICIAN ASSISTANT

## 2021-12-03 PROCEDURE — 63600175 PHARM REV CODE 636 W HCPCS: Performed by: PHYSICIAN ASSISTANT

## 2021-12-03 PROCEDURE — 99285 EMERGENCY DEPT VISIT HI MDM: CPT | Mod: 25

## 2021-12-03 PROCEDURE — 83690 ASSAY OF LIPASE: CPT | Performed by: NURSE PRACTITIONER

## 2021-12-03 PROCEDURE — 80053 COMPREHEN METABOLIC PANEL: CPT | Performed by: NURSE PRACTITIONER

## 2021-12-03 PROCEDURE — 25000003 PHARM REV CODE 250: Performed by: PHYSICIAN ASSISTANT

## 2021-12-03 PROCEDURE — 81003 URINALYSIS AUTO W/O SCOPE: CPT | Performed by: NURSE PRACTITIONER

## 2021-12-03 PROCEDURE — 96375 TX/PRO/DX INJ NEW DRUG ADDON: CPT

## 2021-12-03 RX ORDER — ONDANSETRON 4 MG/1
4 TABLET, ORALLY DISINTEGRATING ORAL EVERY 8 HOURS PRN
Qty: 30 TABLET | Refills: 0 | Status: SHIPPED | OUTPATIENT
Start: 2021-12-03 | End: 2022-01-22

## 2021-12-03 RX ORDER — LACTULOSE 10 G/15ML
10 SOLUTION ORAL; RECTAL 3 TIMES DAILY
Qty: 100 ML | Refills: 0 | Status: SHIPPED | OUTPATIENT
Start: 2021-12-03 | End: 2022-01-22

## 2021-12-03 RX ORDER — ONDANSETRON 2 MG/ML
4 INJECTION INTRAMUSCULAR; INTRAVENOUS
Status: COMPLETED | OUTPATIENT
Start: 2021-12-03 | End: 2021-12-03

## 2021-12-03 RX ORDER — MORPHINE SULFATE 4 MG/ML
4 INJECTION, SOLUTION INTRAMUSCULAR; INTRAVENOUS
Status: COMPLETED | OUTPATIENT
Start: 2021-12-03 | End: 2021-12-03

## 2021-12-03 RX ORDER — HYOSCYAMINE SULFATE 0.12 MG/1
0.12 TABLET SUBLINGUAL EVERY 4 HOURS PRN
Qty: 15 TABLET | Refills: 0 | Status: SHIPPED | OUTPATIENT
Start: 2021-12-03 | End: 2022-01-22

## 2021-12-03 RX ORDER — HYOSCYAMINE SULFATE 0.12 MG/1
0.12 TABLET SUBLINGUAL
Status: COMPLETED | OUTPATIENT
Start: 2021-12-03 | End: 2021-12-03

## 2021-12-03 RX ADMIN — HYOSCYAMINE SULFATE 0.12 MG: 0.12 TABLET ORAL; SUBLINGUAL at 12:12

## 2021-12-03 RX ADMIN — MORPHINE SULFATE 4 MG: 4 INJECTION, SOLUTION INTRAMUSCULAR; INTRAVENOUS at 11:12

## 2021-12-03 RX ADMIN — IOHEXOL 100 ML: 350 INJECTION, SOLUTION INTRAVENOUS at 11:12

## 2021-12-03 RX ADMIN — SODIUM CHLORIDE 1000 ML: 0.9 INJECTION, SOLUTION INTRAVENOUS at 10:12

## 2021-12-03 RX ADMIN — ONDANSETRON 4 MG: 2 INJECTION INTRAMUSCULAR; INTRAVENOUS at 10:12

## 2021-12-05 ENCOUNTER — PATIENT MESSAGE (OUTPATIENT)
Dept: ORTHOPEDICS | Facility: CLINIC | Age: 41
End: 2021-12-05
Payer: COMMERCIAL

## 2021-12-05 DIAGNOSIS — M25.572 CHRONIC PAIN OF LEFT ANKLE: ICD-10-CM

## 2021-12-05 DIAGNOSIS — G89.29 CHRONIC PAIN OF LEFT ANKLE: ICD-10-CM

## 2021-12-05 DIAGNOSIS — Z98.1 STATUS POST ANKLE ARTHRODESIS: ICD-10-CM

## 2021-12-06 DIAGNOSIS — Z98.1 STATUS POST ANKLE ARTHRODESIS: ICD-10-CM

## 2021-12-06 DIAGNOSIS — G89.29 CHRONIC PAIN OF LEFT ANKLE: ICD-10-CM

## 2021-12-06 DIAGNOSIS — M25.572 CHRONIC PAIN OF LEFT ANKLE: ICD-10-CM

## 2021-12-06 RX ORDER — OXYCODONE AND ACETAMINOPHEN 10; 325 MG/1; MG/1
1 TABLET ORAL EVERY 4 HOURS PRN
Qty: 42 TABLET | Refills: 0 | OUTPATIENT
Start: 2021-12-06

## 2021-12-06 RX ORDER — OXYCODONE AND ACETAMINOPHEN 10; 325 MG/1; MG/1
1 TABLET ORAL EVERY 4 HOURS PRN
Qty: 42 TABLET | Refills: 0 | Status: SHIPPED | OUTPATIENT
Start: 2021-12-06 | End: 2021-12-11 | Stop reason: SDUPTHER

## 2021-12-07 ENCOUNTER — OFFICE VISIT (OUTPATIENT)
Dept: INTERNAL MEDICINE | Facility: CLINIC | Age: 41
End: 2021-12-07
Payer: COMMERCIAL

## 2021-12-07 VITALS
BODY MASS INDEX: 37.04 KG/M2 | OXYGEN SATURATION: 98 % | SYSTOLIC BLOOD PRESSURE: 122 MMHG | WEIGHT: 258.19 LBS | DIASTOLIC BLOOD PRESSURE: 84 MMHG | HEART RATE: 60 BPM

## 2021-12-07 DIAGNOSIS — M79.605 BILATERAL LEG PAIN: Primary | ICD-10-CM

## 2021-12-07 DIAGNOSIS — M79.604 BILATERAL LEG PAIN: Primary | ICD-10-CM

## 2021-12-07 PROCEDURE — 99999 PR PBB SHADOW E&M-EST. PATIENT-LVL IV: ICD-10-PCS | Mod: PBBFAC,,, | Performed by: PHYSICIAN ASSISTANT

## 2021-12-07 PROCEDURE — 99214 PR OFFICE/OUTPT VISIT, EST, LEVL IV, 30-39 MIN: ICD-10-PCS | Mod: S$GLB,,, | Performed by: PHYSICIAN ASSISTANT

## 2021-12-07 PROCEDURE — 99214 OFFICE O/P EST MOD 30 MIN: CPT | Mod: S$GLB,,, | Performed by: PHYSICIAN ASSISTANT

## 2021-12-07 PROCEDURE — 99999 PR PBB SHADOW E&M-EST. PATIENT-LVL IV: CPT | Mod: PBBFAC,,, | Performed by: PHYSICIAN ASSISTANT

## 2021-12-08 ENCOUNTER — TELEPHONE (OUTPATIENT)
Dept: SPINE | Facility: CLINIC | Age: 41
End: 2021-12-08
Payer: COMMERCIAL

## 2021-12-09 ENCOUNTER — TELEPHONE (OUTPATIENT)
Dept: INTERNAL MEDICINE | Facility: CLINIC | Age: 41
End: 2021-12-09
Payer: COMMERCIAL

## 2021-12-11 ENCOUNTER — HOSPITAL ENCOUNTER (OUTPATIENT)
Dept: RADIOLOGY | Facility: OTHER | Age: 41
Discharge: HOME | End: 2021-12-11
Attending: PHYSICIAN ASSISTANT
Payer: COMMERCIAL

## 2021-12-11 DIAGNOSIS — M79.605 BILATERAL LEG PAIN: ICD-10-CM

## 2021-12-11 DIAGNOSIS — M79.604 BILATERAL LEG PAIN: ICD-10-CM

## 2021-12-11 DIAGNOSIS — M25.572 CHRONIC PAIN OF LEFT ANKLE: ICD-10-CM

## 2021-12-11 DIAGNOSIS — G89.29 CHRONIC PAIN OF LEFT ANKLE: ICD-10-CM

## 2021-12-11 DIAGNOSIS — Z98.1 STATUS POST ANKLE ARTHRODESIS: ICD-10-CM

## 2021-12-11 PROCEDURE — 93970 US LOWER EXTREMITY VEINS BILATERAL: ICD-10-PCS | Mod: 26,,, | Performed by: RADIOLOGY

## 2021-12-11 PROCEDURE — 93970 EXTREMITY STUDY: CPT | Mod: TC

## 2021-12-11 PROCEDURE — 93970 EXTREMITY STUDY: CPT | Mod: 26,,, | Performed by: RADIOLOGY

## 2021-12-13 DIAGNOSIS — M25.572 CHRONIC PAIN OF LEFT ANKLE: ICD-10-CM

## 2021-12-13 DIAGNOSIS — G89.29 CHRONIC PAIN OF LEFT ANKLE: ICD-10-CM

## 2021-12-13 DIAGNOSIS — Z98.1 STATUS POST ANKLE ARTHRODESIS: ICD-10-CM

## 2021-12-13 RX ORDER — OXYCODONE AND ACETAMINOPHEN 10; 325 MG/1; MG/1
1 TABLET ORAL EVERY 4 HOURS PRN
Qty: 42 TABLET | Refills: 0 | OUTPATIENT
Start: 2021-12-13

## 2021-12-13 RX ORDER — OXYCODONE AND ACETAMINOPHEN 10; 325 MG/1; MG/1
1 TABLET ORAL EVERY 4 HOURS PRN
Qty: 42 TABLET | Refills: 0 | Status: SHIPPED | OUTPATIENT
Start: 2021-12-13 | End: 2021-12-20 | Stop reason: SDUPTHER

## 2021-12-18 ENCOUNTER — PATIENT MESSAGE (OUTPATIENT)
Dept: ORTHOPEDICS | Facility: CLINIC | Age: 41
End: 2021-12-18
Payer: COMMERCIAL

## 2021-12-18 ENCOUNTER — PATIENT MESSAGE (OUTPATIENT)
Dept: PSYCHIATRY | Facility: CLINIC | Age: 41
End: 2021-12-18
Payer: COMMERCIAL

## 2021-12-18 RX ORDER — LORAZEPAM 0.5 MG/1
TABLET ORAL
Qty: 45 TABLET | Refills: 0 | Status: SHIPPED | OUTPATIENT
Start: 2021-12-18 | End: 2022-01-10 | Stop reason: SDUPTHER

## 2021-12-20 ENCOUNTER — PATIENT MESSAGE (OUTPATIENT)
Dept: ORTHOPEDICS | Facility: CLINIC | Age: 41
End: 2021-12-20
Payer: COMMERCIAL

## 2021-12-20 DIAGNOSIS — G89.29 CHRONIC PAIN OF LEFT ANKLE: ICD-10-CM

## 2021-12-20 DIAGNOSIS — Z98.1 STATUS POST ANKLE ARTHRODESIS: ICD-10-CM

## 2021-12-20 DIAGNOSIS — M25.572 CHRONIC PAIN OF LEFT ANKLE: ICD-10-CM

## 2021-12-20 RX ORDER — OXYCODONE AND ACETAMINOPHEN 10; 325 MG/1; MG/1
1 TABLET ORAL EVERY 4 HOURS PRN
Qty: 42 TABLET | Refills: 0 | Status: SHIPPED | OUTPATIENT
Start: 2021-12-20 | End: 2021-12-25 | Stop reason: SDUPTHER

## 2021-12-21 NOTE — ED NOTES
Dr. Hartley, Physician at bedside with ultrasound to verify fht's    
Dr. Simpson, Physician at bedside.    
Physician at bedside at bedside for rectal exam    
Physician at bedside.  
No

## 2021-12-25 DIAGNOSIS — Z98.1 STATUS POST ANKLE ARTHRODESIS: ICD-10-CM

## 2021-12-25 DIAGNOSIS — G89.29 CHRONIC PAIN OF LEFT ANKLE: ICD-10-CM

## 2021-12-25 DIAGNOSIS — M25.572 CHRONIC PAIN OF LEFT ANKLE: ICD-10-CM

## 2021-12-27 ENCOUNTER — TELEPHONE (OUTPATIENT)
Dept: ORTHOPEDICS | Facility: CLINIC | Age: 41
End: 2021-12-27
Payer: COMMERCIAL

## 2021-12-27 ENCOUNTER — PATIENT MESSAGE (OUTPATIENT)
Dept: INTERNAL MEDICINE | Facility: CLINIC | Age: 41
End: 2021-12-27
Payer: COMMERCIAL

## 2021-12-27 ENCOUNTER — PATIENT MESSAGE (OUTPATIENT)
Dept: ORTHOPEDICS | Facility: CLINIC | Age: 41
End: 2021-12-27
Payer: COMMERCIAL

## 2021-12-28 RX ORDER — OXYCODONE AND ACETAMINOPHEN 10; 325 MG/1; MG/1
1 TABLET ORAL EVERY 4 HOURS PRN
Qty: 42 TABLET | Refills: 0 | Status: SHIPPED | OUTPATIENT
Start: 2021-12-28 | End: 2022-01-02 | Stop reason: SDUPTHER

## 2022-01-02 DIAGNOSIS — G89.29 CHRONIC PAIN OF LEFT ANKLE: ICD-10-CM

## 2022-01-02 DIAGNOSIS — M25.572 CHRONIC PAIN OF LEFT ANKLE: ICD-10-CM

## 2022-01-02 DIAGNOSIS — Z98.1 STATUS POST ANKLE ARTHRODESIS: ICD-10-CM

## 2022-01-03 RX ORDER — OXYCODONE AND ACETAMINOPHEN 10; 325 MG/1; MG/1
1 TABLET ORAL EVERY 4 HOURS PRN
Qty: 42 TABLET | Refills: 0 | Status: SHIPPED | OUTPATIENT
Start: 2022-01-03 | End: 2022-01-08 | Stop reason: SDUPTHER

## 2022-01-05 ENCOUNTER — PATIENT MESSAGE (OUTPATIENT)
Dept: INTERNAL MEDICINE | Facility: CLINIC | Age: 42
End: 2022-01-05
Payer: COMMERCIAL

## 2022-01-06 ENCOUNTER — OFFICE VISIT (OUTPATIENT)
Dept: INTERNAL MEDICINE | Facility: CLINIC | Age: 42
End: 2022-01-06
Payer: COMMERCIAL

## 2022-01-06 DIAGNOSIS — J06.9 VIRAL URI: Primary | ICD-10-CM

## 2022-01-06 PROCEDURE — 99213 OFFICE O/P EST LOW 20 MIN: CPT | Mod: 95,,, | Performed by: STUDENT IN AN ORGANIZED HEALTH CARE EDUCATION/TRAINING PROGRAM

## 2022-01-06 PROCEDURE — 99213 PR OFFICE/OUTPT VISIT, EST, LEVL III, 20-29 MIN: ICD-10-PCS | Mod: 95,,, | Performed by: STUDENT IN AN ORGANIZED HEALTH CARE EDUCATION/TRAINING PROGRAM

## 2022-01-06 RX ORDER — GUAIFENESIN 1200 MG/1
1200 TABLET, EXTENDED RELEASE ORAL 2 TIMES DAILY
Qty: 24 TABLET | Refills: 0 | Status: SHIPPED | OUTPATIENT
Start: 2022-01-06 | End: 2022-01-18

## 2022-01-06 RX ORDER — PREDNISONE 20 MG/1
20 TABLET ORAL DAILY
Qty: 5 TABLET | Refills: 0 | Status: SHIPPED | OUTPATIENT
Start: 2022-01-06 | End: 2022-01-23

## 2022-01-06 RX ORDER — AZELASTINE 1 MG/ML
2 SPRAY, METERED NASAL 2 TIMES DAILY
Qty: 30 ML | Refills: 0 | Status: SHIPPED | OUTPATIENT
Start: 2022-01-06 | End: 2022-04-18

## 2022-01-06 NOTE — LETTER
January 6, 2022      [facility]  [address]  [facility] [address]  Phone: [phone]  Fax: [phone]       Patient: Anitra Chaney   YOB: 1980  Date of Visit: 01/06/2022    To Whom It May Concern:    Anitra Chaney  was at [facility] on 01/06/2022. Please excuse her from all work related duties starting 01/05/2021 for medical illness. She may return to work/school on 01/10/2022 with no restrictions. If you have any questions or concerns, or if I can be of further assistance, please do not hesitate to contact me.    Sincerely,    Komal Ramires MD

## 2022-01-06 NOTE — PROGRESS NOTES
The patient's location is:  Patient's Home   The chief complaint leading to consultation is:  Viral URI [J06.9]    Visit type: audiovisual    Time spent in discussion with the patient: 11 minutes  19 minutes of total time spent on the encounter. This includes time spent in discussion with the patient and time preparing for the patient appointment: review of tests, obtaining and/or reviewing separately obtained history, documenting clinical information in the electronic or other health record, independently interpreting results (not separately reported), and communicating results to the patient/family/caregiver or care coordination (not separately reported).     Each patient to whom he or she provides medical services by telemedicine is: (1) informed of the relationship between the physician and patient and the respective role of any other health care provider with respect to management of the patient; and (2) notified that he or she may decline to receive medical services by telemedicine and may withdraw from such care at any time.      Subjective:   Anitra Chaney is a 41 y.o. female who presents for Viral URI [J06.9].       Patient is new to me, PCP is Dr. Watts.    Endorses sinus pressure, headaches, congestion, ear pain bilaterally x2 days  Febrile to 100.6F  Denies cough, SOB, CP  BP well controlled and HbA1c 5.0   Flu and rapid COVID negative two days ago  No known exposures to COVID or flu  Plans to schedule booster for COVID  UTD on primary COVID and influenza vaccination    Otalgia   There is pain in both ears. This is a new problem. The current episode started in the past 7 days. The problem occurs constantly. The problem has been rapidly worsening. The maximum temperature recorded prior to her arrival was 100.4 - 100.9 F. The fever has been present for 3 to 4 days. The pain is at a severity of 8/10. The pain is moderate. Associated symptoms include ear discharge, headaches, neck pain and a  "sore throat. Pertinent negatives include no abdominal pain, coughing, diarrhea, drainage, hearing loss, rash, rhinorrhea or vomiting. She has tried nothing for the symptoms. The treatment provided no relief. There is no history of a chronic ear infection, hearing loss or a tympanostomy tube.        Review of Systems   HENT: Positive for ear discharge, ear pain and sore throat. Negative for hearing loss and rhinorrhea.    Respiratory: Negative for cough.    Gastrointestinal: Negative for abdominal pain, diarrhea and vomiting.   Musculoskeletal: Positive for neck pain.   Skin: Negative for rash.   Neurological: Positive for headaches.         Current Outpatient Medications   Medication Instructions    cyanocobalamin 1,000 mcg/mL injection Weekly x 4 doses and then every 4 weeks    FLUoxetine 20 mg, Oral, Daily    hyoscyamine (LEVSIN/SL) 0.125 mg, Sublingual, Every 4 hours PRN    lactulose (CHRONULAC) 10 g, Oral, 3 times daily    LORazepam (ATIVAN) 0.5 MG tablet Take 3 tablets by mouth daily as directed.    ondansetron (ZOFRAN-ODT) 4 MG TbDL dissolve 1 tablet (4 mg total) by mouth every 8 (eight) hours as needed.    oxyCODONE-acetaminophen (PERCOCET)  mg per tablet 1 tablet, Oral, Every 4 hours PRN    pantoprazole (PROTONIX) 40 mg, Oral, 2 times daily    syringe with needle 3 mL 22 x 1 1/2" Syrg Use to inject B12 shots intramuscularly as directed.       Objective:   No home vitals reported.     Physical Exam  Psychiatric:         Attention and Perception: Attention normal.         Mood and Affect: Mood and affect normal.           Assessment/Plan:       Viral URI  Start prednisone, mucinex, and azelastine for symptomatic relief  Discussed at home therapies including salt water gargling and warm tea with honey  RTC in 3-5 days if symptoms worsen or do not improve  -     predniSONE (DELTASONE) 20 MG tablet; Take 1 tablet (20 mg total) by mouth once daily. for 5 days  -     azelastine (ASTELIN) 137 mcg (0.1 " %) nasal spray; Instill 2 sprays (274 mcg total) by Nasal route 2 (two) times daily.  -     guaiFENesin (MUCINEX) 1,200 mg Ta12; Take 1,200 mg by mouth 2 (two) times a day. for 12 days      Komal Ramires MD  01/06/2022

## 2022-01-08 DIAGNOSIS — Z98.1 STATUS POST ANKLE ARTHRODESIS: ICD-10-CM

## 2022-01-08 DIAGNOSIS — M25.572 CHRONIC PAIN OF LEFT ANKLE: ICD-10-CM

## 2022-01-08 DIAGNOSIS — G89.29 CHRONIC PAIN OF LEFT ANKLE: ICD-10-CM

## 2022-01-10 ENCOUNTER — PATIENT MESSAGE (OUTPATIENT)
Dept: ORTHOPEDICS | Facility: CLINIC | Age: 42
End: 2022-01-10
Payer: COMMERCIAL

## 2022-01-10 ENCOUNTER — PATIENT MESSAGE (OUTPATIENT)
Dept: PSYCHIATRY | Facility: CLINIC | Age: 42
End: 2022-01-10
Payer: COMMERCIAL

## 2022-01-10 RX ORDER — LORAZEPAM 0.5 MG/1
TABLET ORAL
Qty: 45 TABLET | Refills: 0 | Status: SHIPPED | OUTPATIENT
Start: 2022-01-10 | End: 2022-03-20 | Stop reason: SDUPTHER

## 2022-01-10 RX ORDER — OXYCODONE AND ACETAMINOPHEN 10; 325 MG/1; MG/1
1 TABLET ORAL EVERY 4 HOURS PRN
Qty: 42 TABLET | Refills: 0 | Status: SHIPPED | OUTPATIENT
Start: 2022-01-10 | End: 2022-01-14 | Stop reason: SDUPTHER

## 2022-01-10 RX ORDER — FLUOXETINE HYDROCHLORIDE 20 MG/1
20 CAPSULE ORAL DAILY
Qty: 30 CAPSULE | Refills: 3 | Status: SHIPPED | OUTPATIENT
Start: 2022-01-10 | End: 2022-05-18 | Stop reason: DRUGHIGH

## 2022-01-14 DIAGNOSIS — M25.572 CHRONIC PAIN OF LEFT ANKLE: ICD-10-CM

## 2022-01-14 DIAGNOSIS — Z98.1 STATUS POST ANKLE ARTHRODESIS: ICD-10-CM

## 2022-01-14 DIAGNOSIS — G89.29 CHRONIC PAIN OF LEFT ANKLE: ICD-10-CM

## 2022-01-16 ENCOUNTER — HOSPITAL ENCOUNTER (EMERGENCY)
Facility: OTHER | Age: 42
Discharge: HOME | End: 2022-01-16
Attending: EMERGENCY MEDICINE
Payer: COMMERCIAL

## 2022-01-16 VITALS
RESPIRATION RATE: 18 BRPM | BODY MASS INDEX: 33.64 KG/M2 | SYSTOLIC BLOOD PRESSURE: 118 MMHG | HEIGHT: 70 IN | TEMPERATURE: 99 F | HEART RATE: 98 BPM | WEIGHT: 235 LBS | OXYGEN SATURATION: 99 % | DIASTOLIC BLOOD PRESSURE: 78 MMHG

## 2022-01-16 DIAGNOSIS — R06.02 SHORTNESS OF BREATH: ICD-10-CM

## 2022-01-16 DIAGNOSIS — J20.9 ACUTE BRONCHITIS, UNSPECIFIED ORGANISM: Primary | ICD-10-CM

## 2022-01-16 LAB
ALBUMIN SERPL BCP-MCNC: 4.2 G/DL (ref 3.5–5.2)
ALP SERPL-CCNC: 57 U/L (ref 55–135)
ALT SERPL W/O P-5'-P-CCNC: 15 U/L (ref 10–44)
ANION GAP SERPL CALC-SCNC: 17 MMOL/L (ref 8–16)
AST SERPL-CCNC: 19 U/L (ref 10–40)
BASOPHILS # BLD AUTO: 0.08 K/UL (ref 0–0.2)
BASOPHILS NFR BLD: 0.8 % (ref 0–1.9)
BILIRUB SERPL-MCNC: 0.3 MG/DL (ref 0.1–1)
BNP SERPL-MCNC: 12 PG/ML (ref 0–99)
BUN SERPL-MCNC: 11 MG/DL (ref 6–20)
CALCIUM SERPL-MCNC: 10.2 MG/DL (ref 8.7–10.5)
CHLORIDE SERPL-SCNC: 106 MMOL/L (ref 95–110)
CO2 SERPL-SCNC: 16 MMOL/L (ref 23–29)
CREAT SERPL-MCNC: 0.7 MG/DL (ref 0.5–1.4)
CTP QC/QA: YES
CTP QC/QA: YES
DIFFERENTIAL METHOD: NORMAL
EOSINOPHIL # BLD AUTO: 0.2 K/UL (ref 0–0.5)
EOSINOPHIL NFR BLD: 1.7 % (ref 0–8)
ERYTHROCYTE [DISTWIDTH] IN BLOOD BY AUTOMATED COUNT: 14 % (ref 11.5–14.5)
EST. GFR  (AFRICAN AMERICAN): >60 ML/MIN/1.73 M^2
EST. GFR  (NON AFRICAN AMERICAN): >60 ML/MIN/1.73 M^2
GLUCOSE SERPL-MCNC: 93 MG/DL (ref 70–110)
HCT VFR BLD AUTO: 40.5 % (ref 37–48.5)
HGB BLD-MCNC: 13.6 G/DL (ref 12–16)
IMM GRANULOCYTES # BLD AUTO: 0.04 K/UL (ref 0–0.04)
IMM GRANULOCYTES NFR BLD AUTO: 0.4 % (ref 0–0.5)
LYMPHOCYTES # BLD AUTO: 2.4 K/UL (ref 1–4.8)
LYMPHOCYTES NFR BLD: 22.8 % (ref 18–48)
MCH RBC QN AUTO: 29.2 PG (ref 27–31)
MCHC RBC AUTO-ENTMCNC: 33.6 G/DL (ref 32–36)
MCV RBC AUTO: 87 FL (ref 82–98)
MONOCYTES # BLD AUTO: 0.6 K/UL (ref 0.3–1)
MONOCYTES NFR BLD: 5.6 % (ref 4–15)
NEUTROPHILS # BLD AUTO: 7.1 K/UL (ref 1.8–7.7)
NEUTROPHILS NFR BLD: 68.7 % (ref 38–73)
NRBC BLD-RTO: 0 /100 WBC
PLATELET # BLD AUTO: 287 K/UL (ref 150–450)
PMV BLD AUTO: 9.2 FL (ref 9.2–12.9)
POC MOLECULAR INFLUENZA A AGN: NEGATIVE
POC MOLECULAR INFLUENZA B AGN: NEGATIVE
POTASSIUM SERPL-SCNC: 4.9 MMOL/L (ref 3.5–5.1)
PROT SERPL-MCNC: 8.3 G/DL (ref 6–8.4)
RBC # BLD AUTO: 4.65 M/UL (ref 4–5.4)
SARS-COV-2 RDRP RESP QL NAA+PROBE: NEGATIVE
SODIUM SERPL-SCNC: 139 MMOL/L (ref 136–145)
TROPONIN I SERPL DL<=0.01 NG/ML-MCNC: <0.006 NG/ML (ref 0–0.03)
WBC # BLD AUTO: 10.32 K/UL (ref 3.9–12.7)

## 2022-01-16 PROCEDURE — U0002 COVID-19 LAB TEST NON-CDC: HCPCS | Performed by: EMERGENCY MEDICINE

## 2022-01-16 PROCEDURE — 83880 ASSAY OF NATRIURETIC PEPTIDE: CPT | Performed by: EMERGENCY MEDICINE

## 2022-01-16 PROCEDURE — 80053 COMPREHEN METABOLIC PANEL: CPT | Performed by: EMERGENCY MEDICINE

## 2022-01-16 PROCEDURE — 87502 INFLUENZA DNA AMP PROBE: CPT

## 2022-01-16 PROCEDURE — 85025 COMPLETE CBC W/AUTO DIFF WBC: CPT | Performed by: EMERGENCY MEDICINE

## 2022-01-16 PROCEDURE — 25000242 PHARM REV CODE 250 ALT 637 W/ HCPCS: Performed by: EMERGENCY MEDICINE

## 2022-01-16 PROCEDURE — 93010 EKG 12-LEAD: ICD-10-PCS | Mod: ,,, | Performed by: INTERNAL MEDICINE

## 2022-01-16 PROCEDURE — 63600175 PHARM REV CODE 636 W HCPCS: Performed by: EMERGENCY MEDICINE

## 2022-01-16 PROCEDURE — 99285 EMERGENCY DEPT VISIT HI MDM: CPT | Mod: 25

## 2022-01-16 PROCEDURE — 93005 ELECTROCARDIOGRAM TRACING: CPT

## 2022-01-16 PROCEDURE — 93010 ELECTROCARDIOGRAM REPORT: CPT | Mod: ,,, | Performed by: INTERNAL MEDICINE

## 2022-01-16 PROCEDURE — 84484 ASSAY OF TROPONIN QUANT: CPT | Performed by: EMERGENCY MEDICINE

## 2022-01-16 PROCEDURE — 94640 AIRWAY INHALATION TREATMENT: CPT

## 2022-01-16 PROCEDURE — 63700000 PHARM REV CODE 250 ALT 637 W/O HCPCS: Performed by: EMERGENCY MEDICINE

## 2022-01-16 RX ORDER — AZITHROMYCIN 250 MG/1
250 TABLET, FILM COATED ORAL DAILY
Qty: 4 TABLET | Refills: 0 | Status: SHIPPED | OUTPATIENT
Start: 2022-01-16 | End: 2022-01-20

## 2022-01-16 RX ORDER — PREDNISONE 20 MG/1
60 TABLET ORAL
Status: COMPLETED | OUTPATIENT
Start: 2022-01-16 | End: 2022-01-16

## 2022-01-16 RX ORDER — IPRATROPIUM BROMIDE AND ALBUTEROL SULFATE 2.5; .5 MG/3ML; MG/3ML
3 SOLUTION RESPIRATORY (INHALATION)
Status: COMPLETED | OUTPATIENT
Start: 2022-01-16 | End: 2022-01-16

## 2022-01-16 RX ORDER — ALBUTEROL SULFATE 90 UG/1
1-2 AEROSOL, METERED RESPIRATORY (INHALATION) EVERY 4 HOURS PRN
Qty: 8 G | Refills: 0 | Status: SHIPPED | OUTPATIENT
Start: 2022-01-16 | End: 2022-01-20 | Stop reason: SDUPTHER

## 2022-01-16 RX ORDER — PREDNISONE 20 MG/1
40 TABLET ORAL DAILY
Qty: 6 TABLET | Refills: 0 | Status: SHIPPED | OUTPATIENT
Start: 2022-01-16 | End: 2022-01-19

## 2022-01-16 RX ORDER — AZITHROMYCIN 250 MG/1
500 TABLET, FILM COATED ORAL
Status: COMPLETED | OUTPATIENT
Start: 2022-01-16 | End: 2022-01-16

## 2022-01-16 RX ADMIN — IPRATROPIUM BROMIDE AND ALBUTEROL SULFATE 3 ML: .5; 3 SOLUTION RESPIRATORY (INHALATION) at 09:01

## 2022-01-16 RX ADMIN — IPRATROPIUM BROMIDE AND ALBUTEROL SULFATE 3 ML: .5; 3 SOLUTION RESPIRATORY (INHALATION) at 10:01

## 2022-01-16 RX ADMIN — PREDNISONE 60 MG: 20 TABLET ORAL at 11:01

## 2022-01-16 RX ADMIN — AZITHROMYCIN MONOHYDRATE 500 MG: 250 TABLET ORAL at 11:01

## 2022-01-17 NOTE — ED PROVIDER NOTES
"Encounter Date: 2022    SCRIBE #1 NOTE: I, Brandi Dean, am scribing for, and in the presence of, Ovidio Harding MD.       History     Chief Complaint   Patient presents with    Shortness of Breath     Shortness of breath x 2 hours. Pt reports covid positive 2021. Recent dx Pneumonia x 2 weeks.      This is a 41 y.o. female with history of GERD and HTN who presents with complaint of SOB for the past 2 hours. She also reports abdominal pain, lower back pain, chills, diaphoresis, and headache. She denies any recent COVID contacts. Patient had telehealth visit with Dr. Ramires on 2022 and was advised to take Mucinex and Prednisone for her symptoms. No other exacerbating or alleviating factors. No other associated symptoms.      The history is provided by the patient.     Review of patient's allergies indicates:   Allergen Reactions    Ibuprofen Nausea And Vomiting     Other reaction(s): gastric     Past Medical History:   Diagnosis Date    Anxiety and depression     Encounter for blood transfusion     GERD (gastroesophageal reflux disease)     GI bleeds, multiple during admission     HTN (hypertension), benign 3/10/2014    RESOLVED    Migraines     Obesity     PFO (patent foramen ovale)     found after stroke "too small/risky to close"    Stroke 2017    Hospitalized at [facility]; given tPA     Past Surgical History:   Procedure Laterality Date    ANKLE FRACTURE SURGERY       SECTION N/A 2019    Procedure:  SECTION;  Surgeon: Gianna Flowers MD;  Location: [facility] L&D;  Service: OB/GYN;  Laterality: N/A;     SECTION WITH TUBAL LIGATION Bilateral 2020    Procedure:  SECTION, WITH TUBAL LIGATION;  Surgeon: Corina Mao MD;  Location: [facility] L&D;  Service: OB/GYN;  Laterality: Bilateral;     SECTION, CLASSIC  2005/2009/2019    x3    CHOLECYSTECTOMY  3/2002    lap maribel    COLONOSCOPY N/A 2021    Procedure: COLONOSCOPY;  Surgeon: Cleveland" BRAYDEN Sears MD;  Location: [facility];  Service: Endoscopy;  Laterality: N/A;    CYSTOSCOPY  9/10/2021    Procedure: CYSTOSCOPY;  Surgeon: Salomón Hernandez MD;  Location: [facility] OR;  Service: OB/GYN;;    ESOPHAGOGASTRODUODENOSCOPY N/A 7/5/2021    Procedure: EGD (ESOPHAGOGASTRODUODENOSCOPY);  Surgeon: Cleveland Sears MD;  Location: [facility] ENDO;  Service: Endoscopy;  Laterality: N/A;    FOOT SURGERY Left     gastric sleeve  09/01/2017    HYSTERECTOMY  Sept 10,2021    INTRALUMINAL GASTROINTESTINAL TRACT IMAGING VIA CAPSULE N/A 7/13/2021    Procedure: IMAGING PROCEDURE, GI TRACT, INTRALUMINAL, VIA CAPSULE;  Surgeon: Nolan Cordero MD;  Location: [facility] ENDO;  Service: Endoscopy;  Laterality: N/A;  instructions portal -ml    LAPAROSCOPIC SALPINGECTOMY Bilateral 9/10/2021    Procedure: SALPINGECTOMY, LAPAROSCOPIC;  Surgeon: Salomón Hernandez MD;  Location: [facility];  Service: OB/GYN;  Laterality: Bilateral;    LAPAROSCOPIC TOTAL HYSTERECTOMY N/A 9/10/2021    Procedure: HYSTERECTOMY, TOTAL, LAPAROSCOPIC;  Surgeon: Salomón Hernanedz MD;  Location: [facility] OR;  Service: OB/GYN;  Laterality: N/A;    TUBAL LIGATION  7/2020     Family History   Problem Relation Age of Onset    Other Father         house fire    Breast cancer Neg Hx     Colon cancer Neg Hx     Ovarian cancer Neg Hx     Congenital heart disease Neg Hx     Pacemaker/defibrilator Neg Hx     Early death Neg Hx      Social History     Tobacco Use    Smoking status: Heavy Tobacco Smoker     Packs/day: 1.50     Years: 27.00     Pack years: 40.50     Types: Cigarettes    Smokeless tobacco: Never Used    Tobacco comment: cutting to 2 cigarettes per day   Substance Use Topics    Alcohol use: No     Alcohol/week: 0.0 standard drinks    Drug use: No     Review of Systems   Constitutional: Positive for chills and diaphoresis. Negative for fever.   HENT: Negative for congestion.    Eyes: Negative for photophobia and redness.   Respiratory: Positive for shortness of  breath. Negative for cough.    Cardiovascular: Negative for chest pain.   Gastrointestinal: Positive for abdominal pain. Negative for nausea and vomiting.   Genitourinary: Negative for dysuria.   Musculoskeletal: Positive for back pain (lower).   Skin: Negative for rash.   Neurological: Positive for headaches. Negative for weakness and light-headedness.   Psychiatric/Behavioral: Negative for confusion.       Physical Exam     Initial Vitals [01/16/22 2131]   BP Pulse Resp Temp SpO2   (!) 157/105 84 14 97.9 °F (36.6 °C) 98 %      MAP       --         Physical Exam    Nursing note and vitals reviewed.  Constitutional: She appears well-developed and well-nourished. She is not diaphoretic. No distress.   HENT:   Head: Normocephalic and atraumatic.   Mouth/Throat: Oropharynx is clear and moist and mucous membranes are normal.   Mucous membranes are moist. TMs clear and intact bilaterally.    Eyes: Conjunctivae and EOM are normal. Pupils are equal, round, and reactive to light. No scleral icterus.   Conjunctivae are pink, clear, and intact.    Neck: Neck supple.   Normal range of motion.  Cardiovascular: Normal rate, regular rhythm, S1 normal, S2 normal and normal heart sounds. Exam reveals no gallop and no friction rub.    No murmur heard.  Pulmonary/Chest: Breath sounds normal. She has no rhonchi. She has no rales.   Mild inspiratory and expiratory wheezes bilaterally. No acute respiratory distress.   Abdominal: Abdomen is soft. Bowel sounds are normal. There is no abdominal tenderness.   No audible bruits.  There is no rebound and no guarding.   Musculoskeletal:         General: No tenderness or edema. Normal range of motion.      Cervical back: Normal range of motion and neck supple.      Comments: No lower extremity edema.      Lymphadenopathy:     She has no cervical adenopathy.   Neurological: She is alert and oriented to person, place, and time.   Skin: Skin is warm and dry. Capillary refill takes less than 2  seconds. No rash noted. No pallor.   No skin tenting. No lesions.   Psychiatric: She has a normal mood and affect. Her behavior is normal. Judgment and thought content normal.         ED Course   Procedures  Labs Reviewed   COMPREHENSIVE METABOLIC PANEL - Abnormal; Notable for the following components:       Result Value    CO2 16 (*)     Anion Gap 17 (*)     All other components within normal limits   CBC W/ AUTO DIFFERENTIAL   B-TYPE NATRIURETIC PEPTIDE   TROPONIN I   SARS-COV-2 RDRP GENE    Narrative:     This test utilizes isothermal nucleic acid amplification   technology to detect the SARS-CoV-2 RdRp nucleic acid segment.   The analytical sensitivity (limit of detection) is 125 genome   equivalents/mL.   A POSITIVE result implies infection with the SARS-CoV-2 virus;   the patient is presumed to be contagious.     A NEGATIVE result means that SARS-CoV-2 nucleic acids are not   present above the limit of detection. A NEGATIVE result should be   treated as presumptive. It does not rule out the possibility of   COVID-19 and should not be the sole basis for treatment decisions.   If COVID-19 is strongly suspected based on clinical and exposure   history, re-testing using an alternate molecular assay should be   considered.   This test is only for use under the Food and Drug   Administration s Emergency Use Authorization (EUA).   Commercial kits are provided by [employer].   Performance characteristics of the EUA have been independently   verified by [facility] Department of   Pathology and Laboratory Medicine.   _________________________________________________________________   The authorized Fact Sheet for Healthcare Providers and the authorized Fact   Sheet for Patients of the ID NOW COVID-19 are available on the FDA   website:     https://www.fda.gov/media/127973/download  https://www.fda.gov/media/565329/download         POCT INFLUENZA A/B MOLECULAR     EKG Readings: (Independently  Interpreted)   Normal sinus rhythm at rate of 72. No acute ST/T wave changes. No STEMI.        Imaging Results          X-Ray Chest AP Portable (Final result)  Result time 01/16/22 23:03:31    Final result by Albertina Bennett MD (01/16/22 23:03:31)                 Impression:      No acute cardiopulmonary process identified.      Electronically signed by: Albertina Bennett MD  Date:    01/16/2022  Time:    23:03             Narrative:    EXAMINATION:  XR CHEST AP PORTABLE    CLINICAL HISTORY:  Shortness of breath    TECHNIQUE:  Single frontal view of the chest was performed.    COMPARISON:  May 2020.    FINDINGS:  Cardiac silhouette is normal in size.  Lungs are symmetrically expanded.  No evidence of focal consolidative process, pneumothorax, or significant pleural effusion.  No acute osseous abnormality identified.                              X-Rays:   Independently Interpreted Readings:   Chest X-Ray: No enlarged cardiac silhouette. No consolidations. No pulmonary edema.     Medications   albuterol-ipratropium 2.5 mg-0.5 mg/3 mL nebulizer solution 3 mL (3 mLs Nebulization Given 1/16/22 2212)   predniSONE tablet 60 mg (60 mg Oral Given 1/16/22 2343)   azithromycin tablet 500 mg (500 mg Oral Given 1/16/22 2342)     Medical Decision Making:   History:   Old Medical Records: I decided to obtain old medical records.  Independently Interpreted Test(s):   I have ordered and independently interpreted X-rays - see prior notes.  I have ordered and independently interpreted EKG Reading(s) - see prior notes  Clinical Tests:   Lab Tests: Ordered and Reviewed  Radiological Study: Ordered and Reviewed  Medical Tests: Reviewed and Ordered          Scribe Attestation:   Scribe #1: I performed the above scribed service and the documentation accurately describes the services I performed. I attest to the accuracy of the note.    Attending Attestation:             Attending ED Notes:   Emergent evaluation a 41-year-old female with  complaint of intermittent shortness of breath, body aches, back pain and abdominal pain.  Patient concerned for possible COVID-19 infection.  Patient is afebrile, nontoxic appearing with stable vital signs except for elevation in heart rate.  Abdominal exam is benign.  No flank tenderness to palpation.  Influenza screen is negative.  COVID screen is negative.  On re-evaluation after nebulized breathing treatments lungs are clear to auscultation bilaterally.  Patient has no elevation white blood cell count.  H&H is within normal limits.  No acute findings on CMP except for CO2 of 16 with an anion gap of 17. BNP is 12. Troponin is within normal limits.  No acute findings on chest x-ray.  The patient is extensively counseled on their diagnosis and treatment including all diagnostic, laboratory and physical exam findings.  The patient is discharged good condition and directed follow-up with their PCP in the next 24-48 hours.      ED Course as of 01/17/22 0114   Sun Jan 16, 2022   2336 Lungs CTA. Patient feels better, requesting to go home. [MM]      ED Course User Index  [MM] Brianna Dean           Physician Attestation for Scribe: I, Ovidio Harding, reviewed documentation as scribed in my presence, which is both accurate and complete.    Clinical Impression:   Final diagnoses:  [R06.02] Shortness of breath  [J20.9] Acute bronchitis, unspecified organism (Primary)          ED Disposition Condition    Discharge Good        ED Prescriptions     Medication Sig Dispense Start Date End Date Auth. Provider    albuterol (PROVENTIL/VENTOLIN HFA) 90 mcg/actuation inhaler Inhale 1-2 puffs into the lungs every 4 (four) hours as needed for Wheezing or Shortness of Breath. Rescue 8 g 1/16/2022 1/16/2023 Ovidio Harding MD    predniSONE (DELTASONE) 20 MG tablet Take 2 tablets (40 mg total) by mouth once daily. for 3 days 6 tablet 1/16/2022 1/19/2022 Ovidio Harding MD    azithromycin (Z-BRENNAN) 250 MG tablet Take 1 tablet  (250 mg total) by mouth once daily. Start taking on 1/17/2022 for 4 days 4 tablet 1/16/2022 1/20/2022 Ovidio Harding MD        Follow-up Information     Follow up With Specialties Details Why Contact Info    Tien Watts MD Family Medicine In 2 days  [address]  [facility] [address]  [phone]             Ovidio Harding MD  01/17/22 0114

## 2022-01-17 NOTE — ED TRIAGE NOTES
Pt reports to ED via EMS with c/o SOB x 2 hours. Pt states that she has been having SOB for 2 hours causing her to activate EMS. Pt also reporting abdominal and lower back pain as well as chills and headache, denies any contact with anyone covid positive. Pt states she was recently dx with pneumonia and has been taking prednisone for s/s.

## 2022-01-18 RX ORDER — OXYCODONE AND ACETAMINOPHEN 10; 325 MG/1; MG/1
1 TABLET ORAL EVERY 4 HOURS PRN
Qty: 42 TABLET | Refills: 0 | Status: SHIPPED | OUTPATIENT
Start: 2022-01-18 | End: 2022-01-23 | Stop reason: SDUPTHER

## 2022-01-19 ENCOUNTER — PATIENT MESSAGE (OUTPATIENT)
Dept: INTERNAL MEDICINE | Facility: CLINIC | Age: 42
End: 2022-01-19
Payer: COMMERCIAL

## 2022-01-20 ENCOUNTER — OFFICE VISIT (OUTPATIENT)
Dept: INTERNAL MEDICINE | Facility: CLINIC | Age: 42
End: 2022-01-20
Payer: COMMERCIAL

## 2022-01-20 VITALS
HEART RATE: 83 BPM | BODY MASS INDEX: 37.93 KG/M2 | OXYGEN SATURATION: 98 % | WEIGHT: 264.31 LBS | DIASTOLIC BLOOD PRESSURE: 80 MMHG | SYSTOLIC BLOOD PRESSURE: 122 MMHG

## 2022-01-20 DIAGNOSIS — M54.41 CHRONIC BILATERAL LOW BACK PAIN WITH BILATERAL SCIATICA: Primary | ICD-10-CM

## 2022-01-20 DIAGNOSIS — J20.9 ACUTE BRONCHITIS, UNSPECIFIED ORGANISM: ICD-10-CM

## 2022-01-20 DIAGNOSIS — M54.42 CHRONIC BILATERAL LOW BACK PAIN WITH BILATERAL SCIATICA: Primary | ICD-10-CM

## 2022-01-20 DIAGNOSIS — G89.29 CHRONIC BILATERAL LOW BACK PAIN WITH BILATERAL SCIATICA: Primary | ICD-10-CM

## 2022-01-20 PROCEDURE — 99214 PR OFFICE/OUTPT VISIT, EST, LEVL IV, 30-39 MIN: ICD-10-PCS | Mod: S$GLB,,, | Performed by: PHYSICIAN ASSISTANT

## 2022-01-20 PROCEDURE — 1159F MED LIST DOCD IN RCRD: CPT | Mod: CPTII,S$GLB,, | Performed by: PHYSICIAN ASSISTANT

## 2022-01-20 PROCEDURE — 3074F PR MOST RECENT SYSTOLIC BLOOD PRESSURE < 130 MM HG: ICD-10-PCS | Mod: CPTII,S$GLB,, | Performed by: PHYSICIAN ASSISTANT

## 2022-01-20 PROCEDURE — 1160F RVW MEDS BY RX/DR IN RCRD: CPT | Mod: CPTII,S$GLB,, | Performed by: PHYSICIAN ASSISTANT

## 2022-01-20 PROCEDURE — 99999 PR PBB SHADOW E&M-EST. PATIENT-LVL IV: ICD-10-PCS | Mod: PBBFAC,,, | Performed by: PHYSICIAN ASSISTANT

## 2022-01-20 PROCEDURE — 99999 PR PBB SHADOW E&M-EST. PATIENT-LVL IV: CPT | Mod: PBBFAC,,, | Performed by: PHYSICIAN ASSISTANT

## 2022-01-20 PROCEDURE — 1159F PR MEDICATION LIST DOCUMENTED IN MEDICAL RECORD: ICD-10-PCS | Mod: CPTII,S$GLB,, | Performed by: PHYSICIAN ASSISTANT

## 2022-01-20 PROCEDURE — 3074F SYST BP LT 130 MM HG: CPT | Mod: CPTII,S$GLB,, | Performed by: PHYSICIAN ASSISTANT

## 2022-01-20 PROCEDURE — 3079F DIAST BP 80-89 MM HG: CPT | Mod: CPTII,S$GLB,, | Performed by: PHYSICIAN ASSISTANT

## 2022-01-20 PROCEDURE — 99214 OFFICE O/P EST MOD 30 MIN: CPT | Mod: S$GLB,,, | Performed by: PHYSICIAN ASSISTANT

## 2022-01-20 PROCEDURE — 1160F PR REVIEW ALL MEDS BY PRESCRIBER/CLIN PHARMACIST DOCUMENTED: ICD-10-PCS | Mod: CPTII,S$GLB,, | Performed by: PHYSICIAN ASSISTANT

## 2022-01-20 PROCEDURE — 3079F PR MOST RECENT DIASTOLIC BLOOD PRESSURE 80-89 MM HG: ICD-10-PCS | Mod: CPTII,S$GLB,, | Performed by: PHYSICIAN ASSISTANT

## 2022-01-20 PROCEDURE — 3008F PR BODY MASS INDEX (BMI) DOCUMENTED: ICD-10-PCS | Mod: CPTII,S$GLB,, | Performed by: PHYSICIAN ASSISTANT

## 2022-01-20 PROCEDURE — 3008F BODY MASS INDEX DOCD: CPT | Mod: CPTII,S$GLB,, | Performed by: PHYSICIAN ASSISTANT

## 2022-01-20 RX ORDER — ALBUTEROL SULFATE 90 UG/1
1-2 AEROSOL, METERED RESPIRATORY (INHALATION) EVERY 4 HOURS PRN
Qty: 8 G | Refills: 0 | Status: SHIPPED | OUTPATIENT
Start: 2022-01-20 | End: 2022-02-19 | Stop reason: SDUPTHER

## 2022-01-20 RX ORDER — FLUCONAZOLE 150 MG/1
150 TABLET ORAL
Qty: 3 TABLET | Refills: 0 | Status: SHIPPED | OUTPATIENT
Start: 2022-01-20 | End: 2022-02-02

## 2022-01-20 NOTE — LETTER
January 20, 2022      [facility]  [address]  [facility] [address]  Phone: [phone]  Fax: [phone]       Patient: Anitra Chaney   YOB: 1980  Date of Visit: 01/20/2022    To Whom It May Concern:    Vickie Chaney  was at [facility] on 01/20/2022. The patient may return to work on 1/24/2022 with no restrictions. If you have any questions or concerns, or if I can be of further assistance, please do not hesitate to contact me.    Sincerely,          Sandy Flowers PA-C

## 2022-01-20 NOTE — PROGRESS NOTES
"INTERNAL MEDICINE PROGRESS NOTE  ER FOLLOW-UP      CHIEF COMPLAINT     Chief Complaint   Patient presents with    Shortness of Breath       HPI     Anitra Chanye is a 41 y.o. female who presents for an ER follow up visit today.    PCP is Dr. Watts, patient is well known to me.     Patient presents with complaints of SOB. She was seen in the ER at [facility] on 1/16/2022 via EMS. She was given three days of steropids and a z-pack from the ER which she was complaint with. She reports that she is still coughing and feeling like her symptoms are not sufficiently resolved. She denies chest pain, nausea, vomiting, hemoptysis, fever, chills, dizziness. She does admit to increased anxiety - noting increased crime in the city causing patient up-tick in anxiety. 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       Past Medical History:  Past Medical History:   Diagnosis Date    Anxiety and depression     Encounter for blood transfusion     GERD (gastroesophageal reflux disease)     GI bleeds, multiple during admission     HTN (hypertension), benign 3/10/2014    RESOLVED    Migraines     Obesity     PFO (patent foramen ovale)     found after stroke "too small/risky to close"    Stroke 01/2017    Hospitalized at [facility]; given tPA       Home Medications:  Prior to Admission medications    Medication Sig Start Date End Date Taking? Authorizing Provider   albuterol (PROVENTIL/VENTOLIN HFA) 90 mcg/actuation inhaler Inhale 1-2 puffs into the lungs every 4 (four) hours as needed for Wheezing or Shortness of Breath. Rescue 1/16/22 1/16/23 Yes Oivdio Harding MD   azelastine (ASTELIN) 137 mcg (0.1 %) nasal spray Instill 2 sprays (274 mcg total) by Nasal route 2 (two) times daily. 1/6/22 1/6/23 Yes Komal Ramires MD   azithromycin (Z-BRENNAN) 250 MG tablet Take 1 tablet (250 mg total) by mouth once daily. Start taking on 1/17/2022 for 4 days 1/16/22 1/20/22 Yes Ovidio Harding MD   cyanocobalamin 1,000 mcg/mL injection Weekly x " "4 doses and then every 4 weeks 9/14/21  Yes Flori Rivas MD   FLUoxetine 20 MG capsule Take 1 capsule (20 mg total) by mouth once daily. 1/10/22 2/9/22 Yes Bharat Collado Jr., MD   LORazepam (ATIVAN) 0.5 MG tablet Take 3 tablets by mouth daily as directed.  Patient taking differently: Take 3 tablets by mouth daily as directed. 1/10/22  Yes Bharat Collado Jr., MD   oxyCODONE-acetaminophen (PERCOCET)  mg per tablet Take 1 tablet by mouth every 4 (four) hours as needed for Pain. 1/18/22  Yes Lele Hernández MD   pantoprazole (PROTONIX) 40 MG tablet Take 1 tablet (40 mg total) by mouth 2 (two) times daily. 10/13/21 10/13/22 Yes Nolan Cordero MD   predniSONE (DELTASONE) 20 MG tablet Take 1 tablet (20 mg total) by mouth once daily. for 5 days 1/6/22 1/23/22 Yes Komal Ramires MD   syringe with needle 3 mL 22 x 1 1/2" Syrg Use to inject B12 shots intramuscularly as directed. 9/14/21  Yes Flori Rivas MD   hyoscyamine (LEVSIN/SL) 0.125 mg Subl Place 1 tablet (0.125 mg total) under the tongue every 4 (four) hours as needed. 12/3/21   MILEY Conner   lactulose (CHRONULAC) 10 gram/15 mL solution Take 15 mLs (10 g total) by mouth 3 (three) times daily. 12/3/21   MILEY Conner   ondansetron (ZOFRAN-ODT) 4 MG TbDL dissolve 1 tablet (4 mg total) by mouth every 8 (eight) hours as needed. 12/3/21   MILEY Conner   predniSONE (DELTASONE) 20 MG tablet Take 2 tablets (40 mg total) by mouth once daily. for 3 days 1/16/22 1/19/22  Ovidio Harding MD       Review of Systems:  Review of Systems   Constitutional: Negative for chills and fever.   HENT: Negative for sore throat and trouble swallowing.    Eyes: Negative for visual disturbance.   Respiratory: Positive for cough. Negative for shortness of breath.    Cardiovascular: Negative for chest pain.   Gastrointestinal: Negative for abdominal pain, constipation, diarrhea, nausea and vomiting.   Genitourinary: Negative for dysuria and " flank pain.   Musculoskeletal: Negative for back pain, neck pain and neck stiffness.   Skin: Negative for rash.   Neurological: Negative for dizziness, syncope, weakness and headaches.   Psychiatric/Behavioral: Negative for confusion.       Health Maintainence:   Immunizations:  Health Maintenance       Date Due Completion Date    Pneumococcal Vaccines (Age 0-64) (1 of 2 - PPSV23) Never done ---    Sign Pain Contract Never done ---    Complete Opioid Risk Tool Never done ---    Urine Drug Screen Never done ---    Naloxone Prescription Never done ---    Hemoglobin A1c 01/15/2022 7/15/2021    COVID-19 Vaccine (3 - Booster for Pfizer series) 03/13/2022 9/13/2021    Mammogram 08/16/2022 8/16/2021    TETANUS VACCINE 05/04/2030 5/4/2020           PHYSICAL EXAM     /80 (BP Location: Right arm, Patient Position: Sitting)   Pulse 83   Wt 119.9 kg (264 lb 5.3 oz)   LMP 08/09/2021 (Approximate)   SpO2 98%   BMI 37.93 kg/m²     Physical Exam  Vitals and nursing note reviewed.   Constitutional:       Appearance: Normal appearance.      Comments: Healthy appearing female in NAD or apparent pain. She makes good eye contact, speaks in clear full sentences and ambulates with ease.      HENT:      Head: Normocephalic and atraumatic.      Nose: Nose normal.      Mouth/Throat:      Pharynx: Oropharynx is clear.   Eyes:      Conjunctiva/sclera: Conjunctivae normal.   Cardiovascular:      Rate and Rhythm: Normal rate and regular rhythm.      Pulses: Normal pulses.   Pulmonary:      Effort: No respiratory distress.      Breath sounds: Wheezing present.      Comments: Scant wheezing in bilateral lugs on expiration   Abdominal:      Tenderness: There is no abdominal tenderness.   Musculoskeletal:         General: Normal range of motion.      Cervical back: No rigidity.   Skin:     General: Skin is warm and dry.      Capillary Refill: Capillary refill takes less than 2 seconds.      Findings: No rash.   Neurological:      General:  No focal deficit present.      Mental Status: She is alert.      Gait: Gait normal.   Psychiatric:         Mood and Affect: Mood normal.             LABS     Lab Results   Component Value Date    HGBA1C 5.0 07/15/2021     CMP  Sodium   Date Value Ref Range Status   01/16/2022 139 136 - 145 mmol/L Final     Potassium   Date Value Ref Range Status   01/16/2022 4.9 3.5 - 5.1 mmol/L Final     Chloride   Date Value Ref Range Status   01/16/2022 106 95 - 110 mmol/L Final     CO2   Date Value Ref Range Status   01/16/2022 16 (L) 23 - 29 mmol/L Final     Glucose   Date Value Ref Range Status   01/16/2022 93 70 - 110 mg/dL Final     BUN   Date Value Ref Range Status   01/16/2022 11 6 - 20 mg/dL Final     Creatinine   Date Value Ref Range Status   01/16/2022 0.7 0.5 - 1.4 mg/dL Final     Calcium   Date Value Ref Range Status   01/16/2022 10.2 8.7 - 10.5 mg/dL Final     Total Protein   Date Value Ref Range Status   01/16/2022 8.3 6.0 - 8.4 g/dL Final     Albumin   Date Value Ref Range Status   01/16/2022 4.2 3.5 - 5.2 g/dL Final     Total Bilirubin   Date Value Ref Range Status   01/16/2022 0.3 0.1 - 1.0 mg/dL Final     Comment:     For infants and newborns, interpretation of results should be based  on gestational age, weight and in agreement with clinical  observations.    Premature Infant recommended reference ranges:  Up to 24 hours.............<8.0 mg/dL  Up to 48 hours............<12.0 mg/dL  3-5 days..................<15.0 mg/dL  6-29 days.................<15.0 mg/dL       Alkaline Phosphatase   Date Value Ref Range Status   01/16/2022 57 55 - 135 U/L Final     AST   Date Value Ref Range Status   01/16/2022 19 10 - 40 U/L Final     ALT   Date Value Ref Range Status   01/16/2022 15 10 - 44 U/L Final     Anion Gap   Date Value Ref Range Status   01/16/2022 17 (H) 8 - 16 mmol/L Final     eGFR if    Date Value Ref Range Status   01/16/2022 >60 >60 mL/min/1.73 m^2 Final     eGFR if non    Date  Value Ref Range Status   01/16/2022 >60 >60 mL/min/1.73 m^2 Final     Comment:     Calculation used to obtain the estimated glomerular filtration  rate (eGFR) is the CKD-EPI equation.        Lab Results   Component Value Date    WBC 10.32 01/16/2022    HGB 13.6 01/16/2022    HCT 40.5 01/16/2022    MCV 87 01/16/2022     01/16/2022     Lab Results   Component Value Date    CHOL 210 (H) 05/19/2020    CHOL 143 01/09/2019    CHOL 177 01/11/2016     Lab Results   Component Value Date    HDL 67 05/19/2020    HDL 54 01/09/2019    HDL 34 (L) 01/11/2016     Lab Results   Component Value Date    LDLCALC 91.8 05/19/2020    LDLCALC 63.8 01/09/2019    LDLCALC 99.4 01/11/2016     Lab Results   Component Value Date    TRIG 256 (H) 05/19/2020    TRIG 126 01/09/2019    TRIG 218 (H) 01/11/2016     Lab Results   Component Value Date    CHOLHDL 31.9 05/19/2020    CHOLHDL 37.8 01/09/2019    CHOLHDL 19.2 (L) 01/11/2016     Lab Results   Component Value Date    TSH 1.335 07/15/2021       ASSESSMENT/PLAN     Anitra Chaney is a 41 y.o. female     Anitra was seen today for shortness of breath. She admits she did not start steroid dose that was recommended at last PCP [facility]. She subsequently had to report to the ER. Will encourage her to start the prednisone burst that was prescribed by Dr. Ramires earlier this month. Will also prescribe albuterol inhaler and diflucan on pt's request. She is ok to return to work next Monday. She is aware of ED prompts. No formal diagnostic work-up felt warranted at this time.     Diagnoses and all orders for this visit:    Chronic bilateral low back pain with bilateral sciatica  -     Ambulatory referral/consult to Medical Massage Therapy; Future    Other orders  -     fluconazole (DIFLUCAN) 150 MG Tab; Take 1 tablet (150 mg total) by mouth every 72 hours. for 1 day  -     albuterol (PROVENTIL/VENTOLIN HFA) 90 mcg/actuation inhaler; Inhale 1-2 puffs into the lungs every 4 (four) hours as  needed for Wheezing or Shortness of Breath. Rescue      RTC in 4 weeks for symptoms re-check with PCP, sooner if needed.     Sandy Flowers PA-C

## 2022-01-23 DIAGNOSIS — G89.29 CHRONIC PAIN OF LEFT ANKLE: ICD-10-CM

## 2022-01-23 DIAGNOSIS — M25.572 CHRONIC PAIN OF LEFT ANKLE: ICD-10-CM

## 2022-01-23 DIAGNOSIS — Z98.1 STATUS POST ANKLE ARTHRODESIS: ICD-10-CM

## 2022-01-24 RX ORDER — OXYCODONE AND ACETAMINOPHEN 10; 325 MG/1; MG/1
1 TABLET ORAL EVERY 4 HOURS PRN
Qty: 42 TABLET | Refills: 0 | Status: SHIPPED | OUTPATIENT
Start: 2022-01-24 | End: 2022-01-28 | Stop reason: SDUPTHER

## 2022-01-28 DIAGNOSIS — Z98.1 STATUS POST ANKLE ARTHRODESIS: ICD-10-CM

## 2022-01-28 DIAGNOSIS — M25.572 CHRONIC PAIN OF LEFT ANKLE: ICD-10-CM

## 2022-01-28 DIAGNOSIS — G89.29 CHRONIC PAIN OF LEFT ANKLE: ICD-10-CM

## 2022-01-31 RX ORDER — OXYCODONE AND ACETAMINOPHEN 10; 325 MG/1; MG/1
1 TABLET ORAL EVERY 4 HOURS PRN
Qty: 42 TABLET | Refills: 0 | Status: SHIPPED | OUTPATIENT
Start: 2022-01-31 | End: 2022-02-04 | Stop reason: SDUPTHER

## 2022-02-04 DIAGNOSIS — G89.29 CHRONIC PAIN OF LEFT ANKLE: ICD-10-CM

## 2022-02-04 DIAGNOSIS — Z98.1 STATUS POST ANKLE ARTHRODESIS: ICD-10-CM

## 2022-02-04 DIAGNOSIS — M25.572 CHRONIC PAIN OF LEFT ANKLE: ICD-10-CM

## 2022-02-07 RX ORDER — OXYCODONE AND ACETAMINOPHEN 10; 325 MG/1; MG/1
1 TABLET ORAL EVERY 4 HOURS PRN
Qty: 42 TABLET | Refills: 0 | Status: SHIPPED | OUTPATIENT
Start: 2022-02-07 | End: 2022-02-12 | Stop reason: SDUPTHER

## 2022-02-12 DIAGNOSIS — Z98.1 STATUS POST ANKLE ARTHRODESIS: ICD-10-CM

## 2022-02-12 DIAGNOSIS — G89.29 CHRONIC PAIN OF LEFT ANKLE: ICD-10-CM

## 2022-02-12 DIAGNOSIS — M25.572 CHRONIC PAIN OF LEFT ANKLE: ICD-10-CM

## 2022-02-14 RX ORDER — OXYCODONE AND ACETAMINOPHEN 10; 325 MG/1; MG/1
1 TABLET ORAL EVERY 4 HOURS PRN
Qty: 42 TABLET | Refills: 0 | Status: SHIPPED | OUTPATIENT
Start: 2022-02-14 | End: 2022-02-19 | Stop reason: SDUPTHER

## 2022-02-19 DIAGNOSIS — G89.29 CHRONIC PAIN OF LEFT ANKLE: ICD-10-CM

## 2022-02-19 DIAGNOSIS — M25.572 CHRONIC PAIN OF LEFT ANKLE: ICD-10-CM

## 2022-02-19 DIAGNOSIS — Z98.1 STATUS POST ANKLE ARTHRODESIS: ICD-10-CM

## 2022-02-21 RX ORDER — OXYCODONE AND ACETAMINOPHEN 10; 325 MG/1; MG/1
1 TABLET ORAL EVERY 4 HOURS PRN
Qty: 42 TABLET | Refills: 0 | Status: SHIPPED | OUTPATIENT
Start: 2022-02-21 | End: 2022-02-25 | Stop reason: SDUPTHER

## 2022-02-25 DIAGNOSIS — Z98.1 STATUS POST ANKLE ARTHRODESIS: ICD-10-CM

## 2022-02-25 DIAGNOSIS — G89.29 CHRONIC PAIN OF LEFT ANKLE: ICD-10-CM

## 2022-02-25 DIAGNOSIS — M25.572 CHRONIC PAIN OF LEFT ANKLE: ICD-10-CM

## 2022-02-28 RX ORDER — OXYCODONE AND ACETAMINOPHEN 10; 325 MG/1; MG/1
1 TABLET ORAL EVERY 4 HOURS PRN
Qty: 42 TABLET | Refills: 0 | Status: SHIPPED | OUTPATIENT
Start: 2022-02-28 | End: 2022-03-07 | Stop reason: SDUPTHER

## 2022-03-02 ENCOUNTER — PATIENT MESSAGE (OUTPATIENT)
Dept: INTERNAL MEDICINE | Facility: CLINIC | Age: 42
End: 2022-03-02
Payer: COMMERCIAL

## 2022-03-07 ENCOUNTER — PATIENT MESSAGE (OUTPATIENT)
Dept: ORTHOPEDICS | Facility: CLINIC | Age: 42
End: 2022-03-07
Payer: COMMERCIAL

## 2022-03-07 DIAGNOSIS — M25.572 CHRONIC PAIN OF LEFT ANKLE: ICD-10-CM

## 2022-03-07 DIAGNOSIS — Z98.1 STATUS POST ANKLE ARTHRODESIS: ICD-10-CM

## 2022-03-07 DIAGNOSIS — G89.29 CHRONIC PAIN OF LEFT ANKLE: ICD-10-CM

## 2022-03-07 RX ORDER — OXYCODONE AND ACETAMINOPHEN 10; 325 MG/1; MG/1
1 TABLET ORAL EVERY 4 HOURS PRN
Qty: 42 TABLET | Refills: 0 | OUTPATIENT
Start: 2022-03-07

## 2022-03-07 RX ORDER — OXYCODONE AND ACETAMINOPHEN 10; 325 MG/1; MG/1
1 TABLET ORAL EVERY 4 HOURS PRN
Qty: 42 TABLET | Refills: 0 | Status: SHIPPED | OUTPATIENT
Start: 2022-03-07 | End: 2022-03-12 | Stop reason: SDUPTHER

## 2022-03-08 ENCOUNTER — PATIENT MESSAGE (OUTPATIENT)
Dept: OBSTETRICS AND GYNECOLOGY | Facility: CLINIC | Age: 42
End: 2022-03-08
Payer: COMMERCIAL

## 2022-03-08 DIAGNOSIS — B37.31 CANDIDA VAGINITIS: Primary | ICD-10-CM

## 2022-03-08 RX ORDER — CLOTRIMAZOLE AND BETAMETHASONE DIPROPIONATE 10; .64 MG/G; MG/G
CREAM TOPICAL
Qty: 15 G | Refills: 1 | Status: SHIPPED | OUTPATIENT
Start: 2022-03-08 | End: 2022-05-16 | Stop reason: SDUPTHER

## 2022-03-08 RX ORDER — FLUCONAZOLE 150 MG/1
150 TABLET ORAL ONCE
Qty: 1 TABLET | Refills: 0 | Status: SHIPPED | OUTPATIENT
Start: 2022-03-08 | End: 2022-03-15

## 2022-03-12 DIAGNOSIS — M25.572 CHRONIC PAIN OF LEFT ANKLE: ICD-10-CM

## 2022-03-12 DIAGNOSIS — Z98.1 STATUS POST ANKLE ARTHRODESIS: ICD-10-CM

## 2022-03-12 DIAGNOSIS — G89.29 CHRONIC PAIN OF LEFT ANKLE: ICD-10-CM

## 2022-03-13 ENCOUNTER — HOSPITAL ENCOUNTER (EMERGENCY)
Facility: OTHER | Age: 42
Discharge: HOME | End: 2022-03-13
Attending: EMERGENCY MEDICINE
Payer: COMMERCIAL

## 2022-03-13 VITALS
WEIGHT: 260 LBS | BODY MASS INDEX: 38.51 KG/M2 | HEART RATE: 71 BPM | SYSTOLIC BLOOD PRESSURE: 118 MMHG | RESPIRATION RATE: 17 BRPM | HEIGHT: 69 IN | TEMPERATURE: 98 F | DIASTOLIC BLOOD PRESSURE: 84 MMHG | OXYGEN SATURATION: 100 %

## 2022-03-13 DIAGNOSIS — N30.01 ACUTE CYSTITIS WITH HEMATURIA: Primary | ICD-10-CM

## 2022-03-13 LAB
B-HCG UR QL: NEGATIVE
BACTERIA #/AREA URNS HPF: ABNORMAL /HPF
BILIRUB UR QL STRIP: NEGATIVE
CLARITY UR: ABNORMAL
COLOR UR: ABNORMAL
CTP QC/QA: YES
GLUCOSE UR QL STRIP: NEGATIVE
HGB UR QL STRIP: ABNORMAL
HYALINE CASTS #/AREA URNS LPF: 0 /LPF
KETONES UR QL STRIP: NEGATIVE
LEUKOCYTE ESTERASE UR QL STRIP: ABNORMAL
MICROSCOPIC COMMENT: ABNORMAL
NITRITE UR QL STRIP: NEGATIVE
PH UR STRIP: 6 [PH] (ref 5–8)
PROT UR QL STRIP: ABNORMAL
RBC #/AREA URNS HPF: >100 /HPF (ref 0–4)
SP GR UR STRIP: 1.02 (ref 1–1.03)
SQUAMOUS #/AREA URNS HPF: 4 /HPF
URN SPEC COLLECT METH UR: ABNORMAL
UROBILINOGEN UR STRIP-ACNC: NEGATIVE EU/DL
WBC #/AREA URNS HPF: >100 /HPF (ref 0–5)

## 2022-03-13 PROCEDURE — 96372 THER/PROPH/DIAG INJ SC/IM: CPT

## 2022-03-13 PROCEDURE — 96372 THER/PROPH/DIAG INJ SC/IM: CPT | Performed by: EMERGENCY MEDICINE

## 2022-03-13 PROCEDURE — 81025 URINE PREGNANCY TEST: CPT | Performed by: EMERGENCY MEDICINE

## 2022-03-13 PROCEDURE — 81000 URINALYSIS NONAUTO W/SCOPE: CPT | Performed by: EMERGENCY MEDICINE

## 2022-03-13 PROCEDURE — 63600175 PHARM REV CODE 636 W HCPCS: Performed by: EMERGENCY MEDICINE

## 2022-03-13 PROCEDURE — 99284 EMERGENCY DEPT VISIT MOD MDM: CPT | Mod: 25

## 2022-03-13 PROCEDURE — 87086 URINE CULTURE/COLONY COUNT: CPT | Performed by: EMERGENCY MEDICINE

## 2022-03-13 RX ORDER — KETOROLAC TROMETHAMINE 30 MG/ML
30 INJECTION, SOLUTION INTRAMUSCULAR; INTRAVENOUS
Status: COMPLETED | OUTPATIENT
Start: 2022-03-13 | End: 2022-03-13

## 2022-03-13 RX ORDER — NITROFURANTOIN 25; 75 MG/1; MG/1
100 CAPSULE ORAL 2 TIMES DAILY
Qty: 10 CAPSULE | Refills: 0 | Status: SHIPPED | OUTPATIENT
Start: 2022-03-13 | End: 2022-03-18

## 2022-03-13 RX ORDER — PHENAZOPYRIDINE HYDROCHLORIDE 200 MG/1
200 TABLET, FILM COATED ORAL 3 TIMES DAILY
Qty: 6 TABLET | Refills: 0 | Status: SHIPPED | OUTPATIENT
Start: 2022-03-13 | End: 2022-03-23

## 2022-03-13 RX ADMIN — KETOROLAC TROMETHAMINE 30 MG: 30 INJECTION, SOLUTION INTRAMUSCULAR at 12:03

## 2022-03-13 NOTE — ED TRIAGE NOTES
"The patient stated, "I am peeing blood." The patient stated she woke up with pain and went to the restroom and only got a few drips, but now her urine is red in color.   "

## 2022-03-13 NOTE — ED PROVIDER NOTES
"     Source of History:  The patient    Chief complaint:  Abdominal Pain (Abd pain with urination, lower back pain, lower abd pain with red colored urine. She had a hysterectomy in  .Pt denies any abd trauma or injury)      HPI:  Anitra Chaney is a 42 y.o. female presenting with complain of increased redness of her urine, burning with urination as well as pressure in the suprapubic area and burning that she noticed today.  She has had increased urination as well.  Denies any back pain, nausea vomiting.  Denies any fevers or chills.    This is the extent to the patients complaints today here in the emergency department.    ROS: As per HPI and below:  Constitutional: No fever.  No chills.  Eyes: No visual changes.   ENT: No sore throat. No ear pain.  GI:  Positive for suprapubic pain  :  Dysuria and blood in the urine  Urinary: No abnormal urination.  MSK: No back pain. No joint pain.   Integument: No rashes or lesions.    Review of patient's allergies indicates:  No Known Allergies    PMH:  As per HPI and below:  Past Medical History:   Diagnosis Date    Anxiety and depression     Encounter for blood transfusion     GERD (gastroesophageal reflux disease)     GI bleeds, multiple during admission     HTN (hypertension), benign 3/10/2014    RESOLVED    Migraines     Obesity     PFO (patent foramen ovale)     found after stroke "too small/risky to close"    Stroke 2017    Hospitalized at [facility]; given tPA     Past Surgical History:   Procedure Laterality Date    ANKLE FRACTURE SURGERY       SECTION N/A 2019    Procedure:  SECTION;  Surgeon: Gianna Flowers MD;  Location: [facility];  Service: OB/GYN;  Laterality: N/A;     SECTION WITH TUBAL LIGATION Bilateral 2020    Procedure:  SECTION, WITH TUBAL LIGATION;  Surgeon: Corina Mao MD;  [facility]: [facility];  Service: OB/GYN;  Laterality: Bilateral;     SECTION, CLASSIC  " "2005/2009/2019    x3    CHOLECYSTECTOMY  3/2002    lap maribel    COLONOSCOPY N/A 7/6/2021    Procedure: COLONOSCOPY;  Surgeon: Cleveland Sears MD;  Location: [facility] ENDO;  Service: Endoscopy;  Laterality: N/A;    CYSTOSCOPY  9/10/2021    Procedure: CYSTOSCOPY;  Surgeon: Salomón Hernandez MD;  Location: [facility] OR;  Service: OB/GYN;;    ESOPHAGOGASTRODUODENOSCOPY N/A 7/5/2021    Procedure: EGD (ESOPHAGOGASTRODUODENOSCOPY);  Surgeon: Cleveland Sears MD;  Location: [facility] ENDO;  Service: Endoscopy;  Laterality: N/A;    FOOT SURGERY Left     gastric sleeve  09/01/2017    HYSTERECTOMY  Sept 10,2021    INTRALUMINAL GASTROINTESTINAL TRACT IMAGING VIA CAPSULE N/A 7/13/2021    Procedure: IMAGING PROCEDURE, GI TRACT, INTRALUMINAL, VIA CAPSULE;  Surgeon: Nolan Cordero MD;  Location: [facility] ENDO;  Service: Endoscopy;  Laterality: N/A;  instructions portal -ml    LAPAROSCOPIC SALPINGECTOMY Bilateral 9/10/2021    Procedure: SALPINGECTOMY, LAPAROSCOPIC;  Surgeon: Salomón Hernandez MD;  Location: [facility] OR;  Service: OB/GYN;  Laterality: Bilateral;    LAPAROSCOPIC TOTAL HYSTERECTOMY N/A 9/10/2021    Procedure: HYSTERECTOMY, TOTAL, LAPAROSCOPIC;  Surgeon: Salomón Hernandez MD;  [facility];  Service: OB/GYN;  Laterality: N/A;    TUBAL LIGATION  7/2020       Social History     Tobacco Use    Smoking status: Heavy Tobacco Smoker     Packs/day: 1.50     Years: 27.00     Pack years: 40.50     Types: Cigarettes    Smokeless tobacco: Never Used    Tobacco comment: cutting to 2 cigarettes per day   Substance Use Topics    Alcohol use: No     Alcohol/week: 0.0 standard drinks    Drug use: No       Physical Exam:    /86   Pulse 80   Temp 98.8 °F (37.1 °C) (Oral)   Resp 18   Ht 5' 9" (1.753 m)   Wt 117.9 kg (260 lb)   LMP 08/09/2021 (Approximate)   SpO2 100%   BMI 38.40 kg/m²   Nursing note and vital signs reviewed.  Constitutional: No acute distress.  Nontoxic  Eyes: No conjunctival injection. Extraocular muscles " intact.  ENT: Normal phonation.  GI:  Slight suprapubic tenderness to palpation.  No guarding or rebound.  Remainder of the abdomen soft nontender nondistended bowel sounds are normal.  Negative McBurney point tenderness.  Musculoskeletal: Good range of motion all joints.  No deformities.  Neck supple.  No meningismus. Neurovascularly intact.  Skin: No rashes seen.  Good turgor.  No abrasions.  No ecchymoses.  Psych:  Alert and oriented x 3.  Appropriate, conversant.        I decided to obtain the patient's medical records.  Summary of Medical Records:      MDM/ Differential Dx:   42 y.o. female with suprapubic tenderness, dysuria as well as blood in urine.  I suspect urinary tract infection.  No findings to suggest pyelonephritis.  Vital signs are normal.  Do not feel blood work or imaging is indicated at this time.  Will give analgesia and await urinalysis results.    ED Course as of 03/13/22 1304   Sun Mar 13, 2022   1206 Preg Test, Ur: Negative [SM]   1301 Occult Blood UA(!): 3+ [SM]   1301 Leukocytes, UA(!): 1+ [SM]   1301 WBC, UA(!): >100 [SM]   1301 RBC, UA(!): >100  Consistent with urinary tract infection/cystitis.    No further workup indicated based on their complaints or examination today.    Patient to be discharged home in stable condition. Diagnosis and treatment plan explained to patient and/or family member who is at bedside. I have answered all questions and the patient is satisfied with the plan of care. Strict return precautions given. The patient demonstrates understanding of the care plan. [SM]      ED Course User Index  [SM] Manjit De La Torre DO               Diagnostic Impression:    1. Acute cystitis with hematuria         ED Disposition Condition    Discharge Stable          ED Prescriptions     Medication Sig Dispense Start Date End Date Auth. Provider    nitrofurantoin, macrocrystal-monohydrate, (MACROBID) 100 MG capsule Take 1 capsule (100 mg total) by mouth 2 (two) times daily. for 5  days 10 capsule 3/13/2022 3/18/2022 Manjit De La Torre, DO    phenazopyridine (PYRIDIUM) 200 MG tablet Take 1 tablet (200 mg total) by mouth 3 (three) times daily. for 10 days 6 tablet 3/13/2022 3/23/2022 Manjit De La Torre, DO        Follow-up Information     Follow up With Specialties Details Why Contact Info    Tien Watts MD Family Medicine Schedule an appointment as soon as possible for a visit  If symptoms worsen [address]  [facility] [address]  [facility] [address]  [phone]             Manjit De La Torre DO  03/13/22 2966

## 2022-03-14 RX ORDER — OXYCODONE AND ACETAMINOPHEN 10; 325 MG/1; MG/1
1 TABLET ORAL EVERY 4 HOURS PRN
Qty: 42 TABLET | Refills: 0 | Status: SHIPPED | OUTPATIENT
Start: 2022-03-14 | End: 2022-03-18 | Stop reason: SDUPTHER

## 2022-03-15 LAB — BACTERIA UR CULT: NORMAL

## 2022-03-18 DIAGNOSIS — Z98.1 STATUS POST ANKLE ARTHRODESIS: ICD-10-CM

## 2022-03-18 DIAGNOSIS — M25.572 CHRONIC PAIN OF LEFT ANKLE: ICD-10-CM

## 2022-03-18 DIAGNOSIS — B37.31 CANDIDA VAGINITIS: ICD-10-CM

## 2022-03-18 DIAGNOSIS — G89.29 CHRONIC PAIN OF LEFT ANKLE: ICD-10-CM

## 2022-03-19 RX ORDER — FLUCONAZOLE 150 MG/1
150 TABLET ORAL ONCE
Qty: 1 TABLET | Refills: 0 | OUTPATIENT
Start: 2022-03-19 | End: 2022-03-19

## 2022-03-20 ENCOUNTER — PATIENT MESSAGE (OUTPATIENT)
Dept: PSYCHIATRY | Facility: CLINIC | Age: 42
End: 2022-03-20
Payer: COMMERCIAL

## 2022-03-20 RX ORDER — LORAZEPAM 0.5 MG/1
TABLET ORAL
Qty: 45 TABLET | Refills: 0 | Status: SHIPPED | OUTPATIENT
Start: 2022-03-20 | End: 2022-04-25 | Stop reason: SDUPTHER

## 2022-03-21 RX ORDER — OXYCODONE AND ACETAMINOPHEN 10; 325 MG/1; MG/1
1 TABLET ORAL EVERY 4 HOURS PRN
Qty: 42 TABLET | Refills: 0 | Status: SHIPPED | OUTPATIENT
Start: 2022-03-21 | End: 2022-03-26 | Stop reason: SDUPTHER

## 2022-03-26 DIAGNOSIS — M25.572 CHRONIC PAIN OF LEFT ANKLE: ICD-10-CM

## 2022-03-26 DIAGNOSIS — G89.29 CHRONIC PAIN OF LEFT ANKLE: ICD-10-CM

## 2022-03-26 DIAGNOSIS — Z98.1 STATUS POST ANKLE ARTHRODESIS: ICD-10-CM

## 2022-03-28 RX ORDER — OXYCODONE AND ACETAMINOPHEN 10; 325 MG/1; MG/1
1 TABLET ORAL EVERY 4 HOURS PRN
Qty: 42 TABLET | Refills: 0 | Status: SHIPPED | OUTPATIENT
Start: 2022-03-28 | End: 2022-04-02 | Stop reason: SDUPTHER

## 2022-04-02 DIAGNOSIS — G89.29 CHRONIC PAIN OF LEFT ANKLE: ICD-10-CM

## 2022-04-02 DIAGNOSIS — Z98.1 STATUS POST ANKLE ARTHRODESIS: ICD-10-CM

## 2022-04-02 DIAGNOSIS — M25.572 CHRONIC PAIN OF LEFT ANKLE: ICD-10-CM

## 2022-04-04 RX ORDER — OXYCODONE AND ACETAMINOPHEN 10; 325 MG/1; MG/1
1 TABLET ORAL EVERY 4 HOURS PRN
Qty: 42 TABLET | Refills: 0 | Status: SHIPPED | OUTPATIENT
Start: 2022-04-04 | End: 2022-04-09 | Stop reason: SDUPTHER

## 2022-04-09 DIAGNOSIS — M25.572 CHRONIC PAIN OF LEFT ANKLE: ICD-10-CM

## 2022-04-09 DIAGNOSIS — Z98.1 STATUS POST ANKLE ARTHRODESIS: ICD-10-CM

## 2022-04-09 DIAGNOSIS — G89.29 CHRONIC PAIN OF LEFT ANKLE: ICD-10-CM

## 2022-04-11 RX ORDER — OXYCODONE AND ACETAMINOPHEN 10; 325 MG/1; MG/1
1 TABLET ORAL EVERY 4 HOURS PRN
Qty: 42 TABLET | Refills: 0 | Status: SHIPPED | OUTPATIENT
Start: 2022-04-11 | End: 2022-04-15 | Stop reason: SDUPTHER

## 2022-04-15 DIAGNOSIS — M25.572 CHRONIC PAIN OF LEFT ANKLE: ICD-10-CM

## 2022-04-15 DIAGNOSIS — G89.29 CHRONIC PAIN OF LEFT ANKLE: ICD-10-CM

## 2022-04-15 DIAGNOSIS — Z98.1 STATUS POST ANKLE ARTHRODESIS: ICD-10-CM

## 2022-04-17 ENCOUNTER — HOSPITAL ENCOUNTER (EMERGENCY)
Facility: OTHER | Age: 42
Discharge: HOME | End: 2022-04-17
Attending: EMERGENCY MEDICINE
Payer: COMMERCIAL

## 2022-04-17 VITALS
OXYGEN SATURATION: 100 % | HEART RATE: 64 BPM | WEIGHT: 245 LBS | RESPIRATION RATE: 15 BRPM | TEMPERATURE: 98 F | DIASTOLIC BLOOD PRESSURE: 79 MMHG | HEIGHT: 69 IN | BODY MASS INDEX: 36.29 KG/M2 | SYSTOLIC BLOOD PRESSURE: 148 MMHG

## 2022-04-17 DIAGNOSIS — R10.31 RIGHT LOWER QUADRANT ABDOMINAL PAIN: Primary | ICD-10-CM

## 2022-04-17 DIAGNOSIS — N83.202 CYST OF LEFT OVARY: ICD-10-CM

## 2022-04-17 DIAGNOSIS — N83.209 RUPTURED OVARIAN CYST: ICD-10-CM

## 2022-04-17 LAB
ALBUMIN SERPL BCP-MCNC: 3.6 G/DL (ref 3.5–5.2)
ALP SERPL-CCNC: 51 U/L (ref 55–135)
ALT SERPL W/O P-5'-P-CCNC: 13 U/L (ref 10–44)
ANION GAP SERPL CALC-SCNC: 14 MMOL/L (ref 8–16)
AST SERPL-CCNC: 11 U/L (ref 10–40)
BASOPHILS # BLD AUTO: 0.05 K/UL (ref 0–0.2)
BASOPHILS NFR BLD: 0.4 % (ref 0–1.9)
BILIRUB SERPL-MCNC: 0.4 MG/DL (ref 0.1–1)
BILIRUB UR QL STRIP: NEGATIVE
BUN SERPL-MCNC: 8 MG/DL (ref 6–20)
CALCIUM SERPL-MCNC: 9.3 MG/DL (ref 8.7–10.5)
CHLORIDE SERPL-SCNC: 107 MMOL/L (ref 95–110)
CLARITY UR: CLEAR
CO2 SERPL-SCNC: 20 MMOL/L (ref 23–29)
COLOR UR: YELLOW
CREAT SERPL-MCNC: 0.5 MG/DL (ref 0.5–1.4)
CREAT SERPL-MCNC: 0.6 MG/DL (ref 0.5–1.4)
DIFFERENTIAL METHOD: ABNORMAL
EOSINOPHIL # BLD AUTO: 0.2 K/UL (ref 0–0.5)
EOSINOPHIL NFR BLD: 1.3 % (ref 0–8)
ERYTHROCYTE [DISTWIDTH] IN BLOOD BY AUTOMATED COUNT: 12.7 % (ref 11.5–14.5)
EST. GFR  (AFRICAN AMERICAN): >60 ML/MIN/1.73 M^2
EST. GFR  (NON AFRICAN AMERICAN): >60 ML/MIN/1.73 M^2
GLUCOSE SERPL-MCNC: 110 MG/DL (ref 70–110)
GLUCOSE UR QL STRIP: NEGATIVE
HCT VFR BLD AUTO: 40.6 % (ref 37–48.5)
HGB BLD-MCNC: 13.6 G/DL (ref 12–16)
HGB UR QL STRIP: NEGATIVE
IMM GRANULOCYTES # BLD AUTO: 0.04 K/UL (ref 0–0.04)
IMM GRANULOCYTES NFR BLD AUTO: 0.3 % (ref 0–0.5)
KETONES UR QL STRIP: NEGATIVE
LEUKOCYTE ESTERASE UR QL STRIP: NEGATIVE
LIPASE SERPL-CCNC: 19 U/L (ref 4–60)
LYMPHOCYTES # BLD AUTO: 1.3 K/UL (ref 1–4.8)
LYMPHOCYTES NFR BLD: 11.4 % (ref 18–48)
MCH RBC QN AUTO: 30.3 PG (ref 27–31)
MCHC RBC AUTO-ENTMCNC: 33.5 G/DL (ref 32–36)
MCV RBC AUTO: 90 FL (ref 82–98)
MONOCYTES # BLD AUTO: 0.6 K/UL (ref 0.3–1)
MONOCYTES NFR BLD: 4.8 % (ref 4–15)
NEUTROPHILS # BLD AUTO: 9.6 K/UL (ref 1.8–7.7)
NEUTROPHILS NFR BLD: 81.8 % (ref 38–73)
NITRITE UR QL STRIP: NEGATIVE
NRBC BLD-RTO: 0 /100 WBC
PH UR STRIP: 6 [PH] (ref 5–8)
PLATELET # BLD AUTO: 248 K/UL (ref 150–450)
PMV BLD AUTO: 9.6 FL (ref 9.2–12.9)
POTASSIUM SERPL-SCNC: 3.6 MMOL/L (ref 3.5–5.1)
PROT SERPL-MCNC: 6.6 G/DL (ref 6–8.4)
PROT UR QL STRIP: NEGATIVE
RBC # BLD AUTO: 4.49 M/UL (ref 4–5.4)
SAMPLE: NORMAL
SODIUM SERPL-SCNC: 141 MMOL/L (ref 136–145)
SP GR UR STRIP: 1.01 (ref 1–1.03)
URN SPEC COLLECT METH UR: NORMAL
UROBILINOGEN UR STRIP-ACNC: NEGATIVE EU/DL
WBC # BLD AUTO: 11.76 K/UL (ref 3.9–12.7)

## 2022-04-17 PROCEDURE — 99900035 HC TECH TIME PER 15 MIN (STAT)

## 2022-04-17 PROCEDURE — 25000003 PHARM REV CODE 250: Performed by: NURSE PRACTITIONER

## 2022-04-17 PROCEDURE — 96375 TX/PRO/DX INJ NEW DRUG ADDON: CPT

## 2022-04-17 PROCEDURE — 25500020 PHARM REV CODE 255: Performed by: EMERGENCY MEDICINE

## 2022-04-17 PROCEDURE — 81003 URINALYSIS AUTO W/O SCOPE: CPT | Performed by: EMERGENCY MEDICINE

## 2022-04-17 PROCEDURE — 63600175 PHARM REV CODE 636 W HCPCS: Performed by: NURSE PRACTITIONER

## 2022-04-17 PROCEDURE — 96374 THER/PROPH/DIAG INJ IV PUSH: CPT | Mod: 59

## 2022-04-17 PROCEDURE — 96361 HYDRATE IV INFUSION ADD-ON: CPT

## 2022-04-17 PROCEDURE — 83690 ASSAY OF LIPASE: CPT | Performed by: NURSE PRACTITIONER

## 2022-04-17 PROCEDURE — 80053 COMPREHEN METABOLIC PANEL: CPT | Performed by: NURSE PRACTITIONER

## 2022-04-17 PROCEDURE — 82565 ASSAY OF CREATININE: CPT | Mod: 91

## 2022-04-17 PROCEDURE — 87040 BLOOD CULTURE FOR BACTERIA: CPT | Performed by: NURSE PRACTITIONER

## 2022-04-17 PROCEDURE — 99285 EMERGENCY DEPT VISIT HI MDM: CPT | Mod: 25

## 2022-04-17 PROCEDURE — 85025 COMPLETE CBC W/AUTO DIFF WBC: CPT | Performed by: NURSE PRACTITIONER

## 2022-04-17 RX ORDER — MORPHINE SULFATE 4 MG/ML
6 INJECTION, SOLUTION INTRAMUSCULAR; INTRAVENOUS
Status: COMPLETED | OUTPATIENT
Start: 2022-04-17 | End: 2022-04-17

## 2022-04-17 RX ORDER — HYDROCODONE BITARTRATE AND ACETAMINOPHEN 5; 325 MG/1; MG/1
1 TABLET ORAL
Status: COMPLETED | OUTPATIENT
Start: 2022-04-17 | End: 2022-04-17

## 2022-04-17 RX ORDER — KETOROLAC TROMETHAMINE 30 MG/ML
15 INJECTION, SOLUTION INTRAMUSCULAR; INTRAVENOUS
Status: COMPLETED | OUTPATIENT
Start: 2022-04-17 | End: 2022-04-17

## 2022-04-17 RX ORDER — HYDROMORPHONE HYDROCHLORIDE 1 MG/ML
1 INJECTION, SOLUTION INTRAMUSCULAR; INTRAVENOUS; SUBCUTANEOUS
Status: COMPLETED | OUTPATIENT
Start: 2022-04-17 | End: 2022-04-17

## 2022-04-17 RX ORDER — ONDANSETRON 2 MG/ML
4 INJECTION INTRAMUSCULAR; INTRAVENOUS
Status: COMPLETED | OUTPATIENT
Start: 2022-04-17 | End: 2022-04-17

## 2022-04-17 RX ADMIN — IOHEXOL 100 ML: 350 INJECTION, SOLUTION INTRAVENOUS at 01:04

## 2022-04-17 RX ADMIN — SODIUM CHLORIDE 1000 ML: 0.9 INJECTION, SOLUTION INTRAVENOUS at 11:04

## 2022-04-17 RX ADMIN — MORPHINE SULFATE 6 MG: 4 INJECTION, SOLUTION INTRAMUSCULAR; INTRAVENOUS at 11:04

## 2022-04-17 RX ADMIN — HYDROMORPHONE HYDROCHLORIDE 1 MG: 1 INJECTION, SOLUTION INTRAMUSCULAR; INTRAVENOUS; SUBCUTANEOUS at 05:04

## 2022-04-17 RX ADMIN — HYDROCODONE BITARTRATE AND ACETAMINOPHEN 1 TABLET: 5; 325 TABLET ORAL at 03:04

## 2022-04-17 RX ADMIN — ONDANSETRON 4 MG: 2 INJECTION INTRAMUSCULAR; INTRAVENOUS at 11:04

## 2022-04-17 RX ADMIN — KETOROLAC TROMETHAMINE 15 MG: 30 INJECTION, SOLUTION INTRAMUSCULAR at 01:04

## 2022-04-17 NOTE — ED NOTES
Patient identifiers verified and correct for   C/C: pt presents for n/v/severe rlq abdominal pain, normal bm yesterday, no gu difficulty. States she had 4 c-sections, gastric sleeve. Appendix still in.  APPEARANCE: awake and alert in NAD.  SKIN: warm, dry and intact. No breakdown or bruising.  MUSCULOSKELETAL: Patient moving all extremities spontaneously, no obvious swelling or deformities noted. Ambulates independently.  RESPIRATORY: Denies shortness of breath.Respirations unlabored.   CARDIAC: Denies CP,  distal pulses; no peripheral edema  ABDOMEN: endorses abdominal pain and n/v. Denies diarrhea, normal gi output, no blood in either.  : voids spontaneously, denies difficulty  Neurologic: AAO x 4; follows commands equal strength in all extremities; denies numbness/tingling. Denies dizziness

## 2022-04-17 NOTE — ED PROVIDER NOTES
"     Source of History:  Patient    Chief complaint:  Abdominal Pain (RLQ radiates down leg.has appendix.subj fever)      HPI:  Anitra Chaney is a 42 y.o. female with PMH GERD, GI bleed, CVA, recent hysterectomy and chronic pain on opiate therapy presenting with abrupt onset of right lower quadrant pain that started this morning.  Patient reports the pain is 10/10 and radiates towards her back and down her right leg.  Pain is associated with nausea and vomiting.  Patient states that she had a gastric sleeve, cholecystectomy years ago and recent hysterectomy but states that she still has her ovaries.  Patient denies fever, chills, chest pain, shortness of breath, dysuria, flank pain, back pain and diarrhea or constipation.  Patient has chronic pain and has taken the Norco she is prescribed for her chronic left ankle pain without relief of her symptoms.    This is the extent to the patients complaints today here in the emergency department.    ROS: As per HPI and below:  General: No fever.  No chills.  Eyes: No visual changes.  ENT: No sore throat. No ear pain  Head: No headache.    Chest: No shortness of breath.  Cardiovascular: No chest pain.  Abdomen: +abdominla pain +nausea +vomiting  Genito-Urinary: No abnormal urination. No vaginal bleeding  Neurologic: No focal weakness.  No numbness.  MSK: no back pain.  Integument: No rashes or lesions.  Hematologic: No easy bruising.  Endocrine: No excessive thirst or urination.    Review of patient's allergies indicates:  No Known Allergies    PMH:  As per HPI and below:  Past Medical History:   Diagnosis Date    Anxiety and depression     Encounter for blood transfusion     GERD (gastroesophageal reflux disease)     GI bleeds, multiple during admission     HTN (hypertension), benign 3/10/2014    RESOLVED    Migraines     Obesity     PFO (patent foramen ovale)     found after stroke "too small/risky to close"    Stroke 01/2017    Hospitalized at [facility]; " given tPA     Past Surgical History:   Procedure Laterality Date    ANKLE FRACTURE SURGERY       SECTION N/A 2019    Procedure:  SECTION;  Surgeon: Gianna Flowers MD;  Location: [facility];  Service: OB/GYN;  Laterality: N/A;     SECTION WITH TUBAL LIGATION Bilateral 2020    Procedure:  SECTION, WITH TUBAL LIGATION;  Surgeon: oCrina Mao MD;  Location: [facility] L[facility];  Service: OB/GYN;  Laterality: Bilateral;     SECTION, CLASSIC  2005/2009/2019    x3    CHOLECYSTECTOMY  3/2002    lap maribel    COLONOSCOPY N/A 2021    Procedure: COLONOSCOPY;  Surgeon: Cleveland Sears MD;  Location: [facility];  Service: Endoscopy;  Laterality: N/A;    CYSTOSCOPY  9/10/2021    Procedure: CYSTOSCOPY;  Surgeon: Salomón Hernandez MD;  Location: [facility];  Service: OB/GYN;;    ESOPHAGOGASTRODUODENOSCOPY N/A 2021    Procedure: EGD (ESOPHAGOGASTRODUODENOSCOPY);  Surgeon: Cleveland Sears MD;  Location: [facility];  Service: Endoscopy;  Laterality: N/A;    FOOT SURGERY Left     gastric sleeve  2017    HYSTERECTOMY  Sept 10,2021    INTRALUMINAL GASTROINTESTINAL TRACT IMAGING VIA CAPSULE N/A 2021    Procedure: IMAGING PROCEDURE, GI TRACT, INTRALUMINAL, VIA CAPSULE;  Surgeon: Nolan Cordero MD;  Location: [facility] ENDO;  Service: Endoscopy;  Laterality: N/A;  instructions portal -ml    LAPAROSCOPIC SALPINGECTOMY Bilateral 9/10/2021    Procedure: SALPINGECTOMY, LAPAROSCOPIC;  Surgeon: Salomón Hernandez MD;  Location: [facility];  Service: OB/GYN;  Laterality: Bilateral;    LAPAROSCOPIC TOTAL HYSTERECTOMY N/A 9/10/2021    Procedure: HYSTERECTOMY, TOTAL, LAPAROSCOPIC;  Surgeon: Salomón Hernandez MD;  Location: [facility];  Service: OB/GYN;  Laterality: N/A;    TUBAL LIGATION  2020       Social History     Tobacco Use    Smoking status: Heavy Tobacco Smoker     Packs/day: 1.50     Years: 27.00     Pack years: 40.50     Types: Cigarettes    Smokeless tobacco: Never Used     "Tobacco comment: cutting to 2 cigarettes per day   Substance Use Topics    Alcohol use: No     Alcohol/week: 0.0 standard drinks    Drug use: No       Physical Exam:    BP (!) 148/79   Pulse 64   Temp 98 °F (36.7 °C)   Resp 15   Ht 5' 9" (1.753 m)   Wt 111.1 kg (245 lb)   LMP 08/09/2021 (Approximate)   SpO2 100%   BMI 36.18 kg/m²   Nursing note and vital signs reviewed.  Appearance:  Appears distressed.  Appears uncomfortable and is rocking back and forth on the stretcher holding her abdomen  Eyes: No conjunctival injection.  ENT: Oropharynx clear.    Chest/ Respiratory: Clear to auscultation bilaterally.  Good air movement.  No wheezes.  No rhonchi. No rales. No accessory muscle use.  Cardiovascular: Regular rate and rhythm.  No murmurs. No gallops. No rubs.  Abdomen: Soft.    Obese abdomen.  Not distended.  TTP in right lower quadrant at McBurney's point.  Positive guarding.  No rebound, non peritoneal  Musculoskeletal: Good range of motion all joints.  No deformities.  Neck supple.  No meningismus.  Skin: No rashes seen.  Good turgor.  No abrasions.  No ecchymoses.  Neurologic: Motor intact.  Sensation intact.  Cerebellar intact.  Cranial nerves intact.  Mental Status:  Alert and oriented x 3.  Appropriate, conversant    Labs that have been ordered have been independently reviewed and interpreted by myself.        Initial Impression/ Differential Dx:  Urgent  Evaluation of 42-year-old female presenting with right lower quadrant abdominal pain that started this morning.  Patient is afebrile, appears uncomfortable and is rocking back and forth but not toxic and hemodynamically stable.  On exam patient has tenderness in the right lower quadrant at McBurney's point.  Exam concerning for acute appendicitis.  Will obtain labs, CT abdomen pelvis, give fluids, antiemetics, analgesia and continue to reassess.    Differential Diagnosis includes, but is not limited to:  AAA, aortic dissection, mesenteric ischemia, " perforated viscous, MI/ACS, SBO/volvulus, incarcerated/strangulated hernia, intussusception, ileus, appendicitis, cholecystitis, cholangitis, diverticulitis, esophagitis, hepatitis, nephrolithiasis, pancreatitis, gastroenteritis, colitis, IBD/IBS, biliary colic, GERD, PUD, constipation, UTI/pyelonephritis,  disorder.      MDM:        ED Course as of 04/18/22 0041   Sun Apr 17, 2022   1148 CBC W/ AUTO DIFFERENTIAL(!)  Higher end of normal WBC, shift noted, stable H&H [CU]   1149 POC Creatinine: 0.5 [CU]   1308 Urinalysis, Reflex to Urine Culture Urine, Clean Catch  No evidence of UTI on UA [CU]   1308 Lipase: 19 [CU]   1308 Comp. Metabolic Panel(!)  No significant electrolyte abnormalities, normal kidney function, no elevation of LFTs [CU]   1308 Notified by RN that patient states that she is still in significant amount of pain.  Will give additional analgesia [CU]   1416 CT Abdomen Pelvis With Contrast   No evidence of acute appendicitis.  Surgical changes of gastric sleeve, cholecystectomy and hysterectomy noted.    Small volume fluid and stranding in the pelvis, new from prior exam.  The fluid appears slightly hyperattenuating suggesting proteinaceous or hemorrhagic component.  Etiology uncertain, but potentially related to ovarian process. Will get TVUS   Given patient's reported level of pain [CU]   1632 US Pelvis Comp with Transvag NON-OB (xpd  Impression:     Status post hysterectomy, no abnormalities of the vaginal cuff.  No discrete uterine abnormalities.     Suspected involuting follicle or small cyst within the left ovary.     Small amount of fluid in the pelvis, nonspecific.  Given the appearance of the left ovary, left ovarian cyst rupture is a consideration although correlation is needed. [CU]   1633 At bedside to reassess patient and inform her of her imaging results.  Pain likely secondary from ruptured ovarian cyst.  Patient has chronic pain and has her pain meds refilled every Monday.  Counseled  patient that due to this, cannot refill pain meds, however will give 1 dose prior to departure.  Patient is amenable to this plan and discharged home in good condition.  She states that she will  her pain meds tomorrow which will treat both her chronic pain and this acute pain.  Instructed to follow up with OBGYN as needed. Patient educated on on signs and symptoms to monitor for and when to return to ED. Patient verbalized understanding agrees with treatment plan. All questions and concerns addressed.        [CU]      ED Course User Index  [CU] Silver Ca NP               Diagnostic Impression:    1. Right lower quadrant abdominal pain    2. Cyst of left ovary    3. Ruptured ovarian cyst         ED Disposition Condition    Discharge Good          ED Prescriptions     None        Follow-up Information     Follow up With Specialties Details Why Contact Info    [facility] - Emergency Dept Emergency Medicine  If symptoms worsen [address] [facility] [address]  [phone]    Salomón Hernandez MD Obstetrics, Obstetrics and Gynecology   [address] [address]  [phone]             Silver Ca NP  04/18/22 0042

## 2022-04-17 NOTE — Clinical Note
"Anitra Chatmanrajesh Chaney was seen and treated in our emergency department on 4/17/2022.  She may return to work on 04/20/2022.       If you have any questions or concerns, please don't hesitate to call.      Silver Ca NP"

## 2022-04-18 ENCOUNTER — PATIENT MESSAGE (OUTPATIENT)
Dept: OBSTETRICS AND GYNECOLOGY | Facility: CLINIC | Age: 42
End: 2022-04-18
Payer: COMMERCIAL

## 2022-04-18 DIAGNOSIS — N83.201 CYST OF RIGHT OVARY: Primary | ICD-10-CM

## 2022-04-18 RX ORDER — KETOROLAC TROMETHAMINE 10 MG/1
10 TABLET, FILM COATED ORAL EVERY 6 HOURS
Qty: 20 TABLET | Refills: 0 | Status: SHIPPED | OUTPATIENT
Start: 2022-04-18 | End: 2022-04-25 | Stop reason: SDUPTHER

## 2022-04-18 RX ORDER — OXYCODONE AND ACETAMINOPHEN 10; 325 MG/1; MG/1
1 TABLET ORAL EVERY 4 HOURS PRN
Qty: 42 TABLET | Refills: 0 | Status: SHIPPED | OUTPATIENT
Start: 2022-04-18 | End: 2022-04-25 | Stop reason: SDUPTHER

## 2022-04-19 NOTE — PROGRESS NOTES
Subjective:       Patient ID: Anitra Chaney is a 42 y.o. female.    Chief Complaint:  Ovarian Cyst (Cyst ruptured on  /Patient reports pain , brown watery discharge, N/V and spotting )      History of Present Illness  Pelvic Pain  The patient's primary symptoms include pelvic pain. The patient's pertinent negatives include no vaginal discharge. This is a new problem. The current episode started in the past 7 days. The problem occurs constantly. The problem has been rapidly worsening. The pain is severe. The problem affects both sides. She is not pregnant. Associated symptoms include abdominal pain, back pain, discolored urine, a fever, flank pain, frequency, headaches, hematuria, nausea and vomiting. Pertinent negatives include no anorexia, chills, constipation, diarrhea, dysuria, painful intercourse, rash or urgency. The symptoms are aggravated by activity, heavy lifting and tactile pressure. She has tried oral narcotics for the symptoms. The treatment provided no relief. She is sexually active. No, her partner does not have an STD. She uses hysterectomy for contraception. Her past medical history is significant for an abdominal surgery, a  section, an ectopic pregnancy, a gynecological surgery, menorrhagia, miscarriage and ovarian cysts. There is no history of endometriosis, herpes simplex, metrorrhagia, perineal abscess, PID, an STD, a terminated pregnancy or vaginosis.     43 y/o  presents for evaluation of pelvic pain s/p suspected ruptured ovarian cyst. Symptoms started on , she was seen in the ED and US was suspicious for ruptured ovarian cyst. See report below. Reports pain and nausea has been persistent since that time, though improved some today. Reports some brown watery vaginal discharge. She has had a hysterectomy. She has not picked up toradol Rx Dr. Hernandez sent in.       FINDINGS:  Surgical changes are noted of hysterectomy.  The visualized portions of the vaginal  cuff are unremarkable.  There is free fluid in the pelvis.     The right ovary measures a proximally 3.6 x 2.7 x 5.3 cm and contains a few follicles.  The left ovary measures approximately 2.8 x 3.9 x 3.9 cm and contains a dominant follicle or cyst measuring approximately 1.7 x 2.0 x 1.6 cm.  Arterial and venous flow is documented the ovaries bilaterally.     Impression:     Status post hysterectomy, no abnormalities of the vaginal cuff.  No discrete uterine abnormalities.     Suspected involuting follicle or small cyst within the left ovary.     Small amount of fluid in the pelvis, nonspecific.  Given the appearance of the left ovary, left ovarian cyst rupture is a consideration although correlation is needed.    GYN & OB History  Patient's last menstrual period was 2021 (approximate).   Date of Last Pap: 2020    OB History    Para Term  AB Living   5 4 3 1 1 4   SAB IAB Ectopic Multiple Live Births   1     0 4      # Outcome Date GA Lbr Yuval/2nd Weight Sex Delivery Anes PTL Lv   5 Term 20 37w0d  2.71 kg (5 lb 15.6 oz) F CS-LTranv Spinal, EPI  JIGAR   4  19 34w3d   M CS-LTranv Spinal  JIGAR   3 SAB 18 10w0d          2 Term 09 37w0d  2.722 kg (6 lb) F CS-Unspec EPI  JIGAR   1 Term 05 37w0d  3.345 kg (7 lb 6 oz) M CS-Unspec EPI  JIGAR       Review of Systems  Review of Systems   Constitutional: Positive for fever. Negative for chills, diaphoresis and fatigue.   Respiratory: Negative for cough and shortness of breath.    Cardiovascular: Negative for chest pain and palpitations.   Gastrointestinal: Positive for abdominal pain, nausea and vomiting. Negative for anorexia, constipation and diarrhea.   Genitourinary: Positive for flank pain, frequency, hematuria, menorrhagia and pelvic pain. Negative for dyspareunia, dysuria, urgency, vaginal bleeding, vaginal discharge and vaginal pain.   Musculoskeletal: Positive for back pain.   Integumentary:  Negative for rash.    Neurological: Positive for headaches.   Psychiatric/Behavioral: Negative for depression.           Objective:    Physical Exam:   Constitutional: She is oriented to person, place, and time. She appears well-developed and well-nourished. No distress.    HENT:   Head: Normocephalic and atraumatic.    Eyes: EOM are normal.      Pulmonary/Chest: Effort normal.        Abdominal: Soft. She exhibits no distension and no mass. There is no abdominal tenderness. There is no rebound and no guarding. No hernia.     Genitourinary:    Genitourinary Comments: Normal external female genitalia. Vagina normal, white vaginal discharge. Uterus/cervix surgically absent. No adnexal masses palpated.               Musculoskeletal: Normal range of motion and moves all extremeties.       Neurological: She is alert and oriented to person, place, and time.    Skin: No rash noted.    Psychiatric: She has a normal mood and affect. Her behavior is normal. Judgment and thought content normal.          Assessment:        1. Ruptured ovarian cyst    2. Vaginal discharge             Plan:      Anitra was seen today for ovarian cyst.    Diagnoses and all orders for this visit:    Ruptured ovarian cyst    Vaginal discharge  -     Vaginosis Screen by DNA Probe    Other orders  -     fluconazole (DIFLUCAN) 150 MG Tab; Take 1 tablet (150 mg total) by mouth every 72 hours.    Symptoms improving s/p ruptured ovarian cyst.   Exam benign.   Precautions.   Affirm collected, exam c/w likely yeast. Rx diflucan per patient request.   Discussed that she could consider progestin only ovulation suppression (given smoker) if recurrent cysts.       Orders Placed This Encounter   Procedures    Vaginosis Screen by DNA Probe       No follow-ups on file.

## 2022-04-20 ENCOUNTER — IMMUNIZATION (OUTPATIENT)
Dept: INTERNAL MEDICINE | Facility: CLINIC | Age: 42
End: 2022-04-20
Payer: COMMERCIAL

## 2022-04-20 ENCOUNTER — PATIENT OUTREACH (OUTPATIENT)
Dept: ADMINISTRATIVE | Facility: OTHER | Age: 42
End: 2022-04-20
Payer: COMMERCIAL

## 2022-04-20 ENCOUNTER — OFFICE VISIT (OUTPATIENT)
Dept: OBSTETRICS AND GYNECOLOGY | Facility: CLINIC | Age: 42
End: 2022-04-20
Payer: COMMERCIAL

## 2022-04-20 VITALS
SYSTOLIC BLOOD PRESSURE: 120 MMHG | DIASTOLIC BLOOD PRESSURE: 86 MMHG | BODY MASS INDEX: 40.23 KG/M2 | WEIGHT: 271.63 LBS | HEIGHT: 69 IN

## 2022-04-20 DIAGNOSIS — Z23 NEED FOR VACCINATION: Primary | ICD-10-CM

## 2022-04-20 DIAGNOSIS — N89.8 VAGINAL DISCHARGE: ICD-10-CM

## 2022-04-20 DIAGNOSIS — N83.209 RUPTURED OVARIAN CYST: Primary | ICD-10-CM

## 2022-04-20 PROCEDURE — 1160F PR REVIEW ALL MEDS BY PRESCRIBER/CLIN PHARMACIST DOCUMENTED: ICD-10-PCS | Mod: CPTII,S$GLB,, | Performed by: OBSTETRICS & GYNECOLOGY

## 2022-04-20 PROCEDURE — 1159F PR MEDICATION LIST DOCUMENTED IN MEDICAL RECORD: ICD-10-PCS | Mod: CPTII,S$GLB,, | Performed by: OBSTETRICS & GYNECOLOGY

## 2022-04-20 PROCEDURE — 3074F SYST BP LT 130 MM HG: CPT | Mod: CPTII,S$GLB,, | Performed by: OBSTETRICS & GYNECOLOGY

## 2022-04-20 PROCEDURE — 99213 PR OFFICE/OUTPT VISIT, EST, LEVL III, 20-29 MIN: ICD-10-PCS | Mod: S$GLB,,, | Performed by: OBSTETRICS & GYNECOLOGY

## 2022-04-20 PROCEDURE — 99213 OFFICE O/P EST LOW 20 MIN: CPT | Mod: S$GLB,,, | Performed by: OBSTETRICS & GYNECOLOGY

## 2022-04-20 PROCEDURE — 87481 CANDIDA DNA AMP PROBE: CPT | Mod: 59 | Performed by: OBSTETRICS & GYNECOLOGY

## 2022-04-20 PROCEDURE — 87801 DETECT AGNT MULT DNA AMPLI: CPT | Performed by: OBSTETRICS & GYNECOLOGY

## 2022-04-20 PROCEDURE — 99999 PR PBB SHADOW E&M-EST. PATIENT-LVL III: CPT | Mod: PBBFAC,,, | Performed by: OBSTETRICS & GYNECOLOGY

## 2022-04-20 PROCEDURE — 99999 PR PBB SHADOW E&M-EST. PATIENT-LVL III: ICD-10-PCS | Mod: PBBFAC,,, | Performed by: OBSTETRICS & GYNECOLOGY

## 2022-04-20 PROCEDURE — 3008F BODY MASS INDEX DOCD: CPT | Mod: CPTII,S$GLB,, | Performed by: OBSTETRICS & GYNECOLOGY

## 2022-04-20 PROCEDURE — 3079F PR MOST RECENT DIASTOLIC BLOOD PRESSURE 80-89 MM HG: ICD-10-PCS | Mod: CPTII,S$GLB,, | Performed by: OBSTETRICS & GYNECOLOGY

## 2022-04-20 PROCEDURE — 1160F RVW MEDS BY RX/DR IN RCRD: CPT | Mod: CPTII,S$GLB,, | Performed by: OBSTETRICS & GYNECOLOGY

## 2022-04-20 PROCEDURE — 1159F MED LIST DOCD IN RCRD: CPT | Mod: CPTII,S$GLB,, | Performed by: OBSTETRICS & GYNECOLOGY

## 2022-04-20 PROCEDURE — 3074F PR MOST RECENT SYSTOLIC BLOOD PRESSURE < 130 MM HG: ICD-10-PCS | Mod: CPTII,S$GLB,, | Performed by: OBSTETRICS & GYNECOLOGY

## 2022-04-20 PROCEDURE — 3079F DIAST BP 80-89 MM HG: CPT | Mod: CPTII,S$GLB,, | Performed by: OBSTETRICS & GYNECOLOGY

## 2022-04-20 PROCEDURE — 91305 COVID-19, MRNA, LNP-S, PF, 30 MCG/0.3 ML DOSE VACCINE (PFIZER): CPT | Mod: PBBFAC | Performed by: INTERNAL MEDICINE

## 2022-04-20 PROCEDURE — 3008F PR BODY MASS INDEX (BMI) DOCUMENTED: ICD-10-PCS | Mod: CPTII,S$GLB,, | Performed by: OBSTETRICS & GYNECOLOGY

## 2022-04-20 RX ORDER — FLUCONAZOLE 150 MG/1
150 TABLET ORAL
Qty: 2 TABLET | Refills: 0 | Status: SHIPPED | OUTPATIENT
Start: 2022-04-20 | End: 2022-04-25 | Stop reason: SDUPTHER

## 2022-04-20 NOTE — LETTER
through/ April 20, 2022       [facility]  [address] [facility] [address]  Phone: [phone]       Patient: Anitra Chaney   YOB: 1980  Date of Visit: 04/20/2022    To Whom It May Concern:    Vickie Chaney  was at [facility] on 04/20/2022. Please excuse Anitra for 4/19 through 4/21 she may return to work on 4/22 with no restrictions. If you have any questions or concerns, or if I can be of further assistance, please do not hesitate to contact me.    Sincerely,    Dr. Leida Andrade M.D.

## 2022-04-21 ENCOUNTER — IMMUNIZATION (OUTPATIENT)
Dept: PHARMACY | Facility: CLINIC | Age: 42
End: 2022-04-21
Payer: COMMERCIAL

## 2022-04-22 LAB
BACTERIA BLD CULT: NORMAL
BACTERIAL VAGINOSIS DNA: NEGATIVE
CANDIDA GLABRATA DNA: NEGATIVE
CANDIDA KRUSEI DNA: NEGATIVE
CANDIDA RRNA VAG QL PROBE: POSITIVE
T VAGINALIS RRNA GENITAL QL PROBE: NEGATIVE

## 2022-04-25 ENCOUNTER — PATIENT MESSAGE (OUTPATIENT)
Dept: ORTHOPEDICS | Facility: CLINIC | Age: 42
End: 2022-04-25
Payer: COMMERCIAL

## 2022-04-25 DIAGNOSIS — M25.572 CHRONIC PAIN OF LEFT ANKLE: ICD-10-CM

## 2022-04-25 DIAGNOSIS — G89.29 CHRONIC PAIN OF LEFT ANKLE: ICD-10-CM

## 2022-04-25 DIAGNOSIS — Z98.1 STATUS POST ANKLE ARTHRODESIS: ICD-10-CM

## 2022-04-25 DIAGNOSIS — N83.201 CYST OF RIGHT OVARY: ICD-10-CM

## 2022-04-25 RX ORDER — LORAZEPAM 0.5 MG/1
TABLET ORAL
Qty: 45 TABLET | Refills: 0 | Status: SHIPPED | OUTPATIENT
Start: 2022-04-25 | End: 2022-05-15 | Stop reason: SDUPTHER

## 2022-04-25 RX ORDER — OXYCODONE AND ACETAMINOPHEN 10; 325 MG/1; MG/1
1 TABLET ORAL EVERY 4 HOURS PRN
Qty: 42 TABLET | Refills: 0 | OUTPATIENT
Start: 2022-04-25

## 2022-04-25 RX ORDER — OXYCODONE AND ACETAMINOPHEN 10; 325 MG/1; MG/1
1 TABLET ORAL EVERY 4 HOURS PRN
Qty: 42 TABLET | Refills: 0 | Status: SHIPPED | OUTPATIENT
Start: 2022-04-25 | End: 2022-04-29 | Stop reason: SDUPTHER

## 2022-04-25 RX ORDER — FLUCONAZOLE 150 MG/1
150 TABLET ORAL
Qty: 2 TABLET | Refills: 0 | Status: SHIPPED | OUTPATIENT
Start: 2022-04-25 | End: 2022-05-15 | Stop reason: SDUPTHER

## 2022-04-25 RX ORDER — KETOROLAC TROMETHAMINE 10 MG/1
10 TABLET, FILM COATED ORAL EVERY 6 HOURS
Qty: 20 TABLET | Refills: 0 | Status: SHIPPED | OUTPATIENT
Start: 2022-04-25 | End: 2022-05-01

## 2022-04-26 ENCOUNTER — PATIENT MESSAGE (OUTPATIENT)
Dept: INTERNAL MEDICINE | Facility: CLINIC | Age: 42
End: 2022-04-26
Payer: COMMERCIAL

## 2022-04-26 ENCOUNTER — PATIENT MESSAGE (OUTPATIENT)
Dept: PSYCHIATRY | Facility: CLINIC | Age: 42
End: 2022-04-26
Payer: COMMERCIAL

## 2022-04-26 ENCOUNTER — PATIENT MESSAGE (OUTPATIENT)
Dept: ORTHOPEDICS | Facility: CLINIC | Age: 42
End: 2022-04-26
Payer: COMMERCIAL

## 2022-04-29 DIAGNOSIS — M25.572 CHRONIC PAIN OF LEFT ANKLE: ICD-10-CM

## 2022-04-29 DIAGNOSIS — G89.29 CHRONIC PAIN OF LEFT ANKLE: ICD-10-CM

## 2022-04-29 DIAGNOSIS — Z98.1 STATUS POST ANKLE ARTHRODESIS: ICD-10-CM

## 2022-05-02 RX ORDER — OXYCODONE AND ACETAMINOPHEN 10; 325 MG/1; MG/1
1 TABLET ORAL EVERY 4 HOURS PRN
Qty: 42 TABLET | Refills: 0 | Status: SHIPPED | OUTPATIENT
Start: 2022-05-02 | End: 2022-05-07 | Stop reason: SDUPTHER

## 2022-05-07 DIAGNOSIS — M25.572 CHRONIC PAIN OF LEFT ANKLE: ICD-10-CM

## 2022-05-07 DIAGNOSIS — G89.29 CHRONIC PAIN OF LEFT ANKLE: ICD-10-CM

## 2022-05-07 DIAGNOSIS — Z98.1 STATUS POST ANKLE ARTHRODESIS: ICD-10-CM

## 2022-05-09 RX ORDER — OXYCODONE AND ACETAMINOPHEN 10; 325 MG/1; MG/1
1 TABLET ORAL EVERY 4 HOURS PRN
Qty: 42 TABLET | Refills: 0 | Status: SHIPPED | OUTPATIENT
Start: 2022-05-09 | End: 2022-05-13 | Stop reason: SDUPTHER

## 2022-05-13 DIAGNOSIS — Z98.1 STATUS POST ANKLE ARTHRODESIS: ICD-10-CM

## 2022-05-13 DIAGNOSIS — M25.572 CHRONIC PAIN OF LEFT ANKLE: ICD-10-CM

## 2022-05-13 DIAGNOSIS — G89.29 CHRONIC PAIN OF LEFT ANKLE: ICD-10-CM

## 2022-05-16 DIAGNOSIS — B37.31 CANDIDA VAGINITIS: ICD-10-CM

## 2022-05-16 RX ORDER — OXYCODONE AND ACETAMINOPHEN 10; 325 MG/1; MG/1
1 TABLET ORAL EVERY 4 HOURS PRN
Qty: 42 TABLET | Refills: 0 | Status: SHIPPED | OUTPATIENT
Start: 2022-05-16 | End: 2022-05-21 | Stop reason: SDUPTHER

## 2022-05-16 RX ORDER — LORAZEPAM 0.5 MG/1
TABLET ORAL
Qty: 45 TABLET | Refills: 0 | Status: SHIPPED | OUTPATIENT
Start: 2022-05-16 | End: 2022-06-12 | Stop reason: SDUPTHER

## 2022-05-16 RX ORDER — FLUCONAZOLE 150 MG/1
150 TABLET ORAL
Qty: 2 TABLET | Refills: 0 | Status: SHIPPED | OUTPATIENT
Start: 2022-05-16 | End: 2022-07-25 | Stop reason: SDUPTHER

## 2022-05-18 ENCOUNTER — OFFICE VISIT (OUTPATIENT)
Dept: PSYCHIATRY | Facility: CLINIC | Age: 42
End: 2022-05-18
Payer: COMMERCIAL

## 2022-05-18 DIAGNOSIS — F33.1 MAJOR DEPRESSIVE DISORDER, RECURRENT EPISODE, MODERATE: Primary | ICD-10-CM

## 2022-05-18 DIAGNOSIS — F41.1 GAD (GENERALIZED ANXIETY DISORDER): ICD-10-CM

## 2022-05-18 PROCEDURE — 99214 OFFICE O/P EST MOD 30 MIN: CPT | Mod: 95,,, | Performed by: PSYCHIATRY & NEUROLOGY

## 2022-05-18 PROCEDURE — 1159F MED LIST DOCD IN RCRD: CPT | Mod: CPTII,95,, | Performed by: PSYCHIATRY & NEUROLOGY

## 2022-05-18 PROCEDURE — 1160F PR REVIEW ALL MEDS BY PRESCRIBER/CLIN PHARMACIST DOCUMENTED: ICD-10-PCS | Mod: CPTII,95,, | Performed by: PSYCHIATRY & NEUROLOGY

## 2022-05-18 PROCEDURE — 90833 PSYTX W PT W E/M 30 MIN: CPT | Mod: 95,,, | Performed by: PSYCHIATRY & NEUROLOGY

## 2022-05-18 PROCEDURE — 99214 PR OFFICE/OUTPT VISIT, EST, LEVL IV, 30-39 MIN: ICD-10-PCS | Mod: 95,,, | Performed by: PSYCHIATRY & NEUROLOGY

## 2022-05-18 PROCEDURE — 1160F RVW MEDS BY RX/DR IN RCRD: CPT | Mod: CPTII,95,, | Performed by: PSYCHIATRY & NEUROLOGY

## 2022-05-18 PROCEDURE — 90833 PR PSYCHOTHERAPY W/PATIENT W/E&M, 30 MIN (ADD ON): ICD-10-PCS | Mod: 95,,, | Performed by: PSYCHIATRY & NEUROLOGY

## 2022-05-18 PROCEDURE — 1159F PR MEDICATION LIST DOCUMENTED IN MEDICAL RECORD: ICD-10-PCS | Mod: CPTII,95,, | Performed by: PSYCHIATRY & NEUROLOGY

## 2022-05-18 RX ORDER — FLUOXETINE HYDROCHLORIDE 20 MG/1
40 CAPSULE ORAL DAILY
Qty: 60 CAPSULE | Refills: 5 | Status: SHIPPED | OUTPATIENT
Start: 2022-05-18 | End: 2022-10-21

## 2022-05-18 RX ORDER — TRAZODONE HYDROCHLORIDE 50 MG/1
TABLET ORAL
Qty: 60 TABLET | Refills: 5 | Status: SHIPPED | OUTPATIENT
Start: 2022-05-18 | End: 2022-10-21
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
## 2022-05-19 RX ORDER — CLOTRIMAZOLE AND BETAMETHASONE DIPROPIONATE 10; .64 MG/G; MG/G
CREAM TOPICAL
Qty: 15 G | Refills: 1 | Status: SHIPPED | OUTPATIENT
Start: 2022-05-19 | End: 2023-01-13

## 2022-05-21 DIAGNOSIS — M25.572 CHRONIC PAIN OF LEFT ANKLE: ICD-10-CM

## 2022-05-21 DIAGNOSIS — Z98.1 STATUS POST ANKLE ARTHRODESIS: ICD-10-CM

## 2022-05-21 DIAGNOSIS — G89.29 CHRONIC PAIN OF LEFT ANKLE: ICD-10-CM

## 2022-05-23 RX ORDER — OXYCODONE AND ACETAMINOPHEN 10; 325 MG/1; MG/1
1 TABLET ORAL EVERY 4 HOURS PRN
Qty: 42 TABLET | Refills: 0 | Status: SHIPPED | OUTPATIENT
Start: 2022-05-23 | End: 2022-05-27 | Stop reason: SDUPTHER

## 2022-05-24 ENCOUNTER — PATIENT MESSAGE (OUTPATIENT)
Dept: ORTHOPEDICS | Facility: CLINIC | Age: 42
End: 2022-05-24
Payer: COMMERCIAL

## 2022-05-27 DIAGNOSIS — M25.572 CHRONIC PAIN OF LEFT ANKLE: ICD-10-CM

## 2022-05-27 DIAGNOSIS — G89.29 CHRONIC PAIN OF LEFT ANKLE: ICD-10-CM

## 2022-05-27 DIAGNOSIS — Z98.1 STATUS POST ANKLE ARTHRODESIS: ICD-10-CM

## 2022-05-30 ENCOUNTER — PATIENT MESSAGE (OUTPATIENT)
Dept: INTERNAL MEDICINE | Facility: CLINIC | Age: 42
End: 2022-05-30
Payer: COMMERCIAL

## 2022-05-30 DIAGNOSIS — R20.0 LEG NUMBNESS: ICD-10-CM

## 2022-05-30 DIAGNOSIS — M79.606 PAIN OF LOWER EXTREMITY, UNSPECIFIED LATERALITY: ICD-10-CM

## 2022-05-30 DIAGNOSIS — R20.0 ARM NUMBNESS: Primary | ICD-10-CM

## 2022-05-30 DIAGNOSIS — M79.603 PAIN OF UPPER EXTREMITY, UNSPECIFIED LATERALITY: ICD-10-CM

## 2022-05-30 RX ORDER — OXYCODONE AND ACETAMINOPHEN 10; 325 MG/1; MG/1
1 TABLET ORAL EVERY 4 HOURS PRN
Qty: 42 TABLET | Refills: 0 | Status: SHIPPED | OUTPATIENT
Start: 2022-05-30 | End: 2022-06-06 | Stop reason: SDUPTHER

## 2022-06-03 ENCOUNTER — OFFICE VISIT (OUTPATIENT)
Dept: URGENT CARE | Facility: CLINIC | Age: 42
End: 2022-06-03
Payer: COMMERCIAL

## 2022-06-03 VITALS
DIASTOLIC BLOOD PRESSURE: 94 MMHG | HEART RATE: 61 BPM | BODY MASS INDEX: 38.51 KG/M2 | HEIGHT: 69 IN | TEMPERATURE: 97 F | OXYGEN SATURATION: 98 % | SYSTOLIC BLOOD PRESSURE: 143 MMHG | RESPIRATION RATE: 16 BRPM | WEIGHT: 260 LBS

## 2022-06-03 DIAGNOSIS — J06.9 UPPER RESPIRATORY INFECTION WITH COUGH AND CONGESTION: Primary | ICD-10-CM

## 2022-06-03 DIAGNOSIS — R52 BODY ACHES: ICD-10-CM

## 2022-06-03 LAB
CTP QC/QA: YES
SARS-COV-2 RDRP RESP QL NAA+PROBE: NEGATIVE

## 2022-06-03 PROCEDURE — 3077F PR MOST RECENT SYSTOLIC BLOOD PRESSURE >= 140 MM HG: ICD-10-PCS | Mod: CPTII,S$GLB,, | Performed by: NURSE PRACTITIONER

## 2022-06-03 PROCEDURE — U0002: ICD-10-PCS | Mod: QW,S$GLB,, | Performed by: NURSE PRACTITIONER

## 2022-06-03 PROCEDURE — 3080F DIAST BP >= 90 MM HG: CPT | Mod: CPTII,S$GLB,, | Performed by: NURSE PRACTITIONER

## 2022-06-03 PROCEDURE — 1160F RVW MEDS BY RX/DR IN RCRD: CPT | Mod: CPTII,S$GLB,, | Performed by: NURSE PRACTITIONER

## 2022-06-03 PROCEDURE — 3077F SYST BP >= 140 MM HG: CPT | Mod: CPTII,S$GLB,, | Performed by: NURSE PRACTITIONER

## 2022-06-03 PROCEDURE — 1159F MED LIST DOCD IN RCRD: CPT | Mod: CPTII,S$GLB,, | Performed by: NURSE PRACTITIONER

## 2022-06-03 PROCEDURE — U0002 COVID-19 LAB TEST NON-CDC: HCPCS | Mod: QW,S$GLB,, | Performed by: NURSE PRACTITIONER

## 2022-06-03 PROCEDURE — 1159F PR MEDICATION LIST DOCUMENTED IN MEDICAL RECORD: ICD-10-PCS | Mod: CPTII,S$GLB,, | Performed by: NURSE PRACTITIONER

## 2022-06-03 PROCEDURE — 1160F PR REVIEW ALL MEDS BY PRESCRIBER/CLIN PHARMACIST DOCUMENTED: ICD-10-PCS | Mod: CPTII,S$GLB,, | Performed by: NURSE PRACTITIONER

## 2022-06-03 PROCEDURE — 99213 PR OFFICE/OUTPT VISIT, EST, LEVL III, 20-29 MIN: ICD-10-PCS | Mod: S$GLB,,, | Performed by: NURSE PRACTITIONER

## 2022-06-03 PROCEDURE — 3080F PR MOST RECENT DIASTOLIC BLOOD PRESSURE >= 90 MM HG: ICD-10-PCS | Mod: CPTII,S$GLB,, | Performed by: NURSE PRACTITIONER

## 2022-06-03 PROCEDURE — 99213 OFFICE O/P EST LOW 20 MIN: CPT | Mod: S$GLB,,, | Performed by: NURSE PRACTITIONER

## 2022-06-03 PROCEDURE — 3008F PR BODY MASS INDEX (BMI) DOCUMENTED: ICD-10-PCS | Mod: CPTII,S$GLB,, | Performed by: NURSE PRACTITIONER

## 2022-06-03 PROCEDURE — 3008F BODY MASS INDEX DOCD: CPT | Mod: CPTII,S$GLB,, | Performed by: NURSE PRACTITIONER

## 2022-06-03 NOTE — PATIENT INSTRUCTIONS
Return to Urgent Care or go to ER if symptoms worsen or fail to improve.  Follow up with PCP as recommended for further management.     THE FOLLOWING ARE RECOMMENDED TO HELP YOU MANAGE YOUR SYMPTOMS:    Take Coricidin HBP cough/cold according to label instructions as needed for congestion, cough and body aches.      Take Ibuprofen or Acetaminophen according to label instructions for fever, throat pain, headache, and body aches    Use warm salt water gargles to ease your throat pain. Warm salt water gargles as needed for sore throat-  1/2 tsp salt to 1 cup warm water, gargle as desired.

## 2022-06-03 NOTE — PROGRESS NOTES
"Subjective:       Patient ID: Anitra Chaney is a 42 y.o. female.    Vitals:  height is 5' 9" (1.753 m) and weight is 117.9 kg (260 lb). Her temperature is 96.7 °F (35.9 °C). Her blood pressure is 143/94 (abnormal) and her pulse is 61. Her respiration is 16 and oxygen saturation is 98%.     Chief Complaint: Sinus Problem    Patient presents with c.o body aches, congestion and sore throat. Onset of sxs x 2 days.    Sinus Problem  This is a new problem. The current episode started in the past 7 days. The problem is unchanged. There has been no fever. Associated symptoms include congestion. Pertinent negatives include no chills or diaphoresis. Past treatments include nothing.       Constitution: Negative for chills, sweating, fatigue and fever.   HENT: Positive for congestion and postnasal drip. Negative for sinus pain.        Objective:      Physical Exam   Constitutional: She is oriented to person, place, and time. She appears well-developed. She is cooperative.  Non-toxic appearance. She does not appear ill. No distress.      Comments:ambulatory     HENT:   Nose: Mucosal edema, rhinorrhea and congestion present. No purulent discharge. Right sinus exhibits no maxillary sinus tenderness and no frontal sinus tenderness. Left sinus exhibits no maxillary sinus tenderness and no frontal sinus tenderness.   Mouth/Throat: Uvula is midline and mucous membranes are normal. Mucous membranes are moist. No uvula swelling. Posterior oropharyngeal erythema (mild) present. No oropharyngeal exudate. No tonsillar exudate.   Eyes: Conjunctivae are normal. Extraocular movement intact   Pulmonary/Chest: Effort normal. No respiratory distress.   Neurological: She is alert and oriented to person, place, and time.   Skin: Skin is warm, dry and not diaphoretic.   Psychiatric: Her behavior is normal.   Nursing note and vitals reviewed.        Results for orders placed or performed in visit on 06/03/22   POCT COVID-19 Rapid " Screening   Result Value Ref Range    POC Rapid COVID Negative Negative     Acceptable Yes      *Note: Due to a large number of results and/or encounters for the requested time period, some results have not been displayed. A complete set of results can be found in Results Review.       Assessment:       1. Upper respiratory infection with cough and congestion    2. Body aches          Plan:         Upper respiratory infection with cough and congestion    Body aches  -     POCT COVID-19 Rapid Screening

## 2022-06-03 NOTE — LETTER
[address] ? [place], [address] ? Phone [phone] ? Fax [phone]           Return to Work/School    Patient: Anitra Cahney  YOB: 1980   Date: 06/03/2022      To Whom It May Concern:     Anitra Chaney was in contact with/seen in my office on 06/03/2022.     Anitra Chaney has met the criteria for COVID-19 testing and has a NEGATIVE result. The employee can return to work 6/4/2022 as long as there is no fever x 24 hours without the use of fever reducing medications (Tylenol, Motrin, etc).     If you have any questions or concerns, or if I can be of further assistance, please do not hesitate to contact me.     Sincerely,          Lora Alves, NP

## 2022-06-06 ENCOUNTER — PATIENT MESSAGE (OUTPATIENT)
Dept: ORTHOPEDICS | Facility: CLINIC | Age: 42
End: 2022-06-06
Payer: COMMERCIAL

## 2022-06-06 DIAGNOSIS — M25.572 CHRONIC PAIN OF LEFT ANKLE: ICD-10-CM

## 2022-06-06 DIAGNOSIS — Z98.1 STATUS POST ANKLE ARTHRODESIS: ICD-10-CM

## 2022-06-06 DIAGNOSIS — G89.29 CHRONIC PAIN OF LEFT ANKLE: ICD-10-CM

## 2022-06-06 RX ORDER — OXYCODONE AND ACETAMINOPHEN 10; 325 MG/1; MG/1
1 TABLET ORAL EVERY 4 HOURS PRN
Qty: 42 TABLET | Refills: 0 | Status: SHIPPED | OUTPATIENT
Start: 2022-06-06 | End: 2022-06-10 | Stop reason: SDUPTHER

## 2022-06-10 DIAGNOSIS — G89.29 CHRONIC PAIN OF LEFT ANKLE: ICD-10-CM

## 2022-06-10 DIAGNOSIS — Z98.1 STATUS POST ANKLE ARTHRODESIS: ICD-10-CM

## 2022-06-10 DIAGNOSIS — M25.572 CHRONIC PAIN OF LEFT ANKLE: ICD-10-CM

## 2022-06-12 RX ORDER — LORAZEPAM 0.5 MG/1
TABLET ORAL
Qty: 120 TABLET | Refills: 2 | Status: SHIPPED | OUTPATIENT
Start: 2022-06-12 | End: 2022-10-21

## 2022-06-13 RX ORDER — OXYCODONE AND ACETAMINOPHEN 10; 325 MG/1; MG/1
1 TABLET ORAL EVERY 4 HOURS PRN
Qty: 42 TABLET | Refills: 0 | Status: SHIPPED | OUTPATIENT
Start: 2022-06-13 | End: 2022-06-17 | Stop reason: SDUPTHER

## 2022-06-17 DIAGNOSIS — G89.29 CHRONIC PAIN OF LEFT ANKLE: ICD-10-CM

## 2022-06-17 DIAGNOSIS — M25.572 CHRONIC PAIN OF LEFT ANKLE: ICD-10-CM

## 2022-06-17 DIAGNOSIS — Z98.1 STATUS POST ANKLE ARTHRODESIS: ICD-10-CM

## 2022-06-20 RX ORDER — OXYCODONE AND ACETAMINOPHEN 10; 325 MG/1; MG/1
1 TABLET ORAL EVERY 4 HOURS PRN
Qty: 42 TABLET | Refills: 0 | Status: SHIPPED | OUTPATIENT
Start: 2022-06-20 | End: 2022-06-25 | Stop reason: SDUPTHER

## 2022-06-25 DIAGNOSIS — M25.572 CHRONIC PAIN OF LEFT ANKLE: ICD-10-CM

## 2022-06-25 DIAGNOSIS — G89.29 CHRONIC PAIN OF LEFT ANKLE: ICD-10-CM

## 2022-06-25 DIAGNOSIS — Z98.1 STATUS POST ANKLE ARTHRODESIS: ICD-10-CM

## 2022-06-27 ENCOUNTER — OFFICE VISIT (OUTPATIENT)
Dept: ORTHOPEDICS | Facility: CLINIC | Age: 42
End: 2022-06-27
Payer: COMMERCIAL

## 2022-06-27 VITALS — WEIGHT: 259.94 LBS | HEIGHT: 69 IN | BODY MASS INDEX: 38.5 KG/M2

## 2022-06-27 DIAGNOSIS — M25.572 CHRONIC PAIN OF LEFT ANKLE: ICD-10-CM

## 2022-06-27 DIAGNOSIS — Z98.1 STATUS POST ANKLE ARTHRODESIS: Primary | ICD-10-CM

## 2022-06-27 DIAGNOSIS — G89.29 CHRONIC PAIN OF LEFT ANKLE: ICD-10-CM

## 2022-06-27 DIAGNOSIS — Q66.70 CONGENITAL PES CAVUS: ICD-10-CM

## 2022-06-27 PROCEDURE — 99213 PR OFFICE/OUTPT VISIT, EST, LEVL III, 20-29 MIN: ICD-10-PCS | Mod: S$GLB,,, | Performed by: ORTHOPAEDIC SURGERY

## 2022-06-27 PROCEDURE — 3008F PR BODY MASS INDEX (BMI) DOCUMENTED: ICD-10-PCS | Mod: CPTII,S$GLB,, | Performed by: ORTHOPAEDIC SURGERY

## 2022-06-27 PROCEDURE — 1159F PR MEDICATION LIST DOCUMENTED IN MEDICAL RECORD: ICD-10-PCS | Mod: CPTII,S$GLB,, | Performed by: ORTHOPAEDIC SURGERY

## 2022-06-27 PROCEDURE — 99999 PR PBB SHADOW E&M-EST. PATIENT-LVL III: ICD-10-PCS | Mod: PBBFAC,,, | Performed by: ORTHOPAEDIC SURGERY

## 2022-06-27 PROCEDURE — 99213 OFFICE O/P EST LOW 20 MIN: CPT | Mod: S$GLB,,, | Performed by: ORTHOPAEDIC SURGERY

## 2022-06-27 PROCEDURE — 3008F BODY MASS INDEX DOCD: CPT | Mod: CPTII,S$GLB,, | Performed by: ORTHOPAEDIC SURGERY

## 2022-06-27 PROCEDURE — 99999 PR PBB SHADOW E&M-EST. PATIENT-LVL III: CPT | Mod: PBBFAC,,, | Performed by: ORTHOPAEDIC SURGERY

## 2022-06-27 PROCEDURE — 1159F MED LIST DOCD IN RCRD: CPT | Mod: CPTII,S$GLB,, | Performed by: ORTHOPAEDIC SURGERY

## 2022-06-27 RX ORDER — OXYCODONE AND ACETAMINOPHEN 10; 325 MG/1; MG/1
1 TABLET ORAL EVERY 4 HOURS PRN
Qty: 42 TABLET | Refills: 0 | Status: SHIPPED | OUTPATIENT
Start: 2022-06-27 | End: 2022-06-30 | Stop reason: SDUPTHER

## 2022-06-27 NOTE — PROGRESS NOTES
Anitra Chaney  Returns today for follow-up.  This is a 41-year-old female with congenital cavovarus feet who has undergone multiple procedures on her left ankle and hindfoot including previous calcaneal osteotomy and most recently a left ankle arthrodesis in March 2018. She has chronic pain and has been taking narcotic pain medications to manage her pain.  It has been about 10 months since I have seen her.  She comes in today because she developed some acute swelling in the anterior aspect of her ankle few days ago which is now resolved.  She continues to have daily pain.  She has been offered bracing and orthotics in the past.    Examination:  She walks in today with an antalgic gait.  She has continued significant cavovarus alignment of her feet with severe lateral overload especially on the left.  She continues to have ankle motion despite previous fusion boot without any significant pain.  She also has decent subtalar motion with minimal pain.  She has significant forefoot varus and tenderness with increased callus formation under the 5th metatarsal base.      Impression:  1. Status post ankle arthrodesis     2. Chronic pain of left ankle     3. Congenital pes cavus       Recommendation:  The etiology of her recent flare-up of swelling is unclear to me but I am going to dispense a fracture boot that she can use as needed.  I prescribed her pain medication today.    Follow-up as needed

## 2022-06-29 ENCOUNTER — PATIENT MESSAGE (OUTPATIENT)
Dept: ORTHOPEDICS | Facility: CLINIC | Age: 42
End: 2022-06-29
Payer: COMMERCIAL

## 2022-06-30 DIAGNOSIS — M25.572 CHRONIC PAIN OF LEFT ANKLE: ICD-10-CM

## 2022-06-30 DIAGNOSIS — G89.29 CHRONIC PAIN OF LEFT ANKLE: ICD-10-CM

## 2022-06-30 DIAGNOSIS — Z98.1 STATUS POST ANKLE ARTHRODESIS: ICD-10-CM

## 2022-07-01 DIAGNOSIS — M25.572 CHRONIC PAIN OF LEFT ANKLE: Primary | ICD-10-CM

## 2022-07-01 DIAGNOSIS — M25.572 ACUTE LEFT ANKLE PAIN: Primary | ICD-10-CM

## 2022-07-01 DIAGNOSIS — G89.29 CHRONIC PAIN OF LEFT ANKLE: Primary | ICD-10-CM

## 2022-07-01 DIAGNOSIS — M25.572 ACUTE LEFT ANKLE PAIN: ICD-10-CM

## 2022-07-01 RX ORDER — METHYLPREDNISOLONE 4 MG/1
TABLET ORAL
Qty: 21 EACH | Refills: 0 | Status: SHIPPED | OUTPATIENT
Start: 2022-07-01 | End: 2023-01-13

## 2022-07-01 RX ORDER — OXYCODONE AND ACETAMINOPHEN 10; 325 MG/1; MG/1
1 TABLET ORAL EVERY 4 HOURS PRN
Qty: 42 TABLET | Refills: 0 | Status: SHIPPED | OUTPATIENT
Start: 2022-07-01 | End: 2022-07-07 | Stop reason: SDUPTHER

## 2022-07-03 ENCOUNTER — OFFICE VISIT (OUTPATIENT)
Dept: URGENT CARE | Facility: CLINIC | Age: 42
End: 2022-07-03
Payer: COMMERCIAL

## 2022-07-03 VITALS
DIASTOLIC BLOOD PRESSURE: 91 MMHG | BODY MASS INDEX: 38.5 KG/M2 | SYSTOLIC BLOOD PRESSURE: 129 MMHG | WEIGHT: 259.94 LBS | HEART RATE: 98 BPM | HEIGHT: 69 IN | RESPIRATION RATE: 16 BRPM | OXYGEN SATURATION: 98 % | TEMPERATURE: 99 F

## 2022-07-03 DIAGNOSIS — R10.9 ABDOMINAL CRAMPS: ICD-10-CM

## 2022-07-03 DIAGNOSIS — R11.0 NAUSEA: Primary | ICD-10-CM

## 2022-07-03 PROCEDURE — 1160F PR REVIEW ALL MEDS BY PRESCRIBER/CLIN PHARMACIST DOCUMENTED: ICD-10-PCS | Mod: CPTII,S$GLB,, | Performed by: NURSE PRACTITIONER

## 2022-07-03 PROCEDURE — 3080F DIAST BP >= 90 MM HG: CPT | Mod: CPTII,S$GLB,, | Performed by: NURSE PRACTITIONER

## 2022-07-03 PROCEDURE — 99213 OFFICE O/P EST LOW 20 MIN: CPT | Mod: S$GLB,,, | Performed by: NURSE PRACTITIONER

## 2022-07-03 PROCEDURE — 3080F PR MOST RECENT DIASTOLIC BLOOD PRESSURE >= 90 MM HG: ICD-10-PCS | Mod: CPTII,S$GLB,, | Performed by: NURSE PRACTITIONER

## 2022-07-03 PROCEDURE — 1159F MED LIST DOCD IN RCRD: CPT | Mod: CPTII,S$GLB,, | Performed by: NURSE PRACTITIONER

## 2022-07-03 PROCEDURE — 99213 PR OFFICE/OUTPT VISIT, EST, LEVL III, 20-29 MIN: ICD-10-PCS | Mod: S$GLB,,, | Performed by: NURSE PRACTITIONER

## 2022-07-03 PROCEDURE — 3074F SYST BP LT 130 MM HG: CPT | Mod: CPTII,S$GLB,, | Performed by: NURSE PRACTITIONER

## 2022-07-03 PROCEDURE — 1159F PR MEDICATION LIST DOCUMENTED IN MEDICAL RECORD: ICD-10-PCS | Mod: CPTII,S$GLB,, | Performed by: NURSE PRACTITIONER

## 2022-07-03 PROCEDURE — 3008F BODY MASS INDEX DOCD: CPT | Mod: CPTII,S$GLB,, | Performed by: NURSE PRACTITIONER

## 2022-07-03 PROCEDURE — 3008F PR BODY MASS INDEX (BMI) DOCUMENTED: ICD-10-PCS | Mod: CPTII,S$GLB,, | Performed by: NURSE PRACTITIONER

## 2022-07-03 PROCEDURE — 1160F RVW MEDS BY RX/DR IN RCRD: CPT | Mod: CPTII,S$GLB,, | Performed by: NURSE PRACTITIONER

## 2022-07-03 PROCEDURE — 3074F PR MOST RECENT SYSTOLIC BLOOD PRESSURE < 130 MM HG: ICD-10-PCS | Mod: CPTII,S$GLB,, | Performed by: NURSE PRACTITIONER

## 2022-07-03 RX ORDER — ONDANSETRON 8 MG/1
8 TABLET, ORALLY DISINTEGRATING ORAL
Status: COMPLETED | OUTPATIENT
Start: 2022-07-03 | End: 2022-07-03

## 2022-07-03 RX ORDER — ONDANSETRON 4 MG/1
4 TABLET, FILM COATED ORAL EVERY 8 HOURS PRN
Qty: 30 TABLET | Refills: 0 | Status: SHIPPED | OUTPATIENT
Start: 2022-07-03 | End: 2023-01-13

## 2022-07-03 RX ORDER — DICYCLOMINE HYDROCHLORIDE 10 MG/1
10 CAPSULE ORAL 4 TIMES DAILY
Qty: 16 CAPSULE | Refills: 0 | Status: SHIPPED | OUTPATIENT
Start: 2022-07-03 | End: 2022-07-07

## 2022-07-03 RX ORDER — DICYCLOMINE HYDROCHLORIDE 20 MG/1
20 TABLET ORAL
Status: COMPLETED | OUTPATIENT
Start: 2022-07-03 | End: 2022-07-03

## 2022-07-03 RX ADMIN — ONDANSETRON 8 MG: 8 TABLET, ORALLY DISINTEGRATING ORAL at 12:07

## 2022-07-03 RX ADMIN — DICYCLOMINE HYDROCHLORIDE 20 MG: 20 TABLET ORAL at 12:07

## 2022-07-03 NOTE — LETTER
July 3, 2022      Urgent Care - [address] [address]  Phone: [phone]  Fax: [phone]       Patient: Anitra Chaney   YOB: 1980  Date of Visit: 07/03/2022    To Whom It May Concern:    Vickie Chaney  was at [facility] on 07/03/2022. The patient may return to work/school on 7/4/2022 with no restrictions. If you have any questions or concerns, or if I can be of further assistance, please do not hesitate to contact me.    Sincerely,    Eliz Saleh, NP

## 2022-07-03 NOTE — PROGRESS NOTES
"Subjective:       Patient ID: Anitra Chaney is a 42 y.o. female.    Vitals:  height is 5' 9" (1.753 m) and weight is 117.9 kg (259 lb 14.8 oz). Her oral temperature is 98.7 °F (37.1 °C). Her blood pressure is 129/91 (abnormal) and her pulse is 98. Her respiration is 16 and oxygen saturation is 98%.     Chief Complaint: Abdominal Pain    Pt presents with stomach upset beginning last night around 10 pm. States stomach only aches. No diarrhea, no emesis, no cramping. Pt also reports nausea. Pt has treated with pepto bismol with no relief.  Provider note begins below  Patient has an extensive medical history.  He is telling lies stress in her life.  Thinks her stomach ache is related to stress.  Denies any vomiting or diarrhea.  Fevers.  No ill contacts.    Abdominal Pain  This is a new problem. The current episode started yesterday. The onset quality is sudden. The problem occurs constantly. The most recent episode lasted 13 hours. The problem has been unchanged. The pain is at a severity of 5/10. The pain is moderate. The quality of the pain is aching. The abdominal pain does not radiate. Associated symptoms include nausea. Pertinent negatives include no constipation, diarrhea, fever, hematochezia or vomiting. Nothing aggravates the pain. The pain is relieved by nothing. She has tried antacids for the symptoms. The treatment provided no relief.       Constitution: Negative for sweating, fatigue and fever.   Cardiovascular: Negative for chest pain and sob on exertion.   Respiratory: Negative for cough and shortness of breath.    Gastrointestinal: Positive for abdominal pain and nausea. Negative for vomiting, constipation, diarrhea and bright red blood in stool.       Objective:      Physical Exam   Constitutional: She is oriented to person, place, and time.   HENT:   Head: Normocephalic and atraumatic.   Cardiovascular: Normal rate.   Pulmonary/Chest: Effort normal and breath sounds normal. No respiratory " distress.   Abdominal: Normal appearance. She exhibits no distension and no mass. Soft. There is no abdominal tenderness. No hernia.   Neurological: She is alert and oriented to person, place, and time.   Skin: Skin is warm and dry.   Psychiatric: Her behavior is normal. Mood normal.         Assessment:       1. Nausea    2. Abdominal cramps          Plan:       Patient Instructions   Zofran for nausea  Bentyl for cramps  Follow-up with PCP  ED precautions  Thayer diet            Nausea  -     ondansetron disintegrating tablet 8 mg  -     ondansetron (ZOFRAN) 4 MG tablet; Take 1 tablet (4 mg total) by mouth every 8 (eight) hours as needed for Nausea.  Dispense: 30 tablet; Refill: 0    Abdominal cramps  -     dicyclomine tablet 20 mg  -     dicyclomine (BENTYL) 10 MG capsule; Take 1 capsule (10 mg total) by mouth 4 (four) times daily. for 4 days  Dispense: 16 capsule; Refill: 0

## 2022-07-07 DIAGNOSIS — M25.572 CHRONIC PAIN OF LEFT ANKLE: ICD-10-CM

## 2022-07-07 DIAGNOSIS — Z98.1 STATUS POST ANKLE ARTHRODESIS: ICD-10-CM

## 2022-07-07 DIAGNOSIS — G89.29 CHRONIC PAIN OF LEFT ANKLE: ICD-10-CM

## 2022-07-08 ENCOUNTER — PATIENT MESSAGE (OUTPATIENT)
Dept: ORTHOPEDICS | Facility: CLINIC | Age: 42
End: 2022-07-08
Payer: COMMERCIAL

## 2022-07-08 RX ORDER — OXYCODONE AND ACETAMINOPHEN 10; 325 MG/1; MG/1
1 TABLET ORAL EVERY 4 HOURS PRN
Qty: 42 TABLET | Refills: 0 | Status: SHIPPED | OUTPATIENT
Start: 2022-07-08 | End: 2022-07-12 | Stop reason: SDUPTHER

## 2022-07-12 DIAGNOSIS — M25.572 CHRONIC PAIN OF LEFT ANKLE: ICD-10-CM

## 2022-07-12 DIAGNOSIS — G89.29 CHRONIC PAIN OF LEFT ANKLE: ICD-10-CM

## 2022-07-12 DIAGNOSIS — Z98.1 STATUS POST ANKLE ARTHRODESIS: ICD-10-CM

## 2022-07-13 RX ORDER — OXYCODONE AND ACETAMINOPHEN 10; 325 MG/1; MG/1
1 TABLET ORAL EVERY 4 HOURS PRN
Qty: 42 TABLET | Refills: 0 | Status: SHIPPED | OUTPATIENT
Start: 2022-07-13 | End: 2022-07-17 | Stop reason: SDUPTHER

## 2022-07-17 DIAGNOSIS — G89.29 CHRONIC PAIN OF LEFT ANKLE: ICD-10-CM

## 2022-07-17 DIAGNOSIS — M25.572 CHRONIC PAIN OF LEFT ANKLE: ICD-10-CM

## 2022-07-17 DIAGNOSIS — Z98.1 STATUS POST ANKLE ARTHRODESIS: ICD-10-CM

## 2022-07-18 RX ORDER — OXYCODONE AND ACETAMINOPHEN 10; 325 MG/1; MG/1
1 TABLET ORAL EVERY 4 HOURS PRN
Qty: 42 TABLET | Refills: 0 | Status: SHIPPED | OUTPATIENT
Start: 2022-07-18 | End: 2022-07-22 | Stop reason: SDUPTHER

## 2022-07-22 DIAGNOSIS — M25.572 CHRONIC PAIN OF LEFT ANKLE: ICD-10-CM

## 2022-07-22 DIAGNOSIS — Z98.1 STATUS POST ANKLE ARTHRODESIS: ICD-10-CM

## 2022-07-22 DIAGNOSIS — G89.29 CHRONIC PAIN OF LEFT ANKLE: ICD-10-CM

## 2022-07-22 RX ORDER — OXYCODONE AND ACETAMINOPHEN 10; 325 MG/1; MG/1
1 TABLET ORAL EVERY 4 HOURS PRN
Qty: 42 TABLET | Refills: 0 | Status: SHIPPED | OUTPATIENT
Start: 2022-07-22 | End: 2022-07-26 | Stop reason: SDUPTHER

## 2022-07-25 RX ORDER — FLUCONAZOLE 150 MG/1
150 TABLET ORAL
Qty: 2 TABLET | Refills: 0 | Status: SHIPPED | OUTPATIENT
Start: 2022-07-25 | End: 2022-08-12 | Stop reason: SDUPTHER

## 2022-07-26 DIAGNOSIS — G89.29 CHRONIC PAIN OF LEFT ANKLE: ICD-10-CM

## 2022-07-26 DIAGNOSIS — Z98.1 STATUS POST ANKLE ARTHRODESIS: ICD-10-CM

## 2022-07-26 DIAGNOSIS — M25.572 CHRONIC PAIN OF LEFT ANKLE: ICD-10-CM

## 2022-07-27 RX ORDER — OXYCODONE AND ACETAMINOPHEN 10; 325 MG/1; MG/1
1 TABLET ORAL EVERY 4 HOURS PRN
Qty: 42 TABLET | Refills: 0 | Status: SHIPPED | OUTPATIENT
Start: 2022-07-27 | End: 2022-07-31 | Stop reason: SDUPTHER

## 2022-07-31 DIAGNOSIS — G89.29 CHRONIC PAIN OF LEFT ANKLE: ICD-10-CM

## 2022-07-31 DIAGNOSIS — Z98.1 STATUS POST ANKLE ARTHRODESIS: ICD-10-CM

## 2022-07-31 DIAGNOSIS — M25.572 CHRONIC PAIN OF LEFT ANKLE: ICD-10-CM

## 2022-08-01 RX ORDER — OXYCODONE AND ACETAMINOPHEN 10; 325 MG/1; MG/1
1 TABLET ORAL EVERY 4 HOURS PRN
Qty: 42 TABLET | Refills: 0 | Status: SHIPPED | OUTPATIENT
Start: 2022-08-01 | End: 2022-08-03 | Stop reason: SDUPTHER

## 2022-08-02 ENCOUNTER — PATIENT MESSAGE (OUTPATIENT)
Dept: INTERNAL MEDICINE | Facility: CLINIC | Age: 42
End: 2022-08-02
Payer: COMMERCIAL

## 2022-08-02 NOTE — LETTER
August 3, 2022      [facility]  [address]  [facility] [address]  Phone: [phone]  Fax: [phone]       Patient: Anitra Chaney   YOB: 1980  Date of Visit: 08/03/2022    To Whom It May Concern:    Vickie Chaney  was at [facility] on 08/03/2022. The patient may return to work/school on 8/3/22 with no restrictions. If you have any questions or concerns, or if I can be of further assistance, please do not hesitate to contact me.    Sincerely,    Tien Watts MD

## 2022-08-03 ENCOUNTER — PATIENT MESSAGE (OUTPATIENT)
Dept: INTERNAL MEDICINE | Facility: CLINIC | Age: 42
End: 2022-08-03

## 2022-08-03 ENCOUNTER — OFFICE VISIT (OUTPATIENT)
Dept: INTERNAL MEDICINE | Facility: CLINIC | Age: 42
End: 2022-08-03
Attending: FAMILY MEDICINE
Payer: COMMERCIAL

## 2022-08-03 VITALS
HEART RATE: 71 BPM | SYSTOLIC BLOOD PRESSURE: 128 MMHG | OXYGEN SATURATION: 98 % | DIASTOLIC BLOOD PRESSURE: 70 MMHG | BODY MASS INDEX: 39.77 KG/M2 | WEIGHT: 268.5 LBS | HEIGHT: 69 IN

## 2022-08-03 DIAGNOSIS — I10 PRIMARY HYPERTENSION: ICD-10-CM

## 2022-08-03 DIAGNOSIS — G89.29 CHRONIC PAIN OF LEFT ANKLE: ICD-10-CM

## 2022-08-03 DIAGNOSIS — E11.9 TYPE 2 DIABETES MELLITUS WITHOUT COMPLICATION, WITHOUT LONG-TERM CURRENT USE OF INSULIN: ICD-10-CM

## 2022-08-03 DIAGNOSIS — Z00.00 PREVENTATIVE HEALTH CARE: ICD-10-CM

## 2022-08-03 DIAGNOSIS — Z98.1 STATUS POST ANKLE ARTHRODESIS: ICD-10-CM

## 2022-08-03 DIAGNOSIS — F41.1 GAD (GENERALIZED ANXIETY DISORDER): ICD-10-CM

## 2022-08-03 DIAGNOSIS — M25.572 CHRONIC PAIN OF LEFT ANKLE: ICD-10-CM

## 2022-08-03 DIAGNOSIS — R30.0 DYSURIA: Primary | ICD-10-CM

## 2022-08-03 LAB
BILIRUB SERPL-MCNC: NORMAL MG/DL
BLOOD, POC UA: NORMAL
GLUCOSE UR QL STRIP: NORMAL
KETONES UR QL STRIP: NORMAL
LEUKOCYTE ESTERASE URINE, POC: NORMAL
NITRITE, POC UA: NORMAL
PH, POC UA: 5
PROTEIN, POC: NORMAL
SPECIFIC GRAVITY, POC UA: 1.02
UROBILINOGEN, POC UA: NORMAL

## 2022-08-03 PROCEDURE — 1160F PR REVIEW ALL MEDS BY PRESCRIBER/CLIN PHARMACIST DOCUMENTED: ICD-10-PCS | Mod: CPTII,S$GLB,, | Performed by: FAMILY MEDICINE

## 2022-08-03 PROCEDURE — 99999 PR PBB SHADOW E&M-EST. PATIENT-LVL IV: ICD-10-PCS | Mod: PBBFAC,,, | Performed by: FAMILY MEDICINE

## 2022-08-03 PROCEDURE — 99214 PR OFFICE/OUTPT VISIT, EST, LEVL IV, 30-39 MIN: ICD-10-PCS | Mod: S$GLB,,, | Performed by: FAMILY MEDICINE

## 2022-08-03 PROCEDURE — 3078F PR MOST RECENT DIASTOLIC BLOOD PRESSURE < 80 MM HG: ICD-10-PCS | Mod: CPTII,S$GLB,, | Performed by: FAMILY MEDICINE

## 2022-08-03 PROCEDURE — 99214 OFFICE O/P EST MOD 30 MIN: CPT | Mod: S$GLB,,, | Performed by: FAMILY MEDICINE

## 2022-08-03 PROCEDURE — 1159F MED LIST DOCD IN RCRD: CPT | Mod: CPTII,S$GLB,, | Performed by: FAMILY MEDICINE

## 2022-08-03 PROCEDURE — 1159F PR MEDICATION LIST DOCUMENTED IN MEDICAL RECORD: ICD-10-PCS | Mod: CPTII,S$GLB,, | Performed by: FAMILY MEDICINE

## 2022-08-03 PROCEDURE — 3008F PR BODY MASS INDEX (BMI) DOCUMENTED: ICD-10-PCS | Mod: CPTII,S$GLB,, | Performed by: FAMILY MEDICINE

## 2022-08-03 PROCEDURE — 3074F PR MOST RECENT SYSTOLIC BLOOD PRESSURE < 130 MM HG: ICD-10-PCS | Mod: CPTII,S$GLB,, | Performed by: FAMILY MEDICINE

## 2022-08-03 PROCEDURE — 3074F SYST BP LT 130 MM HG: CPT | Mod: CPTII,S$GLB,, | Performed by: FAMILY MEDICINE

## 2022-08-03 PROCEDURE — 1160F RVW MEDS BY RX/DR IN RCRD: CPT | Mod: CPTII,S$GLB,, | Performed by: FAMILY MEDICINE

## 2022-08-03 PROCEDURE — 87086 URINE CULTURE/COLONY COUNT: CPT | Performed by: FAMILY MEDICINE

## 2022-08-03 PROCEDURE — 3008F BODY MASS INDEX DOCD: CPT | Mod: CPTII,S$GLB,, | Performed by: FAMILY MEDICINE

## 2022-08-03 PROCEDURE — 99999 PR PBB SHADOW E&M-EST. PATIENT-LVL IV: CPT | Mod: PBBFAC,,, | Performed by: FAMILY MEDICINE

## 2022-08-03 PROCEDURE — 81003 POCT URINALYSIS: ICD-10-PCS | Mod: QW,S$GLB,, | Performed by: FAMILY MEDICINE

## 2022-08-03 PROCEDURE — 3078F DIAST BP <80 MM HG: CPT | Mod: CPTII,S$GLB,, | Performed by: FAMILY MEDICINE

## 2022-08-03 PROCEDURE — 81003 URINALYSIS AUTO W/O SCOPE: CPT | Mod: QW,S$GLB,, | Performed by: FAMILY MEDICINE

## 2022-08-03 RX ORDER — OXYCODONE AND ACETAMINOPHEN 10; 325 MG/1; MG/1
1 TABLET ORAL EVERY 8 HOURS PRN
Qty: 20 TABLET | Refills: 0 | Status: SHIPPED | OUTPATIENT
Start: 2022-08-03 | End: 2022-08-09 | Stop reason: SDUPTHER

## 2022-08-03 RX ORDER — ALPRAZOLAM 1 MG/1
1 TABLET ORAL 2 TIMES DAILY PRN
Qty: 20 TABLET | Refills: 0 | Status: SHIPPED | OUTPATIENT
Start: 2022-08-03 | End: 2022-10-21

## 2022-08-03 RX ORDER — CEPHALEXIN 500 MG/1
500 CAPSULE ORAL EVERY 6 HOURS
Qty: 28 CAPSULE | Refills: 0 | Status: SHIPPED | OUTPATIENT
Start: 2022-08-03 | End: 2022-10-10 | Stop reason: SDUPTHER

## 2022-08-03 NOTE — LETTER
August 3, 2022      [facility]  [address]  [facility] [address]  Phone: [phone]  Fax: [phone]       Patient: Anitra Chaney   YOB: 1980  Date of Visit: 08/03/2022    To Whom It May Concern:    Vickie Chaney  was at [facility] on 08/03/2022. The patient may return to work on 8/8/2022 with no restrictions. If you have any questions or concerns, or if I can be of further assistance, please do not hesitate to contact me.    Sincerely,    Tien Watts MD

## 2022-08-03 NOTE — PROGRESS NOTES
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  She is having urinary incont. and sweating for several days    She is c/o fibromyalgia due to the fact that she has had bilateral upper and lower extremity Arthralgias and myalgias for several months.    She has a history of mood disorder.  She sees psychiatry.     REVIEW OF SYSTEMS:  GENERAL: No fever, chills, fatigability or weight loss.  SKIN: No rashes, itching or changes in color or texture of skin.  HEAD: No headaches or recent head trauma.  EYES: Visual acuity fine. No photophobia, ocular pain or diplopia.  EARS: Denies ear pain, discharge or vertigo.  NOSE: No loss of smell, no epistaxis or postnasal drip.  MOUTH & THROAT: No hoarseness or change in voice. No excessive gum bleeding.  NODES: Denies swollen glands.  CHEST: Denies OLIVARES, cyanosis, wheezing, cough and sputum production.  CARDIOVASCULAR: Denies PND, orthopnea or reduced exercise tolerance.  ABDOMEN: Appetite fine. No weight loss. Denies diarrhea, abdominal pain, hematemesis or blood in stool.  URINARY: No flank pain, dysuria or hematuria.  PERIPHERAL VASCULAR: No claudication or cyanosis.  MUSCULOSKELETAL: No joint stiffness or swelling.  Except as mentioned above  NEUROLOGIC: No history of seizures.  She was admitted for a TIA.    SOCIAL HISTORY: The patient does not smoke.  The patient consumes alcohol socially.  The patient is .  She works as a dispatcher for [employer].      PHYSICAL EXAMINATION:   Blood pressure 128/70, pulse 71, height 5' 9" (1.753 m), weight 121.8 kg (268 lb 8.3 oz), last menstrual period 08/09/2021, SpO2 98 %.  APPEARANCE: Well nourished, well developed, in no acute distress.    HEAD: Normocephalic, atraumatic.  EYES: PERRL. EOMI.  Conjunctivae without injection and  anicteric  NOSE: Mucosa pink. Airway " clear.  MOUTH & THROAT: No tonsillar enlargement. No pharyngeal erythema or exudate. No stridor.  NECK: Supple.   NODES: No cervical, axillary or inguinal lymph node enlargement.  CHEST: Lungs clear to auscultation.  No retractions are noted.  No rales or rhonchi are present.  CARDIOVASCULAR: Normal S1, S2. No rubs, murmurs or gallops.  ABDOMEN: Bowel sounds normal. Not distended. Soft. No tenderness or masses.  No ascites is noted.  MUSCULOSKELETAL:  There is no clubbing, cyanosis, or edema of the extremities x4.  There is full range of motion of the lumbar spine.  There is full range of motion of the extremities x4.  There is no deformity noted.    NEUROLOGIC:       Normal speech development.      Hearing normal.      Normal gait.      Motor and sensory exams grossly normal.  PSYCHIATRIC: Patient is alert and oriented x3.  Thought processes are all normal.  There is no homicidality.  There is no suicidality.  There is no evidence of psychosis.    LABORATORY/RADIOLOGY:   Chart reviewed.     ASSESSMENT:   Possible UTI  c/o fibromyalgia  Arthralgias and myalgias for several months involving both the upper and lower extremities  Obesity with a BMI of 37, status post gastric sleeve with substantial weight loss  SARAH / snoring improved with weight loss  HTN improved with weight loss    PLAN:  UA and Cx  Keflex  cymbalta  Rheum   Lamictal  Patient has been seen by sleep medicine.  Continue current medications     Return to clinic in 6 months

## 2022-08-05 LAB — BACTERIA UR CULT: NO GROWTH

## 2022-08-09 ENCOUNTER — PATIENT MESSAGE (OUTPATIENT)
Dept: INTERNAL MEDICINE | Facility: CLINIC | Age: 42
End: 2022-08-09
Payer: COMMERCIAL

## 2022-08-09 ENCOUNTER — PATIENT MESSAGE (OUTPATIENT)
Dept: ORTHOPEDICS | Facility: CLINIC | Age: 42
End: 2022-08-09
Payer: COMMERCIAL

## 2022-08-09 DIAGNOSIS — G89.29 CHRONIC PAIN OF LEFT ANKLE: ICD-10-CM

## 2022-08-09 DIAGNOSIS — Z98.1 STATUS POST ANKLE ARTHRODESIS: ICD-10-CM

## 2022-08-09 DIAGNOSIS — M25.572 CHRONIC PAIN OF LEFT ANKLE: ICD-10-CM

## 2022-08-09 RX ORDER — OXYCODONE AND ACETAMINOPHEN 10; 325 MG/1; MG/1
1 TABLET ORAL EVERY 8 HOURS PRN
Qty: 20 TABLET | Refills: 0 | Status: SHIPPED | OUTPATIENT
Start: 2022-08-09 | End: 2022-08-13 | Stop reason: SDUPTHER

## 2022-08-09 RX ORDER — OXYCODONE AND ACETAMINOPHEN 10; 325 MG/1; MG/1
1 TABLET ORAL EVERY 8 HOURS PRN
Qty: 20 TABLET | Refills: 0 | Status: CANCELLED | OUTPATIENT
Start: 2022-08-09

## 2022-08-09 NOTE — TELEPHONE ENCOUNTER
No new care gaps identified.  [facility] Embedded Care Gaps. Reference number: [id number]. 8/09/2022   5:30:45 AM CDT

## 2022-08-10 NOTE — TELEPHONE ENCOUNTER
Returned Ms. James's call. Confirmed authenticity of pt's return to work letter(s) and per chart review, pt asked to get a letter for earlier clearance due to job needs and this was written.

## 2022-08-10 NOTE — TELEPHONE ENCOUNTER
----- Message from Amie Hayes sent at 8/10/2022  8:58 AM CDT -----  Regarding: Requesting a call back  Contact: : Ms. James with Pt job at [employer]  Name of Who is Calling: : Ms. James with Pt job at [employer];        What is the request in detail: Ms. James with Pt job at [employer];'s dept is calling in regards to verifying a return to work letter  the pt has given to her , Please advise she is requesting a call back in regards           Can the clinic reply by [facility]:No           What Number to Call Back if not [facility]:[phone]

## 2022-08-12 RX ORDER — FLUCONAZOLE 150 MG/1
150 TABLET ORAL
Qty: 2 TABLET | Refills: 0 | Status: SHIPPED | OUTPATIENT
Start: 2022-08-12 | End: 2022-08-29 | Stop reason: SDUPTHER

## 2022-08-13 DIAGNOSIS — M25.572 CHRONIC PAIN OF LEFT ANKLE: ICD-10-CM

## 2022-08-13 DIAGNOSIS — G89.29 CHRONIC PAIN OF LEFT ANKLE: ICD-10-CM

## 2022-08-13 DIAGNOSIS — Z98.1 STATUS POST ANKLE ARTHRODESIS: ICD-10-CM

## 2022-08-15 RX ORDER — OXYCODONE AND ACETAMINOPHEN 10; 325 MG/1; MG/1
1 TABLET ORAL EVERY 8 HOURS PRN
Qty: 20 TABLET | Refills: 0 | Status: SHIPPED | OUTPATIENT
Start: 2022-08-15 | End: 2022-08-18 | Stop reason: SDUPTHER

## 2022-08-16 DIAGNOSIS — F41.1 GAD (GENERALIZED ANXIETY DISORDER): Primary | ICD-10-CM

## 2022-08-16 RX ORDER — ALPRAZOLAM 1 MG/1
1 TABLET ORAL 2 TIMES DAILY PRN
Qty: 20 TABLET | Refills: 0 | Status: CANCELLED | OUTPATIENT
Start: 2022-08-16

## 2022-08-16 NOTE — TELEPHONE ENCOUNTER
No new care gaps identified.  [facility] system Embedded Care Gaps. Reference number: [id number]. 8/16/2022   7:41:02 AM BERRYT

## 2022-08-17 ENCOUNTER — PATIENT MESSAGE (OUTPATIENT)
Dept: HEMATOLOGY/ONCOLOGY | Facility: CLINIC | Age: 42
End: 2022-08-17
Payer: COMMERCIAL

## 2022-08-18 ENCOUNTER — TELEPHONE (OUTPATIENT)
Dept: UROLOGY | Facility: CLINIC | Age: 42
End: 2022-08-18
Payer: COMMERCIAL

## 2022-08-18 ENCOUNTER — PATIENT MESSAGE (OUTPATIENT)
Dept: UROLOGY | Facility: CLINIC | Age: 42
End: 2022-08-18
Payer: COMMERCIAL

## 2022-08-18 DIAGNOSIS — Z98.1 STATUS POST ANKLE ARTHRODESIS: ICD-10-CM

## 2022-08-18 DIAGNOSIS — G89.29 CHRONIC PAIN OF LEFT ANKLE: ICD-10-CM

## 2022-08-18 DIAGNOSIS — M25.572 CHRONIC PAIN OF LEFT ANKLE: ICD-10-CM

## 2022-08-18 NOTE — TELEPHONE ENCOUNTER
Pt last seen 11/2020. Reports recurrent UTIs/hematuria. She can see Macarena TAPIA at [facility] [place] for evaluation. Please help schedule.

## 2022-08-18 NOTE — TELEPHONE ENCOUNTER
I left a message for the pt to call back and get scheduled with WILLIE Hammer for hematuria and UTI's.

## 2022-08-19 RX ORDER — OXYCODONE AND ACETAMINOPHEN 10; 325 MG/1; MG/1
1 TABLET ORAL EVERY 4 HOURS PRN
Qty: 42 TABLET | Refills: 0 | Status: SHIPPED | OUTPATIENT
Start: 2022-08-19 | End: 2022-08-24 | Stop reason: SDUPTHER

## 2022-08-20 ENCOUNTER — PATIENT MESSAGE (OUTPATIENT)
Dept: GASTROENTEROLOGY | Facility: CLINIC | Age: 42
End: 2022-08-20
Payer: COMMERCIAL

## 2022-08-23 ENCOUNTER — PATIENT MESSAGE (OUTPATIENT)
Dept: INTERNAL MEDICINE | Facility: CLINIC | Age: 42
End: 2022-08-23
Payer: COMMERCIAL

## 2022-08-23 NOTE — TELEPHONE ENCOUNTER
Called Ms. Ayoub, advised her that the first available appointment was near [place] - states that she has a home there and here. Did look to see if there are some available appointments more local, but this was the first available - 8-24-22 at 8 AM with Dr. Victoria in [place].

## 2022-08-24 DIAGNOSIS — Z98.1 STATUS POST ANKLE ARTHRODESIS: ICD-10-CM

## 2022-08-24 DIAGNOSIS — M25.572 CHRONIC PAIN OF LEFT ANKLE: ICD-10-CM

## 2022-08-24 DIAGNOSIS — G89.29 CHRONIC PAIN OF LEFT ANKLE: ICD-10-CM

## 2022-08-24 RX ORDER — ALPRAZOLAM 1 MG/1
1 TABLET ORAL 2 TIMES DAILY PRN
Qty: 20 TABLET | Refills: 0 | Status: CANCELLED | OUTPATIENT
Start: 2022-08-24

## 2022-08-25 RX ORDER — OXYCODONE AND ACETAMINOPHEN 10; 325 MG/1; MG/1
1 TABLET ORAL EVERY 4 HOURS PRN
Qty: 42 TABLET | Refills: 0 | Status: SHIPPED | OUTPATIENT
Start: 2022-08-25 | End: 2022-08-29 | Stop reason: SDUPTHER

## 2022-08-25 NOTE — TELEPHONE ENCOUNTER
No new care gaps identified.  [facility] Embedded Care Gaps. Reference number: [id number]. 8/24/2022   7:10:56 PM CDT

## 2022-08-29 DIAGNOSIS — M25.572 CHRONIC PAIN OF LEFT ANKLE: ICD-10-CM

## 2022-08-29 DIAGNOSIS — Z98.1 STATUS POST ANKLE ARTHRODESIS: ICD-10-CM

## 2022-08-29 DIAGNOSIS — G89.29 CHRONIC PAIN OF LEFT ANKLE: ICD-10-CM

## 2022-08-29 RX ORDER — FLUCONAZOLE 150 MG/1
150 TABLET ORAL
Qty: 2 TABLET | Refills: 0 | Status: SHIPPED | OUTPATIENT
Start: 2022-08-29 | End: 2022-09-29 | Stop reason: SDUPTHER

## 2022-08-29 RX ORDER — OXYCODONE AND ACETAMINOPHEN 10; 325 MG/1; MG/1
1 TABLET ORAL EVERY 4 HOURS PRN
Qty: 42 TABLET | Refills: 0 | Status: SHIPPED | OUTPATIENT
Start: 2022-08-29 | End: 2022-09-03 | Stop reason: SDUPTHER

## 2022-08-30 ENCOUNTER — PATIENT MESSAGE (OUTPATIENT)
Dept: INTERNAL MEDICINE | Facility: CLINIC | Age: 42
End: 2022-08-30
Payer: COMMERCIAL

## 2022-09-03 DIAGNOSIS — E11.9 TYPE 2 DIABETES MELLITUS WITHOUT COMPLICATION, WITHOUT LONG-TERM CURRENT USE OF INSULIN: Primary | ICD-10-CM

## 2022-09-03 DIAGNOSIS — Z98.1 STATUS POST ANKLE ARTHRODESIS: ICD-10-CM

## 2022-09-03 DIAGNOSIS — F41.1 GAD (GENERALIZED ANXIETY DISORDER): ICD-10-CM

## 2022-09-03 DIAGNOSIS — G89.29 CHRONIC PAIN OF LEFT ANKLE: ICD-10-CM

## 2022-09-03 DIAGNOSIS — M25.572 CHRONIC PAIN OF LEFT ANKLE: ICD-10-CM

## 2022-09-03 RX ORDER — ALPRAZOLAM 1 MG/1
1 TABLET ORAL 2 TIMES DAILY PRN
Qty: 20 TABLET | Refills: 0 | Status: CANCELLED | OUTPATIENT
Start: 2022-09-03

## 2022-09-03 NOTE — TELEPHONE ENCOUNTER
No new care gaps identified.  [facility] Embedded Care Gaps. Reference number: [id number]. 9/03/2022   1:29:48 PM CDT

## 2022-09-05 RX ORDER — OXYCODONE AND ACETAMINOPHEN 10; 325 MG/1; MG/1
1 TABLET ORAL EVERY 4 HOURS PRN
Qty: 42 TABLET | Refills: 0 | Status: SHIPPED | OUTPATIENT
Start: 2022-09-05 | End: 2022-09-08 | Stop reason: SDUPTHER

## 2022-09-08 ENCOUNTER — PATIENT MESSAGE (OUTPATIENT)
Dept: ORTHOPEDICS | Facility: CLINIC | Age: 42
End: 2022-09-08
Payer: COMMERCIAL

## 2022-09-08 ENCOUNTER — TELEPHONE (OUTPATIENT)
Dept: ORTHOPEDICS | Facility: CLINIC | Age: 42
End: 2022-09-08
Payer: COMMERCIAL

## 2022-09-08 DIAGNOSIS — Z98.1 STATUS POST ANKLE ARTHRODESIS: ICD-10-CM

## 2022-09-08 DIAGNOSIS — M25.572 CHRONIC PAIN OF LEFT ANKLE: ICD-10-CM

## 2022-09-08 DIAGNOSIS — G89.29 CHRONIC PAIN OF LEFT ANKLE: ICD-10-CM

## 2022-09-09 RX ORDER — ALPRAZOLAM 1 MG/1
1 TABLET ORAL 2 TIMES DAILY PRN
Qty: 20 TABLET | Refills: 0 | Status: CANCELLED | OUTPATIENT
Start: 2022-09-09

## 2022-09-09 RX ORDER — OXYCODONE AND ACETAMINOPHEN 10; 325 MG/1; MG/1
1 TABLET ORAL EVERY 4 HOURS PRN
Qty: 42 TABLET | Refills: 0 | Status: SHIPPED | OUTPATIENT
Start: 2022-09-09 | End: 2022-09-13 | Stop reason: SDUPTHER

## 2022-09-09 NOTE — TELEPHONE ENCOUNTER
No new care gaps identified.  [facility] Embedded Care Gaps. Reference number: [id number]. 9/09/2022   6:35:05 AM BERRYT

## 2022-09-13 DIAGNOSIS — M25.572 CHRONIC PAIN OF LEFT ANKLE: ICD-10-CM

## 2022-09-13 DIAGNOSIS — Z98.1 STATUS POST ANKLE ARTHRODESIS: ICD-10-CM

## 2022-09-13 DIAGNOSIS — G89.29 CHRONIC PAIN OF LEFT ANKLE: ICD-10-CM

## 2022-09-13 RX ORDER — OXYCODONE AND ACETAMINOPHEN 10; 325 MG/1; MG/1
1 TABLET ORAL EVERY 4 HOURS PRN
Qty: 42 TABLET | Refills: 0 | Status: SHIPPED | OUTPATIENT
Start: 2022-09-13 | End: 2022-09-17 | Stop reason: SDUPTHER

## 2022-09-14 ENCOUNTER — PATIENT MESSAGE (OUTPATIENT)
Dept: INTERNAL MEDICINE | Facility: CLINIC | Age: 42
End: 2022-09-14
Payer: COMMERCIAL

## 2022-09-17 DIAGNOSIS — Z98.1 STATUS POST ANKLE ARTHRODESIS: ICD-10-CM

## 2022-09-17 DIAGNOSIS — M25.572 CHRONIC PAIN OF LEFT ANKLE: ICD-10-CM

## 2022-09-17 DIAGNOSIS — G89.29 CHRONIC PAIN OF LEFT ANKLE: ICD-10-CM

## 2022-09-19 RX ORDER — OXYCODONE AND ACETAMINOPHEN 10; 325 MG/1; MG/1
1 TABLET ORAL EVERY 4 HOURS PRN
Qty: 42 TABLET | Refills: 0 | Status: SHIPPED | OUTPATIENT
Start: 2022-09-19 | End: 2022-09-22 | Stop reason: SDUPTHER

## 2022-09-23 ENCOUNTER — PATIENT MESSAGE (OUTPATIENT)
Dept: ORTHOPEDICS | Facility: CLINIC | Age: 42
End: 2022-09-23
Payer: COMMERCIAL

## 2022-09-23 ENCOUNTER — PATIENT MESSAGE (OUTPATIENT)
Dept: INTERNAL MEDICINE | Facility: CLINIC | Age: 42
End: 2022-09-23
Payer: COMMERCIAL

## 2022-09-23 DIAGNOSIS — R29.818 NEUROGENIC CLAUDICATION: ICD-10-CM

## 2022-09-23 DIAGNOSIS — F33.1 MAJOR DEPRESSIVE DISORDER, RECURRENT EPISODE, MODERATE: ICD-10-CM

## 2022-09-23 DIAGNOSIS — F41.1 GAD (GENERALIZED ANXIETY DISORDER): Primary | ICD-10-CM

## 2022-09-23 RX ORDER — ALPRAZOLAM 1 MG/1
1 TABLET ORAL 2 TIMES DAILY PRN
Qty: 20 TABLET | Refills: 0 | Status: CANCELLED | OUTPATIENT
Start: 2022-09-23

## 2022-09-23 NOTE — TELEPHONE ENCOUNTER
No new care gaps identified.  [facility] Embedded Care Gaps. Reference number: [id number]. 9/23/2022   2:39:24 PM CDT

## 2022-09-24 NOTE — TELEPHONE ENCOUNTER
The [place] [facility] is [facility] so changed to internal referral. Please pass along contact info so she can schedule.

## 2022-09-28 DIAGNOSIS — G89.29 CHRONIC PAIN OF LEFT ANKLE: Primary | ICD-10-CM

## 2022-09-28 DIAGNOSIS — M25.572 CHRONIC PAIN OF LEFT ANKLE: Primary | ICD-10-CM

## 2022-09-29 ENCOUNTER — PATIENT MESSAGE (OUTPATIENT)
Dept: ORTHOPEDICS | Facility: CLINIC | Age: 42
End: 2022-09-29
Payer: COMMERCIAL

## 2022-09-30 ENCOUNTER — PATIENT MESSAGE (OUTPATIENT)
Dept: ORTHOPEDICS | Facility: CLINIC | Age: 42
End: 2022-09-30
Payer: COMMERCIAL

## 2022-09-30 DIAGNOSIS — Z98.1 STATUS POST ANKLE ARTHRODESIS: ICD-10-CM

## 2022-09-30 DIAGNOSIS — G89.29 CHRONIC PAIN OF LEFT ANKLE: ICD-10-CM

## 2022-09-30 DIAGNOSIS — M25.572 CHRONIC PAIN OF LEFT ANKLE: ICD-10-CM

## 2022-09-30 RX ORDER — OXYCODONE AND ACETAMINOPHEN 10; 325 MG/1; MG/1
1 TABLET ORAL EVERY 4 HOURS PRN
Qty: 42 TABLET | Refills: 0 | OUTPATIENT
Start: 2022-09-30

## 2022-09-30 RX ORDER — FLUCONAZOLE 150 MG/1
150 TABLET ORAL
Qty: 2 TABLET | Refills: 0 | Status: SHIPPED | OUTPATIENT
Start: 2022-09-30 | End: 2022-10-10 | Stop reason: SDUPTHER

## 2022-10-04 DIAGNOSIS — G89.29 CHRONIC PAIN OF LEFT ANKLE: ICD-10-CM

## 2022-10-04 DIAGNOSIS — M25.572 CHRONIC PAIN OF LEFT ANKLE: ICD-10-CM

## 2022-10-04 DIAGNOSIS — Z98.1 STATUS POST ANKLE ARTHRODESIS: ICD-10-CM

## 2022-10-04 RX ORDER — OXYCODONE AND ACETAMINOPHEN 10; 325 MG/1; MG/1
1 TABLET ORAL EVERY 4 HOURS PRN
Qty: 42 TABLET | Refills: 0 | OUTPATIENT
Start: 2022-10-04

## 2022-10-05 DIAGNOSIS — M25.572 CHRONIC PAIN OF LEFT ANKLE: ICD-10-CM

## 2022-10-05 DIAGNOSIS — Z98.1 STATUS POST ANKLE ARTHRODESIS: ICD-10-CM

## 2022-10-05 DIAGNOSIS — F41.1 GAD (GENERALIZED ANXIETY DISORDER): Primary | ICD-10-CM

## 2022-10-05 DIAGNOSIS — G89.29 CHRONIC PAIN OF LEFT ANKLE: ICD-10-CM

## 2022-10-05 RX ORDER — ALPRAZOLAM 1 MG/1
1 TABLET ORAL 2 TIMES DAILY PRN
Qty: 20 TABLET | Refills: 0 | Status: CANCELLED | OUTPATIENT
Start: 2022-10-05

## 2022-10-05 RX ORDER — OXYCODONE AND ACETAMINOPHEN 10; 325 MG/1; MG/1
1 TABLET ORAL EVERY 4 HOURS PRN
Qty: 42 TABLET | Refills: 0 | Status: SHIPPED | OUTPATIENT
Start: 2022-10-05 | End: 2022-10-08 | Stop reason: SDUPTHER

## 2022-10-05 NOTE — TELEPHONE ENCOUNTER
No new care gaps identified.  [facility] Embedded Care Gaps. Reference number: [id number]. 10/05/2022   8:50:32 AM CDT

## 2022-10-08 DIAGNOSIS — G89.29 CHRONIC PAIN OF LEFT ANKLE: ICD-10-CM

## 2022-10-08 DIAGNOSIS — Z98.1 STATUS POST ANKLE ARTHRODESIS: ICD-10-CM

## 2022-10-08 DIAGNOSIS — M25.572 CHRONIC PAIN OF LEFT ANKLE: ICD-10-CM

## 2022-10-09 ENCOUNTER — PATIENT MESSAGE (OUTPATIENT)
Dept: ORTHOPEDICS | Facility: CLINIC | Age: 42
End: 2022-10-09
Payer: COMMERCIAL

## 2022-10-10 RX ORDER — FLUCONAZOLE 150 MG/1
150 TABLET ORAL
Qty: 2 TABLET | Refills: 0 | Status: SHIPPED | OUTPATIENT
Start: 2022-10-10 | End: 2022-11-07 | Stop reason: SDUPTHER

## 2022-10-10 RX ORDER — OXYCODONE AND ACETAMINOPHEN 10; 325 MG/1; MG/1
1 TABLET ORAL EVERY 4 HOURS PRN
Qty: 42 TABLET | Refills: 0 | Status: SHIPPED | OUTPATIENT
Start: 2022-10-10 | End: 2022-10-15 | Stop reason: SDUPTHER

## 2022-10-10 RX ORDER — ALPRAZOLAM 1 MG/1
1 TABLET ORAL 2 TIMES DAILY PRN
Qty: 20 TABLET | Refills: 0 | Status: CANCELLED | OUTPATIENT
Start: 2022-10-10

## 2022-10-10 NOTE — TELEPHONE ENCOUNTER
No new care gaps identified.  [facility] Embedded Care Gaps. Reference number: [id number]. 10/10/2022   6:48:21 AM BERRYT

## 2022-10-11 RX ORDER — CEPHALEXIN 500 MG/1
500 CAPSULE ORAL EVERY 6 HOURS
Qty: 28 CAPSULE | Refills: 0 | Status: SHIPPED | OUTPATIENT
Start: 2022-10-11 | End: 2023-01-13

## 2022-10-13 DIAGNOSIS — Z98.1 STATUS POST ANKLE ARTHRODESIS: ICD-10-CM

## 2022-10-13 DIAGNOSIS — G89.29 CHRONIC PAIN OF LEFT ANKLE: ICD-10-CM

## 2022-10-13 DIAGNOSIS — M25.572 CHRONIC PAIN OF LEFT ANKLE: ICD-10-CM

## 2022-10-14 DIAGNOSIS — M25.572 CHRONIC PAIN OF LEFT ANKLE: ICD-10-CM

## 2022-10-14 DIAGNOSIS — G89.29 CHRONIC PAIN OF LEFT ANKLE: ICD-10-CM

## 2022-10-14 DIAGNOSIS — Z98.1 STATUS POST ANKLE ARTHRODESIS: ICD-10-CM

## 2022-10-14 RX ORDER — OXYCODONE AND ACETAMINOPHEN 10; 325 MG/1; MG/1
1 TABLET ORAL EVERY 4 HOURS PRN
Qty: 42 TABLET | Refills: 0 | OUTPATIENT
Start: 2022-10-14

## 2022-10-14 RX ORDER — OXYCODONE AND ACETAMINOPHEN 10; 325 MG/1; MG/1
1 TABLET ORAL EVERY 4 HOURS PRN
Qty: 42 TABLET | Refills: 0 | Status: CANCELLED | OUTPATIENT
Start: 2022-10-14

## 2022-10-15 ENCOUNTER — HOSPITAL ENCOUNTER (EMERGENCY)
Facility: OTHER | Age: 42
Discharge: HOME | End: 2022-10-15
Attending: EMERGENCY MEDICINE
Payer: COMMERCIAL

## 2022-10-15 VITALS
SYSTOLIC BLOOD PRESSURE: 166 MMHG | TEMPERATURE: 99 F | HEART RATE: 56 BPM | BODY MASS INDEX: 35.07 KG/M2 | RESPIRATION RATE: 16 BRPM | HEIGHT: 70 IN | DIASTOLIC BLOOD PRESSURE: 90 MMHG | WEIGHT: 245 LBS | OXYGEN SATURATION: 99 %

## 2022-10-15 DIAGNOSIS — Z98.1 STATUS POST ANKLE ARTHRODESIS: ICD-10-CM

## 2022-10-15 DIAGNOSIS — M79.10 MYALGIA: Primary | ICD-10-CM

## 2022-10-15 DIAGNOSIS — G89.29 CHRONIC PAIN OF LEFT ANKLE: ICD-10-CM

## 2022-10-15 DIAGNOSIS — M25.572 CHRONIC PAIN OF LEFT ANKLE: ICD-10-CM

## 2022-10-15 PROCEDURE — 99282 EMERGENCY DEPT VISIT SF MDM: CPT

## 2022-10-15 RX ORDER — GABAPENTIN 300 MG/1
600 CAPSULE ORAL
Status: DISCONTINUED | OUTPATIENT
Start: 2022-10-15 | End: 2022-10-15

## 2022-10-15 RX ORDER — DIAZEPAM 5 MG/1
5 TABLET ORAL
Status: DISCONTINUED | OUTPATIENT
Start: 2022-10-15 | End: 2022-10-15

## 2022-10-15 NOTE — ED PROVIDER NOTES
"Encounter Date: 10/15/2022       History     Chief Complaint   Patient presents with    Back Pain     Lower back pain x 2 days radiating to bilateral legs.      This is a 42-year-old female with hypertension, anxiety, s/p bariatric surgery, chronic pain, and fibromyalgia muscle pain who presents to the ED with complaints of diffuse generalized muscle pains.  Patient is describing this as a fibromyalgia flare although she is not on any maintenance medications for fibromyalgia.  She describes the pain as burning and constant.  She states it is present everywhere including her arms, elbows, back, legs.  Patient reports using Percocet at home for alleviation of symptoms, however, states she is currently out.  Denies fever, chills, shortness of breath, chest pain, N/V/D.      Review of patient's allergies indicates:  No Known Allergies  Past Medical History:   Diagnosis Date    Anxiety and depression     Encounter for blood transfusion     GERD (gastroesophageal reflux disease)     GI bleeds, multiple during admission     HTN (hypertension), benign 3/10/2014    RESOLVED    Migraines     Obesity     PFO (patent foramen ovale)     found after stroke "too small/risky to close"    Stroke 2017    Hospitalized at [facility]; given tPA     Past Surgical History:   Procedure Laterality Date    ANKLE FRACTURE SURGERY       SECTION N/A 2019    Procedure:  SECTION;  Surgeon: Gianna Flowers MD;  Location: [facility] L&D;  Service: OB/GYN;  Laterality: N/A;     SECTION WITH TUBAL LIGATION Bilateral 2020    Procedure:  SECTION, WITH TUBAL LIGATION;  Surgeon: Corina Mao MD;  Location: [facility] L&D;  Service: OB/GYN;  Laterality: Bilateral;     SECTION, CLASSIC  2005/2009/2019    x3    CHOLECYSTECTOMY  3/2002    lap maribel    COLONOSCOPY N/A 2021    Procedure: COLONOSCOPY;  Surgeon: Cleveland Sears MD;  Location: [facility];  Service: Endoscopy;  Laterality: N/A;    CYSTOSCOPY  9/10/2021 "    Procedure: CYSTOSCOPY;  Surgeon: Salomón Hernandez MD;  Location: [facility];  Service: OB/GYN;;    ESOPHAGOGASTRODUODENOSCOPY N/A 7/5/2021    Procedure: EGD (ESOPHAGOGASTRODUODENOSCOPY);  Surgeon: Cleveland Sears MD;  Location: [facility] ENDO;  Service: Endoscopy;  Laterality: N/A;    FOOT SURGERY Left     gastric sleeve  09/01/2017    HYSTERECTOMY  Sept 10,2021    INTRALUMINAL GASTROINTESTINAL TRACT IMAGING VIA CAPSULE N/A 7/13/2021    Procedure: IMAGING PROCEDURE, GI TRACT, INTRALUMINAL, VIA CAPSULE;  Surgeon: Nolan Cordero MD;  Location: [facility] ENDO;  Service: Endoscopy;  Laterality: N/A;  instructions portal -ml    LAPAROSCOPIC SALPINGECTOMY Bilateral 9/10/2021    Procedure: SALPINGECTOMY, LAPAROSCOPIC;  Surgeon: Salomón Hernandez MD;  Location: [facility];  Service: OB/GYN;  Laterality: Bilateral;    LAPAROSCOPIC TOTAL HYSTERECTOMY N/A 9/10/2021    Procedure: HYSTERECTOMY, TOTAL, LAPAROSCOPIC;  Surgeon: Salomón Hernandez MD;  Location: [facility];  Service: OB/GYN;  Laterality: N/A;    TUBAL LIGATION  7/2020     Family History   Problem Relation Age of Onset    Other Father         house fire    Breast cancer Neg Hx     Colon cancer Neg Hx     Ovarian cancer Neg Hx     Congenital heart disease Neg Hx     Pacemaker/defibrilator Neg Hx     Early death Neg Hx      Social History     Tobacco Use    Smoking status: Heavy Smoker     Packs/day: 1.50     Years: 27.00     Pack years: 40.50     Types: Cigarettes    Smokeless tobacco: Never    Tobacco comments:     cutting to 2 cigarettes per day   Substance Use Topics    Alcohol use: No     Alcohol/week: 0.0 standard drinks    Drug use: No     Review of Systems   Constitutional:  Negative for chills and fever.   HENT:  Negative for congestion, sinus pressure and sore throat.    Eyes:  Negative for pain.   Respiratory:  Negative for cough and shortness of breath.    Cardiovascular:  Negative for chest pain.   Gastrointestinal:  Negative for abdominal pain, constipation, diarrhea,  nausea and vomiting.   Genitourinary:  Negative for dysuria and hematuria.   Musculoskeletal:  Positive for arthralgias, back pain, joint swelling and myalgias.   Skin: Negative.    Neurological:  Negative for weakness, numbness and headaches.   Hematological: Negative.    Psychiatric/Behavioral:  Negative for agitation and confusion.      Physical Exam     Initial Vitals [10/15/22 1338]   BP Pulse Resp Temp SpO2   (!) 180/102 73 16 98.9 °F (37.2 °C) 96 %      MAP       --         Physical Exam    Constitutional: Vital signs are normal. She is Obese . She is cooperative. She appears distressed.   HENT:   Head: Normocephalic and atraumatic.   Eyes: Conjunctivae and lids are normal.   Neck: Trachea normal. No thyroid mass present.   Cardiovascular:  Normal rate and regular rhythm.           Abdominal: Abdomen is soft.   Musculoskeletal:      Comments: No swelling, edema, deformity, bony tenderness, or crepitus to cervical, thoracic, or lumbar back.  No swelling, edema, deformity, bony tenderness, decreased range of motion to elbows, wrists, hands, legs.     Neurological: She is alert and oriented to person, place, and time. She has normal strength. She displays no atrophy. No cranial nerve deficit or sensory deficit. She exhibits normal muscle tone. She displays a negative Romberg sign. GCS eye subscore is 4. GCS verbal subscore is 5. GCS motor subscore is 6.   Skin: Skin is warm, dry and intact. No rash noted.   Psychiatric: Her speech is normal and behavior is normal. Thought content normal. Her mood appears anxious. Her affect is angry.       ED Course   Procedures  Labs Reviewed   CBC W/ AUTO DIFFERENTIAL   BASIC METABOLIC PANEL   POCT URINE PREGNANCY          Imaging Results    None          Medications - No data to display  Medical Decision Making:   Initial Assessment:   Urgent evaluation of a 42-year-old with diffuse myalgias. Patient has chronic pain and is followed by Dr. Watts.  Given the chronic etiology  of her pain and having a physician who follows this, I do not feel that it is appropriate to administer her narcotic pain medication at this time.  Will offer Tylenol gabapentin and obtain basic labs.  I do not feel that imaging is necessary at this time.  Differential Diagnosis:   Fracture, dislocation, compartment syndrome, nerve injury/palsy, vascular injury, DVT, rhabdomyolysis, hemarthrosis, septic joint, cellulitis, bursitis, muscle strain, ligament tear/sprain, laceration, foreign body, abrasion, soft tissue contusion, osteoarthritis.  ED Management:  I saw this patient with Dr. Boone and we discussed with this patient that given the etiology of her chronic pain and being followed for such by her primary care, we are unable to give her narcotic pain medication in the ED as we do not have long-term follow-up with her.  She was offered Tylenol, after which she got very upset and said she was just going to leave and then swore at us as we were leaving.  She declined any further medication or workup, and asked to be discharged immediately.  I feel as though it is safe for patient discharge at this time and follow-up with primary care in order to establish care with a rheumatologist for further workup of her fibromyalgia symptoms.  After taking into careful account the patient's history, physical exam findings, as well as empirical and objective data obtained throughout ED workup, I feel no emergent medical condition has been identified. No further evaluation or admission was felt to be required, and the patient is stable for discharge from the ED. The patient was updated with test results, overall clinical impression, and recommended further plan of care, including discharge instructions as provided and outpatient follow-up for continued evaluation and management as needed. All questions were answered. The patient expressed understanding and agreed with current plan for discharge and follow-up plan of care.  Strict ED return precautions were provided, including return/worsening of current symptoms, new symptoms, or any other concerns.                          Clinical Impression:   Final diagnoses:  [M79.10] Myalgia (Primary)        ED Disposition Condition    Discharge Stable          ED Prescriptions    None       Follow-up Information       Follow up With Specialties Details Why Contact Info    Tien Watts MD Family Medicine  For re-evaluation [address]  [facility] [address]  [phone]      [facility] Emergency Dept Emergency Medicine  If symptoms worsen [address] [id number] [phone]             Corinna Hinojosa PA-C  10/15/22 2053

## 2022-10-15 NOTE — ED TRIAGE NOTES
Pt presents to ED c/o lower back pain x2 days radiating to BL legs and feet, and pain in BL elbows/hands. Hx of rheumatoid arthritis. States she took tramadol at home with no relief. States she normally takes percocet at home, but recently ran  out. States she has not been able to get in to see rheumatologist yet. Denies CP, SOB, N/V/D,, fever at home.

## 2022-10-17 ENCOUNTER — PATIENT MESSAGE (OUTPATIENT)
Dept: ORTHOPEDICS | Facility: CLINIC | Age: 42
End: 2022-10-17
Payer: COMMERCIAL

## 2022-10-17 RX ORDER — OXYCODONE AND ACETAMINOPHEN 10; 325 MG/1; MG/1
1 TABLET ORAL EVERY 4 HOURS PRN
Qty: 42 TABLET | Refills: 0 | Status: SHIPPED | OUTPATIENT
Start: 2022-10-17 | End: 2022-10-21 | Stop reason: SDUPTHER

## 2022-10-18 ENCOUNTER — PATIENT MESSAGE (OUTPATIENT)
Dept: INTERNAL MEDICINE | Facility: CLINIC | Age: 42
End: 2022-10-18
Payer: COMMERCIAL

## 2022-10-18 ENCOUNTER — OFFICE VISIT (OUTPATIENT)
Dept: INTERNAL MEDICINE | Facility: CLINIC | Age: 42
End: 2022-10-18
Attending: INTERNAL MEDICINE
Payer: COMMERCIAL

## 2022-10-18 ENCOUNTER — PATIENT MESSAGE (OUTPATIENT)
Dept: INTERNAL MEDICINE | Facility: CLINIC | Age: 42
End: 2022-10-18

## 2022-10-18 VITALS
BODY MASS INDEX: 37.71 KG/M2 | HEIGHT: 70 IN | HEART RATE: 82 BPM | OXYGEN SATURATION: 99 % | WEIGHT: 263.44 LBS | DIASTOLIC BLOOD PRESSURE: 70 MMHG | SYSTOLIC BLOOD PRESSURE: 100 MMHG

## 2022-10-18 DIAGNOSIS — F11.29 OPIOID DEPENDENCE WITH OPIOID-INDUCED DISORDER: Primary | ICD-10-CM

## 2022-10-18 PROCEDURE — 99999 PR PBB SHADOW E&M-EST. PATIENT-LVL IV: ICD-10-PCS | Mod: PBBFAC,,, | Performed by: INTERNAL MEDICINE

## 2022-10-18 PROCEDURE — 1159F PR MEDICATION LIST DOCUMENTED IN MEDICAL RECORD: ICD-10-PCS | Mod: CPTII,S$GLB,, | Performed by: INTERNAL MEDICINE

## 2022-10-18 PROCEDURE — 1160F RVW MEDS BY RX/DR IN RCRD: CPT | Mod: CPTII,S$GLB,, | Performed by: INTERNAL MEDICINE

## 2022-10-18 PROCEDURE — 1160F PR REVIEW ALL MEDS BY PRESCRIBER/CLIN PHARMACIST DOCUMENTED: ICD-10-PCS | Mod: CPTII,S$GLB,, | Performed by: INTERNAL MEDICINE

## 2022-10-18 PROCEDURE — 99213 PR OFFICE/OUTPT VISIT, EST, LEVL III, 20-29 MIN: ICD-10-PCS | Mod: S$GLB,,, | Performed by: INTERNAL MEDICINE

## 2022-10-18 PROCEDURE — 3078F DIAST BP <80 MM HG: CPT | Mod: CPTII,S$GLB,, | Performed by: INTERNAL MEDICINE

## 2022-10-18 PROCEDURE — 3074F PR MOST RECENT SYSTOLIC BLOOD PRESSURE < 130 MM HG: ICD-10-PCS | Mod: CPTII,S$GLB,, | Performed by: INTERNAL MEDICINE

## 2022-10-18 PROCEDURE — 1159F MED LIST DOCD IN RCRD: CPT | Mod: CPTII,S$GLB,, | Performed by: INTERNAL MEDICINE

## 2022-10-18 PROCEDURE — 99213 OFFICE O/P EST LOW 20 MIN: CPT | Mod: S$GLB,,, | Performed by: INTERNAL MEDICINE

## 2022-10-18 PROCEDURE — 99999 PR PBB SHADOW E&M-EST. PATIENT-LVL IV: CPT | Mod: PBBFAC,,, | Performed by: INTERNAL MEDICINE

## 2022-10-18 PROCEDURE — 3078F PR MOST RECENT DIASTOLIC BLOOD PRESSURE < 80 MM HG: ICD-10-PCS | Mod: CPTII,S$GLB,, | Performed by: INTERNAL MEDICINE

## 2022-10-18 PROCEDURE — 3074F SYST BP LT 130 MM HG: CPT | Mod: CPTII,S$GLB,, | Performed by: INTERNAL MEDICINE

## 2022-10-18 NOTE — LETTER
October 18, 2022      [facility]  [address]  [facility] [address]  Phone: [phone]  Fax: [phone]       Patient: Anitra Chaney   YOB: 1980  Date of Visit: 10/18/2022    To Whom It May Concern:    Vickie Chaney  was at [facility] on 10/18/2022. The patient may return to work on 10/18/2022 with no restrictions. If you have any questions or concerns, or if I can be of further assistance, please do not hesitate to contact me.    Sincerely,    Gavin Cedeno MA

## 2022-10-18 NOTE — PROGRESS NOTES
Subjective:       Patient ID: Anitra Chaney is a 42 y.o. female.    Chief Complaint: Medication Problem    Here for urgent appt  Pt normally cared for by my colleague Dr. Watts. I have reviewed patient's past medical, surgical, and social history in addition to MAR and allergies.   Patient Active Problem List:     Neurogenic claudication     Ankle arthritis     HTN (hypertension)     Chronic bilateral low back pain with bilateral sciatica     Fibromyalgia muscle pain     Class 2 severe obesity due to excess calories with serious comorbidity and body mass index (BMI) of 35.0 to 35.9 in adult     Hx of bariatric surgery     Cerebrovascular accident (CVA) due to thrombosis     Migraine aura, persistent, intractable, with status migrainosus     Major depressive disorder, recurrent episode, moderate     NUSRAT (generalized anxiety disorder)     Tobacco abuse     Iron deficiency anemia    41 yo F with above problem list presents with     Pt reports coming to realization she has an addiction to opiates so over the weekend she tried to go cold turkey and experienced impending sense doom, myalgias, restlessness, jerks, SOB. She has resumed her chronic opiate regimen and symptoms have subsided. Would like to discuss a plan.               Review of Systems   Constitutional:  Negative for chills, fatigue, fever and unexpected weight change.   HENT:  Negative for ear pain, hearing loss, postnasal drip, tinnitus, trouble swallowing and voice change.    Respiratory:  Negative for cough, chest tightness, shortness of breath and wheezing.    Cardiovascular:  Negative for chest pain, palpitations and leg swelling.   Gastrointestinal:  Negative for abdominal pain, blood in stool, diarrhea, nausea and vomiting.   Endocrine: Negative for polydipsia, polyphagia and polyuria.   Genitourinary:  Negative for difficulty urinating, dysuria, hematuria and vaginal bleeding.   Skin:  Negative for rash.   Allergic/Immunologic: Negative  "for food allergies.   Neurological:  Negative for dizziness, numbness and headaches.   Hematological:  Does not bruise/bleed easily.   Psychiatric/Behavioral:  The patient is not nervous/anxious.      Objective:      Vitals:    10/18/22 1606   BP: 100/70   BP Location: Left arm   Patient Position: Sitting   BP Method: Large (Manual)   Pulse: 82   SpO2: 99%   Weight: 119.5 kg (263 lb 7.2 oz)   Height: 5' 10" (1.778 m)      Physical Exam  Vitals and nursing note reviewed.   Constitutional:       General: She is not in acute distress.     Appearance: Normal appearance. She is well-developed.   HENT:      Head: Normocephalic and atraumatic.      Mouth/Throat:      Pharynx: No oropharyngeal exudate.   Eyes:      General: No scleral icterus.     Conjunctiva/sclera: Conjunctivae normal.      Pupils: Pupils are equal, round, and reactive to light.   Neck:      Thyroid: No thyromegaly.   Cardiovascular:      Rate and Rhythm: Normal rate and regular rhythm.      Heart sounds: Normal heart sounds. No murmur heard.  Pulmonary:      Effort: Pulmonary effort is normal.      Breath sounds: Normal breath sounds. No wheezing or rales.   Abdominal:      General: There is no distension.   Musculoskeletal:         General: No tenderness.   Lymphadenopathy:      Cervical: No cervical adenopathy.   Skin:     General: Skin is warm and dry.   Neurological:      Mental Status: She is alert and oriented to person, place, and time.   Psychiatric:         Attention and Perception: She does not perceive auditory or visual hallucinations.         Mood and Affect: Mood is not anxious or depressed. Affect is not inappropriate.         Speech: Speech is not rapid and pressured, delayed or slurred.         Behavior: Behavior normal. Behavior is not agitated, slowed, aggressive, withdrawn or hyperactive.         Judgment: Judgment is not inappropriate.       Assessment:       1. Opioid dependence with opioid-induced disorder        Plan:     "   Anitra was seen today for medication problem.    Diagnoses and all orders for this visit:    Opioid dependence with opioid-induced disorder  Acutely fine. Rec start weaning as she can. Start by going to Q6 or Q8 if possible with goal of BID. F/u mental health for more definitive Tx. Pt made aware that if any delay at all we can begin suboxone, low dose clonidine possibly, and zofran.  -     Ambulatory referral/consult to Psychiatry; Future         Herminio Davison MD  Internal Medicine-[facility]        Side effects of medication(s) were discussed in detail and patient voiced understanding.  Patient will call back for any issues or complications.

## 2022-10-19 ENCOUNTER — PATIENT MESSAGE (OUTPATIENT)
Dept: PSYCHIATRY | Facility: CLINIC | Age: 42
End: 2022-10-19
Payer: COMMERCIAL

## 2022-10-20 ENCOUNTER — PATIENT MESSAGE (OUTPATIENT)
Dept: INTERNAL MEDICINE | Facility: CLINIC | Age: 42
End: 2022-10-20
Payer: COMMERCIAL

## 2022-10-21 ENCOUNTER — PATIENT MESSAGE (OUTPATIENT)
Dept: ORTHOPEDICS | Facility: CLINIC | Age: 42
End: 2022-10-21
Payer: COMMERCIAL

## 2022-10-21 ENCOUNTER — OFFICE VISIT (OUTPATIENT)
Dept: PSYCHIATRY | Facility: CLINIC | Age: 42
End: 2022-10-21
Payer: COMMERCIAL

## 2022-10-21 VITALS
HEIGHT: 70 IN | SYSTOLIC BLOOD PRESSURE: 161 MMHG | DIASTOLIC BLOOD PRESSURE: 90 MMHG | HEART RATE: 74 BPM | WEIGHT: 266.13 LBS | BODY MASS INDEX: 38.1 KG/M2

## 2022-10-21 DIAGNOSIS — M25.572 CHRONIC PAIN OF LEFT ANKLE: ICD-10-CM

## 2022-10-21 DIAGNOSIS — Z98.1 STATUS POST ANKLE ARTHRODESIS: ICD-10-CM

## 2022-10-21 DIAGNOSIS — F41.1 GAD (GENERALIZED ANXIETY DISORDER): Primary | ICD-10-CM

## 2022-10-21 DIAGNOSIS — G89.29 CHRONIC PAIN OF LEFT ANKLE: ICD-10-CM

## 2022-10-21 DIAGNOSIS — F33.1 MAJOR DEPRESSIVE DISORDER, RECURRENT EPISODE, MODERATE: ICD-10-CM

## 2022-10-21 PROCEDURE — 90833 PSYTX W PT W E/M 30 MIN: CPT | Mod: S$GLB,,, | Performed by: NURSE PRACTITIONER

## 2022-10-21 PROCEDURE — 3077F PR MOST RECENT SYSTOLIC BLOOD PRESSURE >= 140 MM HG: ICD-10-PCS | Mod: CPTII,S$GLB,, | Performed by: NURSE PRACTITIONER

## 2022-10-21 PROCEDURE — 3080F DIAST BP >= 90 MM HG: CPT | Mod: CPTII,S$GLB,, | Performed by: NURSE PRACTITIONER

## 2022-10-21 PROCEDURE — 99999 PR PBB SHADOW E&M-EST. PATIENT-LVL II: ICD-10-PCS | Mod: PBBFAC,,, | Performed by: NURSE PRACTITIONER

## 2022-10-21 PROCEDURE — 99999 PR PBB SHADOW E&M-EST. PATIENT-LVL II: CPT | Mod: PBBFAC,,, | Performed by: NURSE PRACTITIONER

## 2022-10-21 PROCEDURE — 3077F SYST BP >= 140 MM HG: CPT | Mod: CPTII,S$GLB,, | Performed by: NURSE PRACTITIONER

## 2022-10-21 PROCEDURE — 99214 OFFICE O/P EST MOD 30 MIN: CPT | Mod: S$GLB,,, | Performed by: NURSE PRACTITIONER

## 2022-10-21 PROCEDURE — 99214 PR OFFICE/OUTPT VISIT, EST, LEVL IV, 30-39 MIN: ICD-10-PCS | Mod: S$GLB,,, | Performed by: NURSE PRACTITIONER

## 2022-10-21 PROCEDURE — 3080F PR MOST RECENT DIASTOLIC BLOOD PRESSURE >= 90 MM HG: ICD-10-PCS | Mod: CPTII,S$GLB,, | Performed by: NURSE PRACTITIONER

## 2022-10-21 PROCEDURE — 90833 PR PSYCHOTHERAPY W/PATIENT W/E&M, 30 MIN (ADD ON): ICD-10-PCS | Mod: S$GLB,,, | Performed by: NURSE PRACTITIONER

## 2022-10-21 RX ORDER — DULOXETIN HYDROCHLORIDE 30 MG/1
CAPSULE, DELAYED RELEASE ORAL
Qty: 60 CAPSULE | Refills: 1 | Status: SHIPPED | OUTPATIENT
Start: 2022-10-21 | End: 2023-03-16 | Stop reason: SDUPTHER

## 2022-10-21 RX ORDER — HYDROXYZINE HYDROCHLORIDE 50 MG/1
TABLET, FILM COATED ORAL
Qty: 60 TABLET | Refills: 1 | Status: SHIPPED | OUTPATIENT
Start: 2022-10-21 | End: 2023-03-16
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
## 2022-10-23 NOTE — PROGRESS NOTES
"Subjective:      Patient ID: Anitra Chaney is a 39 y.o. female.    Chief Complaint: Abdominal Pain    HPI  This patient is new to me.   Anitra hCaney is a 39 y.o. year old female who presents today complaining of abdominal pain.     S/p  2019. Had to have wound re-opened and packed.   Getting wound vac replaced this week.   Drainage has improved. 7/10 in severity. Pain occurs all the time.   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 Goes down to a 5/10 for a hour then back up. Tramadol didn't help.   Took percocet, which helped.     She would like to see her gynecologist, Dr. Yen, but says she has had trouble getting an appointment.     I personally reviewed Past Medical History, Past Surgical History, Social History, and Family History    Review of Systems   Constitutional: Negative for chills, fatigue and fever.   HENT: Negative for congestion, hearing loss and rhinorrhea.    Eyes: Negative for visual disturbance.   Respiratory: Negative for cough, shortness of breath and wheezing.    Cardiovascular: Negative for chest pain.   Gastrointestinal: Positive for abdominal pain. Negative for constipation, diarrhea, nausea and vomiting.   Skin: Negative for rash.   Neurological: Negative for dizziness, syncope and headaches.   Psychiatric/Behavioral: Negative for dysphoric mood and sleep disturbance. The patient is not nervous/anxious.        Objective:      Vitals:    19 1315   BP: 126/74   Pulse: 78   SpO2: 98%   Weight: 108.8 kg (239 lb 13.8 oz)   Height: 5' 10" (1.778 m)     Physical Exam   Constitutional: She is oriented to person, place, and time. She appears well-developed and well-nourished. No distress.   HENT:   Head: Normocephalic and atraumatic.   Eyes: Conjunctivae and EOM are normal.   Neck: Normal range of " motion.   Cardiovascular: Normal rate, regular rhythm and normal heart sounds.   No murmur heard.  Pulmonary/Chest: Effort normal and breath sounds normal. No respiratory distress.   Abdominal: Soft. Bowel sounds are normal. There is tenderness in the periumbilical area and suprapubic area.   Neurological: She is alert and oriented to person, place, and time.   Skin: Skin is warm and dry. No rash noted.        Psychiatric: She has a normal mood and affect. Her behavior is normal. Thought content normal.   Nursing note and vitals reviewed.      Assessment:       1. Pain at surgical site    2. Skin wound from surgical incision    3. S/P  section        Plan:   Diagnoses and all orders for this visit:    Pain at surgical site  Skin wound from surgical incision  S/P  section  - Discussed with patient that I will not be able to prescribe narcotics today. Will try a different NSAID. Replace ibuprofen with diclofenac. Will also try gabapentin at bedtime to help with pain and trouble sleeping. Discussed risks of gabapentin and sedation, especially with a . She will not take it if her  is not home.   Also needs to follow-up with gyn for wound care. Appt with Dr. Farshad carias.   -     gabapentin (NEURONTIN) 300 MG capsule; Take 1 capsule (300 mg total) by mouth nightly as needed (pain).  -     diclofenac (VOLTAREN) 50 MG EC tablet; Take 1 tablet (50 mg total) by mouth 3 (three) times daily as needed (pain).              no

## 2022-10-24 ENCOUNTER — PATIENT MESSAGE (OUTPATIENT)
Dept: INTERNAL MEDICINE | Facility: CLINIC | Age: 42
End: 2022-10-24
Payer: COMMERCIAL

## 2022-10-24 RX ORDER — OXYCODONE AND ACETAMINOPHEN 10; 325 MG/1; MG/1
1 TABLET ORAL EVERY 4 HOURS PRN
Qty: 42 TABLET | Refills: 0 | Status: SHIPPED | OUTPATIENT
Start: 2022-10-24 | End: 2022-10-29 | Stop reason: SDUPTHER

## 2022-10-29 DIAGNOSIS — M25.572 CHRONIC PAIN OF LEFT ANKLE: ICD-10-CM

## 2022-10-29 DIAGNOSIS — Z98.1 STATUS POST ANKLE ARTHRODESIS: ICD-10-CM

## 2022-10-29 DIAGNOSIS — G89.29 CHRONIC PAIN OF LEFT ANKLE: ICD-10-CM

## 2022-10-30 ENCOUNTER — PATIENT MESSAGE (OUTPATIENT)
Dept: ORTHOPEDICS | Facility: CLINIC | Age: 42
End: 2022-10-30
Payer: COMMERCIAL

## 2022-10-31 ENCOUNTER — PATIENT MESSAGE (OUTPATIENT)
Dept: ORTHOPEDICS | Facility: CLINIC | Age: 42
End: 2022-10-31
Payer: COMMERCIAL

## 2022-10-31 RX ORDER — OXYCODONE AND ACETAMINOPHEN 10; 325 MG/1; MG/1
1 TABLET ORAL EVERY 4 HOURS PRN
Qty: 42 TABLET | Refills: 0 | Status: SHIPPED | OUTPATIENT
Start: 2022-10-31 | End: 2022-11-05 | Stop reason: SDUPTHER

## 2022-10-31 NOTE — TELEPHONE ENCOUNTER
----- Message from Guerita Cummings sent at 10/31/2022 11:50 AM CDT -----  Anitra Chaney calling regarding Patient Advice (message) for #oxyCODONE-acetaminophen (PERCOCET)  mg per tablet.  she is trying to catch a flight.  call back [phone]

## 2022-11-05 DIAGNOSIS — G89.29 CHRONIC PAIN OF LEFT ANKLE: ICD-10-CM

## 2022-11-05 DIAGNOSIS — Z98.1 STATUS POST ANKLE ARTHRODESIS: ICD-10-CM

## 2022-11-05 DIAGNOSIS — M25.572 CHRONIC PAIN OF LEFT ANKLE: ICD-10-CM

## 2022-11-06 ENCOUNTER — HOSPITAL ENCOUNTER (EMERGENCY)
Facility: OTHER | Age: 42
Discharge: HOME | End: 2022-11-06
Attending: EMERGENCY MEDICINE
Payer: COMMERCIAL

## 2022-11-06 VITALS
OXYGEN SATURATION: 100 % | SYSTOLIC BLOOD PRESSURE: 136 MMHG | RESPIRATION RATE: 18 BRPM | WEIGHT: 245 LBS | BODY MASS INDEX: 36.29 KG/M2 | HEART RATE: 45 BPM | HEIGHT: 69 IN | TEMPERATURE: 99 F | DIASTOLIC BLOOD PRESSURE: 73 MMHG

## 2022-11-06 DIAGNOSIS — R10.31 RIGHT LOWER QUADRANT ABDOMINAL PAIN: Primary | ICD-10-CM

## 2022-11-06 LAB
ALBUMIN SERPL BCP-MCNC: 3.6 G/DL (ref 3.5–5.2)
ALP SERPL-CCNC: 63 U/L (ref 55–135)
ALT SERPL W/O P-5'-P-CCNC: 16 U/L (ref 10–44)
ANION GAP SERPL CALC-SCNC: 9 MMOL/L (ref 8–16)
AST SERPL-CCNC: 15 U/L (ref 10–40)
BACTERIA #/AREA URNS HPF: NORMAL /HPF
BASOPHILS # BLD AUTO: 0.04 K/UL (ref 0–0.2)
BASOPHILS NFR BLD: 0.7 % (ref 0–1.9)
BILIRUB SERPL-MCNC: 0.2 MG/DL (ref 0.1–1)
BILIRUB UR QL STRIP: NEGATIVE
BUN SERPL-MCNC: 7 MG/DL (ref 6–20)
CALCIUM SERPL-MCNC: 9.1 MG/DL (ref 8.7–10.5)
CHLORIDE SERPL-SCNC: 108 MMOL/L (ref 95–110)
CLARITY UR: CLEAR
CO2 SERPL-SCNC: 25 MMOL/L (ref 23–29)
COLOR UR: YELLOW
CREAT SERPL-MCNC: 0.7 MG/DL (ref 0.5–1.4)
DIFFERENTIAL METHOD: NORMAL
EOSINOPHIL # BLD AUTO: 0.1 K/UL (ref 0–0.5)
EOSINOPHIL NFR BLD: 2.5 % (ref 0–8)
ERYTHROCYTE [DISTWIDTH] IN BLOOD BY AUTOMATED COUNT: 12.9 % (ref 11.5–14.5)
EST. GFR  (NO RACE VARIABLE): >60 ML/MIN/1.73 M^2
GLUCOSE SERPL-MCNC: 95 MG/DL (ref 70–110)
GLUCOSE UR QL STRIP: NEGATIVE
HCT VFR BLD AUTO: 40 % (ref 37–48.5)
HGB BLD-MCNC: 13.6 G/DL (ref 12–16)
HGB UR QL STRIP: ABNORMAL
IMM GRANULOCYTES # BLD AUTO: 0.02 K/UL (ref 0–0.04)
IMM GRANULOCYTES NFR BLD AUTO: 0.4 % (ref 0–0.5)
KETONES UR QL STRIP: ABNORMAL
LACTATE SERPL-SCNC: 0.8 MMOL/L (ref 0.5–2.2)
LEUKOCYTE ESTERASE UR QL STRIP: ABNORMAL
LYMPHOCYTES # BLD AUTO: 1.7 K/UL (ref 1–4.8)
LYMPHOCYTES NFR BLD: 30.5 % (ref 18–48)
MCH RBC QN AUTO: 29.3 PG (ref 27–31)
MCHC RBC AUTO-ENTMCNC: 34 G/DL (ref 32–36)
MCV RBC AUTO: 86 FL (ref 82–98)
MICROSCOPIC COMMENT: NORMAL
MONOCYTES # BLD AUTO: 0.4 K/UL (ref 0.3–1)
MONOCYTES NFR BLD: 6.2 % (ref 4–15)
NEUTROPHILS # BLD AUTO: 3.4 K/UL (ref 1.8–7.7)
NEUTROPHILS NFR BLD: 59.7 % (ref 38–73)
NITRITE UR QL STRIP: NEGATIVE
NRBC BLD-RTO: 0 /100 WBC
PH UR STRIP: 6 [PH] (ref 5–8)
PLATELET # BLD AUTO: 222 K/UL (ref 150–450)
PMV BLD AUTO: 9.4 FL (ref 9.2–12.9)
POTASSIUM SERPL-SCNC: 3.9 MMOL/L (ref 3.5–5.1)
PROT SERPL-MCNC: 7.1 G/DL (ref 6–8.4)
PROT UR QL STRIP: NEGATIVE
RBC # BLD AUTO: 4.64 M/UL (ref 4–5.4)
RBC #/AREA URNS HPF: 1 /HPF (ref 0–4)
SODIUM SERPL-SCNC: 142 MMOL/L (ref 136–145)
SP GR UR STRIP: 1.02 (ref 1–1.03)
URN SPEC COLLECT METH UR: ABNORMAL
UROBILINOGEN UR STRIP-ACNC: NEGATIVE EU/DL
WBC # BLD AUTO: 5.68 K/UL (ref 3.9–12.7)
WBC #/AREA URNS HPF: 3 /HPF (ref 0–5)

## 2022-11-06 PROCEDURE — 25000003 PHARM REV CODE 250: Performed by: PHYSICIAN ASSISTANT

## 2022-11-06 PROCEDURE — 83605 ASSAY OF LACTIC ACID: CPT | Performed by: PHYSICIAN ASSISTANT

## 2022-11-06 PROCEDURE — 80053 COMPREHEN METABOLIC PANEL: CPT | Performed by: PHYSICIAN ASSISTANT

## 2022-11-06 PROCEDURE — 96361 HYDRATE IV INFUSION ADD-ON: CPT

## 2022-11-06 PROCEDURE — 96376 TX/PRO/DX INJ SAME DRUG ADON: CPT

## 2022-11-06 PROCEDURE — 99285 EMERGENCY DEPT VISIT HI MDM: CPT | Mod: 25

## 2022-11-06 PROCEDURE — 85025 COMPLETE CBC W/AUTO DIFF WBC: CPT | Performed by: PHYSICIAN ASSISTANT

## 2022-11-06 PROCEDURE — 25500020 PHARM REV CODE 255: Performed by: PHYSICIAN ASSISTANT

## 2022-11-06 PROCEDURE — 81000 URINALYSIS NONAUTO W/SCOPE: CPT | Performed by: EMERGENCY MEDICINE

## 2022-11-06 PROCEDURE — 96375 TX/PRO/DX INJ NEW DRUG ADDON: CPT

## 2022-11-06 PROCEDURE — 63600175 PHARM REV CODE 636 W HCPCS: Performed by: PHYSICIAN ASSISTANT

## 2022-11-06 PROCEDURE — 96374 THER/PROPH/DIAG INJ IV PUSH: CPT

## 2022-11-06 RX ORDER — HYOSCYAMINE SULFATE 0.12 MG/1
0.12 TABLET SUBLINGUAL EVERY 4 HOURS PRN
Qty: 20 TABLET | Refills: 0 | Status: SHIPPED | OUTPATIENT
Start: 2022-11-06 | End: 2023-01-13

## 2022-11-06 RX ORDER — ONDANSETRON 2 MG/ML
4 INJECTION INTRAMUSCULAR; INTRAVENOUS
Status: COMPLETED | OUTPATIENT
Start: 2022-11-06 | End: 2022-11-06

## 2022-11-06 RX ORDER — MORPHINE SULFATE 4 MG/ML
4 INJECTION, SOLUTION INTRAMUSCULAR; INTRAVENOUS
Status: COMPLETED | OUTPATIENT
Start: 2022-11-06 | End: 2022-11-06

## 2022-11-06 RX ORDER — HYOSCYAMINE SULFATE 0.5 MG/ML
0.5 INJECTION, SOLUTION SUBCUTANEOUS
Status: COMPLETED | OUTPATIENT
Start: 2022-11-06 | End: 2022-11-06

## 2022-11-06 RX ORDER — KETOROLAC TROMETHAMINE 30 MG/ML
10 INJECTION, SOLUTION INTRAMUSCULAR; INTRAVENOUS
Status: COMPLETED | OUTPATIENT
Start: 2022-11-06 | End: 2022-11-06

## 2022-11-06 RX ADMIN — IOHEXOL 100 ML: 350 INJECTION, SOLUTION INTRAVENOUS at 05:11

## 2022-11-06 RX ADMIN — HYOSCYAMINE SULFATE 0.5 MG: 0.5 INJECTION, SOLUTION SUBCUTANEOUS at 07:11

## 2022-11-06 RX ADMIN — KETOROLAC TROMETHAMINE 10 MG: 30 INJECTION, SOLUTION INTRAMUSCULAR; INTRAVENOUS at 06:11

## 2022-11-06 RX ADMIN — MORPHINE SULFATE 4 MG: 4 INJECTION, SOLUTION INTRAMUSCULAR; INTRAVENOUS at 05:11

## 2022-11-06 RX ADMIN — ONDANSETRON 4 MG: 2 INJECTION INTRAMUSCULAR; INTRAVENOUS at 06:11

## 2022-11-06 RX ADMIN — SODIUM CHLORIDE 1000 ML: 0.9 INJECTION, SOLUTION INTRAVENOUS at 05:11

## 2022-11-06 RX ADMIN — ONDANSETRON 4 MG: 2 INJECTION INTRAMUSCULAR; INTRAVENOUS at 05:11

## 2022-11-06 NOTE — Clinical Note
"Anitra"Nagi Chaney was seen and treated in our emergency department on 11/6/2022.  She may return to work on 11/07/2022.       If you have any questions or concerns, please don't hesitate to call.      Hui Barriga PA-C"

## 2022-11-06 NOTE — ED TRIAGE NOTES
Pt reports to ED with right lower quadrant pain that started last night. She reports nausea, vomiting, and diarrhea with dizziness last night. Denies any fever. Denies any urinary symptoms. She is aaox4. In no acute distress at this time.

## 2022-11-07 ENCOUNTER — PATIENT MESSAGE (OUTPATIENT)
Dept: ORTHOPEDICS | Facility: CLINIC | Age: 42
End: 2022-11-07
Payer: COMMERCIAL

## 2022-11-07 ENCOUNTER — PATIENT MESSAGE (OUTPATIENT)
Dept: OBSTETRICS AND GYNECOLOGY | Facility: CLINIC | Age: 42
End: 2022-11-07
Payer: COMMERCIAL

## 2022-11-07 RX ORDER — OXYCODONE AND ACETAMINOPHEN 10; 325 MG/1; MG/1
1 TABLET ORAL EVERY 4 HOURS PRN
Qty: 42 TABLET | Refills: 0 | Status: SHIPPED | OUTPATIENT
Start: 2022-11-07 | End: 2022-11-11 | Stop reason: SDUPTHER

## 2022-11-07 RX ORDER — PANTOPRAZOLE SODIUM 40 MG/1
40 TABLET, DELAYED RELEASE ORAL 2 TIMES DAILY
Qty: 60 TABLET | Refills: 11 | Status: SHIPPED | OUTPATIENT
Start: 2022-11-07 | End: 2024-04-02

## 2022-11-07 NOTE — ED PROVIDER NOTES
"Encounter Date: 2022       History     Chief Complaint   Patient presents with    Abdominal Pain     RLQ abd pain and epigastric pain with NVD onset last night. States she's unable to keep anything down. Symptoms unrelieved by zofran. Reports feeling dizzy and "woozy". Aaox4, vss.        Patient is a 42-year-old female who presents for right lower quadrant abdominal pain; PMH obesity, migraines, HTN, history GI bleed, GERD, anxiety, depression, history of stroke requiring tPA,   Hysterectomy.   Patient reports 1 day onset of right lower quadrant abdominal pain.  Minimally improved with Percocet at home.  Similar episode in past on chart review, states she was diagnosed with an ovarian cyst.  Symptoms are associated with mild nausea, no vomiting.  Last BM today, unremarkable.  Denies dysuria, hematuria, fever, flank pain, vaginal discharge.  The patients available PMH, PSH, Social History, medications, allergies, and triage vital signs were reviewed just prior to their medical evaluation.  A ten point review of systems was completed and is negative except as documented above.  Patient denies any other acute medical complaint.    Please be advised this text was dictated with [employer] software and may contain errors due to translation.         Review of patient's allergies indicates:  No Known Allergies  Past Medical History:   Diagnosis Date    Anxiety and depression     Encounter for blood transfusion     GERD (gastroesophageal reflux disease)     GI bleeds, multiple during admission     HTN (hypertension), benign 3/10/2014    RESOLVED    Migraines     Obesity     PFO (patent foramen ovale)     found after stroke "too small/risky to close"    Stroke 2017    Hospitalized at [facility]; given tPA     Past Surgical History:   Procedure Laterality Date    ANKLE FRACTURE SURGERY       SECTION N/A 2019    Procedure:  SECTION;  Surgeon: Gianna Flowers MD;  Location: [facility] L&D;  Service: OB/GYN;  " Laterality: N/A;     SECTION WITH TUBAL LIGATION Bilateral 2020    Procedure:  SECTION, WITH TUBAL LIGATION;  Surgeon: Corina Mao MD;  Location: [facility] L&D;  Service: OB/GYN;  Laterality: Bilateral;     SECTION, CLASSIC  2005/2009/2019    x3    CHOLECYSTECTOMY  3/2002    lap maribel    COLONOSCOPY N/A 2021    Procedure: COLONOSCOPY;  Surgeon: Cleveland Sears MD;  Location: [facility];  Service: Endoscopy;  Laterality: N/A;    CYSTOSCOPY  9/10/2021    Procedure: CYSTOSCOPY;  Surgeon: Salomón Hernandez MD;  Location: [facility] OR;  Service: OB/GYN;;    ESOPHAGOGASTRODUODENOSCOPY N/A 2021    Procedure: EGD (ESOPHAGOGASTRODUODENOSCOPY);  Surgeon: Cleveland Sears MD;  Location: [facility];  Service: Endoscopy;  Laterality: N/A;    FOOT SURGERY Left     gastric sleeve  2017    HYSTERECTOMY  Sept 10,2021    INTRALUMINAL GASTROINTESTINAL TRACT IMAGING VIA CAPSULE N/A 2021    Procedure: IMAGING PROCEDURE, GI TRACT, INTRALUMINAL, VIA CAPSULE;  Surgeon: Nolan Cordero MD;  Location: [facility] ENDO;  Service: Endoscopy;  Laterality: N/A;  instructions portal -ml    LAPAROSCOPIC SALPINGECTOMY Bilateral 9/10/2021    Procedure: SALPINGECTOMY, LAPAROSCOPIC;  Surgeon: Salomón Hernandez MD;  Location: [facility];  Service: OB/GYN;  Laterality: Bilateral;    LAPAROSCOPIC TOTAL HYSTERECTOMY N/A 9/10/2021    Procedure: HYSTERECTOMY, TOTAL, LAPAROSCOPIC;  Surgeon: Salomón Hernandez MD;  Location: [facility];  Service: OB/GYN;  Laterality: N/A;    TUBAL LIGATION  2020     Family History   Problem Relation Age of Onset    Other Father         house fire    Breast cancer Neg Hx     Colon cancer Neg Hx     Ovarian cancer Neg Hx     Congenital heart disease Neg Hx     Pacemaker/defibrilator Neg Hx     Early death Neg Hx      Social History     Tobacco Use    Smoking status: Heavy Smoker     Packs/day: 1.50     Years: 27.00     Pack years: 40.50     Types: Cigarettes    Smokeless tobacco: Never    Tobacco  comments:     cutting to 2 cigarettes per day   Substance Use Topics    Alcohol use: No     Alcohol/week: 0.0 standard drinks    Drug use: No     Review of Systems   Constitutional:  Negative for chills and fever.   HENT:  Negative for sore throat.    Respiratory:  Negative for shortness of breath.    Cardiovascular:  Negative for chest pain.   Gastrointestinal:  Positive for abdominal pain and nausea. Negative for constipation, diarrhea and vomiting.   Genitourinary:  Negative for dysuria, frequency, hematuria, vaginal bleeding and vaginal discharge.   Musculoskeletal:  Negative for back pain.   Skin:  Negative for rash.   Neurological:  Negative for weakness.   Hematological:  Does not bruise/bleed easily.     Physical Exam     Initial Vitals [11/06/22 1346]   BP Pulse Resp Temp SpO2   (!) 158/91 77 20 99.7 °F (37.6 °C) 96 %      MAP       --         Physical Exam    Nursing note and vitals reviewed.  Constitutional: She appears well-developed and well-nourished. She is not diaphoretic. No distress.   Nontoxic-appearing   HENT:   Head: Normocephalic and atraumatic.   Right Ear: External ear normal.   Left Ear: External ear normal.   Mouth/Throat: Oropharynx is clear and moist.   Eyes: Conjunctivae and EOM are normal. Pupils are equal, round, and reactive to light. No scleral icterus.   Cardiovascular:  Normal rate, regular rhythm and intact distal pulses.           Pulmonary/Chest: Breath sounds normal. No respiratory distress. She has no wheezes. She has no rhonchi. She has no rales.   Abdominal: Abdomen is soft. Bowel sounds are normal. There is abdominal tenderness (RLQ). There is no rebound and no guarding.   Musculoskeletal:         General: No edema. Normal range of motion.     Neurological: She is alert and oriented to person, place, and time. She has normal strength. No cranial nerve deficit or sensory deficit.   Skin: Skin is warm and dry. Capillary refill takes less than 2 seconds. No rash noted. No  erythema. No pallor.   Psychiatric: She has a normal mood and affect. Her behavior is normal. Judgment and thought content normal.       ED Course   Procedures  Labs Reviewed   URINALYSIS, REFLEX TO URINE CULTURE - Abnormal; Notable for the following components:       Result Value    Ketones, UA Trace (*)     Occult Blood UA Trace (*)     Leukocytes, UA Trace (*)     All other components within normal limits    Narrative:     Specimen Source->Urine   COMPREHENSIVE METABOLIC PANEL   CBC W/ AUTO DIFFERENTIAL   URINALYSIS MICROSCOPIC    Narrative:     Specimen Source->Urine   LACTIC ACID, PLASMA          Imaging Results              CT Abdomen Pelvis With Contrast (Final result)  Result time 11/06/22 18:17:40      Final result by Albertina Bennett MD (11/06/22 18:17:40)                   Impression:      1. No acute intra-abdominal abnormalities identified.  2. Stable heterogenous enhancing hepatic lesions, presumed hemangiomas.  3. Stable left adrenal nodule.  4. Postsurgical changes of sleeve gastrectomy, cholecystectomy, and hysterectomy.      Electronically signed by: Albertina Bennett MD  Date:    11/06/2022  Time:    18:17               Narrative:    EXAMINATION:  CT ABDOMEN PELVIS WITH CONTRAST    CLINICAL HISTORY:  RLQ abdominal pain (Age >= 14y);    TECHNIQUE:  Low dose axial images, sagittal and coronal reformations were obtained from the lung bases to the pubic symphysis following the IV administration of 100 mL of Omnipaque 350 .  Oral contrast was not given.    COMPARISON:  CT abdomen and pelvis from 04/17/2022.    FINDINGS:  The visualized portion of the heart is unremarkable.  The lung bases are clear.    Liver is enlarged measuring 23 cm.  Three stable heterogenous enhancing hepatic lesions, presumed hemangiomas are seen, the largest of which measures 6 cm in the right hepatic lobe.  There is no intra-or extrahepatic biliary ductal dilatation.  Gallbladder has been removed.  Postsurgical changes of sleeve  gastrectomy are seen.  Spleen, pancreas, and right adrenal gland are unremarkable.  Stable 2.7 cm left adrenal nodule is visualized.    Kidneys enhance normally with no evidence of hydronephrosis.  No abnormalities are seen along the ureteral courses.  Urinary bladder is nondistended.  Uterus has been removed.  No significant adnexal abnormalities are seen.    Appendix is visualized and is unremarkable.  The visualized loops of small and large bowel show no evidence of obstruction or inflammation.  No free air or free fluid.    Aorta tapers normally.    No acute osseous abnormality identified. Subcutaneous soft tissues show no significant abnormalities.                                       Medications   morphine injection 4 mg (4 mg Intravenous Given 11/6/22 1710)   ondansetron injection 4 mg (4 mg Intravenous Given 11/6/22 1706)   sodium chloride 0.9% bolus 1,000 mL (0 mLs Intravenous Stopped 11/6/22 1937)   iohexoL (OMNIPAQUE 350) injection 100 mL (100 mLs Intravenous Given 11/6/22 1747)   ketorolac injection 9.999 mg (9.999 mg Intravenous Given 11/6/22 1840)   ondansetron injection 4 mg (4 mg Intravenous Given 11/6/22 1843)   hyoscyamine injection 0.5 mg (0.5 mg Intravenous Given 11/6/22 1921)     Medical Decision Making:   History:   Old Medical Records: I decided to obtain old medical records.  Old Records Summarized: records from previous admission(s) and records from clinic visits.  Initial Assessment:    Right lower quadrant pain x1 day,  history of same.  No  symptoms. VSS, afebrile  Differential Diagnosis:     Appendicitis, diverticulitis, terminal ileitis, functional abdominal pain, kidney stone  Physical exam and history taking lower clinical suspicion for  ovarian torsion, TOA, perforated viscus, dissection, GI bleed  Clinical Tests:   Lab Tests: Ordered and Reviewed  Radiological Study: Ordered and Reviewed           ED Course as of 11/06/22 2036   Sun Nov 06, 2022   1642 Ketones, UA(!): Trace [MF]    1642 Leukocytes, UA(!): Trace  Noninfectious UA [MF]   1704 CBC auto differential  unremarkable [MF]   1738 Comprehensive metabolic panel  unremarkable [MF]   1821 CT Abdomen Pelvis With Contrast  1. No acute intra-abdominal abnormalities identified.  2. Stable heterogenous enhancing hepatic lesions, presumed hemangiomas.  3. Stable left adrenal nodule.  4. Postsurgical changes of sleeve gastrectomy, cholecystectomy, and hysterectomy.    [MF]   1905 Lactate, Mika: 0.8 [MF]   2005 Reports significant improvement after levsin [MF]   2005 Pulse(!): 45  Incorrect filing- 73bpm on my repeat exam in room at this time [MF]      ED Course User Index  [MF] Hui Barriga PA-C            Rx  Levsin sent to preferred pharmacy. Given serial abdominal exams, workup and imaging, I do not suspect emergent etiology of symptoms.  I did recommend follow-up with primary care provider as this pain pattern has seemed to happen in the past. Discharged in stable condition with strict ED return precautions. Patient agreed to plan of care and voiced understanding.    Hui Barriga PA-C  I discussed the following case, diagnosis and plan of care with attending physician.         Clinical Impression:   Final diagnoses:  [R10.31] Right lower quadrant abdominal pain (Primary)        ED Disposition Condition    Discharge Stable          ED Prescriptions       Medication Sig Dispense Start Date End Date Auth. Provider    hyoscyamine (LEVSIN/SL) 0.125 mg Subl Place 1 tablet (0.125 mg total) under the tongue every 4 (four) hours as needed. 20 tablet 11/6/2022 -- Hui Barriga PA-C          Follow-up Information    None          Hui Barriga PA-C  11/06/22 2040

## 2022-11-07 NOTE — DISCHARGE INSTRUCTIONS
Take medication as prescribed, do not take with Bentyl.  Follow-up with your primary care provider if symptoms continue.  Return to the emergency room for rapidly worsening symptoms or fever

## 2022-11-08 RX ORDER — FLUCONAZOLE 150 MG/1
150 TABLET ORAL
Qty: 2 TABLET | Refills: 0 | Status: SHIPPED | OUTPATIENT
Start: 2022-11-08

## 2022-11-11 DIAGNOSIS — G89.29 CHRONIC PAIN OF LEFT ANKLE: ICD-10-CM

## 2022-11-11 DIAGNOSIS — Z98.1 STATUS POST ANKLE ARTHRODESIS: ICD-10-CM

## 2022-11-11 DIAGNOSIS — M25.572 CHRONIC PAIN OF LEFT ANKLE: ICD-10-CM

## 2022-11-14 ENCOUNTER — PATIENT MESSAGE (OUTPATIENT)
Dept: ORTHOPEDICS | Facility: CLINIC | Age: 42
End: 2022-11-14
Payer: COMMERCIAL

## 2022-11-14 RX ORDER — OXYCODONE AND ACETAMINOPHEN 10; 325 MG/1; MG/1
1 TABLET ORAL EVERY 4 HOURS PRN
Qty: 42 TABLET | Refills: 0 | Status: SHIPPED | OUTPATIENT
Start: 2022-11-14 | End: 2022-11-18 | Stop reason: SDUPTHER

## 2022-11-18 ENCOUNTER — PATIENT MESSAGE (OUTPATIENT)
Dept: INTERNAL MEDICINE | Facility: CLINIC | Age: 42
End: 2022-11-18
Payer: COMMERCIAL

## 2022-11-18 ENCOUNTER — PATIENT OUTREACH (OUTPATIENT)
Dept: INTERNAL MEDICINE | Facility: CLINIC | Age: 42
End: 2022-11-18
Payer: COMMERCIAL

## 2022-11-18 DIAGNOSIS — G89.29 CHRONIC PAIN OF LEFT ANKLE: ICD-10-CM

## 2022-11-18 DIAGNOSIS — Z98.1 STATUS POST ANKLE ARTHRODESIS: ICD-10-CM

## 2022-11-18 DIAGNOSIS — M25.572 CHRONIC PAIN OF LEFT ANKLE: ICD-10-CM

## 2022-11-21 RX ORDER — OXYCODONE AND ACETAMINOPHEN 10; 325 MG/1; MG/1
1 TABLET ORAL EVERY 4 HOURS PRN
Qty: 42 TABLET | Refills: 0 | Status: SHIPPED | OUTPATIENT
Start: 2022-11-21 | End: 2022-11-25 | Stop reason: SDUPTHER

## 2022-11-23 DIAGNOSIS — Z98.1 STATUS POST ANKLE ARTHRODESIS: ICD-10-CM

## 2022-11-23 DIAGNOSIS — M25.572 CHRONIC PAIN OF LEFT ANKLE: ICD-10-CM

## 2022-11-23 DIAGNOSIS — G89.29 CHRONIC PAIN OF LEFT ANKLE: ICD-10-CM

## 2022-11-23 RX ORDER — OXYCODONE AND ACETAMINOPHEN 10; 325 MG/1; MG/1
1 TABLET ORAL EVERY 4 HOURS PRN
Qty: 42 TABLET | Refills: 0 | OUTPATIENT
Start: 2022-11-23

## 2022-11-25 DIAGNOSIS — M25.572 CHRONIC PAIN OF LEFT ANKLE: ICD-10-CM

## 2022-11-25 DIAGNOSIS — G89.29 CHRONIC PAIN OF LEFT ANKLE: ICD-10-CM

## 2022-11-25 DIAGNOSIS — Z98.1 STATUS POST ANKLE ARTHRODESIS: ICD-10-CM

## 2022-11-28 RX ORDER — OXYCODONE AND ACETAMINOPHEN 10; 325 MG/1; MG/1
1 TABLET ORAL EVERY 4 HOURS PRN
Qty: 42 TABLET | Refills: 0 | Status: SHIPPED | OUTPATIENT
Start: 2022-11-28 | End: 2022-12-02

## 2022-12-02 ENCOUNTER — PATIENT MESSAGE (OUTPATIENT)
Dept: ORTHOPEDICS | Facility: CLINIC | Age: 42
End: 2022-12-02
Payer: COMMERCIAL

## 2022-12-02 DIAGNOSIS — G89.29 CHRONIC PAIN OF LEFT ANKLE: ICD-10-CM

## 2022-12-02 DIAGNOSIS — Z98.1 STATUS POST ANKLE ARTHRODESIS: ICD-10-CM

## 2022-12-02 DIAGNOSIS — M25.572 CHRONIC PAIN OF LEFT ANKLE: ICD-10-CM

## 2022-12-02 RX ORDER — OXYCODONE AND ACETAMINOPHEN 10; 325 MG/1; MG/1
1 TABLET ORAL EVERY 4 HOURS PRN
Qty: 42 TABLET | Refills: 0 | Status: SHIPPED | OUTPATIENT
Start: 2022-12-02 | End: 2022-12-09 | Stop reason: SDUPTHER

## 2022-12-02 RX ORDER — OXYCODONE AND ACETAMINOPHEN 10; 325 MG/1; MG/1
1 TABLET ORAL EVERY 4 HOURS PRN
Qty: 42 TABLET | Refills: 0 | Status: CANCELLED | OUTPATIENT
Start: 2022-12-02

## 2022-12-02 RX ORDER — OXYCODONE AND ACETAMINOPHEN 10; 325 MG/1; MG/1
1 TABLET ORAL EVERY 4 HOURS PRN
Qty: 42 TABLET | Refills: 0 | OUTPATIENT
Start: 2022-12-02

## 2022-12-06 ENCOUNTER — PATIENT MESSAGE (OUTPATIENT)
Dept: ORTHOPEDICS | Facility: CLINIC | Age: 42
End: 2022-12-06
Payer: COMMERCIAL

## 2022-12-06 DIAGNOSIS — M25.572 CHRONIC PAIN OF LEFT ANKLE: ICD-10-CM

## 2022-12-06 DIAGNOSIS — G89.29 CHRONIC PAIN OF LEFT ANKLE: ICD-10-CM

## 2022-12-06 DIAGNOSIS — Z98.1 STATUS POST ANKLE ARTHRODESIS: ICD-10-CM

## 2022-12-06 RX ORDER — OXYCODONE AND ACETAMINOPHEN 10; 325 MG/1; MG/1
1 TABLET ORAL EVERY 4 HOURS PRN
Qty: 42 TABLET | Refills: 0 | OUTPATIENT
Start: 2022-12-06

## 2022-12-06 RX ORDER — OXYCODONE AND ACETAMINOPHEN 10; 325 MG/1; MG/1
1 TABLET ORAL EVERY 4 HOURS PRN
Qty: 42 TABLET | Refills: 0 | Status: CANCELLED | OUTPATIENT
Start: 2022-12-06

## 2022-12-07 ENCOUNTER — OUTSIDE PLACE OF SERVICE (OUTPATIENT)
Dept: CARDIOLOGY | Facility: CLINIC | Age: 42
End: 2022-12-07
Payer: COMMERCIAL

## 2022-12-07 PROCEDURE — 93010 ELECTROCARDIOGRAM REPORT: CPT | Mod: ,,, | Performed by: INTERNAL MEDICINE

## 2022-12-07 PROCEDURE — 93010 PR ELECTROCARDIOGRAM REPORT: ICD-10-PCS | Mod: ,,, | Performed by: INTERNAL MEDICINE

## 2022-12-08 ENCOUNTER — PATIENT MESSAGE (OUTPATIENT)
Dept: PSYCHIATRY | Facility: CLINIC | Age: 42
End: 2022-12-08
Payer: COMMERCIAL

## 2022-12-08 ENCOUNTER — PATIENT MESSAGE (OUTPATIENT)
Dept: INTERNAL MEDICINE | Facility: CLINIC | Age: 42
End: 2022-12-08
Payer: COMMERCIAL

## 2022-12-08 DIAGNOSIS — Z98.1 STATUS POST ANKLE ARTHRODESIS: ICD-10-CM

## 2022-12-08 DIAGNOSIS — M25.572 CHRONIC PAIN OF LEFT ANKLE: ICD-10-CM

## 2022-12-08 DIAGNOSIS — G89.29 CHRONIC PAIN OF LEFT ANKLE: ICD-10-CM

## 2022-12-08 RX ORDER — OXYCODONE AND ACETAMINOPHEN 10; 325 MG/1; MG/1
1 TABLET ORAL EVERY 4 HOURS PRN
Qty: 42 TABLET | Refills: 0 | OUTPATIENT
Start: 2022-12-08

## 2022-12-08 NOTE — TELEPHONE ENCOUNTER
"Routing as high priority w/ comment "Should pt be seen by primary care for withdrawals/ go back to ER/ get referral for facility?  Thanks!"  "

## 2022-12-09 DIAGNOSIS — M25.572 CHRONIC PAIN OF LEFT ANKLE: ICD-10-CM

## 2022-12-09 DIAGNOSIS — G89.29 CHRONIC PAIN OF LEFT ANKLE: ICD-10-CM

## 2022-12-09 DIAGNOSIS — Z98.1 STATUS POST ANKLE ARTHRODESIS: ICD-10-CM

## 2022-12-09 NOTE — TELEPHONE ENCOUNTER
Please see attached Rx Request    Navneet Chester MS, OTC   Sports Medicine Assistant   [facility]  (P) [phone]  (F) [phone]

## 2022-12-12 ENCOUNTER — PATIENT MESSAGE (OUTPATIENT)
Dept: ORTHOPEDICS | Facility: CLINIC | Age: 42
End: 2022-12-12
Payer: COMMERCIAL

## 2022-12-12 RX ORDER — OXYCODONE AND ACETAMINOPHEN 10; 325 MG/1; MG/1
1 TABLET ORAL EVERY 4 HOURS PRN
Qty: 42 TABLET | Refills: 0 | Status: SHIPPED | OUTPATIENT
Start: 2022-12-12 | End: 2022-12-16 | Stop reason: SDUPTHER

## 2022-12-16 DIAGNOSIS — Z98.1 STATUS POST ANKLE ARTHRODESIS: ICD-10-CM

## 2022-12-16 DIAGNOSIS — G89.29 CHRONIC PAIN OF LEFT ANKLE: ICD-10-CM

## 2022-12-16 DIAGNOSIS — M25.572 CHRONIC PAIN OF LEFT ANKLE: ICD-10-CM

## 2022-12-19 RX ORDER — OXYCODONE AND ACETAMINOPHEN 10; 325 MG/1; MG/1
1 TABLET ORAL EVERY 4 HOURS PRN
Qty: 42 TABLET | Refills: 0 | Status: SHIPPED | OUTPATIENT
Start: 2022-12-19 | End: 2022-12-23 | Stop reason: SDUPTHER

## 2022-12-23 ENCOUNTER — PATIENT MESSAGE (OUTPATIENT)
Dept: ORTHOPEDICS | Facility: CLINIC | Age: 42
End: 2022-12-23
Payer: COMMERCIAL

## 2022-12-23 DIAGNOSIS — M25.572 CHRONIC PAIN OF LEFT ANKLE: ICD-10-CM

## 2022-12-23 DIAGNOSIS — Z98.1 STATUS POST ANKLE ARTHRODESIS: ICD-10-CM

## 2022-12-23 DIAGNOSIS — G89.29 CHRONIC PAIN OF LEFT ANKLE: ICD-10-CM

## 2022-12-24 ENCOUNTER — PATIENT MESSAGE (OUTPATIENT)
Dept: ORTHOPEDICS | Facility: CLINIC | Age: 42
End: 2022-12-24
Payer: COMMERCIAL

## 2022-12-26 RX ORDER — OXYCODONE AND ACETAMINOPHEN 10; 325 MG/1; MG/1
1 TABLET ORAL EVERY 4 HOURS PRN
Qty: 42 TABLET | Refills: 0 | Status: SHIPPED | OUTPATIENT
Start: 2022-12-26 | End: 2023-01-03 | Stop reason: SDUPTHER

## 2022-12-31 DIAGNOSIS — Z98.1 STATUS POST ANKLE ARTHRODESIS: ICD-10-CM

## 2022-12-31 DIAGNOSIS — M25.572 CHRONIC PAIN OF LEFT ANKLE: ICD-10-CM

## 2022-12-31 DIAGNOSIS — G89.29 CHRONIC PAIN OF LEFT ANKLE: ICD-10-CM

## 2022-12-31 RX ORDER — OXYCODONE AND ACETAMINOPHEN 10; 325 MG/1; MG/1
1 TABLET ORAL EVERY 4 HOURS PRN
Qty: 42 TABLET | Refills: 0 | Status: CANCELLED | OUTPATIENT
Start: 2022-12-31

## 2023-01-02 ENCOUNTER — PATIENT MESSAGE (OUTPATIENT)
Dept: ORTHOPEDICS | Facility: CLINIC | Age: 43
End: 2023-01-02
Payer: COMMERCIAL

## 2023-01-02 DIAGNOSIS — G89.29 CHRONIC PAIN OF LEFT ANKLE: ICD-10-CM

## 2023-01-02 DIAGNOSIS — M25.572 CHRONIC PAIN OF LEFT ANKLE: ICD-10-CM

## 2023-01-02 DIAGNOSIS — Z98.1 STATUS POST ANKLE ARTHRODESIS: ICD-10-CM

## 2023-01-03 ENCOUNTER — PATIENT MESSAGE (OUTPATIENT)
Dept: INTERNAL MEDICINE | Facility: CLINIC | Age: 43
End: 2023-01-03
Payer: COMMERCIAL

## 2023-01-03 ENCOUNTER — PATIENT MESSAGE (OUTPATIENT)
Dept: ORTHOPEDICS | Facility: CLINIC | Age: 43
End: 2023-01-03
Payer: COMMERCIAL

## 2023-01-03 DIAGNOSIS — G89.29 CHRONIC PAIN OF LEFT ANKLE: ICD-10-CM

## 2023-01-03 DIAGNOSIS — Z98.1 STATUS POST ANKLE ARTHRODESIS: ICD-10-CM

## 2023-01-03 DIAGNOSIS — M25.572 CHRONIC PAIN OF LEFT ANKLE: ICD-10-CM

## 2023-01-03 RX ORDER — OXYCODONE AND ACETAMINOPHEN 10; 325 MG/1; MG/1
1 TABLET ORAL EVERY 4 HOURS PRN
Qty: 42 TABLET | Refills: 0 | Status: CANCELLED | OUTPATIENT
Start: 2023-01-03

## 2023-01-03 RX ORDER — OXYCODONE AND ACETAMINOPHEN 10; 325 MG/1; MG/1
1 TABLET ORAL EVERY 4 HOURS PRN
Qty: 42 TABLET | Refills: 0 | Status: SHIPPED | OUTPATIENT
Start: 2023-01-03 | End: 2023-01-07 | Stop reason: SDUPTHER

## 2023-01-07 DIAGNOSIS — M25.572 CHRONIC PAIN OF LEFT ANKLE: ICD-10-CM

## 2023-01-07 DIAGNOSIS — Z98.1 STATUS POST ANKLE ARTHRODESIS: ICD-10-CM

## 2023-01-07 DIAGNOSIS — G89.29 CHRONIC PAIN OF LEFT ANKLE: ICD-10-CM

## 2023-01-09 ENCOUNTER — PATIENT MESSAGE (OUTPATIENT)
Dept: ORTHOPEDICS | Facility: CLINIC | Age: 43
End: 2023-01-09
Payer: COMMERCIAL

## 2023-01-09 RX ORDER — OXYCODONE AND ACETAMINOPHEN 10; 325 MG/1; MG/1
1 TABLET ORAL EVERY 4 HOURS PRN
Qty: 42 TABLET | Refills: 0 | Status: SHIPPED | OUTPATIENT
Start: 2023-01-09 | End: 2023-01-13 | Stop reason: SDUPTHER

## 2023-01-13 ENCOUNTER — OFFICE VISIT (OUTPATIENT)
Dept: INTERNAL MEDICINE | Facility: CLINIC | Age: 43
End: 2023-01-13
Payer: COMMERCIAL

## 2023-01-13 ENCOUNTER — TELEPHONE (OUTPATIENT)
Dept: NEUROLOGY | Facility: CLINIC | Age: 43
End: 2023-01-13
Payer: COMMERCIAL

## 2023-01-13 VITALS
DIASTOLIC BLOOD PRESSURE: 86 MMHG | SYSTOLIC BLOOD PRESSURE: 138 MMHG | HEIGHT: 69 IN | OXYGEN SATURATION: 99 % | WEIGHT: 269.81 LBS | HEART RATE: 84 BPM | BODY MASS INDEX: 39.96 KG/M2

## 2023-01-13 DIAGNOSIS — M25.572 CHRONIC PAIN OF LEFT ANKLE: ICD-10-CM

## 2023-01-13 DIAGNOSIS — Z98.1 STATUS POST ANKLE ARTHRODESIS: ICD-10-CM

## 2023-01-13 DIAGNOSIS — G89.29 CHRONIC PAIN OF LEFT ANKLE: ICD-10-CM

## 2023-01-13 DIAGNOSIS — I63.9 CEREBROVASCULAR ACCIDENT (CVA), UNSPECIFIED MECHANISM: Primary | ICD-10-CM

## 2023-01-13 PROCEDURE — 1160F PR REVIEW ALL MEDS BY PRESCRIBER/CLIN PHARMACIST DOCUMENTED: ICD-10-PCS | Mod: CPTII,S$GLB,, | Performed by: PHYSICIAN ASSISTANT

## 2023-01-13 PROCEDURE — 99214 OFFICE O/P EST MOD 30 MIN: CPT | Mod: S$GLB,,, | Performed by: PHYSICIAN ASSISTANT

## 2023-01-13 PROCEDURE — 3008F PR BODY MASS INDEX (BMI) DOCUMENTED: ICD-10-PCS | Mod: CPTII,S$GLB,, | Performed by: PHYSICIAN ASSISTANT

## 2023-01-13 PROCEDURE — 99999 PR PBB SHADOW E&M-EST. PATIENT-LVL IV: CPT | Mod: PBBFAC,,, | Performed by: PHYSICIAN ASSISTANT

## 2023-01-13 PROCEDURE — 4010F PR ACE/ARB THEARPY RXD/TAKEN: ICD-10-PCS | Mod: CPTII,S$GLB,, | Performed by: PHYSICIAN ASSISTANT

## 2023-01-13 PROCEDURE — 1159F MED LIST DOCD IN RCRD: CPT | Mod: CPTII,S$GLB,, | Performed by: PHYSICIAN ASSISTANT

## 2023-01-13 PROCEDURE — 1160F RVW MEDS BY RX/DR IN RCRD: CPT | Mod: CPTII,S$GLB,, | Performed by: PHYSICIAN ASSISTANT

## 2023-01-13 PROCEDURE — 3008F BODY MASS INDEX DOCD: CPT | Mod: CPTII,S$GLB,, | Performed by: PHYSICIAN ASSISTANT

## 2023-01-13 PROCEDURE — 99999 PR PBB SHADOW E&M-EST. PATIENT-LVL IV: ICD-10-PCS | Mod: PBBFAC,,, | Performed by: PHYSICIAN ASSISTANT

## 2023-01-13 PROCEDURE — 3075F PR MOST RECENT SYSTOLIC BLOOD PRESS GE 130-139MM HG: ICD-10-PCS | Mod: CPTII,S$GLB,, | Performed by: PHYSICIAN ASSISTANT

## 2023-01-13 PROCEDURE — 3079F DIAST BP 80-89 MM HG: CPT | Mod: CPTII,S$GLB,, | Performed by: PHYSICIAN ASSISTANT

## 2023-01-13 PROCEDURE — 4010F ACE/ARB THERAPY RXD/TAKEN: CPT | Mod: CPTII,S$GLB,, | Performed by: PHYSICIAN ASSISTANT

## 2023-01-13 PROCEDURE — 3075F SYST BP GE 130 - 139MM HG: CPT | Mod: CPTII,S$GLB,, | Performed by: PHYSICIAN ASSISTANT

## 2023-01-13 PROCEDURE — 99214 PR OFFICE/OUTPT VISIT, EST, LEVL IV, 30-39 MIN: ICD-10-PCS | Mod: S$GLB,,, | Performed by: PHYSICIAN ASSISTANT

## 2023-01-13 PROCEDURE — 3079F PR MOST RECENT DIASTOLIC BLOOD PRESSURE 80-89 MM HG: ICD-10-PCS | Mod: CPTII,S$GLB,, | Performed by: PHYSICIAN ASSISTANT

## 2023-01-13 PROCEDURE — 1159F PR MEDICATION LIST DOCUMENTED IN MEDICAL RECORD: ICD-10-PCS | Mod: CPTII,S$GLB,, | Performed by: PHYSICIAN ASSISTANT

## 2023-01-13 RX ORDER — ATORVASTATIN CALCIUM 40 MG/1
40 TABLET, FILM COATED ORAL NIGHTLY
Qty: 90 TABLET | Refills: 3 | Status: SHIPPED | OUTPATIENT
Start: 2023-01-13 | End: 2024-01-13

## 2023-01-13 RX ORDER — AMLODIPINE BESYLATE 10 MG/1
10 TABLET ORAL DAILY
Qty: 90 TABLET | Refills: 3 | Status: SHIPPED | OUTPATIENT
Start: 2023-01-13 | End: 2023-12-06 | Stop reason: SDUPTHER

## 2023-01-13 RX ORDER — LOSARTAN POTASSIUM 25 MG/1
25 TABLET ORAL DAILY
Qty: 90 TABLET | Refills: 3 | Status: SHIPPED | OUTPATIENT
Start: 2023-01-13 | End: 2023-12-06 | Stop reason: SDUPTHER

## 2023-01-13 NOTE — LETTER
January 13, 2023      [facility]  [address]  [facility] [address]  Phone: [phone]  Fax: [phone]       Patient: Anitra Chaney   YOB: 1980  Date of Visit: 01/13/2023    To Whom It May Concern:    Vickie Chaney  was at [facility] on 01/13/2023. The patient may return to work/school on 01/16/2023 with no restrictions. If you have any questions or concerns, or if I can be of further assistance, please do not hesitate to contact me.    Sincerely,    Seema Orellana MA

## 2023-01-13 NOTE — PROGRESS NOTES
"INTERNAL MEDICINE PROGRESS NOTE    CHIEF COMPLAINT     Chief Complaint   Patient presents with    Hospital Follow Up       HPI     Anitra Chaney is a 42 y.o. female who presents for a follow-up visit today.    PCP is Tien Watts MD, patient is known to me.     Patient presents with complaints of complaints of left sided deficits after CVA -she describes having vascular occlusion resulting in stroke -this was identified upon presentation at [facility] ER. She reports that she was given "a form of clot buster" (specifics un known.). she was admitted to neuro ICU for three days and was discharged with plan to follow-up with PT/OT and PCP in outpatient setting. She presents today with no documenting or medical records from this event. I do not have acces to these records in Epic as she was seen at an outside facility.      Today she is feeling ok, still having left sided upper extremity weakness and left ankle pain. No abnormal speech or AMS/confusion.    She admits that she is here today so that she can get documentation to provide two weeks off of work while going to PT/OT for left arm sensory and strength deficits.     Past Medical History:  Past Medical History:   Diagnosis Date    Anxiety and depression     Encounter for blood transfusion     GERD (gastroesophageal reflux disease)     GI bleeds, multiple during admission     HTN (hypertension), benign 3/10/2014    RESOLVED    Migraines     Obesity     PFO (patent foramen ovale)     found after stroke "too small/risky to close"    Stroke 01/2017    Hospitalized at [facility]; given tPA       Home Medications:  Prior to Admission medications    Medication Sig Start Date End Date Taking? Authorizing Provider   amLODIPine (NORVASC) 10 MG tablet Take 1 tablet (10 mg total) by mouth once daily. 1/3/23  Yes    aspirin 81 MG Chew Chew and swallow 1 tablet by mouth once daily 1/3/23  Yes    atorvastatin (LIPITOR) 40 MG tablet Take 1 tablet (40 mg total) by " mouth every evening. 1/3/23  Yes    cephALEXin (KEFLEX) 500 MG capsule Take 1 capsule (500 mg total) by mouth every 6 (six) hours. 10/11/22  Yes Tien Watts MD   clotrimazole-betamethasone 1-0.05% (LOTRISONE) cream Apply topically twice a day for 5-7 days 5/19/22  Yes Salomón Hernandez MD   cyanocobalamin (VITAMIN B-12) 1000 MCG tablet Take 1 tablet (1,000 mcg total) by mouth once daily. 1/3/23  Yes    DULoxetine (CYMBALTA) 30 MG capsule Take 1 capsule by mouth daily for 1 week, then take 2 capsules daily 10/21/22  Yes Ab Mendoza NP   fluconazole (DIFLUCAN) 150 MG Tab Take 1 tablet (150 mg total) by mouth every 72 hours. 11/8/22  Yes Leida Andrade MD   hydrOXYzine (ATARAX) 50 MG tablet Take 1 tablet as needed for sleep. Can also take 1/2 tablet two times daily as needed for anxiety 10/21/22  Yes Ab Mendoza NP   losartan (COZAAR) 25 MG tablet Take 1 tablet (25 mg total) by mouth once daily. 1/3/23  Yes    nicotine (NICODERM CQ) 14 mg/24 hr 1 PATCH TRANSDERM DAILY after using one week of 21mg. 1/3/23  Yes    nicotine (NICODERM CQ) 21 mg/24 hr APPLY 1 PATCH TRANSDERM DAILY. Use this strength first 1/3/23  Yes    nicotine (NICODERM CQ) 7 mg/24 hr APPLY 1 PATCH TRANSDERM DAILY 1/3/23  Yes    oxyCODONE-acetaminophen (PERCOCET)  mg per tablet Take 1 tablet by mouth every 4 (four) hours as needed for Pain. 1/9/23  Yes Lele Hernández MD   pantoprazole (PROTONIX) 40 MG tablet Take 1 tablet (40 mg total) by mouth 2 (two) times daily. 11/7/22 11/7/23 Yes Nolan Cordero MD   vitamin D (VITAMIN D3) 1000 units Tab Take 1 tablet by mouth once daily 1/3/23  Yes    albuterol (PROVENTIL/VENTOLIN HFA) 90 mcg/actuation inhaler Inhale 1-2 puffs into the lungs every 4 (four) hours as needed for Wheezing or Shortness of Breath. Rescue  Patient not taking: Reported on 8/3/2022 2/21/22 2/21/23  Tien Watts MD   cyanocobalamin 1,000 mcg/mL injection Weekly x 4 doses and then every 4  "weeks  Patient not taking: Reported on 8/3/2022 9/14/21   Flori Rivas MD   hyoscyamine (LEVSIN/SL) 0.125 mg Subl Place 1 tablet (0.125 mg total) under the tongue every 4 (four) hours as needed. 11/6/22   Hui Barriga PA-C   methylPREDNISolone (MEDROL, BRENNAN,) 4 mg tablet Take as instructed on package  Patient not taking: Reported on 8/3/2022 7/1/22   Lele Hernández MD   ondansetron (ZOFRAN) 4 MG tablet Take 1 tablet (4 mg total) by mouth every 8 (eight) hours as needed for Nausea. 7/3/22   Eliz Saleh, WILLIE   syringe with needle 3 mL 22 x 1 1/2" Syrg Use to inject B12 shots intramuscularly as directed.  Patient not taking: Reported on 8/3/2022 9/14/21   Flori Rivas MD       Review of Systems:  Review of Systems   Constitutional:  Negative for chills and fever.   HENT:  Negative for sore throat and trouble swallowing.    Eyes:  Negative for visual disturbance.   Respiratory:  Negative for cough and shortness of breath.    Cardiovascular:  Negative for chest pain.   Gastrointestinal:  Negative for abdominal pain, constipation, diarrhea, nausea and vomiting.   Genitourinary:  Negative for dysuria and flank pain.   Musculoskeletal:  Negative for back pain, neck pain and neck stiffness.   Skin:  Negative for rash.   Neurological:  Negative for dizziness, syncope, weakness and headaches.   Psychiatric/Behavioral:  Negative for confusion.      Health Maintainence:   Immunizations:  Health Maintenance         Date Due Completion Date    Monkeypox Vaccine (1 - Risk 2-dose series) Never done ---    Sign Pain Contract Never done ---    Complete Opioid Risk Tool Never done ---    Urine Drug Screen Never done ---    Naloxone Prescription Never done ---    COVID-19 Vaccine (5 - Booster) 06/15/2022 4/20/2022    Hemoglobin A1c 07/15/2022 7/15/2021    Mammogram 08/16/2022 8/16/2021    Influenza Vaccine (1) 09/01/2022 10/15/2021    TETANUS VACCINE 05/04/2030 5/4/2020             PHYSICAL EXAM     /86 (BP Location: " "Left arm, Patient Position: Sitting, BP Method: Large (Manual))   Pulse 84   Ht 5' 9" (1.753 m)   Wt 122.4 kg (269 lb 13.5 oz)   LMP 08/09/2021 (Approximate)   SpO2 99%   BMI 39.85 kg/m²     Physical Exam  Vitals and nursing note reviewed.   Constitutional:       Appearance: Normal appearance.      Comments: Healthy appearing female in NAD or apparent pain. She makes good eye contact, speaks in clear full sentences and ambulates with ease.        HENT:      Head: Normocephalic and atraumatic.      Nose: Nose normal.      Mouth/Throat:      Pharynx: Oropharynx is clear.   Eyes:      Conjunctiva/sclera: Conjunctivae normal.   Cardiovascular:      Rate and Rhythm: Normal rate and regular rhythm.      Pulses: Normal pulses.   Pulmonary:      Effort: No respiratory distress.   Abdominal:      Tenderness: There is no abdominal tenderness.   Musculoskeletal:         General: Normal range of motion.      Cervical back: No rigidity.      Comments: Left upper extremity guarding and some weakness on  strength and pronator drift. She reports sensory deficits to the left upper extremity. She has inconsistent left sided CN deficits on exam.    Skin:     General: Skin is warm and dry.      Capillary Refill: Capillary refill takes less than 2 seconds.      Findings: No rash.   Neurological:      General: No focal deficit present.      Mental Status: She is alert.      Gait: Gait normal.   Psychiatric:         Mood and Affect: Mood normal.       LABS     Lab Results   Component Value Date    HGBA1C 5.0 07/15/2021     CMP  Sodium   Date Value Ref Range Status   11/06/2022 142 136 - 145 mmol/L Final     Potassium   Date Value Ref Range Status   11/06/2022 3.9 3.5 - 5.1 mmol/L Final     Chloride   Date Value Ref Range Status   11/06/2022 108 95 - 110 mmol/L Final     CO2   Date Value Ref Range Status   11/06/2022 25 23 - 29 mmol/L Final     Glucose   Date Value Ref Range Status   11/06/2022 95 70 - 110 mg/dL Final     BUN "   Date Value Ref Range Status   11/06/2022 7 6 - 20 mg/dL Final     Creatinine   Date Value Ref Range Status   11/06/2022 0.7 0.5 - 1.4 mg/dL Final     Calcium   Date Value Ref Range Status   11/06/2022 9.1 8.7 - 10.5 mg/dL Final     Total Protein   Date Value Ref Range Status   11/06/2022 7.1 6.0 - 8.4 g/dL Final     Albumin   Date Value Ref Range Status   11/06/2022 3.6 3.5 - 5.2 g/dL Final     Total Bilirubin   Date Value Ref Range Status   11/06/2022 0.2 0.1 - 1.0 mg/dL Final     Comment:     For infants and newborns, interpretation of results should be based  on gestational age, weight and in agreement with clinical  observations.    Premature Infant recommended reference ranges:  Up to 24 hours.............<8.0 mg/dL  Up to 48 hours............<12.0 mg/dL  3-5 days..................<15.0 mg/dL  6-29 days.................<15.0 mg/dL       Alkaline Phosphatase   Date Value Ref Range Status   11/06/2022 63 55 - 135 U/L Final     AST   Date Value Ref Range Status   11/06/2022 15 10 - 40 U/L Final     ALT   Date Value Ref Range Status   11/06/2022 16 10 - 44 U/L Final     Anion Gap   Date Value Ref Range Status   11/06/2022 9 8 - 16 mmol/L Final     eGFR if    Date Value Ref Range Status   04/17/2022 >60 >60 mL/min/1.73 m^2 Final     eGFR if non    Date Value Ref Range Status   04/17/2022 >60 >60 mL/min/1.73 m^2 Final     Comment:     Calculation used to obtain the estimated glomerular filtration  rate (eGFR) is the CKD-EPI equation.        Lab Results   Component Value Date    WBC 5.68 11/06/2022    HGB 13.6 11/06/2022    HCT 40.0 11/06/2022    MCV 86 11/06/2022     11/06/2022     Lab Results   Component Value Date    CHOL 210 (H) 05/19/2020    CHOL 143 01/09/2019    CHOL 177 01/11/2016     Lab Results   Component Value Date    HDL 67 05/19/2020    HDL 54 01/09/2019    HDL 34 (L) 01/11/2016     Lab Results   Component Value Date    LDLCALC 91.8 05/19/2020    LDLCALC 63.8  01/09/2019    LDLCALC 99.4 01/11/2016     Lab Results   Component Value Date    TRIG 256 (H) 05/19/2020    TRIG 126 01/09/2019    TRIG 218 (H) 01/11/2016     Lab Results   Component Value Date    CHOLHDL 31.9 05/19/2020    CHOLHDL 37.8 01/09/2019    CHOLHDL 19.2 (L) 01/11/2016     Lab Results   Component Value Date    TSH 1.335 07/15/2021       ASSESSMENT/PLAN     Anitra Chaney is a 42 y.o. female     Anitra was seen today for hospital follow up for CVA - need official records review. I asked patient to fill out a MARIELENA form today in office so that we can request records from [facility] - also asked patient to obtain records (this approach may be faster). Referral placed to [facility] for follow-up. Meds refilled on pt's request.     Diagnoses and all orders for this visit:    Cerebrovascular accident (CVA), unspecified mechanism  -     Ambulatory referral/consult to Vascular Neurology; Future    Other orders  -     amLODIPine (NORVASC) 10 MG tablet; Take 1 tablet (10 mg total) by mouth once daily.  -     atorvastatin (LIPITOR) 40 MG tablet; Take 1 tablet (40 mg total) by mouth every evening.  -     losartan (COZAAR) 25 MG tablet; Take 1 tablet (25 mg total) by mouth once daily.      Sandy Flowers PA-C   Department of Internal Medicine - [facility]   9:53 AM

## 2023-01-13 NOTE — TELEPHONE ENCOUNTER
Called pt to schedule a hospital f/u for a recent stroke. I explained to pt that since her stroke occurred outside the [facility] system, she will need to bring in imaging and reports. Pt verbalized understanding and stated that her PCP has already submitted requests for those medical records. Pt stated she knew Dr. Vasquez, but I explained that Mary Lou Bess is his NP and sees his pts in clinic. Pt stated that this was fine so I scheduled her for 2/1 at 9:30am with Mary Lou. Pt confirmed date/time of appt

## 2023-01-14 NOTE — TELEPHONE ENCOUNTER
Please see attached Rx Request    Navneet Chester MS, OTC   Sports Medicine Assistant   [facility]  (P) [phone]  (F) [phone]

## 2023-01-16 ENCOUNTER — PATIENT MESSAGE (OUTPATIENT)
Dept: INTERNAL MEDICINE | Facility: CLINIC | Age: 43
End: 2023-01-16
Payer: COMMERCIAL

## 2023-01-16 RX ORDER — OXYCODONE AND ACETAMINOPHEN 10; 325 MG/1; MG/1
1 TABLET ORAL EVERY 4 HOURS PRN
Qty: 42 TABLET | Refills: 0 | Status: SHIPPED | OUTPATIENT
Start: 2023-01-16 | End: 2023-01-21 | Stop reason: SDUPTHER

## 2023-01-21 DIAGNOSIS — Z98.1 STATUS POST ANKLE ARTHRODESIS: ICD-10-CM

## 2023-01-21 DIAGNOSIS — M25.572 CHRONIC PAIN OF LEFT ANKLE: ICD-10-CM

## 2023-01-21 DIAGNOSIS — G89.29 CHRONIC PAIN OF LEFT ANKLE: ICD-10-CM

## 2023-01-23 ENCOUNTER — TELEPHONE (OUTPATIENT)
Dept: PSYCHIATRY | Facility: CLINIC | Age: 43
End: 2023-01-23
Payer: COMMERCIAL

## 2023-01-23 RX ORDER — OXYCODONE AND ACETAMINOPHEN 10; 325 MG/1; MG/1
1 TABLET ORAL EVERY 4 HOURS PRN
Qty: 42 TABLET | Refills: 0 | Status: SHIPPED | OUTPATIENT
Start: 2023-01-23 | End: 2023-01-27 | Stop reason: SDUPTHER

## 2023-01-24 ENCOUNTER — TELEPHONE (OUTPATIENT)
Dept: INTERNAL MEDICINE | Facility: CLINIC | Age: 43
End: 2023-01-24
Payer: COMMERCIAL

## 2023-01-27 DIAGNOSIS — M25.572 CHRONIC PAIN OF LEFT ANKLE: ICD-10-CM

## 2023-01-27 DIAGNOSIS — Z98.1 STATUS POST ANKLE ARTHRODESIS: ICD-10-CM

## 2023-01-27 DIAGNOSIS — G89.29 CHRONIC PAIN OF LEFT ANKLE: ICD-10-CM

## 2023-01-30 ENCOUNTER — PATIENT MESSAGE (OUTPATIENT)
Dept: ORTHOPEDICS | Facility: CLINIC | Age: 43
End: 2023-01-30
Payer: COMMERCIAL

## 2023-01-30 RX ORDER — OXYCODONE AND ACETAMINOPHEN 10; 325 MG/1; MG/1
1 TABLET ORAL EVERY 4 HOURS PRN
Qty: 42 TABLET | Refills: 0 | Status: SHIPPED | OUTPATIENT
Start: 2023-01-30 | End: 2023-02-06 | Stop reason: SDUPTHER

## 2023-02-01 ENCOUNTER — PATIENT OUTREACH (OUTPATIENT)
Dept: ADMINISTRATIVE | Facility: HOSPITAL | Age: 43
End: 2023-02-01
Payer: COMMERCIAL

## 2023-02-01 DIAGNOSIS — Z12.31 ENCOUNTER FOR SCREENING MAMMOGRAM FOR BREAST CANCER: Primary | ICD-10-CM

## 2023-02-04 DIAGNOSIS — Z98.1 STATUS POST ANKLE ARTHRODESIS: ICD-10-CM

## 2023-02-04 DIAGNOSIS — G89.29 CHRONIC PAIN OF LEFT ANKLE: ICD-10-CM

## 2023-02-04 DIAGNOSIS — M25.572 CHRONIC PAIN OF LEFT ANKLE: ICD-10-CM

## 2023-02-04 RX ORDER — OXYCODONE AND ACETAMINOPHEN 10; 325 MG/1; MG/1
1 TABLET ORAL EVERY 4 HOURS PRN
Qty: 42 TABLET | Refills: 0 | Status: CANCELLED | OUTPATIENT
Start: 2023-02-04

## 2023-02-06 ENCOUNTER — PATIENT MESSAGE (OUTPATIENT)
Dept: ORTHOPEDICS | Facility: CLINIC | Age: 43
End: 2023-02-06
Payer: COMMERCIAL

## 2023-02-06 DIAGNOSIS — Z98.1 STATUS POST ANKLE ARTHRODESIS: ICD-10-CM

## 2023-02-06 DIAGNOSIS — M25.572 CHRONIC PAIN OF LEFT ANKLE: ICD-10-CM

## 2023-02-06 DIAGNOSIS — G89.29 CHRONIC PAIN OF LEFT ANKLE: ICD-10-CM

## 2023-02-06 RX ORDER — OXYCODONE AND ACETAMINOPHEN 10; 325 MG/1; MG/1
1 TABLET ORAL EVERY 4 HOURS PRN
Qty: 42 TABLET | Refills: 0 | OUTPATIENT
Start: 2023-02-06

## 2023-02-06 RX ORDER — OXYCODONE AND ACETAMINOPHEN 10; 325 MG/1; MG/1
1 TABLET ORAL EVERY 4 HOURS PRN
Qty: 42 TABLET | Refills: 0 | Status: SHIPPED | OUTPATIENT
Start: 2023-02-06 | End: 2023-02-13 | Stop reason: SDUPTHER

## 2023-02-10 ENCOUNTER — PATIENT MESSAGE (OUTPATIENT)
Dept: NEUROLOGY | Facility: CLINIC | Age: 43
End: 2023-02-10
Payer: COMMERCIAL

## 2023-02-10 ENCOUNTER — TELEPHONE (OUTPATIENT)
Dept: NEUROLOGY | Facility: CLINIC | Age: 43
End: 2023-02-10
Payer: COMMERCIAL

## 2023-02-10 DIAGNOSIS — M25.572 CHRONIC PAIN OF LEFT ANKLE: ICD-10-CM

## 2023-02-10 DIAGNOSIS — Z98.1 STATUS POST ANKLE ARTHRODESIS: ICD-10-CM

## 2023-02-10 DIAGNOSIS — G89.29 CHRONIC PAIN OF LEFT ANKLE: ICD-10-CM

## 2023-02-10 RX ORDER — OXYCODONE AND ACETAMINOPHEN 10; 325 MG/1; MG/1
1 TABLET ORAL EVERY 4 HOURS PRN
Qty: 42 TABLET | Refills: 0 | Status: CANCELLED | OUTPATIENT
Start: 2023-02-10

## 2023-02-10 NOTE — TELEPHONE ENCOUNTER
Please see attached Rx Request    Navneet Chester MS, OTC   Sports Medicine Assistant   [facility]  (P) [phone]  (F) [phone]

## 2023-02-10 NOTE — TELEPHONE ENCOUNTER
Called patient and left voice mail to reschedule appt until all records and imaging disc are received from [facility]. My Chart Message sent.

## 2023-02-13 ENCOUNTER — PATIENT MESSAGE (OUTPATIENT)
Dept: ORTHOPEDICS | Facility: CLINIC | Age: 43
End: 2023-02-13
Payer: COMMERCIAL

## 2023-02-13 DIAGNOSIS — Z98.1 STATUS POST ANKLE ARTHRODESIS: ICD-10-CM

## 2023-02-13 DIAGNOSIS — M25.572 CHRONIC PAIN OF LEFT ANKLE: ICD-10-CM

## 2023-02-13 DIAGNOSIS — G89.29 CHRONIC PAIN OF LEFT ANKLE: ICD-10-CM

## 2023-02-13 RX ORDER — OXYCODONE AND ACETAMINOPHEN 10; 325 MG/1; MG/1
1 TABLET ORAL EVERY 4 HOURS PRN
Qty: 42 TABLET | Refills: 0 | OUTPATIENT
Start: 2023-02-13

## 2023-02-13 RX ORDER — OXYCODONE AND ACETAMINOPHEN 10; 325 MG/1; MG/1
1 TABLET ORAL EVERY 4 HOURS PRN
Qty: 42 TABLET | Refills: 0 | Status: SHIPPED | OUTPATIENT
Start: 2023-02-13 | End: 2023-02-19 | Stop reason: SDUPTHER

## 2023-02-20 ENCOUNTER — PATIENT MESSAGE (OUTPATIENT)
Dept: ORTHOPEDICS | Facility: CLINIC | Age: 43
End: 2023-02-20
Payer: COMMERCIAL

## 2023-02-20 ENCOUNTER — PATIENT MESSAGE (OUTPATIENT)
Dept: NEUROLOGY | Facility: CLINIC | Age: 43
End: 2023-02-20
Payer: COMMERCIAL

## 2023-02-20 DIAGNOSIS — M25.572 CHRONIC PAIN OF LEFT ANKLE: ICD-10-CM

## 2023-02-20 DIAGNOSIS — Z98.1 STATUS POST ANKLE ARTHRODESIS: ICD-10-CM

## 2023-02-20 DIAGNOSIS — G89.29 CHRONIC PAIN OF LEFT ANKLE: ICD-10-CM

## 2023-02-20 RX ORDER — OXYCODONE AND ACETAMINOPHEN 10; 325 MG/1; MG/1
1 TABLET ORAL EVERY 4 HOURS PRN
Qty: 42 TABLET | Refills: 0 | OUTPATIENT
Start: 2023-02-20

## 2023-02-24 DIAGNOSIS — Z98.1 STATUS POST ANKLE ARTHRODESIS: ICD-10-CM

## 2023-02-24 DIAGNOSIS — M25.572 CHRONIC PAIN OF LEFT ANKLE: ICD-10-CM

## 2023-02-24 DIAGNOSIS — G89.29 CHRONIC PAIN OF LEFT ANKLE: ICD-10-CM

## 2023-02-27 RX ORDER — OXYCODONE AND ACETAMINOPHEN 10; 325 MG/1; MG/1
1 TABLET ORAL EVERY 4 HOURS PRN
Qty: 42 TABLET | Refills: 0 | Status: SHIPPED | OUTPATIENT
Start: 2023-02-27 | End: 2023-03-03 | Stop reason: SDUPTHER

## 2023-03-01 ENCOUNTER — PATIENT MESSAGE (OUTPATIENT)
Dept: INTERNAL MEDICINE | Facility: CLINIC | Age: 43
End: 2023-03-01
Payer: COMMERCIAL

## 2023-03-01 DIAGNOSIS — M54.41 CHRONIC BILATERAL LOW BACK PAIN WITH BILATERAL SCIATICA: Primary | ICD-10-CM

## 2023-03-01 DIAGNOSIS — G89.29 CHRONIC BILATERAL LOW BACK PAIN WITH BILATERAL SCIATICA: Primary | ICD-10-CM

## 2023-03-01 DIAGNOSIS — M54.42 CHRONIC BILATERAL LOW BACK PAIN WITH BILATERAL SCIATICA: Primary | ICD-10-CM

## 2023-03-03 DIAGNOSIS — G89.29 CHRONIC PAIN OF LEFT ANKLE: ICD-10-CM

## 2023-03-03 DIAGNOSIS — M25.572 CHRONIC PAIN OF LEFT ANKLE: ICD-10-CM

## 2023-03-03 DIAGNOSIS — Z98.1 STATUS POST ANKLE ARTHRODESIS: ICD-10-CM

## 2023-03-06 ENCOUNTER — PATIENT MESSAGE (OUTPATIENT)
Dept: ORTHOPEDICS | Facility: CLINIC | Age: 43
End: 2023-03-06
Payer: COMMERCIAL

## 2023-03-06 RX ORDER — OXYCODONE AND ACETAMINOPHEN 10; 325 MG/1; MG/1
1 TABLET ORAL EVERY 4 HOURS PRN
Qty: 42 TABLET | Refills: 0 | Status: SHIPPED | OUTPATIENT
Start: 2023-03-06 | End: 2023-03-11 | Stop reason: SDUPTHER

## 2023-03-11 DIAGNOSIS — M25.572 CHRONIC PAIN OF LEFT ANKLE: ICD-10-CM

## 2023-03-11 DIAGNOSIS — Z98.1 STATUS POST ANKLE ARTHRODESIS: ICD-10-CM

## 2023-03-11 DIAGNOSIS — G89.29 CHRONIC PAIN OF LEFT ANKLE: ICD-10-CM

## 2023-03-13 RX ORDER — OXYCODONE AND ACETAMINOPHEN 10; 325 MG/1; MG/1
1 TABLET ORAL EVERY 4 HOURS PRN
Qty: 42 TABLET | Refills: 0 | Status: SHIPPED | OUTPATIENT
Start: 2023-03-13 | End: 2023-03-17 | Stop reason: SDUPTHER

## 2023-03-16 ENCOUNTER — OFFICE VISIT (OUTPATIENT)
Dept: PSYCHIATRY | Facility: CLINIC | Age: 43
End: 2023-03-16
Payer: COMMERCIAL

## 2023-03-16 VITALS
SYSTOLIC BLOOD PRESSURE: 132 MMHG | WEIGHT: 281 LBS | BODY MASS INDEX: 41.49 KG/M2 | HEART RATE: 75 BPM | DIASTOLIC BLOOD PRESSURE: 61 MMHG

## 2023-03-16 DIAGNOSIS — F33.1 MAJOR DEPRESSIVE DISORDER, RECURRENT EPISODE, MODERATE: ICD-10-CM

## 2023-03-16 DIAGNOSIS — F11.90 OPIOID USE DISORDER: ICD-10-CM

## 2023-03-16 DIAGNOSIS — F41.1 GAD (GENERALIZED ANXIETY DISORDER): Primary | ICD-10-CM

## 2023-03-16 PROCEDURE — 99999 PR PBB SHADOW E&M-EST. PATIENT-LVL II: CPT | Mod: PBBFAC,,, | Performed by: NURSE PRACTITIONER

## 2023-03-16 PROCEDURE — 3075F SYST BP GE 130 - 139MM HG: CPT | Mod: CPTII,S$GLB,, | Performed by: NURSE PRACTITIONER

## 2023-03-16 PROCEDURE — 99214 PR OFFICE/OUTPT VISIT, EST, LEVL IV, 30-39 MIN: ICD-10-PCS | Mod: S$GLB,,, | Performed by: NURSE PRACTITIONER

## 2023-03-16 PROCEDURE — 4010F PR ACE/ARB THEARPY RXD/TAKEN: ICD-10-PCS | Mod: CPTII,S$GLB,, | Performed by: NURSE PRACTITIONER

## 2023-03-16 PROCEDURE — 3078F PR MOST RECENT DIASTOLIC BLOOD PRESSURE < 80 MM HG: ICD-10-PCS | Mod: CPTII,S$GLB,, | Performed by: NURSE PRACTITIONER

## 2023-03-16 PROCEDURE — 4010F ACE/ARB THERAPY RXD/TAKEN: CPT | Mod: CPTII,S$GLB,, | Performed by: NURSE PRACTITIONER

## 2023-03-16 PROCEDURE — 99214 OFFICE O/P EST MOD 30 MIN: CPT | Mod: S$GLB,,, | Performed by: NURSE PRACTITIONER

## 2023-03-16 PROCEDURE — 3078F DIAST BP <80 MM HG: CPT | Mod: CPTII,S$GLB,, | Performed by: NURSE PRACTITIONER

## 2023-03-16 PROCEDURE — 3008F PR BODY MASS INDEX (BMI) DOCUMENTED: ICD-10-PCS | Mod: CPTII,S$GLB,, | Performed by: NURSE PRACTITIONER

## 2023-03-16 PROCEDURE — 99999 PR PBB SHADOW E&M-EST. PATIENT-LVL II: ICD-10-PCS | Mod: PBBFAC,,, | Performed by: NURSE PRACTITIONER

## 2023-03-16 PROCEDURE — 3075F PR MOST RECENT SYSTOLIC BLOOD PRESS GE 130-139MM HG: ICD-10-PCS | Mod: CPTII,S$GLB,, | Performed by: NURSE PRACTITIONER

## 2023-03-16 PROCEDURE — 3008F BODY MASS INDEX DOCD: CPT | Mod: CPTII,S$GLB,, | Performed by: NURSE PRACTITIONER

## 2023-03-16 RX ORDER — TRAZODONE HYDROCHLORIDE 50 MG/1
50 TABLET ORAL NIGHTLY
Qty: 30 TABLET | Refills: 2 | Status: SHIPPED | OUTPATIENT
Start: 2023-03-16 | End: 2023-04-06 | Stop reason: SDUPTHER

## 2023-03-16 RX ORDER — DULOXETIN HYDROCHLORIDE 30 MG/1
CAPSULE, DELAYED RELEASE ORAL
Qty: 60 CAPSULE | Refills: 2 | Status: SHIPPED | OUTPATIENT
Start: 2023-03-16 | End: 2023-04-06 | Stop reason: SDUPTHER
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
## 2023-03-16 NOTE — PLAN OF CARE
CHIEF COMPLAINT:   Menorrhagia    HISTORY OF PRESENT ILLNESS:   Patient is a 45 year old female, G 3, P 2, LMP 2/10/2023.   Patient presents today because she has dysfunctional uterine bleeding.  Her menstruation can be between 3 days up to 10 days.  Her menstruations are very heavy but short or she has at least 5 days of heavy menstruation when she has longer menstruations.  She is passing clots and she feels very fatigued.  Patient has severe pelvic pain radiating to her lower back.  Sometimes she is having pelvic pain even without menstruation.  Her symptoms are going on for more than a year.    Pelvic ultrasound was done on 02/05/2023  Uterus measures 10.3 x 5.9 cm  Patient has presumed fibroids measuring 5.1 cm 1.7 cm and 2.4 cm  Endometrium was 14 mm.  Bilateral ovaries were within normal limits.      ROS    Denies any fever or chills.  Denies shortness of breath, chest pain or difficulty swallowing   Denies any dysuria or hematuria.  Denies nausea or vomiting.    Past Medical History:   Diagnosis Date   • Anxiety 02/09/2018   • Eustachian tube dysfunction    • Fibroid uterus    • Menorrhagia    • Pelvic pain    • Primary localized osteoarthrosis, hand 07/28/2015   • Reflux    • Trochanteric bursitis of right hip 07/28/2015   • Vitamin D deficiency        Past Surgical History:   Procedure Laterality Date   • Bunionectomy  04/21/2011   • Tubal ligation         Medication  Prior to Admission medications    Medication Sig Start Date End Date Taking? Authorizing Provider   vitamin D3 (CHOLECALCIFEROL) 1.25 mg (50,000 units) capsule    Yes Outside Provider   citalopram (CeleXA) 20 MG tablet Take 1 tablet by mouth daily. 12/20/22  Yes Christelle Ty NP   montelukast (SINGULAIR) 10 MG tablet Take 1 tablet by mouth nightly. 12/20/22  Yes Christelle Ty NP   SUMAtriptan (IMITREX) 50 MG tablet TAKE 1 TABLET BY MOUTH AT ONSET OF MIGRAINE. MAY REPEAT AFTER 2 HOURS IF NEEDED. 4/28/21  Yes Christelle Ty NP  Discharge planner met with patient at the bedside, Patient denies any inpatient admissions less than 30 days.  Patient able to verify all demographics in epic to be correct.   Verified PCP: Tien Watts MD  Preferred Pharmacy:   [facility]  [address]  [facility] [address]  Phone: [phone] Fax: [phone]    [facility]  [address]  [place] [address]  Phone: [phone] Fax: [phone]    [id number] - [place], [place] - [address]  [address]  [facility] [address]  Phone: [phone] Fax: [phone]    Patient who is at week 17 of pregnancy, reports being independent in her ADL's, lives at home with her spouse and two children; 12 yo and 8 yo. Patient denies having any discharge needs at this time  No needs identified at this time, case management to continue to follow.    12/9/18 1230   Discharge Assessment   Assessment Type Discharge Planning Assessment   Confirmed/corrected address and phone number on face sheet? Yes   Assessment information obtained from? Patient    Communicated expected length of stay with patient/caregiver Yes, observation    Prior to hospitalization cognitive status: Alert/Oriented   Prior to hospitalization functional status: Independent     Current cognitive status: Alert/Oriented    Current Functional Status: Independent    Arrived From home or self-care   Able to Return to Prior Arrangements Yes    Is patient able to care for self after discharge? Yes    Provided patient/caregiver education on the expected discharge date and the discharge plan Yes   Patient currently being followed by outpatient case management? No   Patient currently receives any other outside agency services? No   Do you have any problems affording any of your prescribed medications?    Is the patient taking medications as prescribed? Yes   Do you have any financial concerns preventing you from receiving the healthcare you    Esomeprazole Magnesium (NEXIUM PO)    Yes Outside Provider       ALLERGIES:  No Known Allergies    Nonsmoker     PHYSICAL EXAMINATION:   GENERAL: Well-nourished, well-developed female not in acute distress.   VITAL SIGNS:   Visit Vitals  BP (!) 146/104   Ht 5' 5\" (1.651 m)   Wt 96.7 kg (213 lb 3 oz)   LMP 02/10/2023 (Approximate)   BMI 35.48 kg/m²      HEENT: Within normal limits.   NECK: Supple, without thyromegaly.   LUNGS: Clear to auscultation.   HEART: Regular rhythm and rate.   ABDOMEN: Nondistended abdomen    Good bowel sound was present    Soft and nontender without mass    No rebound or guarding was noted     PELVIC:  External genitalia, no abnormal skin growth or skin changes  Urethra  normal in appearance without evidence of atrophy or prolapse    Vagina  normal appearing vaginal mucosa without prolapse    Cervix   normal appearing without prolapse   Uterus uterus was enlarged between 12-14 week size    Adnexa without mass or tenderness       Endometrial biopsy was performed during today's visit.    Pipelle was inserted into intrauterine cavity without any resistance.    Endometrial cavity was sounded to 10 cm without any resistance.   Endometrial biopsy was done with minimal discomfort and bleeding.    ASSESSMENT:   Dysfunctional uterine bleeding  Increasing pelvic pain  Fibroid uterus    PLAN:   Pap smear was normal from 12/13/2022  Endometrial biopsy was performed and sent to pathology.  Patient may benefit from robotic assisted Total Laparoscopic Hysterectomy and bilateral salpingectomy.  The patient understands the risks of bleeding, infection, damage to adjacent organs including bladder, bowel and ureters.    Patient is to follow-up in the future as clinically indicated.       need? No   Does the patient have transportation to healthcare appointments? Yes   Transportation Available family or friend will provide   Discharge Plan A Home with self care   Discharge Plan B Home with family

## 2023-03-18 DIAGNOSIS — Z98.1 STATUS POST ANKLE ARTHRODESIS: ICD-10-CM

## 2023-03-18 DIAGNOSIS — G89.29 CHRONIC PAIN OF LEFT ANKLE: ICD-10-CM

## 2023-03-18 DIAGNOSIS — M25.572 CHRONIC PAIN OF LEFT ANKLE: ICD-10-CM

## 2023-03-20 RX ORDER — OXYCODONE AND ACETAMINOPHEN 10; 325 MG/1; MG/1
1 TABLET ORAL EVERY 4 HOURS PRN
Qty: 42 TABLET | Refills: 0 | OUTPATIENT
Start: 2023-03-20

## 2023-03-31 ENCOUNTER — PATIENT MESSAGE (OUTPATIENT)
Dept: ORTHOPEDICS | Facility: CLINIC | Age: 43
End: 2023-03-31
Payer: COMMERCIAL

## 2023-03-31 DIAGNOSIS — Z98.1 STATUS POST ANKLE ARTHRODESIS: ICD-10-CM

## 2023-03-31 DIAGNOSIS — M25.572 CHRONIC PAIN OF LEFT ANKLE: ICD-10-CM

## 2023-03-31 DIAGNOSIS — G89.29 CHRONIC PAIN OF LEFT ANKLE: ICD-10-CM

## 2023-03-31 RX ORDER — OXYCODONE AND ACETAMINOPHEN 10; 325 MG/1; MG/1
1 TABLET ORAL EVERY 4 HOURS PRN
Qty: 42 TABLET | Refills: 0 | Status: SHIPPED | OUTPATIENT
Start: 2023-03-31 | End: 2023-04-06 | Stop reason: SDUPTHER

## 2023-04-06 DIAGNOSIS — Z98.1 STATUS POST ANKLE ARTHRODESIS: ICD-10-CM

## 2023-04-06 DIAGNOSIS — M25.572 CHRONIC PAIN OF LEFT ANKLE: ICD-10-CM

## 2023-04-06 DIAGNOSIS — G89.29 CHRONIC PAIN OF LEFT ANKLE: ICD-10-CM

## 2023-04-06 RX ORDER — OXYCODONE AND ACETAMINOPHEN 10; 325 MG/1; MG/1
1 TABLET ORAL EVERY 4 HOURS PRN
Qty: 42 TABLET | Refills: 0 | OUTPATIENT
Start: 2023-04-06

## 2023-04-07 DIAGNOSIS — Z98.1 STATUS POST ANKLE ARTHRODESIS: ICD-10-CM

## 2023-04-07 DIAGNOSIS — G89.29 CHRONIC PAIN OF LEFT ANKLE: ICD-10-CM

## 2023-04-07 DIAGNOSIS — M25.572 CHRONIC PAIN OF LEFT ANKLE: ICD-10-CM

## 2023-04-07 RX ORDER — OXYCODONE AND ACETAMINOPHEN 10; 325 MG/1; MG/1
1 TABLET ORAL EVERY 4 HOURS PRN
Qty: 42 TABLET | Refills: 0 | Status: CANCELLED | OUTPATIENT
Start: 2023-04-07

## 2023-04-08 ENCOUNTER — PATIENT MESSAGE (OUTPATIENT)
Dept: ORTHOPEDICS | Facility: CLINIC | Age: 43
End: 2023-04-08
Payer: COMMERCIAL

## 2023-04-09 DIAGNOSIS — Z98.1 STATUS POST ANKLE ARTHRODESIS: ICD-10-CM

## 2023-04-09 DIAGNOSIS — M25.572 CHRONIC PAIN OF LEFT ANKLE: ICD-10-CM

## 2023-04-09 DIAGNOSIS — G89.29 CHRONIC PAIN OF LEFT ANKLE: ICD-10-CM

## 2023-04-09 RX ORDER — OXYCODONE AND ACETAMINOPHEN 10; 325 MG/1; MG/1
1 TABLET ORAL EVERY 4 HOURS PRN
Qty: 42 TABLET | Refills: 0 | Status: CANCELLED | OUTPATIENT
Start: 2023-04-07

## 2023-04-10 RX ORDER — OXYCODONE AND ACETAMINOPHEN 10; 325 MG/1; MG/1
1 TABLET ORAL EVERY 4 HOURS PRN
Qty: 42 TABLET | Refills: 0 | Status: SHIPPED | OUTPATIENT
Start: 2023-04-10 | End: 2023-04-20 | Stop reason: SDUPTHER

## 2023-04-10 RX ORDER — OXYCODONE AND ACETAMINOPHEN 10; 325 MG/1; MG/1
1 TABLET ORAL EVERY 4 HOURS PRN
Qty: 42 TABLET | Refills: 0 | Status: SHIPPED | OUTPATIENT
Start: 2023-04-10 | End: 2023-06-12 | Stop reason: SDUPTHER

## 2023-04-26 DIAGNOSIS — M25.572 CHRONIC PAIN OF LEFT ANKLE: ICD-10-CM

## 2023-04-26 DIAGNOSIS — Z98.1 STATUS POST ANKLE ARTHRODESIS: ICD-10-CM

## 2023-04-26 DIAGNOSIS — G89.29 CHRONIC PAIN OF LEFT ANKLE: ICD-10-CM

## 2023-04-27 ENCOUNTER — PATIENT MESSAGE (OUTPATIENT)
Dept: ORTHOPEDICS | Facility: CLINIC | Age: 43
End: 2023-04-27
Payer: COMMERCIAL

## 2023-04-27 RX ORDER — OXYCODONE AND ACETAMINOPHEN 10; 325 MG/1; MG/1
1 TABLET ORAL EVERY 4 HOURS PRN
Qty: 42 TABLET | Refills: 0 | Status: SHIPPED | OUTPATIENT
Start: 2023-04-27 | End: 2023-05-01 | Stop reason: SDUPTHER

## 2023-05-01 DIAGNOSIS — G89.29 CHRONIC PAIN OF LEFT ANKLE: ICD-10-CM

## 2023-05-01 DIAGNOSIS — Z98.1 STATUS POST ANKLE ARTHRODESIS: ICD-10-CM

## 2023-05-01 DIAGNOSIS — M25.572 CHRONIC PAIN OF LEFT ANKLE: ICD-10-CM

## 2023-05-02 RX ORDER — OXYCODONE AND ACETAMINOPHEN 10; 325 MG/1; MG/1
1 TABLET ORAL EVERY 4 HOURS PRN
Qty: 42 TABLET | Refills: 0 | Status: SHIPPED | OUTPATIENT
Start: 2023-05-02 | End: 2023-05-05 | Stop reason: SDUPTHER

## 2023-05-05 DIAGNOSIS — G89.29 CHRONIC PAIN OF LEFT ANKLE: ICD-10-CM

## 2023-05-05 DIAGNOSIS — M25.572 CHRONIC PAIN OF LEFT ANKLE: ICD-10-CM

## 2023-05-05 DIAGNOSIS — Z98.1 STATUS POST ANKLE ARTHRODESIS: ICD-10-CM

## 2023-05-08 RX ORDER — OXYCODONE AND ACETAMINOPHEN 10; 325 MG/1; MG/1
1 TABLET ORAL EVERY 4 HOURS PRN
Qty: 42 TABLET | Refills: 0 | Status: SHIPPED | OUTPATIENT
Start: 2023-05-08 | End: 2023-05-12 | Stop reason: SDUPTHER

## 2023-05-10 ENCOUNTER — PATIENT MESSAGE (OUTPATIENT)
Dept: ORTHOPEDICS | Facility: CLINIC | Age: 43
End: 2023-05-10
Payer: COMMERCIAL

## 2023-05-12 ENCOUNTER — PATIENT MESSAGE (OUTPATIENT)
Dept: ORTHOPEDICS | Facility: CLINIC | Age: 43
End: 2023-05-12
Payer: COMMERCIAL

## 2023-05-12 DIAGNOSIS — M25.572 CHRONIC PAIN OF LEFT ANKLE: ICD-10-CM

## 2023-05-12 DIAGNOSIS — G89.29 CHRONIC PAIN OF LEFT ANKLE: ICD-10-CM

## 2023-05-12 DIAGNOSIS — Z98.1 STATUS POST ANKLE ARTHRODESIS: ICD-10-CM

## 2023-05-15 RX ORDER — OXYCODONE AND ACETAMINOPHEN 10; 325 MG/1; MG/1
1 TABLET ORAL EVERY 4 HOURS PRN
Qty: 42 TABLET | Refills: 0 | Status: SHIPPED | OUTPATIENT
Start: 2023-05-15 | End: 2023-05-20 | Stop reason: SDUPTHER

## 2023-05-20 DIAGNOSIS — M25.572 CHRONIC PAIN OF LEFT ANKLE: ICD-10-CM

## 2023-05-20 DIAGNOSIS — Z98.1 STATUS POST ANKLE ARTHRODESIS: ICD-10-CM

## 2023-05-20 DIAGNOSIS — G89.29 CHRONIC PAIN OF LEFT ANKLE: ICD-10-CM

## 2023-05-22 ENCOUNTER — PATIENT MESSAGE (OUTPATIENT)
Dept: ORTHOPEDICS | Facility: CLINIC | Age: 43
End: 2023-05-22
Payer: COMMERCIAL

## 2023-05-22 DIAGNOSIS — M25.572 CHRONIC PAIN OF LEFT ANKLE: ICD-10-CM

## 2023-05-22 DIAGNOSIS — Z98.1 STATUS POST ANKLE ARTHRODESIS: ICD-10-CM

## 2023-05-22 DIAGNOSIS — G89.29 CHRONIC PAIN OF LEFT ANKLE: ICD-10-CM

## 2023-05-22 RX ORDER — OXYCODONE AND ACETAMINOPHEN 10; 325 MG/1; MG/1
1 TABLET ORAL EVERY 4 HOURS PRN
Qty: 42 TABLET | Refills: 0 | Status: SHIPPED | OUTPATIENT
Start: 2023-05-22 | End: 2023-05-27 | Stop reason: SDUPTHER

## 2023-05-22 RX ORDER — OXYCODONE AND ACETAMINOPHEN 10; 325 MG/1; MG/1
1 TABLET ORAL EVERY 4 HOURS PRN
Qty: 42 TABLET | Refills: 0 | Status: CANCELLED | OUTPATIENT
Start: 2023-05-22

## 2023-05-22 RX ORDER — OXYCODONE AND ACETAMINOPHEN 10; 325 MG/1; MG/1
1 TABLET ORAL EVERY 4 HOURS PRN
Qty: 42 TABLET | Refills: 0 | Status: SHIPPED | OUTPATIENT
Start: 2023-05-22 | End: 2023-05-22 | Stop reason: SDUPTHER

## 2023-05-22 RX ORDER — OXYCODONE AND ACETAMINOPHEN 10; 325 MG/1; MG/1
1 TABLET ORAL EVERY 4 HOURS PRN
Qty: 42 TABLET | Refills: 0 | OUTPATIENT
Start: 2023-05-22

## 2023-05-27 DIAGNOSIS — M25.572 CHRONIC PAIN OF LEFT ANKLE: ICD-10-CM

## 2023-05-27 DIAGNOSIS — Z98.1 STATUS POST ANKLE ARTHRODESIS: ICD-10-CM

## 2023-05-27 DIAGNOSIS — G89.29 CHRONIC PAIN OF LEFT ANKLE: ICD-10-CM

## 2023-05-29 RX ORDER — OXYCODONE AND ACETAMINOPHEN 10; 325 MG/1; MG/1
1 TABLET ORAL EVERY 4 HOURS PRN
Qty: 42 TABLET | Refills: 0 | Status: SHIPPED | OUTPATIENT
Start: 2023-05-29 | End: 2023-06-06 | Stop reason: SDUPTHER

## 2023-06-03 DIAGNOSIS — M25.572 CHRONIC PAIN OF LEFT ANKLE: ICD-10-CM

## 2023-06-03 DIAGNOSIS — G89.29 CHRONIC PAIN OF LEFT ANKLE: ICD-10-CM

## 2023-06-03 DIAGNOSIS — Z98.1 STATUS POST ANKLE ARTHRODESIS: ICD-10-CM

## 2023-06-05 ENCOUNTER — PATIENT MESSAGE (OUTPATIENT)
Dept: ORTHOPEDICS | Facility: CLINIC | Age: 43
End: 2023-06-05
Payer: COMMERCIAL

## 2023-06-05 ENCOUNTER — PATIENT MESSAGE (OUTPATIENT)
Dept: INTERNAL MEDICINE | Facility: CLINIC | Age: 43
End: 2023-06-05
Payer: COMMERCIAL

## 2023-06-05 DIAGNOSIS — G89.29 CHRONIC PAIN OF LEFT ANKLE: ICD-10-CM

## 2023-06-05 DIAGNOSIS — Z98.1 STATUS POST ANKLE ARTHRODESIS: ICD-10-CM

## 2023-06-05 DIAGNOSIS — M25.572 CHRONIC PAIN OF LEFT ANKLE: ICD-10-CM

## 2023-06-05 RX ORDER — OXYCODONE AND ACETAMINOPHEN 10; 325 MG/1; MG/1
1 TABLET ORAL EVERY 4 HOURS PRN
Qty: 42 TABLET | Refills: 0 | Status: CANCELLED | OUTPATIENT
Start: 2023-06-05

## 2023-06-05 RX ORDER — OXYCODONE AND ACETAMINOPHEN 10; 325 MG/1; MG/1
1 TABLET ORAL EVERY 4 HOURS PRN
Qty: 42 TABLET | Refills: 0 | OUTPATIENT
Start: 2023-06-05

## 2023-06-05 NOTE — TELEPHONE ENCOUNTER
No care due was identified.  [facility] Embedded Care Due Messages. Reference number: [id number].   6/05/2023 10:56:12 AM CDT

## 2023-06-12 DIAGNOSIS — M25.572 CHRONIC PAIN OF LEFT ANKLE: ICD-10-CM

## 2023-06-12 DIAGNOSIS — Z98.1 STATUS POST ANKLE ARTHRODESIS: ICD-10-CM

## 2023-06-12 DIAGNOSIS — G89.29 CHRONIC PAIN OF LEFT ANKLE: ICD-10-CM

## 2023-06-13 RX ORDER — OXYCODONE AND ACETAMINOPHEN 10; 325 MG/1; MG/1
1 TABLET ORAL EVERY 4 HOURS PRN
Qty: 42 TABLET | Refills: 0 | Status: SHIPPED | OUTPATIENT
Start: 2023-06-13 | End: 2023-06-17 | Stop reason: SDUPTHER

## 2023-06-17 DIAGNOSIS — G89.29 CHRONIC PAIN OF LEFT ANKLE: ICD-10-CM

## 2023-06-17 DIAGNOSIS — Z98.1 STATUS POST ANKLE ARTHRODESIS: ICD-10-CM

## 2023-06-17 DIAGNOSIS — M25.572 CHRONIC PAIN OF LEFT ANKLE: ICD-10-CM

## 2023-06-19 RX ORDER — OXYCODONE AND ACETAMINOPHEN 10; 325 MG/1; MG/1
1 TABLET ORAL EVERY 4 HOURS PRN
Qty: 42 TABLET | Refills: 0 | Status: SHIPPED | OUTPATIENT
Start: 2023-06-19 | End: 2023-06-23 | Stop reason: SDUPTHER

## 2023-06-22 ENCOUNTER — PATIENT MESSAGE (OUTPATIENT)
Dept: ORTHOPEDICS | Facility: CLINIC | Age: 43
End: 2023-06-22
Payer: COMMERCIAL

## 2023-06-22 DIAGNOSIS — M25.572 CHRONIC PAIN OF LEFT ANKLE: ICD-10-CM

## 2023-06-22 DIAGNOSIS — Z98.1 STATUS POST ANKLE ARTHRODESIS: ICD-10-CM

## 2023-06-22 DIAGNOSIS — G89.29 CHRONIC PAIN OF LEFT ANKLE: ICD-10-CM

## 2023-06-23 DIAGNOSIS — G89.29 CHRONIC PAIN OF LEFT ANKLE: ICD-10-CM

## 2023-06-23 DIAGNOSIS — Z98.1 STATUS POST ANKLE ARTHRODESIS: ICD-10-CM

## 2023-06-23 DIAGNOSIS — M25.572 CHRONIC PAIN OF LEFT ANKLE: ICD-10-CM

## 2023-06-23 RX ORDER — OXYCODONE AND ACETAMINOPHEN 10; 325 MG/1; MG/1
1 TABLET ORAL EVERY 4 HOURS PRN
Qty: 42 TABLET | Refills: 0 | Status: SHIPPED | OUTPATIENT
Start: 2023-06-23 | End: 2023-06-30 | Stop reason: SDUPTHER

## 2023-06-23 RX ORDER — OXYCODONE AND ACETAMINOPHEN 10; 325 MG/1; MG/1
1 TABLET ORAL EVERY 4 HOURS PRN
Qty: 42 TABLET | Refills: 0 | OUTPATIENT
Start: 2023-06-23

## 2023-06-30 ENCOUNTER — TELEPHONE (OUTPATIENT)
Dept: ADMINISTRATIVE | Facility: HOSPITAL | Age: 43
End: 2023-06-30
Payer: COMMERCIAL

## 2023-06-30 ENCOUNTER — HOSPITAL ENCOUNTER (OUTPATIENT)
Dept: RADIOLOGY | Facility: HOSPITAL | Age: 43
Discharge: HOME | End: 2023-06-30
Attending: ORTHOPAEDIC SURGERY
Payer: MEDICAID

## 2023-06-30 ENCOUNTER — OFFICE VISIT (OUTPATIENT)
Dept: ORTHOPEDICS | Facility: CLINIC | Age: 43
End: 2023-06-30
Payer: COMMERCIAL

## 2023-06-30 VITALS — BODY MASS INDEX: 41.6 KG/M2 | WEIGHT: 280.88 LBS | HEIGHT: 69 IN

## 2023-06-30 DIAGNOSIS — G89.29 CHRONIC PAIN OF LEFT ANKLE: ICD-10-CM

## 2023-06-30 DIAGNOSIS — Q66.72 CONGENITAL PES CAVUS, LEFT FOOT: Primary | ICD-10-CM

## 2023-06-30 DIAGNOSIS — Z98.1 STATUS POST ANKLE ARTHRODESIS: ICD-10-CM

## 2023-06-30 DIAGNOSIS — M25.572 CHRONIC PAIN OF LEFT ANKLE: ICD-10-CM

## 2023-06-30 DIAGNOSIS — M25.572 ACUTE LEFT ANKLE PAIN: ICD-10-CM

## 2023-06-30 PROCEDURE — 3008F BODY MASS INDEX DOCD: CPT | Mod: CPTII,S$GLB,, | Performed by: ORTHOPAEDIC SURGERY

## 2023-06-30 PROCEDURE — 99999 PR PBB SHADOW E&M-EST. PATIENT-LVL IV: CPT | Mod: PBBFAC,,, | Performed by: ORTHOPAEDIC SURGERY

## 2023-06-30 PROCEDURE — 99999 PR PBB SHADOW E&M-EST. PATIENT-LVL IV: ICD-10-PCS | Mod: PBBFAC,,, | Performed by: ORTHOPAEDIC SURGERY

## 2023-06-30 PROCEDURE — 3008F PR BODY MASS INDEX (BMI) DOCUMENTED: ICD-10-PCS | Mod: CPTII,S$GLB,, | Performed by: ORTHOPAEDIC SURGERY

## 2023-06-30 PROCEDURE — 73610 XR ANKLE COMPLETE 3 VIEW LEFT: ICD-10-PCS | Mod: 26,LT,, | Performed by: RADIOLOGY

## 2023-06-30 PROCEDURE — 1159F PR MEDICATION LIST DOCUMENTED IN MEDICAL RECORD: ICD-10-PCS | Mod: CPTII,S$GLB,, | Performed by: ORTHOPAEDIC SURGERY

## 2023-06-30 PROCEDURE — 73610 X-RAY EXAM OF ANKLE: CPT | Mod: TC,LT

## 2023-06-30 PROCEDURE — 1159F MED LIST DOCD IN RCRD: CPT | Mod: CPTII,S$GLB,, | Performed by: ORTHOPAEDIC SURGERY

## 2023-06-30 PROCEDURE — 4010F ACE/ARB THERAPY RXD/TAKEN: CPT | Mod: CPTII,S$GLB,, | Performed by: ORTHOPAEDIC SURGERY

## 2023-06-30 PROCEDURE — 99214 OFFICE O/P EST MOD 30 MIN: CPT | Mod: PBBFAC | Performed by: ORTHOPAEDIC SURGERY

## 2023-06-30 PROCEDURE — 73610 X-RAY EXAM OF ANKLE: CPT | Mod: 26,LT,, | Performed by: RADIOLOGY

## 2023-06-30 PROCEDURE — 4010F PR ACE/ARB THEARPY RXD/TAKEN: ICD-10-PCS | Mod: CPTII,S$GLB,, | Performed by: ORTHOPAEDIC SURGERY

## 2023-06-30 PROCEDURE — 99213 PR OFFICE/OUTPT VISIT, EST, LEVL III, 20-29 MIN: ICD-10-PCS | Mod: S$GLB,,, | Performed by: ORTHOPAEDIC SURGERY

## 2023-06-30 PROCEDURE — 99213 OFFICE O/P EST LOW 20 MIN: CPT | Mod: S$GLB,,, | Performed by: ORTHOPAEDIC SURGERY

## 2023-06-30 RX ORDER — OXYCODONE AND ACETAMINOPHEN 10; 325 MG/1; MG/1
1 TABLET ORAL EVERY 4 HOURS PRN
Qty: 42 TABLET | Refills: 0 | Status: SHIPPED | OUTPATIENT
Start: 2023-06-30 | End: 2023-07-06 | Stop reason: SDUPTHER

## 2023-06-30 RX ORDER — OXYCODONE AND ACETAMINOPHEN 10; 325 MG/1; MG/1
1 TABLET ORAL EVERY 4 HOURS PRN
Qty: 42 TABLET | Refills: 0 | Status: SHIPPED | OUTPATIENT
Start: 2023-06-30 | End: 2023-06-30 | Stop reason: SDUPTHER
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
## 2023-07-05 ENCOUNTER — PATIENT MESSAGE (OUTPATIENT)
Dept: ORTHOPEDICS | Facility: CLINIC | Age: 43
End: 2023-07-05
Payer: COMMERCIAL

## 2023-07-06 DIAGNOSIS — G89.29 CHRONIC PAIN OF LEFT ANKLE: ICD-10-CM

## 2023-07-06 DIAGNOSIS — M25.572 CHRONIC PAIN OF LEFT ANKLE: ICD-10-CM

## 2023-07-06 DIAGNOSIS — Z98.1 STATUS POST ANKLE ARTHRODESIS: ICD-10-CM

## 2023-07-06 RX ORDER — OXYCODONE AND ACETAMINOPHEN 10; 325 MG/1; MG/1
1 TABLET ORAL EVERY 4 HOURS PRN
Qty: 42 TABLET | Refills: 0 | Status: SHIPPED | OUTPATIENT
Start: 2023-07-06 | End: 2023-07-11 | Stop reason: SDUPTHER

## 2023-07-11 DIAGNOSIS — G89.29 CHRONIC PAIN OF LEFT ANKLE: ICD-10-CM

## 2023-07-11 DIAGNOSIS — M25.572 CHRONIC PAIN OF LEFT ANKLE: ICD-10-CM

## 2023-07-11 DIAGNOSIS — Z98.1 STATUS POST ANKLE ARTHRODESIS: ICD-10-CM

## 2023-07-11 RX ORDER — OXYCODONE AND ACETAMINOPHEN 10; 325 MG/1; MG/1
1 TABLET ORAL EVERY 4 HOURS PRN
Qty: 42 TABLET | Refills: 0 | Status: SHIPPED | OUTPATIENT
Start: 2023-07-11 | End: 2023-07-17 | Stop reason: SDUPTHER

## 2023-07-17 DIAGNOSIS — M25.572 CHRONIC PAIN OF LEFT ANKLE: ICD-10-CM

## 2023-07-17 DIAGNOSIS — G89.29 CHRONIC PAIN OF LEFT ANKLE: ICD-10-CM

## 2023-07-17 DIAGNOSIS — Z98.1 STATUS POST ANKLE ARTHRODESIS: ICD-10-CM

## 2023-07-17 RX ORDER — OXYCODONE AND ACETAMINOPHEN 10; 325 MG/1; MG/1
1 TABLET ORAL EVERY 4 HOURS PRN
Qty: 42 TABLET | Refills: 0 | Status: SHIPPED | OUTPATIENT
Start: 2023-07-17 | End: 2023-07-22 | Stop reason: SDUPTHER

## 2023-07-22 DIAGNOSIS — G89.29 CHRONIC PAIN OF LEFT ANKLE: ICD-10-CM

## 2023-07-22 DIAGNOSIS — M25.572 CHRONIC PAIN OF LEFT ANKLE: ICD-10-CM

## 2023-07-22 DIAGNOSIS — Z98.1 STATUS POST ANKLE ARTHRODESIS: ICD-10-CM

## 2023-07-24 RX ORDER — OXYCODONE AND ACETAMINOPHEN 10; 325 MG/1; MG/1
1 TABLET ORAL EVERY 4 HOURS PRN
Qty: 42 TABLET | Refills: 0 | Status: SHIPPED | OUTPATIENT
Start: 2023-07-24 | End: 2023-07-29 | Stop reason: SDUPTHER

## 2023-07-29 DIAGNOSIS — M25.572 CHRONIC PAIN OF LEFT ANKLE: ICD-10-CM

## 2023-07-29 DIAGNOSIS — G89.29 CHRONIC PAIN OF LEFT ANKLE: ICD-10-CM

## 2023-07-29 DIAGNOSIS — Z98.1 STATUS POST ANKLE ARTHRODESIS: ICD-10-CM

## 2023-07-31 RX ORDER — OXYCODONE AND ACETAMINOPHEN 10; 325 MG/1; MG/1
1 TABLET ORAL EVERY 4 HOURS PRN
Qty: 42 TABLET | Refills: 0 | Status: SHIPPED | OUTPATIENT
Start: 2023-07-31 | End: 2023-08-06 | Stop reason: SDUPTHER

## 2023-08-06 DIAGNOSIS — M25.572 CHRONIC PAIN OF LEFT ANKLE: ICD-10-CM

## 2023-08-06 DIAGNOSIS — G89.29 CHRONIC PAIN OF LEFT ANKLE: ICD-10-CM

## 2023-08-06 DIAGNOSIS — Z98.1 STATUS POST ANKLE ARTHRODESIS: ICD-10-CM

## 2023-08-07 DIAGNOSIS — G89.29 CHRONIC PAIN OF LEFT ANKLE: ICD-10-CM

## 2023-08-07 DIAGNOSIS — M25.572 CHRONIC PAIN OF LEFT ANKLE: ICD-10-CM

## 2023-08-07 DIAGNOSIS — Z98.1 STATUS POST ANKLE ARTHRODESIS: ICD-10-CM

## 2023-08-07 RX ORDER — OXYCODONE AND ACETAMINOPHEN 10; 325 MG/1; MG/1
1 TABLET ORAL EVERY 4 HOURS PRN
Qty: 42 TABLET | Refills: 0 | Status: SHIPPED | OUTPATIENT
Start: 2023-08-07 | End: 2023-08-13 | Stop reason: SDUPTHER

## 2023-08-07 RX ORDER — OXYCODONE AND ACETAMINOPHEN 10; 325 MG/1; MG/1
1 TABLET ORAL EVERY 4 HOURS PRN
Qty: 42 TABLET | Refills: 0 | Status: SHIPPED | OUTPATIENT
Start: 2023-08-07 | End: 2023-08-18 | Stop reason: SDUPTHER

## 2023-08-13 DIAGNOSIS — Z98.1 STATUS POST ANKLE ARTHRODESIS: ICD-10-CM

## 2023-08-13 DIAGNOSIS — M25.572 CHRONIC PAIN OF LEFT ANKLE: ICD-10-CM

## 2023-08-13 DIAGNOSIS — G89.29 CHRONIC PAIN OF LEFT ANKLE: ICD-10-CM

## 2023-08-14 RX ORDER — OXYCODONE AND ACETAMINOPHEN 10; 325 MG/1; MG/1
1 TABLET ORAL EVERY 4 HOURS PRN
Qty: 42 TABLET | Refills: 0 | Status: SHIPPED | OUTPATIENT
Start: 2023-08-14

## 2023-08-18 ENCOUNTER — PATIENT MESSAGE (OUTPATIENT)
Dept: ORTHOPEDICS | Facility: CLINIC | Age: 43
End: 2023-08-18
Payer: COMMERCIAL

## 2023-08-18 DIAGNOSIS — G89.29 CHRONIC PAIN OF LEFT ANKLE: ICD-10-CM

## 2023-08-18 DIAGNOSIS — M25.572 CHRONIC PAIN OF LEFT ANKLE: ICD-10-CM

## 2023-08-18 DIAGNOSIS — Z98.1 STATUS POST ANKLE ARTHRODESIS: ICD-10-CM

## 2023-08-18 RX ORDER — OXYCODONE AND ACETAMINOPHEN 10; 325 MG/1; MG/1
1 TABLET ORAL EVERY 4 HOURS PRN
Qty: 42 TABLET | Refills: 0 | Status: SHIPPED | OUTPATIENT
Start: 2023-08-18 | End: 2023-08-23 | Stop reason: SDUPTHER

## 2023-08-23 DIAGNOSIS — G89.29 CHRONIC PAIN OF LEFT ANKLE: ICD-10-CM

## 2023-08-23 DIAGNOSIS — M25.572 CHRONIC PAIN OF LEFT ANKLE: ICD-10-CM

## 2023-08-23 DIAGNOSIS — Z98.1 STATUS POST ANKLE ARTHRODESIS: ICD-10-CM

## 2023-08-24 ENCOUNTER — PATIENT MESSAGE (OUTPATIENT)
Dept: INTERNAL MEDICINE | Facility: CLINIC | Age: 43
End: 2023-08-24
Payer: COMMERCIAL

## 2023-08-25 ENCOUNTER — PATIENT MESSAGE (OUTPATIENT)
Dept: ADMINISTRATIVE | Facility: OTHER | Age: 43
End: 2023-08-25
Payer: COMMERCIAL

## 2023-08-25 RX ORDER — NIRMATRELVIR AND RITONAVIR 300-100 MG
KIT ORAL
Qty: 30 TABLET | Refills: 0 | Status: SHIPPED | OUTPATIENT
Start: 2023-08-25 | End: 2023-08-30

## 2023-08-25 RX ORDER — ALBUTEROL SULFATE 90 UG/1
2 AEROSOL, METERED RESPIRATORY (INHALATION) EVERY 6 HOURS PRN
Qty: 18 G | Refills: 0 | Status: SHIPPED | OUTPATIENT
Start: 2023-08-25 | End: 2024-08-24

## 2023-08-25 RX ORDER — OXYCODONE AND ACETAMINOPHEN 10; 325 MG/1; MG/1
1 TABLET ORAL EVERY 4 HOURS PRN
Qty: 42 TABLET | Refills: 0 | Status: SHIPPED | OUTPATIENT
Start: 2023-08-25 | End: 2023-08-31 | Stop reason: SDUPTHER

## 2023-08-25 RX ORDER — BENZONATATE 100 MG/1
100 CAPSULE ORAL 3 TIMES DAILY PRN
Qty: 30 CAPSULE | Refills: 0 | Status: SHIPPED | OUTPATIENT
Start: 2023-08-25 | End: 2023-09-04

## 2023-08-31 ENCOUNTER — PATIENT MESSAGE (OUTPATIENT)
Dept: ORTHOPEDICS | Facility: CLINIC | Age: 43
End: 2023-08-31
Payer: COMMERCIAL

## 2023-08-31 DIAGNOSIS — M25.572 CHRONIC PAIN OF LEFT ANKLE: ICD-10-CM

## 2023-08-31 DIAGNOSIS — Z98.1 STATUS POST ANKLE ARTHRODESIS: ICD-10-CM

## 2023-08-31 DIAGNOSIS — G89.29 CHRONIC PAIN OF LEFT ANKLE: ICD-10-CM

## 2023-09-05 RX ORDER — OXYCODONE AND ACETAMINOPHEN 10; 325 MG/1; MG/1
1 TABLET ORAL EVERY 4 HOURS PRN
Qty: 42 TABLET | Refills: 0 | Status: SHIPPED | OUTPATIENT
Start: 2023-09-05 | End: 2023-09-10 | Stop reason: SDUPTHER

## 2023-09-10 DIAGNOSIS — Z98.1 STATUS POST ANKLE ARTHRODESIS: ICD-10-CM

## 2023-09-10 DIAGNOSIS — M25.572 CHRONIC PAIN OF LEFT ANKLE: ICD-10-CM

## 2023-09-10 DIAGNOSIS — G89.29 CHRONIC PAIN OF LEFT ANKLE: ICD-10-CM

## 2023-09-11 ENCOUNTER — PATIENT MESSAGE (OUTPATIENT)
Dept: ORTHOPEDICS | Facility: CLINIC | Age: 43
End: 2023-09-11
Payer: COMMERCIAL

## 2023-09-11 RX ORDER — OXYCODONE AND ACETAMINOPHEN 10; 325 MG/1; MG/1
1 TABLET ORAL EVERY 4 HOURS PRN
Qty: 42 TABLET | Refills: 0 | Status: SHIPPED | OUTPATIENT
Start: 2023-09-11 | End: 2023-09-20 | Stop reason: SDUPTHER

## 2023-09-18 DIAGNOSIS — Z98.1 STATUS POST ANKLE ARTHRODESIS: ICD-10-CM

## 2023-09-18 DIAGNOSIS — M25.572 CHRONIC PAIN OF LEFT ANKLE: ICD-10-CM

## 2023-09-18 DIAGNOSIS — G89.29 CHRONIC PAIN OF LEFT ANKLE: ICD-10-CM

## 2023-09-18 RX ORDER — OXYCODONE AND ACETAMINOPHEN 10; 325 MG/1; MG/1
1 TABLET ORAL EVERY 4 HOURS PRN
Qty: 42 TABLET | Refills: 0 | OUTPATIENT
Start: 2023-09-18

## 2023-09-20 DIAGNOSIS — Z98.1 STATUS POST ANKLE ARTHRODESIS: ICD-10-CM

## 2023-09-20 DIAGNOSIS — G89.29 CHRONIC PAIN OF LEFT ANKLE: ICD-10-CM

## 2023-09-20 DIAGNOSIS — M25.572 CHRONIC PAIN OF LEFT ANKLE: ICD-10-CM

## 2023-09-20 RX ORDER — OXYCODONE AND ACETAMINOPHEN 10; 325 MG/1; MG/1
1 TABLET ORAL EVERY 4 HOURS PRN
Qty: 42 TABLET | Refills: 0 | Status: SHIPPED | OUTPATIENT
Start: 2023-09-20 | End: 2023-09-22 | Stop reason: SDUPTHER

## 2023-09-22 DIAGNOSIS — G89.29 CHRONIC PAIN OF LEFT ANKLE: ICD-10-CM

## 2023-09-22 DIAGNOSIS — M25.572 CHRONIC PAIN OF LEFT ANKLE: ICD-10-CM

## 2023-09-22 DIAGNOSIS — Z98.1 STATUS POST ANKLE ARTHRODESIS: ICD-10-CM

## 2023-09-25 RX ORDER — OXYCODONE AND ACETAMINOPHEN 10; 325 MG/1; MG/1
1 TABLET ORAL EVERY 4 HOURS PRN
Qty: 42 TABLET | Refills: 0 | Status: SHIPPED | OUTPATIENT
Start: 2023-09-25 | End: 2023-10-02 | Stop reason: SDUPTHER

## 2023-10-02 DIAGNOSIS — G89.29 CHRONIC PAIN OF LEFT ANKLE: ICD-10-CM

## 2023-10-02 DIAGNOSIS — M25.572 CHRONIC PAIN OF LEFT ANKLE: ICD-10-CM

## 2023-10-02 DIAGNOSIS — Z98.1 STATUS POST ANKLE ARTHRODESIS: ICD-10-CM

## 2023-10-02 RX ORDER — OXYCODONE AND ACETAMINOPHEN 10; 325 MG/1; MG/1
1 TABLET ORAL EVERY 4 HOURS PRN
Qty: 42 TABLET | Refills: 0 | Status: SHIPPED | OUTPATIENT
Start: 2023-10-02 | End: 2023-10-09 | Stop reason: SDUPTHER

## 2023-10-09 DIAGNOSIS — Z98.1 STATUS POST ANKLE ARTHRODESIS: ICD-10-CM

## 2023-10-09 DIAGNOSIS — G89.29 CHRONIC PAIN OF LEFT ANKLE: ICD-10-CM

## 2023-10-09 DIAGNOSIS — M25.572 CHRONIC PAIN OF LEFT ANKLE: ICD-10-CM

## 2023-10-09 RX ORDER — OXYCODONE AND ACETAMINOPHEN 10; 325 MG/1; MG/1
1 TABLET ORAL EVERY 4 HOURS PRN
Qty: 42 TABLET | Refills: 0 | Status: SHIPPED | OUTPATIENT
Start: 2023-10-09 | End: 2023-10-14 | Stop reason: SDUPTHER

## 2023-10-14 DIAGNOSIS — Z98.1 STATUS POST ANKLE ARTHRODESIS: ICD-10-CM

## 2023-10-14 DIAGNOSIS — M25.572 CHRONIC PAIN OF LEFT ANKLE: ICD-10-CM

## 2023-10-14 DIAGNOSIS — G89.29 CHRONIC PAIN OF LEFT ANKLE: ICD-10-CM

## 2023-10-16 RX ORDER — OXYCODONE AND ACETAMINOPHEN 10; 325 MG/1; MG/1
1 TABLET ORAL EVERY 4 HOURS PRN
Qty: 42 TABLET | Refills: 0 | Status: SHIPPED | OUTPATIENT
Start: 2023-10-16 | End: 2023-10-20 | Stop reason: SDUPTHER

## 2023-10-20 DIAGNOSIS — Z98.1 STATUS POST ANKLE ARTHRODESIS: ICD-10-CM

## 2023-10-20 DIAGNOSIS — G89.29 CHRONIC PAIN OF LEFT ANKLE: ICD-10-CM

## 2023-10-20 DIAGNOSIS — M25.572 CHRONIC PAIN OF LEFT ANKLE: ICD-10-CM

## 2023-10-21 ENCOUNTER — HOSPITAL ENCOUNTER (EMERGENCY)
Facility: HOSPITAL | Age: 43
Discharge: HOME | End: 2023-10-21
Attending: EMERGENCY MEDICINE
Payer: COMMERCIAL

## 2023-10-21 VITALS
DIASTOLIC BLOOD PRESSURE: 90 MMHG | RESPIRATION RATE: 18 BRPM | TEMPERATURE: 98 F | WEIGHT: 245 LBS | OXYGEN SATURATION: 100 % | SYSTOLIC BLOOD PRESSURE: 181 MMHG | HEART RATE: 79 BPM | BODY MASS INDEX: 36.18 KG/M2

## 2023-10-21 DIAGNOSIS — S93.402A LEFT ANKLE SPRAIN: ICD-10-CM

## 2023-10-21 PROCEDURE — 99283 EMERGENCY DEPT VISIT LOW MDM: CPT | Mod: ER

## 2023-10-21 PROCEDURE — 25000003 PHARM REV CODE 250: Mod: ER | Performed by: EMERGENCY MEDICINE

## 2023-10-21 RX ORDER — OXYCODONE AND ACETAMINOPHEN 5; 325 MG/1; MG/1
2 TABLET ORAL
Status: COMPLETED | OUTPATIENT
Start: 2023-10-21 | End: 2023-10-21

## 2023-10-21 RX ADMIN — OXYCODONE HYDROCHLORIDE AND ACETAMINOPHEN 2 TABLET: 5; 325 TABLET ORAL at 07:10

## 2023-10-22 NOTE — ED PROVIDER NOTES
"ED Provider Note - 10/21/2023    History     Chief Complaint   Patient presents with    Ankle Pain     Reports "I was just walking and heard something snap in my L ankle. I've had 4 surgeries on that foot already"     Patient currently presents with a chief complaint of injury to the left ankle.  This was acquired 3-4 hours ago as a result of an awkward step.  Patient notes associated symptoms of pain and swelling.  Denies associated bruising, numbness, deformity, abrasion, and laceration.  Patient has previously undergone arthrodesis and has significant residual hardware in the ankle.      Review of patient's allergies indicates:  No Known Allergies  Past Medical History:   Diagnosis Date    Anxiety and depression     Encounter for blood transfusion     GERD (gastroesophageal reflux disease)     GI bleeds, multiple during admission     HTN (hypertension), benign 3/10/2014    RESOLVED    Migraines     Obesity     PFO (patent foramen ovale)     found after stroke "too small/risky to close"    Stroke 2017    Hospitalized at [facility]; given tPA     Past Surgical History:   Procedure Laterality Date    ANKLE FRACTURE SURGERY       SECTION N/A 2019    Procedure:  SECTION;  Surgeon: Gianna Flowers MD;  Location: [facility] L&D;  Service: OB/GYN;  Laterality: N/A;     SECTION WITH TUBAL LIGATION Bilateral 2020    Procedure:  SECTION, WITH TUBAL LIGATION;  Surgeon: Corina Mao MD;  Location: [facility] L&D;  Service: OB/GYN;  Laterality: Bilateral;     SECTION, CLASSIC  2005/2009/2019    x3    CHOLECYSTECTOMY  3/2002    lap maribel    COLONOSCOPY N/A 2021    Procedure: COLONOSCOPY;  Surgeon: Cleveland Sears MD;  Location: [facility];  Service: Endoscopy;  Laterality: N/A;    CYSTOSCOPY  9/10/2021    Procedure: CYSTOSCOPY;  Surgeon: Salomón Hernandez MD;  Location: [facility] OR;  Service: OB/GYN;;    ESOPHAGOGASTRODUODENOSCOPY N/A 2021    Procedure: EGD (ESOPHAGOGASTRODUODENOSCOPY); "  Surgeon: Cleveland Sears MD;  Location: [facility] ENDO;  Service: Endoscopy;  Laterality: N/A;    FOOT SURGERY Left     gastric sleeve  09/01/2017    HYSTERECTOMY  Sept 10,2021    INTRALUMINAL GASTROINTESTINAL TRACT IMAGING VIA CAPSULE N/A 7/13/2021    Procedure: IMAGING PROCEDURE, GI TRACT, INTRALUMINAL, VIA CAPSULE;  Surgeon: Nolan Cordero MD;  Location: [facility] ENDO;  Service: Endoscopy;  Laterality: N/A;  instructions portal -ml    LAPAROSCOPIC SALPINGECTOMY Bilateral 9/10/2021    Procedure: SALPINGECTOMY, LAPAROSCOPIC;  Surgeon: Salomón Hernandez MD;  Location: [facility] OR;  Service: OB/GYN;  Laterality: Bilateral;    LAPAROSCOPIC TOTAL HYSTERECTOMY N/A 9/10/2021    Procedure: HYSTERECTOMY, TOTAL, LAPAROSCOPIC;  Surgeon: Salomón Hernandez MD;  Location: [facility] OR;  Service: OB/GYN;  Laterality: N/A;    TUBAL LIGATION  7/2020     Family History   Problem Relation Age of Onset    Other Father         house fire    Breast cancer Neg Hx     Colon cancer Neg Hx     Ovarian cancer Neg Hx     Congenital heart disease Neg Hx     Pacemaker/defibrilator Neg Hx     Early death Neg Hx      Social History     Tobacco Use    Smoking status: Heavy Smoker     Current packs/day: 1.50     Average packs/day: 1.5 packs/day for 27.0 years (40.5 ttl pk-yrs)     Types: Cigarettes    Smokeless tobacco: Never    Tobacco comments:     cutting to 2 cigarettes per day   Substance Use Topics    Alcohol use: No     Alcohol/week: 0.0 standard drinks of alcohol    Drug use: No     Review of Systems   Constitutional:  Negative for chills and fever.   HENT:  Negative for congestion and rhinorrhea.    Respiratory:  Negative for cough and shortness of breath.    Cardiovascular:  Negative for chest pain and palpitations.   Gastrointestinal:  Negative for abdominal pain, diarrhea and vomiting.   Genitourinary:  Negative for difficulty urinating and dysuria.   Skin:  Negative for color change and rash.   Neurological:  Negative for dizziness and  light-headedness.   Hematological:  Negative for adenopathy. Does not bruise/bleed easily.     Physical Exam     Initial Vitals [10/21/23 1819]   BP Pulse Resp Temp SpO2   (!) 181/90 79 18 98.3 °F (36.8 °C) 100 %      MAP       --           Physical Exam    Nursing note and vitals reviewed.  Constitutional: She appears well-developed and well-nourished. She is not diaphoretic. No distress.   HENT:   Head: Normocephalic and atraumatic.   Nose: Nose normal.   Mouth/Throat: Oropharynx is clear and moist.   Cardiovascular:  Normal rate, regular rhythm, normal heart sounds and intact distal pulses.           Pulmonary/Chest: Breath sounds normal. No respiratory distress.   Musculoskeletal:      Left ankle: Swelling present. No ecchymosis. Tenderness present. No medial malleolus tenderness. Normal pulse.      Left Achilles Tendon: Normal.     Neurological: She is alert and oriented to person, place, and time. She has normal strength. No sensory deficit.   Skin: Skin is warm and dry.           ED Course   Procedures                   MDM  Differential Diagnoses   Based on available history, the working differential diagnoses considered during this evaluation include but are not limited to sprain/strain, contusion, fracture, dislocation.      LABS   Labs Reviewed - No data to display      Imaging     Imaging Results              X-Ray Ankle Complete Left (Final result)  Result time 10/21/23 19:03:00      Final result by Joseph Bergman MD (10/21/23 19:03:00)                   Impression:      As above.      Electronically signed by: Joseph Bergman  Date:    10/21/2023  Time:    19:03               Narrative:    EXAMINATION:  XR ANKLE COMPLETE 3 VIEW LEFT    CLINICAL HISTORY:  Sprain of unspecified ligament of left ankle, initial encounter    TECHNIQUE:  AP, lateral and oblique views of the left ankle were performed.    COMPARISON:  None    FINDINGS:  Prior ankle arthrodesis.  Distal fibular resection.  Calcaneal fixation.   Severe degenerative change.  No acute displaced fracture or traumatic malalignment.    Soft tissue swelling.                                    Films independently reviewed of the ankle.  There is evidence of prior arthrodesis as well as resection of the distal fibula.  There is degenerative change noted but no apparent fracture or subluxation.       EKG        ED Management/Discussion     Medications   oxyCODONE-acetaminophen 5-325 mg per tablet 2 tablet (2 tablets Oral Given 10/21/23 1944)                   The patient's list of active medical problems, social history, medications, and allergies as documented per RN staff has been reviewed.                 On final assessment, the patient appears well and comfortable for discharge.  I see no indication of an emergent process beyond that addressed during our encounter but have duly counseled the patient/family regarding the need for prompt follow-up as well as the indications that should prompt immediate return to the emergency room should new or worrisome developments occur.  The patient/family has been provided with verbal and printed direction regarding our final diagnosis(es) as well as instructions regarding use of OTC and/or Rx medications intended to manage the patient's aforementioned conditions including:  ED Prescriptions    None           Patient has been advised of the following recommended follow-up instructions:  Follow-up Information       Follow up With Specialties Details Why Contact [facility] - Emergency Dept Emergency Medicine Go to  As needed, If symptoms worsen [address] [facility] [address] [phone]    Lele Hernández MD Orthopedic Surgery Schedule an appointment as soon as possible for a visit  for reassessment [address] [facility] [address]  [phone]            The patient/family communicates understanding of all this information and all remaining questions and concerns were  addressed at this time.      Referrals:  No orders of the defined types were placed in this encounter.      CLINICAL IMPRESSION    ICD-10-CM ICD-9-CM   1. Left ankle sprain  S93.402A 845.00          ED Disposition Condition    Discharge Stable               Dipesh Canchola MD  10/22/23 0409

## 2023-10-23 RX ORDER — OXYCODONE AND ACETAMINOPHEN 10; 325 MG/1; MG/1
1 TABLET ORAL EVERY 4 HOURS PRN
Qty: 42 TABLET | Refills: 0 | Status: SHIPPED | OUTPATIENT
Start: 2023-10-23 | End: 2023-10-29 | Stop reason: SDUPTHER

## 2023-10-29 DIAGNOSIS — Z98.1 STATUS POST ANKLE ARTHRODESIS: ICD-10-CM

## 2023-10-29 DIAGNOSIS — M25.572 CHRONIC PAIN OF LEFT ANKLE: ICD-10-CM

## 2023-10-29 DIAGNOSIS — G89.29 CHRONIC PAIN OF LEFT ANKLE: ICD-10-CM

## 2023-10-30 RX ORDER — OXYCODONE AND ACETAMINOPHEN 10; 325 MG/1; MG/1
1 TABLET ORAL EVERY 4 HOURS PRN
Qty: 42 TABLET | Refills: 0 | Status: SHIPPED | OUTPATIENT
Start: 2023-10-30 | End: 2023-11-05 | Stop reason: SDUPTHER

## 2023-11-05 DIAGNOSIS — M25.572 CHRONIC PAIN OF LEFT ANKLE: ICD-10-CM

## 2023-11-05 DIAGNOSIS — Z98.1 STATUS POST ANKLE ARTHRODESIS: ICD-10-CM

## 2023-11-05 DIAGNOSIS — G89.29 CHRONIC PAIN OF LEFT ANKLE: ICD-10-CM

## 2023-11-06 RX ORDER — OXYCODONE AND ACETAMINOPHEN 10; 325 MG/1; MG/1
1 TABLET ORAL EVERY 4 HOURS PRN
Qty: 42 TABLET | Refills: 0 | Status: SHIPPED | OUTPATIENT
Start: 2023-11-06 | End: 2023-11-10 | Stop reason: SDUPTHER

## 2023-11-10 DIAGNOSIS — Z98.1 STATUS POST ANKLE ARTHRODESIS: ICD-10-CM

## 2023-11-10 DIAGNOSIS — M25.572 CHRONIC PAIN OF LEFT ANKLE: ICD-10-CM

## 2023-11-10 DIAGNOSIS — G89.29 CHRONIC PAIN OF LEFT ANKLE: ICD-10-CM

## 2023-11-13 ENCOUNTER — PATIENT MESSAGE (OUTPATIENT)
Dept: ORTHOPEDICS | Facility: CLINIC | Age: 43
End: 2023-11-13
Payer: COMMERCIAL

## 2023-11-13 RX ORDER — OXYCODONE AND ACETAMINOPHEN 10; 325 MG/1; MG/1
1 TABLET ORAL EVERY 4 HOURS PRN
Qty: 42 TABLET | Refills: 0 | Status: SHIPPED | OUTPATIENT
Start: 2023-11-13 | End: 2023-11-18 | Stop reason: SDUPTHER

## 2023-11-18 DIAGNOSIS — Z98.1 STATUS POST ANKLE ARTHRODESIS: ICD-10-CM

## 2023-11-18 DIAGNOSIS — M25.572 CHRONIC PAIN OF LEFT ANKLE: ICD-10-CM

## 2023-11-18 DIAGNOSIS — G89.29 CHRONIC PAIN OF LEFT ANKLE: ICD-10-CM

## 2023-11-20 ENCOUNTER — PATIENT MESSAGE (OUTPATIENT)
Dept: INTERNAL MEDICINE | Facility: CLINIC | Age: 43
End: 2023-11-20
Payer: COMMERCIAL

## 2023-11-20 DIAGNOSIS — R10.9 ABDOMINAL PAIN, UNSPECIFIED ABDOMINAL LOCATION: Primary | ICD-10-CM

## 2023-11-20 RX ORDER — OXYCODONE AND ACETAMINOPHEN 10; 325 MG/1; MG/1
1 TABLET ORAL EVERY 4 HOURS PRN
Qty: 42 TABLET | Refills: 0 | Status: SHIPPED | OUTPATIENT
Start: 2023-11-20 | End: 2023-11-24 | Stop reason: SDUPTHER

## 2023-11-24 DIAGNOSIS — Z98.1 STATUS POST ANKLE ARTHRODESIS: ICD-10-CM

## 2023-11-24 DIAGNOSIS — M25.572 CHRONIC PAIN OF LEFT ANKLE: ICD-10-CM

## 2023-11-24 DIAGNOSIS — G89.29 CHRONIC PAIN OF LEFT ANKLE: ICD-10-CM

## 2023-11-24 RX ORDER — OXYCODONE AND ACETAMINOPHEN 10; 325 MG/1; MG/1
1 TABLET ORAL EVERY 4 HOURS PRN
Qty: 42 TABLET | Refills: 0 | Status: CANCELLED | OUTPATIENT
Start: 2023-11-24

## 2023-11-27 RX ORDER — OXYCODONE AND ACETAMINOPHEN 10; 325 MG/1; MG/1
1 TABLET ORAL EVERY 4 HOURS PRN
Qty: 42 TABLET | Refills: 0 | Status: SHIPPED | OUTPATIENT
Start: 2023-11-27 | End: 2023-12-02 | Stop reason: SDUPTHER

## 2023-12-02 DIAGNOSIS — Z98.1 STATUS POST ANKLE ARTHRODESIS: ICD-10-CM

## 2023-12-02 DIAGNOSIS — G89.29 CHRONIC PAIN OF LEFT ANKLE: ICD-10-CM

## 2023-12-02 DIAGNOSIS — M25.572 CHRONIC PAIN OF LEFT ANKLE: ICD-10-CM

## 2023-12-04 ENCOUNTER — HOSPITAL ENCOUNTER (EMERGENCY)
Facility: HOSPITAL | Age: 43
Discharge: HOME | End: 2023-12-05
Attending: EMERGENCY MEDICINE
Payer: COMMERCIAL

## 2023-12-04 DIAGNOSIS — R10.13 EPIGASTRIC ABDOMINAL PAIN: ICD-10-CM

## 2023-12-04 PROCEDURE — 93005 ELECTROCARDIOGRAM TRACING: CPT | Mod: ER

## 2023-12-04 PROCEDURE — 93010 EKG 12-LEAD: ICD-10-PCS | Mod: ,,, | Performed by: INTERNAL MEDICINE

## 2023-12-04 PROCEDURE — 93010 ELECTROCARDIOGRAM REPORT: CPT | Mod: ,,, | Performed by: INTERNAL MEDICINE

## 2023-12-04 PROCEDURE — 99285 EMERGENCY DEPT VISIT HI MDM: CPT | Mod: ER

## 2023-12-05 ENCOUNTER — PATIENT MESSAGE (OUTPATIENT)
Dept: INTERNAL MEDICINE | Facility: CLINIC | Age: 43
End: 2023-12-05
Payer: COMMERCIAL

## 2023-12-05 VITALS
WEIGHT: 245 LBS | HEIGHT: 69 IN | DIASTOLIC BLOOD PRESSURE: 101 MMHG | RESPIRATION RATE: 20 BRPM | SYSTOLIC BLOOD PRESSURE: 192 MMHG | TEMPERATURE: 98 F | OXYGEN SATURATION: 98 % | BODY MASS INDEX: 36.29 KG/M2 | HEART RATE: 54 BPM

## 2023-12-05 LAB
ALBUMIN SERPL BCP-MCNC: 4.3 G/DL (ref 3.5–5.2)
ALP SERPL-CCNC: 65 U/L (ref 38–126)
ALT SERPL W/O P-5'-P-CCNC: 17 U/L (ref 10–44)
ANION GAP SERPL CALC-SCNC: 11 MMOL/L (ref 8–16)
AST SERPL-CCNC: 20 U/L (ref 15–46)
BASOPHILS # BLD AUTO: 0.06 K/UL (ref 0–0.2)
BASOPHILS NFR BLD: 0.6 % (ref 0–1.9)
BILIRUB SERPL-MCNC: 0.4 MG/DL (ref 0.1–1)
BILIRUB UR QL STRIP: NEGATIVE
CALCIUM SERPL-MCNC: 9.7 MG/DL (ref 8.7–10.5)
CHLORIDE SERPL-SCNC: 105 MMOL/L (ref 95–110)
CLARITY UR REFRACT.AUTO: CLEAR
CO2 SERPL-SCNC: 24 MMOL/L (ref 23–29)
COLOR UR AUTO: YELLOW
CREAT SERPL-MCNC: 0.53 MG/DL (ref 0.5–1.4)
DIFFERENTIAL METHOD: ABNORMAL
EOSINOPHIL # BLD AUTO: 0.1 K/UL (ref 0–0.5)
EOSINOPHIL NFR BLD: 1.3 % (ref 0–8)
ERYTHROCYTE [DISTWIDTH] IN BLOOD BY AUTOMATED COUNT: 13.3 % (ref 11.5–14.5)
EST. GFR  (NO RACE VARIABLE): >60 ML/MIN/1.73 M^2
GLUCOSE SERPL-MCNC: 111 MG/DL (ref 70–110)
GLUCOSE UR QL STRIP: NEGATIVE
HCT VFR BLD AUTO: 39.6 % (ref 37–48.5)
HGB BLD-MCNC: 13.2 G/DL (ref 12–16)
HGB UR QL STRIP: NEGATIVE
IMM GRANULOCYTES # BLD AUTO: 0.16 K/UL (ref 0–0.04)
IMM GRANULOCYTES NFR BLD AUTO: 1.6 % (ref 0–0.5)
KETONES UR QL STRIP: NEGATIVE
LEUKOCYTE ESTERASE UR QL STRIP: NEGATIVE
LIPASE SERPL-CCNC: 95 U/L (ref 23–300)
LYMPHOCYTES # BLD AUTO: 2.1 K/UL (ref 1–4.8)
LYMPHOCYTES NFR BLD: 21.4 % (ref 18–48)
MCH RBC QN AUTO: 28.6 PG (ref 27–31)
MCHC RBC AUTO-ENTMCNC: 33.3 G/DL (ref 32–36)
MCV RBC AUTO: 86 FL (ref 82–98)
MONOCYTES # BLD AUTO: 0.6 K/UL (ref 0.3–1)
MONOCYTES NFR BLD: 5.5 % (ref 4–15)
NEUTROPHILS # BLD AUTO: 6.9 K/UL (ref 1.8–7.7)
NEUTROPHILS NFR BLD: 69.6 % (ref 38–73)
NITRITE UR QL STRIP: NEGATIVE
NRBC BLD-RTO: 0 /100 WBC
PH UR STRIP: 6 [PH] (ref 5–8)
PLATELET # BLD AUTO: 298 K/UL (ref 150–450)
PMV BLD AUTO: 9.3 FL (ref 9.2–12.9)
POTASSIUM SERPL-SCNC: 3.3 MMOL/L (ref 3.5–5.1)
PROT SERPL-MCNC: 7.7 G/DL (ref 6–8.4)
PROT UR QL STRIP: NEGATIVE
RBC # BLD AUTO: 4.61 M/UL (ref 4–5.4)
SODIUM SERPL-SCNC: 140 MMOL/L (ref 136–145)
SP GR UR STRIP: 1.02 (ref 1–1.03)
TROPONIN I SERPL-MCNC: <0.012 NG/ML (ref 0.01–0.03)
URN SPEC COLLECT METH UR: NORMAL
UROBILINOGEN UR STRIP-ACNC: NEGATIVE EU/DL
UUN UR-MCNC: 9 MG/DL (ref 7–17)
WBC # BLD AUTO: 9.97 K/UL (ref 3.9–12.7)

## 2023-12-05 PROCEDURE — 81003 URINALYSIS AUTO W/O SCOPE: CPT | Mod: ER | Performed by: EMERGENCY MEDICINE

## 2023-12-05 PROCEDURE — 25000003 PHARM REV CODE 250: Mod: ER | Performed by: EMERGENCY MEDICINE

## 2023-12-05 PROCEDURE — 84484 ASSAY OF TROPONIN QUANT: CPT | Mod: ER | Performed by: EMERGENCY MEDICINE

## 2023-12-05 PROCEDURE — 85025 COMPLETE CBC W/AUTO DIFF WBC: CPT | Mod: ER | Performed by: EMERGENCY MEDICINE

## 2023-12-05 PROCEDURE — 80053 COMPREHEN METABOLIC PANEL: CPT | Mod: ER | Performed by: EMERGENCY MEDICINE

## 2023-12-05 PROCEDURE — 83690 ASSAY OF LIPASE: CPT | Mod: ER | Performed by: EMERGENCY MEDICINE

## 2023-12-05 RX ORDER — AMLODIPINE BESYLATE 5 MG/1
10 TABLET ORAL
Status: COMPLETED | OUTPATIENT
Start: 2023-12-05 | End: 2023-12-05

## 2023-12-05 RX ORDER — TRAMADOL HYDROCHLORIDE 50 MG/1
100 TABLET ORAL
Status: COMPLETED | OUTPATIENT
Start: 2023-12-05 | End: 2023-12-05

## 2023-12-05 RX ORDER — OXYCODONE AND ACETAMINOPHEN 10; 325 MG/1; MG/1
1 TABLET ORAL EVERY 4 HOURS PRN
Qty: 42 TABLET | Refills: 0 | Status: SHIPPED | OUTPATIENT
Start: 2023-12-05 | End: 2023-12-11 | Stop reason: SDUPTHER

## 2023-12-05 RX ADMIN — AMLODIPINE BESYLATE 10 MG: 5 TABLET ORAL at 02:12

## 2023-12-05 RX ADMIN — TRAMADOL HYDROCHLORIDE 100 MG: 50 TABLET, COATED ORAL at 01:12

## 2023-12-05 NOTE — ED PROVIDER NOTES
"ED Provider Note - 2023    History     Chief Complaint   Patient presents with    Abdominal Pain     Reports to ED c c/o abd pain that goes into her R chest and back. +Aching. Denies N/V. Pt is hypertensive.      Patient currently presents with complaint of abdominal pain.  Onset of this event was first noted 2-3 hours ago.  This discomfort is reported to be primarily RUQ.  This discomfort is described as a aching sensation and radiates to the R flank.    Patient notably denies diarrhea, dysuria, emesis, fever, and urinary frequency. This is not a recurring process.  PSH notable for cholecystectomy, hysterectomy, and gastric sleeve.            Review of patient's allergies indicates:  No Known Allergies  Past Medical History:   Diagnosis Date    Anxiety and depression     Encounter for blood transfusion     GERD (gastroesophageal reflux disease)     GI bleeds, multiple during admission     HTN (hypertension), benign 3/10/2014    RESOLVED    Migraines     Obesity     PFO (patent foramen ovale)     found after stroke "too small/risky to close"    Stroke 2017    Hospitalized at [facility]; given tPA     Past Surgical History:   Procedure Laterality Date    ANKLE FRACTURE SURGERY       SECTION N/A 2019    Procedure:  SECTION;  Surgeon: Gianna Flowers MD;  Location: [facility] L&D;  Service: OB/GYN;  Laterality: N/A;     SECTION WITH TUBAL LIGATION Bilateral 2020    Procedure:  SECTION, WITH TUBAL LIGATION;  Surgeon: Corina Mao MD;  Location: [facility] L&D;  Service: OB/GYN;  Laterality: Bilateral;     SECTION, CLASSIC  2005/2009/2019    x3    CHOLECYSTECTOMY  3/2002    lap maribel    COLONOSCOPY N/A 2021    Procedure: COLONOSCOPY;  Surgeon: Cleveland Sears MD;  Location: [facility] ENDO;  Service: Endoscopy;  Laterality: N/A;    CYSTOSCOPY  9/10/2021    Procedure: CYSTOSCOPY;  Surgeon: Salomón Hernandez MD;  Location: [facility] OR;  Service: OB/GYN;;    ESOPHAGOGASTRODUODENOSCOPY " N/A 7/5/2021    Procedure: EGD (ESOPHAGOGASTRODUODENOSCOPY);  Surgeon: Cleveland Sears MD;  Location: [facility] ENDO;  Service: Endoscopy;  Laterality: N/A;    FOOT SURGERY Left     gastric sleeve  09/01/2017    HYSTERECTOMY  Sept 10,2021    INTRALUMINAL GASTROINTESTINAL TRACT IMAGING VIA CAPSULE N/A 7/13/2021    Procedure: IMAGING PROCEDURE, GI TRACT, INTRALUMINAL, VIA CAPSULE;  Surgeon: Nolan Cordero MD;  Location: [facility] ENDO;  Service: Endoscopy;  Laterality: N/A;  instructions portal -ml    LAPAROSCOPIC SALPINGECTOMY Bilateral 9/10/2021    Procedure: SALPINGECTOMY, LAPAROSCOPIC;  Surgeon: Salomón Hernandez MD;  Location: [facility] OR;  Service: OB/GYN;  Laterality: Bilateral;    LAPAROSCOPIC TOTAL HYSTERECTOMY N/A 9/10/2021    Procedure: HYSTERECTOMY, TOTAL, LAPAROSCOPIC;  Surgeon: Salomón Hernandez MD;  Location: [facility] OR;  Service: OB/GYN;  Laterality: N/A;    TUBAL LIGATION  7/2020     Family History   Problem Relation Age of Onset    Other Father         house fire    Breast cancer Neg Hx     Colon cancer Neg Hx     Ovarian cancer Neg Hx     Congenital heart disease Neg Hx     Pacemaker/defibrilator Neg Hx     Early death Neg Hx      Social History     Tobacco Use    Smoking status: Heavy Smoker     Current packs/day: 1.50     Average packs/day: 1.5 packs/day for 27.0 years (40.5 ttl pk-yrs)     Types: Cigarettes    Smokeless tobacco: Never    Tobacco comments:     cutting to 2 cigarettes per day   Substance Use Topics    Alcohol use: No     Alcohol/week: 0.0 standard drinks of alcohol    Drug use: No     Review of Systems   Constitutional:  Negative for chills and fever.   HENT:  Negative for congestion and rhinorrhea.    Respiratory:  Negative for cough and shortness of breath.    Cardiovascular:  Negative for chest pain and palpitations.   Gastrointestinal:  Positive for abdominal pain. Negative for diarrhea and vomiting.   Genitourinary:  Negative for difficulty urinating and dysuria.   Skin:  Negative for color  "change and rash.   Neurological:  Negative for dizziness and light-headedness.   Hematological:  Negative for adenopathy. Does not bruise/bleed easily.       Physical Exam     Initial Vitals [12/04/23 2333]   BP Pulse Resp Temp SpO2   (!) 199/113 66 20 98 °F (36.7 °C) 99 %      MAP       --         Vitals:    12/04/23 2333 12/05/23 0001 12/05/23 0038 12/05/23 0048   BP: (!) 199/113 (!) 179/111     Pulse: 66 65 61 (!) 58   Resp: 20 (!) 25 (!) 27 17   Temp: 98 °F (36.7 °C)      TempSrc: Oral      SpO2: 99% 99% 100% 99%   Weight: 111.1 kg (245 lb)      Height: 5' 9" (1.753 m)       12/05/23 0101 12/05/23 0123 12/05/23 0157 12/05/23 0200   BP: (!) 158/90  (!) 214/117 (!) 209/110   Pulse: (!) 48  (!) 59 (!) 59   Resp: (!) 23 20 (!) 36 14   Temp:       TempSrc:       SpO2: 97%  99% 99%   Weight:       Height:        12/05/23 0205 12/05/23 0214 12/05/23 0217 12/05/23 0223   BP: (!) 217/116 (!) 237/120 (!) 217/125 (!) 231/136   Pulse:  (!) 59 (!) 54 (!) 53   Resp:       Temp:       TempSrc:       SpO2:  99% 98% 99%   Weight:       Height:        12/05/23 0225 12/05/23 0238   BP: (!) 228/117 (!) 192/101   Pulse: (!) 55 (!) 54   Resp:  20   Temp:  98 °F (36.7 °C)   TempSrc:  Oral   SpO2: 97% 98%   Weight:     Height:       Physical Exam    Nursing note and vitals reviewed.  Constitutional: She appears well-developed and well-nourished. She is not diaphoretic. No distress.   HENT:   Head: Normocephalic and atraumatic.   Nose: Nose normal.   Mouth/Throat: Oropharynx is clear and moist.   Eyes: Conjunctivae are normal. No scleral icterus.   Cardiovascular:  Normal rate, regular rhythm, normal heart sounds and intact distal pulses.           Pulmonary/Chest: Breath sounds normal. No respiratory distress.   Abdominal: Abdomen is soft. She exhibits no distension and no mass. There is abdominal tenderness (RUQ - mild-mod). There is no rebound and no guarding.   Musculoskeletal:         General: No edema. Normal range of motion. "     Neurological: She is alert and oriented to person, place, and time. She has normal strength.   Skin: Skin is warm and dry.         ED Course   Procedures                   MDM  Differential Diagnoses   Based on available history, the working differential diagnoses considered during this evaluation include but are not limited to MSK injury, choledocholithiasis, cholangitis, colitis, pancreatitis, PUD.      LABS     Labs Reviewed   CBC W/ AUTO DIFFERENTIAL - Abnormal; Notable for the following components:       Result Value    Immature Granulocytes 1.6 (*)     Immature Grans (Abs) 0.16 (*)     All other components within normal limits   COMPREHENSIVE METABOLIC PANEL - Abnormal; Notable for the following components:    Potassium 3.3 (*)     Glucose 111 (*)     All other components within normal limits   LIPASE   URINALYSIS, REFLEX TO URINE CULTURE    Narrative:     Preferred Collection Type->Urine, Clean Catch  Specimen Source->Urine   TROPONIN I           All available results from the labs ordered were independently reviewed. with findings as follows:  CBC and CMP unremarkable.  Lipase and troponin level normal.  Urinalysis unremarkable.     Imaging     Imaging Results              X-Ray Abdomen Flat And Erect (Final result)  Result time 12/05/23 08:35:04      Final result by Lance Becerra MD (12/05/23 08:35:04)                   Impression:      No acute abdominal findings.      Electronically signed by: Lance Becerra  Date:    12/05/2023  Time:    08:35               Narrative:    EXAMINATION:  XR ABDOMEN FLAT AND ERECT    CLINICAL HISTORY:  Bloating;    COMPARISON:  January 24, 2012.    FINDINGS:  Nonobstructive bowel gas pattern. No abnormal calcifications.  Osseous structures intact.  Left abdominal surgical clips.  Lung bases are clear.                                    X-Rays:   Independently Interpreted Readings:   Abdomen: Nonspecific bowel gas.  No free air under diaphragm.  No air fluid levels or  signs of obstruction.       EKG   EKG Readings: (Independently Interpreted)   Initial Reading: No STEMI. Rhythm: Sinus Bradycardia. Heart Rate: 59. Ectopy: No Ectopy. Conduction: Normal. Axis: Normal.       ED Management/Discussion     Medications   traMADoL tablet 100 mg (100 mg Oral Given 12/5/23 0123)   amLODIPine tablet 10 mg (10 mg Oral Given 12/5/23 0205)                   The patient's list of active medical problems, social history, medications, and allergies as documented per RN staff has been reviewed.                 On final assessment, the patient appears suitable for discharge.  I see no indication of an emergent process beyond that addressed during our encounter but have duly counseled the patient/family regarding the need for prompt follow-up as well as the indications that should prompt immediate return to the emergency room should new or worrisome developments occur.  The patient/family has been provided with language -specific verbal and printed direction regarding our final diagnosis(es) as well as instructions regarding use of OTC and/or Rx medications intended to manage the patient's aforementioned conditions including:  ED Prescriptions    None           Patient has been advised of the following recommended follow-up instructions:  Follow-up Information       Follow up With Specialties Details Why Contact Info    Tien Watts MD Family Medicine Schedule an appointment as soon as possible for a visit  for reassessment [address]  [facility] [address]  [phone]      [facility] - Emergency Dept Emergency Medicine Go to  As needed, If symptoms worsen [address] [facility] [address] [phone]          The patient/family communicates understanding of all this information and all remaining questions and concerns were addressed at this time.      Referrals:  No orders of the defined types were placed in this encounter.      CLINICAL  IMPRESSION    ICD-10-CM ICD-9-CM   1. Epigastric abdominal pain  R10.13 789.06          ED Disposition Condition    Discharge Stable                 Dipesh Canchola MD  12/05/23 1542

## 2023-12-05 NOTE — ED NOTES
Review of patient's allergies indicates:  No Known Allergies     Patient has verified the spelling of the name and  on armband.   APPEARANCE: Patient is alert, calm, oriented x 4, and does not appear distressed.  SKIN: Skin is normal for race, warm, and dry. Normal skin turgor and mucous membranes moist.  CARDIAC: Bradycardic  RESPIRATORY:Normal rate and effort. Breath sounds clear bilaterally throughout chest. Respirations are equal and unlabored.    GASTRO: Bowel sounds normal, abdomen is soft, c/o right side abdominal pain the is worse with palpitation  and no abdominal distention.  MUSCLE: Full ROM. No bony tenderness or soft tissue tenderness. No obvious deformity.  PERIPHERAL VASCULAR: peripheral pulses present. Normal cap refill. No edema. Warm to touch.  NEURO: Katja coma scale: eyes open spontaneously-4, oriented & converses-5, obeys commands-6. No neurological abnormalities.   MENTAL STATUS: awake, alert and aware of environment.  EYE: No overt deficits noted. No drainage. Sclera WNL  ENT: EARS: no obvious drainage. NOSE: no active bleeding. THROAT: no redness or swelling.  : Voids without complication per patient

## 2023-12-06 ENCOUNTER — OFFICE VISIT (OUTPATIENT)
Dept: INTERNAL MEDICINE | Facility: CLINIC | Age: 43
End: 2023-12-06
Attending: FAMILY MEDICINE
Payer: COMMERCIAL

## 2023-12-06 ENCOUNTER — PATIENT MESSAGE (OUTPATIENT)
Dept: ORTHOPEDICS | Facility: CLINIC | Age: 43
End: 2023-12-06
Payer: COMMERCIAL

## 2023-12-06 ENCOUNTER — LAB VISIT (OUTPATIENT)
Dept: LAB | Facility: OTHER | Age: 43
End: 2023-12-06
Attending: FAMILY MEDICINE
Payer: COMMERCIAL

## 2023-12-06 ENCOUNTER — TELEPHONE (OUTPATIENT)
Dept: ADMINISTRATIVE | Facility: CLINIC | Age: 43
End: 2023-12-06
Payer: COMMERCIAL

## 2023-12-06 VITALS
SYSTOLIC BLOOD PRESSURE: 130 MMHG | OXYGEN SATURATION: 98 % | HEIGHT: 69 IN | DIASTOLIC BLOOD PRESSURE: 84 MMHG | HEART RATE: 70 BPM | BODY MASS INDEX: 37.67 KG/M2 | WEIGHT: 254.31 LBS

## 2023-12-06 DIAGNOSIS — I10 PRIMARY HYPERTENSION: ICD-10-CM

## 2023-12-06 DIAGNOSIS — E11.9 TYPE 2 DIABETES MELLITUS WITHOUT COMPLICATION, WITHOUT LONG-TERM CURRENT USE OF INSULIN: ICD-10-CM

## 2023-12-06 DIAGNOSIS — I10 PRIMARY HYPERTENSION: Primary | ICD-10-CM

## 2023-12-06 LAB
CHOLEST SERPL-MCNC: 172 MG/DL (ref 120–199)
CHOLEST/HDLC SERPL: 4.2 {RATIO} (ref 2–5)
ESTIMATED AVG GLUCOSE: 100 MG/DL (ref 68–131)
HBA1C MFR BLD: 5.1 % (ref 4–5.6)
HDLC SERPL-MCNC: 41 MG/DL (ref 40–75)
HDLC SERPL: 23.8 % (ref 20–50)
LDLC SERPL CALC-MCNC: 98.8 MG/DL (ref 63–159)
NONHDLC SERPL-MCNC: 131 MG/DL
TRIGL SERPL-MCNC: 161 MG/DL (ref 30–150)
TSH SERPL DL<=0.005 MIU/L-ACNC: 2.19 UIU/ML (ref 0.4–4)

## 2023-12-06 PROCEDURE — 3075F SYST BP GE 130 - 139MM HG: CPT | Mod: CPTII,S$GLB,, | Performed by: FAMILY MEDICINE

## 2023-12-06 PROCEDURE — 3079F PR MOST RECENT DIASTOLIC BLOOD PRESSURE 80-89 MM HG: ICD-10-PCS | Mod: CPTII,S$GLB,, | Performed by: FAMILY MEDICINE

## 2023-12-06 PROCEDURE — 84443 ASSAY THYROID STIM HORMONE: CPT | Performed by: FAMILY MEDICINE

## 2023-12-06 PROCEDURE — 1159F MED LIST DOCD IN RCRD: CPT | Mod: CPTII,S$GLB,, | Performed by: FAMILY MEDICINE

## 2023-12-06 PROCEDURE — 99999 PR PBB SHADOW E&M-EST. PATIENT-LVL IV: ICD-10-PCS | Mod: PBBFAC,,, | Performed by: FAMILY MEDICINE

## 2023-12-06 PROCEDURE — 3079F DIAST BP 80-89 MM HG: CPT | Mod: CPTII,S$GLB,, | Performed by: FAMILY MEDICINE

## 2023-12-06 PROCEDURE — 1160F PR REVIEW ALL MEDS BY PRESCRIBER/CLIN PHARMACIST DOCUMENTED: ICD-10-PCS | Mod: CPTII,S$GLB,, | Performed by: FAMILY MEDICINE

## 2023-12-06 PROCEDURE — 99215 PR OFFICE/OUTPT VISIT, EST, LEVL V, 40-54 MIN: ICD-10-PCS | Mod: S$GLB,,, | Performed by: FAMILY MEDICINE

## 2023-12-06 PROCEDURE — 1160F RVW MEDS BY RX/DR IN RCRD: CPT | Mod: CPTII,S$GLB,, | Performed by: FAMILY MEDICINE

## 2023-12-06 PROCEDURE — 36415 COLL VENOUS BLD VENIPUNCTURE: CPT | Performed by: FAMILY MEDICINE

## 2023-12-06 PROCEDURE — 99999 PR PBB SHADOW E&M-EST. PATIENT-LVL IV: CPT | Mod: PBBFAC,,, | Performed by: FAMILY MEDICINE

## 2023-12-06 PROCEDURE — 99215 OFFICE O/P EST HI 40 MIN: CPT | Mod: S$GLB,,, | Performed by: FAMILY MEDICINE

## 2023-12-06 PROCEDURE — 3008F BODY MASS INDEX DOCD: CPT | Mod: CPTII,S$GLB,, | Performed by: FAMILY MEDICINE

## 2023-12-06 PROCEDURE — 3008F PR BODY MASS INDEX (BMI) DOCUMENTED: ICD-10-PCS | Mod: CPTII,S$GLB,, | Performed by: FAMILY MEDICINE

## 2023-12-06 PROCEDURE — 4010F ACE/ARB THERAPY RXD/TAKEN: CPT | Mod: CPTII,S$GLB,, | Performed by: FAMILY MEDICINE

## 2023-12-06 PROCEDURE — 1159F PR MEDICATION LIST DOCUMENTED IN MEDICAL RECORD: ICD-10-PCS | Mod: CPTII,S$GLB,, | Performed by: FAMILY MEDICINE

## 2023-12-06 PROCEDURE — 4010F PR ACE/ARB THEARPY RXD/TAKEN: ICD-10-PCS | Mod: CPTII,S$GLB,, | Performed by: FAMILY MEDICINE

## 2023-12-06 PROCEDURE — 3075F PR MOST RECENT SYSTOLIC BLOOD PRESS GE 130-139MM HG: ICD-10-PCS | Mod: CPTII,S$GLB,, | Performed by: FAMILY MEDICINE

## 2023-12-06 PROCEDURE — 80061 LIPID PANEL: CPT | Performed by: FAMILY MEDICINE

## 2023-12-06 PROCEDURE — 83036 HEMOGLOBIN GLYCOSYLATED A1C: CPT | Performed by: FAMILY MEDICINE

## 2023-12-06 RX ORDER — METOPROLOL TARTRATE 50 MG/1
50 TABLET ORAL
COMMUNITY
Start: 2023-08-14 | End: 2023-12-06 | Stop reason: SDUPTHER

## 2023-12-06 RX ORDER — ESCITALOPRAM OXALATE 20 MG/1
20 TABLET ORAL DAILY
Qty: 90 TABLET | Refills: 3 | Status: SHIPPED | OUTPATIENT
Start: 2023-12-06 | End: 2024-12-05

## 2023-12-06 RX ORDER — LOSARTAN POTASSIUM 100 MG/1
100 TABLET ORAL DAILY
Qty: 90 TABLET | Refills: 3 | Status: SHIPPED | OUTPATIENT
Start: 2023-12-06 | End: 2024-12-05

## 2023-12-06 RX ORDER — METOPROLOL TARTRATE 100 MG/1
100 TABLET ORAL DAILY
Qty: 90 TABLET | Refills: 3 | Status: SHIPPED | OUTPATIENT
Start: 2023-12-06

## 2023-12-06 RX ORDER — ATORVASTATIN CALCIUM 80 MG/1
40 TABLET, FILM COATED ORAL
COMMUNITY
Start: 2023-01-03

## 2023-12-06 RX ORDER — AMLODIPINE BESYLATE 10 MG/1
10 TABLET ORAL DAILY
Qty: 90 TABLET | Refills: 3 | Status: SHIPPED | OUTPATIENT
Start: 2023-12-06 | End: 2024-12-05

## 2023-12-06 NOTE — TELEPHONE ENCOUNTER
If we can not get her seen in primary care today she will need to go to the ER for further treatment.

## 2023-12-06 NOTE — PROGRESS NOTES
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  She she has been seen in the ER in the past couple of days for approximately 230/130 blood pressure.  Medication adjustments as below.  Follow-up as below    She is c/o fibromyalgia due to the fact that she has had bilateral upper and lower extremity Arthralgias and myalgias for several months.    She has a history of mood disorder.  She sees psychiatry.     REVIEW OF SYSTEMS:  GENERAL: No fever, chills, fatigability or weight loss.  SKIN: No rashes, itching or changes in color or texture of skin.  HEAD: No recent head trauma.  EYES: Visual acuity fine. No photophobia, ocular pain or diplopia.  EARS: Denies ear pain, discharge or vertigo.  NOSE: No loss of smell, no epistaxis or postnasal drip.  MOUTH & THROAT: No hoarseness or change in voice. No excessive gum bleeding.  NODES: Denies swollen glands.  CHEST: Denies OLIVARES, cyanosis, wheezing, cough and sputum production.  CARDIOVASCULAR: Denies PND, orthopnea or reduced exercise tolerance.  ABDOMEN: Appetite fine. No weight loss. Denies diarrhea, abdominal pain, hematemesis or blood in stool.  URINARY: No flank pain, dysuria or hematuria.  PERIPHERAL VASCULAR: No claudication or cyanosis.  MUSCULOSKELETAL: No joint stiffness or swelling.  Except as mentioned above  NEUROLOGIC: No history of seizures.  She was admitted for a TIA.    SOCIAL HISTORY: The patient does not smoke.  The patient consumes alcohol socially.  The patient is .  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      PHYSICAL EXAMINATION:   Blood pressure 130/84, pulse 70, height 5' 9" (1.753 m), weight 115.4 kg (254 lb 4.8 oz), last menstrual period 08/06/2021, SpO2 98 %.  APPEARANCE: Well nourished, well developed, in no acute distress.    HEAD: Normocephalic, atraumatic.  EYES: PERRL. EOMI.  Conjunctivae without " injection and  anicteric  NOSE: Mucosa pink. Airway clear.  MOUTH & THROAT: No tonsillar enlargement. No pharyngeal erythema or exudate. No stridor.  NECK: Supple.   NODES: No cervical, axillary or inguinal lymph node enlargement.  CHEST: Lungs clear to auscultation.  No retractions are noted.  No rales or rhonchi are present.  CARDIOVASCULAR: Normal S1, S2. No rubs, murmurs or gallops.  ABDOMEN: Bowel sounds normal. Not distended. Soft. No tenderness or masses.  No ascites is noted.  MUSCULOSKELETAL:  There is no clubbing, cyanosis, or edema of the extremities x4.  There is full range of motion of the lumbar spine.  There is full range of motion of the extremities x4.  There is no deformity noted.    NEUROLOGIC:       Normal speech development.      Hearing normal.      Normal gait.      Motor and sensory exams grossly normal.  PSYCHIATRIC: Patient is alert and oriented x3.  Thought processes are all normal.  There is no homicidality.  There is no suicidality.  There is no evidence of psychosis.    LABORATORY/RADIOLOGY:   Chart reviewed.     ASSESSMENT:   HTN improved with weight loss, not well controlled  Anxiety, not well controlled  Arthralgias and myalgias for several months involving both the upper and lower extremities  Obesity with a BMI of 37, status post gastric sleeve with substantial weight loss  SARAH / snoring improved with weight loss      PLAN:  We will follow-up blood work which we expect to be normal.    Continue amlodipine 10 mg  Increase losartan to 100 mg  Increase metoprolol to 100 mg    Start Lexapro    Patient has been seen by sleep medicine.  Continue current medications     Return to clinic in 6 months

## 2023-12-06 NOTE — PROGRESS NOTES
Spoke with patient and she requested appointment to be scheduled in the rheumatology clinic.  Patient has no rheumatology referral.  In-basket message sent to patient's PCP provider for patient's call back for rheumatology referral.  Patient denied no other needs to be addressed at this time.

## 2023-12-08 ENCOUNTER — TELEPHONE (OUTPATIENT)
Dept: INTERNAL MEDICINE | Facility: CLINIC | Age: 43
End: 2023-12-08
Payer: COMMERCIAL

## 2023-12-08 NOTE — TELEPHONE ENCOUNTER
----- Message from Tiffanie Bhat sent at 12/6/2023 12:08 PM CST -----  Regarding: Appt Access  Contact: Anitra Chaney  Good afternoon,    Patient would like a referral to the rheumatology clinic.  Patient's call back number is [phone].    Thanks and have a good day!    Tiffanie Bhat

## 2023-12-11 DIAGNOSIS — M25.572 CHRONIC PAIN OF LEFT ANKLE: ICD-10-CM

## 2023-12-11 DIAGNOSIS — G89.29 CHRONIC PAIN OF LEFT ANKLE: ICD-10-CM

## 2023-12-11 DIAGNOSIS — Z98.1 STATUS POST ANKLE ARTHRODESIS: ICD-10-CM

## 2023-12-11 RX ORDER — OXYCODONE AND ACETAMINOPHEN 10; 325 MG/1; MG/1
1 TABLET ORAL EVERY 4 HOURS PRN
Qty: 42 TABLET | Refills: 0 | OUTPATIENT
Start: 2023-12-11

## 2023-12-12 DIAGNOSIS — M25.572 CHRONIC PAIN OF LEFT ANKLE: ICD-10-CM

## 2023-12-12 DIAGNOSIS — G89.29 CHRONIC PAIN OF LEFT ANKLE: ICD-10-CM

## 2023-12-12 DIAGNOSIS — Z98.1 STATUS POST ANKLE ARTHRODESIS: ICD-10-CM

## 2023-12-12 RX ORDER — OXYCODONE AND ACETAMINOPHEN 10; 325 MG/1; MG/1
1 TABLET ORAL EVERY 4 HOURS PRN
Qty: 42 TABLET | Refills: 0 | Status: SHIPPED | OUTPATIENT
Start: 2023-12-12 | End: 2023-12-18 | Stop reason: SDUPTHER

## 2023-12-12 RX ORDER — OXYCODONE AND ACETAMINOPHEN 10; 325 MG/1; MG/1
1 TABLET ORAL EVERY 4 HOURS PRN
Qty: 42 TABLET | Refills: 0 | OUTPATIENT
Start: 2023-12-12

## 2023-12-18 DIAGNOSIS — M25.572 CHRONIC PAIN OF LEFT ANKLE: ICD-10-CM

## 2023-12-18 DIAGNOSIS — Z98.1 STATUS POST ANKLE ARTHRODESIS: ICD-10-CM

## 2023-12-18 DIAGNOSIS — G89.29 CHRONIC PAIN OF LEFT ANKLE: ICD-10-CM

## 2023-12-19 RX ORDER — OXYCODONE AND ACETAMINOPHEN 10; 325 MG/1; MG/1
1 TABLET ORAL EVERY 4 HOURS PRN
Qty: 42 TABLET | Refills: 0 | Status: SHIPPED | OUTPATIENT
Start: 2023-12-19 | End: 2023-12-22 | Stop reason: SDUPTHER

## 2023-12-22 DIAGNOSIS — G89.29 CHRONIC PAIN OF LEFT ANKLE: ICD-10-CM

## 2023-12-22 DIAGNOSIS — Z98.1 STATUS POST ANKLE ARTHRODESIS: ICD-10-CM

## 2023-12-22 DIAGNOSIS — M25.572 CHRONIC PAIN OF LEFT ANKLE: ICD-10-CM

## 2023-12-26 RX ORDER — OXYCODONE AND ACETAMINOPHEN 10; 325 MG/1; MG/1
1 TABLET ORAL EVERY 4 HOURS PRN
Qty: 42 TABLET | Refills: 0 | Status: SHIPPED | OUTPATIENT
Start: 2023-12-26 | End: 2023-12-31 | Stop reason: SDUPTHER

## 2023-12-31 DIAGNOSIS — M25.572 CHRONIC PAIN OF LEFT ANKLE: ICD-10-CM

## 2023-12-31 DIAGNOSIS — G89.29 CHRONIC PAIN OF LEFT ANKLE: ICD-10-CM

## 2023-12-31 DIAGNOSIS — Z98.1 STATUS POST ANKLE ARTHRODESIS: ICD-10-CM

## 2024-01-02 RX ORDER — OXYCODONE AND ACETAMINOPHEN 10; 325 MG/1; MG/1
1 TABLET ORAL EVERY 4 HOURS PRN
Qty: 42 TABLET | Refills: 0 | Status: SHIPPED | OUTPATIENT
Start: 2024-01-02 | End: 2024-01-05 | Stop reason: SDUPTHER

## 2024-01-04 DIAGNOSIS — E11.9 TYPE 2 DIABETES MELLITUS WITHOUT COMPLICATION: ICD-10-CM

## 2024-01-05 DIAGNOSIS — Z98.1 STATUS POST ANKLE ARTHRODESIS: ICD-10-CM

## 2024-01-05 DIAGNOSIS — M25.572 CHRONIC PAIN OF LEFT ANKLE: ICD-10-CM

## 2024-01-05 DIAGNOSIS — G89.29 CHRONIC PAIN OF LEFT ANKLE: ICD-10-CM

## 2024-01-08 RX ORDER — OXYCODONE AND ACETAMINOPHEN 10; 325 MG/1; MG/1
1 TABLET ORAL EVERY 4 HOURS PRN
Qty: 42 TABLET | Refills: 0 | Status: SHIPPED | OUTPATIENT
Start: 2024-01-08 | End: 2024-01-14 | Stop reason: SDUPTHER

## 2024-01-14 DIAGNOSIS — G89.29 CHRONIC PAIN OF LEFT ANKLE: ICD-10-CM

## 2024-01-14 DIAGNOSIS — M25.572 CHRONIC PAIN OF LEFT ANKLE: ICD-10-CM

## 2024-01-14 DIAGNOSIS — Z98.1 STATUS POST ANKLE ARTHRODESIS: ICD-10-CM

## 2024-01-16 RX ORDER — OXYCODONE AND ACETAMINOPHEN 10; 325 MG/1; MG/1
1 TABLET ORAL EVERY 4 HOURS PRN
Qty: 42 TABLET | Refills: 0 | Status: SHIPPED | OUTPATIENT
Start: 2024-01-16 | End: 2024-01-21 | Stop reason: SDUPTHER

## 2024-01-21 DIAGNOSIS — Z98.1 STATUS POST ANKLE ARTHRODESIS: ICD-10-CM

## 2024-01-21 DIAGNOSIS — G89.29 CHRONIC PAIN OF LEFT ANKLE: ICD-10-CM

## 2024-01-21 DIAGNOSIS — M25.572 CHRONIC PAIN OF LEFT ANKLE: ICD-10-CM

## 2024-01-22 RX ORDER — OXYCODONE AND ACETAMINOPHEN 10; 325 MG/1; MG/1
1 TABLET ORAL EVERY 4 HOURS PRN
Qty: 42 TABLET | Refills: 0 | Status: SHIPPED | OUTPATIENT
Start: 2024-01-22 | End: 2024-01-27 | Stop reason: SDUPTHER

## 2024-01-27 DIAGNOSIS — G89.29 CHRONIC PAIN OF LEFT ANKLE: ICD-10-CM

## 2024-01-27 DIAGNOSIS — Z98.1 STATUS POST ANKLE ARTHRODESIS: ICD-10-CM

## 2024-01-27 DIAGNOSIS — M25.572 CHRONIC PAIN OF LEFT ANKLE: ICD-10-CM

## 2024-01-27 RX ORDER — OXYCODONE AND ACETAMINOPHEN 10; 325 MG/1; MG/1
1 TABLET ORAL EVERY 4 HOURS PRN
Qty: 42 TABLET | Refills: 0 | Status: CANCELLED | OUTPATIENT
Start: 2024-01-27

## 2024-01-28 DIAGNOSIS — M25.572 CHRONIC PAIN OF LEFT ANKLE: ICD-10-CM

## 2024-01-28 DIAGNOSIS — G89.29 CHRONIC PAIN OF LEFT ANKLE: ICD-10-CM

## 2024-01-28 DIAGNOSIS — Z98.1 STATUS POST ANKLE ARTHRODESIS: ICD-10-CM

## 2024-01-29 RX ORDER — OXYCODONE AND ACETAMINOPHEN 10; 325 MG/1; MG/1
1 TABLET ORAL EVERY 4 HOURS PRN
Qty: 42 TABLET | Refills: 0 | Status: SHIPPED | OUTPATIENT
Start: 2024-01-29 | End: 2024-02-03 | Stop reason: SDUPTHER

## 2024-02-03 DIAGNOSIS — M25.572 CHRONIC PAIN OF LEFT ANKLE: ICD-10-CM

## 2024-02-03 DIAGNOSIS — Z98.1 STATUS POST ANKLE ARTHRODESIS: ICD-10-CM

## 2024-02-03 DIAGNOSIS — G89.29 CHRONIC PAIN OF LEFT ANKLE: ICD-10-CM

## 2024-02-05 RX ORDER — OXYCODONE AND ACETAMINOPHEN 10; 325 MG/1; MG/1
1 TABLET ORAL EVERY 4 HOURS PRN
Qty: 42 TABLET | Refills: 0 | Status: SHIPPED | OUTPATIENT
Start: 2024-02-05 | End: 2024-02-09 | Stop reason: SDUPTHER

## 2024-02-09 DIAGNOSIS — M25.572 CHRONIC PAIN OF LEFT ANKLE: ICD-10-CM

## 2024-02-09 DIAGNOSIS — Z98.1 STATUS POST ANKLE ARTHRODESIS: ICD-10-CM

## 2024-02-09 DIAGNOSIS — G89.29 CHRONIC PAIN OF LEFT ANKLE: ICD-10-CM

## 2024-02-09 RX ORDER — OXYCODONE AND ACETAMINOPHEN 10; 325 MG/1; MG/1
1 TABLET ORAL EVERY 4 HOURS PRN
Qty: 42 TABLET | Refills: 0 | Status: SHIPPED | OUTPATIENT
Start: 2024-02-09 | End: 2024-02-15 | Stop reason: SDUPTHER

## 2024-02-09 RX ORDER — OXYCODONE AND ACETAMINOPHEN 10; 325 MG/1; MG/1
1 TABLET ORAL EVERY 4 HOURS PRN
Qty: 42 TABLET | Refills: 0 | Status: CANCELLED | OUTPATIENT
Start: 2024-02-09

## 2024-02-15 DIAGNOSIS — G89.29 CHRONIC PAIN OF LEFT ANKLE: ICD-10-CM

## 2024-02-15 DIAGNOSIS — M25.572 CHRONIC PAIN OF LEFT ANKLE: ICD-10-CM

## 2024-02-15 DIAGNOSIS — Z98.1 STATUS POST ANKLE ARTHRODESIS: ICD-10-CM

## 2024-02-16 RX ORDER — OXYCODONE AND ACETAMINOPHEN 10; 325 MG/1; MG/1
1 TABLET ORAL EVERY 4 HOURS PRN
Qty: 42 TABLET | Refills: 0 | Status: SHIPPED | OUTPATIENT
Start: 2024-02-16 | End: 2024-02-21 | Stop reason: SDUPTHER

## 2024-02-21 DIAGNOSIS — M25.572 CHRONIC PAIN OF LEFT ANKLE: ICD-10-CM

## 2024-02-21 DIAGNOSIS — G89.29 CHRONIC PAIN OF LEFT ANKLE: ICD-10-CM

## 2024-02-21 DIAGNOSIS — Z98.1 STATUS POST ANKLE ARTHRODESIS: ICD-10-CM

## 2024-02-22 RX ORDER — OXYCODONE AND ACETAMINOPHEN 10; 325 MG/1; MG/1
1 TABLET ORAL EVERY 4 HOURS PRN
Qty: 42 TABLET | Refills: 0 | Status: SHIPPED | OUTPATIENT
Start: 2024-02-22 | End: 2024-02-29 | Stop reason: SDUPTHER

## 2024-02-29 DIAGNOSIS — Z98.1 STATUS POST ANKLE ARTHRODESIS: ICD-10-CM

## 2024-02-29 DIAGNOSIS — M25.572 CHRONIC PAIN OF LEFT ANKLE: ICD-10-CM

## 2024-02-29 DIAGNOSIS — G89.29 CHRONIC PAIN OF LEFT ANKLE: ICD-10-CM

## 2024-02-29 RX ORDER — OXYCODONE AND ACETAMINOPHEN 10; 325 MG/1; MG/1
1 TABLET ORAL EVERY 4 HOURS PRN
Qty: 42 TABLET | Refills: 0 | Status: SHIPPED | OUTPATIENT
Start: 2024-02-29 | End: 2024-03-06 | Stop reason: SDUPTHER

## 2024-03-03 NOTE — PROGRESS NOTES
Mr. Ramos presents to the office today for a preventive visit. The patient currently has no complaints or problems and feels well.except as noted below. I have not seen him in over three years    It should be noted that the patient's list of drug allergies/sensitivities, current medication list, problem list, and past medical history were reviewed today by me with the patient and in Epic, and updated accordingly.        Past Medical History:   Diagnosis Date   • Family history of prostate cancer     Brother   • Hyperplastic colon polyp 2021    Dr Burns   • Hypocalcemia    • Overweight    • PAST MEDICAL HISTORY childhood    rheumatic fever w/o heart disease   • Stage 2 chronic kidney disease 2021   • Tobacco abuse    • Tubular adenoma of colon 2021    Dr Burns   • Unspecified hypothyroidism    • Vitamin D deficiency          Past Surgical History:   Procedure Laterality Date   • Colonoscopy diagnostic  2021    Dr Burns/Colonoscopy/ Tubular adenoma and Hyperplastic/ Recall 5 years   • Hand finger surgery unlisted      (L) hand glass FB removal   • Leg/ankle surgery proc unlisted      removal of nonmalignant tumor   • Nose surgery      nose fx         VACCINATIONS:  Primary series okay                Due for:  influenza vaccination, Shingrix, and COVID-19. He declines all of these today in spite of my strong recommendation      Social History     Tobacco Use   • Smoking status: Every Day     Current packs/day: 0.50     Average packs/day: 0.5 packs/day for 40.0 years (20.0 ttl pk-yrs)     Types: Cigarettes   • Smokeless tobacco: Never   Substance Use Topics   • Alcohol use: No     Alcohol/week: 0.0 standard drinks of alcohol     Comment: quit drinking entirely in his 20's   • Drug use: No         Review of patient's family status indicates:    Mother                                           Father                                           Sister                Obstetrical ultrasound completed today.  See report in imaging section of [facility].             Alive                     Brother                        Alive                     Brother                        Alive                     Maternal Grandmother                                 Family History   Problem Relation Age of Onset   • NEGATIVE FAMILY HX OF Mother         trauma   • Alcohol Abuse Father    • Hypertension Father    • Hypothyroid Sister    • Obesity Sister    • Cancer, Prostate Brother    • Diabetes Brother    • Cancer Maternal Grandmother            PHYSICAL EXAMINATION:  CONSTITUTIONAL:  The patient appears comfortable, nontoxic, and in no apparent distress.  SKIN:  No rashes, ulcers, or suspicious lesions are seen.    HEENT:  The conjunctivae/sclerae are clear.  No lesions, rashes, or other abnormalities are appreciated on the eyelids.  Pupils are equal and reactive to light and are symmetrical. The external auditory canals and tympanic membranes are normal bilaterally. The pharynx is clear without lesions or erythema.  NECK:  Supple and nontender without masses, lesions, or bruits.  The thyroid is grossly normal to palpation without masses, goiter, asymmetry, or tenderness.    RESPIRATORY:  Lungs are clear to auscultation without rales, wheezes, or rhonchi.   CARDIAC:  Regular rate and rhythm without S3, S4, heave or thrill.    ABDOMEN:  Soft and nontender with positive bowel sounds in all quadrants.  No hepatosplenomegaly, masses, rebound, guarding, rigidity or ventral hernias are appreciated.  Genitourinary examination:  Normal male anatomy with descended testicles bilaterally.  No inguinal hernias are appreciated.  Palpation of the testicles and scrotum reveal no masses or tenderness.  Penis is normal without lesions or discharge.  RECTAL: Deferred at patients request  MUSCULOSKELETAL:  Extremities are without edema.  Back is without costovertebral angle or spine tenderness.  Gait appears grossly normal.  NEUROLOGICAL:  Grossly intact and nonfocal.        ASSESSMENT/PLAN:    Preventative visit.      Tests/Consults/Medications:  See order section.     Note is made that the patient is overdue for a routine dental examination.  The importance of routine health care maintenance was discussed.    Thyroid. No heat or cold intolerance. Patient states he is taking his medication every day because he had a lot on hand, but refill history shows he hasn't refilled it since mid-2022. Will await labs.  Vitamin D/hypocalcemia. No myalgias. Check level  FHx of prostate cancer. No nocturia. Recommend annual PSA testing  CKD 2. No urinary sxs. Follow    The patient and I discussed his smoking.  The risks of smoking, including increased (but not limited to) risks of emphysema/COPD, heart disease, lung/bladder/oral/throat/pancreatic cancer, stroke and pneumonia were discussed.  We also discussed various options for quitting, including \"cold turkey,\" Wellbutrin, Chantix, and nicotine replacement therapy including nicotine patches, lozenges, and gum. [place] Quit Line number was provided to the patient (1-800-QUIT-NOW). Over 4 minutes was spent in this discussion. He also agrees to proceed with the previously discussed LDCT for screening    I will be in touch with the patient when all of the test results (if any were performed) are available and he should call if there are any questions or concerns.  Otherwise, he should be seen at least annually; more often as needed.      The following diagnoses have been reviewed with the patient at today's visit and appropriate recommendations made:    Routine general medical examination at a health care facility  (primary encounter diagnosis)  Hypothyroidism, unspecified type  Vitamin D deficiency  Tobacco abuse  Stage 2 chronic kidney disease  Family history of prostate cancer  Hypocalcemia  Special screening for malignant neoplasm of prostate  Screening for lung cancer  Cigarette nicotine dependence without complication      In the  interest of transparency, the 21st Century Cures Act requires healthcare organizations to make nearly all medical information readily available to patients. Please remember that this document is intended as a form of “[id number]” communication. Often medical records contain verbiage and abbreviations that might be unfamiliar and without context. Language may appear direct as it is intended to succinctly relay the clinical opinion of the practitioner.

## 2024-03-06 DIAGNOSIS — M25.572 CHRONIC PAIN OF LEFT ANKLE: ICD-10-CM

## 2024-03-06 DIAGNOSIS — Z98.1 STATUS POST ANKLE ARTHRODESIS: ICD-10-CM

## 2024-03-06 DIAGNOSIS — G89.29 CHRONIC PAIN OF LEFT ANKLE: ICD-10-CM

## 2024-03-07 RX ORDER — OXYCODONE AND ACETAMINOPHEN 10; 325 MG/1; MG/1
1 TABLET ORAL EVERY 4 HOURS PRN
Qty: 42 TABLET | Refills: 0 | Status: SHIPPED | OUTPATIENT
Start: 2024-03-07 | End: 2024-03-13 | Stop reason: SDUPTHER

## 2024-03-08 DIAGNOSIS — Z98.1 STATUS POST ANKLE ARTHRODESIS: ICD-10-CM

## 2024-03-08 DIAGNOSIS — M25.572 CHRONIC PAIN OF LEFT ANKLE: ICD-10-CM

## 2024-03-08 DIAGNOSIS — G89.29 CHRONIC PAIN OF LEFT ANKLE: ICD-10-CM

## 2024-03-08 RX ORDER — OXYCODONE AND ACETAMINOPHEN 10; 325 MG/1; MG/1
1 TABLET ORAL EVERY 4 HOURS PRN
Qty: 42 TABLET | Refills: 0 | Status: CANCELLED | OUTPATIENT
Start: 2024-03-08

## 2024-03-13 DIAGNOSIS — M25.572 CHRONIC PAIN OF LEFT ANKLE: ICD-10-CM

## 2024-03-13 DIAGNOSIS — G89.29 CHRONIC PAIN OF LEFT ANKLE: ICD-10-CM

## 2024-03-13 DIAGNOSIS — Z98.1 STATUS POST ANKLE ARTHRODESIS: ICD-10-CM

## 2024-03-14 ENCOUNTER — TELEPHONE (OUTPATIENT)
Dept: INTERNAL MEDICINE | Facility: CLINIC | Age: 44
End: 2024-03-14
Payer: COMMERCIAL

## 2024-03-14 ENCOUNTER — PATIENT MESSAGE (OUTPATIENT)
Dept: INTERNAL MEDICINE | Facility: CLINIC | Age: 44
End: 2024-03-14
Payer: COMMERCIAL

## 2024-03-14 RX ORDER — OXYCODONE AND ACETAMINOPHEN 10; 325 MG/1; MG/1
1 TABLET ORAL EVERY 4 HOURS PRN
Qty: 42 TABLET | Refills: 0 | Status: SHIPPED | OUTPATIENT
Start: 2024-03-14 | End: 2024-03-20 | Stop reason: SDUPTHER

## 2024-03-14 NOTE — TELEPHONE ENCOUNTER
LVM, advising Pt. to get treatment at the closest [facility]. to her current location, since her fever is 104 F and she cannot swallow at this time. Call ended.    My Chart message sent.

## 2024-03-15 ENCOUNTER — PATIENT MESSAGE (OUTPATIENT)
Dept: ORTHOPEDICS | Facility: CLINIC | Age: 44
End: 2024-03-15
Payer: COMMERCIAL

## 2024-03-20 DIAGNOSIS — Z98.1 STATUS POST ANKLE ARTHRODESIS: ICD-10-CM

## 2024-03-20 DIAGNOSIS — G89.29 CHRONIC PAIN OF LEFT ANKLE: ICD-10-CM

## 2024-03-20 DIAGNOSIS — M25.572 CHRONIC PAIN OF LEFT ANKLE: ICD-10-CM

## 2024-03-21 RX ORDER — OXYCODONE AND ACETAMINOPHEN 10; 325 MG/1; MG/1
1 TABLET ORAL EVERY 4 HOURS PRN
Qty: 42 TABLET | Refills: 0 | Status: SHIPPED | OUTPATIENT
Start: 2024-03-21 | End: 2024-03-27 | Stop reason: SDUPTHER

## 2024-03-27 DIAGNOSIS — G89.29 CHRONIC PAIN OF LEFT ANKLE: ICD-10-CM

## 2024-03-27 DIAGNOSIS — M25.572 CHRONIC PAIN OF LEFT ANKLE: ICD-10-CM

## 2024-03-27 DIAGNOSIS — Z98.1 STATUS POST ANKLE ARTHRODESIS: ICD-10-CM

## 2024-03-28 RX ORDER — OXYCODONE AND ACETAMINOPHEN 10; 325 MG/1; MG/1
1 TABLET ORAL EVERY 4 HOURS PRN
Qty: 42 TABLET | Refills: 0 | Status: SHIPPED | OUTPATIENT
Start: 2024-03-28 | End: 2024-04-02 | Stop reason: SDUPTHER

## 2024-04-02 ENCOUNTER — OFFICE VISIT (OUTPATIENT)
Dept: INTERNAL MEDICINE | Facility: CLINIC | Age: 44
End: 2024-04-02
Attending: FAMILY MEDICINE
Payer: MEDICAID

## 2024-04-02 VITALS — HEIGHT: 69 IN | WEIGHT: 240 LBS | BODY MASS INDEX: 35.55 KG/M2

## 2024-04-02 DIAGNOSIS — M79.7 FIBROMYALGIA MUSCLE PAIN: ICD-10-CM

## 2024-04-02 DIAGNOSIS — G89.29 CHRONIC PAIN OF LEFT ANKLE: ICD-10-CM

## 2024-04-02 DIAGNOSIS — I10 PRIMARY HYPERTENSION: ICD-10-CM

## 2024-04-02 DIAGNOSIS — F41.1 GAD (GENERALIZED ANXIETY DISORDER): ICD-10-CM

## 2024-04-02 DIAGNOSIS — Z98.1 STATUS POST ANKLE ARTHRODESIS: ICD-10-CM

## 2024-04-02 DIAGNOSIS — M25.572 CHRONIC PAIN OF LEFT ANKLE: ICD-10-CM

## 2024-04-02 DIAGNOSIS — F41.9 ANXIETY: Primary | ICD-10-CM

## 2024-04-02 PROCEDURE — 99215 OFFICE O/P EST HI 40 MIN: CPT | Mod: 95,,, | Performed by: FAMILY MEDICINE

## 2024-04-02 PROCEDURE — 3008F BODY MASS INDEX DOCD: CPT | Mod: CPTII,95,, | Performed by: FAMILY MEDICINE

## 2024-04-02 PROCEDURE — 1159F MED LIST DOCD IN RCRD: CPT | Mod: CPTII,95,, | Performed by: FAMILY MEDICINE

## 2024-04-02 PROCEDURE — 1160F RVW MEDS BY RX/DR IN RCRD: CPT | Mod: CPTII,95,, | Performed by: FAMILY MEDICINE

## 2024-04-02 RX ORDER — PROMETHAZINE HYDROCHLORIDE 25 MG/1
25 TABLET ORAL EVERY 6 HOURS PRN
Qty: 30 TABLET | Refills: 1 | Status: SHIPPED | OUTPATIENT
Start: 2024-04-02 | End: 2024-05-02

## 2024-04-02 RX ORDER — ONDANSETRON HYDROCHLORIDE 8 MG/1
8 TABLET, FILM COATED ORAL EVERY 12 HOURS PRN
Qty: 30 TABLET | Refills: 1 | Status: SHIPPED | OUTPATIENT
Start: 2024-04-02 | End: 2024-05-13 | Stop reason: SDUPTHER

## 2024-04-02 NOTE — PROGRESS NOTES
The patient location is:  [place]  The chief complaint leading to consultation is:  Anxiety    Visit type: audiovisual    Face to Face time with patient:  22 minutes  Thirty minutes of total time spent on the encounter, which includes face to face time and non-face to face time preparing to see the patient (eg, review of tests), Obtaining and/or reviewing separately obtained history, Documenting clinical information in the electronic or other health record, Independently interpreting results (not separately reported) and communicating results to the patient/family/caregiver, or Care coordination (not separately reported).         Each patient to whom he or she provides medical services by telemedicine is:  (1) informed of the relationship between the physician and patient and the respective role of any other health care provider with respect to management of the patient; and (2) notified that he or she may decline to receive medical services by telemedicine and may withdraw from such care at any time.    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  She has been having a rough time recently.  She is having a lot of anxiety and insomnia over her daughter's suicide attempt    She is c/o fibromyalgia due to the fact that she has had bilateral upper and lower extremity Arthralgias and myalgias for several months.  Would like to see Rheumatology regarding this    She has a history of mood disorder.  She sees psychiatry.     REVIEW OF SYSTEMS:  GENERAL: No fever, chills, fatigability or weight loss.  SKIN: No rashes, itching or changes in color or texture of skin.  HEAD: No recent head trauma.  EYES: Visual acuity fine. No photophobia, ocular pain or diplopia.  EARS: Denies ear pain, discharge or vertigo.  NOSE: No loss of smell, no epistaxis or postnasal drip.  MOUTH & THROAT:  "No hoarseness or change in voice. No excessive gum bleeding.  NODES: Denies swollen glands.  CHEST: Denies OLIVARES, cyanosis, wheezing, cough and sputum production.  CARDIOVASCULAR: Denies PND, orthopnea or reduced exercise tolerance.  ABDOMEN: Appetite fine. No weight loss. Denies diarrhea, abdominal pain, hematemesis or blood in stool.  URINARY: No flank pain, dysuria or hematuria.  PERIPHERAL VASCULAR: No claudication or cyanosis.  MUSCULOSKELETAL: No joint stiffness or swelling.  Except as mentioned above  NEUROLOGIC: No history of seizures.  She was admitted for a TIA.    SOCIAL HISTORY: The patient does not smoke.  The patient consumes alcohol socially.  The patient is .  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      PHYSICAL EXAMINATION:   Height 5' 9" (1.753 m), weight 108.9 kg (240 lb), last menstrual period 08/06/2021.  APPEARANCE: Well nourished, well developed, in no acute distress.    HEAD: Normocephalic, atraumatic.  EYES: PERRL. EOMI.  Conjunctivae without injection and  anicteric  NEUROLOGIC:       Normal speech development.      Hearing normal.  PSYCHIATRIC: Patient is alert and oriented x3.  Thought processes are all normal.  There is no homicidality.  There is no suicidality.  There is no evidence of psychosis.    LABORATORY/RADIOLOGY:   Chart reviewed.     ASSESSMENT:   HTN improved and well controlled  Anxiety, not well controlled  Arthralgias and myalgias for several months involving both the upper and lower extremities due to fibromyalgia  Obesity with a BMI of 37, status post gastric sleeve with substantial weight loss  SARAH / snoring improved with weight loss    PLAN:  Zofran and Phenergan  Rheumatology referral  Continue amlodipine 10 mg  losartan 100 mg  metoprolol 100 mg    Start Lexapro    Patient has been seen by sleep medicine.  Continue current medications     Return to clinic in 6 months      Answers submitted by the patient for this visit:  Review of Systems Questionnaire (Submitted on " 4/2/2024)  activity change: Yes  unexpected weight change: No  neck pain: No  hearing loss: No  rhinorrhea: No  trouble swallowing: No  eye discharge: No  visual disturbance: No  chest tightness: No  wheezing: No  chest pain: No  palpitations: No  blood in stool: No  constipation: No  vomiting: No  diarrhea: No  polydipsia: Yes  polyuria: No  difficulty urinating: No  hematuria: No  menstrual problem: No  dysuria: No  joint swelling: Yes  arthralgias: Yes  headaches: Yes  weakness: Yes  confusion: No  dysphoric mood: Yes

## 2024-04-03 RX ORDER — OXYCODONE AND ACETAMINOPHEN 10; 325 MG/1; MG/1
1 TABLET ORAL EVERY 4 HOURS PRN
Qty: 42 TABLET | Refills: 0 | Status: SHIPPED | OUTPATIENT
Start: 2024-04-03 | End: 2024-04-10 | Stop reason: SDUPTHER

## 2024-04-10 ENCOUNTER — PATIENT MESSAGE (OUTPATIENT)
Dept: PODIATRY | Facility: CLINIC | Age: 44
End: 2024-04-10
Payer: COMMERCIAL

## 2024-04-10 ENCOUNTER — TELEPHONE (OUTPATIENT)
Dept: PODIATRY | Facility: CLINIC | Age: 44
End: 2024-04-10
Payer: COMMERCIAL

## 2024-04-10 DIAGNOSIS — G89.29 CHRONIC PAIN OF LEFT ANKLE: ICD-10-CM

## 2024-04-10 DIAGNOSIS — M25.572 CHRONIC PAIN OF LEFT ANKLE: ICD-10-CM

## 2024-04-10 DIAGNOSIS — Z98.1 STATUS POST ANKLE ARTHRODESIS: ICD-10-CM

## 2024-04-10 NOTE — TELEPHONE ENCOUNTER
----- Message from Rom Benedict DPM sent at 4/10/2024  4:04 PM CDT -----  Sure I can take a look. Alyssia please assist with scheduling.    Davion Phelan.  ----- Message -----  From: Amie Quintanilla MA  Sent: 4/10/2024   2:22 PM CDT  To: Rom Benedict DPM    Good afternoon,    Can you see this patient?  stated in his last note that he was going to send her to  for a second opinion.

## 2024-04-10 NOTE — TELEPHONE ENCOUNTER
Attempted to reach out to Ms Chaney to assist her with making an appt with Dr Benedict; however, she was unavailable. Voice mail left offering assistance with making an appt with call back encouraged. Will attempt to reach out to her during clinic hours.

## 2024-04-11 RX ORDER — OXYCODONE AND ACETAMINOPHEN 10; 325 MG/1; MG/1
1 TABLET ORAL EVERY 4 HOURS PRN
Qty: 42 TABLET | Refills: 0 | Status: SHIPPED | OUTPATIENT
Start: 2024-04-11 | End: 2024-04-18 | Stop reason: SDUPTHER

## 2024-04-18 DIAGNOSIS — M25.572 CHRONIC PAIN OF LEFT ANKLE: ICD-10-CM

## 2024-04-18 DIAGNOSIS — G89.29 CHRONIC PAIN OF LEFT ANKLE: ICD-10-CM

## 2024-04-18 DIAGNOSIS — Z98.1 STATUS POST ANKLE ARTHRODESIS: ICD-10-CM

## 2024-04-18 RX ORDER — OXYCODONE AND ACETAMINOPHEN 10; 325 MG/1; MG/1
1 TABLET ORAL EVERY 4 HOURS PRN
Qty: 42 TABLET | Refills: 0 | Status: CANCELLED | OUTPATIENT
Start: 2024-04-18

## 2024-04-18 RX ORDER — OXYCODONE AND ACETAMINOPHEN 10; 325 MG/1; MG/1
1 TABLET ORAL EVERY 4 HOURS PRN
Qty: 42 TABLET | Refills: 0 | Status: SHIPPED | OUTPATIENT
Start: 2024-04-18 | End: 2024-04-23 | Stop reason: SDUPTHER

## 2024-04-23 DIAGNOSIS — M25.572 CHRONIC PAIN OF LEFT ANKLE: ICD-10-CM

## 2024-04-23 DIAGNOSIS — Z98.1 STATUS POST ANKLE ARTHRODESIS: ICD-10-CM

## 2024-04-23 DIAGNOSIS — G89.29 CHRONIC PAIN OF LEFT ANKLE: ICD-10-CM

## 2024-04-24 RX ORDER — OXYCODONE AND ACETAMINOPHEN 10; 325 MG/1; MG/1
1 TABLET ORAL EVERY 4 HOURS PRN
Qty: 42 TABLET | Refills: 0 | Status: SHIPPED | OUTPATIENT
Start: 2024-04-24 | End: 2024-05-06 | Stop reason: SDUPTHER

## 2024-04-25 NOTE — ASSESSMENT & PLAN NOTE
- Possibly hemorrhoidal, but also could be diverticular.  - Colonoscopy result from 2011 not on chart.  GI looking into it.  - ASA held  - Serial H/H  - Regular diet (patient 17 weeks pregnant)  - OB/GYN have evaluated and do not feel bleeding is related to her pregnancy.       [Parents] : parents [Fruit] : fruit [Vegetables] : vegetables [Meat] : meat [Eggs] : eggs [Dairy] : dairy [___ stools every other day] : [unfilled]  stools every other day [Normal] : Normal [In bed] : In bed [Sippy cup use] : Sippy cup use [Brushing teeth] : Brushing teeth [No] : Patient does not go to dentist yearly [Toilet Training] : Toilet training [Up to date] : Up to date [FreeTextEntry7] : Doing well. Cough x 1 week - giving Zarbee's, no fever.  [de-identified] : 3 meals/day, drinks water, juice, Lactaid whole milk 5-6oz (in cereal) [FreeTextEntry3] : sleeps through the night, 1 nap [de-identified] : cup with straw [de-identified] : upcoming appt 5/6 [FreeTextEntry9] : home during the day, 3-4 hours of screen time/day

## 2024-04-29 ENCOUNTER — PATIENT MESSAGE (OUTPATIENT)
Dept: ADMINISTRATIVE | Facility: HOSPITAL | Age: 44
End: 2024-04-29
Payer: COMMERCIAL

## 2024-04-29 ENCOUNTER — PATIENT OUTREACH (OUTPATIENT)
Dept: ADMINISTRATIVE | Facility: HOSPITAL | Age: 44
End: 2024-04-29
Payer: COMMERCIAL

## 2024-04-29 NOTE — PROGRESS NOTES
Health Maintenance Due   Topic Date Due    Monkeypox Vaccine (1 - Risk 2-dose series) Never done    Diabetes Urine Screening  Never done    Eye Exam  03/20/2014    Foot Exam  12/03/2017    Mammogram  08/16/2022    Influenza Vaccine (1) 09/01/2023    COVID-19 Vaccine (6 - 2023-24 season) 09/01/2023    Health Maintenance reviewed, updated and links triggered. Eye, Foot, Mammogram and DM Urine due.   Portal message sent for scheduling. ([place]) 4/29/24 Patient have a lot of NO Shows and cancels. Podiatry  No showed.

## 2024-05-04 ENCOUNTER — HOSPITAL ENCOUNTER (EMERGENCY)
Facility: HOSPITAL | Age: 44
Discharge: HOME | End: 2024-05-04
Attending: EMERGENCY MEDICINE
Payer: MEDICAID

## 2024-05-04 ENCOUNTER — PATIENT MESSAGE (OUTPATIENT)
Dept: ORTHOPEDICS | Facility: CLINIC | Age: 44
End: 2024-05-04
Payer: COMMERCIAL

## 2024-05-04 VITALS
RESPIRATION RATE: 18 BRPM | TEMPERATURE: 99 F | BODY MASS INDEX: 34.44 KG/M2 | SYSTOLIC BLOOD PRESSURE: 149 MMHG | OXYGEN SATURATION: 98 % | HEART RATE: 67 BPM | HEIGHT: 70 IN | DIASTOLIC BLOOD PRESSURE: 95 MMHG

## 2024-05-04 DIAGNOSIS — M25.572 CHRONIC PAIN OF LEFT ANKLE: ICD-10-CM

## 2024-05-04 DIAGNOSIS — L02.214 ABSCESS OF LEFT GROIN: Primary | ICD-10-CM

## 2024-05-04 DIAGNOSIS — R10.30 LOWER ABDOMINAL PAIN: ICD-10-CM

## 2024-05-04 DIAGNOSIS — G89.29 CHRONIC PAIN OF LEFT ANKLE: ICD-10-CM

## 2024-05-04 DIAGNOSIS — Z98.1 STATUS POST ANKLE ARTHRODESIS: ICD-10-CM

## 2024-05-04 DIAGNOSIS — M79.605 LEFT LEG PAIN: ICD-10-CM

## 2024-05-04 DIAGNOSIS — M54.9 BACK PAIN, UNSPECIFIED BACK LOCATION, UNSPECIFIED BACK PAIN LATERALITY, UNSPECIFIED CHRONICITY: ICD-10-CM

## 2024-05-04 LAB
ALBUMIN SERPL BCP-MCNC: 4.3 G/DL (ref 3.5–5.2)
ALP SERPL-CCNC: 68 U/L (ref 38–126)
ALT SERPL W/O P-5'-P-CCNC: 13 U/L (ref 10–44)
ANION GAP SERPL CALC-SCNC: 10 MMOL/L (ref 8–16)
AST SERPL-CCNC: 17 U/L (ref 15–46)
BASOPHILS # BLD AUTO: 0.07 K/UL (ref 0–0.2)
BASOPHILS NFR BLD: 0.6 % (ref 0–1.9)
BILIRUB SERPL-MCNC: 0.3 MG/DL (ref 0.1–1)
BILIRUB UR QL STRIP: NEGATIVE
CALCIUM SERPL-MCNC: 9.8 MG/DL (ref 8.7–10.5)
CHLORIDE SERPL-SCNC: 106 MMOL/L (ref 95–110)
CLARITY UR REFRACT.AUTO: CLEAR
CO2 SERPL-SCNC: 25 MMOL/L (ref 23–29)
COLOR UR AUTO: YELLOW
CREAT SERPL-MCNC: 0.54 MG/DL (ref 0.5–1.4)
DIFFERENTIAL METHOD BLD: ABNORMAL
EOSINOPHIL # BLD AUTO: 0.1 K/UL (ref 0–0.5)
EOSINOPHIL NFR BLD: 0.9 % (ref 0–8)
ERYTHROCYTE [DISTWIDTH] IN BLOOD BY AUTOMATED COUNT: 13.6 % (ref 11.5–14.5)
EST. GFR  (NO RACE VARIABLE): >60 ML/MIN/1.73 M^2
GLUCOSE SERPL-MCNC: 101 MG/DL (ref 70–110)
GLUCOSE UR QL STRIP: NEGATIVE
HCT VFR BLD AUTO: 37.8 % (ref 37–48.5)
HGB BLD-MCNC: 12.3 G/DL (ref 12–16)
HGB UR QL STRIP: ABNORMAL
IMM GRANULOCYTES # BLD AUTO: 0.04 K/UL (ref 0–0.04)
IMM GRANULOCYTES NFR BLD AUTO: 0.3 % (ref 0–0.5)
KETONES UR QL STRIP: NEGATIVE
LEUKOCYTE ESTERASE UR QL STRIP: NEGATIVE
LYMPHOCYTES # BLD AUTO: 2.2 K/UL (ref 1–4.8)
LYMPHOCYTES NFR BLD: 18.6 % (ref 18–48)
MCH RBC QN AUTO: 28.3 PG (ref 27–31)
MCHC RBC AUTO-ENTMCNC: 32.5 G/DL (ref 32–36)
MCV RBC AUTO: 87 FL (ref 82–98)
MONOCYTES # BLD AUTO: 0.5 K/UL (ref 0.3–1)
MONOCYTES NFR BLD: 4.1 % (ref 4–15)
NEUTROPHILS # BLD AUTO: 8.8 K/UL (ref 1.8–7.7)
NEUTROPHILS NFR BLD: 75.5 % (ref 38–73)
NITRITE UR QL STRIP: NEGATIVE
NRBC BLD-RTO: 0 /100 WBC
PH UR STRIP: 6 [PH] (ref 5–8)
PLATELET # BLD AUTO: 319 K/UL (ref 150–450)
PMV BLD AUTO: 9.2 FL (ref 9.2–12.9)
POCT GLUCOSE: 116 MG/DL (ref 70–110)
POTASSIUM SERPL-SCNC: 3.9 MMOL/L (ref 3.5–5.1)
PROT SERPL-MCNC: 7.8 G/DL (ref 6–8.4)
PROT UR QL STRIP: NEGATIVE
RBC # BLD AUTO: 4.35 M/UL (ref 4–5.4)
SODIUM SERPL-SCNC: 141 MMOL/L (ref 136–145)
SP GR UR STRIP: >=1.03 (ref 1–1.03)
URN SPEC COLLECT METH UR: ABNORMAL
UROBILINOGEN UR STRIP-ACNC: NEGATIVE EU/DL
UUN UR-MCNC: 11 MG/DL (ref 7–17)
WBC # BLD AUTO: 11.68 K/UL (ref 3.9–12.7)

## 2024-05-04 PROCEDURE — 85025 COMPLETE CBC W/AUTO DIFF WBC: CPT | Mod: ER

## 2024-05-04 PROCEDURE — 82962 GLUCOSE BLOOD TEST: CPT | Mod: ER

## 2024-05-04 PROCEDURE — 99285 EMERGENCY DEPT VISIT HI MDM: CPT | Mod: 25,ER

## 2024-05-04 PROCEDURE — 25500020 PHARM REV CODE 255: Mod: ER

## 2024-05-04 PROCEDURE — 81003 URINALYSIS AUTO W/O SCOPE: CPT | Mod: ER

## 2024-05-04 PROCEDURE — 80053 COMPREHEN METABOLIC PANEL: CPT | Mod: ER

## 2024-05-04 RX ADMIN — IOHEXOL 100 ML: 350 INJECTION, SOLUTION INTRAVENOUS at 07:05

## 2024-05-04 NOTE — ED PROVIDER NOTES
"Encounter Date: 2024       History     Chief Complaint   Patient presents with    Abscess     Pt reports abscess to left groin radiating to abdomen. Also reports chills.      Anitra Chaney is a 44 y.o. female who has a past medical history of Anxiety and depression, Encounter for blood transfusion, GERD (gastroesophageal reflux disease), GI bleeds, multiple during admission, HTN (hypertension), benign, Migraines, Obesity, PFO (patent foramen ovale), and Stroke. presenting to the Emergency Department for abscess. She first noticed it to left groin four days ago. She was treating with warm compress. It spontaneously drained last night. Today, patient reports the pain is shooting from the left groin to the lower abdomen, lower back and down the left leg. The pain down the leg is an itchy pain, no numbness or tingling. She reports hot and cold flashes, but no known fever. Feels queasy, but no vomiting. No diarrhea. No dysuria, hematuria. She has tried both ibuprofen and percocet for the pain. History of , cholecystectomy, gastric sleeve. History of diabetes, controlled with lifestyle changes.         The history is provided by the patient.     Review of patient's allergies indicates:   Allergen Reactions    Corticosteroids (glucocorticoids) Palpitations and Swelling    Aspirin     Toradol [ketorolac]      GI issues     Ibuprofen Nausea And Vomiting     Other reaction(s): gastric     Past Medical History:   Diagnosis Date    Anxiety and depression     Encounter for blood transfusion     GERD (gastroesophageal reflux disease)     GI bleeds, multiple during admission     HTN (hypertension), benign 3/10/2014    RESOLVED    Migraines     Obesity     PFO (patent foramen ovale)     found after stroke "too small/risky to close"    Stroke 2017    Hospitalized at [facility]; given tPA     Past Surgical History:   Procedure Laterality Date    ANKLE FRACTURE SURGERY       SECTION N/A 2019    " Procedure:  SECTION;  Surgeon: Gianna Flowers MD;  Location: [facility], [place], operated by [facility];  Service: OB/GYN;  Laterality: N/A;     SECTION WITH TUBAL LIGATION Bilateral 2020    Procedure:  SECTION, WITH TUBAL LIGATION;  Surgeon: Corina Mao MD;  Location: [facility], [place], operated by [facility];  Service: OB/GYN;  Laterality: Bilateral;     SECTION, CLASSIC  2005/2009/2019    x3    CHOLECYSTECTOMY  3/2002    lap maribel    COLONOSCOPY N/A 2021    Procedure: COLONOSCOPY;  Surgeon: Cleveland Sears MD;  Location: [facility];  Service: Endoscopy;  Laterality: N/A;    CYSTOSCOPY  9/10/2021    Procedure: CYSTOSCOPY;  Surgeon: Salomón Hernandez MD;  [facility];  Service: OB/GYN;;    ESOPHAGOGASTRODUODENOSCOPY N/A 2021    Procedure: EGD (ESOPHAGOGASTRODUODENOSCOPY);  Surgeon: Cleveland Sears MD;  Location: [facility];  Service: Endoscopy;  Laterality: N/A;    FOOT SURGERY Left     gastric sleeve  2017    HYSTERECTOMY  Sept 10,2021    INTRALUMINAL GASTROINTESTINAL TRACT IMAGING VIA CAPSULE N/A 2021    Procedure: IMAGING PROCEDURE, GI TRACT, INTRALUMINAL, VIA CAPSULE;  Surgeon: Nolan Cordero MD;  Location: [facility] END[facility];  Service: Endoscopy;  Laterality: N/A;  instructions portal -ml    LAPAROSCOPIC SALPINGECTOMY Bilateral 9/10/2021    Procedure: SALPINGECTOMY, LAPAROSCOPIC;  Surgeon: Salomón Hernandez MD;  [facility];  Service: OB/GYN;  Laterality: Bilateral;    LAPAROSCOPIC TOTAL HYSTERECTOMY N/A 9/10/2021    Procedure: HYSTERECTOMY, TOTAL, LAPAROSCOPIC;  Surgeon: Salomón Hernandez MD;  Location: [facility];  Service: OB/GYN;  Laterality: N/A;    TUBAL LIGATION  2020     Family History   Problem Relation Name Age of Onset    Other Father Osmar ivan         house fire    Alcohol abuse Father Osmar ivan     Depression Father Osmar ivan     Diabetes Father Osmar ivan     Early death Father Osmar ivan     Breast cancer Neg Hx      Colon cancer Neg Hx      Ovarian cancer Neg Hx      Congenital heart disease  Neg Hx      Pacemaker/defibrilator Neg Hx       Social History     Tobacco Use    Smoking status: Heavy Smoker     Current packs/day: 0.00     Average packs/day: 1.8 packs/day for 56.9 years (100.3 ttl pk-yrs)     Types: Cigarettes     Start date: 1992     Last attempt to quit: 2022     Years since quittin.6    Smokeless tobacco: Never    Tobacco comments:     cutting to 2 cigarettes per day   Substance Use Topics    Alcohol use: No    Drug use: No     Review of Systems   Constitutional:  Positive for chills. Negative for fever.   Respiratory:  Negative for cough, shortness of breath and wheezing.    Cardiovascular:  Negative for chest pain.   Gastrointestinal:  Positive for abdominal pain and nausea. Negative for diarrhea and vomiting.   Genitourinary:  Positive for difficulty urinating (pressure). Negative for dysuria and frequency.   Musculoskeletal:  Positive for back pain.   Skin:  Negative for color change.   Psychiatric/Behavioral:  Negative for agitation and confusion.        Physical Exam     Initial Vitals [24 1740]   BP Pulse Resp Temp SpO2   (!) 178/112 77 20 98.1 °F (36.7 °C) 97 %      MAP       --         Physical Exam    Nursing note and vitals reviewed.  Constitutional: She appears well-developed and well-nourished. She is not diaphoretic.  Non-toxic appearance. No distress.   HENT:   Head: Normocephalic and atraumatic.   Right Ear: Hearing and external ear normal.   Left Ear: Hearing and external ear normal.   Eyes: Conjunctivae and EOM are normal. Pupils are equal, round, and reactive to light.   Neck: Neck supple.   Normal range of motion.  Cardiovascular:  Normal rate and regular rhythm.           Pulmonary/Chest: Breath sounds normal. No respiratory distress. She has no wheezes.   Abdominal: Abdomen is soft. She exhibits no distension. There is abdominal tenderness (Across lower abdomen.  No focal tenderness.  No peritoneal signs.). There is no rebound.   Genitourinary:        Musculoskeletal:         General: Normal range of motion.      Cervical back: Normal range of motion and neck supple. Normal range of motion.      Comments: Midline thoracic and lumbar spine nontender to palpation.  No paraspinal tenderness.  No deformity or step-off. Ambulatory with steady gait.     Neurological: She is alert and oriented to person, place, and time. GCS score is 15.   Skin: Skin is warm and dry.   Psychiatric: She has a normal mood and affect. Her behavior is normal. Judgment and thought content normal.         ED Course   Procedures  Labs Reviewed   CBC W/ AUTO DIFFERENTIAL - Abnormal; Notable for the following components:       Result Value    Gran # (ANC) 8.8 (*)     Gran % 75.5 (*)     All other components within normal limits   URINALYSIS, REFLEX TO URINE CULTURE - Abnormal; Notable for the following components:    Specific Gravity, UA >=1.030 (*)     Occult Blood UA Trace (*)     All other components within normal limits    Narrative:     Preferred Collection Type->Urine, Clean Catch  Specimen Source->Urine   POCT GLUCOSE - Abnormal; Notable for the following components:    POCT Glucose 116 (*)     All other components within normal limits   COMPREHENSIVE METABOLIC PANEL          Imaging Results              CT Abdomen Pelvis With IV Contrast NO Oral Contrast (Final result)  Result time 05/04/24 20:48:44      Final result by Roselia Hernandez MD (05/04/24 20:48:44)                   Impression:      Stable CT with no acute or adverse finding      Electronically signed by: Roselia Hernandez  Date:    05/04/2024  Time:    20:48               Narrative:    EXAMINATION:  CT ABDOMEN PELVIS WITH IV CONTRAST    CLINICAL HISTORY:  Abdominal pain, acute, nonlocalized;left groin abscess radiating pain to lower abdomen, suprapubic tenderness without e/o UTI;    TECHNIQUE:  Low dose axial images, sagittal and coronal reformations were obtained from the lung bases to the pubic symphysis following  the IV administration of 100 mL of Omnipaque 350 iterative technique automated exposure    COMPARISON:  November 2022    FINDINGS:  Liver stable with hemangiomata likely.  Gallbladder absent    Spleen stable size    Pancreas stable    Left adrenal gland nodule stable size    Kidneys stable without hydronephrosis    No signs of appendicitis and no obstructive bowel findings.  Epigastric surgical change redemonstrated.  No fluid collection seen    Urinary bladder decompressed                                       Medications   iohexoL (OMNIPAQUE 350) injection 100 mL (100 mLs Intravenous Given 5/4/24 1930)     Medical Decision Making  Well-appearing 44-year-old female presents abscess left groin that spontaneously drained yesterday.  Patient now with pain radiating from the site of abscess to lower abdomen, left leg, lower back. Appears to be small ingrown hair that developed an abscess and ruptured spontaneously, 1 cm area of induration, no fluctuance or gross signs of infection. Patient does have lower abdominal tenderness. Will obtain belly labs, CT abd pelvis, UA. Patient is driving, so will not administer narcotics. Contraindications to NSAIDs. Offered tylenol, but patient declined.     Appendicitis, cholecystitis, cholangitis, pancreatitis, hepatitis, abdominal aortic aneurysm, diabetic ketoacidosis, bowel obstruction, intra-abdominal perforation, intra-abdominal infection vs. abscess, nephrolithiasis, pyelonephritis, urinary tract infection, electrolyte and/or metabolic abnormality, testicular/ovarian torsion, shingles.      Problems Addressed:  Abscess of left groin: self-limited or minor problem  Back pain, unspecified back location, unspecified back pain laterality, unspecified chronicity: acute illness or injury  Left leg pain: acute illness or injury  Lower abdominal pain: acute illness or injury    Amount and/or Complexity of Data Reviewed  Labs: ordered. Decision-making details documented in ED  "Course.  Radiology: ordered. Decision-making details documented in ED Course.  Discussion of management or test interpretation with external provider(s): none    Risk  Prescription drug management.  Risk Details: Comorbidities taken into consideration during the patient's evaluation and treatment include  has a past medical history of Anxiety and depression, Encounter for blood transfusion, GERD (gastroesophageal reflux disease), GI bleeds, multiple during admission, HTN (hypertension), benign (3/10/2014), Migraines, Obesity, PFO (patent foramen ovale), and Stroke (01/2017).  Social determinants of health taken into consideration during development of our treatment plan include difficulty in obtaining follow-up and obtaining medications; health literacy                 ED Course as of 05/04/24 2159   Sat May 04, 2024   1809 POCT Glucose(!): 116 [CS]   1837 Urinalysis, Reflex to Urine Culture Urine, Clean Catch(!)  Trace blood and increased specific gravity.  No signs of infection. [CS]   1912 CBC auto differential(!)  No leukocytosis.  Normal H&H [CS]   1912 Comprehensive metabolic panel  No renal or hepatic abnormality.  Normal electrolytes. [CS]   2053 CT Abdomen Pelvis With IV Contrast NO Oral Contrast  Stable CT with no acute or adverse finding [CS]   2056 Results discussed with patient at bedside.  She does feel reassured by negative workup, even stating the "pain is better knowing it is nothing significant." Labs and CT unremarkable. Patient to continue tylenol/percocet at home as needed.  No indication for antibiotics as abscess has drained and no signs of active infection/cellulitis. No leukocytosis. Close follow up with PCP for continued workup as indicated. ED return precautions discussed. Patient appreciative.  [CS]      ED Course User Index  [CS] Kathryn Mi PA-C                           Clinical Impression:  Final diagnoses:  [L02.214] Abscess of left groin (Primary)  [R10.30] Lower abdominal " pain  [M54.9] Back pain, unspecified back location, unspecified back pain laterality, unspecified chronicity  [M79.605] Left leg pain          ED Disposition Condition    Discharge Stable          ED Prescriptions    None       Follow-up Information       Follow up With Specialties Details Why Contact Info    Tien Watts MD Family Medicine Schedule an appointment as soon as possible for a visit in 2 days  [address]  [facility] [address]  [phone]               Kathryn Mi PA-C  05/04/24 2408

## 2024-05-04 NOTE — ED NOTES
Patient presents to ED reports abscess to perineal area that spontaneously opened yesterday, still draining today. Patient denies hx of having abscesses. Patient states since it has been draining patient has been experiencing increase in pain.

## 2024-05-05 DIAGNOSIS — G89.29 CHRONIC PAIN OF LEFT ANKLE: ICD-10-CM

## 2024-05-05 DIAGNOSIS — M25.572 CHRONIC PAIN OF LEFT ANKLE: ICD-10-CM

## 2024-05-05 DIAGNOSIS — Z98.1 STATUS POST ANKLE ARTHRODESIS: ICD-10-CM

## 2024-05-05 NOTE — DISCHARGE INSTRUCTIONS
Your bloodwork and urine look great. There are no signs of infection. Your CT scan is normal as well with no clear etiology for your pain. You can take Tylenol or Percocet for pain.  Drink plenty of fluids.  Please follow up with your primary care doctor for re-evaluation and to discuss your symptoms.     The abscess appears to be healing well. Continue using a warm compress and monitoring for any worsening symptoms. I do not feel you need any antibiotics at this time.

## 2024-05-06 ENCOUNTER — TELEPHONE (OUTPATIENT)
Dept: ORTHOPEDICS | Facility: CLINIC | Age: 44
End: 2024-05-06
Payer: COMMERCIAL

## 2024-05-06 DIAGNOSIS — Z98.1 STATUS POST ANKLE ARTHRODESIS: ICD-10-CM

## 2024-05-06 DIAGNOSIS — G89.29 CHRONIC PAIN OF LEFT ANKLE: ICD-10-CM

## 2024-05-06 DIAGNOSIS — M25.572 CHRONIC PAIN OF LEFT ANKLE: ICD-10-CM

## 2024-05-06 RX ORDER — OXYCODONE AND ACETAMINOPHEN 10; 325 MG/1; MG/1
1 TABLET ORAL EVERY 4 HOURS PRN
Qty: 42 TABLET | Refills: 0 | OUTPATIENT
Start: 2024-05-06

## 2024-05-06 RX ORDER — NALOXONE HYDROCHLORIDE 4 MG/.1ML
SPRAY NASAL
Qty: 1 EACH | Refills: 11 | Status: SHIPPED | OUTPATIENT
Start: 2024-05-06

## 2024-05-06 RX ORDER — OXYCODONE AND ACETAMINOPHEN 10; 325 MG/1; MG/1
1 TABLET ORAL EVERY 4 HOURS PRN
Qty: 42 TABLET | Refills: 0 | Status: SHIPPED | OUTPATIENT
Start: 2024-05-06 | End: 2024-05-20

## 2024-05-06 RX ORDER — OXYCODONE AND ACETAMINOPHEN 10; 325 MG/1; MG/1
1 TABLET ORAL EVERY 4 HOURS PRN
Qty: 42 TABLET | Refills: 0 | Status: SHIPPED | OUTPATIENT
Start: 2024-05-06 | End: 2024-05-11 | Stop reason: SDUPTHER

## 2024-05-06 NOTE — TELEPHONE ENCOUNTER
Attempted to contact pt,no answer. Lvm       ----- Message from Martha Quintanilla sent at 5/6/2024  8:44 AM CDT -----  Regarding: Appt Access  Contact: pt [phone]  Pt calling to schedule office visit to discuss ongoing left ankle pain. Pls call

## 2024-05-09 ENCOUNTER — TELEPHONE (OUTPATIENT)
Dept: ORTHOPEDICS | Facility: CLINIC | Age: 44
End: 2024-05-09
Payer: COMMERCIAL

## 2024-05-09 NOTE — TELEPHONE ENCOUNTER
TAQUERIA for appointment with Dr. Tim Bello MS, OTC  Orthopedic Clinical / OR Assistant to Dr. Terrence Dumont

## 2024-05-10 ENCOUNTER — TELEPHONE (OUTPATIENT)
Dept: ORTHOPEDICS | Facility: CLINIC | Age: 44
End: 2024-05-10
Payer: COMMERCIAL

## 2024-05-11 DIAGNOSIS — G89.29 CHRONIC PAIN OF LEFT ANKLE: ICD-10-CM

## 2024-05-11 DIAGNOSIS — Z98.1 STATUS POST ANKLE ARTHRODESIS: ICD-10-CM

## 2024-05-11 DIAGNOSIS — M25.572 CHRONIC PAIN OF LEFT ANKLE: ICD-10-CM

## 2024-05-13 DIAGNOSIS — F41.1 GAD (GENERALIZED ANXIETY DISORDER): ICD-10-CM

## 2024-05-13 DIAGNOSIS — F41.9 ANXIETY: ICD-10-CM

## 2024-05-13 RX ORDER — OXYCODONE AND ACETAMINOPHEN 10; 325 MG/1; MG/1
1 TABLET ORAL EVERY 4 HOURS PRN
Qty: 42 TABLET | Refills: 0 | Status: SHIPPED | OUTPATIENT
Start: 2024-05-13 | End: 2024-05-20

## 2024-05-13 RX ORDER — ONDANSETRON HYDROCHLORIDE 8 MG/1
8 TABLET, FILM COATED ORAL EVERY 12 HOURS PRN
Qty: 30 TABLET | Refills: 1 | Status: SHIPPED | OUTPATIENT
Start: 2024-05-13

## 2024-05-13 NOTE — TELEPHONE ENCOUNTER
No care due was identified.  [facility] Embedded Care Due Messages. Reference number: [id number].   5/13/2024 12:58:36 PM CDT

## 2024-05-13 NOTE — TELEPHONE ENCOUNTER
----- Message from Cecelia Zavala MD sent at 5/13/2024  9:28 AM CDT -----  1. Please let her know that I will cancel her E visit.  She should have this discussion with her PCP and with Psychiatry.  Please schedule with her PCP (in-person or virtual) and also reschedule with [facility] for anxiety, depression, Ativan.  Thank you!

## 2024-05-17 DIAGNOSIS — Z98.1 STATUS POST ANKLE ARTHRODESIS: ICD-10-CM

## 2024-05-17 DIAGNOSIS — G89.29 CHRONIC PAIN OF LEFT ANKLE: ICD-10-CM

## 2024-05-17 DIAGNOSIS — M25.572 CHRONIC PAIN OF LEFT ANKLE: ICD-10-CM

## 2024-05-17 RX ORDER — OXYCODONE AND ACETAMINOPHEN 10; 325 MG/1; MG/1
1 TABLET ORAL EVERY 4 HOURS PRN
Qty: 42 TABLET | Refills: 0 | Status: CANCELLED | OUTPATIENT
Start: 2024-05-17

## 2024-05-18 DIAGNOSIS — G89.29 CHRONIC PAIN OF LEFT ANKLE: ICD-10-CM

## 2024-05-18 DIAGNOSIS — Z98.1 STATUS POST ANKLE ARTHRODESIS: ICD-10-CM

## 2024-05-18 DIAGNOSIS — M25.572 CHRONIC PAIN OF LEFT ANKLE: ICD-10-CM

## 2024-05-18 RX ORDER — OXYCODONE AND ACETAMINOPHEN 10; 325 MG/1; MG/1
1 TABLET ORAL EVERY 4 HOURS PRN
Qty: 42 TABLET | Refills: 0 | Status: CANCELLED | OUTPATIENT
Start: 2024-05-17

## 2024-05-19 ENCOUNTER — HOSPITAL ENCOUNTER (EMERGENCY)
Facility: HOSPITAL | Age: 44
Discharge: HOME | End: 2024-05-19
Attending: FAMILY MEDICINE
Payer: MEDICAID

## 2024-05-19 VITALS
DIASTOLIC BLOOD PRESSURE: 101 MMHG | BODY MASS INDEX: 35.55 KG/M2 | TEMPERATURE: 98 F | WEIGHT: 240 LBS | RESPIRATION RATE: 16 BRPM | HEART RATE: 71 BPM | SYSTOLIC BLOOD PRESSURE: 169 MMHG | HEIGHT: 69 IN | OXYGEN SATURATION: 97 %

## 2024-05-19 DIAGNOSIS — S62.647A CLOSED NONDISPLACED FRACTURE OF PROXIMAL PHALANX OF LEFT LITTLE FINGER, INITIAL ENCOUNTER: Primary | ICD-10-CM

## 2024-05-19 DIAGNOSIS — T14.90XA INJURY: ICD-10-CM

## 2024-05-19 PROCEDURE — 29515 APPLICATION SHORT LEG SPLINT: CPT | Mod: LT,ER

## 2024-05-19 PROCEDURE — 99283 EMERGENCY DEPT VISIT LOW MDM: CPT | Mod: 25,ER

## 2024-05-19 PROCEDURE — 25000003 PHARM REV CODE 250: Mod: ER | Performed by: FAMILY MEDICINE

## 2024-05-19 RX ORDER — IBUPROFEN 400 MG/1
800 TABLET ORAL
Status: COMPLETED | OUTPATIENT
Start: 2024-05-19 | End: 2024-05-19

## 2024-05-19 RX ORDER — HYDROCODONE BITARTRATE AND ACETAMINOPHEN 10; 325 MG/1; MG/1
1 TABLET ORAL EVERY 6 HOURS PRN
Qty: 12 TABLET | Refills: 0 | Status: SHIPPED | OUTPATIENT
Start: 2024-05-19 | End: 2024-05-20

## 2024-05-19 RX ADMIN — IBUPROFEN 800 MG: 400 TABLET ORAL at 01:05

## 2024-05-19 NOTE — ED NOTES
Splint applied per Vilma PCT, application checked per ED charge FERNANDO Pandya MD at  to verify splint placement.  States pt ok to be d/c and will remove splint when arrives home and put on her walking boot.  Pt verbalized understanding.  Pt has scheduled ortho appt for tomorrow, encouraged to keep appt.

## 2024-05-19 NOTE — ED PROVIDER NOTES
"Encounter Date: 2024       History     Chief Complaint   Patient presents with    Foot Injury     Patient tripped and fell to the ground pta.  She reports she can not move her toes and has pain to left foot.      44-year-old female claims that she would twisted her foot and fell to ground.  Complains of stiffness in her left toes.  Previous ankle surgery.  Normal sensation.    The history is provided by the patient.     Review of patient's allergies indicates:   Allergen Reactions    Corticosteroids (glucocorticoids) Palpitations and Swelling    Aspirin     Toradol [ketorolac]      GI issues     Ibuprofen Nausea And Vomiting     Other reaction(s): gastric     Past Medical History:   Diagnosis Date    Anxiety and depression     Encounter for blood transfusion     GERD (gastroesophageal reflux disease)     GI bleeds, multiple during admission     HTN (hypertension), benign 3/10/2014    RESOLVED    Migraines     Obesity     PFO (patent foramen ovale)     found after stroke "too small/risky to close"    Stroke 2017    Hospitalized at [facility]; given tPA     Past Surgical History:   Procedure Laterality Date    ANKLE FRACTURE SURGERY       SECTION N/A 2019    Procedure:  SECTION;  Surgeon: Gianna Flowers MD;  Location: [facility] L&D;  Service: OB/GYN;  Laterality: N/A;     SECTION WITH TUBAL LIGATION Bilateral 2020    Procedure:  SECTION, WITH TUBAL LIGATION;  Surgeon: Corina Mao MD;  Location: [facility] L&D;  Service: OB/GYN;  Laterality: Bilateral;     SECTION, CLASSIC  2005/2009/2019    x3    CHOLECYSTECTOMY  3/2002    lap maribel    COLONOSCOPY N/A 2021    Procedure: COLONOSCOPY;  Surgeon: Cleveland Sears MD;  Location: [facility];  Service: Endoscopy;  Laterality: N/A;    CYSTOSCOPY  9/10/2021    Procedure: CYSTOSCOPY;  Surgeon: Salomón Hernandez MD;  Location: [facility] OR;  Service: OB/GYN;;    ESOPHAGOGASTRODUODENOSCOPY N/A 2021    Procedure: EGD " (ESOPHAGOGASTRODUODENOSCOPY);  Surgeon: Cleveland Sears MD;  Location: [facility] ENDO;  Service: Endoscopy;  Laterality: N/A;    FOOT SURGERY Left     gastric sleeve  2017    HYSTERECTOMY  Sept 10,2021    INTRALUMINAL GASTROINTESTINAL TRACT IMAGING VIA CAPSULE N/A 2021    Procedure: IMAGING PROCEDURE, GI TRACT, INTRALUMINAL, VIA CAPSULE;  Surgeon: Nolan Cordero MD;  Location: [facility] ENDO;  Service: Endoscopy;  Laterality: N/A;  instructions portal -ml    LAPAROSCOPIC SALPINGECTOMY Bilateral 9/10/2021    Procedure: SALPINGECTOMY, LAPAROSCOPIC;  Surgeon: Salomón Hernandez MD;  Location: [facility] OR;  Service: OB/GYN;  Laterality: Bilateral;    LAPAROSCOPIC TOTAL HYSTERECTOMY N/A 9/10/2021    Procedure: HYSTERECTOMY, TOTAL, LAPAROSCOPIC;  Surgeon: Salomón Hernandez MD;  Location: [facility] OR;  Service: OB/GYN;  Laterality: N/A;    TUBAL LIGATION  2020     Family History   Problem Relation Name Age of Onset    Other Father Osmar ivan         house fire    Alcohol abuse Father Osmar ivan     Depression Father Osmar ivan     Diabetes Father Osmar ivan     Early death Father Osmar ivan     Breast cancer Neg Hx      Colon cancer Neg Hx      Ovarian cancer Neg Hx      Congenital heart disease Neg Hx      Pacemaker/defibrilator Neg Hx       Social History     Tobacco Use    Smoking status: Heavy Smoker     Current packs/day: 0.00     Average packs/day: 1.8 packs/day for 56.9 years (100.3 ttl pk-yrs)     Types: Cigarettes     Start date: 1992     Last attempt to quit: 2022     Years since quittin.7    Smokeless tobacco: Never    Tobacco comments:     cutting to 2 cigarettes per day   Substance Use Topics    Alcohol use: No    Drug use: No     Review of Systems   All other systems reviewed and are negative.      Physical Exam     Initial Vitals [24 1209]   BP Pulse Resp Temp SpO2   (!) 191/111 67 20 99 °F (37.2 °C) 99 %      MAP       --         Physical Exam    Nursing note and vitals  reviewed.  Constitutional: Vital signs are normal. She appears well-developed and well-nourished. She is active. No distress.   HENT:   Head: Normocephalic.   Nose: Nose normal.   Mouth/Throat: Oropharynx is clear and moist and mucous membranes are normal.   Eyes: Conjunctivae, EOM and lids are normal.   Neck: Neck supple.   Normal range of motion.  Cardiovascular:  Normal rate, regular rhythm, S1 normal, S2 normal and normal heart sounds.           Pulmonary/Chest: Breath sounds normal.   Musculoskeletal:      Right upper arm: Normal.      Left upper arm: Normal.      Cervical back: Normal range of motion and neck supple.      Right lower leg: Normal.      Left lower leg: Normal.      Comments: Tenderness in anterior left foot.  Normal sensation.  Old scar with previous surgery to left lateral ankle area.  With baseline deformity.  Patient is stiffening 2,3,4 and 5 toes       Neurological: She is alert and oriented to person, place, and time. She has normal strength. GCS score is 15. GCS eye subscore is 4. GCS verbal subscore is 5. GCS motor subscore is 6.   Skin: Skin is warm. Capillary refill takes less than 2 seconds.   Psychiatric: She has a normal mood and affect. Her speech is normal and behavior is normal. Thought content normal. Cognition and memory are normal.         ED Course   Splint Application    Date/Time: 5/19/2024 1:09 PM    Performed by: Beau Mares MD  Authorized by: Beau Mares MD  Consent Done: Yes  Consent given by: patient  Patient understanding: patient states understanding of the procedure being performed  Patient consent: the patient's understanding of the procedure matches consent given  Procedure consent: procedure consent matches procedure scheduled  Relevant documents: relevant documents present and verified  Test results: test results available and properly labeled  Site marked: the operative site was marked  Imaging studies: imaging studies available  Patient identity  "confirmed: name and verbally with patient  Time out: Immediately prior to procedure a "time out" was called to verify the correct patient, procedure, equipment, support staff and site/side marked as required.  Location details: left leg  Splint type: short leg  Supplies used: Ortho-Glass  Post-procedure: The splinted body part was neurovascularly unchanged following the procedure.  Patient tolerance: Patient tolerated the procedure well with no immediate complications        Labs Reviewed - No data to display       Imaging Results              X-Ray Foot Complete Left (Final result)  Result time 05/19/24 12:32:07      Final result by Elkin Giraldo MD (05/19/24 12:32:07)                   Impression:      1.  Fifth proximal phalanx shaft fracture.      Electronically signed by: Elkin Giraldo MD  Date:    05/19/2024  Time:    12:32               Narrative:    EXAMINATION:  XR FOOT COMPLETE 3 VIEW LEFT    CLINICAL HISTORY:  .  Injury, unspecified, initial encounter    TECHNIQUE:  AP, lateral and oblique views of the left foot were performed.    COMPARISON:  Multiple studies dating back to November 23, 2020    FINDINGS:  There is a fracture involving the 5th proximal phalanx shaft without definite joint surface involvement.  No other acute fractures are seen.  Stable postoperative changes involving the hindfoot, to include hardware across the ankle joint.  Negative for soft tissue abnormalities.                                       Medications   ibuprofen tablet 800 mg (800 mg Oral Given 5/19/24 1305)     Medical Decision Making  Differential diagnosis including not limited to- foot sprain, fracture, dislocation,    X-ray of left foot with fracture of 5th proximal phalanx.  Patient is given ibuprofen in the ED along with prescription for Norco.  She is driving.  She has an appointment with Orthopedics tomorrow which showed follow up.    Amount and/or Complexity of Data Reviewed  Radiology: ordered and independent " interpretation performed. Decision-making details documented in ED Course.     Details: Left 5th proximal toe fracture.                                      Clinical Impression:  Final diagnoses:  [T14.90XA] Injury  [S62.647A] Closed nondisplaced fracture of proximal phalanx of left little finger, initial encounter (Primary)          ED Disposition Condition    Discharge Stable          ED Prescriptions       Medication Sig Dispense Start Date End Date Auth. Provider    HYDROcodone-acetaminophen (NORCO)  mg per tablet Take 1 tablet by mouth every 6 (six) hours as needed for Pain. 12 tablet 5/19/2024 -- Beau Mares MD          Follow-up Information       Follow up With Specialties Details Why Contact Info    Tien Watts MD Family Medicine  f/u as scheduled [address]  [facility] [address]  [phone]               Beau Mares MD  05/19/24 4215

## 2024-05-20 ENCOUNTER — TELEPHONE (OUTPATIENT)
Dept: ORTHOPEDICS | Facility: CLINIC | Age: 44
End: 2024-05-20
Payer: COMMERCIAL

## 2024-05-20 DIAGNOSIS — G89.29 CHRONIC PAIN OF LEFT ANKLE: ICD-10-CM

## 2024-05-20 DIAGNOSIS — Z98.1 STATUS POST ANKLE ARTHRODESIS: ICD-10-CM

## 2024-05-20 DIAGNOSIS — M25.572 CHRONIC PAIN OF LEFT ANKLE: ICD-10-CM

## 2024-05-20 RX ORDER — OXYCODONE AND ACETAMINOPHEN 10; 325 MG/1; MG/1
1 TABLET ORAL EVERY 6 HOURS PRN
Qty: 120 TABLET | Refills: 0 | Status: SHIPPED | OUTPATIENT
Start: 2024-05-20 | End: 2024-06-13 | Stop reason: SDUPTHER

## 2024-05-23 ENCOUNTER — OFFICE VISIT (OUTPATIENT)
Dept: ORTHOPEDICS | Facility: CLINIC | Age: 44
End: 2024-05-23
Payer: MEDICAID

## 2024-05-23 VITALS — WEIGHT: 240.06 LBS | BODY MASS INDEX: 35.56 KG/M2 | HEIGHT: 69 IN

## 2024-05-23 DIAGNOSIS — S92.513D: Primary | ICD-10-CM

## 2024-05-23 PROCEDURE — 99999 PR PBB SHADOW E&M-EST. PATIENT-LVL III: CPT | Mod: PBBFAC,,, | Performed by: SURGERY

## 2024-05-23 PROCEDURE — 99213 OFFICE O/P EST LOW 20 MIN: CPT | Mod: SF,57,S$GLB, | Performed by: SURGERY

## 2024-05-23 PROCEDURE — 28510 TREATMENT OF TOE FRACTURE: CPT | Mod: LT,S$GLB,, | Performed by: SURGERY

## 2024-05-23 PROCEDURE — 1159F MED LIST DOCD IN RCRD: CPT | Mod: CPTII,S$GLB,, | Performed by: SURGERY

## 2024-05-23 PROCEDURE — 99213 OFFICE O/P EST LOW 20 MIN: CPT | Mod: PBBFAC,PN | Performed by: SURGERY

## 2024-05-23 PROCEDURE — 28510 TREATMENT OF TOE FRACTURE: CPT | Mod: PBBFAC,PN | Performed by: SURGERY

## 2024-05-23 PROCEDURE — 3008F BODY MASS INDEX DOCD: CPT | Mod: CPTII,S$GLB,, | Performed by: SURGERY

## 2024-05-24 NOTE — PROGRESS NOTES
"SUBJECTIVE:    Ms. Chaney is here today for a follow up visit.  She has a history of multiple surgeries including an ankle arthrodesis with the foot and ankle service.  She recently sustained a nondisplaced fracture of the left 5th proximal phalanx shaft.  She presents for a 2nd opinion following her multiple surgeries as well a fracture.        OBJECTIVE:      Vitals:    05/23/24 1330   Weight: 108.9 kg (240 lb 1.3 oz)   Height: 5' 9" (1.753 m)   PainSc:   3       Lower Extremity Exam  Calcaneal varus deformity about the hindfoot.  Incisions well healed.  Minimal ankle motion.  Essentially no subtalar motion.  Has some forefoot motion.  Ambulates with mild gait antalgia.  Minimal tenderness to palpation about the ankle, moderate tenderness to palpation about the proximal phalanx, neurovascular intact.    DIAGNOSTIC STUDIES:  X-ray of the left foot complete reviewed.  Minimally displaced proximal phalanx fracture of the 5th toe.    ASSESSMENT:   1. Proximal phalanx fracture  2.  History of ankle fusion      PLAN:  There are no diagnoses linked to this encounter.    Discussed her pathology and the proposed treatment.  We will initiate fracture care for the proximal phalanx fracture.  She can weight bear as tolerated and wean from the Cam boot to a normal shoe. Discussed operative and nonoperative options. Recommended an [place] brace for her ankle and subtalar arthritis and deformity.  She can follow-up with us on an as-needed basis.  I communicated my findings with referring provider.    Terrence Dumont MD  [facility]  Orthopedic Surgery      This note was done with voice recognition software. Please excuse any errors missed in proof reading.     " no

## 2024-06-04 ENCOUNTER — PATIENT MESSAGE (OUTPATIENT)
Dept: ORTHOPEDICS | Facility: CLINIC | Age: 44
End: 2024-06-04
Payer: COMMERCIAL

## 2024-06-04 DIAGNOSIS — G89.29 CHRONIC PAIN OF LEFT ANKLE: ICD-10-CM

## 2024-06-04 DIAGNOSIS — M25.572 CHRONIC PAIN OF LEFT ANKLE: ICD-10-CM

## 2024-06-04 DIAGNOSIS — Z98.1 STATUS POST ANKLE ARTHRODESIS: ICD-10-CM

## 2024-06-04 RX ORDER — OXYCODONE AND ACETAMINOPHEN 10; 325 MG/1; MG/1
1 TABLET ORAL EVERY 6 HOURS PRN
Qty: 120 TABLET | Refills: 0 | OUTPATIENT
Start: 2024-06-04

## 2024-06-04 RX ORDER — OXYCODONE AND ACETAMINOPHEN 10; 325 MG/1; MG/1
1 TABLET ORAL EVERY 6 HOURS PRN
Qty: 120 TABLET | Refills: 0 | Status: CANCELLED | OUTPATIENT
Start: 2024-06-04

## 2024-06-10 ENCOUNTER — PATIENT MESSAGE (OUTPATIENT)
Dept: ORTHOPEDICS | Facility: CLINIC | Age: 44
End: 2024-06-10
Payer: MEDICAID

## 2024-06-12 DIAGNOSIS — E11.9 TYPE 2 DIABETES MELLITUS WITHOUT COMPLICATION, UNSPECIFIED WHETHER LONG TERM INSULIN USE: ICD-10-CM

## 2024-06-13 DIAGNOSIS — M25.572 CHRONIC PAIN OF LEFT ANKLE: ICD-10-CM

## 2024-06-13 DIAGNOSIS — G89.29 CHRONIC PAIN OF LEFT ANKLE: ICD-10-CM

## 2024-06-13 DIAGNOSIS — Z98.1 STATUS POST ANKLE ARTHRODESIS: ICD-10-CM

## 2024-06-13 RX ORDER — OXYCODONE AND ACETAMINOPHEN 10; 325 MG/1; MG/1
1 TABLET ORAL EVERY 6 HOURS PRN
Qty: 120 TABLET | Refills: 0 | Status: CANCELLED | OUTPATIENT
Start: 2024-06-13

## 2024-06-17 RX ORDER — OXYCODONE AND ACETAMINOPHEN 10; 325 MG/1; MG/1
1 TABLET ORAL EVERY 6 HOURS PRN
Qty: 120 TABLET | Refills: 0 | Status: SHIPPED | OUTPATIENT
Start: 2024-06-17

## 2024-06-24 RX ORDER — PANTOPRAZOLE SODIUM 40 MG/1
40 TABLET, DELAYED RELEASE ORAL 2 TIMES DAILY
Qty: 60 TABLET | Refills: 11 | Status: SHIPPED | OUTPATIENT
Start: 2024-06-24 | End: 2025-06-24

## 2024-07-08 ENCOUNTER — PATIENT MESSAGE (OUTPATIENT)
Dept: ORTHOPEDICS | Facility: CLINIC | Age: 44
End: 2024-07-08
Payer: COMMERCIAL

## 2024-07-08 DIAGNOSIS — Z98.1 STATUS POST ANKLE ARTHRODESIS: ICD-10-CM

## 2024-07-08 DIAGNOSIS — G89.29 CHRONIC PAIN OF LEFT ANKLE: ICD-10-CM

## 2024-07-08 DIAGNOSIS — M25.572 CHRONIC PAIN OF LEFT ANKLE: ICD-10-CM

## 2024-07-11 ENCOUNTER — PATIENT MESSAGE (OUTPATIENT)
Dept: ORTHOPEDICS | Facility: CLINIC | Age: 44
End: 2024-07-11
Payer: COMMERCIAL

## 2024-07-12 ENCOUNTER — HOSPITAL ENCOUNTER (EMERGENCY)
Facility: HOSPITAL | Age: 44
Discharge: HOME | End: 2024-07-12
Payer: COMMERCIAL

## 2024-07-12 VITALS
HEIGHT: 70 IN | DIASTOLIC BLOOD PRESSURE: 92 MMHG | OXYGEN SATURATION: 99 % | SYSTOLIC BLOOD PRESSURE: 172 MMHG | HEART RATE: 81 BPM | BODY MASS INDEX: 35.07 KG/M2 | RESPIRATION RATE: 17 BRPM | TEMPERATURE: 99 F | WEIGHT: 245 LBS

## 2024-07-12 DIAGNOSIS — K08.89 PAIN, DENTAL: ICD-10-CM

## 2024-07-12 DIAGNOSIS — R11.0 NAUSEA: Primary | ICD-10-CM

## 2024-07-12 DIAGNOSIS — R51.9 ACUTE NONINTRACTABLE HEADACHE, UNSPECIFIED HEADACHE TYPE: ICD-10-CM

## 2024-07-12 PROCEDURE — 99284 EMERGENCY DEPT VISIT MOD MDM: CPT | Mod: ER

## 2024-07-12 PROCEDURE — 25000003 PHARM REV CODE 250: Mod: ER | Performed by: PHYSICIAN ASSISTANT

## 2024-07-12 RX ORDER — HYDROCODONE BITARTRATE AND ACETAMINOPHEN 5; 325 MG/1; MG/1
1 TABLET ORAL EVERY 6 HOURS PRN
Qty: 12 TABLET | Refills: 0 | Status: SHIPPED | OUTPATIENT
Start: 2024-07-12

## 2024-07-12 RX ORDER — HYDROCODONE BITARTRATE AND ACETAMINOPHEN 5; 325 MG/1; MG/1
1 TABLET ORAL
Status: COMPLETED | OUTPATIENT
Start: 2024-07-12 | End: 2024-07-12

## 2024-07-12 RX ORDER — ONDANSETRON 4 MG/1
4 TABLET, ORALLY DISINTEGRATING ORAL EVERY 6 HOURS PRN
Qty: 15 TABLET | Refills: 0 | Status: SHIPPED | OUTPATIENT
Start: 2024-07-12

## 2024-07-12 RX ORDER — ONDANSETRON 4 MG/1
4 TABLET, ORALLY DISINTEGRATING ORAL
Status: COMPLETED | OUTPATIENT
Start: 2024-07-12 | End: 2024-07-12

## 2024-07-12 RX ADMIN — HYDROCODONE BITARTRATE AND ACETAMINOPHEN 1 TABLET: 5; 325 TABLET ORAL at 01:07

## 2024-07-12 RX ADMIN — ONDANSETRON 4 MG: 4 TABLET, ORALLY DISINTEGRATING ORAL at 01:07

## 2024-07-12 NOTE — ED PROVIDER NOTES
"Encounter Date: 2024       History     Chief Complaint   Patient presents with    Nausea     Pt had teeth pulled a couple days ago. Reports nausea and headache today.      This is a 44-year-old white female with significant past medical history of anxiety/depression, GERD, GI bleeds, hypertension, migraines, obesity, PFO, stroke that presents the ED with complaint of dental pain, headache, jaw pain, nausea with intermittent vomiting.  She states 2 days ago she had teeth removed on the bottom left in the back.  She was also on clindamycin and this is day 5 and she has been compliant.  She rates her pain an 8/10 and describes it as aching and throbbing.  Pain is generalized and does not radiate.  Over-the-counter medications have not helped.  Nothing has helped with the pain.  Breathing, eating, drinking, talking all exacerbate the pain.  She denies any fever.      Review of patient's allergies indicates:   Allergen Reactions    Corticosteroids (glucocorticoids) Palpitations and Swelling    Aspirin     Toradol [ketorolac]      GI issues     Ibuprofen Nausea And Vomiting     Other reaction(s): gastric     Past Medical History:   Diagnosis Date    Anxiety and depression     Encounter for blood transfusion     GERD (gastroesophageal reflux disease)     GI bleeds, multiple during admission     HTN (hypertension), benign 3/10/2014    RESOLVED    Migraines     Obesity     PFO (patent foramen ovale)     found after stroke "too small/risky to close"    Stroke 2017    Hospitalized at [facility]; given tPA     Past Surgical History:   Procedure Laterality Date    ANKLE FRACTURE SURGERY       SECTION N/A 2019    Procedure:  SECTION;  Surgeon: Gianna Flowers MD;  Location: [facility] L&D;  Service: OB/GYN;  Laterality: N/A;     SECTION WITH TUBAL LIGATION Bilateral 2020    Procedure:  SECTION, WITH TUBAL LIGATION;  Surgeon: Corina Mao MD;  Location: [facility] L&D;  Service: OB/GYN;  " Laterality: Bilateral;     SECTION, CLASSIC  2005/2009/2019    x3    CHOLECYSTECTOMY  3/2002    lap maribel    COLONOSCOPY N/A 2021    Procedure: COLONOSCOPY;  Surgeon: Cleveland Sears MD;  Location: [facility] ENDO;  Service: Endoscopy;  Laterality: N/A;    CYSTOSCOPY  9/10/2021    Procedure: CYSTOSCOPY;  Surgeon: Salomón Hernandez MD;  Location: [facility] OR;  Service: OB/GYN;;    ESOPHAGOGASTRODUODENOSCOPY N/A 2021    Procedure: EGD (ESOPHAGOGASTRODUODENOSCOPY);  Surgeon: Cleveland Sears MD;  Location: [facility] ENDO;  Service: Endoscopy;  Laterality: N/A;    FOOT SURGERY Left     gastric sleeve  2017    HYSTERECTOMY  Sept 10,2021    INTRALUMINAL GASTROINTESTINAL TRACT IMAGING VIA CAPSULE N/A 2021    Procedure: IMAGING PROCEDURE, GI TRACT, INTRALUMINAL, VIA CAPSULE;  Surgeon: Nolan Cordero MD;  Location: [facility] ENDO;  Service: Endoscopy;  Laterality: N/A;  instructions portal -ml    LAPAROSCOPIC SALPINGECTOMY Bilateral 9/10/2021    Procedure: SALPINGECTOMY, LAPAROSCOPIC;  Surgeon: Salomón Hernandez MD;  Location: [facility] OR;  Service: OB/GYN;  Laterality: Bilateral;    LAPAROSCOPIC TOTAL HYSTERECTOMY N/A 9/10/2021    Procedure: HYSTERECTOMY, TOTAL, LAPAROSCOPIC;  Surgeon: Salomón Hernandez MD;  Location: [facility];  Service: OB/GYN;  Laterality: N/A;    TUBAL LIGATION  2020     Family History   Problem Relation Name Age of Onset    Other Father Osmar ivan         house fire    Alcohol abuse Father Osmar ivan     Depression Father Osmar ivan     Diabetes Father Osmar ivan     Early death Father Osmar ivan     Breast cancer Neg Hx      Colon cancer Neg Hx      Ovarian cancer Neg Hx      Congenital heart disease Neg Hx      Pacemaker/defibrilator Neg Hx       Social History     Tobacco Use    Smoking status: Heavy Smoker     Current packs/day: 0.00     Average packs/day: 1.8 packs/day for 56.9 years (100.3 ttl pk-yrs)     Types: Cigarettes     Start date: 1992     Last attempt to quit: 2022      Years since quittin.8    Smokeless tobacco: Never    Tobacco comments:     cutting to 2 cigarettes per day   Substance Use Topics    Alcohol use: No    Drug use: No     Review of Systems   Constitutional:  Negative for fever.   HENT:  Positive for dental problem.    Respiratory:  Negative for cough and shortness of breath.    Cardiovascular:  Negative for chest pain and palpitations.   Gastrointestinal:  Positive for nausea and vomiting.       Physical Exam     Initial Vitals [24 1307]   BP Pulse Resp Temp SpO2   (!) 172/92 81 18 98.6 °F (37 °C) 99 %      MAP       --         Physical Exam    Constitutional: She appears well-developed and well-nourished.   HENT:   Head: Normocephalic and atraumatic.   Mouth/Throat: Abnormal dentition. Dental caries present.       Location of where teeth 4 removed 2 days ago.  The gingiva appears pink in color and non swollen.  There is no fluctuance or evidence of dental abscess on exam.  The patient can bite down on a tongue depressor but with considerable pain.  She was no difficulty opening and closing her jaw.   Cardiovascular:  Normal rate, regular rhythm and normal heart sounds.           Pulmonary/Chest: Breath sounds normal. She has no wheezes.     Neurological: She is alert and oriented to person, place, and time.   Psychiatric: She has a normal mood and affect. Thought content normal.         ED Course   Procedures  Labs Reviewed - No data to display       Imaging Results    None          Medications   HYDROcodone-acetaminophen 5-325 mg per tablet 1 tablet (has no administration in time range)   ondansetron disintegrating tablet 4 mg (has no administration in time range)     Medical Decision Making  This is a 44-year-old white female that presents to the ED following tooth extraction 2 days ago complaining of dental pain, headache, nausea, vomiting.  On arrival the patient is afebrile and nontoxic-appearing with stable vital signs.  Please see physical exam for  further details.  I see no evidence of drainable dental abscess at this time.  I believe the most prudent treatment plan is for the patient to complete her course of clindamycin and she will be written a small prescription for Norco and Zofran to bridge her until her dentist appointment in 5 days.  I do not suspect any emergent or life-threatening disease process or condition.  I believe this is simply a pain control and nausea issue.  The patient should follow up with dentistry as scheduled.  She is to have PCP follow up in the next week.  She can also return to the ED sooner for any worsening or concerned.  She verbalized understanding and was agreeable to the treatment plan.    Risk  Prescription drug management.                                      Clinical Impression:  Final diagnoses:  [R11.0] Nausea (Primary)  [K08.89] Pain, dental  [R51.9] Acute nonintractable headache, unspecified headache type                 Tello Burleson PA-C  07/12/24 3740

## 2024-07-15 RX ORDER — OXYCODONE AND ACETAMINOPHEN 10; 325 MG/1; MG/1
1 TABLET ORAL EVERY 6 HOURS PRN
Qty: 120 TABLET | Refills: 0 | Status: SHIPPED | OUTPATIENT
Start: 2024-07-15

## 2024-08-07 ENCOUNTER — HOSPITAL ENCOUNTER (EMERGENCY)
Facility: HOSPITAL | Age: 44
Discharge: HOME | End: 2024-08-07
Attending: EMERGENCY MEDICINE
Payer: COMMERCIAL

## 2024-08-07 VITALS
TEMPERATURE: 98 F | OXYGEN SATURATION: 99 % | DIASTOLIC BLOOD PRESSURE: 114 MMHG | WEIGHT: 145 LBS | SYSTOLIC BLOOD PRESSURE: 172 MMHG | HEART RATE: 91 BPM | BODY MASS INDEX: 20.81 KG/M2 | RESPIRATION RATE: 20 BRPM

## 2024-08-07 DIAGNOSIS — M25.572 LEFT ANKLE PAIN: ICD-10-CM

## 2024-08-07 DIAGNOSIS — M19.072 OSTEOARTHRITIS OF LEFT ANKLE, UNSPECIFIED OSTEOARTHRITIS TYPE: ICD-10-CM

## 2024-08-07 DIAGNOSIS — M25.572 CHRONIC PAIN OF LEFT ANKLE: Primary | ICD-10-CM

## 2024-08-07 DIAGNOSIS — G89.29 CHRONIC PAIN OF LEFT ANKLE: Primary | ICD-10-CM

## 2024-08-07 PROCEDURE — 99283 EMERGENCY DEPT VISIT LOW MDM: CPT | Mod: 25,ER

## 2024-08-07 RX ORDER — HYDROCODONE BITARTRATE AND ACETAMINOPHEN 5; 325 MG/1; MG/1
1 TABLET ORAL EVERY 6 HOURS PRN
Qty: 12 TABLET | Refills: 0 | Status: SHIPPED | OUTPATIENT
Start: 2024-08-07

## 2024-08-07 NOTE — ED PROVIDER NOTES
"Encounter Date: 2024       History     Chief Complaint   Patient presents with    Ankle Pain     LEft ankle pain- denies injury     44-year-old white female with significant past medical history of anxiety/depression, GERD, GI bleeds, hypertension, migraines, obesity, PFO, stroke, and chronic left ankle pain that presents the ED with an acute exacerbation of her chronic left ankle pain.  She denies any injury, trauma, or inciting event.  She rates her pain currently a 9/10 and describes it as throbbing and burning.  Pain is located across the entire ankle and radiates up the back of her leg.  She was able to bear weight and ambulate but with difficulty and pain.  She has tried Tylenol and ibuprofen with no relief.  She denies any numbness, tingling, or altered sensation.      Review of patient's allergies indicates:   Allergen Reactions    Corticosteroids (glucocorticoids) Palpitations and Swelling    Aspirin     Toradol [ketorolac]      GI issues     Ibuprofen Nausea And Vomiting     Other reaction(s): gastric     Past Medical History:   Diagnosis Date    Anxiety and depression     Encounter for blood transfusion     GERD (gastroesophageal reflux disease)     GI bleeds, multiple during admission     HTN (hypertension), benign 3/10/2014    RESOLVED    Migraines     Obesity     PFO (patent foramen ovale)     found after stroke "too small/risky to close"    Stroke 2017    Hospitalized at [facility]; given tPA     Past Surgical History:   Procedure Laterality Date    ANKLE FRACTURE SURGERY       SECTION N/A 2019    Procedure:  SECTION;  Surgeon: Gianna Flowers MD;  Location: [facility] L&D;  Service: OB/GYN;  Laterality: N/A;     SECTION WITH TUBAL LIGATION Bilateral 2020    Procedure:  SECTION, WITH TUBAL LIGATION;  Surgeon: Corina Mao MD;  Location: [facility] L&D;  Service: OB/GYN;  Laterality: Bilateral;     SECTION, CLASSIC  2005/2009/2019    x3    CHOLECYSTECTOMY  " 3/2002    lap maribel    COLONOSCOPY N/A 2021    Procedure: COLONOSCOPY;  Surgeon: Cleveland Sears MD;  Location: [facility] ENDO;  Service: Endoscopy;  Laterality: N/A;    CYSTOSCOPY  9/10/2021    Procedure: CYSTOSCOPY;  Surgeon: Salomón Hernandez MD;  Location: [facility] OR;  Service: OB/GYN;;    ESOPHAGOGASTRODUODENOSCOPY N/A 2021    Procedure: EGD (ESOPHAGOGASTRODUODENOSCOPY);  Surgeon: Cleveland Sears MD;  Location: [facility] ENDO;  Service: Endoscopy;  Laterality: N/A;    FOOT SURGERY Left     gastric sleeve  2017    HYSTERECTOMY  Sept 10,2021    INTRALUMINAL GASTROINTESTINAL TRACT IMAGING VIA CAPSULE N/A 2021    Procedure: IMAGING PROCEDURE, GI TRACT, INTRALUMINAL, VIA CAPSULE;  Surgeon: Nolan Cordero MD;  Location: [facility] ENDO;  Service: Endoscopy;  Laterality: N/A;  instructions portal -ml    LAPAROSCOPIC SALPINGECTOMY Bilateral 9/10/2021    Procedure: SALPINGECTOMY, LAPAROSCOPIC;  Surgeon: Salomón Hernandez MD;  Location: [facility] OR;  Service: OB/GYN;  Laterality: Bilateral;    LAPAROSCOPIC TOTAL HYSTERECTOMY N/A 9/10/2021    Procedure: HYSTERECTOMY, TOTAL, LAPAROSCOPIC;  Surgeon: Salomón Hernandez MD;  Location: [facility];  Service: OB/GYN;  Laterality: N/A;    TUBAL LIGATION  2020     Family History   Problem Relation Name Age of Onset    Other Father Osmar ivan         house fire    Alcohol abuse Father Osmar ivan     Depression Father Osmar ivan     Diabetes Father Osmar ivan     Early death Father Osmar ivan     Breast cancer Neg Hx      Colon cancer Neg Hx      Ovarian cancer Neg Hx      Congenital heart disease Neg Hx      Pacemaker/defibrilator Neg Hx       Social History     Tobacco Use    Smoking status: Heavy Smoker     Current packs/day: 0.00     Average packs/day: 1.8 packs/day for 56.9 years (100.3 ttl pk-yrs)     Types: Cigarettes     Start date: 1992     Last attempt to quit: 2022     Years since quittin.9    Smokeless tobacco: Never    Tobacco comments:     cutting to 2  cigarettes per day   Substance Use Topics    Alcohol use: No    Drug use: No     Review of Systems   Constitutional:  Negative for fever.   Respiratory:  Negative for cough and shortness of breath.    Cardiovascular:  Negative for chest pain and palpitations.   Gastrointestinal:  Negative for nausea and vomiting.   Musculoskeletal:  Positive for arthralgias and gait problem.       Physical Exam     Initial Vitals [08/07/24 0859]   BP Pulse Resp Temp SpO2   (!) 172/114 91 20 98.2 °F (36.8 °C) 99 %      MAP       --         Physical Exam    Constitutional: She appears well-developed and well-nourished.   HENT:   Head: Normocephalic and atraumatic.   Cardiovascular:  Normal rate, regular rhythm and normal heart sounds.           Pulmonary/Chest: Breath sounds normal. She has no wheezes.   Musculoskeletal:      Right ankle: Normal.      Left ankle: Swelling present. No deformity, ecchymosis or lacerations. Tenderness present over the lateral malleolus and medial malleolus. Decreased range of motion. Normal pulse.      Comments: There is tenderness to palpation and swelling noted to the lateral and medial malleolus of the left ankle.  There is no erythema, warmth, ecchymoses, or other skin changes noted.  The patient has mildly decreased range of motion secondary to pain and swelling.  Motor and sensory intact.  2+ DP and PT pulses.     Neurological: She is alert and oriented to person, place, and time.   Psychiatric: She has a normal mood and affect. Thought content normal.         ED Course   Procedures  Labs Reviewed - No data to display       Imaging Results              X-Ray Ankle Complete Left (Final result)  Result time 08/07/24 09:35:23      Final result by Osmar Sanchez MD (08/07/24 09:35:23)                   Impression:      No acute abnormalities      Electronically signed by: Osmar Sanchez MD  Date:    08/07/2024  Time:    09:35               Narrative:    EXAMINATION:  XR ANKLE COMPLETE 3 VIEW  LEFT    CLINICAL HISTORY:  XR ANKLE COMPLETE 3 VIEW LEFTPain in left ankle and joints of left foot    COMPARISON:  05/19/2024    FINDINGS:  Three views of the left ankle were obtained.    No evidence of acute fracture or dislocation.  Diffuse osteopenia.  Moderate soft tissue swelling .  Screws are seen with fusion at the tibia talar joint.  Two screws are seen through the posterior calcaneus.  Partial resection distal fibula.                                    X-Rays:   Independently Interpreted Readings:   Other Readings:  Left ankle x-ray was independently interpreted by me and shows no acute fracture or dislocation.    Medications - No data to display  Medical Decision Making  This is a 44-year-old white female with pertinent past medical history of chronic left ankle pain that presents the ED with acute exacerbation of her left ankle pain.  On arrival the patient is afebrile and nontoxic-appearing with stable vital signs.  Differential diagnoses include but are not limited to: Osteoarthritis, ankle sprain, ankle fracture, ankle dislocation.  Please see physical exam for further details.  X-ray of the left ankle was independently interpreted by me and shows no acute fracture or dislocation.  The patient takes chronic opioids and has been taking them at a rate faster than her prescription calls 4.  An urgent referral to pain management has been placed.  She will be written a very small prescription for Norco 5 mg.  I spent significant time talking to the patient about her opioid use and explaining that if she has this much chronic pain then pain management is the most appropriate next step.  She was also informed she can not continue to see multiple providers and get opioids.  The prescription today is only to bridge her until she can get in to see pain management.  I have identified no emergent or life-threatening osseous or neurovascular disease process or condition today the patient will be discharged home  with a small prescription for Norco and a referral to pain management.  She can also return to the ED sooner for any worsening or concern.    Amount and/or Complexity of Data Reviewed  Radiology: ordered.                                      Clinical Impression:  Final diagnoses:  [M25.572] Left ankle pain  [M25.572, G89.29] Chronic pain of left ankle (Primary)  [M19.072] Osteoarthritis of left ankle, unspecified osteoarthritis type          ED Disposition Condition    Discharge Stable          ED Prescriptions       Medication Sig Dispense Start Date End Date Auth. Provider    HYDROcodone-acetaminophen (NORCO) 5-325 mg per tablet Take 1 tablet by mouth every 6 (six) hours as needed. 12 tablet 8/7/2024 -- Tello Burleson PA-C          Follow-up Information       Follow up With Specialties Details Why Contact Info    Tien Watts MD Family Medicine Schedule an appointment as soon as possible for a visit in 2 days  [address]  [facility] [address]  [phone]      [facility] - Emergency Dept Emergency Medicine  If symptoms worsen [address]  Emergency Department  [facility] [address] [phone]             Tello Burleson PA-C  08/07/24 0959

## 2024-08-09 DIAGNOSIS — Z98.1 STATUS POST ANKLE ARTHRODESIS: ICD-10-CM

## 2024-08-09 DIAGNOSIS — M25.572 CHRONIC PAIN OF LEFT ANKLE: ICD-10-CM

## 2024-08-09 DIAGNOSIS — G89.29 CHRONIC PAIN OF LEFT ANKLE: ICD-10-CM

## 2024-08-12 RX ORDER — OXYCODONE AND ACETAMINOPHEN 10; 325 MG/1; MG/1
1 TABLET ORAL EVERY 6 HOURS PRN
Qty: 120 TABLET | Refills: 0 | Status: SHIPPED | OUTPATIENT
Start: 2024-08-12

## 2024-08-14 ENCOUNTER — PATIENT MESSAGE (OUTPATIENT)
Dept: ORTHOPEDICS | Facility: CLINIC | Age: 44
End: 2024-08-14
Payer: MEDICAID

## 2024-08-15 DIAGNOSIS — Z98.1 STATUS POST ANKLE ARTHRODESIS: ICD-10-CM

## 2024-08-15 DIAGNOSIS — Q66.72 CONGENITAL PES CAVUS, LEFT FOOT: ICD-10-CM

## 2024-08-15 DIAGNOSIS — G89.29 CHRONIC PAIN OF LEFT ANKLE: Primary | ICD-10-CM

## 2024-08-15 DIAGNOSIS — M25.572 CHRONIC PAIN OF LEFT ANKLE: Primary | ICD-10-CM

## 2024-08-20 ENCOUNTER — PATIENT MESSAGE (OUTPATIENT)
Dept: INTERNAL MEDICINE | Facility: CLINIC | Age: 44
End: 2024-08-20
Payer: MEDICAID

## 2024-08-20 RX ORDER — ONDANSETRON 4 MG/1
4 TABLET, ORALLY DISINTEGRATING ORAL EVERY 6 HOURS PRN
Qty: 15 TABLET | Refills: 0 | Status: SHIPPED | OUTPATIENT
Start: 2024-08-20

## 2024-08-20 NOTE — TELEPHONE ENCOUNTER
No care due was identified.  [facility] Embedded Care Due Messages. Reference number: [id number].   8/20/2024 9:44:04 AM CDT

## 2024-09-06 DIAGNOSIS — G89.29 CHRONIC PAIN OF LEFT ANKLE: ICD-10-CM

## 2024-09-06 DIAGNOSIS — M25.572 CHRONIC PAIN OF LEFT ANKLE: ICD-10-CM

## 2024-09-06 DIAGNOSIS — Z98.1 STATUS POST ANKLE ARTHRODESIS: ICD-10-CM

## 2024-09-06 RX ORDER — OXYCODONE AND ACETAMINOPHEN 10; 325 MG/1; MG/1
1 TABLET ORAL EVERY 6 HOURS PRN
Qty: 120 TABLET | Refills: 0 | OUTPATIENT
Start: 2024-09-06

## 2024-09-07 DIAGNOSIS — Z98.1 STATUS POST ANKLE ARTHRODESIS: ICD-10-CM

## 2024-09-07 DIAGNOSIS — G89.29 CHRONIC PAIN OF LEFT ANKLE: ICD-10-CM

## 2024-09-07 DIAGNOSIS — M25.572 CHRONIC PAIN OF LEFT ANKLE: ICD-10-CM

## 2024-09-09 RX ORDER — OXYCODONE AND ACETAMINOPHEN 10; 325 MG/1; MG/1
1 TABLET ORAL EVERY 6 HOURS PRN
Qty: 120 TABLET | Refills: 0 | Status: SHIPPED | OUTPATIENT
Start: 2024-09-09

## 2024-09-11 RX ORDER — ALBUTEROL SULFATE 90 UG/1
2 INHALANT RESPIRATORY (INHALATION) EVERY 6 HOURS PRN
Qty: 8.5 G | Refills: 0 | Status: SHIPPED | OUTPATIENT
Start: 2024-09-11 | End: 2025-09-11

## 2024-09-19 NOTE — ED NOTES
Patient discharged home self care. Discharge instructions given to pt at bedside. Pt verbalized understanding of instructions. Vital signs stable. Patient remained afebrile. No apparent distress noted.   
Gen: Well appearing in NAD  Head: NC/AT  Neck: trachea midline  cv: tachycardic  Resp:  No distress, lungs clear  abd nontender   Ext: no deformities  Neuro:  A&O appears non focal  Skin:  Warm and dry as visualized  Psych:  Normal affect and mood

## 2024-10-03 ENCOUNTER — TELEPHONE (OUTPATIENT)
Dept: ORTHOPEDICS | Facility: CLINIC | Age: 44
End: 2024-10-03
Payer: MEDICAID

## 2024-10-03 DIAGNOSIS — Z98.1 STATUS POST ANKLE ARTHRODESIS: ICD-10-CM

## 2024-10-03 DIAGNOSIS — M25.572 CHRONIC PAIN OF LEFT ANKLE: ICD-10-CM

## 2024-10-03 DIAGNOSIS — G89.29 CHRONIC PAIN OF LEFT ANKLE: ICD-10-CM

## 2024-10-03 RX ORDER — OXYCODONE AND ACETAMINOPHEN 10; 325 MG/1; MG/1
1 TABLET ORAL EVERY 6 HOURS PRN
Qty: 120 TABLET | Refills: 0 | OUTPATIENT
Start: 2024-10-03

## 2024-10-03 NOTE — TELEPHONE ENCOUNTER
Let VM for rescheduling todays appointment.      Chucho Bello MS, OTC  Orthopedic Clinical / OR Assistant to Dr. Terrence Dumont

## 2024-10-05 ENCOUNTER — HOSPITAL ENCOUNTER (OUTPATIENT)
Dept: RADIOLOGY | Facility: HOSPITAL | Age: 44
Discharge: HOME | End: 2024-10-05
Attending: OBSTETRICS & GYNECOLOGY
Payer: MEDICAID

## 2024-10-05 VITALS — WEIGHT: 145 LBS | BODY MASS INDEX: 20.76 KG/M2 | HEIGHT: 70 IN

## 2024-10-05 DIAGNOSIS — Z12.31 SCREENING MAMMOGRAM FOR BREAST CANCER: ICD-10-CM

## 2024-10-05 PROCEDURE — 77067 SCR MAMMO BI INCL CAD: CPT | Mod: 26,,, | Performed by: RADIOLOGY

## 2024-10-05 PROCEDURE — 77063 BREAST TOMOSYNTHESIS BI: CPT | Mod: 26,,, | Performed by: RADIOLOGY

## 2024-10-05 PROCEDURE — 77063 BREAST TOMOSYNTHESIS BI: CPT | Mod: TC

## 2024-10-05 PROCEDURE — 77067 SCR MAMMO BI INCL CAD: CPT | Mod: TC

## 2024-10-06 DIAGNOSIS — G89.29 CHRONIC PAIN OF LEFT ANKLE: ICD-10-CM

## 2024-10-06 DIAGNOSIS — Z98.1 STATUS POST ANKLE ARTHRODESIS: ICD-10-CM

## 2024-10-06 DIAGNOSIS — M25.572 CHRONIC PAIN OF LEFT ANKLE: ICD-10-CM

## 2024-10-07 RX ORDER — OXYCODONE AND ACETAMINOPHEN 10; 325 MG/1; MG/1
1 TABLET ORAL EVERY 6 HOURS PRN
Qty: 120 TABLET | Refills: 0 | Status: SHIPPED | OUTPATIENT
Start: 2024-10-07

## 2024-10-12 RX ORDER — FLUCONAZOLE 150 MG/1
150 TABLET ORAL
Qty: 2 TABLET | Refills: 0 | Status: CANCELLED | OUTPATIENT
Start: 2024-10-12

## 2024-10-12 NOTE — TELEPHONE ENCOUNTER
Refill Routing Note   Medication(s) are not appropriate for processing by [facility] for the following reason(s):      Medication outside of protocol    [facility] action(s):  Route Care Due:  None identified            Appointments  past 12m or future 3m with PCP    Date Provider   Last Visit   4/20/2022 Leida Andrade MD   Next Visit   Visit date not found Leida Andrade MD   ED visits in past 90 days: 1        Note composed:11:05 AM 10/12/2024

## 2024-10-14 RX ORDER — FLUCONAZOLE 150 MG/1
150 TABLET ORAL
Qty: 2 TABLET | Refills: 0 | Status: SHIPPED | OUTPATIENT
Start: 2024-10-14

## 2024-10-14 NOTE — TELEPHONE ENCOUNTER
Refill Routing Note   Medication(s) are not appropriate for processing by [facility] for the following reason(s):        Outside of protocol    [facility] action(s):  Route               Appointments  past 12m or future 3m with PCP    Date Provider   Last Visit   4/20/2022 Leida Andrade MD   Next Visit   Visit date not found Leida Andrade MD   ED visits in past 90 days: 1        Note composed:9:17 PM 10/13/2024

## 2024-11-03 DIAGNOSIS — M25.572 CHRONIC PAIN OF LEFT ANKLE: ICD-10-CM

## 2024-11-03 DIAGNOSIS — Z98.1 STATUS POST ANKLE ARTHRODESIS: ICD-10-CM

## 2024-11-03 DIAGNOSIS — G89.29 CHRONIC PAIN OF LEFT ANKLE: ICD-10-CM

## 2024-11-04 RX ORDER — OXYCODONE AND ACETAMINOPHEN 10; 325 MG/1; MG/1
1 TABLET ORAL EVERY 6 HOURS PRN
Qty: 120 TABLET | Refills: 0 | Status: SHIPPED | OUTPATIENT
Start: 2024-11-04

## 2024-11-30 DIAGNOSIS — Z98.1 STATUS POST ANKLE ARTHRODESIS: ICD-10-CM

## 2024-11-30 DIAGNOSIS — M25.572 CHRONIC PAIN OF LEFT ANKLE: ICD-10-CM

## 2024-11-30 DIAGNOSIS — G89.29 CHRONIC PAIN OF LEFT ANKLE: ICD-10-CM

## 2024-12-02 RX ORDER — OXYCODONE AND ACETAMINOPHEN 10; 325 MG/1; MG/1
1 TABLET ORAL EVERY 6 HOURS PRN
Qty: 120 TABLET | Refills: 0 | Status: SHIPPED | OUTPATIENT
Start: 2024-12-02

## 2024-12-11 ENCOUNTER — PATIENT MESSAGE (OUTPATIENT)
Dept: ADMINISTRATIVE | Facility: HOSPITAL | Age: 44
End: 2024-12-11
Payer: MEDICAID

## 2024-12-29 DIAGNOSIS — Z98.1 STATUS POST ANKLE ARTHRODESIS: ICD-10-CM

## 2024-12-29 DIAGNOSIS — G89.29 CHRONIC PAIN OF LEFT ANKLE: ICD-10-CM

## 2024-12-29 DIAGNOSIS — M25.572 CHRONIC PAIN OF LEFT ANKLE: ICD-10-CM

## 2024-12-30 RX ORDER — OXYCODONE AND ACETAMINOPHEN 10; 325 MG/1; MG/1
1 TABLET ORAL EVERY 6 HOURS PRN
Qty: 120 TABLET | Refills: 0 | Status: SHIPPED | OUTPATIENT
Start: 2024-12-30

## 2025-01-02 DIAGNOSIS — F41.1 GAD (GENERALIZED ANXIETY DISORDER): ICD-10-CM

## 2025-01-02 DIAGNOSIS — F41.9 ANXIETY: ICD-10-CM

## 2025-01-02 RX ORDER — ONDANSETRON HYDROCHLORIDE 8 MG/1
8 TABLET, FILM COATED ORAL EVERY 12 HOURS PRN
Qty: 30 TABLET | Refills: 0 | Status: SHIPPED | OUTPATIENT
Start: 2025-01-02

## 2025-01-02 NOTE — TELEPHONE ENCOUNTER
Refill Encounter    PCP Visits: Recent Visits  Date Type Provider Dept   04/02/24 Office Visit Tien Watts MD [facility] Internal Medicine   Showing recent visits within past 360 days and meeting all other requirements  Future Appointments  No visits were found meeting these conditions.  Showing future appointments within next 720 days and meeting all other requirements     Last 3 Blood Pressure:   BP Readings from Last 3 Encounters:   08/07/24 (!) 172/114   07/12/24 (!) 172/92   05/19/24 (!) 169/101     Preferred Pharmacy:   [id number] - [place], [place] - [address]  [address]  [facility] [address]  Phone: [phone] Fax: [phone]    [facility] [address] [place] [address]  Phone: [phone] Fax: [phone]    [facility]/pharmacy #[id number] - [address] AT CORNER OF [address] [address]  Phone: [phone] Fax: [phone]    Requested RX:  Requested Prescriptions     Pending Prescriptions Disp Refills    ondansetron (ZOFRAN) 8 MG tablet 30 tablet 1     Sig: Take 1 tablet (8 mg total) by mouth every 12 (twelve) hours as needed for Nausea.      RX Route: Normal

## 2025-01-02 NOTE — TELEPHONE ENCOUNTER
No care due was identified.  [facility] Embedded Care Due Messages. Reference number: [id number].   1/02/2025 12:56:35 PM CST

## 2025-01-28 DIAGNOSIS — G89.29 CHRONIC PAIN OF LEFT ANKLE: ICD-10-CM

## 2025-01-28 DIAGNOSIS — M25.572 CHRONIC PAIN OF LEFT ANKLE: ICD-10-CM

## 2025-01-28 DIAGNOSIS — Z98.1 STATUS POST ANKLE ARTHRODESIS: ICD-10-CM

## 2025-01-28 RX ORDER — OXYCODONE AND ACETAMINOPHEN 10; 325 MG/1; MG/1
1 TABLET ORAL EVERY 6 HOURS PRN
Qty: 120 TABLET | Refills: 0 | Status: SHIPPED | OUTPATIENT
Start: 2025-01-28

## 2025-02-10 ENCOUNTER — OCCUPATIONAL HEALTH (OUTPATIENT)
Dept: URGENT CARE | Facility: CLINIC | Age: 45
End: 2025-02-10

## 2025-02-10 DIAGNOSIS — Z02.1 PRE-EMPLOYMENT EXAMINATION: Primary | ICD-10-CM

## 2025-02-10 LAB
CTP QC/QA: YES
FECAL OCCULT BLOOD, POC: NEGATIVE

## 2025-02-10 RX ORDER — SUMATRIPTAN SUCCINATE 50 MG/1
50 TABLET ORAL DAILY PRN
COMMUNITY
Start: 2024-10-10

## 2025-02-23 DIAGNOSIS — M25.572 CHRONIC PAIN OF LEFT ANKLE: ICD-10-CM

## 2025-02-23 DIAGNOSIS — F41.9 ANXIETY: ICD-10-CM

## 2025-02-23 DIAGNOSIS — Z98.1 STATUS POST ANKLE ARTHRODESIS: ICD-10-CM

## 2025-02-23 DIAGNOSIS — G89.29 CHRONIC PAIN OF LEFT ANKLE: ICD-10-CM

## 2025-02-23 DIAGNOSIS — F41.1 GAD (GENERALIZED ANXIETY DISORDER): ICD-10-CM

## 2025-02-23 RX ORDER — ONDANSETRON HYDROCHLORIDE 8 MG/1
8 TABLET, FILM COATED ORAL EVERY 12 HOURS PRN
Qty: 30 TABLET | Refills: 0 | Status: CANCELLED | OUTPATIENT
Start: 2025-02-23

## 2025-02-23 NOTE — TELEPHONE ENCOUNTER
Care Due:                  Date            Visit Type   Department     Provider  --------------------------------------------------------------------------------                                ESTABLISHED                              PATIENT -    [facility] INTERNAL  Tien Nicholson  Last Visit: 04-      VIRTUAL      MEDICINE       [facility]  Next Visit: None Scheduled  None         None Found                                                            Last  Test          Frequency    Reason                     Performed    Due Date  --------------------------------------------------------------------------------    CMP.........  12 months..  losartan.................  05- 04-    [facility] Embedded Care Due Messages. Reference number: [id number].   2/23/2025 10:02:47 AM CST

## 2025-02-24 DIAGNOSIS — F41.9 ANXIETY: ICD-10-CM

## 2025-02-24 DIAGNOSIS — F41.1 GAD (GENERALIZED ANXIETY DISORDER): ICD-10-CM

## 2025-02-24 RX ORDER — OXYCODONE AND ACETAMINOPHEN 10; 325 MG/1; MG/1
1 TABLET ORAL EVERY 6 HOURS PRN
Qty: 120 TABLET | Refills: 0 | OUTPATIENT
Start: 2025-02-24

## 2025-02-24 NOTE — TELEPHONE ENCOUNTER
Refill Encounter    PCP Visits: Recent Visits  Date Type Provider Dept   04/02/24 Office Visit Tien Watts MD [facility] Internal Medicine   Showing recent visits within past 360 days and meeting all other requirements  Future Appointments  No visits were found meeting these conditions.  Showing future appointments within next 720 days and meeting all other requirements      Last 3 Blood Pressure:   BP Readings from Last 3 Encounters:   08/07/24 (!) 172/114   07/12/24 (!) 172/92   05/19/24 (!) 169/101     Preferred Pharmacy:   [facility] [address] [place] [address]  Phone: [phone] Fax: [phone]    Requested RX:  Requested Prescriptions     Pending Prescriptions Disp Refills    ondansetron (ZOFRAN) 8 MG tablet 30 tablet 0     Sig: Take 1 tablet (8 mg total) by mouth every 12 (twelve) hours as needed for Nausea.      RX Route: Normal

## 2025-02-24 NOTE — TELEPHONE ENCOUNTER
Refill Routing Note   Medication(s) are not appropriate for processing by [facility] for the following reason(s):        Outside of protocol    [facility] action(s):  Route     Requires labs : Yes             Appointments  past 12m or future 3m with PCP    Date Provider   Last Visit   4/2/2024 Tein Watts MD   Next Visit   Visit date not found Tien Watts MD   ED visits in past 90 days: 0        Note composed:10:18 AM 02/24/2025

## 2025-02-24 NOTE — TELEPHONE ENCOUNTER
No care due was identified.  [facility] Embedded Care Due Messages. Reference number: [id number].   2/24/2025 11:00:57 AM CST

## 2025-02-25 RX ORDER — ONDANSETRON HYDROCHLORIDE 8 MG/1
8 TABLET, FILM COATED ORAL EVERY 12 HOURS PRN
Qty: 30 TABLET | Refills: 0 | Status: SHIPPED | OUTPATIENT
Start: 2025-02-25

## 2025-02-25 NOTE — TELEPHONE ENCOUNTER
Called patient via telephone call but there was no answer. LVM for patient informing pt is overdue for annual appt.  Also instructed patient to return our call at [phone] for assistance in scheduling this appointment or they may also use [employer] to do so. Sent [employer] message as well.      Refill Encounter  Allergies: Confirmed    PCP Visits: Recent Visits  Date Type Provider Dept   04/02/24 Virtual Visit Tien Watts MD [facility] Internal Medicine   Showing recent visits within past 360 days and meeting all other requirements  Future Appointments  No visits were found meeting these conditions.  Showing future appointments within next 720 days and meeting all other requirements     Last 3 Blood Pressure:   BP Readings from Last 3 Encounters:   08/07/24 (!) 172/114   07/12/24 (!) 172/92   05/19/24 (!) 169/101     Preferred Pharmacy:   [facility] [address] [place] [address]  Phone: [phone] Fax: [phone]    Requested RX:  Requested Prescriptions     Pending Prescriptions Disp Refills    ondansetron (ZOFRAN) 8 MG tablet 30 tablet 0     Sig: Take 1 tablet (8 mg total) by mouth every 12 (twelve) hours as needed for Nausea.      RX Route: Normal

## 2025-02-27 ENCOUNTER — PATIENT MESSAGE (OUTPATIENT)
Dept: ORTHOPEDICS | Facility: CLINIC | Age: 45
End: 2025-02-27
Payer: MEDICAID

## 2025-02-27 DIAGNOSIS — G89.29 CHRONIC PAIN OF LEFT ANKLE: ICD-10-CM

## 2025-02-27 DIAGNOSIS — M25.572 CHRONIC PAIN OF LEFT ANKLE: ICD-10-CM

## 2025-02-27 DIAGNOSIS — Z98.1 STATUS POST ANKLE ARTHRODESIS: ICD-10-CM

## 2025-02-28 DIAGNOSIS — G89.29 CHRONIC PAIN OF LEFT ANKLE: ICD-10-CM

## 2025-02-28 DIAGNOSIS — M25.572 CHRONIC PAIN OF LEFT ANKLE: ICD-10-CM

## 2025-02-28 DIAGNOSIS — Z98.1 STATUS POST ANKLE ARTHRODESIS: ICD-10-CM

## 2025-02-28 RX ORDER — OXYCODONE AND ACETAMINOPHEN 10; 325 MG/1; MG/1
1 TABLET ORAL EVERY 6 HOURS PRN
Qty: 120 TABLET | Refills: 0 | OUTPATIENT
Start: 2025-02-28

## 2025-02-28 RX ORDER — OXYCODONE AND ACETAMINOPHEN 10; 325 MG/1; MG/1
1 TABLET ORAL EVERY 6 HOURS PRN
Qty: 120 TABLET | Refills: 0 | Status: SHIPPED | OUTPATIENT
Start: 2025-02-28

## 2025-03-27 DIAGNOSIS — Z98.1 STATUS POST ANKLE ARTHRODESIS: ICD-10-CM

## 2025-03-27 DIAGNOSIS — M25.572 CHRONIC PAIN OF LEFT ANKLE: ICD-10-CM

## 2025-03-27 DIAGNOSIS — G89.29 CHRONIC PAIN OF LEFT ANKLE: ICD-10-CM

## 2025-03-27 RX ORDER — OXYCODONE AND ACETAMINOPHEN 10; 325 MG/1; MG/1
1 TABLET ORAL EVERY 6 HOURS PRN
Qty: 120 TABLET | Refills: 0 | Status: CANCELLED | OUTPATIENT
Start: 2025-03-27

## 2025-03-28 ENCOUNTER — PATIENT MESSAGE (OUTPATIENT)
Dept: ORTHOPEDICS | Facility: CLINIC | Age: 45
End: 2025-03-28
Payer: MEDICAID

## 2025-03-28 RX ORDER — OXYCODONE AND ACETAMINOPHEN 10; 325 MG/1; MG/1
1 TABLET ORAL EVERY 6 HOURS PRN
Qty: 120 TABLET | Refills: 0 | Status: SHIPPED | OUTPATIENT
Start: 2025-03-28

## 2025-03-31 ENCOUNTER — PATIENT MESSAGE (OUTPATIENT)
Dept: ADMINISTRATIVE | Facility: HOSPITAL | Age: 45
End: 2025-03-31
Payer: MEDICAID

## 2025-04-25 DIAGNOSIS — M25.572 CHRONIC PAIN OF LEFT ANKLE: ICD-10-CM

## 2025-04-25 DIAGNOSIS — Z98.1 STATUS POST ANKLE ARTHRODESIS: ICD-10-CM

## 2025-04-25 DIAGNOSIS — G89.29 CHRONIC PAIN OF LEFT ANKLE: ICD-10-CM

## 2025-04-25 RX ORDER — ONDANSETRON 4 MG/1
4 TABLET, ORALLY DISINTEGRATING ORAL EVERY 6 HOURS PRN
Qty: 15 TABLET | Refills: 0 | OUTPATIENT
Start: 2025-04-25

## 2025-04-25 RX ORDER — OXYCODONE AND ACETAMINOPHEN 10; 325 MG/1; MG/1
1 TABLET ORAL EVERY 6 HOURS PRN
Qty: 120 TABLET | Refills: 0 | OUTPATIENT
Start: 2025-04-25

## 2025-04-28 DIAGNOSIS — M25.572 CHRONIC PAIN OF LEFT ANKLE: ICD-10-CM

## 2025-04-28 DIAGNOSIS — Z98.1 STATUS POST ANKLE ARTHRODESIS: ICD-10-CM

## 2025-04-28 DIAGNOSIS — G89.29 CHRONIC PAIN OF LEFT ANKLE: ICD-10-CM

## 2025-04-28 RX ORDER — OXYCODONE AND ACETAMINOPHEN 10; 325 MG/1; MG/1
1 TABLET ORAL EVERY 6 HOURS PRN
Qty: 120 TABLET | Refills: 0 | Status: SHIPPED | OUTPATIENT
Start: 2025-04-28

## 2025-05-13 NOTE — HPI
"Anitar Chaney is a 38 y.o. [id number] at 17w5d who presented to the ER last night with complaints of 3 episodes of dark blood per rectum and maroon colored stool that occurred yesterday.  She states when she wiped she noticed some dark maroon blood on the toilet paper and adds when she looked down and there was blood in the toilet.  She reports loose BM, denies straining.  She denies history of hemorrhoids and states this has never happened before.  She adds she also had emesis x2 yesterday, denies hematemesis.  She denies feeling lightheaded, weak, or dizzy.  She denies fever or chills.     She does have a history of GERD, states she has had both an EGD and colonoscopy several years ago showing just "irritation" and gastritis.   She denies vaginal bleeding or abdominal pain/cramping.  She reports mostly lower back pain.     This pregnancy is complicated by history of CVA in 2016 for which she has been taking ASA 81 mg daily. She also has a history of gastric sleeve in 2017 for which she has lost 130 pounds.  Prior to this procedure she had HTN (previously on 3 meds) and DM2 (previously on metformin).  She no longer requires medication for these conditions since she has lost weight.  She is on Prozac and klonopin PRN for anxiety.   Patient's primary ob/gyn is Dr. Yen and she has seen MFM early in pregnancy due to her high risk pregnancy.     Ob History  - C/S 37 wks failed induction, GDM (metformin), PreEclampsia  - C/S 37 wks repeat, GDM metformin)  2018- SAB (cytotec)  " 0

## 2025-05-27 DIAGNOSIS — Z98.1 STATUS POST ANKLE ARTHRODESIS: ICD-10-CM

## 2025-05-27 DIAGNOSIS — G89.29 CHRONIC PAIN OF LEFT ANKLE: ICD-10-CM

## 2025-05-27 DIAGNOSIS — M25.572 CHRONIC PAIN OF LEFT ANKLE: ICD-10-CM

## 2025-05-27 RX ORDER — OXYCODONE AND ACETAMINOPHEN 10; 325 MG/1; MG/1
1 TABLET ORAL EVERY 6 HOURS PRN
Qty: 120 TABLET | Refills: 0 | Status: SHIPPED | OUTPATIENT
Start: 2025-05-27

## 2025-06-25 DIAGNOSIS — M25.572 CHRONIC PAIN OF LEFT ANKLE: ICD-10-CM

## 2025-06-25 DIAGNOSIS — Z98.1 STATUS POST ANKLE ARTHRODESIS: ICD-10-CM

## 2025-06-25 DIAGNOSIS — G89.29 CHRONIC PAIN OF LEFT ANKLE: ICD-10-CM

## 2025-06-25 RX ORDER — OXYCODONE AND ACETAMINOPHEN 10; 325 MG/1; MG/1
1 TABLET ORAL EVERY 6 HOURS PRN
Qty: 120 TABLET | Refills: 0 | Status: SHIPPED | OUTPATIENT
Start: 2025-06-25

## 2025-07-24 DIAGNOSIS — G89.29 CHRONIC PAIN OF LEFT ANKLE: ICD-10-CM

## 2025-07-24 DIAGNOSIS — M25.572 CHRONIC PAIN OF LEFT ANKLE: ICD-10-CM

## 2025-07-24 DIAGNOSIS — Z98.1 STATUS POST ANKLE ARTHRODESIS: ICD-10-CM

## 2025-07-24 RX ORDER — OXYCODONE AND ACETAMINOPHEN 10; 325 MG/1; MG/1
1 TABLET ORAL EVERY 6 HOURS PRN
Qty: 120 TABLET | Refills: 0 | Status: SHIPPED | OUTPATIENT
Start: 2025-07-24

## 2025-08-19 DIAGNOSIS — M25.572 CHRONIC PAIN OF LEFT ANKLE: ICD-10-CM

## 2025-08-19 DIAGNOSIS — G89.29 CHRONIC PAIN OF LEFT ANKLE: ICD-10-CM

## 2025-08-19 DIAGNOSIS — Z98.1 STATUS POST ANKLE ARTHRODESIS: ICD-10-CM

## 2025-08-20 DIAGNOSIS — M25.572 CHRONIC PAIN OF LEFT ANKLE: ICD-10-CM

## 2025-08-20 DIAGNOSIS — Z98.1 STATUS POST ANKLE ARTHRODESIS: ICD-10-CM

## 2025-08-20 DIAGNOSIS — G89.29 CHRONIC PAIN OF LEFT ANKLE: ICD-10-CM

## 2025-08-20 RX ORDER — OXYCODONE AND ACETAMINOPHEN 10; 325 MG/1; MG/1
1 TABLET ORAL EVERY 6 HOURS PRN
Qty: 120 TABLET | Refills: 0 | Status: CANCELLED | OUTPATIENT
Start: 2025-08-20

## 2025-08-21 RX ORDER — OXYCODONE AND ACETAMINOPHEN 10; 325 MG/1; MG/1
1 TABLET ORAL EVERY 6 HOURS PRN
Qty: 120 TABLET | Refills: 0 | Status: SHIPPED | OUTPATIENT
Start: 2025-08-21

## (undated) DEVICE — Device

## (undated) DEVICE — SOL 9P NACL IRR PIC IL

## (undated) DEVICE — SEE MEDLINE ITEM 146298

## (undated) DEVICE — SUT 0 8-27IN VICRYL PL CT-1

## (undated) DEVICE — ELECTRODE REM PLYHSV RETURN 9

## (undated) DEVICE — PAD ABD 8X10 STERILE

## (undated) DEVICE — SUT ETHILON 3-0 PS2 18 BLK

## (undated) DEVICE — SUT 2-0 VICRYL / SH (J417)

## (undated) DEVICE — CONTAINER SPECIMEN STRL 4OZ

## (undated) DEVICE — SYS SEE SHARP SCOPE ANTIFOG

## (undated) DEVICE — TIP RUMI KOH-EFFICIENT 3.5

## (undated) DEVICE — SOL NS 1000CC

## (undated) DEVICE — PAD CAST SPECIALIST STRL 4

## (undated) DEVICE — SEE MEDLINE ITEM 152622

## (undated) DEVICE — SUT MCRYL PLUS 4-0 PS2 27IN

## (undated) DEVICE — SEE MEDLINE ITEM 157150

## (undated) DEVICE — COTTON ROLL 16 OZ.

## (undated) DEVICE — DRAPE STERI-DRAPE 1000 17X11IN

## (undated) DEVICE — SEALER LIGASURE MARYLAND 37CM

## (undated) DEVICE — TIP RUMI GREEN DISPOSABLE6.7MM

## (undated) DEVICE — SPONGE LAP 18X18 PREWASHED

## (undated) DEVICE — BIT DRILL ACUTRAK LONG 7.5MM

## (undated) DEVICE — SEE MEDLINE ITEM 157110

## (undated) DEVICE — BANDAGE KLING 3

## (undated) DEVICE — DRAPE STERI LONG

## (undated) DEVICE — TROCAR KII FIOS 5MM X 100MM

## (undated) DEVICE — DRAPE C ARM 42 X 120 10/BX

## (undated) DEVICE — DRAPE C-ARMOR EQUIPMENT COVER

## (undated) DEVICE — GUIDEWIRE .054MM
Type: IMPLANTABLE DEVICE | Site: ANKLE | Status: NON-FUNCTIONAL
Removed: 2018-03-27

## (undated) DEVICE — PAD PREP 50/CA

## (undated) DEVICE — KIT WING PAD POSITIONING

## (undated) DEVICE — BIT DRILL PROFILE ACUTRAK 7.5

## (undated) DEVICE — BANDAGE ESMARK 6X12

## (undated) DEVICE — SEE MEDLINE ITEM 157117

## (undated) DEVICE — GAUZE SPONGE 4X4 12PLY

## (undated) DEVICE — SYR 10CC LUER LOCK

## (undated) DEVICE — SYR 50CC LL

## (undated) DEVICE — JELLY SURGILUBE 5GR

## (undated) DEVICE — DRESSING XEROFORM FOIL PK 1X8

## (undated) DEVICE — SPONGE GAUZE 16PLY 4X4

## (undated) DEVICE — NDL INSUF ULTRA VERESS 120MM

## (undated) DEVICE — GAUZE SPONGE BULKEE 6X6.75IN

## (undated) DEVICE — BURR LONG PINEAPPLE

## (undated) DEVICE — PACK LAPAROSCOPY BAPTIST

## (undated) DEVICE — WIRE KIRSCHNER ACUTRK.094X9.25
Type: IMPLANTABLE DEVICE | Site: ANKLE | Status: NON-FUNCTIONAL
Removed: 2018-03-27

## (undated) DEVICE — BLADE SAGITTA 5/BX

## (undated) DEVICE — SEE MEDLINE ITEM 146271

## (undated) DEVICE — WIRE K SMTH ACUTRAK .062X9.25
Type: IMPLANTABLE DEVICE | Site: ANKLE | Status: NON-FUNCTIONAL
Removed: 2018-03-27

## (undated) DEVICE — GLOVE BIOGEL SKINSENSE PI 6.5

## (undated) DEVICE — APPLICATOR CHLORAPREP ORN 26ML

## (undated) DEVICE — BIT DRILL STD LONG

## (undated) DEVICE — SEE MEDLINE ITEM 156930

## (undated) DEVICE — TRAY MINOR ORTHO

## (undated) DEVICE — SUT VICRYL PLUS 0 CT1 36IN

## (undated) DEVICE — BIT DRILL STD ACUTRAK 2

## (undated) DEVICE — BANDAGE ACE ELASTIC 6"

## (undated) DEVICE — STOCKINET TUBULAR 1 PLY 6X60IN